# Patient Record
Sex: FEMALE | Race: WHITE | NOT HISPANIC OR LATINO | Employment: OTHER | URBAN - METROPOLITAN AREA
[De-identification: names, ages, dates, MRNs, and addresses within clinical notes are randomized per-mention and may not be internally consistent; named-entity substitution may affect disease eponyms.]

---

## 2017-01-09 DIAGNOSIS — E55.9 VITAMIN D DEFICIENCY: ICD-10-CM

## 2017-01-09 DIAGNOSIS — D64.9 ANEMIA: ICD-10-CM

## 2017-01-09 DIAGNOSIS — E53.8 DEFICIENCY OF OTHER SPECIFIED B GROUP VITAMINS: ICD-10-CM

## 2017-01-09 DIAGNOSIS — R73.9 HYPERGLYCEMIA: ICD-10-CM

## 2017-01-09 DIAGNOSIS — E78.5 HYPERLIPIDEMIA: ICD-10-CM

## 2017-01-09 DIAGNOSIS — R74.8 ABNORMAL LEVELS OF OTHER SERUM ENZYMES: ICD-10-CM

## 2017-01-09 DIAGNOSIS — E87.6 HYPOKALEMIA: ICD-10-CM

## 2017-01-16 ENCOUNTER — APPOINTMENT (OUTPATIENT)
Dept: LAB | Facility: CLINIC | Age: 47
End: 2017-01-16
Payer: MEDICARE

## 2017-01-16 ENCOUNTER — HOSPITAL ENCOUNTER (OUTPATIENT)
Dept: INFUSION CENTER | Facility: CLINIC | Age: 47
Discharge: HOME | End: 2017-01-16
Payer: MEDICARE

## 2017-01-16 DIAGNOSIS — R74.8 ABNORMAL LEVELS OF OTHER SERUM ENZYMES: ICD-10-CM

## 2017-01-16 DIAGNOSIS — E87.6 HYPOKALEMIA: ICD-10-CM

## 2017-01-16 DIAGNOSIS — R73.9 HYPERGLYCEMIA: ICD-10-CM

## 2017-01-16 DIAGNOSIS — E55.9 VITAMIN D DEFICIENCY: ICD-10-CM

## 2017-01-16 DIAGNOSIS — D64.9 ANEMIA: ICD-10-CM

## 2017-01-16 DIAGNOSIS — E53.8 DEFICIENCY OF OTHER SPECIFIED B GROUP VITAMINS: ICD-10-CM

## 2017-01-16 DIAGNOSIS — E78.5 HYPERLIPIDEMIA: ICD-10-CM

## 2017-01-16 LAB
25(OH)D3 SERPL-MCNC: 17.8 NG/ML (ref 30–100)
ALBUMIN SERPL BCP-MCNC: 4.1 G/DL (ref 3.5–5)
ALP SERPL-CCNC: 81 U/L (ref 46–116)
ALT SERPL W P-5'-P-CCNC: <6 U/L (ref 12–78)
AMORPH PHOS CRY URNS QL MICRO: ABNORMAL /HPF
ANION GAP SERPL CALCULATED.3IONS-SCNC: 9 MMOL/L (ref 4–13)
AST SERPL W P-5'-P-CCNC: <5 U/L (ref 5–45)
BACTERIA UR QL AUTO: ABNORMAL /HPF
BILIRUB SERPL-MCNC: 0.5 MG/DL (ref 0.2–1)
BILIRUB UR QL STRIP: NEGATIVE
BUN SERPL-MCNC: 9 MG/DL (ref 5–25)
CALCIUM SERPL-MCNC: 9.3 MG/DL (ref 8.3–10.1)
CHLORIDE SERPL-SCNC: 101 MMOL/L (ref 100–108)
CHOLEST SERPL-MCNC: 283 MG/DL (ref 50–200)
CLARITY UR: CLEAR
CO2 SERPL-SCNC: 30 MMOL/L (ref 21–32)
COLOR UR: YELLOW
CREAT SERPL-MCNC: 0.39 MG/DL (ref 0.6–1.3)
ERYTHROCYTE [DISTWIDTH] IN BLOOD BY AUTOMATED COUNT: 12.6 % (ref 11.6–15.1)
EST. AVERAGE GLUCOSE BLD GHB EST-MCNC: 100 MG/DL
FOLATE SERPL-MCNC: 11.1 NG/ML (ref 3.1–17.5)
GFR SERPL CREATININE-BSD FRML MDRD: >60 ML/MIN/1.73SQ M
GLUCOSE SERPL-MCNC: 98 MG/DL (ref 65–140)
GLUCOSE UR STRIP-MCNC: NEGATIVE MG/DL
HBA1C MFR BLD: 5.1 % (ref 4.2–6.3)
HCT VFR BLD AUTO: 40.1 % (ref 34.8–46.1)
HDLC SERPL-MCNC: 97 MG/DL (ref 40–60)
HGB BLD-MCNC: 13.4 G/DL (ref 11.5–15.4)
HGB UR QL STRIP.AUTO: NEGATIVE
INSULIN SERPL-ACNC: 7.6 MU/L (ref 3–25)
KETONES UR STRIP-MCNC: NEGATIVE MG/DL
LDLC SERPL CALC-MCNC: 172 MG/DL (ref 0–100)
LEUKOCYTE ESTERASE UR QL STRIP: ABNORMAL
MCH RBC QN AUTO: 28.6 PG (ref 26.8–34.3)
MCHC RBC AUTO-ENTMCNC: 33.5 G/DL (ref 31.4–37.4)
MCV RBC AUTO: 85 FL (ref 82–98)
NITRITE UR QL STRIP: POSITIVE
NON-SQ EPI CELLS URNS QL MICRO: ABNORMAL /HPF
PH UR STRIP.AUTO: 7.5 [PH] (ref 4.5–8)
PLATELET # BLD AUTO: 292 THOUSANDS/UL (ref 149–390)
PMV BLD AUTO: 7.4 FL (ref 8.9–12.7)
POTASSIUM SERPL-SCNC: 3.6 MMOL/L (ref 3.5–5.3)
PROT SERPL-MCNC: 6.6 G/DL (ref 6.4–8.2)
PROT UR STRIP-MCNC: NEGATIVE MG/DL
RBC # BLD AUTO: 4.7 MILLION/UL (ref 3.81–5.12)
RBC #/AREA URNS AUTO: ABNORMAL /HPF
SODIUM SERPL-SCNC: 140 MMOL/L (ref 136–145)
SP GR UR STRIP.AUTO: 1.01 (ref 1–1.03)
TRIGL SERPL-MCNC: 71 MG/DL
TSH SERPL DL<=0.05 MIU/L-ACNC: 2.9 UIU/ML (ref 0.36–3.74)
UROBILINOGEN UR QL STRIP.AUTO: 0.2 E.U./DL
VIT B12 SERPL-MCNC: 482 PG/ML (ref 100–900)
WBC # BLD AUTO: 8.5 THOUSAND/UL (ref 4.31–10.16)
WBC #/AREA URNS AUTO: ABNORMAL /HPF

## 2017-01-16 PROCEDURE — 84207 ASSAY OF VITAMIN B-6: CPT | Performed by: PSYCHIATRY & NEUROLOGY

## 2017-01-16 PROCEDURE — 36415 COLL VENOUS BLD VENIPUNCTURE: CPT

## 2017-01-16 PROCEDURE — 80061 LIPID PANEL: CPT

## 2017-01-16 PROCEDURE — 84425 ASSAY OF VITAMIN B-1: CPT | Performed by: PSYCHIATRY & NEUROLOGY

## 2017-01-16 PROCEDURE — 80053 COMPREHEN METABOLIC PANEL: CPT

## 2017-01-16 PROCEDURE — 84080 ASSAY ALKALINE PHOSPHATASES: CPT

## 2017-01-16 PROCEDURE — 85027 COMPLETE CBC AUTOMATED: CPT

## 2017-01-16 PROCEDURE — 83036 HEMOGLOBIN GLYCOSYLATED A1C: CPT

## 2017-01-16 PROCEDURE — 84443 ASSAY THYROID STIM HORMONE: CPT

## 2017-01-16 PROCEDURE — 82306 VITAMIN D 25 HYDROXY: CPT

## 2017-01-16 PROCEDURE — 83525 ASSAY OF INSULIN: CPT

## 2017-01-16 PROCEDURE — 83918 ORGANIC ACIDS TOTAL QUANT: CPT | Performed by: PSYCHIATRY & NEUROLOGY

## 2017-01-16 PROCEDURE — 81001 URINALYSIS AUTO W/SCOPE: CPT

## 2017-01-16 PROCEDURE — 82607 VITAMIN B-12: CPT

## 2017-01-16 PROCEDURE — 82746 ASSAY OF FOLIC ACID SERUM: CPT

## 2017-01-16 RX ORDER — POTASSIUM CHLORIDE 20 MEQ/1
20 TABLET, EXTENDED RELEASE ORAL EVERY EVENING
COMMUNITY
End: 2018-01-25 | Stop reason: SDUPTHER

## 2017-01-16 RX ORDER — FUROSEMIDE 40 MG/1
40 TABLET ORAL EVERY EVENING
COMMUNITY
End: 2018-02-07 | Stop reason: SDUPTHER

## 2017-01-16 RX ORDER — DULOXETIN HYDROCHLORIDE 20 MG/1
20 CAPSULE, DELAYED RELEASE ORAL EVERY EVENING
COMMUNITY
End: 2018-01-25 | Stop reason: SDUPTHER

## 2017-01-16 RX ORDER — BACLOFEN 20 MG/1
20 TABLET ORAL 3 TIMES DAILY
COMMUNITY
End: 2018-06-14 | Stop reason: SDUPTHER

## 2017-01-16 RX ADMIN — Medication 300 UNITS: at 12:00

## 2017-01-16 NOTE — PLAN OF CARE
Problem: Potential for Falls  Goal: Patient will remain free of falls  INTERVENTIONS:  - Assess patient frequently for physical needs  - Identify cognitive and physical deficits and behaviors that affect risk of falls  - [place] fall precautions as indicated by assessment   - Educate patient/family on patient safety including physical limitations  - Instruct patient to call for assistance with activity based on assessment  - Modify environment to reduce risk of injury  - Consider OT/PT consult to assist with strengthening/mobility   Outcome: Progressing    Problem: SAFETY ADULT  Goal: Patient will remain free of falls  INTERVENTIONS:  - Assess patient frequently for physical needs  - Identify cognitive and physical deficits and behaviors that affect risk of falls  - [place] fall precautions as indicated by assessment   - Educate patient/family on patient safety including physical limitations  - Instruct patient to call for assistance with activity based on assessment  - Modify environment to reduce risk of injury  - Consider OT/PT consult to assist with strengthening/mobility   Outcome: Progressing    Problem: Knowledge Deficit  Goal: Patient/family/caregiver demonstrates understanding of disease process, treatment plan, medications, and discharge instructions  Complete learning assessment and assess knowledge base    Interventions:  - Provide teaching at level of understanding  - Provide teaching via preferred learning methods  Outcome: Progressing

## 2017-01-17 ENCOUNTER — ALLSCRIPTS OFFICE VISIT (OUTPATIENT)
Dept: OTHER | Facility: OTHER | Age: 47
End: 2017-01-17

## 2017-01-18 LAB
ALP BONE CFR SERPL: 32 % (ref 14–68)
ALP INTEST CFR SERPL: 0 % (ref 0–18)
ALP LIVER CFR SERPL: 68 % (ref 18–85)
ALP SERPL-CCNC: 81 IU/L (ref 39–117)

## 2017-01-19 ENCOUNTER — GENERIC CONVERSION - ENCOUNTER (OUTPATIENT)
Dept: OTHER | Facility: OTHER | Age: 47
End: 2017-01-19

## 2017-01-19 LAB
VIT B1 BLD-SCNC: 130.8 NMOL/L (ref 66.5–200)
VIT B6 SERPL-MCNC: 15.8 UG/L (ref 2–32.8)

## 2017-01-20 LAB — METHYLMALONATE SERPL-SCNC: 151 NMOL/L (ref 0–378)

## 2017-01-30 ENCOUNTER — GENERIC CONVERSION - ENCOUNTER (OUTPATIENT)
Dept: OTHER | Facility: OTHER | Age: 47
End: 2017-01-30

## 2017-02-19 ENCOUNTER — GENERIC CONVERSION - ENCOUNTER (OUTPATIENT)
Dept: OTHER | Facility: OTHER | Age: 47
End: 2017-02-19

## 2017-02-22 ENCOUNTER — ALLSCRIPTS OFFICE VISIT (OUTPATIENT)
Dept: OTHER | Facility: OTHER | Age: 47
End: 2017-02-22

## 2017-02-24 RX ORDER — SODIUM CHLORIDE 9 MG/ML
20 INJECTION, SOLUTION INTRAVENOUS CONTINUOUS
Status: DISCONTINUED | OUTPATIENT
Start: 2017-02-27 | End: 2017-03-02 | Stop reason: HOSPADM

## 2017-02-27 ENCOUNTER — HOSPITAL ENCOUNTER (OUTPATIENT)
Dept: INFUSION CENTER | Facility: CLINIC | Age: 47
Discharge: HOME | End: 2017-02-27
Payer: MEDICARE

## 2017-02-27 VITALS
RESPIRATION RATE: 18 BRPM | TEMPERATURE: 98.2 F | SYSTOLIC BLOOD PRESSURE: 114 MMHG | HEART RATE: 83 BPM | DIASTOLIC BLOOD PRESSURE: 53 MMHG

## 2017-02-27 PROCEDURE — 96365 THER/PROPH/DIAG IV INF INIT: CPT

## 2017-02-27 RX ORDER — SODIUM CHLORIDE 9 MG/ML
20 INJECTION, SOLUTION INTRAVENOUS CONTINUOUS
Status: DISCONTINUED | OUTPATIENT
Start: 2017-02-28 | End: 2017-03-03 | Stop reason: HOSPADM

## 2017-02-27 RX ADMIN — SODIUM CHLORIDE 20 ML/HR: 0.9 INJECTION, SOLUTION INTRAVENOUS at 11:00

## 2017-02-27 RX ADMIN — Medication 300 UNITS: at 12:14

## 2017-02-27 RX ADMIN — SODIUM CHLORIDE 1000 MG: 0.9 INJECTION, SOLUTION INTRAVENOUS at 11:05

## 2017-02-28 ENCOUNTER — HOSPITAL ENCOUNTER (OUTPATIENT)
Dept: INFUSION CENTER | Facility: CLINIC | Age: 47
Discharge: HOME | End: 2017-02-28
Payer: MEDICARE

## 2017-02-28 VITALS
SYSTOLIC BLOOD PRESSURE: 99 MMHG | HEART RATE: 77 BPM | TEMPERATURE: 97 F | RESPIRATION RATE: 16 BRPM | DIASTOLIC BLOOD PRESSURE: 58 MMHG

## 2017-02-28 PROCEDURE — 96365 THER/PROPH/DIAG IV INF INIT: CPT

## 2017-02-28 RX ORDER — SODIUM CHLORIDE 9 MG/ML
20 INJECTION, SOLUTION INTRAVENOUS CONTINUOUS
Status: DISCONTINUED | OUTPATIENT
Start: 2017-03-01 | End: 2017-03-04 | Stop reason: HOSPADM

## 2017-02-28 RX ADMIN — SODIUM CHLORIDE 1000 MG: 0.9 INJECTION, SOLUTION INTRAVENOUS at 13:17

## 2017-02-28 RX ADMIN — Medication 300 UNITS: at 14:31

## 2017-02-28 RX ADMIN — SODIUM CHLORIDE 20 ML/HR: 0.9 INJECTION, SOLUTION INTRAVENOUS at 13:16

## 2017-02-28 NOTE — PLAN OF CARE
Problem: Potential for Falls  Goal: Patient will remain free of falls  INTERVENTIONS:  - Assess patient frequently for physical needs  - Identify cognitive and physical deficits and behaviors that affect risk of falls    - [place] fall precautions as indicated by assessment   - Educate patient/family on patient safety including physical limitations  - Instruct patient to call for assistance with activity based on assessment  - Modify environment to reduce risk of injury  - Consider OT/PT consult to assist with strengthening/mobility   Outcome: Progressing

## 2017-02-28 NOTE — PROGRESS NOTES
Port a cath accessed from yesterday's infusion  Pt requests to keep needle in place for tomorrow as well

## 2017-03-01 ENCOUNTER — HOSPITAL ENCOUNTER (OUTPATIENT)
Dept: INFUSION CENTER | Facility: CLINIC | Age: 47
Discharge: HOME | End: 2017-03-01
Payer: MEDICARE

## 2017-03-01 VITALS
SYSTOLIC BLOOD PRESSURE: 103 MMHG | RESPIRATION RATE: 18 BRPM | TEMPERATURE: 97 F | HEART RATE: 65 BPM | DIASTOLIC BLOOD PRESSURE: 51 MMHG

## 2017-03-01 PROCEDURE — 96365 THER/PROPH/DIAG IV INF INIT: CPT

## 2017-03-01 RX ADMIN — SODIUM CHLORIDE 1000 MG: 0.9 INJECTION, SOLUTION INTRAVENOUS at 10:03

## 2017-03-01 RX ADMIN — SODIUM CHLORIDE 20 ML/HR: 0.9 INJECTION, SOLUTION INTRAVENOUS at 10:06

## 2017-03-01 RX ADMIN — Medication 300 UNITS: at 11:28

## 2017-03-01 NOTE — PLAN OF CARE
Problem: Potential for Falls  Goal: Patient will remain free of falls  INTERVENTIONS:  - Assess patient frequently for physical needs  - Identify cognitive and physical deficits and behaviors that affect risk of falls    - [place] fall precautions as indicated by assessment   - Educate patient/family on patient safety including physical limitations  - Instruct patient to call for assistance with activity based on assessment  - Modify environment to reduce risk of injury  - Consider OT/PT consult to assist with strengthening/mobility   Outcome: Progressing

## 2017-03-06 DIAGNOSIS — G35 MULTIPLE SCLEROSIS (HCC): ICD-10-CM

## 2017-04-17 DIAGNOSIS — R73.9 HYPERGLYCEMIA: ICD-10-CM

## 2017-04-17 DIAGNOSIS — M48.00 SPINAL STENOSIS: ICD-10-CM

## 2017-04-17 DIAGNOSIS — N31.9 NEUROMUSCULAR DYSFUNCTION OF BLADDER: ICD-10-CM

## 2017-04-17 DIAGNOSIS — E53.8 DEFICIENCY OF OTHER SPECIFIED B GROUP VITAMINS: ICD-10-CM

## 2017-04-17 DIAGNOSIS — E55.9 VITAMIN D DEFICIENCY: ICD-10-CM

## 2017-04-17 DIAGNOSIS — Z00.00 ENCOUNTER FOR GENERAL ADULT MEDICAL EXAMINATION WITHOUT ABNORMAL FINDINGS: ICD-10-CM

## 2017-04-17 DIAGNOSIS — E78.5 HYPERLIPIDEMIA: ICD-10-CM

## 2017-04-17 DIAGNOSIS — E87.6 HYPOKALEMIA: ICD-10-CM

## 2017-04-17 DIAGNOSIS — Z99.3 DEPENDENT ON WHEELCHAIR: ICD-10-CM

## 2017-04-17 DIAGNOSIS — F32.9 MAJOR DEPRESSIVE DISORDER, SINGLE EPISODE: ICD-10-CM

## 2017-07-18 ENCOUNTER — ALLSCRIPTS OFFICE VISIT (OUTPATIENT)
Dept: OTHER | Facility: OTHER | Age: 47
End: 2017-07-18

## 2017-09-28 ENCOUNTER — GENERIC CONVERSION - ENCOUNTER (OUTPATIENT)
Dept: OTHER | Facility: OTHER | Age: 47
End: 2017-09-28

## 2017-10-09 ENCOUNTER — GENERIC CONVERSION - ENCOUNTER (OUTPATIENT)
Dept: OTHER | Facility: OTHER | Age: 47
End: 2017-10-09

## 2017-11-17 ENCOUNTER — GENERIC CONVERSION - ENCOUNTER (OUTPATIENT)
Dept: OTHER | Facility: OTHER | Age: 47
End: 2017-11-17

## 2017-11-21 DIAGNOSIS — N21.0 CALCULUS IN BLADDER: ICD-10-CM

## 2017-11-21 DIAGNOSIS — N20.0 CALCULUS OF KIDNEY: ICD-10-CM

## 2017-11-24 ENCOUNTER — GENERIC CONVERSION - ENCOUNTER (OUTPATIENT)
Dept: OTHER | Facility: OTHER | Age: 47
End: 2017-11-24

## 2017-11-27 ENCOUNTER — GENERIC CONVERSION - ENCOUNTER (OUTPATIENT)
Dept: OTHER | Facility: OTHER | Age: 47
End: 2017-11-27

## 2017-11-28 ENCOUNTER — GENERIC CONVERSION - ENCOUNTER (OUTPATIENT)
Dept: OTHER | Facility: OTHER | Age: 47
End: 2017-11-28

## 2017-11-29 ENCOUNTER — ANESTHESIA EVENT (OUTPATIENT)
Dept: PERIOP | Facility: HOSPITAL | Age: 47
End: 2017-11-29
Payer: MEDICARE

## 2017-11-29 RX ORDER — SODIUM CHLORIDE 9 MG/ML
125 INJECTION, SOLUTION INTRAVENOUS CONTINUOUS
Status: CANCELLED | OUTPATIENT
Start: 2017-12-04

## 2017-11-30 ENCOUNTER — HOSPITAL ENCOUNTER (OUTPATIENT)
Dept: NON INVASIVE DIAGNOSTICS | Facility: HOSPITAL | Age: 47
Discharge: HOME | End: 2017-11-30
Attending: UROLOGY
Payer: MEDICARE

## 2017-11-30 ENCOUNTER — TRANSCRIBE ORDERS (OUTPATIENT)
Dept: ADMINISTRATIVE | Facility: HOSPITAL | Age: 47
End: 2017-11-30

## 2017-11-30 ENCOUNTER — APPOINTMENT (OUTPATIENT)
Dept: PREADMISSION TESTING | Facility: HOSPITAL | Age: 47
End: 2017-11-30
Payer: MEDICARE

## 2017-11-30 ENCOUNTER — APPOINTMENT (OUTPATIENT)
Dept: LAB | Facility: HOSPITAL | Age: 47
End: 2017-11-30
Attending: UROLOGY
Payer: MEDICARE

## 2017-11-30 VITALS
BODY MASS INDEX: 27.32 KG/M2 | HEIGHT: 66 IN | TEMPERATURE: 96 F | RESPIRATION RATE: 16 BRPM | HEART RATE: 76 BPM | WEIGHT: 170 LBS

## 2017-11-30 DIAGNOSIS — Z01.818 PREOP EXAMINATION: Primary | ICD-10-CM

## 2017-11-30 DIAGNOSIS — Z01.818 PREOP EXAMINATION: ICD-10-CM

## 2017-11-30 DIAGNOSIS — N31.9 NEUROGENIC DYSFUNCTION OF THE URINARY BLADDER: ICD-10-CM

## 2017-11-30 LAB
ANION GAP SERPL CALCULATED.3IONS-SCNC: 5 MMOL/L (ref 4–13)
APTT PPP: 27 SECONDS (ref 23–35)
ATRIAL RATE: 73 BPM
BASOPHILS # BLD AUTO: 0.03 THOUSANDS/ΜL (ref 0–0.1)
BASOPHILS NFR BLD AUTO: 1 % (ref 0–1)
BUN SERPL-MCNC: 6 MG/DL (ref 5–25)
CALCIUM SERPL-MCNC: 9.5 MG/DL (ref 8.3–10.1)
CHLORIDE SERPL-SCNC: 100 MMOL/L (ref 100–108)
CO2 SERPL-SCNC: 32 MMOL/L (ref 21–32)
CREAT SERPL-MCNC: 0.47 MG/DL (ref 0.6–1.3)
EOSINOPHIL # BLD AUTO: 0.12 THOUSAND/ΜL (ref 0–0.61)
EOSINOPHIL NFR BLD AUTO: 2 % (ref 0–6)
ERYTHROCYTE [DISTWIDTH] IN BLOOD BY AUTOMATED COUNT: 12.7 % (ref 11.6–15.1)
GFR SERPL CREATININE-BSD FRML MDRD: 118 ML/MIN/1.73SQ M
GLUCOSE P FAST SERPL-MCNC: 98 MG/DL (ref 65–99)
HCT VFR BLD AUTO: 41.4 % (ref 34.8–46.1)
HGB BLD-MCNC: 13.8 G/DL (ref 11.5–15.4)
INR PPP: 0.91 (ref 0.86–1.16)
LYMPHOCYTES # BLD AUTO: 1.33 THOUSANDS/ΜL (ref 0.6–4.47)
LYMPHOCYTES NFR BLD AUTO: 27 % (ref 14–44)
MCH RBC QN AUTO: 28.8 PG (ref 26.8–34.3)
MCHC RBC AUTO-ENTMCNC: 33.3 G/DL (ref 31.4–37.4)
MCV RBC AUTO: 86 FL (ref 82–98)
MONOCYTES # BLD AUTO: 0.32 THOUSAND/ΜL (ref 0.17–1.22)
MONOCYTES NFR BLD AUTO: 6 % (ref 4–12)
NEUTROPHILS # BLD AUTO: 3.22 THOUSANDS/ΜL (ref 1.85–7.62)
NEUTS SEG NFR BLD AUTO: 64 % (ref 43–75)
P AXIS: 67 DEGREES
PLATELET # BLD AUTO: 295 THOUSANDS/UL (ref 149–390)
PMV BLD AUTO: 9.7 FL (ref 8.9–12.7)
POTASSIUM SERPL-SCNC: 3.6 MMOL/L (ref 3.5–5.3)
PR INTERVAL: 138 MS
PROTHROMBIN TIME: 12.3 SECONDS (ref 12.1–14.4)
QRS AXIS: 39 DEGREES
QRSD INTERVAL: 74 MS
QT INTERVAL: 394 MS
QTC INTERVAL: 434 MS
RBC # BLD AUTO: 4.79 MILLION/UL (ref 3.81–5.12)
SODIUM SERPL-SCNC: 137 MMOL/L (ref 136–145)
T WAVE AXIS: 36 DEGREES
VENTRICULAR RATE: 73 BPM
WBC # BLD AUTO: 5.02 THOUSAND/UL (ref 4.31–10.16)

## 2017-11-30 PROCEDURE — 93005 ELECTROCARDIOGRAM TRACING: CPT

## 2017-11-30 PROCEDURE — 85610 PROTHROMBIN TIME: CPT

## 2017-11-30 PROCEDURE — 85730 THROMBOPLASTIN TIME PARTIAL: CPT

## 2017-11-30 PROCEDURE — 85025 COMPLETE CBC W/AUTO DIFF WBC: CPT

## 2017-11-30 PROCEDURE — 36415 COLL VENOUS BLD VENIPUNCTURE: CPT

## 2017-11-30 PROCEDURE — 80048 BASIC METABOLIC PNL TOTAL CA: CPT

## 2017-11-30 RX ORDER — OXYCODONE HYDROCHLORIDE AND ACETAMINOPHEN 5; 325 MG/1; MG/1
1 TABLET ORAL EVERY 4 HOURS PRN
COMMUNITY
End: 2018-01-25 | Stop reason: SDUPTHER

## 2017-11-30 NOTE — ANESTHESIA PREPROCEDURE EVALUATION
Review of Systems/Medical History  Patient summary reviewed  Chart reviewed      Cardiovascular  EKG reviewed, Negative cardio ROS    Pulmonary       GI/Hepatic  Negative GI/hepatic ROS          Negative  ROS        Endo/Other  Negative endo/other ROS      GYN       Hematology  Negative hematology ROS      Musculoskeletal  Negative musculoskeletal ROS        Neurology      Comment: Multiple sclerosis    Muscle spacity Psychology   Negative psychology ROS            Physical Exam    Airway    Mallampati score: I  TM Distance: <3 FB  Neck ROM: full     Dental   No notable dental hx     Cardiovascular  Comment: Negative ROS, Rhythm: regular, Rate: normal,     Pulmonary  Pulmonary exam normal     Other Findings        Anesthesia Plan  ASA Score- 2       Anesthesia Type- general with ASA Monitors  Additional Monitors:   Airway Plan:           Induction- intravenous  Informed Consent- Anesthetic plan and risks discussed with patient and spouse

## 2017-11-30 NOTE — PRE-PROCEDURE INSTRUCTIONS
Pre-Surgery Instructions:   Medication Instructions    baclofen 20 mg tablet Patient was instructed by Physician and understands   Cyanocobalamin (VITAMIN B-12 PO) Patient was instructed by Physician and understands   DULoxetine (CYMBALTA) 20 mg capsule Patient was instructed by Physician and understands   furosemide (LASIX) 40 mg tablet Patient was instructed by Physician and understands   oxyCODONE-acetaminophen (PERCOCET) 5-325 mg per tablet Patient was instructed by Physician and understands   potassium chloride (K-DUR,KLOR-CON) 20 mEq tablet Patient was instructed by Physician and understands   Sennosides (SENOKOT PO) Patient was instructed by Physician and understands    Pt and spouse given/reviewed [facility] preop instructions and chlorhexadine soap   Pt to hold asa/NSAIDS/vitamins/herbal supplements one week before surgery

## 2017-12-04 ENCOUNTER — HOSPITAL ENCOUNTER (OUTPATIENT)
Facility: HOSPITAL | Age: 47
Setting detail: OUTPATIENT SURGERY
Discharge: HOME | End: 2017-12-04
Attending: UROLOGY | Admitting: UROLOGY
Payer: MEDICARE

## 2017-12-04 ENCOUNTER — HOSPITAL ENCOUNTER (OUTPATIENT)
Dept: RADIOLOGY | Facility: HOSPITAL | Age: 47
Setting detail: OUTPATIENT SURGERY
Discharge: HOME | End: 2017-12-04
Payer: MEDICARE

## 2017-12-04 ENCOUNTER — ANESTHESIA (OUTPATIENT)
Dept: PERIOP | Facility: HOSPITAL | Age: 47
End: 2017-12-04
Payer: MEDICARE

## 2017-12-04 VITALS
RESPIRATION RATE: 16 BRPM | DIASTOLIC BLOOD PRESSURE: 54 MMHG | BODY MASS INDEX: 22.34 KG/M2 | SYSTOLIC BLOOD PRESSURE: 111 MMHG | WEIGHT: 139 LBS | HEIGHT: 66 IN | TEMPERATURE: 97.5 F | OXYGEN SATURATION: 99 % | HEART RATE: 91 BPM

## 2017-12-04 DIAGNOSIS — N21.0 CALCULUS IN BLADDER: ICD-10-CM

## 2017-12-04 PROCEDURE — C1769 GUIDE WIRE: HCPCS | Performed by: UROLOGY

## 2017-12-04 PROCEDURE — 82360 CALCULUS ASSAY QUANT: CPT | Performed by: UROLOGY

## 2017-12-04 RX ORDER — SODIUM CHLORIDE 9 MG/ML
125 INJECTION, SOLUTION INTRAVENOUS CONTINUOUS
Status: DISCONTINUED | OUTPATIENT
Start: 2017-12-04 | End: 2017-12-04 | Stop reason: HOSPADM

## 2017-12-04 RX ORDER — ONDANSETRON 2 MG/ML
4 INJECTION INTRAMUSCULAR; INTRAVENOUS ONCE
Status: DISCONTINUED | OUTPATIENT
Start: 2017-12-04 | End: 2017-12-04 | Stop reason: HOSPADM

## 2017-12-04 RX ORDER — HYDROCODONE BITARTRATE AND ACETAMINOPHEN 5; 325 MG/1; MG/1
1 TABLET ORAL EVERY 4 HOURS PRN
Qty: 12 TABLET | Refills: 0 | Status: SHIPPED | OUTPATIENT
Start: 2017-12-04 | End: 2017-12-14

## 2017-12-04 RX ORDER — SODIUM CHLORIDE 9 MG/ML
INJECTION, SOLUTION INTRAVENOUS AS NEEDED
Status: DISCONTINUED | OUTPATIENT
Start: 2017-12-04 | End: 2017-12-04 | Stop reason: HOSPADM

## 2017-12-04 RX ORDER — EPHEDRINE SULFATE 50 MG/ML
INJECTION, SOLUTION INTRAVENOUS AS NEEDED
Status: DISCONTINUED | OUTPATIENT
Start: 2017-12-04 | End: 2017-12-04 | Stop reason: SURG

## 2017-12-04 RX ORDER — FENTANYL CITRATE/PF 50 MCG/ML
25 SYRINGE (ML) INJECTION
Status: DISCONTINUED | OUTPATIENT
Start: 2017-12-04 | End: 2017-12-04 | Stop reason: HOSPADM

## 2017-12-04 RX ORDER — MIDAZOLAM HYDROCHLORIDE 1 MG/ML
INJECTION INTRAMUSCULAR; INTRAVENOUS AS NEEDED
Status: DISCONTINUED | OUTPATIENT
Start: 2017-12-04 | End: 2017-12-04 | Stop reason: SURG

## 2017-12-04 RX ORDER — ONDANSETRON 2 MG/ML
INJECTION INTRAMUSCULAR; INTRAVENOUS AS NEEDED
Status: DISCONTINUED | OUTPATIENT
Start: 2017-12-04 | End: 2017-12-04 | Stop reason: SURG

## 2017-12-04 RX ORDER — HYDROCODONE BITARTRATE AND ACETAMINOPHEN 5; 325 MG/1; MG/1
2 TABLET ORAL EVERY 4 HOURS PRN
Status: DISCONTINUED | OUTPATIENT
Start: 2017-12-04 | End: 2017-12-04 | Stop reason: HOSPADM

## 2017-12-04 RX ORDER — FENTANYL CITRATE 50 UG/ML
INJECTION, SOLUTION INTRAMUSCULAR; INTRAVENOUS AS NEEDED
Status: DISCONTINUED | OUTPATIENT
Start: 2017-12-04 | End: 2017-12-04 | Stop reason: SURG

## 2017-12-04 RX ORDER — PROPOFOL 10 MG/ML
INJECTION, EMULSION INTRAVENOUS AS NEEDED
Status: DISCONTINUED | OUTPATIENT
Start: 2017-12-04 | End: 2017-12-04 | Stop reason: SURG

## 2017-12-04 RX ORDER — CIPROFLOXACIN 500 MG/1
500 TABLET, FILM COATED ORAL 2 TIMES DAILY
Qty: 6 TABLET | Refills: 0 | Status: SHIPPED | OUTPATIENT
Start: 2017-12-04 | End: 2017-12-07

## 2017-12-04 RX ORDER — MAGNESIUM HYDROXIDE 1200 MG/15ML
LIQUID ORAL AS NEEDED
Status: DISCONTINUED | OUTPATIENT
Start: 2017-12-04 | End: 2017-12-04 | Stop reason: HOSPADM

## 2017-12-04 RX ADMIN — EPHEDRINE SULFATE 20 MG: 50 INJECTION, SOLUTION INTRAMUSCULAR; INTRAVENOUS; SUBCUTANEOUS at 15:12

## 2017-12-04 RX ADMIN — ONDANSETRON HYDROCHLORIDE 4 MG: 2 INJECTION, SOLUTION INTRAVENOUS at 15:47

## 2017-12-04 RX ADMIN — CEFAZOLIN SODIUM 1000 MG: 1 SOLUTION INTRAVENOUS at 15:06

## 2017-12-04 RX ADMIN — SODIUM CHLORIDE 125 ML/HR: 0.9 INJECTION, SOLUTION INTRAVENOUS at 13:45

## 2017-12-04 RX ADMIN — MIDAZOLAM HYDROCHLORIDE 2 MG: 1 INJECTION, SOLUTION INTRAMUSCULAR; INTRAVENOUS at 15:06

## 2017-12-04 RX ADMIN — PROPOFOL 200 MG: 10 INJECTION, EMULSION INTRAVENOUS at 15:10

## 2017-12-04 RX ADMIN — LIDOCAINE HYDROCHLORIDE 40 MG: 20 INJECTION, SOLUTION INTRAVENOUS at 15:10

## 2017-12-04 RX ADMIN — DEXAMETHASONE SODIUM PHOSPHATE 8 MG: 10 INJECTION INTRAMUSCULAR; INTRAVENOUS at 15:19

## 2017-12-04 RX ADMIN — FENTANYL CITRATE 50 MCG: 50 INJECTION INTRAMUSCULAR; INTRAVENOUS at 15:10

## 2017-12-04 RX ADMIN — SODIUM CHLORIDE 125 ML/HR: 0.9 INJECTION, SOLUTION INTRAVENOUS at 16:56

## 2017-12-04 NOTE — OP NOTE
OPERATIVE REPORT  PATIENT NAME: Raleigh Jacques    :  1970  MRN: [id number]  Pt Location: [facility] OR ROOM 03    SURGERY DATE: 2017    Surgeon(s) and Role:     Gregorio Maldonado MD - Primary    Preop Diagnosis:  Calculus in bladder [N21 0]    Post-Op Diagnosis Codes:     * Calculus in bladder [N21 0]    Procedure(s) (LRB):  CYSTO, LITHOLOPAXY HOLMIUM LASER (N/A)    Specimen(s):  Bladder stone    Estimated Blood Loss:   Minimal    Drains:  Suprapubic Catheter Non-latex (Active)   Site Assessment Clean; Intact 2017  1:38 PM   Dressing Status Clean;Dry; Intact 2017  1:38 PM   Dressing Type Dry dressing 2017  1:38 PM   Collection Container Standard drainage bag 2017  1:38 PM   Number of days:        Anesthesia Type:   General    Operative Indications:  Calculus in bladder [N21 0]      Operative Findings:  Multiple bladder stones    Complications:   none    Procedure and Technique:  India Levi is a 40-year-old female with advanced multiple sclerosis  She has an indwelling suprapubic tube  Most recently in the office I performed flexible cystoscopy and found multiple bladder stones too large to pass  There were 2 large to be irrigated  Risk and benefits of cystoscopy with cystolitholapaxy were discussed and reviewed  Informed consent was obtained  The patient was brought to the operating room on 2017  After the smooth induction of general LMA anesthesia, the patient is placed in the dorsal lithotomy position  Her genitalia was prepped and draped in sterile fashion  Intravenous antibiotics were administered  A time-out was performed with all members of the operative team confirming the patient's identity and procedure be performed  A 22 German rigid scope with 30 degree lens was inserted  The patient's suprapubic tube has been removed at the start of the case  A new 24 German suprapubic tube was inserted under vision    Within the bladder there were at least 4 sizable stones identified  A 1000 micorn holmium laser fiber was utilized to estimate the stones until the fragments were easily removed utilizing an ellick flask  Mucosa was re-inspected and no abnormalities were appreciated  Both ureteral orifices were intact  The bladder was emptied and the cystoscope was removed  The suprapubic tube was maintained to gravity drainage  Overall the patient tolerated the procedure well number no complications  Patient was extubated in the operating room and transferred to the PACU in stable condition at the conclusion of the case      Patient Disposition:  PACU stable and extubated      SIGNATURE: Nowell Crigler, MD  DATE: December 4, 2017  TIME: 3:15 PM

## 2017-12-04 NOTE — ANESTHESIA POSTPROCEDURE EVALUATION
Post-Op Assessment Note      CV Status:  Stable    Mental Status:  Alert and awake    Hydration Status:  Euvolemic    PONV Controlled:  Controlled    Airway Patency:  Patent    Post Op Vitals Reviewed: Yes          Staff: Anesthesiologist           /53 (12/04/17 1627)    Temp      Pulse 96 (12/04/17 1627)   Resp (!) 11 (12/04/17 1627)    SpO2 97 % (12/04/17 1627)

## 2017-12-04 NOTE — DISCHARGE INSTRUCTIONS
Today he underwent removal of your bladder stones  Maintain her suprapubic tube to gravity drainage  Call for follow-up with Dr Satish Solis  [phone]    Bladder Stones   WHAT YOU NEED TO KNOW:   A bladder stone is a hard substance in your bladder  Bladder stones may form in your bladder, or they may first form in your kidney and then travel to your bladder  Bladder stones are made up of minerals such as calcium, uric acid, oxalate, and phosphate  You may have one or more bladder stone  DISCHARGE INSTRUCTIONS:   Seek care immediately if:   · You have severe pain that does not get better with medicine  · You are vomiting  · You have a fever  Contact your healthcare provider if:   · Your symptoms do not get better, or they get worse  · You have trouble urinating  · You have questions or concerns about your condition or care  Drink plenty of liquids: Your healthcare provider may tell you to drink up to 8 (eight-ounce) cups of liquids each day  This helps flush out the stones when you urinate  It may also help prevent bladder stones from forming again  Water is the best liquid to drink  Follow up with your healthcare provider as directed: You may need to return for more tests or treatment  Write down your questions so you remember to ask them during your visits  © 2017 [employer] Information is for End User's use only and may not be sold, redistributed or otherwise used for commercial purposes  All illustrations and images included in CareNotes® are the copyrighted property of [employer]  or [employer]  The above information is an  only  It is not intended as medical advice for individual conditions or treatments  Talk to your doctor, nurse or pharmacist before following any medical regimen to see if it is safe and effective for you

## 2017-12-12 LAB
AMM URATE MFR STONE: 5 %
CA PHOS MFR STONE: 55 %
COLOR STONE: NORMAL
COM MFR STONE: 25 %
COMMENT-STONE3: NORMAL
COMPOSITION: NORMAL
LABORATORY COMMENT REPORT: NORMAL
NIDUS STONE QL: NORMAL
PHOTO: NORMAL
SIZE STONE: NORMAL MM
STONE ANALYSIS-IMP: NORMAL
STONE ANALYSIS-IMP: NORMAL
TRI-PHOS MFR STONE: 15 %
WT STONE: 1234.8 MG

## 2018-01-10 NOTE — MISCELLANEOUS
Provider Comments  Provider Comments:   Patient did not show up for scheduled appointment      Signatures   Electronically signed by : Danielle Goldman DO; May 25 2016 11:03AM EST                       (Author)

## 2018-01-12 NOTE — MISCELLANEOUS
Provider Comments  Provider Comments:   Patient did not show up for her scheduled appointment today      Signatures   Electronically signed by : Rachana Carrasquillo DO; Dec 15 2016  3:28PM EST                       (Author)

## 2018-01-12 NOTE — RESULT NOTES
Verified Results  (1) ALKALINE PHOSPHATASE ISOENZYMES [id number] 11:50AM Tamera Linden Order Number: [id number]     Test Name Result Flag Reference   ALK PHOSPHATAS 81 IU/L  39 - 117   BONE ISOENZYMES 32 %  14 - 68   INTEST ISOENZYM 0 %  0 - 18   Performed at:  [address] [place] [address]  [id number]  : Trina Bateman MD, Phone:  [phone]  Performed at:  [address]  [id number]  : Janki Robins MD, Phone:  [phone]   LIVER ISOENZ 68 %  18 - 85

## 2018-01-13 NOTE — MISCELLANEOUS
Message   Recorded as Task   Date: 11/17/2017 02:09 PM, Created By: Jimmy Camacho   Task Name: Care Coordination   Assigned To: Jimmy Camacho   Regarding Patient: Martha Carrasco, Status: Active   Janet Harden - 17 Nov 2017 2:09 PM     TASK CREATED  PC from patient calling regarding sp tubes changes  I spoke with Veronica from [facility]  and informed her of the new order for patient to have sp tube changes  She will contact patient        Active Problems    1  Abdomen Suprapubic Catheter   2  Bladder calculus (594 1) (N21 0)   3  Conjunctivitis (372 30) (H10 9)   4  Constipation (564 00) (K59 00)   5  Depression (311) (F32 9)   6  Difficulty in walking (719 7) (R26 2)   7  Difficulty Swallowing (Dysphagia)   8  Dizziness (780 4) (R42)   9  Double vision (368 2) (H53 2)   10  History of Elevated alkaline phosphatase level (790 5) (R74 8)   11  Encounter for routine gynecological examination with Papanicolaou smear of cervix    (V72 31,V76 2) (Z01 419)   12  Encounter for screening mammogram for malignant neoplasm of breast (V76 12)    (Z12 31)   13  Hyperglycemia (790 29) (R73 9)   14  Hyperlipidemia (272 4) (E78 5)   15  Hypokalemia (276 8) (E87 6)   16  Lymphedema (457 1) (I89 0)   17  Multiple sclerosis (340) (G35)   18  Muscle spasm (728 85) (M62 838)   19  Muscle weakness (728 87) (M62 81)   20  Nephrolithiasis (592 0) (N20 0)   21  Neurogenic bladder (596 54) (N31 9)   22  Numbness (782 0) (R20 0)   23  Paraparesis (Lower Extremities) (344 1)   24  Right knee pain (719 46) (M25 561)   25  Sleep disorder (780 50) (G47 9)   26  Spasticity (781 0) (R25 2)   27  Spinal stenosis (724 00) (M48 00)   28  Tingling (782 0) (R20 2)   29  Tremor (781 0) (R25 1)   30  Urge incontinence of urine (788 31) (N39 41)   31  Urinary incontinence (788 30) (R32)   32  Vision loss (369 9) (H54 7)   33  Vitamin B12 deficiency (266 2) (E53 8)   34  Vitamin D deficiency (268 9) (E55 9)   35   Wheelchair dependent (V46 3) (Z99 3)    Current Meds   1  Baclofen 20 MG Oral Tablet; TAKE 1 TABLET 5 TIMES A DAY AS NEEDED; Therapy: 31Hhe2201 to (Evaluate:85Rry1177)  Requested for: 45PPA4557; Last   Rx:71Txk1555 Ordered   2  DULoxetine HCl - 30 MG Oral Capsule Delayed Release Particles (Cymbalta); TAKE 1   CAPSULE EVERY DAY; Therapy: 89XYA2498 to (BSUQVLUV:60MDH6191)  Requested for: 14XYQ9188; Last   Rx:17Jan2017 Ordered   3  Furosemide 40 MG Oral Tablet; TAKE 1 TABLET EVERY DAY; Therapy: 56ROM6766 to (FFFFNEOW:10AEB4506)  Requested for: 36JKB8514; Last   Rx:17Jan2017 Ordered   4  Klor-Con 8 MEQ Oral Tablet Extended Release; TAKE 1 TABLET DAILY WITH FOOD; Therapy: 17OBC0578 to (XTCHDPVR:44HRF2176)  Requested for: 89DKV4415; Last   Rx:17Jan2017 Ordered   5  Motrin  MG Oral Tablet; Therapy: [phone]) to Recorded   6  Oxycodone-Acetaminophen 5-325 MG Oral Tablet (Percocet); TAKE 1 TABLET every 4   hours as needed for pain; Therapy: 44NPN9763 to (Last Rx:12Nis2408) Ordered    Allergies    1  Latex Gloves MISC    Plan  Abdomen Suprapubic Catheter    · *1 - [facility]  Consult and treat  Patient needs sp tube changes every 4 weeks  Patient is due for sp tube change 11/20/17  Status: Hold For - Scheduling,Retrospective  By Protocol Authorization  Requested for: [id number]  Care Summary provided  : Yes    Signatures   Electronically signed by :  Mirtha Castellon, ; Nov 17 2017  2:11PM EST                       (Author)

## 2018-01-14 VITALS — HEART RATE: 64 BPM | SYSTOLIC BLOOD PRESSURE: 122 MMHG | DIASTOLIC BLOOD PRESSURE: 60 MMHG

## 2018-01-14 NOTE — MISCELLANEOUS
Provider Comments  Provider Comments:   1st no show appointment for neurology  No call, no message received  Tapan Preston attempted to call the patient- unable to reach the patient left a message for the patient to give our office a call back to reschedule her missed appointment on 5/11/16  1st no show letter mailed to patient's home on 5/13/16        Signatures   Electronically signed by : INES Soliman ; May 13 2016  1:10PM EST                       (Review)

## 2018-01-15 VITALS
SYSTOLIC BLOOD PRESSURE: 108 MMHG | DIASTOLIC BLOOD PRESSURE: 60 MMHG | HEIGHT: 66 IN | HEART RATE: 82 BPM | RESPIRATION RATE: 14 BRPM

## 2018-01-15 VITALS — DIASTOLIC BLOOD PRESSURE: 70 MMHG | SYSTOLIC BLOOD PRESSURE: 106 MMHG | HEART RATE: 80 BPM | RESPIRATION RATE: 20 BRPM

## 2018-01-15 NOTE — MISCELLANEOUS
Message  Message Free Text Note Form: Pt called service with c/o bilateral red sclera with d/c for the past two to three days  No cold symptoms or head congestion  Does not wear contacts  No one else in the family has same symptoms  She is not allergic to any antibiotics  Plan    1   Sulfacetamide Sodium 10 % Ophthalmic Solution; INSTILL 2 DROP 3 times daily    Signatures   Electronically signed by : Chance Salazar [facility]; Feb 19 2017 10:02AM EST                       (Author)

## 2018-01-15 NOTE — MISCELLANEOUS
Message  I rec'd a call from [facility] stating pt is being D/C from services b/c she was told that she can no longer change her own [facility] Cath otherwise Home care services are eligible through Medicare  Nurse went out and on 9/9/17 pt's family changed the cath and again on 9/23 which they were asked not to do that prior to 9/9/17  Family became upset when Nurse informed them on D/C  [facility] called us to let us know and she also contact Dr [place] office and spoke to [place] to inform her as well  Active Problems    1  Bladder calculus (594 1) (N21 0)   2  Conjunctivitis (372 30) (H10 9)   3  Constipation (564 00) (K59 00)   4  Depression (311) (F32 9)   5  Difficulty in walking (719 7) (R26 2)   6  Difficulty Swallowing (Dysphagia)   7  Dizziness (780 4) (R42)   8  Double vision (368 2) (H53 2)   9  History of Elevated alkaline phosphatase level (790 5) (R74 8)   10  Encounter for routine gynecological examination with Papanicolaou smear of cervix    (V72 31,V76 2) (Z01 419)   11  Encounter for screening mammogram for malignant neoplasm of breast (V76 12)    (Z12 31)   12  Hyperglycemia (790 29) (R73 9)   13  Hyperlipidemia (272 4) (E78 5)   14  Hypokalemia (276 8) (E87 6)   15  Lymphedema (457 1) (I89 0)   16  Multiple sclerosis (340) (G35)   17  Muscle spasm (728 85) (M62 838)   18  Muscle weakness (728 87) (M62 81)   19  Nephrolithiasis (592 0) (N20 0)   20  Neurogenic bladder (596 54) (N31 9)   21  Numbness (782 0) (R20 0)   22  Paraparesis (Lower Extremities) (344 1)   23  Right knee pain (719 46) (M25 561)   24  Sleep disorder (780 50) (G47 9)   25  Spasticity (781 0) (R25 2)   26  Spinal stenosis (724 00) (M48 00)   27  Tingling (782 0) (R20 2)   28  Tremor (781 0) (R25 1)   29  Urge incontinence of urine (788 31) (N39 41)   30  Urinary incontinence (788 30) (R32)   31  Vision loss (369 9) (H54 7)   32  Vitamin B12 deficiency (266 2) (E53 8)   33  Vitamin D deficiency (268 9) (E55 9)   34   Wheelchair dependent (V46 3) (Z99 3)    Current Meds   1  Baclofen 20 MG Oral Tablet; TAKE 1 TABLET 5 TIMES A DAY AS NEEDED; Therapy: 78Tev2785 to (Evaluate:52Whe8254)  Requested for: 62WXP2861; Last   Rx:30Sdu6915 Ordered   2  DULoxetine HCl - 30 MG Oral Capsule Delayed Release Particles (Cymbalta); TAKE 1   CAPSULE EVERY DAY; Therapy: 46IOM4208 to (AANVPMQF:19SVG5559)  Requested for: 88UJU8585; Last   Rx:17Jan2017 Ordered   3  Furosemide 40 MG Oral Tablet; TAKE 1 TABLET EVERY DAY; Therapy: 27SNL6060 to (FWSQJDZN:48KNC7615)  Requested for: 75YMZ8571; Last   Rx:17Jan2017 Ordered   4  Klor-Con 8 MEQ Oral Tablet Extended Release; TAKE 1 TABLET DAILY WITH FOOD; Therapy: 94JQQ6057 to (MHVSJBI:78PAR7285)  Requested for: 11KLH5299; Last   Rx:17Jan2017 Ordered   5  Motrin  MG Oral Tablet; Therapy: (Recorded:14Jan2014) to Recorded   6  Oxycodone-Acetaminophen 5-325 MG Oral Tablet (Percocet); TAKE 1 TABLET every 4   hours as needed for pain; Therapy: 42QZO4725 to (Last Rx:09Kje3627) Ordered   7  Vitamin D (Ergocalciferol) 36812 UNIT Oral Capsule; TAKE 1 CAPSULE Weekly; Therapy: 98EMF6727 to (Last Rx:17Jan2017)  Requested for: 14FIL0115 Ordered    Allergies    1   Latex Gloves MISC    Signatures   Electronically signed by : Walt hSoemaker, ; Sep 28 2017  8:26AM EST                       (Author)

## 2018-01-15 NOTE — MISCELLANEOUS
Message  PC from [facility] [phone] calling regarding sp tube changes  She stated that she didn't see the order to change sp tube  Explained that the order was in allscripts  If patient needs someone to assess if she is eligble for any other assistance, okay for assessment      Active Problems    1  Abdomen Suprapubic Catheter   2  Bladder calculus (594 1) (N21 0)   3  Conjunctivitis (372 30) (H10 9)   4  Constipation (564 00) (K59 00)   5  Depression (311) (F32 9)   6  Difficulty in walking (719 7) (R26 2)   7  Difficulty Swallowing (Dysphagia)   8  Dizziness (780 4) (R42)   9  Double vision (368 2) (H53 2)   10  History of Elevated alkaline phosphatase level (790 5) (R74 8)   11  Encounter for routine gynecological examination with Papanicolaou smear of cervix    (V72 31,V76 2) (Z01 419)   12  Encounter for screening mammogram for malignant neoplasm of breast (V76 12)    (Z12 31)   13  Hyperglycemia (790 29) (R73 9)   14  Hyperlipidemia (272 4) (E78 5)   15  Hypokalemia (276 8) (E87 6)   16  Lymphedema (457 1) (I89 0)   17  Multiple sclerosis (340) (G35)   18  Muscle spasm (728 85) (M62 838)   19  Muscle weakness (728 87) (M62 81)   20  Nephrolithiasis (592 0) (N20 0)   21  Neurogenic bladder (596 54) (N31 9)   22  Numbness (782 0) (R20 0)   23  Paraparesis (Lower Extremities) (344 1)   24  Right knee pain (719 46) (M25 561)   25  Sleep disorder (780 50) (G47 9)   26  Spasticity (781 0) (R25 2)   27  Spinal stenosis (724 00) (M48 00)   28  Tingling (782 0) (R20 2)   29  Tremor (781 0) (R25 1)   30  Urge incontinence of urine (788 31) (N39 41)   31  Urinary incontinence (788 30) (R32)   32  Vision loss (369 9) (H54 7)   33  Vitamin B12 deficiency (266 2) (E53 8)   34  Vitamin D deficiency (268 9) (E55 9)   35  Wheelchair dependent (V46 3) (Z99 3)    Current Meds   1  Baclofen 20 MG Oral Tablet; TAKE 1 TABLET 5 TIMES A DAY AS NEEDED;    Therapy: 39Suz2851 to (Evaluate:40Mdi5583)  Requested for: 31OYT1749; Last   Rx:96Kpw1175 Ordered   2  DULoxetine HCl - 30 MG Oral Capsule Delayed Release Particles (Cymbalta); TAKE 1   CAPSULE EVERY DAY; Therapy: 18RVL0186 to (FGJAJYQZ:96UPP6874)  Requested for: 79QZL6969; Last   Rx:17Jan2017 Ordered   3  Furosemide 40 MG Oral Tablet; TAKE 1 TABLET EVERY DAY; Therapy: 28RLB3435 to (RFOJXZST:66GRC3977)  Requested for: 11RQZ5384; Last   Rx:17Jan2017 Ordered   4  Klor-Con 8 MEQ Oral Tablet Extended Release; TAKE 1 TABLET DAILY WITH FOOD; Therapy: 13ZOQ5949 to (KLRYLBJK:95PRU9062)  Requested for: 35VGZ1071; Last   Rx:17Jan2017 Ordered   5  Motrin  MG Oral Tablet; Therapy: (450 39 173) to Recorded   6  Oxycodone-Acetaminophen 5-325 MG Oral Tablet (Percocet); TAKE 1 TABLET every 4   hours as needed for pain; Therapy: 07PQF4529 to (Last Rx:32Rwg5330) Ordered    Allergies    1  Latex Gloves MISC    Signatures   Electronically signed by :  Jose Delgadillo, ; Nov 24 2017  9:47AM EST                       (Author)

## 2018-01-17 NOTE — MISCELLANEOUS
Message  DR Jessica Alba [place], [address] IN [place] CALLED  [phone]  SHE WILL BE FAXING  [address] BECAUSE PT IS HOMEBOUND WITH [employer]  THANK YOU  Active Problems    1  Bladder calculus (594 1) (N21 0)   2  Constipation (564 00) (K59 00)   3  Depression (311) (F32 9)   4  Difficulty in walking (719 7) (R26 2)   5  Difficulty Swallowing (Dysphagia)   6  Dizziness (780 4) (R42)   7  Double vision (368 2) (H53 2)   8  History of Elevated alkaline phosphatase level (790 5) (R74 8)   9  Encounter for routine gynecological examination with Papanicolaou smear of cervix   (V72 31,V76 2) (Z01 419)   10  Encounter for screening mammogram for malignant neoplasm of breast (V76 12)    (Z12 31)   11  Hyperglycemia (790 29) (R73 9)   12  Hyperlipidemia (272 4) (E78 5)   13  Hypokalemia (276 8) (E87 6)   14  Lymphedema (457 1) (I89 0)   15  Multiple sclerosis (340) (G35)   16  Muscle spasm (728 85) (M62 838)   17  Muscle weakness (728 87) (M62 81)   18  Nephrolithiasis (592 0) (N20 0)   19  Neurogenic bladder (596 54) (N31 9)   20  Numbness (782 0) (R20 0)   21  Paraparesis (Lower Extremities) (344 1)   22  Right knee pain (719 46) (M25 561)   23  Sleep disorder (780 50) (G47 9)   24  Spasticity (781 0) (R25 2)   25  Spinal stenosis (724 00) (M48 00)   26  Tingling (782 0) (R20 2)   27  Tremor (781 0) (R25 1)   28  Urge incontinence of urine (788 31) (N39 41)   29  Urinary incontinence (788 30) (R32)   30  Vision loss (369 9) (H54 7)   31  Vitamin B12 deficiency (266 2) (E53 8)   32  Vitamin D deficiency (268 9) (E55 9)   33  Wheelchair dependent (V46 3) (Z99 3)    Current Meds   1  Baclofen 20 MG Oral Tablet; TAKE 1 TABLET 5 TIMES A DAY AS NEEDED; Therapy: 97Xbo6990 to (Evaluate:31Jan2017)  Requested for: 69MCO6627; Last   Rx:03Oct2016 Ordered   2  Calmoseptine 0 44-20 6 % External Ointment; Therapy: 61QJN1863 to Recorded   3   DiazePAM 5 MG Oral Tablet; TAKE 1 TABLET Bedtime; Therapy: 34Upj3862 to (Evaluate:64Aee6812); Last Rx:86Ptq4656 Ordered   4  DULoxetine HCl - 30 MG Oral Capsule Delayed Release Particles; TAKE 1 CAPSULE   EVERY DAY; Therapy: 41YMG2454 to (OGNLBJDX:76VBZ4714)  Requested for: 39BTR6669; Last   Rx:2017 Ordered   5  Furosemide 40 MG Oral Tablet; TAKE 1 TABLET EVERY DAY; Therapy: 41TUK3829 to (KGQOMHBW:84PTK3121)  Requested for: 85RZL3570; Last   Rx:2017 Ordered   6  Klor-Con 8 MEQ Oral Tablet Extended Release; TAKE 1 TABLET DAILY WITH FOOD; Therapy: 16AGL8218 to (SJMSWVSJ:89EAO0057)  Requested for: 73MGM0353; Last   Rx:2017 Ordered   7  Motrin  MG Oral Tablet; Therapy: (Recorded:2014) to Recorded   8  Oxybutynin Chloride ER 10 MG Oral Tablet Extended Release 24 Hour; Take 1 tablet   daily; Therapy: 58RWK9492 to (Seda Flax)  Requested for: 44YTD9482; Last   Rx:2016 Ordered   9  Oxycodone-Acetaminophen 5-325 MG Oral Tablet; TAKE 1 TABLET every 4 hours as   needed for pain; Therapy: 26UKD0273 to (Last Rx:2017) Ordered   10  QC Daily Multivitamins/Iron Oral Tablet; TAKE 1 TABLET ONCE DAILY; Therapy: 21PVY8372 to (NDXHLWK33FJJ1853); Last Rx:2015 Ordered   11  Vitamin D (Ergocalciferol) 32403 UNIT Oral Capsule; TAKE 1 CAPSULE Weekly; Therapy: 10DVI4050 to (Last Rx:2017)  Requested for: 16DMH3421 Ordered    Allergies    1   Latex Gloves MISC    Signatures   Electronically signed by : Lawrence Jaime DO; 2017 10:07AM EST                       (Author)

## 2018-01-18 NOTE — MISCELLANEOUS
Message  Phone call from Torin Barrondarin   she is providing home care for catheter check per Dr Sofia Fisher  She has noticed the start of pressure ulcers on b/l buttocks  Would like to use hydrocolloid dressing  Approved  Active Problems    1  Abdomen Suprapubic Catheter   2  Bladder calculus (594 1) (N21 0)   3  Conjunctivitis (372 30) (H10 9)   4  Constipation (564 00) (K59 00)   5  Depression (311) (F32 9)   6  Difficulty in walking (719 7) (R26 2)   7  Difficulty Swallowing (Dysphagia)   8  Dizziness (780 4) (R42)   9  Double vision (368 2) (H53 2)   10  History of Elevated alkaline phosphatase level (790 5) (R74 8)   11  Encounter for routine gynecological examination with Papanicolaou smear of cervix    (V72 31,V76 2) (Z01 419)   12  Encounter for screening mammogram for malignant neoplasm of breast (V76 12)    (Z12 31)   13  Hyperglycemia (790 29) (R73 9)   14  Hyperlipidemia (272 4) (E78 5)   15  Hypokalemia (276 8) (E87 6)   16  Lymphedema (457 1) (I89 0)   17  Multiple sclerosis (340) (G35)   18  Muscle spasm (728 85) (M62 838)   19  Muscle weakness (728 87) (M62 81)   20  Nephrolithiasis (592 0) (N20 0)   21  Neurogenic bladder (596 54) (N31 9)   22  Numbness (782 0) (R20 0)   23  Paraparesis (Lower Extremities) (344 1)   24  Right knee pain (719 46) (M25 561)   25  Sleep disorder (780 50) (G47 9)   26  Spasticity (781 0) (R25 2)   27  Spinal stenosis (724 00) (M48 00)   28  Tingling (782 0) (R20 2)   29  Tremor (781 0) (R25 1)   30  Urge incontinence of urine (788 31) (N39 41)   31  Urinary incontinence (788 30) (R32)   32  Vision loss (369 9) (H54 7)   33  Vitamin B12 deficiency (266 2) (E53 8)   34  Vitamin D deficiency (268 9) (E55 9)   35  Wheelchair dependent (V46 3) (Z99 3)    Current Meds   1  Baclofen 20 MG Oral Tablet; TAKE 1 TABLET 5 TIMES A DAY AS NEEDED; Therapy: 61Ekv6927 to (Evaluate:98Yjn0547)  Requested for: 43SPG9790; Last   Rx:92Qlg1900 Ordered   2   DULoxetine HCl - 30 MG Oral Capsule Delayed Release Particles (Cymbalta); TAKE 1   CAPSULE EVERY DAY; Therapy: 90SRI2802 to (OWWRFCTH:94VSO9747)  Requested for: 26ACE9206; Last   Rx:17Jan2017 Ordered   3  Furosemide 40 MG Oral Tablet; TAKE 1 TABLET EVERY DAY; Therapy: 52GQS7574 to (AJTHYMJS:79RKM3785)  Requested for: 58DNB4398; Last   Rx:17Jan2017 Ordered   4  Klor-Con 8 MEQ Oral Tablet Extended Release; TAKE 1 TABLET DAILY WITH FOOD; Therapy: 32WRE0352 to (FTWLTHYV:93GMK7974)  Requested for: 71KWC4631; Last   Rx:17Jan2017 Ordered   5  Motrin  MG Oral Tablet; Therapy: ([id number]) to Recorded   6  Oxycodone-Acetaminophen 5-325 MG Oral Tablet (Percocet); TAKE 1 TABLET every 4   hours as needed for pain; Therapy: 91IKN5551 to (Last Rx:64Cxd6719) Ordered    Allergies    1   Latex Gloves MISC    Signatures   Electronically signed by : Marely Fallon, ; Nov 27 2017  9:24AM EST                       (Author)

## 2018-01-22 VITALS — HEART RATE: 80 BPM | DIASTOLIC BLOOD PRESSURE: 70 MMHG | RESPIRATION RATE: 16 BRPM | SYSTOLIC BLOOD PRESSURE: 114 MMHG

## 2018-01-22 VITALS — HEART RATE: 60 BPM | SYSTOLIC BLOOD PRESSURE: 110 MMHG | DIASTOLIC BLOOD PRESSURE: 70 MMHG

## 2018-01-25 PROBLEM — Z99.3 WHEELCHAIR DEPENDENT: Status: ACTIVE | Noted: 2018-01-25

## 2018-01-25 PROBLEM — Z93.59 SUPRAPUBIC CATHETER (HCC): Status: ACTIVE | Noted: 2017-11-17

## 2018-01-25 RX ORDER — POTASSIUM CHLORIDE 600 MG/1
TABLET, FILM COATED, EXTENDED RELEASE ORAL
COMMUNITY
Start: 2015-09-22 | End: 2018-02-07 | Stop reason: SDUPTHER

## 2018-01-25 RX ORDER — OXYCODONE HYDROCHLORIDE AND ACETAMINOPHEN 5; 325 MG/1; MG/1
TABLET ORAL
COMMUNITY
Start: 2014-01-22 | End: 2018-01-26 | Stop reason: SDUPTHER

## 2018-01-25 RX ORDER — IBUPROFEN 200 MG
TABLET ORAL EVERY 4 HOURS PRN
COMMUNITY
End: 2020-10-10 | Stop reason: HOSPADM

## 2018-01-25 RX ORDER — FUROSEMIDE 40 MG/1
TABLET ORAL
COMMUNITY
Start: 2015-01-20 | End: 2018-01-25 | Stop reason: SDUPTHER

## 2018-01-25 RX ORDER — DULOXETIN HYDROCHLORIDE 30 MG/1
CAPSULE, DELAYED RELEASE ORAL
COMMUNITY
Start: 2014-01-22 | End: 2018-02-07 | Stop reason: SDUPTHER

## 2018-01-25 RX ORDER — BACLOFEN 20 MG/1
TABLET ORAL
COMMUNITY
Start: 2011-08-24 | End: 2018-01-25 | Stop reason: SDUPTHER

## 2018-01-26 ENCOUNTER — OFFICE VISIT (OUTPATIENT)
Dept: FAMILY MEDICINE CLINIC | Facility: CLINIC | Age: 48
End: 2018-01-26
Payer: MEDICARE

## 2018-01-26 VITALS — HEART RATE: 80 BPM | DIASTOLIC BLOOD PRESSURE: 72 MMHG | SYSTOLIC BLOOD PRESSURE: 100 MMHG

## 2018-01-26 DIAGNOSIS — E78.2 MIXED HYPERLIPIDEMIA: ICD-10-CM

## 2018-01-26 DIAGNOSIS — E87.6 HYPOKALEMIA: ICD-10-CM

## 2018-01-26 DIAGNOSIS — M48.00 SPINAL STENOSIS, UNSPECIFIED SPINAL REGION: ICD-10-CM

## 2018-01-26 DIAGNOSIS — E53.8 VITAMIN B12 DEFICIENCY: ICD-10-CM

## 2018-01-26 DIAGNOSIS — Z93.59 SUPRAPUBIC CATHETER (HCC): ICD-10-CM

## 2018-01-26 DIAGNOSIS — N20.0 NEPHROLITHIASIS: ICD-10-CM

## 2018-01-26 DIAGNOSIS — F32.A DEPRESSION, UNSPECIFIED DEPRESSION TYPE: ICD-10-CM

## 2018-01-26 DIAGNOSIS — G82.20 PARAPLEGIA (HCC): ICD-10-CM

## 2018-01-26 DIAGNOSIS — I89.0 LYMPHEDEMA: ICD-10-CM

## 2018-01-26 DIAGNOSIS — Z23 NEED FOR INFLUENZA VACCINATION: Primary | ICD-10-CM

## 2018-01-26 DIAGNOSIS — R32 URINARY INCONTINENCE, UNSPECIFIED TYPE: ICD-10-CM

## 2018-01-26 DIAGNOSIS — G35 MULTIPLE SCLEROSIS (HCC): ICD-10-CM

## 2018-01-26 DIAGNOSIS — N31.9 NEUROGENIC BLADDER: ICD-10-CM

## 2018-01-26 DIAGNOSIS — E55.9 VITAMIN D DEFICIENCY: ICD-10-CM

## 2018-01-26 DIAGNOSIS — R73.9 HYPERGLYCEMIA: ICD-10-CM

## 2018-01-26 PROCEDURE — G0008 ADMIN INFLUENZA VIRUS VAC: HCPCS

## 2018-01-26 PROCEDURE — 90656 IIV3 VACC NO PRSV 0.5 ML IM: CPT

## 2018-01-26 PROCEDURE — 99214 OFFICE O/P EST MOD 30 MIN: CPT

## 2018-01-26 RX ORDER — OXYCODONE HYDROCHLORIDE AND ACETAMINOPHEN 5; 325 MG/1; MG/1
1 TABLET ORAL EVERY 4 HOURS PRN
Qty: 100 TABLET | Refills: 0 | Status: SHIPPED | OUTPATIENT
Start: 2018-01-26 | End: 2018-02-25

## 2018-01-26 NOTE — PROGRESS NOTES
Assessment/Plan:    No problem-specific Assessment & Plan notes found for this encounter  Diagnoses and all orders for this visit:    Need for influenza vaccination  -     Flu vaccine greater than or equal to 4yo preservative free IM    Multiple sclerosis (HCC)  -     oxyCODONE-acetaminophen (PERCOCET) 5-325 mg per tablet; Take 1 tablet by mouth every 4 (four) hours as needed for moderate pain for up to 30 days Max Daily Amount: 6 tablets  -     CBC; Future  -     Comprehensive metabolic panel; Future  -     Hemoglobin A1c; Future  -     Lipid Panel with Direct LDL reflex; Future  -     TSH, 3rd generation with T4 reflex; Future  -     Vitamin B12; Future    Paraplegia (HCC)  -     CBC; Future  -     Comprehensive metabolic panel; Future  -     Hemoglobin A1c; Future  -     Lipid Panel with Direct LDL reflex; Future  -     TSH, 3rd generation with T4 reflex; Future  -     Vitamin B12; Future    Nephrolithiasis  -     CBC; Future  -     Comprehensive metabolic panel; Future  -     Hemoglobin A1c; Future  -     Lipid Panel with Direct LDL reflex; Future  -     TSH, 3rd generation with T4 reflex; Future  -     Vitamin B12; Future    Suprapubic catheter (HCC)  -     CBC; Future  -     Comprehensive metabolic panel; Future  -     Hemoglobin A1c; Future  -     Lipid Panel with Direct LDL reflex; Future  -     TSH, 3rd generation with T4 reflex; Future  -     Vitamin B12; Future    Depression, unspecified depression type  -     CBC; Future  -     Comprehensive metabolic panel; Future  -     Hemoglobin A1c; Future  -     Lipid Panel with Direct LDL reflex; Future  -     TSH, 3rd generation with T4 reflex; Future  -     Vitamin B12; Future    Hyperglycemia  -     CBC; Future  -     Comprehensive metabolic panel; Future  -     Hemoglobin A1c; Future  -     Lipid Panel with Direct LDL reflex; Future  -     TSH, 3rd generation with T4 reflex; Future  -     Vitamin B12; Future    Mixed hyperlipidemia  -     CBC;  Future  - Comprehensive metabolic panel; Future  -     Hemoglobin A1c; Future  -     Lipid Panel with Direct LDL reflex; Future  -     TSH, 3rd generation with T4 reflex; Future  -     Vitamin B12; Future    Hypokalemia  -     CBC; Future  -     Comprehensive metabolic panel; Future  -     Hemoglobin A1c; Future  -     Lipid Panel with Direct LDL reflex; Future  -     TSH, 3rd generation with T4 reflex; Future  -     Vitamin B12; Future    Lymphedema  -     CBC; Future  -     Comprehensive metabolic panel; Future  -     Hemoglobin A1c; Future  -     Lipid Panel with Direct LDL reflex; Future  -     TSH, 3rd generation with T4 reflex; Future  -     Vitamin B12; Future    Neurogenic bladder  -     CBC; Future  -     Comprehensive metabolic panel; Future  -     Hemoglobin A1c; Future  -     Lipid Panel with Direct LDL reflex; Future  -     TSH, 3rd generation with T4 reflex; Future  -     Vitamin B12; Future    Vitamin B12 deficiency  -     CBC; Future  -     Comprehensive metabolic panel; Future  -     Hemoglobin A1c; Future  -     Lipid Panel with Direct LDL reflex; Future  -     TSH, 3rd generation with T4 reflex; Future  -     Vitamin B12; Future    Vitamin D deficiency  -     CBC; Future  -     Comprehensive metabolic panel; Future  -     Hemoglobin A1c; Future  -     Lipid Panel with Direct LDL reflex; Future  -     TSH, 3rd generation with T4 reflex; Future  -     Vitamin B12; Future    Urinary incontinence, unspecified type  -     CBC; Future  -     Comprehensive metabolic panel; Future  -     Hemoglobin A1c; Future  -     Lipid Panel with Direct LDL reflex; Future  -     TSH, 3rd generation with T4 reflex; Future  -     Vitamin B12; Future    Spinal stenosis, unspecified spinal region  -     oxyCODONE-acetaminophen (PERCOCET) 5-325 mg per tablet; Take 1 tablet by mouth every 4 (four) hours as needed for moderate pain for up to 30 days Max Daily Amount: 6 tablets  -     CBC;  Future  -     Comprehensive metabolic panel; Future  -     Hemoglobin A1c; Future  -     Lipid Panel with Direct LDL reflex; Future  -     TSH, 3rd generation with T4 reflex; Future  -     Vitamin B12; Future        1  Multiple sclerosis/spinal stenosis, Percocet was reordered, patient follows with Neurology  2  Hyperlipidemia/hyperglycemia, blood work is ordered  3  Hypokalemia blood work is ordered  4  Lymphedema, chronic  5  Neurogenic bladder/nephrolithiasis/suprapubic catheter, patient follows up with Urology  6  Vitamin deficiencies B12 and D, blood work is ordered  7  Depression, stable continue present therapy  8  Patient return in 6 months, will see sooner if needed  Subjective:      Patient ID: Kina De La Garza is a 52 y o  female  Follow up  Also pt would like a Flu vaccine today  kw    Patient is stable  Patient tells me recently she had a have for kidney stones removed by Urology  At the present time patient is doing well  Past medical, surgical, family, medication, allergy history reviewed    Review of Systems   Constitutional: Negative  HENT: Negative  Eyes: Negative  Respiratory: Negative  Cardiovascular: Negative  Gastrointestinal: Negative  Endocrine: Negative  Genitourinary:        See HPI   Musculoskeletal:        Wheelchair-bound   Skin: Negative  Allergic/Immunologic: Negative  Neurological:        Wheelchair-bound   Hematological: Negative  Psychiatric/Behavioral: Negative  Objective:     Physical Exam   Constitutional: She appears well-developed and well-nourished  HENT:   Head: Normocephalic and atraumatic  Mouth/Throat: Oropharynx is clear and moist    Eyes: Conjunctivae are normal    Neck: Neck supple  Cardiovascular: Normal rate, regular rhythm and normal heart sounds  Pulmonary/Chest: Effort normal and breath sounds normal    Abdominal: Soft  Bowel sounds are normal    Positive suprapubic catheter   Musculoskeletal: She exhibits edema  Wheelchair-bound, positive chronic nonpitting lymphedema lower extremities    Skin: Skin is warm and dry  Psychiatric: She has a normal mood and affect

## 2018-02-07 DIAGNOSIS — I89.0 LYMPHEDEMA: ICD-10-CM

## 2018-02-07 DIAGNOSIS — E87.6 HYPOKALEMIA: Primary | ICD-10-CM

## 2018-02-07 DIAGNOSIS — F32.A DEPRESSION, UNSPECIFIED DEPRESSION TYPE: ICD-10-CM

## 2018-02-07 RX ORDER — FUROSEMIDE 40 MG/1
TABLET ORAL
Qty: 30 TABLET | Refills: 11 | Status: SHIPPED | OUTPATIENT
Start: 2018-02-07 | End: 2019-02-26 | Stop reason: SDUPTHER

## 2018-02-07 RX ORDER — DULOXETIN HYDROCHLORIDE 30 MG/1
CAPSULE, DELAYED RELEASE ORAL
Qty: 30 CAPSULE | Refills: 8 | Status: SHIPPED | OUTPATIENT
Start: 2018-02-07 | End: 2019-01-31 | Stop reason: SDUPTHER

## 2018-02-07 RX ORDER — POTASSIUM CHLORIDE 600 MG/1
TABLET, FILM COATED, EXTENDED RELEASE ORAL
Qty: 30 TABLET | Refills: 9 | Status: SHIPPED | OUTPATIENT
Start: 2018-02-07 | End: 2018-12-30 | Stop reason: SDUPTHER

## 2018-03-26 ENCOUNTER — OFFICE VISIT (OUTPATIENT)
Dept: FAMILY MEDICINE CLINIC | Facility: CLINIC | Age: 48
End: 2018-03-26
Payer: MEDICARE

## 2018-03-26 VITALS
TEMPERATURE: 100.6 F | HEART RATE: 52 BPM | DIASTOLIC BLOOD PRESSURE: 66 MMHG | HEIGHT: 66 IN | BODY MASS INDEX: 22.5 KG/M2 | SYSTOLIC BLOOD PRESSURE: 108 MMHG | WEIGHT: 140 LBS

## 2018-03-26 DIAGNOSIS — G35 MULTIPLE SCLEROSIS (HCC): ICD-10-CM

## 2018-03-26 DIAGNOSIS — H69.80 DYSFUNCTION OF EUSTACHIAN TUBE, UNSPECIFIED LATERALITY: ICD-10-CM

## 2018-03-26 DIAGNOSIS — J01.40 ACUTE PANSINUSITIS, RECURRENCE NOT SPECIFIED: Primary | ICD-10-CM

## 2018-03-26 DIAGNOSIS — G82.20 PARAPLEGIA (HCC): ICD-10-CM

## 2018-03-26 DIAGNOSIS — R05.9 COUGH: ICD-10-CM

## 2018-03-26 DIAGNOSIS — H61.23 EXCESSIVE CERUMEN IN BOTH EAR CANALS: ICD-10-CM

## 2018-03-26 DIAGNOSIS — J30.9 ALLERGIC RHINITIS, UNSPECIFIED CHRONICITY, UNSPECIFIED SEASONALITY, UNSPECIFIED TRIGGER: ICD-10-CM

## 2018-03-26 DIAGNOSIS — H65.90 SEROUS OTITIS MEDIA, UNSPECIFIED CHRONICITY, UNSPECIFIED LATERALITY: ICD-10-CM

## 2018-03-26 PROCEDURE — 99214 OFFICE O/P EST MOD 30 MIN: CPT | Performed by: FAMILY MEDICINE

## 2018-03-26 RX ORDER — OXYCODONE HYDROCHLORIDE AND ACETAMINOPHEN 5; 325 MG/1; MG/1
1 TABLET ORAL EVERY 4 HOURS PRN
COMMUNITY
End: 2018-09-10 | Stop reason: SDUPTHER

## 2018-03-26 RX ORDER — CEFUROXIME AXETIL 500 MG/1
500 TABLET ORAL EVERY 12 HOURS SCHEDULED
Qty: 20 TABLET | Refills: 0 | Status: SHIPPED | OUTPATIENT
Start: 2018-03-26 | End: 2018-04-05

## 2018-03-26 RX ORDER — MONTELUKAST SODIUM 10 MG/1
TABLET ORAL
Qty: 30 TABLET | Refills: 0 | Status: SHIPPED | OUTPATIENT
Start: 2018-03-26 | End: 2019-05-17 | Stop reason: ALTCHOICE

## 2018-03-26 RX ORDER — BENZONATATE 200 MG/1
200 CAPSULE ORAL 3 TIMES DAILY PRN
Qty: 30 CAPSULE | Refills: 0 | Status: SHIPPED | OUTPATIENT
Start: 2018-03-26 | End: 2018-04-05

## 2018-03-26 NOTE — PROGRESS NOTES
Assessment/Plan:  1  Acute sinusitis Ceftin was ordered  2  Allergic rhinitis, singular was ordered  3  Eustachian tube dysfunction, singular was ordered  4  Serous otitis media, singular was ordered  5  Cough  Tessalon was ordered  6  Multiple sclerosis/paraplegia, stable patient is wheelchair-bound patient follows up with Neurology  7  Excessive cerumen patient to use over-the-counter Debrox 4 drops in both ears twice daily  If unrelieved return to office in 1 week for ear lavage  8  If patient still with fever in 48 hours she should call office  If not completely symptom-free in 1 week return to office    Allergic rhinitis  Singulair ordered    Multiple sclerosis [facility])  Patient follows with Neurology    Paraplegia [facility])  Follows with Neurology       Diagnoses and all orders for this visit:    Acute pansinusitis, recurrence not specified  -     cefuroxime (CEFTIN) 500 mg tablet; Take 1 tablet (500 mg total) by mouth every 12 (twelve) hours for 10 days    Cough  -     benzonatate (TESSALON) 200 MG capsule; Take 1 capsule (200 mg total) by mouth 3 (three) times a day as needed for cough for up to 10 days    Serous otitis media, unspecified chronicity, unspecified laterality  -     montelukast (SINGULAIR) 10 mg tablet; Take 1 tablet daily for allergy    Dysfunction of Eustachian tube, unspecified laterality  -     montelukast (SINGULAIR) 10 mg tablet; Take 1 tablet daily for allergy    Excessive cerumen in both ear canals    Multiple sclerosis (HCC)    Paraplegia (HCC)    Allergic rhinitis, unspecified chronicity, unspecified seasonality, unspecified trigger  -     montelukast (SINGULAIR) 10 mg tablet; Take 1 tablet daily for allergy    Other orders  -     oxyCODONE-acetaminophen (PERCOCET) 5-325 mg per tablet; Take 1 tablet by mouth every 4 (four) hours as needed for moderate pain          Subjective:     Cc: pt is here with spouse and daughter with complains of fever, cough, and congestion 2 days  GAL Mccullough     Patient ID: Sloane Eubanks is a 52 y o  female  For the past 2 days patient has had fever and chills head congestion sinus pain and pressure sneezing postnasal drip mild to moderate dry cough  Patient denies wheezing or hemoptysis  No chest pain or shortness of breath  No medication has been taken        The following portions of the patient's history were reviewed and updated as appropriate: allergies, current medications, past family history, past medical history, past social history, past surgical history and problem list     Review of Systems   Constitutional: Positive for chills and fever  HENT:        HPI   Eyes: Negative  Respiratory:        HPI   Cardiovascular: Negative  Negative for chest pain  Gastrointestinal: Negative  Endocrine: Negative  Genitourinary: Negative  Musculoskeletal:        Wheelchair-bound, chronic   Skin: Negative  Allergic/Immunologic: Negative  Neurological:        Wheelchair-bound, chronic   Hematological: Negative  Psychiatric/Behavioral: Negative  Objective:    Vitals:    03/26/18 1302   BP: 108/66   BP Location: Left arm   Patient Position: Sitting   Cuff Size: Standard   Pulse: (!) 52   Temp: (!) 100 6 °F (38 1 °C)   TempSrc: Tympanic   Weight: 63 5 kg (140 lb)   Height: 5' 6" (1 676 m)          Physical Exam   Constitutional: She is oriented to person, place, and time  She appears well-developed and well-nourished  HENT:   Head: Normocephalic and atraumatic  Both tympanic membranes dull with fluid increased cerumen bilateral positive allergic turbinates positive pansinus tenderness to percussion positive purulent postnasal drip minimal pharyngeal injection negative exudate positive shotty anterior cervical adenopathy   Eyes: Conjunctivae are normal  No scleral icterus  Neck: Neck supple  Cardiovascular: Normal rate, regular rhythm and normal heart sounds      Pulmonary/Chest: Effort normal and breath sounds normal  Abdominal: Soft  Bowel sounds are normal    Musculoskeletal:   Wheelchair-bound   Lymphadenopathy:     She has cervical adenopathy  Neurological: She is alert and oriented to person, place, and time  No cranial nerve deficit  Skin: Skin is warm and dry  Psychiatric: She has a normal mood and affect

## 2018-04-18 ENCOUNTER — TELEPHONE (OUTPATIENT)
Dept: UROLOGY | Facility: CLINIC | Age: 48
End: 2018-04-18

## 2018-04-18 NOTE — TELEPHONE ENCOUNTER
Patient of Dr Josefina Clark, she left message inquiring if [place] had received orders from [facility] care for Dr Josefina Clark to sign  Left her message to call back for more specifics and that I will check with Ela Mahmood, sending to you to see if you received any orders

## 2018-06-04 ENCOUNTER — OFFICE VISIT (OUTPATIENT)
Dept: UROLOGY | Facility: CLINIC | Age: 48
End: 2018-06-04
Payer: MEDICARE

## 2018-06-04 VITALS — HEART RATE: 72 BPM | DIASTOLIC BLOOD PRESSURE: 72 MMHG | SYSTOLIC BLOOD PRESSURE: 112 MMHG

## 2018-06-04 DIAGNOSIS — N31.9 NEUROGENIC BLADDER: Primary | ICD-10-CM

## 2018-06-04 PROCEDURE — 52000 CYSTOURETHROSCOPY: CPT | Performed by: UROLOGY

## 2018-06-04 NOTE — PROGRESS NOTES
6/4/2018    Wilma Novak  1970  [id number]        Assessment  Multiple sclerosis, neurogenic bladder, indwelling suprapubic tube   History of bladder stones status post cystolitholapaxy, no evidence of recurrent bladder stones    Discussion  I provided the patient and her  with reassurance that her cystoscopy in the office today was within normal limits  There is no evidence of recurrent bladder stones  Mild inflammatory changes were noted which were consistent with her chronic indwelling suprapubic tube  Follow-up will be in 1 year with repeat flexible cystoscopy  She will continue routine suprapubic tube changes at home with [facility]  The patient and her  were instructed to call if she believes she has more stones  History of Present Illness  50 y o  female with a history of multiple sclerosis with a neurogenic bladder status post suprapubic tube insertion  Unfortunately her disease is relatively advanced and she requires a mac and I would wheelchair as she is unable to walk  In December 2017 she underwent cystoscopy with cystolitholapaxy in the operating room to remove bladder stones  She returns for six-month follow-up cystoscopy to ensure no evidence of recurrent stones  She denies any hematuria  There have been no issues otherwise with her suprapubic tube            AUA Symptom Score      Review of Systems  Review of Systems      Past Medical History  Past Medical History:   Diagnosis Date    Anesthesia     "prefers if able to be put to sleep on stretcher before placed on table if possible due to severe spasticity    Bladder stones     Chronic pain disorder     neck    Contracture, right shoulder     right arm and hand    Dependent on wheelchair     motorized    Edema     dependant lower extremities    Elevated alkaline phosphatase level     Mildly elevated total, normal isoenzymes 4/15/15, normal 1/17; last assessed: 80LHR0652    Fernandez catheter in place     #24 to large bag(silicone catheter)    Gallbladder disease     History of femur fracture     right leg    History of UTI     MS (multiple sclerosis) (HCC)     Muscle spasticity     especially with touch    Muscle weakness     Muscular dystrophy (HCC)     Neck pain     Neurogenic bladder     Paralysis (HCC)     bilateral lower extremities    Port-a-cath in place     left chest," hasn't used in approx 1 yr or so"    Sacral pressure sore     "shearing, tegaderm in place,"    Swallowing difficulty     Tinnitus     Urinary incontinence        Past Social History  Past Surgical History:   Procedure Laterality Date    BLADDER STONE REMOVAL N/A 12/4/2017    Procedure: Chance Hooker;  Surgeon: Elizabeth Black MD;  Location: [facility];  Service: Urology    BLADDER SURGERY      CHOLECYSTECTOMY      ESOPHAGOGASTRODUODENOSCOPY      KIDNEY STONE SURGERY      PORTACATH PLACEMENT      SUPRAPUBIC CYSTOSTOMY  02/25/2014    last assessed: 36NDN0029       Past Family History  Family History   Problem Relation Age of Onset    Diabetes Mother     Hyperlipidemia Mother     Hypertension Mother     COPD Father     Hypertension Father     Hyperlipidemia Sister     Alzheimer's disease Family     Diabetes Family     Hypertension Family     Heart disease Family        Past Social history  Social History     Social History    Marital status: /Civil Union     Spouse name: N/A    Number of children: N/A    Years of education: N/A     Occupational History    Not on file       Social History Main Topics    Smoking status: Never Smoker    Smokeless tobacco: Never Used    Alcohol use Yes      Comment: "very rare"; history of being a social drinker- quit drinking     Drug use: No    Sexual activity: Yes     Other Topics Concern    Not on file     Social History Narrative    Caffeine use    Currently on disability    Daily coffee consumption (2 cups/day)    Educational level- completed 2nd year college    Lives with adult children    Not currently employed    Wheelchair dependent        Current Medications  Current Outpatient Prescriptions   Medication Sig Dispense Refill    baclofen 20 mg tablet Take 20 mg by mouth 3 (three) times a day      DULoxetine (CYMBALTA) 30 mg delayed release capsule TAKE ONE CAPSULE BY MOUTH EVERY DAY 30 capsule 8    furosemide (LASIX) 40 mg tablet TAKE 1 TABLET EVERY DAY 30 tablet 11    ibuprofen (MOTRIN IB) 200 mg tablet Take by mouth      KLOR-CON 8 MEQ tablet TAKE 1 TABLET DAILY WITH FOOD 30 tablet 9    montelukast (SINGULAIR) 10 mg tablet Take 1 tablet daily for allergy 30 tablet 0    oxyCODONE-acetaminophen (PERCOCET) 5-325 mg per tablet Take 1 tablet by mouth every 4 (four) hours as needed for moderate pain      Sennosides (SENOKOT PO) Take by mouth as needed      Cyanocobalamin (VITAMIN B-12 PO) Take by mouth every evening       No current facility-administered medications for this visit  Allergies  Allergies   Allergen Reactions    Latex Other (See Comments)     Added based on information entered during case entry, please review and add reactions, type, and severity as needed    blisters       Past Medical History, Social History, Family History, medications and allergies were reviewed      Vitals  Vitals:    06/04/18 0906   BP: 112/72   BP Location: Left arm   Patient Position: Sitting   Cuff Size: Standard   Pulse: 72       Physical Exam  Physical Exam        Results  No results found for: PSA  Lab Results   Component Value Date    GLUCOSE 98 01/16/2017    CALCIUM 9 5 11/30/2017     11/30/2017    K 3 6 11/30/2017    CO2 32 11/30/2017     11/30/2017    BUN 6 11/30/2017    CREATININE 0 47 (L) 11/30/2017     Lab Results   Component Value Date    WBC 5 02 11/30/2017    HGB 13 8 11/30/2017    HCT 41 4 11/30/2017    MCV 86 11/30/2017     11/30/2017         Office Urine Dip  No results found for this or any previous visit (from the past 1 hour(s)) ]         Cystoscopy Procedure note    Risk and benefits of flexible cystoscopy were discussed  Informed consent was obtained  A urine dip is adequate for cystoscopy  The patient was placed supine in her motorized wheelchair  Her suprapubic tube tract was prepped and draped in a sterile fashion  Viscous lidocaine was instilled into the tract  Flexible cystoscopy was then performed  The bladder was thoroughly inspected  Both ureteral orifices were visualized with clear efflux of urine  The bladder mucosa was thoroughly inspected  No stones were visualized  Mild inflammatory changes were noted along the trigone  The bladder neck was normal   Overall this was a negative cystoscopy  The bladder was left full and the cystoscope was removed  A new latex-free 25 Romanian super pubic tube was placed  The catheter easily irrigated and was connected to gravity drainage  A female office staff member was present throughout the entire procedure

## 2018-06-14 DIAGNOSIS — G35 MULTIPLE SCLEROSIS (HCC): Primary | ICD-10-CM

## 2018-06-14 RX ORDER — BACLOFEN 20 MG/1
TABLET ORAL
Qty: 150 TABLET | Refills: 1 | Status: SHIPPED | OUTPATIENT
Start: 2018-06-14 | End: 2018-08-20 | Stop reason: SDUPTHER

## 2018-06-14 NOTE — TELEPHONE ENCOUNTER
Called and advised pt of all of the below  She would like to continue to f/u w/ you  Scheduled appt- 9/25/18 @ 0900  Also placed on waitlist  Pls enter labs

## 2018-06-14 NOTE — TELEPHONE ENCOUNTER
Just got refill for baclofen on pt  Last seen in feb 2017  Please see if pt is continuing to follow up here at [facility]?   If so, needs appt to update status, labs,etc

## 2018-07-17 ENCOUNTER — TELEPHONE (OUTPATIENT)
Dept: FAMILY MEDICINE CLINIC | Facility: CLINIC | Age: 48
End: 2018-07-17

## 2018-07-17 NOTE — TELEPHONE ENCOUNTER
[phone] called  They will be sending over a paper  They wanted to let Dr Chinedu Menjivar know that they are continuing  Care for another 60 days  There is no change  She will have a nurse and a aid

## 2018-08-20 DIAGNOSIS — G35 MULTIPLE SCLEROSIS (HCC): ICD-10-CM

## 2018-08-20 DIAGNOSIS — G35 MULTIPLE SCLEROSIS (HCC): Primary | ICD-10-CM

## 2018-08-20 RX ORDER — BACLOFEN 20 MG/1
TABLET ORAL
Qty: 150 TABLET | Refills: 1 | Status: SHIPPED | OUTPATIENT
Start: 2018-08-20 | End: 2018-11-01 | Stop reason: SDUPTHER

## 2018-08-20 NOTE — TELEPHONE ENCOUNTER
Made patient aware of below  She will have her labs done prior to her September visit  Verbalizes clear understanding of instructions

## 2018-08-20 NOTE — TELEPHONE ENCOUNTER
Just got rx for baclofen refill  Pt has not been seen since feb 2017  Please see my telephone note from June 2018 that again at that time I was refilling meds and reminded that pt must be seen for an appt  It looks like she is scheduled for sept appt  However no recent labs sine nov 2017 and only bmp not cmp done at that time  I am only refilling for one more time till she comes for appt  I will write for rx for labs to be done prior to her retn visit  If she has labs from her pcp recently that would be ok, but I do not seen anything in epic or care everywhere

## 2018-09-06 ENCOUNTER — TELEPHONE (OUTPATIENT)
Dept: NEUROLOGY | Facility: CLINIC | Age: 48
End: 2018-09-06

## 2018-09-06 NOTE — TELEPHONE ENCOUNTER
Patient had a difficult time remembering who had ordered her port  Thinks it was Dr Isauro Thompson from Neurology and it was placed in 2007  Has left it in because she's never had an issue with it and it's easier access for bloodwork since she is a, "terrible stick "  States it has not been flushed in, "probably 6 months "  Sees her PCP every 6 months and has an appointment with him on Monday  Advised patient to call PCP for flush order for tomorrow and to see if they will provide regular maintenance

## 2018-09-06 NOTE — TELEPHONE ENCOUNTER
Order signed by Jacob Alanis and faxed to [facility] infusion  Will have Dr Lila Leblanc on Monday  Left message for patient making her aware script was signed and faxed  Also, reminded patient to have labs drawn prior to her Sept 25 appointment with Dr Shivam Thorne

## 2018-09-06 NOTE — TELEPHONE ENCOUNTER
maite from dr Dnaiel Dickson office called and states that pt called their office asking for an order for port flush  they do not manage port and can not write for flush    i made her aware that our office would reach out to pt regarding flush once we hear back from rommel or dr Riggins [place]

## 2018-09-06 NOTE — TELEPHONE ENCOUNTER
LMOM for patient to return call  Patient has not been seen since Feb 2017  Need additional information regarding who placed port, who has been maintaining etc   Patient also needs labs prior to her Sept 25 visit with Dr Tara Jimenez, last labs from Nov 2017  Had made patient aware of this in Oct  20 encounter

## 2018-09-06 NOTE — TELEPHONE ENCOUNTER
Hoda,  Please have rommel sign flush order for tomorrow, [facility]  please have Dr Rivero Rolling next week & re-fax to [facility]    thanks

## 2018-09-07 ENCOUNTER — TELEPHONE (OUTPATIENT)
Dept: FAMILY MEDICINE CLINIC | Facility: CLINIC | Age: 48
End: 2018-09-07

## 2018-09-07 ENCOUNTER — HOSPITAL ENCOUNTER (OUTPATIENT)
Dept: INFUSION CENTER | Facility: CLINIC | Age: 48
Discharge: HOME | End: 2018-09-07
Payer: MEDICARE

## 2018-09-07 DIAGNOSIS — G35 MULTIPLE SCLEROSIS (HCC): ICD-10-CM

## 2018-09-07 LAB
ALBUMIN SERPL BCP-MCNC: 3.5 G/DL (ref 3.5–5)
ALP SERPL-CCNC: 96 U/L (ref 46–116)
ALT SERPL W P-5'-P-CCNC: 16 U/L (ref 12–78)
AST SERPL W P-5'-P-CCNC: 14 U/L (ref 5–45)
BASOPHILS # BLD AUTO: 0 THOUSAND/UL (ref 0–0.1)
BASOPHILS NFR MAR MANUAL: 0 % (ref 0–1)
BILIRUB DIRECT SERPL-MCNC: 0.12 MG/DL (ref 0–0.2)
BILIRUB SERPL-MCNC: 0.66 MG/DL (ref 0.2–1)
EOSINOPHIL # BLD AUTO: 0.2 THOUSAND/UL (ref 0–0.61)
EOSINOPHIL NFR BLD MANUAL: 4 % (ref 0–6)
ERYTHROCYTE [DISTWIDTH] IN BLOOD BY AUTOMATED COUNT: 13.6 % (ref 11.6–15.1)
HCT VFR BLD AUTO: 32 % (ref 34.8–46.1)
HGB BLD-MCNC: 10.2 G/DL (ref 11.5–15.4)
LYMPHOCYTES # BLD AUTO: 0.66 THOUSAND/UL (ref 0.6–4.47)
LYMPHOCYTES # BLD AUTO: 13 % (ref 14–44)
MCH RBC QN AUTO: 28.2 PG (ref 26.8–34.3)
MCHC RBC AUTO-ENTMCNC: 31.7 G/DL (ref 31.4–37.4)
MCV RBC AUTO: 89 FL (ref 82–98)
MONOCYTES # BLD AUTO: 0.2 THOUSAND/UL (ref 0–1.22)
MONOCYTES NFR BLD AUTO: 4 % (ref 4–12)
NEUTS BAND NFR BLD MANUAL: 0 % (ref 0–8)
NEUTS SEG # BLD: 4.03 THOUSAND/UL (ref 1.81–6.82)
NEUTS SEG NFR BLD AUTO: 79 % (ref 43–75)
PLATELET # BLD AUTO: 218 THOUSANDS/UL (ref 149–390)
PLATELET BLD QL SMEAR: ADEQUATE
PMV BLD AUTO: 7.3 FL (ref 8.9–12.7)
PROT SERPL-MCNC: 7 G/DL (ref 6.4–8.2)
RBC # BLD AUTO: 3.6 MILLION/UL (ref 3.81–5.12)
RBC MORPH BLD: NORMAL
TOTAL CELLS COUNTED SPEC: 100
WBC # BLD AUTO: 5.1 THOUSAND/UL (ref 4.31–10.16)
WBC NRBC COR # BLD: 5.1 THOUSAND/UL (ref 4.31–10.16)

## 2018-09-07 PROCEDURE — 85007 BL SMEAR W/DIFF WBC COUNT: CPT

## 2018-09-07 PROCEDURE — 85027 COMPLETE CBC AUTOMATED: CPT

## 2018-09-07 PROCEDURE — 80076 HEPATIC FUNCTION PANEL: CPT

## 2018-09-07 RX ADMIN — Medication 300 UNITS: at 14:51

## 2018-09-07 NOTE — PLAN OF CARE
Problem: Potential for Falls  Goal: Patient will remain free of falls  INTERVENTIONS:  - Assess patient frequently for physical needs  -  Identify cognitive and physical deficits and behaviors that affect risk of falls    -  [place] fall precautions as indicated by assessment   - Educate patient/family on patient safety including physical limitations  - Instruct patient to call for assistance with activity based on assessment  - Modify environment to reduce risk of injury  - Consider OT/PT consult to assist with strengthening/mobility   Outcome: Progressing

## 2018-09-08 ENCOUNTER — HOSPITAL ENCOUNTER (EMERGENCY)
Facility: HOSPITAL | Age: 48
Discharge: HOME | End: 2018-09-08
Attending: EMERGENCY MEDICINE | Admitting: EMERGENCY MEDICINE
Payer: MEDICARE

## 2018-09-08 VITALS
WEIGHT: 139.99 LBS | OXYGEN SATURATION: 100 % | SYSTOLIC BLOOD PRESSURE: 121 MMHG | TEMPERATURE: 98.8 F | RESPIRATION RATE: 20 BRPM | HEART RATE: 88 BPM | BODY MASS INDEX: 22.6 KG/M2 | DIASTOLIC BLOOD PRESSURE: 56 MMHG

## 2018-09-08 DIAGNOSIS — Z00.00 WELL ADULT HEALTH CHECK: Primary | ICD-10-CM

## 2018-09-08 LAB
ALBUMIN SERPL BCP-MCNC: 3.5 G/DL (ref 3.5–5)
ALP SERPL-CCNC: 94 U/L (ref 46–116)
ALT SERPL W P-5'-P-CCNC: 16 U/L (ref 12–78)
ANION GAP SERPL CALCULATED.3IONS-SCNC: 7 MMOL/L (ref 4–13)
AST SERPL W P-5'-P-CCNC: 10 U/L (ref 5–45)
BASOPHILS # BLD AUTO: 0.04 THOUSANDS/ΜL (ref 0–0.1)
BASOPHILS NFR BLD AUTO: 1 % (ref 0–1)
BILIRUB DIRECT SERPL-MCNC: 0.09 MG/DL (ref 0–0.2)
BILIRUB SERPL-MCNC: 0.34 MG/DL (ref 0.2–1)
BUN SERPL-MCNC: 9 MG/DL (ref 5–25)
CALCIUM SERPL-MCNC: 9 MG/DL (ref 8.3–10.1)
CHLORIDE SERPL-SCNC: 103 MMOL/L (ref 100–108)
CO2 SERPL-SCNC: 30 MMOL/L (ref 21–32)
CREAT SERPL-MCNC: 0.43 MG/DL (ref 0.6–1.3)
EOSINOPHIL # BLD AUTO: 0.2 THOUSAND/ΜL (ref 0–0.61)
EOSINOPHIL NFR BLD AUTO: 3 % (ref 0–6)
ERYTHROCYTE [DISTWIDTH] IN BLOOD BY AUTOMATED COUNT: 12.7 % (ref 11.6–15.1)
GFR SERPL CREATININE-BSD FRML MDRD: 121 ML/MIN/1.73SQ M
GLUCOSE SERPL-MCNC: 105 MG/DL (ref 65–140)
HCT VFR BLD AUTO: 38.8 % (ref 34.8–46.1)
HGB BLD-MCNC: 12.4 G/DL (ref 11.5–15.4)
IMM GRANULOCYTES # BLD AUTO: 0.02 THOUSAND/UL (ref 0–0.2)
IMM GRANULOCYTES NFR BLD AUTO: 0 % (ref 0–2)
LYMPHOCYTES # BLD AUTO: 2.02 THOUSANDS/ΜL (ref 0.6–4.47)
LYMPHOCYTES NFR BLD AUTO: 32 % (ref 14–44)
MAGNESIUM SERPL-MCNC: 2.4 MG/DL (ref 1.6–2.6)
MCH RBC QN AUTO: 28.6 PG (ref 26.8–34.3)
MCHC RBC AUTO-ENTMCNC: 32 G/DL (ref 31.4–37.4)
MCV RBC AUTO: 90 FL (ref 82–98)
MONOCYTES # BLD AUTO: 0.36 THOUSAND/ΜL (ref 0.17–1.22)
MONOCYTES NFR BLD AUTO: 6 % (ref 4–12)
NEUTROPHILS # BLD AUTO: 3.75 THOUSANDS/ΜL (ref 1.85–7.62)
NEUTS SEG NFR BLD AUTO: 58 % (ref 43–75)
NRBC BLD AUTO-RTO: 0 /100 WBCS
PHOSPHATE SERPL-MCNC: 3.6 MG/DL (ref 2.7–4.5)
PLATELET # BLD AUTO: 247 THOUSANDS/UL (ref 149–390)
PMV BLD AUTO: 9.3 FL (ref 8.9–12.7)
POTASSIUM SERPL-SCNC: 3.8 MMOL/L (ref 3.5–5.3)
PROT SERPL-MCNC: 7.2 G/DL (ref 6.4–8.2)
RBC # BLD AUTO: 4.33 MILLION/UL (ref 3.81–5.12)
SODIUM SERPL-SCNC: 140 MMOL/L (ref 136–145)
WBC # BLD AUTO: 6.39 THOUSAND/UL (ref 4.31–10.16)

## 2018-09-08 PROCEDURE — 84100 ASSAY OF PHOSPHORUS: CPT | Performed by: EMERGENCY MEDICINE

## 2018-09-08 PROCEDURE — 36415 COLL VENOUS BLD VENIPUNCTURE: CPT | Performed by: EMERGENCY MEDICINE

## 2018-09-08 PROCEDURE — 96365 THER/PROPH/DIAG IV INF INIT: CPT

## 2018-09-08 PROCEDURE — 83735 ASSAY OF MAGNESIUM: CPT | Performed by: EMERGENCY MEDICINE

## 2018-09-08 PROCEDURE — 80048 BASIC METABOLIC PNL TOTAL CA: CPT | Performed by: EMERGENCY MEDICINE

## 2018-09-08 PROCEDURE — 85025 COMPLETE CBC W/AUTO DIFF WBC: CPT | Performed by: EMERGENCY MEDICINE

## 2018-09-08 PROCEDURE — 80076 HEPATIC FUNCTION PANEL: CPT | Performed by: EMERGENCY MEDICINE

## 2018-09-08 PROCEDURE — 93005 ELECTROCARDIOGRAM TRACING: CPT

## 2018-09-08 PROCEDURE — 99284 EMERGENCY DEPT VISIT MOD MDM: CPT

## 2018-09-08 RX ADMIN — POTASSIUM CHLORIDE 20 MEQ: 149 INJECTION, SOLUTION, CONCENTRATE INTRAVENOUS at 22:22

## 2018-09-08 RX ADMIN — Medication 300 UNITS: at 22:50

## 2018-09-08 RX ADMIN — SODIUM CHLORIDE 1000 ML: 0.9 INJECTION, SOLUTION INTRAVENOUS at 22:22

## 2018-09-08 NOTE — TELEPHONE ENCOUNTER
I received a call from [address] [place] [address] regarding a critical lab value on the patient  Her potassium is 1 9 and her calcium level is less than 5  I attempted to call her home phone and her mobile phone and left a message on both to call me immediately or go to the ER for further evaluation and treatment    As of 30 minutes after calling both phone numbers, she has not called back as yet and I gave her my cell phone number to call back on       2 hours later I called the patient and they finally answered and they were agreeable to go to the ER

## 2018-09-09 NOTE — ED NOTES
of patient, assisted staff with placing patient in bed at this time      Arabella Estrada RN  09/08/18 7659

## 2018-09-09 NOTE — ED PROVIDER NOTES
History  Chief Complaint   Patient presents with    Abnormal Lab     pt reports she had bloodwork drawn yesterday and was told her potassium and calcium are critically low  denies cp, denies sob  49 YO female presents with an abnormal lab  Pt has a Hx of MS, states she had routine blood work yesterday and was called today, told to go to the ED as her calcium and potassium were critically low  Pt denies complaints at this time, no chest pain, shortness of breath   states bloodwork may have been drawn from her port after it had been injected with heparin, concerned this may have skewed the result  Pt states she does have a Hx of low calcium and she does take potassium  Pt denies CP/SOB/F/C/N/V/D/C, no dysuria, burning on urination or blood in urine  History provided by:  Patient   used: No    Medical Problem   Severity:  Unable to specify  Onset quality:  Unable to specify  Timing:  Unable to specify  Context:  Low potassium and calcium on routine blood work  Relieved by:  Nothing  Worsened by:  Nothing  Ineffective treatments:  None tried  Associated symptoms: no abdominal pain, no chest pain, no diarrhea, no fatigue, no fever, no rash, no shortness of breath and no vomiting        Prior to Admission Medications   Prescriptions Last Dose Informant Patient Reported? Taking?    DULoxetine (CYMBALTA) 30 mg delayed release capsule  Self No Yes   Sig: TAKE ONE CAPSULE BY MOUTH EVERY DAY   KLOR-CON 8 MEQ tablet  Self No Yes   Sig: TAKE 1 TABLET DAILY WITH FOOD   Sennosides (SENOKOT PO)  Self Yes Yes   Sig: Take by mouth as needed   baclofen 20 mg tablet   No Yes   Sig: TAKE 1 TABLET 5 TIMES A DAY AS NEEDED   furosemide (LASIX) 40 mg tablet  Self No Yes   Sig: TAKE 1 TABLET EVERY DAY   ibuprofen (MOTRIN IB) 200 mg tablet  Self Yes Yes   Sig: Take by mouth every 4 (four) hours as needed     montelukast (SINGULAIR) 10 mg tablet  Self No Yes   Sig: Take 1 tablet daily for allergy Patient taking differently: as needed Take 1 tablet daily for allergy    oxyCODONE-acetaminophen (PERCOCET) 5-325 mg per tablet  Self Yes Yes   Sig: Take 1 tablet by mouth every 4 (four) hours as needed for moderate pain      Facility-Administered Medications: None       Past Medical History:   Diagnosis Date    Anesthesia     "prefers if able to be put to sleep on stretcher before placed on table if possible due to severe spasticity    Bladder stones     Chronic pain disorder     neck    Contracture, right shoulder     right arm and hand    Dependent on wheelchair     motorized    Edema     dependant lower extremities    Elevated alkaline phosphatase level     Mildly elevated total, normal isoenzymes 4/15/15, normal 1/17; last assessed: [id number]    Fernandez catheter in place     #37 to large bag(silicone catheter)    Gallbladder disease     History of femur fracture     right leg    History of UTI     [facility])     Muscle spasticity     especially with touch    Muscle weakness     Muscular dystrophy ([facility])     Neck pain     Neurogenic bladder     Paralysis ([facility])     bilateral lower extremities    Port-a-cath in place     left chest," hasn't used in approx 1 yr or so"    Sacral pressure sore     "shearing, tegaderm in place,"    Swallowing difficulty     Tinnitus     Urinary incontinence        Past Surgical History:   Procedure Laterality Date    BLADDER STONE REMOVAL N/A 12/4/2017    Procedure: Ara Smart;  Surgeon: Migue Riley MD;  Location: [facility];  Service: Urology    BLADDER SURGERY      CHOLECYSTECTOMY      ESOPHAGOGASTRODUODENOSCOPY      KIDNEY STONE SURGERY      PORTACATH PLACEMENT      SUPRAPUBIC CYSTOSTOMY  02/25/2014    last assessed: [id number]       Family History   Problem Relation Age of Onset    Diabetes Mother     Hyperlipidemia Mother     Hypertension Mother     COPD Father     Hypertension Father    Kareemmirza Gallito Hyperlipidemia Sister     Alzheimer's disease Family     Diabetes Family     Hypertension Family     Heart disease Family      I have reviewed and agree with the history as documented  Social History   Substance Use Topics    Smoking status: Never Smoker    Smokeless tobacco: Never Used    Alcohol use Yes      Comment: "very rare"; history of being a social drinker- quit drinking         Review of Systems   Constitutional: Negative for chills, fatigue and fever  HENT: Negative for dental problem  Eyes: Negative for visual disturbance  Respiratory: Negative for shortness of breath  Cardiovascular: Negative for chest pain  Gastrointestinal: Negative for abdominal pain, diarrhea and vomiting  Genitourinary: Negative for dysuria and frequency  Musculoskeletal: Negative for arthralgias  Skin: Negative for rash  Neurological: Negative for dizziness, weakness and light-headedness  Psychiatric/Behavioral: Negative for agitation, behavioral problems and confusion  All other systems reviewed and are negative  Physical Exam  Physical Exam   Constitutional: She is oriented to person, place, and time  She appears well-developed and well-nourished  HENT:   Head: Normocephalic and atraumatic  Eyes: EOM are normal    Neck: Normal range of motion  Cardiovascular: Normal rate, regular rhythm and normal heart sounds  Pulmonary/Chest: Effort normal and breath sounds normal    Abdominal: Soft  There is no tenderness  Musculoskeletal: Normal range of motion  Neurological: She is alert and oriented to person, place, and time  Skin: Skin is warm and dry  Psychiatric: She has a normal mood and affect  Her behavior is normal  Thought content normal    Nursing note and vitals reviewed        Vital Signs  ED Triage Vitals [09/08/18 2016]   Temperature Pulse Respirations Blood Pressure SpO2   98 8 °F (37 1 °C) 84 16 113/67 100 %      Temp Source Heart Rate Source Patient Position - Orthostatic VS BP Location FiO2 (%)   Temporal Monitor Sitting Left arm --      Pain Score       --           Vitals:    09/08/18 2016 09/08/18 2226   BP: 113/67 124/60   Pulse: 84 77   Patient Position - Orthostatic VS: Sitting Lying       Visual Acuity      ED Medications  Medications   potassium chloride 20 mEq in dextrose 5 % 100 mL IVPB (0 mEq Intravenous Stopped 9/8/18 2243)   heparin lock flush 100 units/mL injection 300 Units (not administered)   sodium chloride 0 9 % bolus 1,000 mL (0 mL Intravenous Stopped 9/8/18 2243)       Diagnostic Studies  Results Reviewed     Procedure Component Value Units Date/Time    Basic metabolic panel [[id number]]  (Abnormal) Collected:  09/08/18 2208    Lab Status:  Final result Specimen:  Blood from Central Venous Line Updated:  09/08/18 2234     Sodium 140 mmol/L      Potassium 3 8 mmol/L      Chloride 103 mmol/L      CO2 30 mmol/L      ANION GAP 7 mmol/L      BUN 9 mg/dL      Creatinine 0 43 (L) mg/dL      Glucose 105 mg/dL      Calcium 9 0 mg/dL      eGFR 121 ml/min/1 73sq m     Narrative:         National Kidney Disease Education Program recommendations are as follows:  GFR calculation is accurate only with a steady state creatinine  Chronic Kidney disease less than 60 ml/min/1 73 sq  meters  Kidney failure less than 15 ml/min/1 73 sq  meters      Hepatic function panel [[id number]]  (Normal) Collected:  09/08/18 2208    Lab Status:  Final result Specimen:  Blood from Central Venous Line Updated:  09/08/18 2234     Total Bilirubin 0 34 mg/dL      Bilirubin, Direct 0 09 mg/dL      Alkaline Phosphatase 94 U/L      AST 10 U/L      ALT 16 U/L      Total Protein 7 2 g/dL      Albumin 3 5 g/dL     Phosphorus [[id number]]  (Normal) Collected:  09/08/18 2208    Lab Status:  Final result Specimen:  Blood from Central Venous Line Updated:  09/08/18 2234     Phosphorus 3 6 mg/dL     Magnesium [[id number]]  (Normal) Collected:  09/08/18 2208    Lab Status:  Final result Specimen:  Blood from Central Venous Line Updated:  09/08/18 2234     Magnesium 2 4 mg/dL     CBC and differential [[id number]] Collected:  09/08/18 2208    Lab Status:  Final result Specimen:  Blood from Central Venous Line Updated:  09/08/18 2215     WBC 6 39 Thousand/uL      RBC 4 33 Million/uL      Hemoglobin 12 4 g/dL      Hematocrit 38 8 %      MCV 90 fL      MCH 28 6 pg      MCHC 32 0 g/dL      RDW 12 7 %      MPV 9 3 fL      Platelets 426 Thousands/uL      nRBC 0 /100 WBCs      Neutrophils Relative 58 %      Immat GRANS % 0 %      Lymphocytes Relative 32 %      Monocytes Relative 6 %      Eosinophils Relative 3 %      Basophils Relative 1 %      Neutrophils Absolute 3 75 Thousands/µL      Immature Grans Absolute 0 02 Thousand/uL      Lymphocytes Absolute 2 02 Thousands/µL      Monocytes Absolute 0 36 Thousand/µL      Eosinophils Absolute 0 20 Thousand/µL      Basophils Absolute 0 04 Thousands/µL                  No orders to display              Procedures  Procedures       Phone Contacts  ED Phone Contact    ED Course                               MDM  Number of Diagnoses or Management Options  Well adult health check: new and requires workup  Diagnosis management comments: Abnormal labs - Pt had routine lab work  This does show a potassium of 1 9, calcium <5  The creatinine is also <0 15  This is possibly due to dilution  Will recheck these labs to determine if yesterday's labs were accurate  Amount and/or Complexity of Data Reviewed  Clinical lab tests: ordered and reviewed  Obtain history from someone other than the patient: yes  Review and summarize past medical records: yes    Patient Progress  Patient progress: stable    CritCare Time    Disposition  Final diagnoses:    Well adult health check     Time reflects when diagnosis was documented in both MDM as applicable and the Disposition within this note     Time User Action Codes Description Comment    9/8/2018 10:41 PM Asiya Srinivasan Add [Z00 00] Well adult health check ED Disposition     ED Disposition Condition Comment    Discharge  Ciara Conway discharge to home/self care  Condition at discharge: Stable        Follow-up Information     Follow up With Specialties Details Why [facility], DO Family Medicine Schedule an appointment as soon as possible for a visit  [address] [place] [address] [address]  [phone]            Patient's Medications   Discharge Prescriptions    No medications on file     No discharge procedures on file      ED Provider  Electronically Signed by           Renu Murillo MD  09/08/18 9723

## 2018-09-09 NOTE — ED NOTES
Patient potassium WNL  ED provider notified and at patient's bedside  Per Dr Yamila Devlin orders, d/c potassium and bolus        Zion Mansfield RN  09/08/18 2724

## 2018-09-09 NOTE — DISCHARGE INSTRUCTIONS
Your potassium level was normal  Call your doctors to let them know of this new lab result  Hypokalemia   WHAT YOU NEED TO KNOW:   Hypokalemia is a low level of potassium in your blood  Potassium helps control how your muscles, heart, and digestive system work  Hypokalemia occurs when your body loses too much potassium or does not absorb enough from food  DISCHARGE INSTRUCTIONS:   Return to the emergency department if:   · You cannot move your arm or leg  · You have a fast or irregular heartbeat  · You are too tired or weak to stand up  Contact your healthcare provider if:   · You are vomiting, or you have diarrhea  · You have numbness or tingling in your arms or legs  · Your symptoms do not go away or they get worse  · You have questions or concerns about your condition or care  Medicines:   · Potassium  will be given to bring your potassium levels back to normal     · Take your medicine as directed  Contact your healthcare provider if you think your medicine is not helping or if you have side effects  Tell him of her if you are allergic to any medicine  Keep a list of the medicines, vitamins, and herbs you take  Include the amounts, and when and why you take them  Bring the list or the pill bottles to follow-up visits  Carry your medicine list with you in case of an emergency  Eat foods that are high in potassium:  Foods that are high in potassium include bananas, oranges, tomatoes, potatoes, and avocado  Chan beans, turkey, salmon, lean beef, yogurt, and milk are also high in potassium  Ask your healthcare provider or dietitian for more information about foods that are high in potassium  Follow up with your healthcare provider as directed:  Write down your questions so you remember to ask them during your visits  © 2017 [employer] Information is for End User's use only and may not be sold, redistributed or otherwise used for commercial purposes   All illustrations and images included in CareNotes® are the copyrighted property of A D A M , Inc  or [employer]  The above information is an  only  It is not intended as medical advice for individual conditions or treatments  Talk to your doctor, nurse or pharmacist before following any medical regimen to see if it is safe and effective for you

## 2018-09-09 NOTE — ED NOTES
Ashley Teixeira RN at bedside accessing patient's port at this time      Latoya Mckeon [facility]  09/08/18 0350

## 2018-09-10 ENCOUNTER — OFFICE VISIT (OUTPATIENT)
Dept: FAMILY MEDICINE CLINIC | Facility: CLINIC | Age: 48
End: 2018-09-10
Payer: MEDICARE

## 2018-09-10 ENCOUNTER — TELEPHONE (OUTPATIENT)
Dept: NEUROLOGY | Facility: CLINIC | Age: 48
End: 2018-09-10

## 2018-09-10 VITALS — HEART RATE: 76 BPM | DIASTOLIC BLOOD PRESSURE: 70 MMHG | SYSTOLIC BLOOD PRESSURE: 100 MMHG

## 2018-09-10 DIAGNOSIS — M48.00 SPINAL STENOSIS, UNSPECIFIED SPINAL REGION: ICD-10-CM

## 2018-09-10 DIAGNOSIS — E55.9 VITAMIN D DEFICIENCY: ICD-10-CM

## 2018-09-10 DIAGNOSIS — E87.6 HYPOKALEMIA: ICD-10-CM

## 2018-09-10 DIAGNOSIS — I89.0 LYMPHEDEMA: ICD-10-CM

## 2018-09-10 DIAGNOSIS — G35 MULTIPLE SCLEROSIS (HCC): Primary | ICD-10-CM

## 2018-09-10 DIAGNOSIS — F32.A DEPRESSION, UNSPECIFIED DEPRESSION TYPE: ICD-10-CM

## 2018-09-10 DIAGNOSIS — E78.2 MIXED HYPERLIPIDEMIA: ICD-10-CM

## 2018-09-10 DIAGNOSIS — Z93.59 SUPRAPUBIC CATHETER (HCC): ICD-10-CM

## 2018-09-10 DIAGNOSIS — Z99.3 WHEELCHAIR DEPENDENT: ICD-10-CM

## 2018-09-10 DIAGNOSIS — R73.9 HYPERGLYCEMIA: ICD-10-CM

## 2018-09-10 DIAGNOSIS — N31.9 NEUROGENIC BLADDER: ICD-10-CM

## 2018-09-10 DIAGNOSIS — Z23 NEED FOR INFLUENZA VACCINATION: ICD-10-CM

## 2018-09-10 DIAGNOSIS — Z12.39 SCREENING FOR BREAST CANCER: ICD-10-CM

## 2018-09-10 DIAGNOSIS — E53.8 VITAMIN B12 DEFICIENCY: ICD-10-CM

## 2018-09-10 PROCEDURE — 90686 IIV4 VACC NO PRSV 0.5 ML IM: CPT

## 2018-09-10 PROCEDURE — G0008 ADMIN INFLUENZA VIRUS VAC: HCPCS

## 2018-09-10 PROCEDURE — 99214 OFFICE O/P EST MOD 30 MIN: CPT | Performed by: FAMILY MEDICINE

## 2018-09-10 RX ORDER — OXYCODONE HYDROCHLORIDE AND ACETAMINOPHEN 5; 325 MG/1; MG/1
1 TABLET ORAL EVERY 4 HOURS PRN
Qty: 40 TABLET | Refills: 0 | Status: SHIPPED | OUTPATIENT
Start: 2018-09-10 | End: 2018-10-30 | Stop reason: SDUPTHER

## 2018-09-10 NOTE — PROGRESS NOTES
Assessment/Plan:  1  Multiple sclerosis/wheelchair-bound, patient follows up with Neurology  2  Neurogenic bladder/suprapubic catheter, patient follows up with Urology  3  Hypokalemia repeat was normal continue present therapy  4  Hyperlipidemia, stable off of medication diet controlled will continue to monitor  5  Hyperglycemia blood work is ordered  6  Vitamin deficiencies D and B12 blood work is ordered  7  Spinal stenosis, Percocet reordered  8  Lymphedema, chronic  9  Depression, stable continue Cymbalta  10  Health maintenance, flu vaccine given today  11  Patient to return in 3 months for office visit blood work, sooner if needed  Patient requested 3 months secondary to drawing air with with visiting nurses I agree to repeat everything in 3 months        Multiple sclerosis ([facility] [address] )  Follows with Neurology    Wheelchair dependent  Follows with Neurology    Vitamin D deficiency  Blood work ordered    Vitamin B12 deficiency  Blood work ordered    [facility]  Follows with Urology    Lymphedema  Chronic    Hypokalemia  Repeat was normal continue to monitor    Hyperlipidemia  Stable on no medication continue to monitor    Hyperglycemia  Diet controlled    Depression  Stable continue Cymbalta    Spinal stenosis  Percocet reordered       Diagnoses and all orders for this visit:    Multiple sclerosis ([facility] [address] )    Screening for breast cancer  -     Mammo screening bilateral w cad; Future    Wheelchair dependent    Lymphedema  -     CBC; Future  -     Comprehensive metabolic panel; Future  -     Hemoglobin A1C; Future  -     Lipid Panel with Direct LDL reflex; Future  -     TSH, 3rd generation with Free T4 reflex; Future  -     Vitamin B12; Future  -     Folate; Future  -     Vitamin D 25 hydroxy; Future    Hypokalemia  -     CBC; Future  -     Comprehensive metabolic panel; Future  -     Hemoglobin A1C; Future  -     Lipid Panel with Direct LDL reflex;  Future  -     TSH, 3rd generation with Free T4 reflex; Future  -     Vitamin B12; Future  -     Folate; Future  -     Vitamin D 25 hydroxy; Future    Mixed hyperlipidemia  -     CBC; Future  -     Comprehensive metabolic panel; Future  -     Hemoglobin A1C; Future  -     Lipid Panel with Direct LDL reflex; Future  -     TSH, 3rd generation with Free T4 reflex; Future  -     Vitamin B12; Future  -     Folate; Future  -     Vitamin D 25 hydroxy; Future    Hyperglycemia  -     CBC; Future  -     Comprehensive metabolic panel; Future  -     Hemoglobin A1C; Future  -     Lipid Panel with Direct LDL reflex; Future  -     TSH, 3rd generation with Free T4 reflex; Future  -     Vitamin B12; Future  -     Folate; Future  -     Vitamin D 25 hydroxy; Future    Depression, unspecified depression type  -     CBC; Future  -     Comprehensive metabolic panel; Future  -     Hemoglobin A1C; Future  -     Lipid Panel with Direct LDL reflex; Future  -     TSH, 3rd generation with Free T4 reflex; Future  -     Vitamin B12; Future  -     Folate; Future  -     Vitamin D 25 hydroxy; Future    Suprapubic catheter (HCC)  -     CBC; Future  -     Comprehensive metabolic panel; Future  -     Hemoglobin A1C; Future  -     Lipid Panel with Direct LDL reflex; Future  -     TSH, 3rd generation with Free T4 reflex; Future  -     Vitamin B12; Future  -     Folate; Future  -     Vitamin D 25 hydroxy; Future    Vitamin D deficiency  -     CBC; Future  -     Comprehensive metabolic panel; Future  -     Hemoglobin A1C; Future  -     Lipid Panel with Direct LDL reflex; Future  -     TSH, 3rd generation with Free T4 reflex; Future  -     Vitamin B12; Future  -     Folate; Future  -     Vitamin D 25 hydroxy; Future    Vitamin B12 deficiency  -     CBC; Future  -     Comprehensive metabolic panel; Future  -     Hemoglobin A1C; Future  -     Lipid Panel with Direct LDL reflex; Future  -     TSH, 3rd generation with Free T4 reflex; Future  -     Vitamin B12; Future  -     Folate;  Future  -     Vitamin D 25 hydroxy; Future    Need for influenza vaccination  -     FLU VACCINE QUADRIVALENT GREATER THAN OR EQUAL TO 2YO PRESERVATIVE FREE IM  -     CBC; Future  -     Comprehensive metabolic panel; Future  -     Hemoglobin A1C; Future  -     Lipid Panel with Direct LDL reflex; Future  -     TSH, 3rd generation with Free T4 reflex; Future  -     Vitamin B12; Future  -     Folate; Future  -     Vitamin D 25 hydroxy; Future    Spinal stenosis, unspecified spinal region  -     oxyCODONE-acetaminophen (PERCOCET) 5-325 mg per tablet; Take 1 tablet by mouth every 4 (four) hours as needed for moderate pain Max Daily Amount: 6 tablets  -     CBC; Future  -     Comprehensive metabolic panel; Future  -     Hemoglobin A1C; Future  -     Lipid Panel with Direct LDL reflex; Future  -     TSH, 3rd generation with Free T4 reflex; Future  -     Vitamin B12; Future  -     Folate; Future  -     Vitamin D 25 hydroxy; Future    Neurogenic bladder          Subjective: Follow up on chronic health issues including MS, Paraplegia, Nephrolithasis, Supra-pubic catheter, hyperglycemia, hyperlipidemia, hypokalemia, lymphedema, vit B12 deficiency and vit d deficiency  Review labs  --bb   Patient ID: Porsha Grant is a 50 y o  female  Discussed blood work with the patient  She had extremely low potassium and calcium was seen in the ER repeat was normal   Patient stated that visiting nurse true these blood work from her indwelling line after flushing  The ER physician the patient and now myself believe this was just dilutional as all repeat blood work was normal         The following portions of the patient's history were reviewed and updated as appropriate: allergies, current medications, past family history, past medical history, past social history, past surgical history and problem list     Review of Systems   Constitutional: Negative for chills and fever  HENT: Negative  Eyes: Negative  Respiratory: Negative      Cardiovascular: Negative  Gastrointestinal: Negative  Endocrine: Negative  Genitourinary:        Follows with Urology positive indwelling suprapubic catheter   Musculoskeletal:        Wheelchair-bound follows up with Neurology   Skin: Negative  Allergic/Immunologic: Negative  Neurological:        Follows with Neurology   Hematological: Negative  Psychiatric/Behavioral: Negative  Objective:      /70 (BP Location: Left arm, Patient Position: Sitting, Cuff Size: Large)   Pulse 76          Physical Exam   Constitutional: She is oriented to person, place, and time  She appears well-developed and well-nourished  HENT:   Head: Normocephalic and atraumatic  Mouth/Throat: Oropharynx is clear and moist    Eyes: Conjunctivae and EOM are normal  No scleral icterus  Neck: Normal range of motion  Neck supple  No thyromegaly present  Cardiovascular: Normal rate, regular rhythm and normal heart sounds  Pulmonary/Chest: Effort normal and breath sounds normal    Abdominal: Soft  Bowel sounds are normal  There is no tenderness  Musculoskeletal: She exhibits edema  Minor nonpitting lymphedema, chronic   Neurological: She is alert and oriented to person, place, and time  No cranial nerve deficit  Skin: Skin is warm and dry  Psychiatric: She has a normal mood and affect

## 2018-09-10 NOTE — TELEPHONE ENCOUNTER
LMOM for patient to return call  Patient has appointment with PCP today and Dr Antwon Quesada on 9/25/18     ----- Message from Sherice Meza MD sent at 9/9/2018  7:04 AM EDT -----  Let pt know her hb low at 10 2, down from 13 8 about 9 months ago  Concerned about her anemia  Please let pt know she must follow up with her pcp  Send labs to her pcp  In addition, pt with lower abs lymphs of 660  Any new meds? Any new ms meds since her last visit in feb 2017? These are about half the value they were 9 months ago Pt is overdue for follow up appt

## 2018-09-12 LAB
ATRIAL RATE: 78 BPM
P AXIS: 52 DEGREES
PR INTERVAL: 134 MS
QRS AXIS: 53 DEGREES
QRSD INTERVAL: 70 MS
QT INTERVAL: 360 MS
QTC INTERVAL: 410 MS
T WAVE AXIS: 7 DEGREES
VENTRICULAR RATE: 78 BPM

## 2018-09-12 PROCEDURE — 93010 ELECTROCARDIOGRAM REPORT: CPT | Performed by: INTERNAL MEDICINE

## 2018-09-13 NOTE — TELEPHONE ENCOUNTER
Made patient aware of below  Takes Baclofen, Cymbalta, Lasix and potassium  Saw PCP on Monday  Results routed to PCP

## 2018-09-25 ENCOUNTER — TELEPHONE (OUTPATIENT)
Dept: NEUROLOGY | Facility: CLINIC | Age: 48
End: 2018-09-25

## 2018-09-25 ENCOUNTER — OFFICE VISIT (OUTPATIENT)
Dept: NEUROLOGY | Facility: CLINIC | Age: 48
End: 2018-09-25
Payer: MEDICARE

## 2018-09-25 VITALS — DIASTOLIC BLOOD PRESSURE: 53 MMHG | RESPIRATION RATE: 16 BRPM | HEART RATE: 87 BPM | SYSTOLIC BLOOD PRESSURE: 113 MMHG

## 2018-09-25 DIAGNOSIS — G35 MULTIPLE SCLEROSIS (HCC): Primary | ICD-10-CM

## 2018-09-25 PROCEDURE — 99215 OFFICE O/P EST HI 40 MIN: CPT | Performed by: PSYCHIATRY & NEUROLOGY

## 2018-09-25 NOTE — TELEPHONE ENCOUNTER
We have a blank order for port flushes that we typically use  Is this appropriate? We can use this order form and I can get patient scheduled for flushes every 6 weeks

## 2018-09-25 NOTE — TELEPHONE ENCOUNTER
Dr Nyla Yadav,     Patient just needs a script for OT that says destini and treat  Also spoke to PT staff  The loud program is geared for Parkinson's patients  They wouldn't turn our patient down but they don't usually have other diagnosis do it, as there is no research to back up that this program would help/benefit someone with MS  Program is 4x a week, for 4 weeks  Loud program is covered by medicare 80%  If patient has secondary they may  other 20% or patient would have co pay  They won't know until patient is referred and they run insurance  If you want patient to try it though she can   You would just need to also put in a script for Physical Therapy and in the comments identify Loud Program

## 2018-09-25 NOTE — PATIENT INSTRUCTIONS
Multiple sclerosis ([facility] [address] )  Pt here for neuro follow up  Pt here with spouse today  Pt last seen in feb 2017  Pt did have er visit Severiano Notice this month due to low potassium which was false reading due to site of draw  Pt notes no significant changes except for some increased left arm weakness and diff with dexterity  Pt with some int word finding diff  rec updated mri head and c spine  Pt notes this is very difficult with her lying down  Hard to make new recommendation without updated studies  Pt also with some increased issues with full breath and hypophonic speech francy with fatigue  rec pulm eval  rec LOUD program for her speech  Will ask sw to help   Pt has nursing visit q 2 weeks  Aide twice weekly which helps tremendously  Pt has suprapubic catheter every 4 weeks by nursing  No recent infections  Discussed ocrevus, pt and spouse not interested at this time due to risk for infection and need for updated imaging  And testing  Pt has had port ever since 2007, placed by dr Sunshine Bashir port flushing was at time of labs about 2-3 weeks ago  Pt is unsure who writes orders for ongoing flushing  Question if visiting nurse can flush the port  We will need to check with infusion center how often this is needed due to longevity of her port     orignially placed for cytoxin  Will do vascular consultation re maintaince recommendations as well

## 2018-09-25 NOTE — TELEPHONE ENCOUNTER
Nicolas Markham and Helder Magallanes,  Just saw pt and spouse in office today  Pt last seen over 1 5 yrs ago  Pt with progressive ms  Pt is tetraplegic and power chair bound  Pt best function is with her left arm  Would like OT eval for her left arm as well as LOUD program for her due to her dysarthria and hypophonia  Can you please see how we can get these set up for pt  In additon she has [facility] nursing every 2weeks and suprapubic catheter change every 4 weeks  This is already in place  An aide comes for 1-2 hours twice weekly as well  Pt had a port placed in 2007 by another doctor  Pt still has port but does not have instructions on regular flushing of port  Pt thought pcp was doing it  Last flush was during er visit earlier this month but not routinely done  Can you coordinate with Hernandez Pryor and see how we find out how to manage the port? I have a referral to vascular surgery in place as well  Helder Magallanes, can you call infusion center and see who they recommend we call about maintance protocols for her port  Again this was just accessed during her er visit on 9/8 but it is not routinely flushed  Need to find out how to get a set protocol in place for her  Can the visiting nurse flush it?   Thanks  Cesar Torres

## 2018-09-25 NOTE — TELEPHONE ENCOUNTER
Called hematology/oncology office to ask for typical port flushing guidelines, spoke with one of the nurses  Normal protocol is monthly port flushes, some physicians push that out to every 6-8 weeks depending on the patient  Port flushes done at infusion center, they do not have any patients that have this done in the home  We can write order and set patient up for monthly (or whatever timeline you'd prefer) port flushes with infusion center  Spoke to patient, she is willing to go to infusion center for maintenance  Flush orders have been coming from our office  PCP had refused to prescribe maintenance, states orders must come from provider who placed port  Port placed in 2007, patient thinks by Dr Destiney Chambers

## 2018-09-25 NOTE — TELEPHONE ENCOUNTER
Awesome job  Can you get a copy of what a typical port flush protocol would be from the infusion or heme nurses    It think every 6 weeks would be reaonable

## 2018-09-25 NOTE — ASSESSMENT & PLAN NOTE
Pt here for neuro follow up  Pt here with spouse today  Pt last seen in feb 2017  Pt did have er visit Samy Ham this month due to low potassium which was false reading due to site of draw  Pt notes no significant changes except for some increased left arm weakness and diff with dexterity  Pt with some int word finding diff  rec updated mri head and c spine  Pt notes this is very difficult with her lying down  Hard to make new recommendation without updated studies  Pt also with some increased issues with full breath and hypophonic speech francy with fatigue  rec pulm eval  rec LOUD program for her speech  Will ask sw to help   Pt has nursing visit q 2 weeks  Aide twice weekly which helps tremendously  Pt has suprapubic catheter every 4 weeks by nursing  No recent infections  Discussed ocrevus, pt and spouse not interested at this time due to risk for infection and need for updated imaging  And testing  Pt has had port ever since 2007, placed by dr Valentin Pena port flushing was at time of labs about 2-3 weeks ago  Pt is unsure who writes orders for ongoing flushing  Question if visiting nurse can flush the port  We will need to check with infusion center how often this is needed due to longevity of her port     orignially placed for cytoxin  Will do vascular consultation re maintaince recommendations as well

## 2018-09-25 NOTE — TELEPHONE ENCOUNTER
Writer left voicemail for pt, asking for return call back about big and loud program, as it's only listed for parkinson's disease  Awaiting return call

## 2018-09-25 NOTE — PROGRESS NOTES
Patient ID: Elio Adler is a 50 y o  female  Assessment/Plan:    Multiple sclerosis ([facility] [address] )  Pt here for neuro follow up  Pt here with spouse today  Pt last seen in feb 2017  Pt did have er visit Severiano Notice this month due to low potassium which was false reading due to site of draw  Pt notes no significant changes except for some increased left arm weakness and diff with dexterity  Pt with some int word finding diff  rec updated mri head and c spine  Pt notes this is very difficult with her lying down  Hard to make new recommendation without updated studies  Pt also with some increased issues with full breath and hypophonic speech francy with fatigue  rec pulm eval  rec LOUD program for her speech  Will ask sw to help   Pt has nursing visit q 2 weeks  Aide twice weekly which helps tremendously  Pt has suprapubic catheter every 4 weeks by nursing  No recent infections  Discussed ocrevus, pt and spouse not interested at this time due to risk for infection and need for updated imaging  And testing  Pt has had port ever since 2007, placed by dr Sunshine Bashir port flushing was at time of labs about 2-3 weeks ago  Pt is unsure who writes orders for ongoing flushing  Question if visiting nurse can flush the port  We will need to check with infusion center how often this is needed due to longevity of her port  orignially placed for cytoxin  Will do vascular consultation re maintaince recommendations as well         Diagnoses and all orders for this visit:    Multiple sclerosis ([facility] [address] )  -     MRI brain MS wo contrast; Future  -     MRI cervical spine wo contrast; Future  -     Ambulatory referral to Pulmonology; Future  -     Ambulatory referral to Vascular Surgery; Future           Subjective:    HPI    HPI: 50year old female presents for MS follow up, last seen in feb 2017 by me  Per my last note " Patient was diagnosed with MS in 1998  She was previously seen by Dr Sujatha Goodson and Dr Ana Schmitt over the years   She was on Copaxone initially, then Avonex, then Rebif  She then went to Cytoxan which she stopped in 2004 and resumed in 2006  Patient reports she started using a wheelchair in 2004  She has also done IV steroid therapy for a while as well, without overall help with progression of disease  Patient stopped working in 2002  At time of Jan 2015 visit she complained on leg pain following fall off of her wheelchair ramp while in her wheelchair  We ordered x-ray and found pt to have a femur fx  She currently has suprapubic catheter via Dr Osborne  This is being managed by visiting nurse as well as urology and going fairly well  This has been helpful for avoidance for skin breakdown "  Pt last seen in feb 2017 over 1 5 yrs ago  Extended appt today to catch up on past  Pt here with spouse today  Pt last seen in feb 2017  Pt did have er visit Jamel Ortiz this month due to low potassium which was false reading due to site of draw  Pt notes no significant changes except for some increased left arm weakness and diff with dexterity  Pt with some int word finding diff  rec updated mri head and c spine  Pt notes this is very difficult with her lying down  Hard to make new recommendation without updated studies  Pt also with some increased issues with full breath and hypophonic speech francy with fatigue  rec pulm eval and also rec LOUD program for her speech  Will ask sw to help coordinate these resources for her  Pt has nursing visit q 2 weeks  Pt has had Aide twice weekly which helps tremendously for about 1-2 hours per week and the same provider for the past yr  Pt has suprapubic catheter every 4 weeks by nursing  No recent infections  earlier last yr pt had a cough and uri requiring abx  Pt notes not recurrence  Spouse has noted that pt cough much less and we both agreed pulm eval may be helpful as well  Discussed ocrevus in th past as well as with pt today    pt and spouse not interested at this time due to risk for infection and need for updated imaging andtesting  Pt has had port ever since 2007, placed by dr Gordo Stanley  Last port flushing was at time of labs about 2-3 weeks ago  Pt is unsure who writes orders for ongoing flushing  Question if visiting nurse can flush the port  We will need to check with infusion center how often this is needed due to longevity of her port     The port was orignially placed for cytoxin  Will do vascular consultation re maintaince recommendations as well  No other new sxs  No recent utis  No falls  No issues with power chair  No sz  No loc  No change in mental status,  No vertigo  No diplopia  No ha or n or v   Bowel and bladder the same  Pt cont to use baclofen for her spasticity  She had been evaluated by Dr Juliet Nguyen for Botox injections but determined she was not a good candidate  She has problems with choking especially with thin liquids  She did complete swallowing eval for this complaint and she had slight delay in her swallowing with recommendation to remove problem some foods from her diet  Last IV steroids were in feb 2017 due to voice getting weaker, left hand weaker  Pt's last MRI brain 11/2010, last MRI c-spine 9/2009 and last MRI t-spine 3/2006  pt adamant about not getting any furhter imaging  Rev upcoming meds for progressive disease  Likely coming out in feb 2019  We will re discuss at that time once fda approvals/ indications of meds available  Total time spent today 45 minutes  Greater than 50% of total time was spent with the patient and / or family counseling and / or coordination of care  The following portions of the patient's history were reviewed and updated as appropriate: allergies, current medications, past family history, past medical history, past social history, past surgical history and problem list          Objective:    Blood pressure 113/53, pulse 87, resp  rate 16      Physical Exam   Constitutional: She appears well-developed and well-nourished  Eyes: EOM are normal  Pupils are equal, round, and reactive to light  Cardiovascular: Intact distal pulses  Neurological Exam    Mental Status  The patient is alert and oriented to person, place, time, and situation  Her recent and remote memory are normal  She has dysarthria of moderate severity  She has normal attention span and concentration  She follows multi-step commands  She has a normal fund of knowledge  hypophonic     Cranial Nerves    CN II: The patient's visual acuity and visual fields are normal   CN III, IV, VI: The patient's pupils are equally round and reactive to light and ocular movements are normal   CN V: The patient has normal facial sensation  CN VII:  The patient has symmetric facial movement  CN VIII:  The patient's hearing is normal   CN IX, X: The patient has symmetric palate movement and normal gag reflex  CN XI: The patient's shoulder shrug strength is normal   CN XII: The patient's tongue is midline without atrophy or fasciculations  Motor    Tetraparesis of the right upper and aleksander lowers  Increased tone throughout greater in legs     Sensory    Dec vib aleksander lowers     Reflexes  Decreased reflexes throughout     Gait and Coordination   She has abnormal right finger to nose and abnormal left finger to nose coordination  Non ambulatory  Power chair dependent           ROS:    Review of Systems   Constitutional: Negative  Negative for appetite change and fever  HENT: Positive for tinnitus and trouble swallowing  Negative for hearing loss and voice change  Eyes: Positive for visual disturbance (blurred vision)  Negative for photophobia and pain  Respiratory: Negative  Negative for shortness of breath  Cardiovascular: Negative  Negative for palpitations  Gastrointestinal: Negative  Negative for nausea and vomiting  Endocrine: Negative  Negative for cold intolerance and heat intolerance  Genitourinary: Negative    Negative for dysuria, frequency and urgency  Musculoskeletal: Negative  Negative for myalgias and neck pain  Skin: Negative  Negative for rash  Neurological: Negative  Negative for dizziness, tremors, seizures, syncope, facial asymmetry, speech difficulty, weakness, light-headedness, numbness and headaches  Hematological: Negative  Does not bruise/bleed easily  Psychiatric/Behavioral: Negative  Negative for confusion, hallucinations and sleep disturbance

## 2018-09-27 NOTE — TELEPHONE ENCOUNTER
Per discussion with Dr Hal Ortez, ok to use our port flush infusion orders  LMOM for patient to call back  Need to ask where she wants to go for port flushes every 6 weeks  Will get first set up for patient once we have her availability

## 2018-09-27 NOTE — TELEPHONE ENCOUNTER
Writer spoke with patient regarding loud program  She will review information as well when it comes in mail with script  She will call and schedule when ready

## 2018-10-01 NOTE — TELEPHONE ENCOUNTER
Patient goes to [facility] infusion [place], requests late morning  Scheduled on Monday Oct 22 at 11  Made patient aware of date and time  Patient aware to schedule every 6 weeks moving forward

## 2018-10-15 ENCOUNTER — DOCUMENTATION (OUTPATIENT)
Dept: NEUROLOGY | Facility: CLINIC | Age: 48
End: 2018-10-15

## 2018-10-30 DIAGNOSIS — M48.00 SPINAL STENOSIS, UNSPECIFIED SPINAL REGION: ICD-10-CM

## 2018-10-31 RX ORDER — OXYCODONE HYDROCHLORIDE AND ACETAMINOPHEN 5; 325 MG/1; MG/1
1 TABLET ORAL EVERY 4 HOURS PRN
Qty: 40 TABLET | Refills: 0 | Status: SHIPPED | OUTPATIENT
Start: 2018-10-31 | End: 2019-05-29 | Stop reason: SDUPTHER

## 2018-11-01 DIAGNOSIS — G35 MULTIPLE SCLEROSIS (HCC): ICD-10-CM

## 2018-11-01 RX ORDER — BACLOFEN 20 MG/1
TABLET ORAL
Qty: 150 TABLET | Refills: 1 | Status: SHIPPED | OUTPATIENT
Start: 2018-11-01 | End: 2019-02-26 | Stop reason: SDUPTHER

## 2018-11-01 NOTE — TELEPHONE ENCOUNTER
Pt called requesting baclofen refill  Pt is completely out  Refill request entered  Pls sign off       Thanks

## 2018-11-24 ENCOUNTER — HOSPITAL ENCOUNTER (OUTPATIENT)
Dept: MRI IMAGING | Facility: HOSPITAL | Age: 48
Discharge: HOME | End: 2018-11-24
Attending: PSYCHIATRY & NEUROLOGY
Payer: MEDICARE

## 2018-11-24 DIAGNOSIS — G35 MULTIPLE SCLEROSIS (HCC): ICD-10-CM

## 2018-11-24 PROCEDURE — 70551 MRI BRAIN STEM W/O DYE: CPT

## 2018-11-24 PROCEDURE — 72141 MRI NECK SPINE W/O DYE: CPT

## 2018-11-27 ENCOUNTER — TELEPHONE (OUTPATIENT)
Dept: NEUROLOGY | Facility: CLINIC | Age: 48
End: 2018-11-27

## 2018-11-27 NOTE — TELEPHONE ENCOUNTER
Made patient aware of both messages below  Verbalizes clear understanding  Denies any further questions or concerns at this time  MD Jimy Salas RN             Let pt know mri head stable         ----- Message from Loco Enciso MD sent at 11/26/2018 12:38 PM EST -----  Please let pt know mri c spine no new lesions but progressive atrophy from prior lesions noted  New drug in 2019 may be an option for her once it is released   We can discuss at next appt

## 2018-12-07 ENCOUNTER — LAB REQUISITION (OUTPATIENT)
Dept: LAB | Facility: HOSPITAL | Age: 48
End: 2018-12-07
Payer: MEDICARE

## 2018-12-07 ENCOUNTER — TELEPHONE (OUTPATIENT)
Dept: UROLOGY | Facility: AMBULATORY SURGERY CENTER | Age: 48
End: 2018-12-07

## 2018-12-07 DIAGNOSIS — Z43.5 ENCOUNTER FOR ATTENTION TO CYSTOSTOMY (HCC): ICD-10-CM

## 2018-12-07 DIAGNOSIS — N31.9 NEUROMUSCULAR DYSFUNCTION OF BLADDER: ICD-10-CM

## 2018-12-07 DIAGNOSIS — N39.0 ACUTE UTI: Primary | ICD-10-CM

## 2018-12-07 LAB
BACTERIA UR QL AUTO: ABNORMAL /HPF
BILIRUB UR QL STRIP: NEGATIVE
CLARITY UR: ABNORMAL
COLOR UR: ABNORMAL
GLUCOSE UR STRIP-MCNC: NEGATIVE MG/DL
HGB UR QL STRIP.AUTO: ABNORMAL
KETONES UR STRIP-MCNC: NEGATIVE MG/DL
LEUKOCYTE ESTERASE UR QL STRIP: ABNORMAL
NITRITE UR QL STRIP: POSITIVE
NON-SQ EPI CELLS URNS QL MICRO: ABNORMAL /HPF
PH UR STRIP.AUTO: 7.5 [PH] (ref 4.5–8)
PROT UR STRIP-MCNC: NEGATIVE MG/DL
RBC #/AREA URNS AUTO: ABNORMAL /HPF
SP GR UR STRIP.AUTO: 1.01 (ref 1–1.03)
UROBILINOGEN UR QL STRIP.AUTO: 0.2 E.U./DL
WBC #/AREA URNS AUTO: ABNORMAL /HPF

## 2018-12-07 PROCEDURE — 81001 URINALYSIS AUTO W/SCOPE: CPT | Performed by: UROLOGY

## 2018-12-07 PROCEDURE — 87086 URINE CULTURE/COLONY COUNT: CPT | Performed by: UROLOGY

## 2018-12-07 NOTE — TELEPHONE ENCOUNTER
Spoke with patient, patient said that she thinks she has a UTI and would like a call back  Please call

## 2018-12-07 NOTE — TELEPHONE ENCOUNTER
Patient has SPT and c/o strong foul odor around stoma and in urine  Also states she is sure she has urine infection due to the feeling in her belly  Denies fever at this time  Advised patient will place order for UA and urine culture  Patient will call her VNA and see if she can take sample  ER precautions provided since it is Friday  Someone will call with results when they are available

## 2018-12-08 LAB — BACTERIA UR CULT: NORMAL

## 2018-12-11 RX ORDER — SULFAMETHOXAZOLE AND TRIMETHOPRIM 800; 160 MG/1; MG/1
1 TABLET ORAL EVERY 12 HOURS SCHEDULED
Qty: 10 TABLET | Refills: 0 | Status: SHIPPED | OUTPATIENT
Start: 2018-12-11 | End: 2018-12-16

## 2018-12-11 NOTE — TELEPHONE ENCOUNTER
Urine culture shows no presence of infection, but mixed contaminants, this is likely secondary to specimen from SPT  Due to presence of nitrites and leukocytes on UA, and report of symptoms, will send Bactrim to patient's [facility] pharmacy, based on prior sensitivity reports  Encourage patient to increase water intake

## 2018-12-11 NOTE — TELEPHONE ENCOUNTER
Giuliana Ponce,    Can you please read the urine results and inform on patient care from results and if antibiotic is needed       Thank you

## 2018-12-11 NOTE — TELEPHONE ENCOUNTER
Spoke with patient and informed her antibiotic was sent to her [facility] pharmacy  Advised patient to continue to increase water intake  Patient verbalized understanding and agrees to plan

## 2018-12-24 ENCOUNTER — TELEPHONE (OUTPATIENT)
Dept: NEUROLOGY | Facility: CLINIC | Age: 48
End: 2018-12-24

## 2018-12-24 DIAGNOSIS — G50.0 TRIGEMINAL NEURALGIA: Primary | ICD-10-CM

## 2018-12-24 RX ORDER — GABAPENTIN 100 MG/1
100 CAPSULE ORAL 3 TIMES DAILY
Qty: 90 CAPSULE | Refills: 0 | Status: SHIPPED | OUTPATIENT
Start: 2018-12-24 | End: 2019-01-22 | Stop reason: SDUPTHER

## 2018-12-24 NOTE — TELEPHONE ENCOUNTER
Ok to restart gabapentin 100mg tid,  Script sent to pharmacy but this is a new problem, if it contniues she needs to call for OVF,  Make sure no dental issues as well

## 2018-12-24 NOTE — TELEPHONE ENCOUNTER
Called and advised pt of all of the below  She verbalized clear understanding of all instructions  No dental issues

## 2018-12-24 NOTE — TELEPHONE ENCOUNTER
Received vm message from pt Sat @ 2:11 pm, c/o dull pain in her face, "I think I have trigeminal neuralgia"  Called pt to get more info  Thursday evening she was having dinner, had taken a bite out of no where she felt excruciating pain like electric shock in her face  Friday, she was ok-had several episode  Could not eat or drink or even brush her teeth  Pt took motrin q 6 hours and gabapentin 100 mg tid (old rx that was prescribed)-helped her symptoms  Asking if ok to re-start gabapentin? If ok, pls send rx to [facility] pharmacy  Advised pt that she will need to make an appt to get evaluated       Pls advise               [phone]

## 2018-12-30 DIAGNOSIS — E87.6 HYPOKALEMIA: ICD-10-CM

## 2018-12-31 RX ORDER — POTASSIUM CHLORIDE 600 MG/1
TABLET, FILM COATED, EXTENDED RELEASE ORAL
Qty: 30 TABLET | Refills: 9 | Status: SHIPPED | OUTPATIENT
Start: 2018-12-31 | End: 2019-11-01 | Stop reason: SDUPTHER

## 2019-01-14 ENCOUNTER — TELEPHONE (OUTPATIENT)
Dept: FAMILY MEDICINE CLINIC | Facility: CLINIC | Age: 49
End: 2019-01-14

## 2019-01-14 NOTE — TELEPHONE ENCOUNTER
Phone call received from pt's home care nurse, Kody Roque  She wanted to inform us of continuing care with pt once a month for catheter change, and now will also do once a week wound care of (L) gluteal fold with cound cream and tegaderm cover  Pt also has HHA x 2 days per week for personal care  --bb

## 2019-01-22 DIAGNOSIS — G50.0 TRIGEMINAL NEURALGIA: ICD-10-CM

## 2019-01-22 RX ORDER — GABAPENTIN 100 MG/1
CAPSULE ORAL
Qty: 90 CAPSULE | Refills: 0 | Status: SHIPPED | OUTPATIENT
Start: 2019-01-22 | End: 2019-02-27 | Stop reason: SDUPTHER

## 2019-01-31 DIAGNOSIS — F32.A DEPRESSION, UNSPECIFIED DEPRESSION TYPE: ICD-10-CM

## 2019-01-31 RX ORDER — DULOXETIN HYDROCHLORIDE 30 MG/1
CAPSULE, DELAYED RELEASE ORAL
Qty: 30 CAPSULE | Refills: 8 | Status: SHIPPED | OUTPATIENT
Start: 2019-01-31 | End: 2019-11-01 | Stop reason: SDUPTHER

## 2019-02-02 ENCOUNTER — TELEPHONE (OUTPATIENT)
Dept: OTHER | Facility: OTHER | Age: 49
End: 2019-02-02

## 2019-02-05 ENCOUNTER — TELEPHONE (OUTPATIENT)
Dept: UROLOGY | Facility: MEDICAL CENTER | Age: 49
End: 2019-02-05

## 2019-02-05 NOTE — TELEPHONE ENCOUNTER
Patient managed by Dr Lynne Paulino, seen in the [address] [id number] office  Patient has history of MS, neurogenic bladder, SPT, and history of bladder stones  Patient last seen in June 2018 for routine cysto  Patient calling today with reports of foul smelling urine, no other real symptoms, no fever or pain  Per patient, urine smells so bad, she is afraid to leave her home  Patient reports she drinks about eight 20 oz glasses of water daily  Patient also reports frequent problems with sediment in her catheter  Reports  or visiting nurse irrigate catheter every other day  Patient reports visiting nurses change her SPT routinely and as needed  Advised patient, given only symptom is foul smelling urine, will discuss with provider need for urine testing or other options to help with smell  Patient agreeable to plan  Patient knows, office will call her back tomorrow to further discuss       Patient has pending routine office cysto scheduled for 6/7/19

## 2019-02-05 NOTE — TELEPHONE ENCOUNTER
Patient managed by Dr Raj Gutiérrez and seen at the [address] office      Left message for patient to call office regarding symptoms

## 2019-02-05 NOTE — TELEPHONE ENCOUNTER
Pt was on Bactrim in December since finishing the antibiotic she has noticed an increased foul odor   Please advise [phone]

## 2019-02-06 NOTE — TELEPHONE ENCOUNTER
Spoke with patient  Advised patient, based on just foul odor, do not advise urine testing at this time  Patient knows if further symptoms arise, she should call office  Per patient, she is unsure if smell is from urine or she does get some drainage around the SPT site  Per patient, she keeps area clean daily and also uses a drain sponge  Patient reports drainage to be brown in color, however, patient reports he  feels drainage looks like "pus" and he is concerned with infection  Patient not sure it is infection or not  Per patient she is going to have home health nurse assess site next time she comes to the home  Advised patient, if home health nurse feels she sees any signs of infection, advised that home health nurse call office to provide her assessment and to determine if any additional testing needed  Patient verbalized understanding  Also instructed patient, if urine is cause for odor, she can try changing her leg bag more frequently, as she currently changes it every other week  Or patient can try rinsing bag regularly with vinegar water solution to clean it  Patient verbalized understanding, thankful for suggestions

## 2019-02-06 NOTE — TELEPHONE ENCOUNTER
Do not recommend urine testing based off of foul smelling urine alone, should encourage 60 ounces of water daily to help improve this and her sediment  Should monitor for UTI symptoms and notify us if these occur  Include Location In Plan?: No Detail Level: Zone

## 2019-02-18 ENCOUNTER — TELEPHONE (OUTPATIENT)
Dept: UROLOGY | Facility: MEDICAL CENTER | Age: 49
End: 2019-02-18

## 2019-02-18 NOTE — TELEPHONE ENCOUNTER
[address] called  She is changing patient's catheter today  Urine has a strong odor  She wants to get a specimen today, but needs orders  Please call her at [phone]

## 2019-02-18 NOTE — TELEPHONE ENCOUNTER
Patient managed by Dr Claire Vasquez, seen in the [address] office  Patient has history of MS, Neurogenic bladder, SPT and history of bladder stones  Patient last seen in June 2018 for routine cysto  Returned [employer]  Per Marylin Boudreaux, patient has no other symptoms, pain or fever  Francisco Feliciano, per previous telephone encounter from approximately 2 weeks ago, had already advised patient urine culture not recommended based on foul odor alone  Francisco Feliciano, if patient symptomatic, would advise testing, but not without presence of symptoms  Marylin Boudreaux verbalized understanding

## 2019-02-26 DIAGNOSIS — G35 MULTIPLE SCLEROSIS (HCC): ICD-10-CM

## 2019-02-26 DIAGNOSIS — I89.0 LYMPHEDEMA: ICD-10-CM

## 2019-02-26 RX ORDER — BACLOFEN 20 MG/1
TABLET ORAL
Qty: 150 TABLET | Refills: 0 | Status: SHIPPED | OUTPATIENT
Start: 2019-02-26 | End: 2019-05-30 | Stop reason: SDUPTHER

## 2019-02-26 RX ORDER — FUROSEMIDE 40 MG/1
TABLET ORAL
Qty: 30 TABLET | Refills: 11 | Status: SHIPPED | OUTPATIENT
Start: 2019-02-26 | End: 2020-02-21 | Stop reason: SDUPTHER

## 2019-02-26 NOTE — TELEPHONE ENCOUNTER
I did refill patient's furosemide however patient is to make appointment in the next few weeks    She was supposed to come in December and did not show for her appointment

## 2019-02-27 DIAGNOSIS — G50.0 TRIGEMINAL NEURALGIA: ICD-10-CM

## 2019-02-27 RX ORDER — GABAPENTIN 100 MG/1
CAPSULE ORAL
Qty: 90 CAPSULE | Refills: 2 | Status: SHIPPED | OUTPATIENT
Start: 2019-02-27 | End: 2019-06-10 | Stop reason: SDUPTHER

## 2019-03-08 ENCOUNTER — TELEPHONE (OUTPATIENT)
Dept: FAMILY MEDICINE CLINIC | Facility: CLINIC | Age: 49
End: 2019-03-08

## 2019-03-08 NOTE — TELEPHONE ENCOUNTER
There are no orders currently in epic    Patient was last seen in September she needs to make appointment in the next month or 2 for follow-up

## 2019-03-08 NOTE — TELEPHONE ENCOUNTER
TSShabana from [employer] called re: pts open wound on right upper thigh, she states the wound is getting deeper not better so they are looking for your permission to go out to pt 3 times per week as well as the aid to do the same, You should be getting an order to sign off on in Epic

## 2019-03-08 NOTE — TELEPHONE ENCOUNTER
Yasir Rider @ [facility] aware as per TS ok to increase visits to pt

## 2019-05-10 ENCOUNTER — TELEPHONE (OUTPATIENT)
Dept: NEUROLOGY | Facility: CLINIC | Age: 49
End: 2019-05-10

## 2019-05-10 ENCOUNTER — OFFICE VISIT (OUTPATIENT)
Dept: NEUROLOGY | Facility: CLINIC | Age: 49
End: 2019-05-10
Payer: MEDICARE

## 2019-05-10 VITALS — DIASTOLIC BLOOD PRESSURE: 61 MMHG | SYSTOLIC BLOOD PRESSURE: 114 MMHG | HEART RATE: 91 BPM

## 2019-05-10 DIAGNOSIS — R25.2 SPASTICITY: ICD-10-CM

## 2019-05-10 DIAGNOSIS — G35 MULTIPLE SCLEROSIS (HCC): Primary | ICD-10-CM

## 2019-05-10 DIAGNOSIS — N31.9 NEUROGENIC BLADDER: ICD-10-CM

## 2019-05-10 DIAGNOSIS — R13.10 DYSPHAGIA, UNSPECIFIED TYPE: ICD-10-CM

## 2019-05-10 PROCEDURE — 99214 OFFICE O/P EST MOD 30 MIN: CPT | Performed by: PHYSICIAN ASSISTANT

## 2019-05-14 DIAGNOSIS — G35 MULTIPLE SCLEROSIS (HCC): ICD-10-CM

## 2019-05-14 RX ORDER — SODIUM CHLORIDE 9 MG/ML
20 INJECTION, SOLUTION INTRAVENOUS ONCE
Status: CANCELLED | OUTPATIENT
Start: 2019-05-17

## 2019-05-17 ENCOUNTER — HOSPITAL ENCOUNTER (OUTPATIENT)
Dept: INFUSION CENTER | Facility: CLINIC | Age: 49
Discharge: HOME | End: 2019-05-17
Payer: MEDICARE

## 2019-05-17 VITALS
HEART RATE: 85 BPM | RESPIRATION RATE: 16 BRPM | DIASTOLIC BLOOD PRESSURE: 72 MMHG | SYSTOLIC BLOOD PRESSURE: 113 MMHG | TEMPERATURE: 96.6 F

## 2019-05-17 DIAGNOSIS — G35 MULTIPLE SCLEROSIS (HCC): Primary | ICD-10-CM

## 2019-05-17 PROCEDURE — 96365 THER/PROPH/DIAG IV INF INIT: CPT

## 2019-05-17 RX ORDER — SODIUM CHLORIDE 9 MG/ML
20 INJECTION, SOLUTION INTRAVENOUS ONCE
Status: CANCELLED | OUTPATIENT
Start: 2019-05-17

## 2019-05-17 RX ORDER — SODIUM CHLORIDE 9 MG/ML
20 INJECTION, SOLUTION INTRAVENOUS ONCE
Status: COMPLETED | OUTPATIENT
Start: 2019-05-17 | End: 2019-05-17

## 2019-05-17 RX ADMIN — SODIUM CHLORIDE 1000 MG: 0.9 INJECTION, SOLUTION INTRAVENOUS at 14:50

## 2019-05-17 RX ADMIN — SODIUM CHLORIDE 20 ML/HR: 0.9 INJECTION, SOLUTION INTRAVENOUS at 14:33

## 2019-05-17 NOTE — PLAN OF CARE
Problem: Knowledge Deficit  Goal: Patient/family/caregiver demonstrates understanding of disease process, treatment plan, medications, and discharge instructions  Description  Complete learning assessment and assess knowledge base    Interventions:  - Provide teaching at level of understanding  - Provide teaching via preferred learning methods  5/17/2019 1554 by Yanci Smith RN  Outcome: Progressing  5/17/2019 1441 by Yanci Smith, RN  Outcome: Progressing

## 2019-05-17 NOTE — PROGRESS NOTES
Patient tolerated IV Methylprednisolone  Denies any discomforts  No further infusion appointments scheduled at this time   AVS given to patient

## 2019-05-29 ENCOUNTER — OFFICE VISIT (OUTPATIENT)
Dept: FAMILY MEDICINE CLINIC | Facility: CLINIC | Age: 49
End: 2019-05-29
Payer: MEDICARE

## 2019-05-29 VITALS — HEART RATE: 64 BPM | SYSTOLIC BLOOD PRESSURE: 112 MMHG | RESPIRATION RATE: 14 BRPM | DIASTOLIC BLOOD PRESSURE: 62 MMHG

## 2019-05-29 DIAGNOSIS — L89.319 PRESSURE INJURY OF SKIN OF RIGHT BUTTOCK, UNSPECIFIED INJURY STAGE: ICD-10-CM

## 2019-05-29 DIAGNOSIS — F32.A DEPRESSION, UNSPECIFIED DEPRESSION TYPE: ICD-10-CM

## 2019-05-29 DIAGNOSIS — E87.6 HYPOKALEMIA: ICD-10-CM

## 2019-05-29 DIAGNOSIS — M62.838 MUSCLE SPASM: ICD-10-CM

## 2019-05-29 DIAGNOSIS — Z93.59 SUPRAPUBIC CATHETER (HCC): ICD-10-CM

## 2019-05-29 DIAGNOSIS — Z12.31 SCREENING MAMMOGRAM, ENCOUNTER FOR: ICD-10-CM

## 2019-05-29 DIAGNOSIS — G82.20 PARAPLEGIA (HCC): ICD-10-CM

## 2019-05-29 DIAGNOSIS — L89.329 PRESSURE INJURY OF SKIN OF LEFT BUTTOCK, UNSPECIFIED INJURY STAGE: ICD-10-CM

## 2019-05-29 DIAGNOSIS — E53.8 VITAMIN B12 DEFICIENCY: ICD-10-CM

## 2019-05-29 DIAGNOSIS — G35 MULTIPLE SCLEROSIS (HCC): Primary | ICD-10-CM

## 2019-05-29 DIAGNOSIS — R73.9 HYPERGLYCEMIA: ICD-10-CM

## 2019-05-29 DIAGNOSIS — N31.9 NEUROGENIC BLADDER: ICD-10-CM

## 2019-05-29 DIAGNOSIS — I89.0 LYMPHEDEMA: ICD-10-CM

## 2019-05-29 DIAGNOSIS — E55.9 VITAMIN D DEFICIENCY: ICD-10-CM

## 2019-05-29 DIAGNOSIS — Z99.3 WHEELCHAIR DEPENDENT: ICD-10-CM

## 2019-05-29 DIAGNOSIS — Z00.00 HEALTH CARE MAINTENANCE: ICD-10-CM

## 2019-05-29 DIAGNOSIS — M48.00 SPINAL STENOSIS, UNSPECIFIED SPINAL REGION: ICD-10-CM

## 2019-05-29 DIAGNOSIS — E78.2 MIXED HYPERLIPIDEMIA: ICD-10-CM

## 2019-05-29 PROBLEM — L89.309 PRESSURE INJURY OF SKIN OF BUTTOCK: Status: ACTIVE | Noted: 2019-05-29

## 2019-05-29 PROCEDURE — 99214 OFFICE O/P EST MOD 30 MIN: CPT | Performed by: FAMILY MEDICINE

## 2019-05-29 PROCEDURE — G0438 PPPS, INITIAL VISIT: HCPCS | Performed by: FAMILY MEDICINE

## 2019-05-29 RX ORDER — OXYCODONE HYDROCHLORIDE AND ACETAMINOPHEN 5; 325 MG/1; MG/1
1 TABLET ORAL EVERY 4 HOURS PRN
Qty: 40 TABLET | Refills: 0 | Status: SHIPPED | OUTPATIENT
Start: 2019-05-29 | End: 2019-05-29 | Stop reason: SDUPTHER

## 2019-05-29 RX ORDER — OXYCODONE HYDROCHLORIDE AND ACETAMINOPHEN 5; 325 MG/1; MG/1
1 TABLET ORAL EVERY 4 HOURS PRN
Qty: 40 TABLET | Refills: 0 | Status: SHIPPED | OUTPATIENT
Start: 2019-05-29 | End: 2020-02-21 | Stop reason: SDUPTHER

## 2019-05-30 DIAGNOSIS — G35 MULTIPLE SCLEROSIS (HCC): ICD-10-CM

## 2019-05-31 RX ORDER — BACLOFEN 20 MG/1
TABLET ORAL
Qty: 150 TABLET | Refills: 0 | Status: SHIPPED | OUTPATIENT
Start: 2019-05-31 | End: 2019-07-18 | Stop reason: SDUPTHER

## 2019-06-03 ENCOUNTER — APPOINTMENT (OUTPATIENT)
Dept: WOUND CARE | Facility: HOSPITAL | Age: 49
End: 2019-06-03
Payer: MEDICARE

## 2019-06-03 PROCEDURE — 99203 OFFICE O/P NEW LOW 30 MIN: CPT

## 2019-06-03 PROCEDURE — 11042 DBRDMT SUBQ TIS 1ST 20SQCM/<: CPT

## 2019-06-07 ENCOUNTER — TELEPHONE (OUTPATIENT)
Dept: UROLOGY | Facility: CLINIC | Age: 49
End: 2019-06-07

## 2019-06-07 ENCOUNTER — PROCEDURE VISIT (OUTPATIENT)
Dept: UROLOGY | Facility: CLINIC | Age: 49
End: 2019-06-07
Payer: MEDICARE

## 2019-06-07 VITALS — DIASTOLIC BLOOD PRESSURE: 78 MMHG | HEART RATE: 66 BPM | SYSTOLIC BLOOD PRESSURE: 112 MMHG

## 2019-06-07 DIAGNOSIS — N31.9 NEUROGENIC BLADDER: Primary | ICD-10-CM

## 2019-06-07 PROCEDURE — 52000 CYSTOURETHROSCOPY: CPT | Performed by: UROLOGY

## 2019-06-07 RX ORDER — OXYBUTYNIN CHLORIDE 10 MG/1
10 TABLET, EXTENDED RELEASE ORAL
Qty: 90 TABLET | Refills: 3 | Status: SHIPPED | OUTPATIENT
Start: 2019-06-07 | End: 2020-10-20

## 2019-06-10 ENCOUNTER — APPOINTMENT (OUTPATIENT)
Dept: WOUND CARE | Facility: HOSPITAL | Age: 49
End: 2019-06-10
Payer: MEDICARE

## 2019-06-10 DIAGNOSIS — G50.0 TRIGEMINAL NEURALGIA: ICD-10-CM

## 2019-06-10 PROCEDURE — 99212 OFFICE O/P EST SF 10 MIN: CPT

## 2019-06-10 RX ORDER — GABAPENTIN 100 MG/1
CAPSULE ORAL
Qty: 90 CAPSULE | Refills: 2 | Status: SHIPPED | OUTPATIENT
Start: 2019-06-10 | End: 2020-10-20

## 2019-06-17 ENCOUNTER — APPOINTMENT (OUTPATIENT)
Dept: WOUND CARE | Facility: HOSPITAL | Age: 49
End: 2019-06-17
Payer: MEDICARE

## 2019-06-17 ENCOUNTER — LAB REQUISITION (OUTPATIENT)
Dept: LAB | Facility: HOSPITAL | Age: 49
End: 2019-06-17
Payer: MEDICARE

## 2019-06-17 DIAGNOSIS — L89.313 STAGE III PRESSURE ULCER OF RIGHT BUTTOCK (HCC): ICD-10-CM

## 2019-06-17 PROCEDURE — 87070 CULTURE OTHR SPECIMN AEROBIC: CPT | Performed by: PREVENTIVE MEDICINE

## 2019-06-17 PROCEDURE — 87205 SMEAR GRAM STAIN: CPT | Performed by: PREVENTIVE MEDICINE

## 2019-06-17 PROCEDURE — 97597 DBRDMT OPN WND 1ST 20 CM/<: CPT | Performed by: PREVENTIVE MEDICINE

## 2019-06-17 PROCEDURE — 87186 SC STD MICRODIL/AGAR DIL: CPT | Performed by: PREVENTIVE MEDICINE

## 2019-06-17 PROCEDURE — 87077 CULTURE AEROBIC IDENTIFY: CPT | Performed by: PREVENTIVE MEDICINE

## 2019-06-20 LAB
BACTERIA WND AEROBE CULT: ABNORMAL
BACTERIA WND AEROBE CULT: ABNORMAL
GRAM STN SPEC: ABNORMAL
GRAM STN SPEC: ABNORMAL

## 2019-06-24 ENCOUNTER — APPOINTMENT (OUTPATIENT)
Dept: WOUND CARE | Facility: HOSPITAL | Age: 49
End: 2019-06-24
Payer: MEDICARE

## 2019-06-24 PROCEDURE — 97597 DBRDMT OPN WND 1ST 20 CM/<: CPT | Performed by: NURSE PRACTITIONER

## 2019-07-01 ENCOUNTER — APPOINTMENT (OUTPATIENT)
Dept: WOUND CARE | Facility: HOSPITAL | Age: 49
End: 2019-07-01
Payer: MEDICARE

## 2019-07-01 PROCEDURE — 99212 OFFICE O/P EST SF 10 MIN: CPT

## 2019-07-08 ENCOUNTER — APPOINTMENT (OUTPATIENT)
Dept: WOUND CARE | Facility: HOSPITAL | Age: 49
End: 2019-07-08
Payer: MEDICARE

## 2019-07-08 PROCEDURE — 11042 DBRDMT SUBQ TIS 1ST 20SQCM/<: CPT | Performed by: PREVENTIVE MEDICINE

## 2019-07-15 ENCOUNTER — APPOINTMENT (OUTPATIENT)
Dept: WOUND CARE | Facility: HOSPITAL | Age: 49
End: 2019-07-15
Payer: MEDICARE

## 2019-07-15 PROCEDURE — 11042 DBRDMT SUBQ TIS 1ST 20SQCM/<: CPT | Performed by: NURSE PRACTITIONER

## 2019-07-16 DIAGNOSIS — G35 MULTIPLE SCLEROSIS (HCC): ICD-10-CM

## 2019-07-18 DIAGNOSIS — G35 MULTIPLE SCLEROSIS (HCC): ICD-10-CM

## 2019-07-18 DIAGNOSIS — G35 MULTIPLE SCLEROSIS (HCC): Primary | ICD-10-CM

## 2019-07-18 RX ORDER — BACLOFEN 20 MG/1
TABLET ORAL
Qty: 150 TABLET | Refills: 2 | Status: SHIPPED | OUTPATIENT
Start: 2019-07-18 | End: 2019-11-19 | Stop reason: SDUPTHER

## 2019-07-18 RX ORDER — BACLOFEN 20 MG/1
TABLET ORAL
Qty: 120 TABLET | Refills: 1 | OUTPATIENT
Start: 2019-07-18

## 2019-07-18 NOTE — TELEPHONE ENCOUNTER
Patient has multiple labs active from May 29th, not quite sure who ordered all of those, but she needs those done    From our standpoint, need to look for her LFTs, will put in a separate order

## 2019-07-18 NOTE — TELEPHONE ENCOUNTER
Called and Left a message on pt's answering machine for a call back  What labs would you like pt to have    Please enter orders

## 2019-07-18 NOTE — TELEPHONE ENCOUNTER
Please let patient know that she should obtain updated labs, baclofen is a fairly high dose  When last seen, patient was referred to PMR for possible Botox, I do not see if this was done, can you ask if she was seen by them   Will refill script

## 2019-07-19 NOTE — TELEPHONE ENCOUNTER
pt made aware  she states that she saw PMR a while ago and this dr said they would not know where to start with her  they did not do any botox  she was scheduled with dr Ute Steele but had to cancel  she states that she will reschedule  she uses a  facility for her labs  she will get labs done within the next few weeks    i then transfered her to reschedule with dr Ute Steele

## 2019-07-23 ENCOUNTER — APPOINTMENT (OUTPATIENT)
Dept: WOUND CARE | Facility: HOSPITAL | Age: 49
End: 2019-07-23
Payer: MEDICARE

## 2019-07-23 PROCEDURE — 11042 DBRDMT SUBQ TIS 1ST 20SQCM/<: CPT | Performed by: PREVENTIVE MEDICINE

## 2019-08-05 ENCOUNTER — APPOINTMENT (OUTPATIENT)
Dept: WOUND CARE | Facility: HOSPITAL | Age: 49
End: 2019-08-05
Payer: MEDICARE

## 2019-08-05 PROCEDURE — 97597 DBRDMT OPN WND 1ST 20 CM/<: CPT

## 2019-08-19 ENCOUNTER — APPOINTMENT (OUTPATIENT)
Dept: WOUND CARE | Facility: HOSPITAL | Age: 49
End: 2019-08-19
Payer: MEDICARE

## 2019-08-19 PROCEDURE — 97597 DBRDMT OPN WND 1ST 20 CM/<: CPT

## 2019-09-09 ENCOUNTER — APPOINTMENT (OUTPATIENT)
Dept: WOUND CARE | Facility: HOSPITAL | Age: 49
End: 2019-09-09
Payer: MEDICARE

## 2019-09-09 PROCEDURE — 97597 DBRDMT OPN WND 1ST 20 CM/<: CPT

## 2019-09-23 ENCOUNTER — APPOINTMENT (OUTPATIENT)
Dept: WOUND CARE | Facility: HOSPITAL | Age: 49
End: 2019-09-23
Payer: MEDICARE

## 2019-09-23 PROCEDURE — 11042 DBRDMT SUBQ TIS 1ST 20SQCM/<: CPT

## 2019-09-27 ENCOUNTER — TRANSCRIBE ORDERS (OUTPATIENT)
Dept: ADMINISTRATIVE | Facility: HOSPITAL | Age: 49
End: 2019-09-27

## 2019-09-27 ENCOUNTER — HOSPITAL ENCOUNTER (OUTPATIENT)
Dept: RADIOLOGY | Facility: HOSPITAL | Age: 49
Discharge: HOME | End: 2019-09-27
Attending: PREVENTIVE MEDICINE
Payer: MEDICARE

## 2019-09-27 DIAGNOSIS — L89.313 STAGE III PRESSURE ULCER OF RIGHT BUTTOCK (HCC): Primary | ICD-10-CM

## 2019-09-27 DIAGNOSIS — L89.313 STAGE III PRESSURE ULCER OF RIGHT BUTTOCK (HCC): ICD-10-CM

## 2019-09-27 PROCEDURE — 72170 X-RAY EXAM OF PELVIS: CPT

## 2019-10-25 ENCOUNTER — APPOINTMENT (OUTPATIENT)
Dept: WOUND CARE | Facility: HOSPITAL | Age: 49
End: 2019-10-25
Payer: MEDICARE

## 2019-10-25 PROCEDURE — 97597 DBRDMT OPN WND 1ST 20 CM/<: CPT

## 2019-11-01 DIAGNOSIS — E87.6 HYPOKALEMIA: ICD-10-CM

## 2019-11-01 DIAGNOSIS — F32.A DEPRESSION, UNSPECIFIED DEPRESSION TYPE: ICD-10-CM

## 2019-11-01 RX ORDER — DULOXETIN HYDROCHLORIDE 30 MG/1
30 CAPSULE, DELAYED RELEASE ORAL DAILY
Qty: 30 CAPSULE | Refills: 5 | Status: SHIPPED | OUTPATIENT
Start: 2019-11-01 | End: 2020-05-05

## 2019-11-01 RX ORDER — POTASSIUM CHLORIDE 600 MG/1
8 TABLET, FILM COATED, EXTENDED RELEASE ORAL DAILY
Qty: 30 TABLET | Refills: 5 | Status: SHIPPED | OUTPATIENT
Start: 2019-11-01 | End: 2020-05-05

## 2019-11-15 ENCOUNTER — APPOINTMENT (OUTPATIENT)
Dept: WOUND CARE | Facility: HOSPITAL | Age: 49
End: 2019-11-15
Payer: MEDICARE

## 2019-11-15 PROCEDURE — 97597 DBRDMT OPN WND 1ST 20 CM/<: CPT

## 2019-11-19 DIAGNOSIS — G35 MULTIPLE SCLEROSIS (HCC): ICD-10-CM

## 2019-11-19 RX ORDER — BACLOFEN 20 MG/1
TABLET ORAL
Qty: 120 TABLET | Refills: 3 | Status: SHIPPED | OUTPATIENT
Start: 2019-11-19 | End: 2020-07-23

## 2020-01-03 ENCOUNTER — APPOINTMENT (OUTPATIENT)
Dept: WOUND CARE | Facility: HOSPITAL | Age: 50
End: 2020-01-03
Payer: MEDICARE

## 2020-01-03 PROCEDURE — 97597 DBRDMT OPN WND 1ST 20 CM/<: CPT

## 2020-01-15 ENCOUNTER — TELEPHONE (OUTPATIENT)
Dept: UROLOGY | Facility: MEDICAL CENTER | Age: 50
End: 2020-01-15

## 2020-01-15 NOTE — TELEPHONE ENCOUNTER
Patient of Dr Radha Mcdonald seen in [facility] office  Evia First from vna calling to see what can be done  Evia First states patient has increased soft sediment causing daily blockage in catheter  Flushing is done daily  VNA states it looks like broken see shells    Please advise

## 2020-01-15 NOTE — TELEPHONE ENCOUNTER
Called patient's visiting nurse Janie Varela [phone] and left a message asking her to please contact the office to speak with clinical in regards to patient's SPT

## 2020-01-16 NOTE — TELEPHONE ENCOUNTER
Called Ela visiting nurse [phone] and left message for Elma Castleman to please contact the office to speak with clinical in regards to patient's SPT

## 2020-01-16 NOTE — TELEPHONE ENCOUNTER
Ela returned phone call to office  Patient with multiple sclerosis and chronic SPT  Managed by Dr Maribell Lara at the [facility] office  Patient's SPT changed every 3 weeks and patient's  irrigates SPT at least daily depending on blockage of catheter  Ashley Jara reports  patient produces large amount of sediment to the point the connection on the urinary bag gets clogged  Patient drinks about 3 liters of water a day  Ashley Jara is asking if there is any medication patient can take to lessen the amount of sediment  Will forward message to [facility] for her advise then call Ela back

## 2020-01-17 NOTE — TELEPHONE ENCOUNTER
Called placed  Spoke with Tato HANCOCK  Advised Grzegorz of [facility] recommendations  Grzegorz verbalized pt is hydrating with 3 liters daily  She states pt currently has non latex catheter, pt did not tolerate silcone catheter   Grzegorz verbalized she understands theres no meds at this time and will have pt continue hydrating

## 2020-01-17 NOTE — TELEPHONE ENCOUNTER
It is uncommon to prescribe the medication to help eliminate the amount of sediment within the patient's urine  I recommend she continue with aggressive hydration and consider adding lemon to her water and/or lemonade  In addition, if she is using a latex Fernandez catheter we can consider switching to a silicone catheter  Sometimes, the amount of sediment decreases with a change in the catheter

## 2020-01-23 DIAGNOSIS — M48.00 SPINAL STENOSIS, UNSPECIFIED SPINAL REGION: ICD-10-CM

## 2020-01-23 RX ORDER — OXYCODONE HYDROCHLORIDE AND ACETAMINOPHEN 5; 325 MG/1; MG/1
1 TABLET ORAL EVERY 4 HOURS PRN
Qty: 40 TABLET | Refills: 0 | OUTPATIENT
Start: 2020-01-23

## 2020-01-27 DIAGNOSIS — I89.0 LYMPHEDEMA: ICD-10-CM

## 2020-01-27 RX ORDER — FUROSEMIDE 40 MG/1
TABLET ORAL
Qty: 90 TABLET | Refills: 0 | OUTPATIENT
Start: 2020-01-27

## 2020-02-13 ENCOUNTER — OFFICE VISIT (OUTPATIENT)
Dept: NEUROLOGY | Facility: CLINIC | Age: 50
End: 2020-02-13
Payer: MEDICARE

## 2020-02-13 VITALS — HEART RATE: 89 BPM | DIASTOLIC BLOOD PRESSURE: 74 MMHG | SYSTOLIC BLOOD PRESSURE: 110 MMHG

## 2020-02-13 DIAGNOSIS — N31.9 NEUROGENIC BLADDER: ICD-10-CM

## 2020-02-13 DIAGNOSIS — G35 MULTIPLE SCLEROSIS (HCC): Primary | ICD-10-CM

## 2020-02-13 PROCEDURE — 1036F TOBACCO NON-USER: CPT | Performed by: PSYCHIATRY & NEUROLOGY

## 2020-02-13 PROCEDURE — 99215 OFFICE O/P EST HI 40 MIN: CPT | Performed by: PSYCHIATRY & NEUROLOGY

## 2020-02-13 NOTE — ASSESSMENT & PLAN NOTE
Pt now with need for more frequent catheter change to every 3 weeks  Home nursing and spouse watchful for any leakage or poor drainage of tubing  Pt to follow up with dr Narinder Flores

## 2020-02-13 NOTE — PROGRESS NOTES
Patient ID: Wilfred Castleman is a 52 y o  female  Assessment/Plan:    Multiple sclerosis (HCC)  Pt here with rebeka   Pt last seen in May   Pt notes some increased progression of the left upper ext  Pt with on going wound care for the past 6 months  Pt with some issues currently with margin of the wound, under nursing eval at home  Pt also cont to see dr Yudith Arteaga and now every 3 week catheter changes  Rev in detail history dating back to pt seeing dr Ruchi Lyn likely with PPMS  rec to pt to consider ocrevus  Pt has been reluctant in past but now due to her left hand , more receptive to option  Rev pros and cons   No known family history of female cancers  No dermatological cancers in pt  Pt to see gyn for updated eval   Also consider derm eval prior to any med consideration  Will also update labs  Update mri c and head  Update jcv status    Neurogenic bladder  Pt now with need for more frequent catheter change to every 3 weeks  Home nursing and spouse watchful for any leakage or poor drainage of tubing  Pt to follow up with dr carey       Diagnoses and all orders for this visit:    Multiple sclerosis ([facility] [address] )  -     Comprehensive metabolic panel; Future  -     CBC and differential; Future  -     MRI brain with and without contrast; Future  -     MRI cervical spine with and without contrast; Future  -     Hep B Surface Ab, Ql; Future  -     Hepatitis B Core Ab Total W/Refl IgM; Future  -     Hepatitis B core antibody, total; Future  -     Hepatitis B Core Ab W/Reflex; Future  -     Quantiferon TB Gold Plus; Future    Neurogenic bladder           Subjective:    HPI      52year old female presents for MS follow up, last seen in May 10 2019 by rommel momin  Per rommel last note "Patient was diagnosed with MS in 1998  She was previously seen by Dr Yuliya Fish and Dr Cecelia Hernández over the years  She was on Copaxone initially, then Avonex, then Rebif   She then went to Cytoxan which she stopped in 2004 and resumed in 2006  Patient reports she started using a wheelchair in 2004  She has also done IV steroid therapy for a while as well, without overall help with progression of disease  Patient stopped working in 2002  She currently has suprapubic catheter via Dr Stephane Peterson  This is being managed by visiting nurse as well as urology and going fairly well  Patient has had a port ever since 2007, placed by Dr Natty Rogers  Patient continues to use baclofen for her spasticity  She had been evaluated by Dr Newton Nageotte for Botox injections but determined she was not a good candidate  She has problems with choking especially with thin liquids  She did complete swallowing eval for this complaint and she had slight delay in her swallowing with recommendation to remove problem some foods from her diet  Last IV steroids were in Feb 2017 due to voice getting weaker, left hand weaker  In the past she had been adamant about no further imaging, but was agreeable to updating  MRI brain 11/24/19 was stable compared to 11/15/2010  MRI c-spine 11/24/19 was compared to 9/30/2009  This did not show any cord lesions, but did show significant progression of atrophy in the cord "  Pt here today for neuro follow up  Pt notes no recent hospitalizations  No fever or chills  Pt is being followed closely for wound decub in her right upper thigh  This has been about 6 months of both wound care appt as well as visiting nursing  Pt notes she is also following with dr Krystal Smiley for her neurogenic bladder  Pt was having more issues with catheter clogging and has required more frequent cath changes  Prior to wound issue, pt had a day with leakage of the catheter with saturation of her seat and this added to her skin breakdown  Re rev history dating back to [address] at time of dx at age 29  Pt notes she was never specifically told what type of ms she had  Pt notes going off meds as no meds halted her progression    Pt and spouse do not recall any times where there seemed to be remitting nature of her sxs  Pt notes she kept accumulating more disease as the yrs went on  Pt stopped work back in 2002  Pt has had her left arm as her best asset over the yrs  Pt has noted some increased diff with now helping with eating  Pt is able to operate her power chair but overall limited with her adls due to her tetraparesis  Pt notes no change in speech or swallowing  No change in bowels  Rev with pt possible role for ocrevus  Rev indicated for rrms but also ppms  Pt has had overall stability in imaging for yrs  Encouraged pt to consider possible use of ocrevus  Rev pros and cons of medicaiton  No known family history of cancers francy any female cancers  Pt will double check with her mom as well  Rev pre ocrevus labs  No known derm issues as well for pt  Pt would possibly consider medication  rec pt to see dr Choudhury Or for amanda for review of med  rec updated mri head and c spine imaging prior to any consideration as well as pre ocruvus labs  Encouraged pt to see her gyn as she has not seen in yrs  Pt notes no recent infections but is contiuing to have some issues with her decub  More recently some issues with margins of healing  Pt is willing to consider ocrevus if all imaging, labs come back favorable for her  Pt is willing to discuss this option with dr Choudhury Or  Again pt not posiitive if she will pursue treatment but willing to go for pre med testing  Pt also to have updated jcv testing  Pt recalls being positive in past   Pt has been using motorized chair ever since 2006  Total time spent today 40 minutes  Greater than 50% of total time was spent with the patient and / or family counseling and / or coordination of care  Extended appt to re rev history of ms presentation and course as well as possible option for drug therapy with ocrevus    Pt has been very hesitant to consider any meds francy any drugs that have risk for pml or any drug that needs serial monitoring  Pt referred to dr Jess Fitzgerald for further evaluation  Again reviewed side effect profile of medication  Pt needs to continue to doctor for her bladder as well as her wound care          The following portions of the patient's history were reviewed and updated as appropriate: allergies, current medications, past family history, past medical history, past social history, past surgical history and problem list and ros rev         Objective:    Blood pressure 110/74, pulse 89  Physical Exam   Constitutional: She appears well-developed and well-nourished  Eyes: Pupils are equal, round, and reactive to light  Lids are normal    Cardiovascular: Intact distal pulses  Neurological:   Reflex Scores:       Tricep reflexes are 1+ on the right side and 1+ on the left side  Bicep reflexes are 1+ on the right side and 1+ on the left side  Brachioradialis reflexes are 1+ on the right side and 1+ on the left side  Patellar reflexes are 1+ on the right side and 1+ on the left side  Achilles reflexes are 1+ on the right side and 1+ on the left side  Neurological Exam  Mental Status  Awake  Recent and remote memory are intact  Moderate dysarthria present  Language is fluent with no aphasia  Attention and concentration are normal  Fund of knowledge is appropriate for level of education  Cranial Nerves  CN II: Visual acuity is normal  Visual fields full to confrontation  Right funduscopic exam: disc intact  Left funduscopic exam: disc intact  CN III, IV, VI: Extraocular movements intact bilaterally  Normal lids and orbits bilaterally  Pupils equal round and reactive to light bilaterally  CN V: Facial sensation is normal   CN VII: Full and symmetric facial movement  CN VIII: Hearing is normal   CN IX, X: Palate elevates symmetrically  Normal gag reflex  CN XI: Shoulder shrug strength is normal   CN XII: Tongue midline without atrophy or fasciculations      Motor  Normal muscle bulk throughout  Increased muscle tone  Strength is 5/5 in all four extremities except as noted  Pt with tetraparesis, aleksander lower ext and right upper ext  Left upper 3-/5 prox, 3+/5 distally  Able to operate power chair with left hand  Sensory  Dec vib aleksander lowers  Reflexes                                           Right                      Left  Brachioradialis                    1+                         1+  Biceps                                 1+                         1+  Triceps                                1+                         1+  Finger flex                           1+                         1+  Hamstring                            1+                         1+  Patellar                                1+                         1+  Achilles                                1+                         1+  Plantar                           Downgoing                Downgoing    Coordination  Right: Finger-to-nose abnormality: Rapid alternating movement abnormality:  Left: Finger-to-nose abnormality: Rapid alternating movement abnormality:    Gait  Casual gait:  Nonambulatory  Power chair dependent  ROS:    Review of Systems   Constitutional: Negative  Negative for appetite change and fever  HENT: Negative  Negative for hearing loss, tinnitus, trouble swallowing and voice change  Eyes: Positive for visual disturbance  Negative for photophobia and pain  Respiratory: Negative  Negative for shortness of breath  Cardiovascular: Negative  Negative for palpitations  Gastrointestinal: Negative  Negative for nausea and vomiting  Endocrine: Negative  Negative for cold intolerance and heat intolerance  Genitourinary: Negative  Negative for dysuria, frequency and urgency  Musculoskeletal: Negative  Negative for myalgias and neck pain  Skin: Negative  Negative for rash  Neurological: Positive for weakness (L arm)   Negative for dizziness, tremors, seizures, syncope, facial asymmetry, speech difficulty, light-headedness, numbness and headaches  Tingling of hands and feet   Hematological: Negative  Does not bruise/bleed easily  Psychiatric/Behavioral: Negative  Negative for confusion, hallucinations and sleep disturbance          Mood swings

## 2020-02-13 NOTE — ASSESSMENT & PLAN NOTE
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

## 2020-02-21 ENCOUNTER — APPOINTMENT (OUTPATIENT)
Dept: WOUND CARE | Facility: HOSPITAL | Age: 50
End: 2020-02-21
Payer: MEDICARE

## 2020-02-21 ENCOUNTER — OFFICE VISIT (OUTPATIENT)
Dept: FAMILY MEDICINE CLINIC | Facility: CLINIC | Age: 50
End: 2020-02-21
Payer: MEDICARE

## 2020-02-21 VITALS — HEART RATE: 88 BPM | RESPIRATION RATE: 16 BRPM | DIASTOLIC BLOOD PRESSURE: 70 MMHG | SYSTOLIC BLOOD PRESSURE: 108 MMHG

## 2020-02-21 DIAGNOSIS — E55.9 VITAMIN D DEFICIENCY: ICD-10-CM

## 2020-02-21 DIAGNOSIS — G35 MULTIPLE SCLEROSIS (HCC): Primary | ICD-10-CM

## 2020-02-21 DIAGNOSIS — E78.2 MIXED HYPERLIPIDEMIA: ICD-10-CM

## 2020-02-21 DIAGNOSIS — E87.6 HYPOKALEMIA: ICD-10-CM

## 2020-02-21 DIAGNOSIS — I89.0 LYMPHEDEMA: ICD-10-CM

## 2020-02-21 DIAGNOSIS — M48.00 SPINAL STENOSIS, UNSPECIFIED SPINAL REGION: ICD-10-CM

## 2020-02-21 DIAGNOSIS — Z02.89 MEDICATION MANAGEMENT CONTRACT SIGNED: ICD-10-CM

## 2020-02-21 DIAGNOSIS — E53.8 VITAMIN B12 DEFICIENCY: ICD-10-CM

## 2020-02-21 DIAGNOSIS — Z93.59 SUPRAPUBIC CATHETER (HCC): ICD-10-CM

## 2020-02-21 DIAGNOSIS — R73.9 HYPERGLYCEMIA: ICD-10-CM

## 2020-02-21 DIAGNOSIS — G82.20 PARAPLEGIA (HCC): ICD-10-CM

## 2020-02-21 PROCEDURE — 99214 OFFICE O/P EST MOD 30 MIN: CPT | Performed by: FAMILY MEDICINE

## 2020-02-21 PROCEDURE — 1036F TOBACCO NON-USER: CPT | Performed by: FAMILY MEDICINE

## 2020-02-21 PROCEDURE — 11042 DBRDMT SUBQ TIS 1ST 20SQCM/<: CPT

## 2020-02-21 PROCEDURE — 99212 OFFICE O/P EST SF 10 MIN: CPT

## 2020-02-21 RX ORDER — OXYCODONE HYDROCHLORIDE AND ACETAMINOPHEN 5; 325 MG/1; MG/1
1 TABLET ORAL EVERY 4 HOURS PRN
Qty: 40 TABLET | Refills: 0 | Status: SHIPPED | OUTPATIENT
Start: 2020-02-21 | End: 2020-10-15 | Stop reason: SDUPTHER

## 2020-02-21 RX ORDER — FUROSEMIDE 40 MG/1
40 TABLET ORAL DAILY
Qty: 30 TABLET | Refills: 11 | Status: SHIPPED | OUTPATIENT
Start: 2020-02-21 | End: 2020-12-29

## 2020-02-21 NOTE — PROGRESS NOTES
Assessment and Plan:  1  Multiple sclerosis/paraplegia/wheelchair-bound, patient follows with Neurology  2  Lymphedema, stable a 6 reordered  3  Hyperglycemia blood work ordered  4  Hyperlipidemia, blood work ordered  5  Hypokalemia blood work ordered  6  Spinal stenosis uses Percocet very sparingly  Narcotic contract was signed  Urine drug screen was ordered  7  Suprapubic catheter, patient follows with Urology  8  Vitamin deficiencies B12 and D, blood work is ordered  9   Formal follow-up in 6 months, sooner if needed      Problem List Items Addressed This Visit        Nervous and Auditory    [facility] ([facility] ) - Primary     Follows with Neurology         Relevant Orders    CBC    Comprehensive metabolic panel    Hemoglobin A1C    Lipid Panel with Direct LDL reflex    TSH, 3rd generation with Free T4 reflex    Vitamin B12    Vitamin D 25 hydroxy    Folate    Oxycodone/Oxymorphone urine    Paraplegia (HCC)     Follows with Neurology         Relevant Orders    CBC    Comprehensive metabolic panel    Hemoglobin A1C    Lipid Panel with Direct LDL reflex    TSH, 3rd generation with Free T4 reflex    Vitamin B12    Vitamin D 25 hydroxy    Folate    Oxycodone/Oxymorphone urine       Other    Suprapubic catheter ([facility] [address] )     Follows with Urology         Relevant Orders    CBC    Comprehensive metabolic panel    Hemoglobin A1C    Lipid Panel with Direct LDL reflex    TSH, 3rd generation with Free T4 reflex    Vitamin B12    Vitamin D 25 hydroxy    Folate    Oxycodone/Oxymorphone urine    Hyperglycemia     Blood work ordered         Relevant Orders    CBC    Comprehensive metabolic panel    Hemoglobin A1C    Lipid Panel with Direct LDL reflex    TSH, 3rd generation with Free T4 reflex    Vitamin B12    Vitamin D 25 hydroxy    Folate    Oxycodone/Oxymorphone urine    Hyperlipidemia     Blood work ordered         Relevant Orders    CBC    Comprehensive metabolic panel    Hemoglobin A1C    Lipid Panel with Direct LDL reflex    TSH, 3rd generation with Free T4 reflex    Vitamin B12    Vitamin D 25 hydroxy    Folate    Oxycodone/Oxymorphone urine    Hypokalemia     Blood work ordered         Relevant Orders    CBC    Comprehensive metabolic panel    Hemoglobin A1C    Lipid Panel with Direct LDL reflex    TSH, 3rd generation with Free T4 reflex    Vitamin B12    Vitamin D 25 hydroxy    Folate    Oxycodone/Oxymorphone urine    Lymphedema     Chronic, Lasix ordered         Relevant Medications    furosemide (LASIX) 40 mg tablet    Other Relevant Orders    CBC    Comprehensive metabolic panel    Hemoglobin A1C    Lipid Panel with Direct LDL reflex    TSH, 3rd generation with Free T4 reflex    Vitamin B12    Vitamin D 25 hydroxy    Folate    Oxycodone/Oxymorphone urine    Spinal stenosis     Uses Percocet p r n , contract was signed  Refill was given PDMP was ordered         Relevant Medications    oxyCODONE-acetaminophen (PERCOCET) 5-325 mg per tablet    Other Relevant Orders    CBC    Comprehensive metabolic panel    Hemoglobin A1C    Lipid Panel with Direct LDL reflex    TSH, 3rd generation with Free T4 reflex    Vitamin B12    Vitamin D 25 hydroxy    Folate    Oxycodone/Oxymorphone urine    Vitamin B12 deficiency     Blood work ordered         Relevant Orders    CBC    Comprehensive metabolic panel    Hemoglobin A1C    Lipid Panel with Direct LDL reflex    TSH, 3rd generation with Free T4 reflex    Vitamin B12    Vitamin D 25 hydroxy    Folate    Oxycodone/Oxymorphone urine    Vitamin D deficiency     Blood work ordered         Relevant Orders    CBC    Comprehensive metabolic panel    Hemoglobin A1C    Lipid Panel with Direct LDL reflex    TSH, 3rd generation with Free T4 reflex    Vitamin B12    Vitamin D 25 hydroxy    Folate    Oxycodone/Oxymorphone urine    Medication management contract signed     Contract signed                      Diagnoses and all orders for this visit:    Multiple sclerosis ([facility] [address] )  -     CBC;  Future  - Comprehensive metabolic panel; Future  -     Hemoglobin A1C; Future  -     Lipid Panel with Direct LDL reflex; Future  -     TSH, 3rd generation with Free T4 reflex; Future  -     Vitamin B12; Future  -     Vitamin D 25 hydroxy; Future  -     Folate; Future  -     Oxycodone/Oxymorphone urine; Future    Lymphedema  -     furosemide (LASIX) 40 mg tablet; Take 1 tablet (40 mg total) by mouth daily  -     CBC; Future  -     Comprehensive metabolic panel; Future  -     Hemoglobin A1C; Future  -     Lipid Panel with Direct LDL reflex; Future  -     TSH, 3rd generation with Free T4 reflex; Future  -     Vitamin B12; Future  -     Vitamin D 25 hydroxy; Future  -     Folate; Future  -     Oxycodone/Oxymorphone urine; Future    Paraplegia (HCC)  -     CBC; Future  -     Comprehensive metabolic panel; Future  -     Hemoglobin A1C; Future  -     Lipid Panel with Direct LDL reflex; Future  -     TSH, 3rd generation with Free T4 reflex; Future  -     Vitamin B12; Future  -     Vitamin D 25 hydroxy; Future  -     Folate; Future  -     Oxycodone/Oxymorphone urine; Future    Hyperglycemia  -     CBC; Future  -     Comprehensive metabolic panel; Future  -     Hemoglobin A1C; Future  -     Lipid Panel with Direct LDL reflex; Future  -     TSH, 3rd generation with Free T4 reflex; Future  -     Vitamin B12; Future  -     Vitamin D 25 hydroxy; Future  -     Folate; Future  -     Oxycodone/Oxymorphone urine; Future    Mixed hyperlipidemia  -     CBC; Future  -     Comprehensive metabolic panel; Future  -     Hemoglobin A1C; Future  -     Lipid Panel with Direct LDL reflex; Future  -     TSH, 3rd generation with Free T4 reflex; Future  -     Vitamin B12; Future  -     Vitamin D 25 hydroxy; Future  -     Folate; Future  -     Oxycodone/Oxymorphone urine; Future    Hypokalemia  -     CBC; Future  -     Comprehensive metabolic panel; Future  -     Hemoglobin A1C; Future  -     Lipid Panel with Direct LDL reflex;  Future  -     TSH, 3rd generation with Free T4 reflex; Future  -     Vitamin B12; Future  -     Vitamin D 25 hydroxy; Future  -     Folate; Future  -     Oxycodone/Oxymorphone urine; Future    Spinal stenosis, unspecified spinal region  -     oxyCODONE-acetaminophen (PERCOCET) 5-325 mg per tablet; Take 1 tablet by mouth every 4 (four) hours as needed for moderate painMax Daily Amount: 6 tablets  -     CBC; Future  -     Comprehensive metabolic panel; Future  -     Hemoglobin A1C; Future  -     Lipid Panel with Direct LDL reflex; Future  -     TSH, 3rd generation with Free T4 reflex; Future  -     Vitamin B12; Future  -     Vitamin D 25 hydroxy; Future  -     Folate; Future  -     Oxycodone/Oxymorphone urine; Future    Suprapubic catheter (HCC)  -     CBC; Future  -     Comprehensive metabolic panel; Future  -     Hemoglobin A1C; Future  -     Lipid Panel with Direct LDL reflex; Future  -     TSH, 3rd generation with Free T4 reflex; Future  -     Vitamin B12; Future  -     Vitamin D 25 hydroxy; Future  -     Folate; Future  -     Oxycodone/Oxymorphone urine; Future    Vitamin B12 deficiency  -     CBC; Future  -     Comprehensive metabolic panel; Future  -     Hemoglobin A1C; Future  -     Lipid Panel with Direct LDL reflex; Future  -     TSH, 3rd generation with Free T4 reflex; Future  -     Vitamin B12; Future  -     Vitamin D 25 hydroxy; Future  -     Folate; Future  -     Oxycodone/Oxymorphone urine; Future    Vitamin D deficiency  -     CBC; Future  -     Comprehensive metabolic panel; Future  -     Hemoglobin A1C; Future  -     Lipid Panel with Direct LDL reflex; Future  -     TSH, 3rd generation with Free T4 reflex; Future  -     Vitamin B12; Future  -     Vitamin D 25 hydroxy; Future  -     Folate; Future  -     Oxycodone/Oxymorphone urine; Future    Medication management contract signed              Subjective:      Patient ID: Jermaine Overton is a 52 y o  female      CC:    Chief Complaint   Patient presents with    Follow-up Pt here today for a follow up  GAL Mccullough       HPI:    Patient is here for her regular follow-up patient does follow-up with Neurology for MS patient is wheelchair-bound  Patient's pain has been stable  Uses her Percocet p r n  Janie Adelaida She uses it very sparingly  Discussed risk and benefit chronic chronic therapy  Signed a narcotic contract  Will order urine drug screen with next blood work patient offers no new complaints or concerns  The following portions of the patient's history were reviewed and updated as appropriate: allergies, current medications, past family history, past medical history, past social history, past surgical history and problem list       Review of Systems   Constitutional: Negative  HENT: Negative  Eyes: Negative  Respiratory: Negative  Cardiovascular: Negative  Gastrointestinal: Negative  Endocrine: Negative  Genitourinary: Negative  Musculoskeletal:        HPI   Skin: Negative  Allergic/Immunologic: Negative  Neurological:        HPI   Hematological: Negative  Psychiatric/Behavioral: Negative  Data to review:       Objective:    Vitals:    02/21/20 1000   BP: 108/70   BP Location: Left arm   Patient Position: Sitting   Cuff Size: Large   Pulse: 88   Resp: 16        Physical Exam   Constitutional: She is oriented to person, place, and time  She appears well-developed and well-nourished  HENT:   Head: Normocephalic and atraumatic  Mouth/Throat: Oropharynx is clear and moist    Eyes: No scleral icterus  Neck: Neck supple  No JVD present  Cardiovascular: Normal rate, regular rhythm and normal heart sounds  Pulmonary/Chest: Effort normal and breath sounds normal    Abdominal: Soft  Bowel sounds are normal  There is no tenderness  Musculoskeletal: She exhibits edema  Chronic nonpitting lower extremity lymphedema   Neurological: She is alert and oriented to person, place, and time  No cranial nerve deficit     Wheelchair-bound   Skin: Skin is warm and dry  Psychiatric: She has a normal mood and affect  BMI Counseling: BMI was not able to be calculated due to patient refusing height and/or weight   Patient is MS is wheelchair-bound and able to do height and weight in office

## 2020-04-16 ENCOUNTER — TELEPHONE (OUTPATIENT)
Dept: NEUROLOGY | Facility: CLINIC | Age: 50
End: 2020-04-16

## 2020-05-05 DIAGNOSIS — F32.A DEPRESSION, UNSPECIFIED DEPRESSION TYPE: ICD-10-CM

## 2020-05-05 DIAGNOSIS — E87.6 HYPOKALEMIA: ICD-10-CM

## 2020-05-05 RX ORDER — POTASSIUM CHLORIDE 600 MG/1
TABLET, FILM COATED, EXTENDED RELEASE ORAL
Qty: 90 TABLET | Refills: 1 | Status: SHIPPED | OUTPATIENT
Start: 2020-05-05 | End: 2020-10-10 | Stop reason: HOSPADM

## 2020-05-05 RX ORDER — DULOXETIN HYDROCHLORIDE 30 MG/1
CAPSULE, DELAYED RELEASE ORAL
Qty: 90 CAPSULE | Refills: 1 | Status: SHIPPED | OUTPATIENT
Start: 2020-05-05 | End: 2020-09-08

## 2020-06-15 ENCOUNTER — PROCEDURE VISIT (OUTPATIENT)
Dept: UROLOGY | Facility: CLINIC | Age: 50
End: 2020-06-15
Payer: MEDICARE

## 2020-06-15 VITALS — SYSTOLIC BLOOD PRESSURE: 110 MMHG | TEMPERATURE: 96.4 F | HEART RATE: 82 BPM | DIASTOLIC BLOOD PRESSURE: 70 MMHG

## 2020-06-15 DIAGNOSIS — N31.9 NEUROGENIC BLADDER: ICD-10-CM

## 2020-06-15 DIAGNOSIS — G35 MULTIPLE SCLEROSIS (HCC): Primary | ICD-10-CM

## 2020-06-15 PROCEDURE — 52000 CYSTOURETHROSCOPY: CPT | Performed by: UROLOGY

## 2020-06-15 PROCEDURE — NC001 PR NO CHARGE: Performed by: UROLOGY

## 2020-07-23 DIAGNOSIS — G35 MULTIPLE SCLEROSIS (HCC): ICD-10-CM

## 2020-07-23 RX ORDER — BACLOFEN 20 MG/1
TABLET ORAL
Qty: 120 TABLET | Refills: 3 | Status: SHIPPED | OUTPATIENT
Start: 2020-07-23 | End: 2021-01-15

## 2020-07-23 NOTE — TELEPHONE ENCOUNTER
Spoke with patient's  [place] Davida (on communication consent)  Made him aware patient needs her labs drawn, he verbalizes understanding

## 2020-07-23 NOTE — TELEPHONE ENCOUNTER
Let pt know I refilled her baclofen  She is overdue for routine labs like cmp from pcp  Please encourage pt about getting labs done

## 2020-08-21 ENCOUNTER — TELEPHONE (OUTPATIENT)
Dept: FAMILY MEDICINE CLINIC | Facility: CLINIC | Age: 50
End: 2020-08-21

## 2020-08-21 NOTE — TELEPHONE ENCOUNTER
This patient did not show for her appointment today, 08/21/2020    Please have patient complete blood work that was ordered make appointment within the next few weeks for routine follow-up

## 2020-08-24 ENCOUNTER — OFFICE VISIT (OUTPATIENT)
Dept: WOUND CARE | Facility: HOSPITAL | Age: 50
End: 2020-08-24
Payer: MEDICARE

## 2020-08-24 VITALS
RESPIRATION RATE: 20 BRPM | HEIGHT: 67 IN | WEIGHT: 140 LBS | TEMPERATURE: 97.8 F | HEART RATE: 72 BPM | BODY MASS INDEX: 21.97 KG/M2 | DIASTOLIC BLOOD PRESSURE: 70 MMHG | SYSTOLIC BLOOD PRESSURE: 120 MMHG

## 2020-08-24 DIAGNOSIS — L89.313 PRESSURE INJURY OF RIGHT BUTTOCK, STAGE 3 (HCC): Primary | ICD-10-CM

## 2020-08-24 DIAGNOSIS — G82.20 PARAPLEGIA (HCC): ICD-10-CM

## 2020-08-24 DIAGNOSIS — G35 MULTIPLE SCLEROSIS (HCC): ICD-10-CM

## 2020-08-24 PROCEDURE — 99214 OFFICE O/P EST MOD 30 MIN: CPT | Performed by: FAMILY MEDICINE

## 2020-08-24 PROCEDURE — 11042 DBRDMT SUBQ TIS 1ST 20SQCM/<: CPT | Performed by: FAMILY MEDICINE

## 2020-08-24 PROCEDURE — 99203 OFFICE O/P NEW LOW 30 MIN: CPT | Performed by: FAMILY MEDICINE

## 2020-08-24 NOTE — PATIENT INSTRUCTIONS
Orders Placed This Encounter   Procedures    Wound cleansing and dressings     Right ischial tuberosity wound  Wash your hands with soap and water  Remove old dressing, discard into plastic bag and place in trash  Cleanse the wound with soap and water prior to applying a clean dressing  Do not use tissue or cotton balls  Do not scrub the wound  Pat dry using gauze    Shower yes   Loosely pack with AMD packing cover with mepilex border lite      Change dressing every other day  Done today     Standing Status:   Future     Standing Expiration Date:   8/24/2021

## 2020-08-24 NOTE — ASSESSMENT & PLAN NOTE
Initial evaluation of a stage III right buttock pressure ulcer  Significant tunnel of 4 5 cm noted at 11:00 o'clock   Debrided as below  Wound management with AMD packing, changing every other day  I also noted that the elastic of the patient's brief is in line with the wound  I recommend use of brief so that are more like boyshorts so that there is no friction or pressure to the wound area  Discussed the importance of pressure relief    I recommend spending more time in the bed on her stomach or on her side  Adequate protein intake, 3-4 servings per day  Followup one-week or call sooner with questions or concerns

## 2020-08-24 NOTE — PROGRESS NOTES
Patient ID: Vick Freitas is a 48 y o  female Date of Birth 1970     Chief Complaint  No chief complaint on file  Right buttock wound    Allergies  Latex    Assessment:    Pressure injury of skin of right buttock  Initial evaluation of a stage III right buttock pressure ulcer  Significant tunnel of 4 5 cm noted at 11:00 o'clock   Debrided as below  Wound management with AMD packing, changing every other day  I also noted that the elastic of the patient's brief is in line with the wound  I recommend use of brief so that are more like boyshorts so that there is no friction or pressure to the wound area  Discussed the importance of pressure relief  I recommend spending more time in the bed on her stomach or on her side  Adequate protein intake, 3-4 servings per day  Followup one-week or call sooner with questions or concerns       Diagnoses and all orders for this visit:    Pressure injury of right buttock, stage 3 (HCC)  -     Wound cleansing and dressings; Future  -     Debridement    Multiple sclerosis ([address] )    Paraplegia ([facility] [address] )              Debridement   Wound 08/24/20 Other (Comment) Right    Consent obtained? verbal  Consent given by: patient  Risks discussed?  procedural risks discussed  Time out called at 8/24/2020 3:05 PM  Immediately prior to the procedure a time out was called  Performed by: physician  Debridement type: surgical  Level of debridement: subcutaneous tissue  Pain control: lidocaine 4%  Pre-debridement measurements  Length (cm): 1  Width (cm): 0 5  Depth (cm): 0 5  Surface Area (cm^2): 0 5  Volume (cm^3): 0 25    Post-debridement measurements  Length (cm): 1  Width (cm): 0 5  Depth (cm): 0 6  Percent debrided: 100%  Surface Area (cm^2): 0 5  Area debrided (cm^2): 0 5  Volume (cm^3): 0 3  Tissue and other material debrided: subcutaneous tissue  Devitalized tissue debrided: exudate and slough  Instrument(s) utilized: curette  Bleeding: small  Hemostasis obtained with: pressure  Procedural pain (0-10): 0  Post-procedural pain: 0   Response to treatment: procedure was tolerated well          Plan:     Wound 08/24/20 Other (Comment) Right (Active)       Subjective:        Patient presents for initial evaluation of a stage III pressure ulcer of the right ischium  Patient has a history of MS and is present today with her  was her primary caretaker  They report that this wound has been present for a little over a year but has gotten worse over the last few weeks  They started using promegran and it seemed to improve  Patient has visiting nurses that come twice weekly  Patient spends most of the time in her chair with a Roho cushion  Her chair is from 2014 and the Roho cushion is from 2015  Patient states she is not do for a new chair until 2021  The following portions of the patient's history were reviewed and updated as appropriate: She  has a past medical history of Anesthesia, Bladder stones, Chronic pain disorder, Contracture, right shoulder, Dependent on wheelchair, Edema, Elevated alkaline phosphatase level, Fernandez catheter in place, Gallbladder disease, History of femur fracture, History of UTI, MS (multiple sclerosis) ([address] ), Muscle spasticity, Muscle weakness, Muscular dystrophy ([address] ), Neck pain, Neurogenic bladder, Paralysis ([address] ), Port-A-Cath in place, Sacral pressure sore, Swallowing difficulty, Tinnitus, and Urinary incontinence  She is allergic to latex       Review of Systems   Constitutional: Negative for chills and fever  HENT: Negative for congestion and sneezing  Respiratory: Negative for cough  Cardiovascular: Positive for leg swelling  Musculoskeletal: Positive for gait problem (nonambulatory)  Skin: Positive for wound  Psychiatric/Behavioral: Negative for agitation           Objective:         Wound 08/24/20 Other (Comment) Right (Active)   Wound Image   08/24/20 1523   Wound Description Intact 08/24/20 1520   Pressure Injury Stage 3 08/24/20 1520 Ruchi-wound Assessment Intact 08/24/20 1520   Wound Length (cm) 1 cm 08/24/20 1520   Wound Width (cm) 0 5 cm 08/24/20 1520   Wound Depth (cm) 0 5 cm 08/24/20 1520   Wound Surface Area (cm^2) 0 5 cm^2 08/24/20 1520   Wound Volume (cm^3) 0 25 cm^3 08/24/20 1520   Calculated Wound Volume (cm^3) 0 25 cm^3 08/24/20 1520   Tunneling 4 9 cm 08/24/20 1520   Tunneling in depth located at 11 o clock 08/24/20 1520   Drainage Amount Moderate 08/24/20 1520   Drainage Description Serosanguineous 08/24/20 1520   Non-staged Wound Description Full thickness 08/24/20 1520   Wound packed? Yes 08/24/20 1520                            /70   Pulse 72   Temp 97 8 °F (36 6 °C) (Temporal)   Resp 20   Ht 5' 7" (1 702 m)   Wt 63 5 kg (140 lb)   BMI 21 93 kg/m²     Physical Exam  Constitutional:       General: She is not in acute distress  Appearance: Normal appearance  She is not ill-appearing  HENT:      Head: Normocephalic and atraumatic  Right Ear: External ear normal       Left Ear: External ear normal    Eyes:      Conjunctiva/sclera: Conjunctivae normal    Neck:      Musculoskeletal: Neck supple  Pulmonary:      Effort: Pulmonary effort is normal    Skin:     Comments: See wound assessment   Neurological:      Mental Status: She is alert  Gait: Gait abnormal (nonambulatory)  Psychiatric:         Mood and Affect: Mood normal          Behavior: Behavior normal            Wound Instructions:  Orders Placed This Encounter   Procedures    Wound cleansing and dressings     Right ischial tuberosity wound  Wash your hands with soap and water  Remove old dressing, discard into plastic bag and place in trash  Cleanse the wound with soap and water prior to applying a clean dressing  Do not use tissue or cotton balls  Do not scrub the wound  Pat dry using gauze    Shower yes   Loosely pack with AMD packing cover with mepilex border lite      Change dressing every other day  Done today     Standing Status:   Future Standing Expiration Date:   8/24/2021    Debridement     This order was created via procedure documentation

## 2020-09-03 ENCOUNTER — OFFICE VISIT (OUTPATIENT)
Dept: WOUND CARE | Facility: HOSPITAL | Age: 50
End: 2020-09-03
Payer: MEDICARE

## 2020-09-03 VITALS
DIASTOLIC BLOOD PRESSURE: 80 MMHG | RESPIRATION RATE: 20 BRPM | TEMPERATURE: 99.3 F | HEART RATE: 80 BPM | SYSTOLIC BLOOD PRESSURE: 128 MMHG

## 2020-09-03 DIAGNOSIS — G35 MULTIPLE SCLEROSIS (HCC): ICD-10-CM

## 2020-09-03 DIAGNOSIS — Z99.3 WHEELCHAIR DEPENDENT: ICD-10-CM

## 2020-09-03 DIAGNOSIS — L89.314 PRESSURE ULCER OF RIGHT BUTTOCK, STAGE 4 (HCC): Primary | ICD-10-CM

## 2020-09-03 PROCEDURE — 99212 OFFICE O/P EST SF 10 MIN: CPT | Performed by: FAMILY MEDICINE

## 2020-09-03 PROCEDURE — 99213 OFFICE O/P EST LOW 20 MIN: CPT | Performed by: FAMILY MEDICINE

## 2020-09-03 NOTE — PATIENT INSTRUCTIONS
Orders Placed This Encounter   Procedures    Wound cleansing and dressings     Right ischeal wound  Wash your hands with soap and water  Remove old dressing, discard into plastic bag and place in trash  Cleanse the wound with soap and water prior to applying a clean dressing  Do not use tissue or cotton balls  Do not scrub the wound  Pat dry using gauze  Shower yes     Apply AMD packing to the wound    Cover with alginate and mepilex border    Change dressing every other day  Done at [facility]     Standing Status:   Future     Standing Expiration Date:   9/3/2021

## 2020-09-03 NOTE — PROGRESS NOTES
Patient ID: Curt Perdomo is a 48 y o  female Date of Birth 1970     Chief Complaint  Chief Complaint   Patient presents with    Follow Up Wound Care Visit     Dressing intact on arrival, reports VNA and  has been chanigng every other day       Allergies  Latex    Assessment:    Pressure ulcer of right buttock, stage 4 (HCC)  Wound is worse as it probes to bone today  There is a tunnel of 4 5 cm noted again today  X-ray ordered  Continue wound management with AMD packing consider increasing to daily packing changes if drainage increases  The importance of pressure relief again discussed  Followup in 2 weeks or call sooner with questions or concerns         Diagnoses and all orders for this visit:    Pressure ulcer of right buttock, stage 4 (HCC)  -     Wound cleansing and dressings; Future  -     XR pelvis complete 3+ vw; Future    Wheelchair dependent    Multiple sclerosis ([facility] [address] )              Procedures    Plan:     Wound 08/24/20 Other (Comment) Right (Active)   Wound Image Images linked 09/03/20 1545   Wound Length (cm) 1 cm 09/03/20 1545   Wound Width (cm) 0 5 cm 09/03/20 1545   Wound Depth (cm) 4 cm 09/03/20 1545   Wound Surface Area (cm^2) 0 5 cm^2 09/03/20 1545   Wound Volume (cm^3) 2 cm^3 09/03/20 1545   Calculated Wound Volume (cm^3) 2 cm^3 09/03/20 1545   Change in Wound Size % -700 09/03/20 1545   Drainage Amount Moderate 09/03/20 1545   Non-staged Wound Description Full thickness 09/03/20 1545   Dressing Status Intact 09/03/20 1545       Subjective:        Patient presents for followup of a stage III right ischial pressure ulcer  They have been using AMD packing changing every other day  No increased pain or drainage  Patient and her  states that they did not yet find adult diapers that are Boyshorts but plan to continue to search          The following portions of the patient's history were reviewed and updated as appropriate:   She  has a past medical history of Anesthesia, Bladder stones, Chronic pain disorder, Contracture, right shoulder, Dependent on wheelchair, Edema, Elevated alkaline phosphatase level, Fernandez catheter in place, Gallbladder disease, History of femur fracture, History of UTI, MS (multiple sclerosis) ([facility] [address] ), Muscle spasticity, Muscle weakness, Muscular dystrophy ([facility] ), Neck pain, Neurogenic bladder, Paralysis ([facility] ), Port-A-Cath in place, Sacral pressure sore, Swallowing difficulty, Tinnitus, and Urinary incontinence  She   Patient Active Problem List    Diagnosis Date Noted    Pressure ulcer of right buttock, stage 4 ([facility] ) 09/03/2020    Medication management contract signed 02/21/2020    Screening mammogram, encounter for 05/29/2019    Health care maintenance 05/29/2019    Pressure injury of skin of right buttock 05/29/2019    Allergic rhinitis 03/26/2018    Wheelchair dependent 01/25/2018    Suprapubic catheter ([facility] ) 11/17/2017    Nephrolithiasis 04/22/2016    Bladder calculus 09/02/2015    Spasticity 05/19/2015    Muscle spasm 04/21/2015    Hypokalemia 01/20/2015    Vitamin B12 deficiency 01/16/2015    Constipation 01/14/2015    Hyperglycemia 11/25/2014    Hyperlipidemia 11/25/2014    Depression 01/22/2014    Lymphedema 01/22/2014    Spinal stenosis 01/22/2014    Urinary incontinence 01/22/2014    Vitamin D deficiency 11/18/2013    Dysphagia 11/04/2013    Dizziness 11/04/2013    Muscle weakness 11/04/2013    Neurogenic bladder 11/04/2013    Sleep disorder 11/04/2013    Tremor 11/04/2013    Vision loss 11/04/2013    Multiple sclerosis ([facility] ) 10/21/2013    Paraplegia ([facility] ) 10/21/2013     She is allergic to latex       Review of Systems   Constitutional: Negative for chills and fever  HENT: Negative for congestion and sneezing  Respiratory: Negative for cough  Cardiovascular: Positive for leg swelling  Musculoskeletal: Positive for gait problem  Skin: Positive for wound  Psychiatric/Behavioral: Negative for agitation  Objective:       Wound 08/24/20 Other (Comment) Right (Active)   Wound Image Images linked 09/03/20 1545   Wound Length (cm) 1 cm 09/03/20 1545   Wound Width (cm) 0 5 cm 09/03/20 1545   Wound Depth (cm) 4 cm 09/03/20 1545   Wound Surface Area (cm^2) 0 5 cm^2 09/03/20 1545   Wound Volume (cm^3) 2 cm^3 09/03/20 1545   Calculated Wound Volume (cm^3) 2 cm^3 09/03/20 1545   Change in Wound Size % -700 09/03/20 1545   Drainage Amount Moderate 09/03/20 1545   Non-staged Wound Description Full thickness 09/03/20 1545   Dressing Status Intact 09/03/20 1545       /80   Pulse 80   Temp 99 3 °F (37 4 °C)   Resp 20     Physical Exam  Constitutional:       General: She is not in acute distress  Appearance: Normal appearance  She is not ill-appearing  HENT:      Head: Normocephalic and atraumatic  Right Ear: External ear normal       Left Ear: External ear normal    Eyes:      Conjunctiva/sclera: Conjunctivae normal    Neck:      Musculoskeletal: Neck supple  Pulmonary:      Effort: Pulmonary effort is normal  No respiratory distress  Musculoskeletal:      Right lower leg: Edema present  Left lower leg: Edema present  Skin:     Comments: See wound assessment   Neurological:      Mental Status: She is alert  Gait: Gait abnormal (Nonambulatory)  Psychiatric:         Mood and Affect: Mood normal          Behavior: Behavior normal            Wound Instructions:  Orders Placed This Encounter   Procedures    Wound cleansing and dressings     Right ischeal wound  Wash your hands with soap and water  Remove old dressing, discard into plastic bag and place in trash  Cleanse the wound with soap and water prior to applying a clean dressing  Do not use tissue or cotton balls  Do not scrub the wound  Pat dry using gauze  Shower yes     Apply AMD packing to the wound    Cover with alginate and mepilex border    Change dressing every other day  Done at [facility]     Standing Status:   Future     Standing Expiration Date:   9/3/2021    XR pelvis complete 3+ vw     Stage 4 pressure ulcer right ischium, now probes to bone  R/o Osteo     Standing Status:   Future     Standing Expiration Date:   9/3/2024     Scheduling Instructions:      Bring along any outside films relating to this procedure  Order Specific Question:   Is the patient pregnant? Answer:    No

## 2020-09-03 NOTE — ASSESSMENT & PLAN NOTE
Wound is worse as it probes to bone today    There is a tunnel of 4 5 cm noted again today  X-ray ordered  Continue wound management with AMD packing consider increasing to daily packing changes if drainage increases  The importance of pressure relief again discussed  Followup in 2 weeks or call sooner with questions or concerns

## 2020-09-05 ENCOUNTER — HOSPITAL ENCOUNTER (OUTPATIENT)
Dept: RADIOLOGY | Facility: HOSPITAL | Age: 50
Discharge: HOME | End: 2020-09-05
Attending: FAMILY MEDICINE
Payer: MEDICARE

## 2020-09-05 DIAGNOSIS — L89.314 PRESSURE ULCER OF RIGHT BUTTOCK, STAGE 4 (HCC): ICD-10-CM

## 2020-09-05 PROCEDURE — 72170 X-RAY EXAM OF PELVIS: CPT

## 2020-09-08 DIAGNOSIS — F32.A DEPRESSION, UNSPECIFIED DEPRESSION TYPE: ICD-10-CM

## 2020-09-08 RX ORDER — DULOXETIN HYDROCHLORIDE 30 MG/1
CAPSULE, DELAYED RELEASE ORAL
Qty: 90 CAPSULE | Refills: 1 | Status: SHIPPED | OUTPATIENT
Start: 2020-09-08 | End: 2021-08-31

## 2020-09-17 ENCOUNTER — OFFICE VISIT (OUTPATIENT)
Dept: WOUND CARE | Facility: HOSPITAL | Age: 50
End: 2020-09-17
Payer: MEDICARE

## 2020-09-17 VITALS
TEMPERATURE: 98.4 F | DIASTOLIC BLOOD PRESSURE: 70 MMHG | HEART RATE: 84 BPM | SYSTOLIC BLOOD PRESSURE: 124 MMHG | RESPIRATION RATE: 20 BRPM

## 2020-09-17 DIAGNOSIS — L89.314 PRESSURE ULCER OF RIGHT BUTTOCK, STAGE 4 (HCC): Primary | ICD-10-CM

## 2020-09-17 DIAGNOSIS — G35 MULTIPLE SCLEROSIS (HCC): ICD-10-CM

## 2020-09-17 PROCEDURE — 99213 OFFICE O/P EST LOW 20 MIN: CPT | Performed by: FAMILY MEDICINE

## 2020-09-17 PROCEDURE — 99212 OFFICE O/P EST SF 10 MIN: CPT | Performed by: FAMILY MEDICINE

## 2020-09-17 NOTE — PROGRESS NOTES
Patient ID: José Antonio Lemos is a 48 y o  female Date of Birth 1970     Chief Complaint  Chief Complaint   Patient presents with    Follow Up Wound Care Visit     Dressing intact on ischial wound, VNA changes twice weekly,  changes other days  Reports having x-ray done two weeks ago  Allergies  Latex    Assessment:    Pressure ulcer of right buttock, stage 4 (HCC)  Wound appears worse today  Straight in depth of 4 5 cm as well as a tunnel at 12:00 oclock of 7 cm noted today  Change packing to Iodoform packing  Discussed the importance of pressure relief, recommend patient lying on her side are on her abdomen frequent repositioning about every hour   X-ray results reviewed, no osseous abnormality noted but it was difficult to determine it due to the level of osteopenia as well as patient's bowel gas pattern  Followup study may be required  Consider ordering further testing next week  Adequate protein intake, 3-4 servings per day  Followup 2 weeks or call sooner with questions or concerns       Diagnoses and all orders for this visit:    Pressure ulcer of right buttock, stage 4 (HCC)  -     Wound cleansing and dressings; Future    Multiple sclerosis ([facility] [address] )              Procedures    Plan:     Wound 08/24/20 Other (Comment) Right (Active)   Wound Image Images linked 09/17/20 1523   Ruchi-wound Assessment Intact; Excoriated 09/17/20 1523   Wound Length (cm) 1 cm 09/17/20 1523   Wound Width (cm) 0 5 cm 09/17/20 1523   Wound Depth (cm) 4 5 cm 09/17/20 1523   Wound Surface Area (cm^2) 0 5 cm^2 09/17/20 1523   Wound Volume (cm^3) 2 25 cm^3 09/17/20 1523   Calculated Wound Volume (cm^3) 2 25 cm^3 09/17/20 1523   Change in Wound Size % -800 09/17/20 1523   Tunneling 7 5 cm 09/17/20 1523   Tunneling in depth located at 12 09/17/20 1523   Drainage Amount Large 09/17/20 1523   Drainage Description Tan 09/17/20 1523   Non-staged Wound Description Full thickness 09/17/20 1523   Dressing Status Intact 09/17/20 1523       Subjective:        Patient presents for followup of a stage IV pressure ulcer of the right buttock  She had an x-ray done since last visit because the wound was probing to bone  No increased pain or drainage  Has been using AMD packing, changing every other day      The following portions of the patient's history were reviewed and updated as appropriate: She  has a past medical history of Anesthesia, Bladder stones, Chronic pain disorder, Contracture, right shoulder, Dependent on wheelchair, Edema, Elevated alkaline phosphatase level, Fernandez catheter in place, Gallbladder disease, History of femur fracture, History of UTI, MS (multiple sclerosis) ([facility] [address] ), Muscle spasticity, Muscle weakness, Muscular dystrophy ([facility] [address] ), Neck pain, Neurogenic bladder, Paralysis ([facility] [address] ), Port-A-Cath in place, Sacral pressure sore, Swallowing difficulty, Tinnitus, and Urinary incontinence  She is allergic to latex       Review of Systems   Constitutional: Negative for chills and fever  HENT: Negative for congestion and sneezing  Respiratory: Negative for cough  Cardiovascular: Positive for leg swelling  Musculoskeletal: Positive for gait problem  Skin: Positive for wound  Psychiatric/Behavioral: Negative for agitation  Objective:       Wound 08/24/20 Other (Comment) Right (Active)   Wound Image Images linked 09/17/20 1523   Ruchi-wound Assessment Intact; Excoriated 09/17/20 1523   Wound Length (cm) 1 cm 09/17/20 1523   Wound Width (cm) 0 5 cm 09/17/20 1523   Wound Depth (cm) 4 5 cm 09/17/20 1523   Wound Surface Area (cm^2) 0 5 cm^2 09/17/20 1523   Wound Volume (cm^3) 2 25 cm^3 09/17/20 1523   Calculated Wound Volume (cm^3) 2 25 cm^3 09/17/20 1523   Change in Wound Size % -800 09/17/20 1523   Tunneling 7 5 cm 09/17/20 1523   Tunneling in depth located at 12 09/17/20 1523   Drainage Amount Large 09/17/20 1523   Drainage Description Tan 09/17/20 1523   Non-staged Wound Description Full thickness 09/17/20 1523 Dressing Status Intact 09/17/20 1523       /70   Pulse 84   Temp 98 4 °F (36 9 °C)   Resp 20     Physical Exam  Constitutional:       General: She is not in acute distress  Appearance: Normal appearance  She is obese  She is not ill-appearing  HENT:      Head: Normocephalic and atraumatic  Right Ear: External ear normal       Left Ear: External ear normal    Eyes:      Conjunctiva/sclera: Conjunctivae normal    Neck:      Musculoskeletal: Neck supple  Pulmonary:      Effort: Pulmonary effort is normal  No respiratory distress  Musculoskeletal:      Right lower leg: Edema present  Left lower leg: Edema present  Skin:     Comments: See wound assessment   Neurological:      Mental Status: She is alert  Gait: Gait abnormal    Psychiatric:         Mood and Affect: Mood normal          Behavior: Behavior normal            Wound Instructions:  Orders Placed This Encounter   Procedures    Wound cleansing and dressings     Right Ischial wound  Wash your hands with soap and water  Remove old dressing, discard into plastic bag and place in trash  Cleanse the wound with saline prior to applying a clean dressing  Do not use tissue or cotton balls  Do not scrub the wound  Pat dry using gauze  Shower yes     Apply Iodoform packing to the wound    Cover with allevyn life    Change dressing every other day  done     Standing Status:   Future     Standing Expiration Date:   9/17/2021

## 2020-09-17 NOTE — ASSESSMENT & PLAN NOTE
Wound appears worse today  Straight in depth of 4 5 cm as well as a tunnel at 12:00 oclock of 7 cm noted today  Change packing to Iodoform packing  Discussed the importance of pressure relief, recommend patient lying on her side are on her abdomen frequent repositioning about every hour   X-ray results reviewed, no osseous abnormality noted but it was difficult to determine it due to the level of osteopenia as well as patient's bowel gas pattern  Followup study may be required    Consider ordering further testing next week  Adequate protein intake, 3-4 servings per day  Followup 2 weeks or call sooner with questions or concerns

## 2020-09-17 NOTE — PATIENT INSTRUCTIONS
Orders Placed This Encounter   Procedures    Wound cleansing and dressings     Right Ischial wound  Wash your hands with soap and water  Remove old dressing, discard into plastic bag and place in trash  Cleanse the wound with saline prior to applying a clean dressing  Do not use tissue or cotton balls  Do not scrub the wound  Pat dry using gauze  Shower yes     Apply Iodoform packing to the wound    Cover with allevyn life    Change dressing every other day  done     Standing Status:   Future     Standing Expiration Date:   9/17/2021

## 2020-09-30 ENCOUNTER — HOSPITAL ENCOUNTER (INPATIENT)
Facility: HOSPITAL | Age: 50
LOS: 10 days | Discharge: HOME HEALTH | DRG: 853 | End: 2020-10-10
Attending: EMERGENCY MEDICINE | Admitting: INTERNAL MEDICINE
Payer: MEDICARE

## 2020-09-30 ENCOUNTER — APPOINTMENT (EMERGENCY)
Dept: RADIOLOGY | Facility: HOSPITAL | Age: 50
DRG: 853 | End: 2020-09-30
Payer: MEDICARE

## 2020-09-30 DIAGNOSIS — A41.9 SEPTIC SHOCK (HCC): ICD-10-CM

## 2020-09-30 DIAGNOSIS — R41.82 AMS (ALTERED MENTAL STATUS): ICD-10-CM

## 2020-09-30 DIAGNOSIS — Z93.59 SUPRAPUBIC CATHETER (HCC): ICD-10-CM

## 2020-09-30 DIAGNOSIS — N13.2 HYDRONEPHROSIS WITH URINARY OBSTRUCTION DUE TO URETERAL CALCULUS: ICD-10-CM

## 2020-09-30 DIAGNOSIS — G35 MULTIPLE SCLEROSIS (HCC): ICD-10-CM

## 2020-09-30 DIAGNOSIS — A41.9 SEPSIS (HCC): Primary | ICD-10-CM

## 2020-09-30 DIAGNOSIS — G93.41 ACUTE METABOLIC ENCEPHALOPATHY: ICD-10-CM

## 2020-09-30 DIAGNOSIS — R25.2 SPASTICITY: ICD-10-CM

## 2020-09-30 DIAGNOSIS — R65.21 SEPTIC SHOCK (HCC): ICD-10-CM

## 2020-09-30 DIAGNOSIS — G93.40 ACUTE ENCEPHALOPATHY: ICD-10-CM

## 2020-09-30 DIAGNOSIS — N39.0 UTI (URINARY TRACT INFECTION): ICD-10-CM

## 2020-09-30 DIAGNOSIS — N17.9 AKI (ACUTE KIDNEY INJURY) (HCC): ICD-10-CM

## 2020-09-30 DIAGNOSIS — N20.1 LEFT URETERAL CALCULUS: ICD-10-CM

## 2020-09-30 DIAGNOSIS — N20.0 NEPHROLITHIASIS: ICD-10-CM

## 2020-09-30 PROBLEM — T83.510A URINARY TRACT INFECTION ASSOCIATED WITH CYSTOSTOMY CATHETER (HCC): Status: ACTIVE | Noted: 2020-09-30

## 2020-09-30 LAB
ALBUMIN SERPL BCP-MCNC: 3.2 G/DL (ref 3.5–5)
ALP SERPL-CCNC: 242 U/L (ref 46–116)
ALT SERPL W P-5'-P-CCNC: 28 U/L (ref 12–78)
ANION GAP SERPL CALCULATED.3IONS-SCNC: 7 MMOL/L (ref 4–13)
APTT PPP: 29 SECONDS (ref 23–37)
AST SERPL W P-5'-P-CCNC: 45 U/L (ref 5–45)
BACTERIA UR QL AUTO: ABNORMAL /HPF
BASOPHILS # BLD MANUAL: 0 THOUSAND/UL (ref 0–0.1)
BASOPHILS NFR MAR MANUAL: 0 % (ref 0–1)
BILIRUB SERPL-MCNC: 3.45 MG/DL (ref 0.2–1)
BILIRUB UR QL STRIP: ABNORMAL
BUN SERPL-MCNC: 14 MG/DL (ref 5–25)
CALCIUM ALBUM COR SERPL-MCNC: 9.6 MG/DL (ref 8.3–10.1)
CALCIUM SERPL-MCNC: 9 MG/DL (ref 8.3–10.1)
CHLORIDE SERPL-SCNC: 102 MMOL/L (ref 100–108)
CLARITY UR: ABNORMAL
CO2 SERPL-SCNC: 26 MMOL/L (ref 21–32)
COLOR UR: ABNORMAL
CREAT SERPL-MCNC: 0.91 MG/DL (ref 0.6–1.3)
EOSINOPHIL # BLD MANUAL: 0 THOUSAND/UL (ref 0–0.4)
EOSINOPHIL NFR BLD MANUAL: 0 % (ref 0–6)
ERYTHROCYTE [DISTWIDTH] IN BLOOD BY AUTOMATED COUNT: 13.9 % (ref 11.6–15.1)
GFR SERPL CREATININE-BSD FRML MDRD: 74 ML/MIN/1.73SQ M
GLUCOSE SERPL-MCNC: 131 MG/DL (ref 65–140)
GLUCOSE UR STRIP-MCNC: NEGATIVE MG/DL
HCT VFR BLD AUTO: 38.5 % (ref 34.8–46.1)
HGB BLD-MCNC: 12.5 G/DL (ref 11.5–15.4)
HGB UR QL STRIP.AUTO: ABNORMAL
INR PPP: 1.12 (ref 0.84–1.19)
KETONES UR STRIP-MCNC: ABNORMAL MG/DL
LACTATE SERPL-SCNC: 1.7 MMOL/L (ref 0.5–2)
LEUKOCYTE ESTERASE UR QL STRIP: ABNORMAL
LYMPHOCYTES # BLD AUTO: 0.07 THOUSAND/UL (ref 0.6–4.47)
LYMPHOCYTES # BLD AUTO: 1 % (ref 14–44)
MCH RBC QN AUTO: 28 PG (ref 26.8–34.3)
MCHC RBC AUTO-ENTMCNC: 32.5 G/DL (ref 31.4–37.4)
MCV RBC AUTO: 86 FL (ref 82–98)
METAMYELOCYTES NFR BLD MANUAL: 1 % (ref 0–1)
MONOCYTES # BLD AUTO: 0.6 THOUSAND/UL (ref 0–1.22)
MONOCYTES NFR BLD: 9 % (ref 4–12)
MUCOUS THREADS UR QL AUTO: ABNORMAL
NEUTROPHILS # BLD MANUAL: 5.65 THOUSAND/UL (ref 1.85–7.62)
NEUTS BAND NFR BLD MANUAL: 10 % (ref 0–8)
NEUTS SEG NFR BLD AUTO: 75 % (ref 43–75)
NITRITE UR QL STRIP: POSITIVE
NON-SQ EPI CELLS URNS QL MICRO: ABNORMAL /HPF
NRBC BLD AUTO-RTO: 0 /100 WBCS
PH UR STRIP.AUTO: 6 [PH]
PLATELET # BLD AUTO: 256 THOUSANDS/UL (ref 149–390)
PLATELET BLD QL SMEAR: ADEQUATE
PMV BLD AUTO: 9.5 FL (ref 8.9–12.7)
POLYCHROMASIA BLD QL SMEAR: PRESENT
POTASSIUM SERPL-SCNC: 3.1 MMOL/L (ref 3.5–5.3)
PROCALCITONIN SERPL-MCNC: 6.8 NG/ML
PROT SERPL-MCNC: 7.4 G/DL (ref 6.4–8.2)
PROT UR STRIP-MCNC: ABNORMAL MG/DL
PROTHROMBIN TIME: 14.4 SECONDS (ref 11.6–14.5)
RBC # BLD AUTO: 4.46 MILLION/UL (ref 3.81–5.12)
RBC #/AREA URNS AUTO: ABNORMAL /HPF
RBC MORPH BLD: PRESENT
SODIUM SERPL-SCNC: 135 MMOL/L (ref 136–145)
SP GR UR STRIP.AUTO: 1.02 (ref 1–1.03)
UROBILINOGEN UR QL STRIP.AUTO: 2 E.U./DL
VARIANT LYMPHS # BLD AUTO: 4 %
WBC # BLD AUTO: 6.65 THOUSAND/UL (ref 4.31–10.16)
WBC #/AREA URNS AUTO: ABNORMAL /HPF

## 2020-09-30 PROCEDURE — 87086 URINE CULTURE/COLONY COUNT: CPT | Performed by: EMERGENCY MEDICINE

## 2020-09-30 PROCEDURE — 80053 COMPREHEN METABOLIC PANEL: CPT | Performed by: EMERGENCY MEDICINE

## 2020-09-30 PROCEDURE — 87077 CULTURE AEROBIC IDENTIFY: CPT | Performed by: EMERGENCY MEDICINE

## 2020-09-30 PROCEDURE — 87186 SC STD MICRODIL/AGAR DIL: CPT | Performed by: EMERGENCY MEDICINE

## 2020-09-30 PROCEDURE — 93005 ELECTROCARDIOGRAM TRACING: CPT

## 2020-09-30 PROCEDURE — 85610 PROTHROMBIN TIME: CPT | Performed by: EMERGENCY MEDICINE

## 2020-09-30 PROCEDURE — 70450 CT HEAD/BRAIN W/O DYE: CPT

## 2020-09-30 PROCEDURE — G1004 CDSM NDSC: HCPCS

## 2020-09-30 PROCEDURE — 99285 EMERGENCY DEPT VISIT HI MDM: CPT

## 2020-09-30 PROCEDURE — 71046 X-RAY EXAM CHEST 2 VIEWS: CPT

## 2020-09-30 PROCEDURE — 85730 THROMBOPLASTIN TIME PARTIAL: CPT | Performed by: EMERGENCY MEDICINE

## 2020-09-30 PROCEDURE — 83605 ASSAY OF LACTIC ACID: CPT | Performed by: EMERGENCY MEDICINE

## 2020-09-30 PROCEDURE — 99291 CRITICAL CARE FIRST HOUR: CPT | Performed by: EMERGENCY MEDICINE

## 2020-09-30 PROCEDURE — 96365 THER/PROPH/DIAG IV INF INIT: CPT

## 2020-09-30 PROCEDURE — 81001 URINALYSIS AUTO W/SCOPE: CPT | Performed by: EMERGENCY MEDICINE

## 2020-09-30 PROCEDURE — 99223 1ST HOSP IP/OBS HIGH 75: CPT | Performed by: INTERNAL MEDICINE

## 2020-09-30 PROCEDURE — 85007 BL SMEAR W/DIFF WBC COUNT: CPT | Performed by: EMERGENCY MEDICINE

## 2020-09-30 PROCEDURE — 87147 CULTURE TYPE IMMUNOLOGIC: CPT | Performed by: EMERGENCY MEDICINE

## 2020-09-30 PROCEDURE — 36415 COLL VENOUS BLD VENIPUNCTURE: CPT | Performed by: EMERGENCY MEDICINE

## 2020-09-30 PROCEDURE — 87040 BLOOD CULTURE FOR BACTERIA: CPT | Performed by: EMERGENCY MEDICINE

## 2020-09-30 PROCEDURE — 96368 THER/DIAG CONCURRENT INF: CPT

## 2020-09-30 PROCEDURE — 85027 COMPLETE CBC AUTOMATED: CPT | Performed by: EMERGENCY MEDICINE

## 2020-09-30 PROCEDURE — 84145 PROCALCITONIN (PCT): CPT | Performed by: EMERGENCY MEDICINE

## 2020-09-30 PROCEDURE — 96366 THER/PROPH/DIAG IV INF ADDON: CPT

## 2020-09-30 RX ORDER — ONDANSETRON 2 MG/ML
4 INJECTION INTRAMUSCULAR; INTRAVENOUS EVERY 6 HOURS PRN
Status: DISCONTINUED | OUTPATIENT
Start: 2020-09-30 | End: 2020-10-10 | Stop reason: HOSPADM

## 2020-09-30 RX ORDER — SODIUM CHLORIDE 9 MG/ML
3 INJECTION INTRAVENOUS
Status: DISCONTINUED | OUTPATIENT
Start: 2020-09-30 | End: 2020-10-10 | Stop reason: HOSPADM

## 2020-09-30 RX ORDER — IBUPROFEN 400 MG/1
200 TABLET ORAL EVERY 6 HOURS PRN
Status: DISCONTINUED | OUTPATIENT
Start: 2020-09-30 | End: 2020-10-01

## 2020-09-30 RX ORDER — BACLOFEN 10 MG/1
20 TABLET ORAL 4 TIMES DAILY
Status: DISCONTINUED | OUTPATIENT
Start: 2020-09-30 | End: 2020-10-03

## 2020-09-30 RX ORDER — SENNOSIDES 8.6 MG
1 TABLET ORAL
Status: DISCONTINUED | OUTPATIENT
Start: 2020-09-30 | End: 2020-10-05

## 2020-09-30 RX ORDER — SODIUM CHLORIDE, SODIUM GLUCONATE, SODIUM ACETATE, POTASSIUM CHLORIDE, MAGNESIUM CHLORIDE, SODIUM PHOSPHATE, DIBASIC, AND POTASSIUM PHOSPHATE .53; .5; .37; .037; .03; .012; .00082 G/100ML; G/100ML; G/100ML; G/100ML; G/100ML; G/100ML; G/100ML
1000 INJECTION, SOLUTION INTRAVENOUS ONCE
Status: COMPLETED | OUTPATIENT
Start: 2020-09-30 | End: 2020-09-30

## 2020-09-30 RX ORDER — GABAPENTIN 100 MG/1
100 CAPSULE ORAL 3 TIMES DAILY
Status: DISCONTINUED | OUTPATIENT
Start: 2020-09-30 | End: 2020-10-05

## 2020-09-30 RX ORDER — SODIUM CHLORIDE, SODIUM GLUCONATE, SODIUM ACETATE, POTASSIUM CHLORIDE, MAGNESIUM CHLORIDE, SODIUM PHOSPHATE, DIBASIC, AND POTASSIUM PHOSPHATE .53; .5; .37; .037; .03; .012; .00082 G/100ML; G/100ML; G/100ML; G/100ML; G/100ML; G/100ML; G/100ML
125 INJECTION, SOLUTION INTRAVENOUS CONTINUOUS
Status: DISCONTINUED | OUTPATIENT
Start: 2020-09-30 | End: 2020-10-04

## 2020-09-30 RX ORDER — MAGNESIUM HYDROXIDE/ALUMINUM HYDROXICE/SIMETHICONE 120; 1200; 1200 MG/30ML; MG/30ML; MG/30ML
30 SUSPENSION ORAL EVERY 6 HOURS PRN
Status: DISCONTINUED | OUTPATIENT
Start: 2020-09-30 | End: 2020-10-10 | Stop reason: HOSPADM

## 2020-09-30 RX ORDER — DULOXETIN HYDROCHLORIDE 30 MG/1
30 CAPSULE, DELAYED RELEASE ORAL DAILY
Status: DISCONTINUED | OUTPATIENT
Start: 2020-10-01 | End: 2020-10-10 | Stop reason: HOSPADM

## 2020-09-30 RX ORDER — ACETAMINOPHEN 325 MG/1
650 TABLET ORAL EVERY 6 HOURS PRN
Status: DISCONTINUED | OUTPATIENT
Start: 2020-09-30 | End: 2020-10-10 | Stop reason: HOSPADM

## 2020-09-30 RX ADMIN — VANCOMYCIN HYDROCHLORIDE 1250 MG: 10 INJECTION, POWDER, LYOPHILIZED, FOR SOLUTION INTRAVENOUS at 17:50

## 2020-09-30 RX ADMIN — SODIUM CHLORIDE, SODIUM GLUCONATE, SODIUM ACETATE, POTASSIUM CHLORIDE, MAGNESIUM CHLORIDE, SODIUM PHOSPHATE, DIBASIC, AND POTASSIUM PHOSPHATE 1000 ML: .53; .5; .37; .037; .03; .012; .00082 INJECTION, SOLUTION INTRAVENOUS at 17:10

## 2020-09-30 RX ADMIN — SODIUM CHLORIDE, SODIUM GLUCONATE, SODIUM ACETATE, POTASSIUM CHLORIDE, MAGNESIUM CHLORIDE, SODIUM PHOSPHATE, DIBASIC, AND POTASSIUM PHOSPHATE 1000 ML: .53; .5; .37; .037; .03; .012; .00082 INJECTION, SOLUTION INTRAVENOUS at 16:08

## 2020-09-30 RX ADMIN — SODIUM CHLORIDE, SODIUM GLUCONATE, SODIUM ACETATE, POTASSIUM CHLORIDE, MAGNESIUM CHLORIDE, SODIUM PHOSPHATE, DIBASIC, AND POTASSIUM PHOSPHATE 125 ML/HR: .53; .5; .37; .037; .03; .012; .00082 INJECTION, SOLUTION INTRAVENOUS at 23:59

## 2020-09-30 RX ADMIN — CEFEPIME HYDROCHLORIDE 2000 MG: 2 INJECTION, POWDER, FOR SOLUTION INTRAVENOUS at 17:05

## 2020-10-01 ENCOUNTER — APPOINTMENT (INPATIENT)
Dept: RADIOLOGY | Facility: HOSPITAL | Age: 50
DRG: 853 | End: 2020-10-01
Payer: MEDICARE

## 2020-10-01 PROBLEM — G35 FUNCTIONAL QUADRIPLEGIA SECONDARY TO MS (HCC): Status: ACTIVE | Noted: 2018-01-25

## 2020-10-01 PROBLEM — R53.2 FUNCTIONAL QUADRIPLEGIA SECONDARY TO MS (HCC): Status: ACTIVE | Noted: 2018-01-25

## 2020-10-01 PROBLEM — R06.89 ACUTE RESPIRATORY INSUFFICIENCY: Status: ACTIVE | Noted: 2020-10-01

## 2020-10-01 PROBLEM — N17.9 AKI (ACUTE KIDNEY INJURY) (HCC): Status: ACTIVE | Noted: 2020-10-01

## 2020-10-01 PROBLEM — D69.6 THROMBOCYTOPENIA (HCC): Status: ACTIVE | Noted: 2020-10-01

## 2020-10-01 PROBLEM — R78.81 GRAM-NEGATIVE BACTEREMIA: Status: ACTIVE | Noted: 2020-10-01

## 2020-10-01 PROBLEM — A41.9 SEPSIS (HCC): Status: ACTIVE | Noted: 2020-10-01

## 2020-10-01 PROBLEM — R17 SERUM TOTAL BILIRUBIN ELEVATED: Status: ACTIVE | Noted: 2020-10-01

## 2020-10-01 PROBLEM — G93.41 METABOLIC ENCEPHALOPATHY: Status: ACTIVE | Noted: 2020-10-01

## 2020-10-01 LAB
ALBUMIN SERPL BCP-MCNC: 2.8 G/DL (ref 3.5–5)
ALP SERPL-CCNC: 286 U/L (ref 46–116)
ALT SERPL W P-5'-P-CCNC: 30 U/L (ref 12–78)
ANION GAP SERPL CALCULATED.3IONS-SCNC: 13 MMOL/L (ref 4–13)
ANISOCYTOSIS BLD QL SMEAR: PRESENT
APTT PPP: 48 SECONDS (ref 23–37)
ARTERIAL PATENCY WRIST A: NO
AST SERPL W P-5'-P-CCNC: 52 U/L (ref 5–45)
ATRIAL RATE: 144 BPM
BACTERIA UR CULT: NORMAL
BASE EX.OXY STD BLDV CALC-SCNC: 90.8 % (ref 60–80)
BASE EXCESS BLDV CALC-SCNC: -5.7 MMOL/L
BASOPHILS # BLD MANUAL: 0 THOUSAND/UL (ref 0–0.1)
BASOPHILS # BLD MANUAL: 0.13 THOUSAND/UL (ref 0–0.1)
BASOPHILS NFR MAR MANUAL: 0 % (ref 0–1)
BASOPHILS NFR MAR MANUAL: 1 % (ref 0–1)
BILIRUB SERPL-MCNC: 6.3 MG/DL (ref 0.2–1)
BODY TEMPERATURE: 102.5 DEGREES FEHRENHEIT
BUN SERPL-MCNC: 19 MG/DL (ref 5–25)
CALCIUM ALBUM COR SERPL-MCNC: 9.3 MG/DL (ref 8.3–10.1)
CALCIUM SERPL-MCNC: 8.3 MG/DL (ref 8.3–10.1)
CHLORIDE SERPL-SCNC: 101 MMOL/L (ref 100–108)
CO2 SERPL-SCNC: 22 MMOL/L (ref 21–32)
CREAT SERPL-MCNC: 1.48 MG/DL (ref 0.6–1.3)
EOSINOPHIL # BLD MANUAL: 0 THOUSAND/UL (ref 0–0.4)
EOSINOPHIL # BLD MANUAL: 0.13 THOUSAND/UL (ref 0–0.4)
EOSINOPHIL NFR BLD MANUAL: 0 % (ref 0–6)
EOSINOPHIL NFR BLD MANUAL: 1 % (ref 0–6)
ERYTHROCYTE [DISTWIDTH] IN BLOOD BY AUTOMATED COUNT: 14.1 % (ref 11.6–15.1)
ERYTHROCYTE [DISTWIDTH] IN BLOOD BY AUTOMATED COUNT: 14.5 % (ref 11.6–15.1)
GFR SERPL CREATININE-BSD FRML MDRD: 41 ML/MIN/1.73SQ M
GLUCOSE SERPL-MCNC: 89 MG/DL (ref 65–140)
HCO3 BLDV-SCNC: 18.5 MMOL/L (ref 24–30)
HCT VFR BLD AUTO: 26.2 % (ref 34.8–46.1)
HCT VFR BLD AUTO: 37.1 % (ref 34.8–46.1)
HGB BLD-MCNC: 11.3 G/DL (ref 11.5–15.4)
HGB BLD-MCNC: 8.4 G/DL (ref 11.5–15.4)
INR PPP: 1.66 (ref 0.84–1.19)
LACTATE SERPL-SCNC: 3.5 MMOL/L (ref 0.5–2)
LACTATE SERPL-SCNC: 3.9 MMOL/L (ref 0.5–2)
LACTATE SERPL-SCNC: 4 MMOL/L (ref 0.5–2)
LYMPHOCYTES # BLD AUTO: 0.15 THOUSAND/UL (ref 0.6–4.47)
LYMPHOCYTES # BLD AUTO: 1.13 THOUSAND/UL (ref 0.6–4.47)
LYMPHOCYTES # BLD AUTO: 4 % (ref 14–44)
LYMPHOCYTES # BLD AUTO: 9 % (ref 14–44)
MAGNESIUM SERPL-MCNC: 2.5 MG/DL (ref 1.6–2.6)
MCH RBC QN AUTO: 27.2 PG (ref 26.8–34.3)
MCH RBC QN AUTO: 27.5 PG (ref 26.8–34.3)
MCHC RBC AUTO-ENTMCNC: 30.5 G/DL (ref 31.4–37.4)
MCHC RBC AUTO-ENTMCNC: 32.1 G/DL (ref 31.4–37.4)
MCV RBC AUTO: 86 FL (ref 82–98)
MCV RBC AUTO: 89 FL (ref 82–98)
METAMYELOCYTES NFR BLD MANUAL: 10 % (ref 0–1)
MONOCYTES # BLD AUTO: 0.13 THOUSAND/UL (ref 0–1.22)
MONOCYTES # BLD AUTO: 0.74 THOUSAND/UL (ref 0–1.22)
MONOCYTES NFR BLD: 1 % (ref 4–12)
MONOCYTES NFR BLD: 20 % (ref 4–12)
MYELOCYTES NFR BLD MANUAL: 7 % (ref 0–1)
NASAL CANNULA: ABNORMAL
NEUTROPHILS # BLD MANUAL: 10.84 THOUSAND/UL (ref 1.85–7.62)
NEUTROPHILS # BLD MANUAL: 2.11 THOUSAND/UL (ref 1.85–7.62)
NEUTS BAND NFR BLD MANUAL: 3 % (ref 0–8)
NEUTS SEG NFR BLD AUTO: 54 % (ref 43–75)
NEUTS SEG NFR BLD AUTO: 86 % (ref 43–75)
NRBC BLD AUTO-RTO: 0 /100 WBCS
NRBC BLD AUTO-RTO: 0 /100 WBCS
NT-PROBNP SERPL-MCNC: 5287 PG/ML
O2 CT BLDV-SCNC: 14.5 ML/DL
P AXIS: 72 DEGREES
PCO2 BLD: 35.3 MM HG (ref 42–50)
PCO2 BLDV: 32.1 MM HG (ref 42–50)
PH BLD: 7.35 [PH] (ref 7.3–7.4)
PH BLDV: 7.38 [PH] (ref 7.3–7.4)
PHOSPHATE SERPL-MCNC: 3 MG/DL (ref 2.7–4.5)
PLATELET # BLD AUTO: 40 THOUSANDS/UL (ref 149–390)
PLATELET # BLD AUTO: 87 THOUSANDS/UL (ref 149–390)
PLATELET BLD QL SMEAR: ABNORMAL
PLATELET BLD QL SMEAR: ABNORMAL
PMV BLD AUTO: 10.1 FL (ref 8.9–12.7)
PMV BLD AUTO: 11.4 FL (ref 8.9–12.7)
PO2 BLDV: 65.4 MM HG (ref 35–45)
PO2 VENOUS TEMP CORRECTED: 76 MM HG (ref 35–45)
POIKILOCYTOSIS BLD QL SMEAR: PRESENT
POTASSIUM SERPL-SCNC: 3.5 MMOL/L (ref 3.5–5.3)
PR INTERVAL: 182 MS
PROCALCITONIN SERPL-MCNC: 45.78 NG/ML
PROT SERPL-MCNC: 6.8 G/DL (ref 6.4–8.2)
PROTHROMBIN TIME: 19.5 SECONDS (ref 11.6–14.5)
QRS AXIS: 101 DEGREES
QRSD INTERVAL: 70 MS
QT INTERVAL: 266 MS
QTC INTERVAL: 411 MS
RBC # BLD AUTO: 3.06 MILLION/UL (ref 3.81–5.12)
RBC # BLD AUTO: 4.15 MILLION/UL (ref 3.81–5.12)
RBC MORPH BLD: PRESENT
SARS-COV-2 RNA RESP QL NAA+PROBE: NEGATIVE
SODIUM SERPL-SCNC: 136 MMOL/L (ref 136–145)
T WAVE AXIS: -43 DEGREES
VARIANT LYMPHS # BLD AUTO: 2 %
VARIANT LYMPHS # BLD AUTO: 2 %
VENTRICULAR RATE: 144 BPM
WBC # BLD AUTO: 12.61 THOUSAND/UL (ref 4.31–10.16)
WBC # BLD AUTO: 3.7 THOUSAND/UL (ref 4.31–10.16)

## 2020-10-01 PROCEDURE — 85610 PROTHROMBIN TIME: CPT | Performed by: PHYSICIAN ASSISTANT

## 2020-10-01 PROCEDURE — 84100 ASSAY OF PHOSPHORUS: CPT | Performed by: INTERNAL MEDICINE

## 2020-10-01 PROCEDURE — 83605 ASSAY OF LACTIC ACID: CPT | Performed by: PHYSICIAN ASSISTANT

## 2020-10-01 PROCEDURE — 84145 PROCALCITONIN (PCT): CPT | Performed by: INTERNAL MEDICINE

## 2020-10-01 PROCEDURE — 85007 BL SMEAR W/DIFF WBC COUNT: CPT | Performed by: PHYSICIAN ASSISTANT

## 2020-10-01 PROCEDURE — 99233 SBSQ HOSP IP/OBS HIGH 50: CPT | Performed by: PHYSICIAN ASSISTANT

## 2020-10-01 PROCEDURE — 87081 CULTURE SCREEN ONLY: CPT | Performed by: INTERNAL MEDICINE

## 2020-10-01 PROCEDURE — 85007 BL SMEAR W/DIFF WBC COUNT: CPT | Performed by: INTERNAL MEDICINE

## 2020-10-01 PROCEDURE — 99223 1ST HOSP IP/OBS HIGH 75: CPT | Performed by: PSYCHIATRY & NEUROLOGY

## 2020-10-01 PROCEDURE — 85730 THROMBOPLASTIN TIME PARTIAL: CPT | Performed by: PHYSICIAN ASSISTANT

## 2020-10-01 PROCEDURE — 80053 COMPREHEN METABOLIC PANEL: CPT | Performed by: INTERNAL MEDICINE

## 2020-10-01 PROCEDURE — 82805 BLOOD GASES W/O2 SATURATION: CPT | Performed by: PHYSICIAN ASSISTANT

## 2020-10-01 PROCEDURE — 83880 ASSAY OF NATRIURETIC PEPTIDE: CPT | Performed by: PHYSICIAN ASSISTANT

## 2020-10-01 PROCEDURE — 93010 ELECTROCARDIOGRAM REPORT: CPT | Performed by: INTERNAL MEDICINE

## 2020-10-01 PROCEDURE — 83735 ASSAY OF MAGNESIUM: CPT | Performed by: INTERNAL MEDICINE

## 2020-10-01 PROCEDURE — 85027 COMPLETE CBC AUTOMATED: CPT | Performed by: PHYSICIAN ASSISTANT

## 2020-10-01 PROCEDURE — U0003 INFECTIOUS AGENT DETECTION BY NUCLEIC ACID (DNA OR RNA); SEVERE ACUTE RESPIRATORY SYNDROME CORONAVIRUS 2 (SARS-COV-2) (CORONAVIRUS DISEASE [COVID-19]), AMPLIFIED PROBE TECHNIQUE, MAKING USE OF HIGH THROUGHPUT TECHNOLOGIES AS DESCRIBED BY CMS-2020-01-R: HCPCS | Performed by: PHYSICIAN ASSISTANT

## 2020-10-01 PROCEDURE — 71045 X-RAY EXAM CHEST 1 VIEW: CPT

## 2020-10-01 PROCEDURE — 99223 1ST HOSP IP/OBS HIGH 75: CPT | Performed by: STUDENT IN AN ORGANIZED HEALTH CARE EDUCATION/TRAINING PROGRAM

## 2020-10-01 PROCEDURE — 85027 COMPLETE CBC AUTOMATED: CPT | Performed by: INTERNAL MEDICINE

## 2020-10-01 PROCEDURE — 99223 1ST HOSP IP/OBS HIGH 75: CPT | Performed by: INTERNAL MEDICINE

## 2020-10-01 RX ORDER — SODIUM CHLORIDE, SODIUM GLUCONATE, SODIUM ACETATE, POTASSIUM CHLORIDE, MAGNESIUM CHLORIDE, SODIUM PHOSPHATE, DIBASIC, AND POTASSIUM PHOSPHATE .53; .5; .37; .037; .03; .012; .00082 G/100ML; G/100ML; G/100ML; G/100ML; G/100ML; G/100ML; G/100ML
1000 INJECTION, SOLUTION INTRAVENOUS ONCE
Status: COMPLETED | OUTPATIENT
Start: 2020-10-01 | End: 2020-10-01

## 2020-10-01 RX ORDER — SODIUM CHLORIDE, SODIUM GLUCONATE, SODIUM ACETATE, POTASSIUM CHLORIDE, MAGNESIUM CHLORIDE, SODIUM PHOSPHATE, DIBASIC, AND POTASSIUM PHOSPHATE .53; .5; .37; .037; .03; .012; .00082 G/100ML; G/100ML; G/100ML; G/100ML; G/100ML; G/100ML; G/100ML
500 INJECTION, SOLUTION INTRAVENOUS ONCE
Status: COMPLETED | OUTPATIENT
Start: 2020-10-01 | End: 2020-10-01

## 2020-10-01 RX ORDER — ACETAMINOPHEN 325 MG/1
325 TABLET ORAL ONCE
Status: COMPLETED | OUTPATIENT
Start: 2020-10-01 | End: 2020-10-01

## 2020-10-01 RX ORDER — ALBUMIN, HUMAN INJ 5% 5 %
12.5 SOLUTION INTRAVENOUS ONCE
Status: COMPLETED | OUTPATIENT
Start: 2020-10-01 | End: 2020-10-02

## 2020-10-01 RX ORDER — HEPARIN SODIUM 5000 [USP'U]/ML
5000 INJECTION, SOLUTION INTRAVENOUS; SUBCUTANEOUS EVERY 8 HOURS SCHEDULED
Status: DISCONTINUED | OUTPATIENT
Start: 2020-10-01 | End: 2020-10-02

## 2020-10-01 RX ADMIN — HEPARIN SODIUM 5000 UNITS: 5000 INJECTION INTRAVENOUS; SUBCUTANEOUS at 21:36

## 2020-10-01 RX ADMIN — DULOXETINE HYDROCHLORIDE 30 MG: 30 CAPSULE, DELAYED RELEASE ORAL at 09:06

## 2020-10-01 RX ADMIN — IBUPROFEN 200 MG: 400 TABLET ORAL at 03:57

## 2020-10-01 RX ADMIN — ACETAMINOPHEN 325 MG: 325 TABLET, FILM COATED ORAL at 07:28

## 2020-10-01 RX ADMIN — HEPARIN SODIUM 5000 UNITS: 5000 INJECTION INTRAVENOUS; SUBCUTANEOUS at 13:31

## 2020-10-01 RX ADMIN — SODIUM CHLORIDE, SODIUM GLUCONATE, SODIUM ACETATE, POTASSIUM CHLORIDE, MAGNESIUM CHLORIDE, SODIUM PHOSPHATE, DIBASIC, AND POTASSIUM PHOSPHATE 1000 ML: .53; .5; .37; .037; .03; .012; .00082 INJECTION, SOLUTION INTRAVENOUS at 20:00

## 2020-10-01 RX ADMIN — SODIUM CHLORIDE, SODIUM GLUCONATE, SODIUM ACETATE, POTASSIUM CHLORIDE, MAGNESIUM CHLORIDE, SODIUM PHOSPHATE, DIBASIC, AND POTASSIUM PHOSPHATE 1000 ML: .53; .5; .37; .037; .03; .012; .00082 INJECTION, SOLUTION INTRAVENOUS at 20:02

## 2020-10-01 RX ADMIN — GABAPENTIN 100 MG: 100 CAPSULE ORAL at 09:06

## 2020-10-01 RX ADMIN — SODIUM CHLORIDE, SODIUM GLUCONATE, SODIUM ACETATE, POTASSIUM CHLORIDE, MAGNESIUM CHLORIDE, SODIUM PHOSPHATE, DIBASIC, AND POTASSIUM PHOSPHATE 500 ML: .53; .5; .37; .037; .03; .012; .00082 INJECTION, SOLUTION INTRAVENOUS at 16:14

## 2020-10-01 RX ADMIN — ENOXAPARIN SODIUM 40 MG: 40 INJECTION SUBCUTANEOUS at 09:06

## 2020-10-01 RX ADMIN — SODIUM CHLORIDE, SODIUM GLUCONATE, SODIUM ACETATE, POTASSIUM CHLORIDE, MAGNESIUM CHLORIDE, SODIUM PHOSPHATE, DIBASIC, AND POTASSIUM PHOSPHATE 125 ML/HR: .53; .5; .37; .037; .03; .012; .00082 INJECTION, SOLUTION INTRAVENOUS at 07:39

## 2020-10-01 RX ADMIN — GABAPENTIN 100 MG: 100 CAPSULE ORAL at 21:36

## 2020-10-01 RX ADMIN — CEFEPIME HYDROCHLORIDE 2000 MG: 2 INJECTION, POWDER, FOR SOLUTION INTRAVENOUS at 07:29

## 2020-10-01 RX ADMIN — ACETAMINOPHEN 650 MG: 325 TABLET, FILM COATED ORAL at 06:16

## 2020-10-01 RX ADMIN — CEFEPIME HYDROCHLORIDE 2000 MG: 2 INJECTION, POWDER, FOR SOLUTION INTRAVENOUS at 18:44

## 2020-10-01 RX ADMIN — GABAPENTIN 100 MG: 100 CAPSULE ORAL at 16:55

## 2020-10-01 RX ADMIN — BACLOFEN 20 MG: 20 TABLET ORAL at 21:36

## 2020-10-01 RX ADMIN — BACLOFEN 20 MG: 20 TABLET ORAL at 09:06

## 2020-10-01 RX ADMIN — BACLOFEN 20 MG: 20 TABLET ORAL at 00:07

## 2020-10-01 RX ADMIN — SODIUM CHLORIDE, SODIUM GLUCONATE, SODIUM ACETATE, POTASSIUM CHLORIDE, MAGNESIUM CHLORIDE, SODIUM PHOSPHATE, DIBASIC, AND POTASSIUM PHOSPHATE 500 ML: .53; .5; .37; .037; .03; .012; .00082 INJECTION, SOLUTION INTRAVENOUS at 17:39

## 2020-10-01 RX ADMIN — BACLOFEN 20 MG: 20 TABLET ORAL at 13:48

## 2020-10-01 RX ADMIN — GABAPENTIN 100 MG: 100 CAPSULE ORAL at 00:07

## 2020-10-01 RX ADMIN — ALBUMIN (HUMAN) 12.5 G: 12.5 INJECTION, SOLUTION INTRAVENOUS at 23:13

## 2020-10-02 ENCOUNTER — APPOINTMENT (INPATIENT)
Dept: RADIOLOGY | Facility: HOSPITAL | Age: 50
DRG: 853 | End: 2020-10-02
Payer: MEDICARE

## 2020-10-02 ENCOUNTER — PREP FOR PROCEDURE (OUTPATIENT)
Dept: UROLOGY | Facility: CLINIC | Age: 50
End: 2020-10-02

## 2020-10-02 ENCOUNTER — ANESTHESIA EVENT (INPATIENT)
Dept: PERIOP | Facility: HOSPITAL | Age: 50
DRG: 853 | End: 2020-10-02
Payer: MEDICARE

## 2020-10-02 ENCOUNTER — ANESTHESIA (INPATIENT)
Dept: PERIOP | Facility: HOSPITAL | Age: 50
DRG: 853 | End: 2020-10-02
Payer: MEDICARE

## 2020-10-02 VITALS — HEART RATE: 100 BPM

## 2020-10-02 DIAGNOSIS — N20.1 LEFT URETERAL STONE: Primary | ICD-10-CM

## 2020-10-02 PROBLEM — Z93.59 SUPRAPUBIC CATHETER (HCC): Chronic | Status: ACTIVE | Noted: 2017-11-17

## 2020-10-02 PROBLEM — N13.30 HYDRONEPHROSIS OF LEFT KIDNEY: Status: ACTIVE | Noted: 2020-10-02

## 2020-10-02 PROBLEM — R53.2 FUNCTIONAL QUADRIPLEGIA SECONDARY TO MS (HCC): Chronic | Status: ACTIVE | Noted: 2018-01-25

## 2020-10-02 PROBLEM — G35 FUNCTIONAL QUADRIPLEGIA SECONDARY TO MS (HCC): Chronic | Status: ACTIVE | Noted: 2018-01-25

## 2020-10-02 LAB
ABO GROUP BLD: NORMAL
ABO GROUP BLD: NORMAL
ALBUMIN SERPL BCP-MCNC: 2.2 G/DL (ref 3.5–5)
ALP SERPL-CCNC: 169 U/L (ref 46–116)
ALT SERPL W P-5'-P-CCNC: 19 U/L (ref 12–78)
ANION GAP SERPL CALCULATED.3IONS-SCNC: 10 MMOL/L (ref 4–13)
ANION GAP SERPL CALCULATED.3IONS-SCNC: 5 MMOL/L (ref 4–13)
ANION GAP SERPL CALCULATED.3IONS-SCNC: 9 MMOL/L (ref 4–13)
AST SERPL W P-5'-P-CCNC: 29 U/L (ref 5–45)
BASOPHILS # BLD AUTO: 0.04 THOUSANDS/ΜL (ref 0–0.1)
BASOPHILS NFR BLD AUTO: 0 % (ref 0–1)
BILIRUB DIRECT SERPL-MCNC: 2.97 MG/DL (ref 0–0.2)
BILIRUB SERPL-MCNC: 3.55 MG/DL (ref 0.2–1)
BLD GP AB SCN SERPL QL: NEGATIVE
BUN SERPL-MCNC: 19 MG/DL (ref 5–25)
BUN SERPL-MCNC: 24 MG/DL (ref 5–25)
BUN SERPL-MCNC: 28 MG/DL (ref 5–25)
CA-I BLD-SCNC: 1.04 MMOL/L (ref 1.12–1.32)
CALCIUM ALBUM COR SERPL-MCNC: 9 MG/DL (ref 8.3–10.1)
CALCIUM SERPL-MCNC: 7.6 MG/DL (ref 8.3–10.1)
CALCIUM SERPL-MCNC: 7.9 MG/DL (ref 8.3–10.1)
CALCIUM SERPL-MCNC: 8.7 MG/DL (ref 8.3–10.1)
CHLORIDE SERPL-SCNC: 103 MMOL/L (ref 100–108)
CHLORIDE SERPL-SCNC: 105 MMOL/L (ref 100–108)
CHLORIDE SERPL-SCNC: 110 MMOL/L (ref 100–108)
CO2 SERPL-SCNC: 25 MMOL/L (ref 21–32)
CO2 SERPL-SCNC: 27 MMOL/L (ref 21–32)
CO2 SERPL-SCNC: 28 MMOL/L (ref 21–32)
CREAT SERPL-MCNC: 0.8 MG/DL (ref 0.6–1.3)
CREAT SERPL-MCNC: 1.23 MG/DL (ref 0.6–1.3)
CREAT SERPL-MCNC: 1.56 MG/DL (ref 0.6–1.3)
EOSINOPHIL # BLD AUTO: 0.06 THOUSAND/ΜL (ref 0–0.61)
EOSINOPHIL NFR BLD AUTO: 1 % (ref 0–6)
ERYTHROCYTE [DISTWIDTH] IN BLOOD BY AUTOMATED COUNT: 14.6 % (ref 11.6–15.1)
ERYTHROCYTE [DISTWIDTH] IN BLOOD BY AUTOMATED COUNT: 14.6 % (ref 11.6–15.1)
ERYTHROCYTE [DISTWIDTH] IN BLOOD BY AUTOMATED COUNT: 15.2 % (ref 11.6–15.1)
GFR SERPL CREATININE-BSD FRML MDRD: 38 ML/MIN/1.73SQ M
GFR SERPL CREATININE-BSD FRML MDRD: 51 ML/MIN/1.73SQ M
GFR SERPL CREATININE-BSD FRML MDRD: 86 ML/MIN/1.73SQ M
GLUCOSE SERPL-MCNC: 121 MG/DL (ref 65–140)
GLUCOSE SERPL-MCNC: 90 MG/DL (ref 65–140)
GLUCOSE SERPL-MCNC: 94 MG/DL (ref 65–140)
HCT VFR BLD AUTO: 23.4 % (ref 34.8–46.1)
HCT VFR BLD AUTO: 26 % (ref 34.8–46.1)
HCT VFR BLD AUTO: 26.9 % (ref 34.8–46.1)
HGB BLD-MCNC: 7.1 G/DL (ref 11.5–15.4)
HGB BLD-MCNC: 8.3 G/DL (ref 11.5–15.4)
HGB BLD-MCNC: 8.4 G/DL (ref 11.5–15.4)
HGB BLD-MCNC: 9.7 G/DL (ref 11.5–15.4)
IMM GRANULOCYTES # BLD AUTO: 0.47 THOUSAND/UL (ref 0–0.2)
IMM GRANULOCYTES NFR BLD AUTO: 4 % (ref 0–2)
LACTATE SERPL-SCNC: 2 MMOL/L (ref 0.5–2)
LACTATE SERPL-SCNC: 2 MMOL/L (ref 0.5–2)
LACTATE SERPL-SCNC: 3.2 MMOL/L (ref 0.5–2)
LACTATE SERPL-SCNC: 3.2 MMOL/L (ref 0.5–2)
LYMPHOCYTES # BLD AUTO: 1 THOUSANDS/ΜL (ref 0.6–4.47)
LYMPHOCYTES NFR BLD AUTO: 8 % (ref 14–44)
MAGNESIUM SERPL-MCNC: 2.6 MG/DL (ref 1.6–2.6)
MCH RBC QN AUTO: 26.9 PG (ref 26.8–34.3)
MCH RBC QN AUTO: 27.5 PG (ref 26.8–34.3)
MCH RBC QN AUTO: 27.8 PG (ref 26.8–34.3)
MCHC RBC AUTO-ENTMCNC: 30.3 G/DL (ref 31.4–37.4)
MCHC RBC AUTO-ENTMCNC: 30.9 G/DL (ref 31.4–37.4)
MCHC RBC AUTO-ENTMCNC: 32.3 G/DL (ref 31.4–37.4)
MCV RBC AUTO: 86 FL (ref 82–98)
MCV RBC AUTO: 87 FL (ref 82–98)
MCV RBC AUTO: 91 FL (ref 82–98)
MONOCYTES # BLD AUTO: 0.2 THOUSAND/ΜL (ref 0.17–1.22)
MONOCYTES NFR BLD AUTO: 2 % (ref 4–12)
MRSA NOSE QL CULT: NORMAL
NEUTROPHILS # BLD AUTO: 10.68 THOUSANDS/ΜL (ref 1.85–7.62)
NEUTS SEG NFR BLD AUTO: 85 % (ref 43–75)
NRBC BLD AUTO-RTO: 0 /100 WBCS
NRBC BLD AUTO-RTO: 0 /100 WBCS
PHOSPHATE SERPL-MCNC: 3.3 MG/DL (ref 2.7–4.5)
PLATELET # BLD AUTO: 42 THOUSANDS/UL (ref 149–390)
PLATELET # BLD AUTO: 47 THOUSANDS/UL (ref 149–390)
PLATELET # BLD AUTO: 50 THOUSANDS/UL (ref 149–390)
PMV BLD AUTO: 11.7 FL (ref 8.9–12.7)
PMV BLD AUTO: 12 FL (ref 8.9–12.7)
PMV BLD AUTO: 12.7 FL (ref 8.9–12.7)
POTASSIUM SERPL-SCNC: 3.1 MMOL/L (ref 3.5–5.3)
POTASSIUM SERPL-SCNC: 3.3 MMOL/L (ref 3.5–5.3)
POTASSIUM SERPL-SCNC: 3.8 MMOL/L (ref 3.5–5.3)
PROCALCITONIN SERPL-MCNC: 46.71 NG/ML
PROCALCITONIN SERPL-MCNC: 64.43 NG/ML
PROT SERPL-MCNC: 5.3 G/DL (ref 6.4–8.2)
RBC # BLD AUTO: 2.58 MILLION/UL (ref 3.81–5.12)
RBC # BLD AUTO: 3.02 MILLION/UL (ref 3.81–5.12)
RBC # BLD AUTO: 3.08 MILLION/UL (ref 3.81–5.12)
RH BLD: POSITIVE
RH BLD: POSITIVE
SODIUM SERPL-SCNC: 138 MMOL/L (ref 136–145)
SODIUM SERPL-SCNC: 141 MMOL/L (ref 136–145)
SODIUM SERPL-SCNC: 143 MMOL/L (ref 136–145)
SPECIMEN EXPIRATION DATE: NORMAL
WBC # BLD AUTO: 12.12 THOUSAND/UL (ref 4.31–10.16)
WBC # BLD AUTO: 12.45 THOUSAND/UL (ref 4.31–10.16)
WBC # BLD AUTO: 9.33 THOUSAND/UL (ref 4.31–10.16)

## 2020-10-02 PROCEDURE — 52351 CYSTOURETERO & OR PYELOSCOPE: CPT | Performed by: UROLOGY

## 2020-10-02 PROCEDURE — 86920 COMPATIBILITY TEST SPIN: CPT

## 2020-10-02 PROCEDURE — 82248 BILIRUBIN DIRECT: CPT | Performed by: PHYSICIAN ASSISTANT

## 2020-10-02 PROCEDURE — C1769 GUIDE WIRE: HCPCS | Performed by: UROLOGY

## 2020-10-02 PROCEDURE — 51705 CHANGE OF BLADDER TUBE: CPT | Performed by: UROLOGY

## 2020-10-02 PROCEDURE — 83605 ASSAY OF LACTIC ACID: CPT | Performed by: PHYSICIAN ASSISTANT

## 2020-10-02 PROCEDURE — 99291 CRITICAL CARE FIRST HOUR: CPT | Performed by: PHYSICIAN ASSISTANT

## 2020-10-02 PROCEDURE — 99223 1ST HOSP IP/OBS HIGH 75: CPT | Performed by: UROLOGY

## 2020-10-02 PROCEDURE — 87077 CULTURE AEROBIC IDENTIFY: CPT | Performed by: UROLOGY

## 2020-10-02 PROCEDURE — 84145 PROCALCITONIN (PCT): CPT | Performed by: PHYSICIAN ASSISTANT

## 2020-10-02 PROCEDURE — 82330 ASSAY OF CALCIUM: CPT | Performed by: PHYSICIAN ASSISTANT

## 2020-10-02 PROCEDURE — 0T2BX0Z CHANGE DRAINAGE DEVICE IN BLADDER, EXTERNAL APPROACH: ICD-10-PCS | Performed by: INTERNAL MEDICINE

## 2020-10-02 PROCEDURE — G1004 CDSM NDSC: HCPCS

## 2020-10-02 PROCEDURE — 92610 EVALUATE SWALLOWING FUNCTION: CPT

## 2020-10-02 PROCEDURE — 52332 CYSTOSCOPY AND TREATMENT: CPT | Performed by: UROLOGY

## 2020-10-02 PROCEDURE — 99233 SBSQ HOSP IP/OBS HIGH 50: CPT | Performed by: INTERNAL MEDICINE

## 2020-10-02 PROCEDURE — BT1FYZZ FLUOROSCOPY OF LEFT KIDNEY, URETER AND BLADDER USING OTHER CONTRAST: ICD-10-PCS | Performed by: INTERNAL MEDICINE

## 2020-10-02 PROCEDURE — 87147 CULTURE TYPE IMMUNOLOGIC: CPT | Performed by: UROLOGY

## 2020-10-02 PROCEDURE — 86901 BLOOD TYPING SEROLOGIC RH(D): CPT | Performed by: PHYSICIAN ASSISTANT

## 2020-10-02 PROCEDURE — 80048 BASIC METABOLIC PNL TOTAL CA: CPT | Performed by: PHYSICIAN ASSISTANT

## 2020-10-02 PROCEDURE — 85025 COMPLETE CBC W/AUTO DIFF WBC: CPT | Performed by: PHYSICIAN ASSISTANT

## 2020-10-02 PROCEDURE — 85027 COMPLETE CBC AUTOMATED: CPT | Performed by: PHYSICIAN ASSISTANT

## 2020-10-02 PROCEDURE — 86850 RBC ANTIBODY SCREEN: CPT | Performed by: PHYSICIAN ASSISTANT

## 2020-10-02 PROCEDURE — 87186 SC STD MICRODIL/AGAR DIL: CPT | Performed by: UROLOGY

## 2020-10-02 PROCEDURE — 80053 COMPREHEN METABOLIC PANEL: CPT | Performed by: PHYSICIAN ASSISTANT

## 2020-10-02 PROCEDURE — 0T778DZ DILATION OF LEFT URETER WITH INTRALUMINAL DEVICE, VIA NATURAL OR ARTIFICIAL OPENING ENDOSCOPIC: ICD-10-PCS | Performed by: UROLOGY

## 2020-10-02 PROCEDURE — 87086 URINE CULTURE/COLONY COUNT: CPT | Performed by: UROLOGY

## 2020-10-02 PROCEDURE — 71250 CT THORAX DX C-: CPT

## 2020-10-02 PROCEDURE — 84100 ASSAY OF PHOSPHORUS: CPT | Performed by: PHYSICIAN ASSISTANT

## 2020-10-02 PROCEDURE — P9016 RBC LEUKOCYTES REDUCED: HCPCS

## 2020-10-02 PROCEDURE — NC001 PR NO CHARGE: Performed by: PHYSICIAN ASSISTANT

## 2020-10-02 PROCEDURE — C2617 STENT, NON-COR, TEM W/O DEL: HCPCS | Performed by: UROLOGY

## 2020-10-02 PROCEDURE — 83735 ASSAY OF MAGNESIUM: CPT | Performed by: PHYSICIAN ASSISTANT

## 2020-10-02 PROCEDURE — 85018 HEMOGLOBIN: CPT | Performed by: PHYSICIAN ASSISTANT

## 2020-10-02 PROCEDURE — 74420 UROGRAPHY RTRGR +-KUB: CPT

## 2020-10-02 PROCEDURE — 85027 COMPLETE CBC AUTOMATED: CPT | Performed by: NURSE ANESTHETIST, CERTIFIED REGISTERED

## 2020-10-02 PROCEDURE — 74176 CT ABD & PELVIS W/O CONTRAST: CPT

## 2020-10-02 PROCEDURE — 30233N1 TRANSFUSION OF NONAUTOLOGOUS RED BLOOD CELLS INTO PERIPHERAL VEIN, PERCUTANEOUS APPROACH: ICD-10-PCS | Performed by: ANESTHESIOLOGY

## 2020-10-02 PROCEDURE — 99292 CRITICAL CARE ADDL 30 MIN: CPT | Performed by: ANESTHESIOLOGY

## 2020-10-02 PROCEDURE — 86900 BLOOD TYPING SEROLOGIC ABO: CPT | Performed by: PHYSICIAN ASSISTANT

## 2020-10-02 DEVICE — STENT URETERAL 6FR 24CML INLAY OPTIMA: Type: IMPLANTABLE DEVICE | Site: URETER | Status: FUNCTIONAL

## 2020-10-02 RX ORDER — SODIUM CHLORIDE, SODIUM GLUCONATE, SODIUM ACETATE, POTASSIUM CHLORIDE, MAGNESIUM CHLORIDE, SODIUM PHOSPHATE, DIBASIC, AND POTASSIUM PHOSPHATE .53; .5; .37; .037; .03; .012; .00082 G/100ML; G/100ML; G/100ML; G/100ML; G/100ML; G/100ML; G/100ML
1000 INJECTION, SOLUTION INTRAVENOUS ONCE
Status: COMPLETED | OUTPATIENT
Start: 2020-10-02 | End: 2020-10-02

## 2020-10-02 RX ORDER — ALBUMIN, HUMAN INJ 5% 5 %
25 SOLUTION INTRAVENOUS ONCE
Status: COMPLETED | OUTPATIENT
Start: 2020-10-02 | End: 2020-10-02

## 2020-10-02 RX ORDER — SODIUM CHLORIDE, SODIUM LACTATE, POTASSIUM CHLORIDE, CALCIUM CHLORIDE 600; 310; 30; 20 MG/100ML; MG/100ML; MG/100ML; MG/100ML
INJECTION, SOLUTION INTRAVENOUS CONTINUOUS PRN
Status: DISCONTINUED | OUTPATIENT
Start: 2020-10-02 | End: 2020-10-02

## 2020-10-02 RX ORDER — PROPOFOL 10 MG/ML
INJECTION, EMULSION INTRAVENOUS AS NEEDED
Status: DISCONTINUED | OUTPATIENT
Start: 2020-10-02 | End: 2020-10-02

## 2020-10-02 RX ORDER — POTASSIUM CHLORIDE 14.9 MG/ML
20 INJECTION INTRAVENOUS ONCE
Status: COMPLETED | OUTPATIENT
Start: 2020-10-02 | End: 2020-10-03

## 2020-10-02 RX ORDER — POTASSIUM CHLORIDE 20 MEQ/1
40 TABLET, EXTENDED RELEASE ORAL ONCE
Status: DISCONTINUED | OUTPATIENT
Start: 2020-10-02 | End: 2020-10-02

## 2020-10-02 RX ORDER — FENTANYL CITRATE/PF 50 MCG/ML
25 SYRINGE (ML) INJECTION
Status: DISCONTINUED | OUTPATIENT
Start: 2020-10-02 | End: 2020-10-02 | Stop reason: HOSPADM

## 2020-10-02 RX ORDER — ALBUMIN, HUMAN INJ 5% 5 %
SOLUTION INTRAVENOUS
Status: COMPLETED
Start: 2020-10-02 | End: 2020-10-02

## 2020-10-02 RX ORDER — CALCIUM GLUCONATE 20 MG/ML
2 INJECTION, SOLUTION INTRAVENOUS ONCE
Status: COMPLETED | OUTPATIENT
Start: 2020-10-02 | End: 2020-10-02

## 2020-10-02 RX ORDER — POTASSIUM CHLORIDE 29.8 MG/ML
40 INJECTION INTRAVENOUS ONCE
Status: COMPLETED | OUTPATIENT
Start: 2020-10-02 | End: 2020-10-02

## 2020-10-02 RX ORDER — POTASSIUM CHLORIDE 14.9 MG/ML
20 INJECTION INTRAVENOUS ONCE
Status: COMPLETED | OUTPATIENT
Start: 2020-10-02 | End: 2020-10-02

## 2020-10-02 RX ORDER — VASOPRESSIN 20 U/ML
INJECTION PARENTERAL AS NEEDED
Status: DISCONTINUED | OUTPATIENT
Start: 2020-10-02 | End: 2020-10-02

## 2020-10-02 RX ORDER — MAGNESIUM HYDROXIDE 1200 MG/15ML
LIQUID ORAL AS NEEDED
Status: DISCONTINUED | OUTPATIENT
Start: 2020-10-02 | End: 2020-10-02 | Stop reason: HOSPADM

## 2020-10-02 RX ORDER — ALBUMIN, HUMAN INJ 5% 5 %
12.5 SOLUTION INTRAVENOUS ONCE
Status: COMPLETED | OUTPATIENT
Start: 2020-10-02 | End: 2020-10-02

## 2020-10-02 RX ORDER — LIDOCAINE HYDROCHLORIDE 10 MG/ML
INJECTION, SOLUTION EPIDURAL; INFILTRATION; INTRACAUDAL; PERINEURAL AS NEEDED
Status: DISCONTINUED | OUTPATIENT
Start: 2020-10-02 | End: 2020-10-02

## 2020-10-02 RX ORDER — POTASSIUM CHLORIDE 14.9 MG/ML
20 INJECTION INTRAVENOUS ONCE
Status: DISCONTINUED | OUTPATIENT
Start: 2020-10-02 | End: 2020-10-02

## 2020-10-02 RX ORDER — PROMETHAZINE HYDROCHLORIDE 25 MG/ML
25 INJECTION, SOLUTION INTRAMUSCULAR; INTRAVENOUS ONCE AS NEEDED
Status: DISCONTINUED | OUTPATIENT
Start: 2020-10-02 | End: 2020-10-02 | Stop reason: HOSPADM

## 2020-10-02 RX ORDER — POTASSIUM CHLORIDE 20 MEQ/1
20 TABLET, EXTENDED RELEASE ORAL ONCE
Status: DISCONTINUED | OUTPATIENT
Start: 2020-10-02 | End: 2020-10-02

## 2020-10-02 RX ADMIN — PHENYLEPHRINE HYDROCHLORIDE 130 MCG/MIN: 10 INJECTION INTRAVENOUS at 15:24

## 2020-10-02 RX ADMIN — VASOPRESSIN 1 UNITS: 20 INJECTION INTRAVENOUS at 07:12

## 2020-10-02 RX ADMIN — SODIUM CHLORIDE, SODIUM GLUCONATE, SODIUM ACETATE, POTASSIUM CHLORIDE, MAGNESIUM CHLORIDE, SODIUM PHOSPHATE, DIBASIC, AND POTASSIUM PHOSPHATE 125 ML/HR: .53; .5; .37; .037; .03; .012; .00082 INJECTION, SOLUTION INTRAVENOUS at 08:05

## 2020-10-02 RX ADMIN — POTASSIUM CHLORIDE 20 MEQ: 14.9 INJECTION, SOLUTION INTRAVENOUS at 16:49

## 2020-10-02 RX ADMIN — VASOPRESSIN 2 UNITS: 20 INJECTION INTRAVENOUS at 06:29

## 2020-10-02 RX ADMIN — POTASSIUM CHLORIDE 20 MEQ: 14.9 INJECTION, SOLUTION INTRAVENOUS at 22:56

## 2020-10-02 RX ADMIN — ALBUMIN, HUMAN INJ 5% 25 G: 5 SOLUTION at 08:33

## 2020-10-02 RX ADMIN — ONDANSETRON 4 MG: 2 INJECTION INTRAMUSCULAR; INTRAVENOUS at 16:16

## 2020-10-02 RX ADMIN — ALBUMIN (HUMAN) 25 G: 12.5 INJECTION, SOLUTION INTRAVENOUS at 08:33

## 2020-10-02 RX ADMIN — PHENYLEPHRINE HYDROCHLORIDE 200 MCG: 10 INJECTION INTRAVENOUS at 06:26

## 2020-10-02 RX ADMIN — LIDOCAINE HYDROCHLORIDE 50 MG: 10 INJECTION, SOLUTION EPIDURAL; INFILTRATION; INTRACAUDAL; PERINEURAL at 06:24

## 2020-10-02 RX ADMIN — HEPARIN SODIUM 5000 UNITS: 5000 INJECTION INTRAVENOUS; SUBCUTANEOUS at 14:17

## 2020-10-02 RX ADMIN — PHENYLEPHRINE HYDROCHLORIDE 200 MCG: 10 INJECTION INTRAVENOUS at 06:24

## 2020-10-02 RX ADMIN — CEFEPIME HYDROCHLORIDE 2000 MG: 2 INJECTION, POWDER, FOR SOLUTION INTRAVENOUS at 18:39

## 2020-10-02 RX ADMIN — VASOPRESSIN 1 UNITS: 20 INJECTION INTRAVENOUS at 07:23

## 2020-10-02 RX ADMIN — SODIUM CHLORIDE, SODIUM GLUCONATE, SODIUM ACETATE, POTASSIUM CHLORIDE, MAGNESIUM CHLORIDE, SODIUM PHOSPHATE, DIBASIC, AND POTASSIUM PHOSPHATE 125 ML/HR: .53; .5; .37; .037; .03; .012; .00082 INJECTION, SOLUTION INTRAVENOUS at 17:22

## 2020-10-02 RX ADMIN — PHENYLEPHRINE HYDROCHLORIDE 25 MCG/MIN: 10 INJECTION INTRAVENOUS at 07:35

## 2020-10-02 RX ADMIN — ALBUMIN (HUMAN) 12.5 G: 12.5 INJECTION, SOLUTION INTRAVENOUS at 13:48

## 2020-10-02 RX ADMIN — PROPOFOL 100 MG: 10 INJECTION, EMULSION INTRAVENOUS at 06:24

## 2020-10-02 RX ADMIN — NOREPINEPHRINE BITARTRATE 2 MCG/MIN: 1 INJECTION, SOLUTION, CONCENTRATE INTRAVENOUS at 17:10

## 2020-10-02 RX ADMIN — POTASSIUM CHLORIDE 40 MEQ: 29.8 INJECTION, SOLUTION INTRAVENOUS at 12:32

## 2020-10-02 RX ADMIN — SODIUM CHLORIDE, SODIUM GLUCONATE, SODIUM ACETATE, POTASSIUM CHLORIDE, MAGNESIUM CHLORIDE, SODIUM PHOSPHATE, DIBASIC, AND POTASSIUM PHOSPHATE 1000 ML: .53; .5; .37; .037; .03; .012; .00082 INJECTION, SOLUTION INTRAVENOUS at 01:39

## 2020-10-02 RX ADMIN — SODIUM CHLORIDE, SODIUM LACTATE, POTASSIUM CHLORIDE, AND CALCIUM CHLORIDE: .6; .31; .03; .02 INJECTION, SOLUTION INTRAVENOUS at 06:21

## 2020-10-02 RX ADMIN — CALCIUM GLUCONATE 2 G: 20 INJECTION, SOLUTION INTRAVENOUS at 14:35

## 2020-10-03 ENCOUNTER — APPOINTMENT (INPATIENT)
Dept: RADIOLOGY | Facility: HOSPITAL | Age: 50
DRG: 853 | End: 2020-10-03
Payer: MEDICARE

## 2020-10-03 LAB
ABO GROUP BLD BPU: NORMAL
ALBUMIN SERPL BCP-MCNC: 2.5 G/DL (ref 3.5–5)
ALP SERPL-CCNC: 173 U/L (ref 46–116)
ALT SERPL W P-5'-P-CCNC: 14 U/L (ref 12–78)
ANION GAP SERPL CALCULATED.3IONS-SCNC: 7 MMOL/L (ref 4–13)
AST SERPL W P-5'-P-CCNC: 20 U/L (ref 5–45)
BILIRUB SERPL-MCNC: 2.83 MG/DL (ref 0.2–1)
BLD SMEAR INTERP: NORMAL
BPU ID: NORMAL
BUN SERPL-MCNC: 17 MG/DL (ref 5–25)
CALCIUM ALBUM COR SERPL-MCNC: 9.5 MG/DL (ref 8.3–10.1)
CALCIUM SERPL-MCNC: 8.3 MG/DL (ref 8.3–10.1)
CHLORIDE SERPL-SCNC: 111 MMOL/L (ref 100–108)
CO2 SERPL-SCNC: 26 MMOL/L (ref 21–32)
CREAT SERPL-MCNC: 0.67 MG/DL (ref 0.6–1.3)
CROSSMATCH: NORMAL
ERYTHROCYTE [DISTWIDTH] IN BLOOD BY AUTOMATED COUNT: 14.7 % (ref 11.6–15.1)
GFR SERPL CREATININE-BSD FRML MDRD: 103 ML/MIN/1.73SQ M
GLUCOSE SERPL-MCNC: 120 MG/DL (ref 65–140)
HCT VFR BLD AUTO: 29.2 % (ref 34.8–46.1)
HGB BLD-MCNC: 9.2 G/DL (ref 11.5–15.4)
LDH SERPL-CCNC: 166 U/L (ref 81–234)
MAGNESIUM SERPL-MCNC: 2.7 MG/DL (ref 1.6–2.6)
MCH RBC QN AUTO: 27.8 PG (ref 26.8–34.3)
MCHC RBC AUTO-ENTMCNC: 31.5 G/DL (ref 31.4–37.4)
MCV RBC AUTO: 88 FL (ref 82–98)
PHOSPHATE SERPL-MCNC: 2 MG/DL (ref 2.7–4.5)
PLATELET # BLD AUTO: 48 THOUSANDS/UL (ref 149–390)
PMV BLD AUTO: 12.1 FL (ref 8.9–12.7)
POTASSIUM SERPL-SCNC: 3.6 MMOL/L (ref 3.5–5.3)
PROCALCITONIN SERPL-MCNC: 21 NG/ML
PROT SERPL-MCNC: 5.5 G/DL (ref 6.4–8.2)
RBC # BLD AUTO: 3.31 MILLION/UL (ref 3.81–5.12)
SODIUM SERPL-SCNC: 144 MMOL/L (ref 136–145)
UNIT DISPENSE STATUS: NORMAL
UNIT PRODUCT CODE: NORMAL
UNIT RH: NORMAL
WBC # BLD AUTO: 13.81 THOUSAND/UL (ref 4.31–10.16)

## 2020-10-03 PROCEDURE — 85027 COMPLETE CBC AUTOMATED: CPT | Performed by: PHYSICIAN ASSISTANT

## 2020-10-03 PROCEDURE — 99232 SBSQ HOSP IP/OBS MODERATE 35: CPT | Performed by: PSYCHIATRY & NEUROLOGY

## 2020-10-03 PROCEDURE — A9585 GADOBUTROL INJECTION: HCPCS | Performed by: PSYCHIATRY & NEUROLOGY

## 2020-10-03 PROCEDURE — 99291 CRITICAL CARE FIRST HOUR: CPT | Performed by: ANESTHESIOLOGY

## 2020-10-03 PROCEDURE — 84100 ASSAY OF PHOSPHORUS: CPT | Performed by: PHYSICIAN ASSISTANT

## 2020-10-03 PROCEDURE — 70450 CT HEAD/BRAIN W/O DYE: CPT

## 2020-10-03 PROCEDURE — G1004 CDSM NDSC: HCPCS

## 2020-10-03 PROCEDURE — 83010 ASSAY OF HAPTOGLOBIN QUANT: CPT | Performed by: PHYSICIAN ASSISTANT

## 2020-10-03 PROCEDURE — 83615 LACTATE (LD) (LDH) ENZYME: CPT | Performed by: PHYSICIAN ASSISTANT

## 2020-10-03 PROCEDURE — 99233 SBSQ HOSP IP/OBS HIGH 50: CPT | Performed by: INTERNAL MEDICINE

## 2020-10-03 PROCEDURE — 80053 COMPREHEN METABOLIC PANEL: CPT | Performed by: PHYSICIAN ASSISTANT

## 2020-10-03 PROCEDURE — 84145 PROCALCITONIN (PCT): CPT | Performed by: PHYSICIAN ASSISTANT

## 2020-10-03 PROCEDURE — 70553 MRI BRAIN STEM W/O & W/DYE: CPT

## 2020-10-03 PROCEDURE — 83735 ASSAY OF MAGNESIUM: CPT | Performed by: PHYSICIAN ASSISTANT

## 2020-10-03 PROCEDURE — 99232 SBSQ HOSP IP/OBS MODERATE 35: CPT | Performed by: UROLOGY

## 2020-10-03 RX ORDER — DIAZEPAM 5 MG/ML
2.5 INJECTION, SOLUTION INTRAMUSCULAR; INTRAVENOUS ONCE
Status: COMPLETED | OUTPATIENT
Start: 2020-10-03 | End: 2020-10-03

## 2020-10-03 RX ORDER — POTASSIUM CHLORIDE 29.8 MG/ML
40 INJECTION INTRAVENOUS ONCE
Status: COMPLETED | OUTPATIENT
Start: 2020-10-03 | End: 2020-10-03

## 2020-10-03 RX ORDER — BACLOFEN 10 MG/1
20 TABLET ORAL 3 TIMES DAILY
Status: DISCONTINUED | OUTPATIENT
Start: 2020-10-03 | End: 2020-10-05

## 2020-10-03 RX ORDER — LIDOCAINE 50 MG/G
1 PATCH TOPICAL DAILY
Status: DISCONTINUED | OUTPATIENT
Start: 2020-10-03 | End: 2020-10-10 | Stop reason: HOSPADM

## 2020-10-03 RX ORDER — LANOLIN ALCOHOL/MO/W.PET/CERES
6 CREAM (GRAM) TOPICAL
Status: DISCONTINUED | OUTPATIENT
Start: 2020-10-03 | End: 2020-10-10 | Stop reason: HOSPADM

## 2020-10-03 RX ORDER — DIAZEPAM 5 MG/ML
5 INJECTION, SOLUTION INTRAMUSCULAR; INTRAVENOUS ONCE
Status: COMPLETED | OUTPATIENT
Start: 2020-10-03 | End: 2020-10-03

## 2020-10-03 RX ADMIN — BACLOFEN 20 MG: 10 TABLET ORAL at 14:36

## 2020-10-03 RX ADMIN — LIDOCAINE 5% 1 PATCH: 700 PATCH TOPICAL at 08:30

## 2020-10-03 RX ADMIN — BACLOFEN 20 MG: 10 TABLET ORAL at 21:27

## 2020-10-03 RX ADMIN — DIAZEPAM 5 MG: 10 INJECTION, SOLUTION INTRAMUSCULAR; INTRAVENOUS at 05:40

## 2020-10-03 RX ADMIN — NOREPINEPHRINE BITARTRATE 2.5 MCG/MIN: 1 INJECTION, SOLUTION, CONCENTRATE INTRAVENOUS at 08:41

## 2020-10-03 RX ADMIN — SODIUM CHLORIDE, SODIUM GLUCONATE, SODIUM ACETATE, POTASSIUM CHLORIDE, MAGNESIUM CHLORIDE, SODIUM PHOSPHATE, DIBASIC, AND POTASSIUM PHOSPHATE 125 ML/HR: .53; .5; .37; .037; .03; .012; .00082 INJECTION, SOLUTION INTRAVENOUS at 20:55

## 2020-10-03 RX ADMIN — GABAPENTIN 100 MG: 100 CAPSULE ORAL at 21:26

## 2020-10-03 RX ADMIN — SODIUM CHLORIDE, SODIUM GLUCONATE, SODIUM ACETATE, POTASSIUM CHLORIDE, MAGNESIUM CHLORIDE, SODIUM PHOSPHATE, DIBASIC, AND POTASSIUM PHOSPHATE 125 ML/HR: .53; .5; .37; .037; .03; .012; .00082 INJECTION, SOLUTION INTRAVENOUS at 00:00

## 2020-10-03 RX ADMIN — POTASSIUM CHLORIDE 40 MEQ: 29.8 INJECTION, SOLUTION INTRAVENOUS at 05:04

## 2020-10-03 RX ADMIN — NOREPINEPHRINE BITARTRATE 2 MCG/MIN: 1 INJECTION, SOLUTION, CONCENTRATE INTRAVENOUS at 20:15

## 2020-10-03 RX ADMIN — POTASSIUM PHOSPHATE, MONOBASIC POTASSIUM PHOSPHATE, DIBASIC 12 MMOL: 224; 236 INJECTION, SOLUTION, CONCENTRATE INTRAVENOUS at 06:34

## 2020-10-03 RX ADMIN — CEFEPIME HYDROCHLORIDE 2000 MG: 2 INJECTION, POWDER, FOR SOLUTION INTRAVENOUS at 06:06

## 2020-10-03 RX ADMIN — ONDANSETRON 4 MG: 2 INJECTION INTRAMUSCULAR; INTRAVENOUS at 13:52

## 2020-10-03 RX ADMIN — GABAPENTIN 100 MG: 100 CAPSULE ORAL at 15:21

## 2020-10-03 RX ADMIN — ACETAMINOPHEN 650 MG: 325 TABLET, FILM COATED ORAL at 18:27

## 2020-10-03 RX ADMIN — GADOBUTROL 8 ML: 604.72 INJECTION INTRAVENOUS at 16:14

## 2020-10-03 RX ADMIN — DIAZEPAM 2.5 MG: 10 INJECTION, SOLUTION INTRAMUSCULAR; INTRAVENOUS at 00:47

## 2020-10-03 RX ADMIN — SODIUM CHLORIDE, SODIUM GLUCONATE, SODIUM ACETATE, POTASSIUM CHLORIDE, MAGNESIUM CHLORIDE, SODIUM PHOSPHATE, DIBASIC, AND POTASSIUM PHOSPHATE 125 ML/HR: .53; .5; .37; .037; .03; .012; .00082 INJECTION, SOLUTION INTRAVENOUS at 09:40

## 2020-10-03 RX ADMIN — SODIUM CHLORIDE, SODIUM GLUCONATE, SODIUM ACETATE, POTASSIUM CHLORIDE, MAGNESIUM CHLORIDE, SODIUM PHOSPHATE, DIBASIC, AND POTASSIUM PHOSPHATE 125 ML/HR: .53; .5; .37; .037; .03; .012; .00082 INJECTION, SOLUTION INTRAVENOUS at 08:45

## 2020-10-03 RX ADMIN — MELATONIN 6 MG: at 22:49

## 2020-10-03 RX ADMIN — CEFEPIME HYDROCHLORIDE 2000 MG: 2 INJECTION, POWDER, FOR SOLUTION INTRAVENOUS at 18:00

## 2020-10-04 ENCOUNTER — APPOINTMENT (INPATIENT)
Dept: RADIOLOGY | Facility: HOSPITAL | Age: 50
DRG: 853 | End: 2020-10-04
Payer: MEDICARE

## 2020-10-04 PROBLEM — D69.1 THROMBOCYTOPATHIA (HCC): Status: ACTIVE | Noted: 2020-10-04

## 2020-10-04 PROBLEM — N13.2 HYDRONEPHROSIS WITH URINARY OBSTRUCTION DUE TO RENAL CALCULUS: Status: ACTIVE | Noted: 2020-10-02

## 2020-10-04 LAB
ALBUMIN SERPL BCP-MCNC: 2.3 G/DL (ref 3.5–5)
ALP SERPL-CCNC: 252 U/L (ref 46–116)
ALT SERPL W P-5'-P-CCNC: 19 U/L (ref 12–78)
ANION GAP SERPL CALCULATED.3IONS-SCNC: 2 MMOL/L (ref 4–13)
ANION GAP SERPL CALCULATED.3IONS-SCNC: 3 MMOL/L (ref 4–13)
ANION GAP SERPL CALCULATED.3IONS-SCNC: 4 MMOL/L (ref 4–13)
AST SERPL W P-5'-P-CCNC: 33 U/L (ref 5–45)
BACTERIA UR CULT: ABNORMAL
BASOPHILS # BLD AUTO: 0.06 THOUSANDS/ΜL (ref 0–0.1)
BASOPHILS NFR BLD AUTO: 0 % (ref 0–1)
BILIRUB SERPL-MCNC: 1.84 MG/DL (ref 0.2–1)
BUN SERPL-MCNC: 11 MG/DL (ref 5–25)
BUN SERPL-MCNC: 12 MG/DL (ref 5–25)
BUN SERPL-MCNC: 12 MG/DL (ref 5–25)
CA-I BLD-SCNC: 1.07 MMOL/L (ref 1.12–1.32)
CALCIUM ALBUM COR SERPL-MCNC: 9.9 MG/DL (ref 8.3–10.1)
CALCIUM SERPL-MCNC: 8 MG/DL (ref 8.3–10.1)
CALCIUM SERPL-MCNC: 8.5 MG/DL (ref 8.3–10.1)
CALCIUM SERPL-MCNC: 8.5 MG/DL (ref 8.3–10.1)
CHLORIDE SERPL-SCNC: 108 MMOL/L (ref 100–108)
CHLORIDE SERPL-SCNC: 109 MMOL/L (ref 100–108)
CHLORIDE SERPL-SCNC: 111 MMOL/L (ref 100–108)
CO2 SERPL-SCNC: 29 MMOL/L (ref 21–32)
CO2 SERPL-SCNC: 32 MMOL/L (ref 21–32)
CO2 SERPL-SCNC: 33 MMOL/L (ref 21–32)
CREAT SERPL-MCNC: 0.47 MG/DL (ref 0.6–1.3)
CREAT SERPL-MCNC: 0.52 MG/DL (ref 0.6–1.3)
CREAT SERPL-MCNC: 0.52 MG/DL (ref 0.6–1.3)
EOSINOPHIL # BLD AUTO: 0.02 THOUSAND/ΜL (ref 0–0.61)
EOSINOPHIL NFR BLD AUTO: 0 % (ref 0–6)
ERYTHROCYTE [DISTWIDTH] IN BLOOD BY AUTOMATED COUNT: 15 % (ref 11.6–15.1)
GFR SERPL CREATININE-BSD FRML MDRD: 112 ML/MIN/1.73SQ M
GFR SERPL CREATININE-BSD FRML MDRD: 112 ML/MIN/1.73SQ M
GFR SERPL CREATININE-BSD FRML MDRD: 116 ML/MIN/1.73SQ M
GLUCOSE SERPL-MCNC: 113 MG/DL (ref 65–140)
GLUCOSE SERPL-MCNC: 115 MG/DL (ref 65–140)
GLUCOSE SERPL-MCNC: 122 MG/DL (ref 65–140)
HAPTOGLOB SERPL-MCNC: 175 MG/DL (ref 42–296)
HCT VFR BLD AUTO: 28.1 % (ref 34.8–46.1)
HGB BLD-MCNC: 8.8 G/DL (ref 11.5–15.4)
IMM GRANULOCYTES # BLD AUTO: 0.18 THOUSAND/UL (ref 0–0.2)
IMM GRANULOCYTES NFR BLD AUTO: 1 % (ref 0–2)
LYMPHOCYTES # BLD AUTO: 1.41 THOUSANDS/ΜL (ref 0.6–4.47)
LYMPHOCYTES NFR BLD AUTO: 9 % (ref 14–44)
MAGNESIUM SERPL-MCNC: 2.2 MG/DL (ref 1.6–2.6)
MCH RBC QN AUTO: 27.5 PG (ref 26.8–34.3)
MCHC RBC AUTO-ENTMCNC: 31.3 G/DL (ref 31.4–37.4)
MCV RBC AUTO: 88 FL (ref 82–98)
MONOCYTES # BLD AUTO: 0.5 THOUSAND/ΜL (ref 0.17–1.22)
MONOCYTES NFR BLD AUTO: 3 % (ref 4–12)
NEUTROPHILS # BLD AUTO: 13.24 THOUSANDS/ΜL (ref 1.85–7.62)
NEUTS SEG NFR BLD AUTO: 87 % (ref 43–75)
NRBC BLD AUTO-RTO: 0 /100 WBCS
PHOSPHATE SERPL-MCNC: 1.8 MG/DL (ref 2.7–4.5)
PLATELET # BLD AUTO: 66 THOUSANDS/UL (ref 149–390)
PMV BLD AUTO: 11 FL (ref 8.9–12.7)
POTASSIUM SERPL-SCNC: 2.9 MMOL/L (ref 3.5–5.3)
POTASSIUM SERPL-SCNC: 3.5 MMOL/L (ref 3.5–5.3)
POTASSIUM SERPL-SCNC: 3.9 MMOL/L (ref 3.5–5.3)
PROT SERPL-MCNC: 5.5 G/DL (ref 6.4–8.2)
RBC # BLD AUTO: 3.2 MILLION/UL (ref 3.81–5.12)
SODIUM SERPL-SCNC: 143 MMOL/L (ref 136–145)
SODIUM SERPL-SCNC: 144 MMOL/L (ref 136–145)
SODIUM SERPL-SCNC: 144 MMOL/L (ref 136–145)
WBC # BLD AUTO: 15.41 THOUSAND/UL (ref 4.31–10.16)

## 2020-10-04 PROCEDURE — 99233 SBSQ HOSP IP/OBS HIGH 50: CPT | Performed by: ANESTHESIOLOGY

## 2020-10-04 PROCEDURE — 80053 COMPREHEN METABOLIC PANEL: CPT | Performed by: PHYSICIAN ASSISTANT

## 2020-10-04 PROCEDURE — 99233 SBSQ HOSP IP/OBS HIGH 50: CPT | Performed by: INTERNAL MEDICINE

## 2020-10-04 PROCEDURE — 71045 X-RAY EXAM CHEST 1 VIEW: CPT

## 2020-10-04 PROCEDURE — 83735 ASSAY OF MAGNESIUM: CPT | Performed by: PHYSICIAN ASSISTANT

## 2020-10-04 PROCEDURE — 99232 SBSQ HOSP IP/OBS MODERATE 35: CPT | Performed by: PSYCHIATRY & NEUROLOGY

## 2020-10-04 PROCEDURE — 84100 ASSAY OF PHOSPHORUS: CPT | Performed by: PHYSICIAN ASSISTANT

## 2020-10-04 PROCEDURE — 80048 BASIC METABOLIC PNL TOTAL CA: CPT | Performed by: PHYSICIAN ASSISTANT

## 2020-10-04 PROCEDURE — 82330 ASSAY OF CALCIUM: CPT | Performed by: PHYSICIAN ASSISTANT

## 2020-10-04 PROCEDURE — 85025 COMPLETE CBC W/AUTO DIFF WBC: CPT | Performed by: PHYSICIAN ASSISTANT

## 2020-10-04 RX ORDER — CALCIUM GLUCONATE 20 MG/ML
1 INJECTION, SOLUTION INTRAVENOUS ONCE
Status: COMPLETED | OUTPATIENT
Start: 2020-10-04 | End: 2020-10-04

## 2020-10-04 RX ORDER — FUROSEMIDE 10 MG/ML
40 INJECTION INTRAMUSCULAR; INTRAVENOUS ONCE
Status: COMPLETED | OUTPATIENT
Start: 2020-10-04 | End: 2020-10-04

## 2020-10-04 RX ORDER — ACETAMINOPHEN 650 MG/1
650 SUPPOSITORY RECTAL EVERY 4 HOURS PRN
Status: DISCONTINUED | OUTPATIENT
Start: 2020-10-04 | End: 2020-10-10 | Stop reason: HOSPADM

## 2020-10-04 RX ORDER — SODIUM PHOSPHATE, DIBASIC AND SODIUM PHOSPHATE, MONOBASIC 7; 19 G/133ML; G/133ML
1 ENEMA RECTAL ONCE
Status: COMPLETED | OUTPATIENT
Start: 2020-10-04 | End: 2020-10-04

## 2020-10-04 RX ORDER — POTASSIUM CHLORIDE 29.8 MG/ML
40 INJECTION INTRAVENOUS ONCE
Status: COMPLETED | OUTPATIENT
Start: 2020-10-04 | End: 2020-10-04

## 2020-10-04 RX ORDER — HEPARIN SODIUM 5000 [USP'U]/ML
5000 INJECTION, SOLUTION INTRAVENOUS; SUBCUTANEOUS EVERY 8 HOURS SCHEDULED
Status: DISCONTINUED | OUTPATIENT
Start: 2020-10-04 | End: 2020-10-10 | Stop reason: HOSPADM

## 2020-10-04 RX ORDER — POTASSIUM CHLORIDE 20MEQ/15ML
20 LIQUID (ML) ORAL ONCE
Status: DISCONTINUED | OUTPATIENT
Start: 2020-10-04 | End: 2020-10-04

## 2020-10-04 RX ORDER — BISACODYL 10 MG
10 SUPPOSITORY, RECTAL RECTAL DAILY PRN
Status: DISCONTINUED | OUTPATIENT
Start: 2020-10-04 | End: 2020-10-05

## 2020-10-04 RX ORDER — POTASSIUM CHLORIDE 14.9 MG/ML
20 INJECTION INTRAVENOUS ONCE
Status: COMPLETED | OUTPATIENT
Start: 2020-10-04 | End: 2020-10-04

## 2020-10-04 RX ADMIN — CEFTRIAXONE SODIUM 1000 MG: 10 INJECTION, POWDER, FOR SOLUTION INTRAVENOUS at 17:55

## 2020-10-04 RX ADMIN — SODIUM PHOSPHATE 1 ENEMA: 7; 19 ENEMA RECTAL at 20:44

## 2020-10-04 RX ADMIN — LIDOCAINE 5% 1 PATCH: 700 PATCH TOPICAL at 08:26

## 2020-10-04 RX ADMIN — GABAPENTIN 100 MG: 100 CAPSULE ORAL at 21:46

## 2020-10-04 RX ADMIN — HEPARIN SODIUM 5000 UNITS: 5000 INJECTION INTRAVENOUS; SUBCUTANEOUS at 15:39

## 2020-10-04 RX ADMIN — MELATONIN 6 MG: at 21:46

## 2020-10-04 RX ADMIN — HEPARIN SODIUM 5000 UNITS: 5000 INJECTION INTRAVENOUS; SUBCUTANEOUS at 21:46

## 2020-10-04 RX ADMIN — BISACODYL 10 MG: 10 SUPPOSITORY RECTAL at 12:00

## 2020-10-04 RX ADMIN — FUROSEMIDE 40 MG: 10 INJECTION, SOLUTION INTRAMUSCULAR; INTRAVENOUS at 06:40

## 2020-10-04 RX ADMIN — ACETAMINOPHEN 650 MG: 650 SUPPOSITORY RECTAL at 09:43

## 2020-10-04 RX ADMIN — BACLOFEN 20 MG: 10 TABLET ORAL at 21:45

## 2020-10-04 RX ADMIN — ACETAMINOPHEN 650 MG: 325 TABLET, FILM COATED ORAL at 00:33

## 2020-10-04 RX ADMIN — CEFEPIME HYDROCHLORIDE 2000 MG: 2 INJECTION, POWDER, FOR SOLUTION INTRAVENOUS at 06:27

## 2020-10-04 RX ADMIN — BACLOFEN 20 MG: 10 TABLET ORAL at 15:38

## 2020-10-04 RX ADMIN — POTASSIUM CHLORIDE 20 MEQ: 14.9 INJECTION, SOLUTION INTRAVENOUS at 08:50

## 2020-10-04 RX ADMIN — GABAPENTIN 100 MG: 100 CAPSULE ORAL at 15:39

## 2020-10-04 RX ADMIN — POTASSIUM PHOSPHATE, MONOBASIC POTASSIUM PHOSPHATE, DIBASIC 21 MMOL: 224; 236 INJECTION, SOLUTION, CONCENTRATE INTRAVENOUS at 08:47

## 2020-10-04 RX ADMIN — CALCIUM GLUCONATE 1 G: 20 INJECTION, SOLUTION INTRAVENOUS at 11:03

## 2020-10-04 RX ADMIN — POTASSIUM CHLORIDE 40 MEQ: 29.8 INJECTION, SOLUTION INTRAVENOUS at 09:54

## 2020-10-05 ENCOUNTER — APPOINTMENT (INPATIENT)
Dept: RADIOLOGY | Facility: HOSPITAL | Age: 50
DRG: 853 | End: 2020-10-05
Payer: MEDICARE

## 2020-10-05 LAB
ANION GAP SERPL CALCULATED.3IONS-SCNC: 1 MMOL/L (ref 4–13)
ANION GAP SERPL CALCULATED.3IONS-SCNC: 3 MMOL/L (ref 4–13)
ANION GAP SERPL CALCULATED.3IONS-SCNC: 4 MMOL/L (ref 4–13)
BACTERIA BLD CULT: ABNORMAL
BUN SERPL-MCNC: 13 MG/DL (ref 5–25)
BUN SERPL-MCNC: 14 MG/DL (ref 5–25)
BUN SERPL-MCNC: 14 MG/DL (ref 5–25)
CALCIUM SERPL-MCNC: 8.5 MG/DL (ref 8.3–10.1)
CALCIUM SERPL-MCNC: 8.6 MG/DL (ref 8.3–10.1)
CALCIUM SERPL-MCNC: 9 MG/DL (ref 8.3–10.1)
CHLORIDE SERPL-SCNC: 103 MMOL/L (ref 100–108)
CHLORIDE SERPL-SCNC: 105 MMOL/L (ref 100–108)
CHLORIDE SERPL-SCNC: 108 MMOL/L (ref 100–108)
CO2 SERPL-SCNC: 32 MMOL/L (ref 21–32)
CO2 SERPL-SCNC: 34 MMOL/L (ref 21–32)
CO2 SERPL-SCNC: 38 MMOL/L (ref 21–32)
CREAT SERPL-MCNC: 0.35 MG/DL (ref 0.6–1.3)
CREAT SERPL-MCNC: 0.44 MG/DL (ref 0.6–1.3)
CREAT SERPL-MCNC: 0.44 MG/DL (ref 0.6–1.3)
ERYTHROCYTE [DISTWIDTH] IN BLOOD BY AUTOMATED COUNT: 15 % (ref 11.6–15.1)
GFR SERPL CREATININE-BSD FRML MDRD: 118 ML/MIN/1.73SQ M
GFR SERPL CREATININE-BSD FRML MDRD: 118 ML/MIN/1.73SQ M
GFR SERPL CREATININE-BSD FRML MDRD: 127 ML/MIN/1.73SQ M
GLUCOSE SERPL-MCNC: 108 MG/DL (ref 65–140)
GLUCOSE SERPL-MCNC: 116 MG/DL (ref 65–140)
GLUCOSE SERPL-MCNC: 155 MG/DL (ref 65–140)
GRAM STN SPEC: ABNORMAL
GRAM STN SPEC: ABNORMAL
HCT VFR BLD AUTO: 28.5 % (ref 34.8–46.1)
HGB BLD-MCNC: 8.9 G/DL (ref 11.5–15.4)
LACTATE SERPL-SCNC: 1 MMOL/L (ref 0.5–2)
MCH RBC QN AUTO: 27.5 PG (ref 26.8–34.3)
MCHC RBC AUTO-ENTMCNC: 31.2 G/DL (ref 31.4–37.4)
MCV RBC AUTO: 88 FL (ref 82–98)
PHOSPHATE SERPL-MCNC: 2.7 MG/DL (ref 2.7–4.5)
PLATELET # BLD AUTO: 78 THOUSANDS/UL (ref 149–390)
PMV BLD AUTO: 10.7 FL (ref 8.9–12.7)
POTASSIUM SERPL-SCNC: 3.3 MMOL/L (ref 3.5–5.3)
POTASSIUM SERPL-SCNC: 3.4 MMOL/L (ref 3.5–5.3)
POTASSIUM SERPL-SCNC: 3.9 MMOL/L (ref 3.5–5.3)
RBC # BLD AUTO: 3.24 MILLION/UL (ref 3.81–5.12)
SODIUM SERPL-SCNC: 142 MMOL/L (ref 136–145)
SODIUM SERPL-SCNC: 143 MMOL/L (ref 136–145)
SODIUM SERPL-SCNC: 143 MMOL/L (ref 136–145)
WBC # BLD AUTO: 14.85 THOUSAND/UL (ref 4.31–10.16)

## 2020-10-05 PROCEDURE — 99233 SBSQ HOSP IP/OBS HIGH 50: CPT | Performed by: INTERNAL MEDICINE

## 2020-10-05 PROCEDURE — 74018 RADEX ABDOMEN 1 VIEW: CPT

## 2020-10-05 PROCEDURE — 80048 BASIC METABOLIC PNL TOTAL CA: CPT | Performed by: PHYSICIAN ASSISTANT

## 2020-10-05 PROCEDURE — 71045 X-RAY EXAM CHEST 1 VIEW: CPT

## 2020-10-05 PROCEDURE — 99291 CRITICAL CARE FIRST HOUR: CPT | Performed by: ANESTHESIOLOGY

## 2020-10-05 PROCEDURE — 85027 COMPLETE CBC AUTOMATED: CPT | Performed by: PHYSICIAN ASSISTANT

## 2020-10-05 PROCEDURE — 83605 ASSAY OF LACTIC ACID: CPT | Performed by: NURSE PRACTITIONER

## 2020-10-05 PROCEDURE — 84100 ASSAY OF PHOSPHORUS: CPT | Performed by: PHYSICIAN ASSISTANT

## 2020-10-05 PROCEDURE — 92526 ORAL FUNCTION THERAPY: CPT

## 2020-10-05 RX ORDER — FUROSEMIDE 10 MG/ML
20 INJECTION INTRAMUSCULAR; INTRAVENOUS DAILY
Status: DISCONTINUED | OUTPATIENT
Start: 2020-10-05 | End: 2020-10-05

## 2020-10-05 RX ORDER — POTASSIUM CHLORIDE 29.8 MG/ML
40 INJECTION INTRAVENOUS ONCE
Status: COMPLETED | OUTPATIENT
Start: 2020-10-05 | End: 2020-10-05

## 2020-10-05 RX ORDER — POTASSIUM CHLORIDE 20MEQ/15ML
40 LIQUID (ML) ORAL ONCE
Status: COMPLETED | OUTPATIENT
Start: 2020-10-05 | End: 2020-10-05

## 2020-10-05 RX ORDER — FUROSEMIDE 10 MG/ML
20 INJECTION INTRAMUSCULAR; INTRAVENOUS
Status: COMPLETED | OUTPATIENT
Start: 2020-10-05 | End: 2020-10-07

## 2020-10-05 RX ORDER — FUROSEMIDE 10 MG/ML
40 INJECTION INTRAMUSCULAR; INTRAVENOUS ONCE
Status: COMPLETED | OUTPATIENT
Start: 2020-10-05 | End: 2020-10-05

## 2020-10-05 RX ORDER — SODIUM PHOSPHATE, DIBASIC AND SODIUM PHOSPHATE, MONOBASIC 7; 19 G/133ML; G/133ML
1 ENEMA RECTAL ONCE
Status: COMPLETED | OUTPATIENT
Start: 2020-10-05 | End: 2020-10-05

## 2020-10-05 RX ORDER — POLYETHYLENE GLYCOL 3350 17 G/17G
17 POWDER, FOR SOLUTION ORAL DAILY
Status: DISCONTINUED | OUTPATIENT
Start: 2020-10-05 | End: 2020-10-08

## 2020-10-05 RX ORDER — BISACODYL 10 MG
10 SUPPOSITORY, RECTAL RECTAL DAILY
Status: DISCONTINUED | OUTPATIENT
Start: 2020-10-05 | End: 2020-10-10 | Stop reason: HOSPADM

## 2020-10-05 RX ORDER — POTASSIUM CHLORIDE 14.9 MG/ML
20 INJECTION INTRAVENOUS
Status: COMPLETED | OUTPATIENT
Start: 2020-10-05 | End: 2020-10-06

## 2020-10-05 RX ORDER — AMOXICILLIN 250 MG
1 CAPSULE ORAL
Status: DISCONTINUED | OUTPATIENT
Start: 2020-10-05 | End: 2020-10-10 | Stop reason: HOSPADM

## 2020-10-05 RX ORDER — BACLOFEN 10 MG/1
40 TABLET ORAL
Status: DISCONTINUED | OUTPATIENT
Start: 2020-10-05 | End: 2020-10-05

## 2020-10-05 RX ORDER — BACLOFEN 20 MG/1
40 TABLET ORAL
Status: DISCONTINUED | OUTPATIENT
Start: 2020-10-05 | End: 2020-10-10 | Stop reason: HOSPADM

## 2020-10-05 RX ORDER — POTASSIUM CHLORIDE 14.9 MG/ML
20 INJECTION INTRAVENOUS
Status: DISCONTINUED | OUTPATIENT
Start: 2020-10-05 | End: 2020-10-05

## 2020-10-05 RX ADMIN — POTASSIUM CHLORIDE 20 MEQ: 14.9 INJECTION, SOLUTION INTRAVENOUS at 21:55

## 2020-10-05 RX ADMIN — POTASSIUM CHLORIDE 40 MEQ: 29.8 INJECTION, SOLUTION INTRAVENOUS at 06:33

## 2020-10-05 RX ADMIN — MELATONIN 6 MG: at 21:28

## 2020-10-05 RX ADMIN — HEPARIN SODIUM 5000 UNITS: 5000 INJECTION INTRAVENOUS; SUBCUTANEOUS at 13:31

## 2020-10-05 RX ADMIN — CEFEPIME HYDROCHLORIDE 2000 MG: 2 INJECTION, POWDER, FOR SOLUTION INTRAVENOUS at 17:04

## 2020-10-05 RX ADMIN — POLYETHYLENE GLYCOL 3350 17 G: 17 POWDER, FOR SOLUTION ORAL at 08:14

## 2020-10-05 RX ADMIN — SODIUM PHOSPHATE 1 ENEMA: 7; 19 ENEMA RECTAL at 10:01

## 2020-10-05 RX ADMIN — DULOXETINE HYDROCHLORIDE 30 MG: 30 CAPSULE, DELAYED RELEASE ORAL at 08:14

## 2020-10-05 RX ADMIN — ACETAMINOPHEN 650 MG: 325 TABLET, FILM COATED ORAL at 21:27

## 2020-10-05 RX ADMIN — POTASSIUM CHLORIDE 40 MEQ: 20 SOLUTION ORAL at 21:28

## 2020-10-05 RX ADMIN — DOCUSATE SODIUM AND SENNOSIDES 1 TABLET: 8.6; 5 TABLET ORAL at 21:27

## 2020-10-05 RX ADMIN — HEPARIN SODIUM 5000 UNITS: 5000 INJECTION INTRAVENOUS; SUBCUTANEOUS at 05:48

## 2020-10-05 RX ADMIN — HEPARIN SODIUM 5000 UNITS: 5000 INJECTION INTRAVENOUS; SUBCUTANEOUS at 21:28

## 2020-10-05 RX ADMIN — ACETAMINOPHEN 650 MG: 325 TABLET, FILM COATED ORAL at 13:31

## 2020-10-05 RX ADMIN — BISACODYL 10 MG: 10 SUPPOSITORY RECTAL at 08:14

## 2020-10-05 RX ADMIN — POTASSIUM PHOSPHATE, MONOBASIC POTASSIUM PHOSPHATE, DIBASIC 9 MMOL: 224; 236 INJECTION, SOLUTION, CONCENTRATE INTRAVENOUS at 08:39

## 2020-10-05 RX ADMIN — FUROSEMIDE 20 MG: 10 INJECTION, SOLUTION INTRAMUSCULAR; INTRAVENOUS at 17:04

## 2020-10-05 RX ADMIN — POTASSIUM CHLORIDE 20 MEQ: 14.9 INJECTION, SOLUTION INTRAVENOUS at 21:29

## 2020-10-05 RX ADMIN — FUROSEMIDE 40 MG: 10 INJECTION, SOLUTION INTRAMUSCULAR; INTRAVENOUS at 08:14

## 2020-10-06 PROBLEM — R65.20 SEVERE SEPSIS (HCC): Status: ACTIVE | Noted: 2020-10-01

## 2020-10-06 LAB
ANION GAP SERPL CALCULATED.3IONS-SCNC: 4 MMOL/L (ref 4–13)
BASOPHILS # BLD MANUAL: 0 THOUSAND/UL (ref 0–0.1)
BASOPHILS NFR MAR MANUAL: 0 % (ref 0–1)
BUN SERPL-MCNC: 16 MG/DL (ref 5–25)
CALCIUM SERPL-MCNC: 9 MG/DL (ref 8.3–10.1)
CHLORIDE SERPL-SCNC: 105 MMOL/L (ref 100–108)
CO2 SERPL-SCNC: 34 MMOL/L (ref 21–32)
CREAT SERPL-MCNC: 0.34 MG/DL (ref 0.6–1.3)
EOSINOPHIL # BLD MANUAL: 0.69 THOUSAND/UL (ref 0–0.4)
EOSINOPHIL NFR BLD MANUAL: 5 % (ref 0–6)
ERYTHROCYTE [DISTWIDTH] IN BLOOD BY AUTOMATED COUNT: 15.2 % (ref 11.6–15.1)
GFR SERPL CREATININE-BSD FRML MDRD: 129 ML/MIN/1.73SQ M
GLUCOSE SERPL-MCNC: 95 MG/DL (ref 65–140)
HCT VFR BLD AUTO: 31.5 % (ref 34.8–46.1)
HGB BLD-MCNC: 9.6 G/DL (ref 11.5–15.4)
LYMPHOCYTES # BLD AUTO: 18 % (ref 14–44)
LYMPHOCYTES # BLD AUTO: 2.5 THOUSAND/UL (ref 0.6–4.47)
MAGNESIUM SERPL-MCNC: 1.9 MG/DL (ref 1.6–2.6)
MCH RBC QN AUTO: 27.3 PG (ref 26.8–34.3)
MCHC RBC AUTO-ENTMCNC: 30.5 G/DL (ref 31.4–37.4)
MCV RBC AUTO: 90 FL (ref 82–98)
METAMYELOCYTES NFR BLD MANUAL: 1 % (ref 0–1)
MONOCYTES # BLD AUTO: 0.97 THOUSAND/UL (ref 0–1.22)
MONOCYTES NFR BLD: 7 % (ref 4–12)
NEUTROPHILS # BLD MANUAL: 9.57 THOUSAND/UL (ref 1.85–7.62)
NEUTS BAND NFR BLD MANUAL: 1 % (ref 0–8)
NEUTS SEG NFR BLD AUTO: 68 % (ref 43–75)
NRBC BLD AUTO-RTO: 0 /100 WBCS
PHOSPHATE SERPL-MCNC: 2 MG/DL (ref 2.7–4.5)
PLATELET # BLD AUTO: 118 THOUSANDS/UL (ref 149–390)
PLATELET BLD QL SMEAR: ABNORMAL
PMV BLD AUTO: 10.5 FL (ref 8.9–12.7)
POTASSIUM SERPL-SCNC: 4 MMOL/L (ref 3.5–5.3)
RBC # BLD AUTO: 3.52 MILLION/UL (ref 3.81–5.12)
SODIUM SERPL-SCNC: 143 MMOL/L (ref 136–145)
TOTAL CELLS COUNTED SPEC: 100
WBC # BLD AUTO: 13.87 THOUSAND/UL (ref 4.31–10.16)

## 2020-10-06 PROCEDURE — 85027 COMPLETE CBC AUTOMATED: CPT | Performed by: PHYSICIAN ASSISTANT

## 2020-10-06 PROCEDURE — 85007 BL SMEAR W/DIFF WBC COUNT: CPT | Performed by: PHYSICIAN ASSISTANT

## 2020-10-06 PROCEDURE — 99233 SBSQ HOSP IP/OBS HIGH 50: CPT | Performed by: INTERNAL MEDICINE

## 2020-10-06 PROCEDURE — 80048 BASIC METABOLIC PNL TOTAL CA: CPT | Performed by: PHYSICIAN ASSISTANT

## 2020-10-06 PROCEDURE — 83735 ASSAY OF MAGNESIUM: CPT | Performed by: PHYSICIAN ASSISTANT

## 2020-10-06 PROCEDURE — 84100 ASSAY OF PHOSPHORUS: CPT | Performed by: PHYSICIAN ASSISTANT

## 2020-10-06 RX ORDER — BISACODYL 10 MG
10 SUPPOSITORY, RECTAL RECTAL DAILY PRN
Status: DISCONTINUED | OUTPATIENT
Start: 2020-10-06 | End: 2020-10-10 | Stop reason: HOSPADM

## 2020-10-06 RX ADMIN — CEFEPIME HYDROCHLORIDE 2000 MG: 2 INJECTION, POWDER, FOR SOLUTION INTRAVENOUS at 09:48

## 2020-10-06 RX ADMIN — POLYETHYLENE GLYCOL 3350 17 G: 17 POWDER, FOR SOLUTION ORAL at 09:47

## 2020-10-06 RX ADMIN — ACETAMINOPHEN 650 MG: 650 SUPPOSITORY RECTAL at 19:18

## 2020-10-06 RX ADMIN — LIDOCAINE 5% 1 PATCH: 700 PATCH TOPICAL at 09:46

## 2020-10-06 RX ADMIN — CEFEPIME HYDROCHLORIDE 2000 MG: 2 INJECTION, POWDER, FOR SOLUTION INTRAVENOUS at 17:49

## 2020-10-06 RX ADMIN — BACLOFEN 40 MG: 20 TABLET ORAL at 17:52

## 2020-10-06 RX ADMIN — MELATONIN 6 MG: at 22:17

## 2020-10-06 RX ADMIN — ACETAMINOPHEN 650 MG: 325 TABLET, FILM COATED ORAL at 06:12

## 2020-10-06 RX ADMIN — HEPARIN SODIUM 5000 UNITS: 5000 INJECTION INTRAVENOUS; SUBCUTANEOUS at 06:12

## 2020-10-06 RX ADMIN — DIBASIC SODIUM PHOSPHATE, MONOBASIC POTASSIUM PHOSPHATE AND MONOBASIC SODIUM PHOSPHATE 1 TABLET: 852; 155; 130 TABLET ORAL at 17:49

## 2020-10-06 RX ADMIN — FUROSEMIDE 20 MG: 10 INJECTION, SOLUTION INTRAMUSCULAR; INTRAVENOUS at 17:49

## 2020-10-06 RX ADMIN — BISACODYL 10 MG: 10 SUPPOSITORY RECTAL at 19:17

## 2020-10-06 RX ADMIN — HEPARIN SODIUM 5000 UNITS: 5000 INJECTION INTRAVENOUS; SUBCUTANEOUS at 22:17

## 2020-10-06 RX ADMIN — HEPARIN SODIUM 5000 UNITS: 5000 INJECTION INTRAVENOUS; SUBCUTANEOUS at 15:00

## 2020-10-06 RX ADMIN — FUROSEMIDE 20 MG: 10 INJECTION, SOLUTION INTRAMUSCULAR; INTRAVENOUS at 09:47

## 2020-10-06 RX ADMIN — DULOXETINE HYDROCHLORIDE 30 MG: 30 CAPSULE, DELAYED RELEASE ORAL at 09:47

## 2020-10-06 RX ADMIN — DIBASIC SODIUM PHOSPHATE, MONOBASIC POTASSIUM PHOSPHATE AND MONOBASIC SODIUM PHOSPHATE 1 TABLET: 852; 155; 130 TABLET ORAL at 09:47

## 2020-10-06 RX ADMIN — BISACODYL 10 MG: 10 SUPPOSITORY RECTAL at 09:47

## 2020-10-06 RX ADMIN — BACLOFEN 40 MG: 20 TABLET ORAL at 22:17

## 2020-10-06 RX ADMIN — DOCUSATE SODIUM AND SENNOSIDES 1 TABLET: 8.6; 5 TABLET ORAL at 22:17

## 2020-10-07 ENCOUNTER — APPOINTMENT (INPATIENT)
Dept: RADIOLOGY | Facility: HOSPITAL | Age: 50
DRG: 853 | End: 2020-10-07
Payer: MEDICARE

## 2020-10-07 PROBLEM — K63.9 MURAL THICKENING OF SIGMOID COLON: Status: ACTIVE | Noted: 2020-10-07

## 2020-10-07 LAB
ANION GAP SERPL CALCULATED.3IONS-SCNC: -1 MMOL/L (ref 4–13)
BACTERIA BLD CULT: ABNORMAL
BUN SERPL-MCNC: 16 MG/DL (ref 5–25)
CALCIUM SERPL-MCNC: 8.6 MG/DL (ref 8.3–10.1)
CHLORIDE SERPL-SCNC: 103 MMOL/L (ref 100–108)
CO2 SERPL-SCNC: 36 MMOL/L (ref 21–32)
CREAT SERPL-MCNC: 0.33 MG/DL (ref 0.6–1.3)
ERYTHROCYTE [DISTWIDTH] IN BLOOD BY AUTOMATED COUNT: 15.2 % (ref 11.6–15.1)
GFR SERPL CREATININE-BSD FRML MDRD: 130 ML/MIN/1.73SQ M
GLUCOSE SERPL-MCNC: 98 MG/DL (ref 65–140)
GRAM STN SPEC: ABNORMAL
GRAM STN SPEC: ABNORMAL
HCT VFR BLD AUTO: 29.7 % (ref 34.8–46.1)
HGB BLD-MCNC: 9.1 G/DL (ref 11.5–15.4)
MCH RBC QN AUTO: 27.7 PG (ref 26.8–34.3)
MCHC RBC AUTO-ENTMCNC: 30.6 G/DL (ref 31.4–37.4)
MCV RBC AUTO: 91 FL (ref 82–98)
PLATELET # BLD AUTO: 158 THOUSANDS/UL (ref 149–390)
PMV BLD AUTO: 9.5 FL (ref 8.9–12.7)
POTASSIUM SERPL-SCNC: 3.4 MMOL/L (ref 3.5–5.3)
RBC # BLD AUTO: 3.28 MILLION/UL (ref 3.81–5.12)
SODIUM SERPL-SCNC: 138 MMOL/L (ref 136–145)
WBC # BLD AUTO: 13.85 THOUSAND/UL (ref 4.31–10.16)

## 2020-10-07 PROCEDURE — 85027 COMPLETE CBC AUTOMATED: CPT | Performed by: INTERNAL MEDICINE

## 2020-10-07 PROCEDURE — 99233 SBSQ HOSP IP/OBS HIGH 50: CPT | Performed by: INTERNAL MEDICINE

## 2020-10-07 PROCEDURE — 92526 ORAL FUNCTION THERAPY: CPT

## 2020-10-07 PROCEDURE — 80048 BASIC METABOLIC PNL TOTAL CA: CPT | Performed by: INTERNAL MEDICINE

## 2020-10-07 PROCEDURE — 74177 CT ABD & PELVIS W/CONTRAST: CPT

## 2020-10-07 PROCEDURE — G1004 CDSM NDSC: HCPCS

## 2020-10-07 RX ORDER — SODIUM CHLORIDE 9 MG/ML
75 INJECTION, SOLUTION INTRAVENOUS CONTINUOUS
Status: DISPENSED | OUTPATIENT
Start: 2020-10-07 | End: 2020-10-08

## 2020-10-07 RX ADMIN — BISACODYL 10 MG: 10 SUPPOSITORY RECTAL at 08:45

## 2020-10-07 RX ADMIN — HEPARIN SODIUM 5000 UNITS: 5000 INJECTION INTRAVENOUS; SUBCUTANEOUS at 05:13

## 2020-10-07 RX ADMIN — CEFEPIME HYDROCHLORIDE 2000 MG: 2 INJECTION, POWDER, FOR SOLUTION INTRAVENOUS at 16:25

## 2020-10-07 RX ADMIN — LIDOCAINE 5% 1 PATCH: 700 PATCH TOPICAL at 08:46

## 2020-10-07 RX ADMIN — MELATONIN 6 MG: at 21:46

## 2020-10-07 RX ADMIN — POLYETHYLENE GLYCOL 3350 17 G: 17 POWDER, FOR SOLUTION ORAL at 08:45

## 2020-10-07 RX ADMIN — FUROSEMIDE 20 MG: 10 INJECTION, SOLUTION INTRAMUSCULAR; INTRAVENOUS at 08:43

## 2020-10-07 RX ADMIN — BACLOFEN 40 MG: 20 TABLET ORAL at 21:47

## 2020-10-07 RX ADMIN — SODIUM CHLORIDE 75 ML/HR: 0.9 INJECTION, SOLUTION INTRAVENOUS at 21:52

## 2020-10-07 RX ADMIN — FUROSEMIDE 20 MG: 10 INJECTION, SOLUTION INTRAMUSCULAR; INTRAVENOUS at 16:25

## 2020-10-07 RX ADMIN — HEPARIN SODIUM 5000 UNITS: 5000 INJECTION INTRAVENOUS; SUBCUTANEOUS at 16:11

## 2020-10-07 RX ADMIN — DOCUSATE SODIUM AND SENNOSIDES 1 TABLET: 8.6; 5 TABLET ORAL at 21:46

## 2020-10-07 RX ADMIN — CEFEPIME HYDROCHLORIDE 2000 MG: 2 INJECTION, POWDER, FOR SOLUTION INTRAVENOUS at 01:38

## 2020-10-07 RX ADMIN — SODIUM CHLORIDE 75 ML/HR: 0.9 INJECTION, SOLUTION INTRAVENOUS at 10:18

## 2020-10-07 RX ADMIN — IOHEXOL 100 ML: 350 INJECTION, SOLUTION INTRAVENOUS at 15:56

## 2020-10-07 RX ADMIN — HEPARIN SODIUM 5000 UNITS: 5000 INJECTION INTRAVENOUS; SUBCUTANEOUS at 21:47

## 2020-10-07 RX ADMIN — CEFEPIME HYDROCHLORIDE 2000 MG: 2 INJECTION, POWDER, FOR SOLUTION INTRAVENOUS at 08:44

## 2020-10-07 RX ADMIN — DULOXETINE HYDROCHLORIDE 30 MG: 30 CAPSULE, DELAYED RELEASE ORAL at 08:45

## 2020-10-08 PROBLEM — G93.41 ACUTE METABOLIC ENCEPHALOPATHY: Status: RESOLVED | Noted: 2020-10-01 | Resolved: 2020-10-08

## 2020-10-08 LAB
ANION GAP SERPL CALCULATED.3IONS-SCNC: 5 MMOL/L (ref 4–13)
ANISOCYTOSIS BLD QL SMEAR: PRESENT
BASOPHILS # BLD MANUAL: 0 THOUSAND/UL (ref 0–0.1)
BASOPHILS NFR MAR MANUAL: 0 % (ref 0–1)
BUN SERPL-MCNC: 11 MG/DL (ref 5–25)
CALCIUM SERPL-MCNC: 8.6 MG/DL (ref 8.3–10.1)
CHLORIDE SERPL-SCNC: 99 MMOL/L (ref 100–108)
CO2 SERPL-SCNC: 35 MMOL/L (ref 21–32)
CREAT SERPL-MCNC: 0.21 MG/DL (ref 0.6–1.3)
EOSINOPHIL # BLD MANUAL: 0.12 THOUSAND/UL (ref 0–0.4)
EOSINOPHIL NFR BLD MANUAL: 1 % (ref 0–6)
ERYTHROCYTE [DISTWIDTH] IN BLOOD BY AUTOMATED COUNT: 14.8 % (ref 11.6–15.1)
FERRITIN SERPL-MCNC: 396 NG/ML (ref 8–388)
GFR SERPL CREATININE-BSD FRML MDRD: 151 ML/MIN/1.73SQ M
GIANT PLATELETS BLD QL SMEAR: PRESENT
GLUCOSE SERPL-MCNC: 93 MG/DL (ref 65–140)
HCT VFR BLD AUTO: 29.6 % (ref 34.8–46.1)
HGB BLD-MCNC: 9.1 G/DL (ref 11.5–15.4)
IRON SATN MFR SERPL: 19 %
IRON SERPL-MCNC: 44 UG/DL (ref 50–170)
LYMPHOCYTES # BLD AUTO: 0.99 THOUSAND/UL (ref 0.6–4.47)
LYMPHOCYTES # BLD AUTO: 8 % (ref 14–44)
MCH RBC QN AUTO: 27.8 PG (ref 26.8–34.3)
MCHC RBC AUTO-ENTMCNC: 30.7 G/DL (ref 31.4–37.4)
MCV RBC AUTO: 91 FL (ref 82–98)
MONOCYTES # BLD AUTO: 0.25 THOUSAND/UL (ref 0–1.22)
MONOCYTES NFR BLD: 2 % (ref 4–12)
NEUTROPHILS # BLD MANUAL: 10.25 THOUSAND/UL (ref 1.85–7.62)
NEUTS SEG NFR BLD AUTO: 83 % (ref 43–75)
NRBC BLD AUTO-RTO: 0 /100 WBCS
PLATELET # BLD AUTO: 198 THOUSANDS/UL (ref 149–390)
PLATELET BLD QL SMEAR: ADEQUATE
PMV BLD AUTO: 9.2 FL (ref 8.9–12.7)
POTASSIUM SERPL-SCNC: 3.5 MMOL/L (ref 3.5–5.3)
RBC # BLD AUTO: 3.27 MILLION/UL (ref 3.81–5.12)
RBC MORPH BLD: PRESENT
SODIUM SERPL-SCNC: 139 MMOL/L (ref 136–145)
TIBC SERPL-MCNC: 236 UG/DL (ref 250–450)
VARIANT LYMPHS # BLD AUTO: 6 %
WBC # BLD AUTO: 12.35 THOUSAND/UL (ref 4.31–10.16)

## 2020-10-08 PROCEDURE — 83550 IRON BINDING TEST: CPT | Performed by: INTERNAL MEDICINE

## 2020-10-08 PROCEDURE — 85007 BL SMEAR W/DIFF WBC COUNT: CPT | Performed by: INTERNAL MEDICINE

## 2020-10-08 PROCEDURE — 85027 COMPLETE CBC AUTOMATED: CPT | Performed by: INTERNAL MEDICINE

## 2020-10-08 PROCEDURE — 80048 BASIC METABOLIC PNL TOTAL CA: CPT | Performed by: INTERNAL MEDICINE

## 2020-10-08 PROCEDURE — 99223 1ST HOSP IP/OBS HIGH 75: CPT | Performed by: INTERNAL MEDICINE

## 2020-10-08 PROCEDURE — 83540 ASSAY OF IRON: CPT | Performed by: INTERNAL MEDICINE

## 2020-10-08 PROCEDURE — 82728 ASSAY OF FERRITIN: CPT | Performed by: INTERNAL MEDICINE

## 2020-10-08 PROCEDURE — 99232 SBSQ HOSP IP/OBS MODERATE 35: CPT | Performed by: INTERNAL MEDICINE

## 2020-10-08 PROCEDURE — 99233 SBSQ HOSP IP/OBS HIGH 50: CPT | Performed by: INTERNAL MEDICINE

## 2020-10-08 RX ORDER — AMOXICILLIN AND CLAVULANATE POTASSIUM 875; 125 MG/1; MG/1
1 TABLET, FILM COATED ORAL EVERY 12 HOURS SCHEDULED
Status: DISCONTINUED | OUTPATIENT
Start: 2020-10-08 | End: 2020-10-10 | Stop reason: HOSPADM

## 2020-10-08 RX ORDER — SULFAMETHOXAZOLE AND TRIMETHOPRIM 800; 160 MG/1; MG/1
1 TABLET ORAL EVERY 12 HOURS SCHEDULED
Refills: 0 | Status: CANCELLED | OUTPATIENT
Start: 2020-10-08

## 2020-10-08 RX ORDER — POLYETHYLENE GLYCOL 3350 17 G/17G
17 POWDER, FOR SOLUTION ORAL 2 TIMES DAILY
Status: DISCONTINUED | OUTPATIENT
Start: 2020-10-08 | End: 2020-10-09

## 2020-10-08 RX ORDER — SULFAMETHOXAZOLE AND TRIMETHOPRIM 800; 160 MG/1; MG/1
1 TABLET ORAL EVERY 12 HOURS SCHEDULED
Status: DISCONTINUED | OUTPATIENT
Start: 2020-10-08 | End: 2020-10-10 | Stop reason: HOSPADM

## 2020-10-08 RX ORDER — MAGNESIUM CARB/ALUMINUM HYDROX 105-160MG
296 TABLET,CHEWABLE ORAL ONCE
Status: COMPLETED | OUTPATIENT
Start: 2020-10-08 | End: 2020-10-08

## 2020-10-08 RX ORDER — AMOXICILLIN AND CLAVULANATE POTASSIUM 875; 125 MG/1; MG/1
1 TABLET, FILM COATED ORAL EVERY 12 HOURS SCHEDULED
Qty: 30 TABLET | Refills: 0 | Status: CANCELLED | OUTPATIENT
Start: 2020-10-08 | End: 2020-10-15

## 2020-10-08 RX ORDER — POTASSIUM CHLORIDE 20 MEQ/1
40 TABLET, EXTENDED RELEASE ORAL ONCE
Status: COMPLETED | OUTPATIENT
Start: 2020-10-08 | End: 2020-10-08

## 2020-10-08 RX ORDER — SODIUM CHLORIDE 9 MG/ML
75 INJECTION, SOLUTION INTRAVENOUS CONTINUOUS
Status: DISCONTINUED | OUTPATIENT
Start: 2020-10-08 | End: 2020-10-09

## 2020-10-08 RX ADMIN — CEFEPIME HYDROCHLORIDE 2000 MG: 2 INJECTION, POWDER, FOR SOLUTION INTRAVENOUS at 08:47

## 2020-10-08 RX ADMIN — SODIUM CHLORIDE 75 ML/HR: 0.9 INJECTION, SOLUTION INTRAVENOUS at 13:02

## 2020-10-08 RX ADMIN — POTASSIUM CHLORIDE 40 MEQ: 1500 TABLET, EXTENDED RELEASE ORAL at 21:45

## 2020-10-08 RX ADMIN — HEPARIN SODIUM 5000 UNITS: 5000 INJECTION INTRAVENOUS; SUBCUTANEOUS at 21:45

## 2020-10-08 RX ADMIN — SULFAMETHOXAZOLE AND TRIMETHOPRIM 1 TABLET: 800; 160 TABLET ORAL at 15:58

## 2020-10-08 RX ADMIN — POLYETHYLENE GLYCOL 3350 17 G: 17 POWDER, FOR SOLUTION ORAL at 08:46

## 2020-10-08 RX ADMIN — HEPARIN SODIUM 5000 UNITS: 5000 INJECTION INTRAVENOUS; SUBCUTANEOUS at 05:08

## 2020-10-08 RX ADMIN — POLYETHYLENE GLYCOL 3350 17 G: 17 POWDER, FOR SOLUTION ORAL at 18:27

## 2020-10-08 RX ADMIN — CEFEPIME HYDROCHLORIDE 2000 MG: 2 INJECTION, POWDER, FOR SOLUTION INTRAVENOUS at 00:21

## 2020-10-08 RX ADMIN — DULOXETINE HYDROCHLORIDE 30 MG: 30 CAPSULE, DELAYED RELEASE ORAL at 08:47

## 2020-10-08 RX ADMIN — MAGNESIUM CITRATE 296 ML: 1.75 LIQUID ORAL at 18:28

## 2020-10-08 RX ADMIN — BACLOFEN 40 MG: 20 TABLET ORAL at 21:45

## 2020-10-08 RX ADMIN — LIDOCAINE 5% 1 PATCH: 700 PATCH TOPICAL at 08:46

## 2020-10-08 RX ADMIN — DOCUSATE SODIUM AND SENNOSIDES 1 TABLET: 8.6; 5 TABLET ORAL at 21:45

## 2020-10-08 RX ADMIN — BISACODYL 10 MG: 10 SUPPOSITORY RECTAL at 08:47

## 2020-10-08 RX ADMIN — MELATONIN 6 MG: at 21:45

## 2020-10-08 RX ADMIN — HEPARIN SODIUM 5000 UNITS: 5000 INJECTION INTRAVENOUS; SUBCUTANEOUS at 14:03

## 2020-10-08 RX ADMIN — AMOXICILLIN AND CLAVULANATE POTASSIUM 1 TABLET: 875; 125 TABLET, FILM COATED ORAL at 15:58

## 2020-10-09 LAB
ALBUMIN SERPL BCP-MCNC: 2.4 G/DL (ref 3.5–5)
ALP SERPL-CCNC: 155 U/L (ref 46–116)
ALT SERPL W P-5'-P-CCNC: 24 U/L (ref 12–78)
ANION GAP SERPL CALCULATED.3IONS-SCNC: 2 MMOL/L (ref 4–13)
AST SERPL W P-5'-P-CCNC: 27 U/L (ref 5–45)
BILIRUB SERPL-MCNC: 0.48 MG/DL (ref 0.2–1)
BUN SERPL-MCNC: 7 MG/DL (ref 5–25)
CALCIUM ALBUM COR SERPL-MCNC: 9.5 MG/DL (ref 8.3–10.1)
CALCIUM SERPL-MCNC: 8.2 MG/DL (ref 8.3–10.1)
CHLORIDE SERPL-SCNC: 105 MMOL/L (ref 100–108)
CO2 SERPL-SCNC: 33 MMOL/L (ref 21–32)
CREAT SERPL-MCNC: 0.28 MG/DL (ref 0.6–1.3)
ERYTHROCYTE [DISTWIDTH] IN BLOOD BY AUTOMATED COUNT: 14.6 % (ref 11.6–15.1)
GFR SERPL CREATININE-BSD FRML MDRD: 137 ML/MIN/1.73SQ M
GLUCOSE SERPL-MCNC: 89 MG/DL (ref 65–140)
HCT VFR BLD AUTO: 29.4 % (ref 34.8–46.1)
HGB BLD-MCNC: 8.9 G/DL (ref 11.5–15.4)
MCH RBC QN AUTO: 27.4 PG (ref 26.8–34.3)
MCHC RBC AUTO-ENTMCNC: 30.3 G/DL (ref 31.4–37.4)
MCV RBC AUTO: 91 FL (ref 82–98)
PLATELET # BLD AUTO: 200 THOUSANDS/UL (ref 149–390)
PMV BLD AUTO: 9.1 FL (ref 8.9–12.7)
POTASSIUM SERPL-SCNC: 4.2 MMOL/L (ref 3.5–5.3)
PROT SERPL-MCNC: 6.2 G/DL (ref 6.4–8.2)
RBC # BLD AUTO: 3.25 MILLION/UL (ref 3.81–5.12)
SODIUM SERPL-SCNC: 140 MMOL/L (ref 136–145)
WBC # BLD AUTO: 10.23 THOUSAND/UL (ref 4.31–10.16)

## 2020-10-09 PROCEDURE — 85027 COMPLETE CBC AUTOMATED: CPT | Performed by: INTERNAL MEDICINE

## 2020-10-09 PROCEDURE — 92526 ORAL FUNCTION THERAPY: CPT

## 2020-10-09 PROCEDURE — 80053 COMPREHEN METABOLIC PANEL: CPT | Performed by: INTERNAL MEDICINE

## 2020-10-09 PROCEDURE — 99233 SBSQ HOSP IP/OBS HIGH 50: CPT | Performed by: INTERNAL MEDICINE

## 2020-10-09 PROCEDURE — 99232 SBSQ HOSP IP/OBS MODERATE 35: CPT | Performed by: INTERNAL MEDICINE

## 2020-10-09 RX ORDER — FUROSEMIDE 40 MG/1
40 TABLET ORAL DAILY
Status: DISCONTINUED | OUTPATIENT
Start: 2020-10-09 | End: 2020-10-10 | Stop reason: HOSPADM

## 2020-10-09 RX ORDER — POLYETHYLENE GLYCOL 3350 17 G/17G
34 POWDER, FOR SOLUTION ORAL 2 TIMES DAILY
Status: DISCONTINUED | OUTPATIENT
Start: 2020-10-09 | End: 2020-10-10 | Stop reason: HOSPADM

## 2020-10-09 RX ADMIN — SODIUM CHLORIDE 75 ML/HR: 0.9 INJECTION, SOLUTION INTRAVENOUS at 01:47

## 2020-10-09 RX ADMIN — HEPARIN SODIUM 5000 UNITS: 5000 INJECTION INTRAVENOUS; SUBCUTANEOUS at 13:14

## 2020-10-09 RX ADMIN — SULFAMETHOXAZOLE AND TRIMETHOPRIM 1 TABLET: 800; 160 TABLET ORAL at 20:57

## 2020-10-09 RX ADMIN — HEPARIN SODIUM 5000 UNITS: 5000 INJECTION INTRAVENOUS; SUBCUTANEOUS at 04:59

## 2020-10-09 RX ADMIN — LIDOCAINE 5% 1 PATCH: 700 PATCH TOPICAL at 08:49

## 2020-10-09 RX ADMIN — DULOXETINE HYDROCHLORIDE 30 MG: 30 CAPSULE, DELAYED RELEASE ORAL at 08:50

## 2020-10-09 RX ADMIN — AMOXICILLIN AND CLAVULANATE POTASSIUM 1 TABLET: 875; 125 TABLET, FILM COATED ORAL at 20:57

## 2020-10-09 RX ADMIN — BISACODYL 10 MG: 10 SUPPOSITORY RECTAL at 08:50

## 2020-10-09 RX ADMIN — DOCUSATE SODIUM AND SENNOSIDES 1 TABLET: 8.6; 5 TABLET ORAL at 21:03

## 2020-10-09 RX ADMIN — HEPARIN SODIUM 5000 UNITS: 5000 INJECTION INTRAVENOUS; SUBCUTANEOUS at 21:03

## 2020-10-09 RX ADMIN — POLYETHYLENE GLYCOL 3350 34 G: 17 POWDER, FOR SOLUTION ORAL at 17:15

## 2020-10-09 RX ADMIN — POLYETHYLENE GLYCOL 3350 17 G: 17 POWDER, FOR SOLUTION ORAL at 08:49

## 2020-10-09 RX ADMIN — BACLOFEN 40 MG: 20 TABLET ORAL at 12:54

## 2020-10-09 RX ADMIN — FUROSEMIDE 40 MG: 40 TABLET ORAL at 12:31

## 2020-10-09 RX ADMIN — AMOXICILLIN AND CLAVULANATE POTASSIUM 1 TABLET: 875; 125 TABLET, FILM COATED ORAL at 08:50

## 2020-10-09 RX ADMIN — SULFAMETHOXAZOLE AND TRIMETHOPRIM 1 TABLET: 800; 160 TABLET ORAL at 08:50

## 2020-10-09 RX ADMIN — MELATONIN 6 MG: at 21:03

## 2020-10-10 VITALS
OXYGEN SATURATION: 95 % | TEMPERATURE: 98.7 F | DIASTOLIC BLOOD PRESSURE: 70 MMHG | HEART RATE: 101 BPM | SYSTOLIC BLOOD PRESSURE: 124 MMHG | WEIGHT: 198.63 LBS | HEIGHT: 67 IN | BODY MASS INDEX: 31.18 KG/M2 | RESPIRATION RATE: 18 BRPM

## 2020-10-10 DIAGNOSIS — E87.6 HYPOKALEMIA: ICD-10-CM

## 2020-10-10 LAB
ANION GAP SERPL CALCULATED.3IONS-SCNC: 7 MMOL/L (ref 4–13)
BUN SERPL-MCNC: 8 MG/DL (ref 5–25)
CALCIUM SERPL-MCNC: 8.8 MG/DL (ref 8.3–10.1)
CHLORIDE SERPL-SCNC: 102 MMOL/L (ref 100–108)
CO2 SERPL-SCNC: 30 MMOL/L (ref 21–32)
CREAT SERPL-MCNC: 0.31 MG/DL (ref 0.6–1.3)
ERYTHROCYTE [DISTWIDTH] IN BLOOD BY AUTOMATED COUNT: 15 % (ref 11.6–15.1)
GFR SERPL CREATININE-BSD FRML MDRD: 132 ML/MIN/1.73SQ M
GLUCOSE SERPL-MCNC: 101 MG/DL (ref 65–140)
GLUCOSE SERPL-MCNC: 103 MG/DL (ref 65–140)
GLUCOSE SERPL-MCNC: 113 MG/DL (ref 65–140)
HCT VFR BLD AUTO: 31.8 % (ref 34.8–46.1)
HGB BLD-MCNC: 9.7 G/DL (ref 11.5–15.4)
MCH RBC QN AUTO: 27.5 PG (ref 26.8–34.3)
MCHC RBC AUTO-ENTMCNC: 30.5 G/DL (ref 31.4–37.4)
MCV RBC AUTO: 90 FL (ref 82–98)
NRBC BLD AUTO-RTO: 0 /100 WBCS
PLATELET # BLD AUTO: 248 THOUSANDS/UL (ref 149–390)
PMV BLD AUTO: 9.2 FL (ref 8.9–12.7)
POTASSIUM SERPL-SCNC: 3.7 MMOL/L (ref 3.5–5.3)
RBC # BLD AUTO: 3.53 MILLION/UL (ref 3.81–5.12)
SODIUM SERPL-SCNC: 139 MMOL/L (ref 136–145)
WBC # BLD AUTO: 11.93 THOUSAND/UL (ref 4.31–10.16)

## 2020-10-10 PROCEDURE — 85027 COMPLETE CBC AUTOMATED: CPT | Performed by: INTERNAL MEDICINE

## 2020-10-10 PROCEDURE — 99239 HOSP IP/OBS DSCHRG MGMT >30: CPT | Performed by: INTERNAL MEDICINE

## 2020-10-10 PROCEDURE — 82948 REAGENT STRIP/BLOOD GLUCOSE: CPT

## 2020-10-10 PROCEDURE — 80048 BASIC METABOLIC PNL TOTAL CA: CPT | Performed by: INTERNAL MEDICINE

## 2020-10-10 RX ORDER — BISACODYL 10 MG
10 SUPPOSITORY, RECTAL RECTAL DAILY
Qty: 12 SUPPOSITORY | Refills: 0 | Status: SHIPPED | OUTPATIENT
Start: 2020-10-11 | End: 2020-10-20

## 2020-10-10 RX ORDER — LANOLIN ALCOHOL/MO/W.PET/CERES
6 CREAM (GRAM) TOPICAL
Qty: 30 EACH | Refills: 0 | Status: SHIPPED | OUTPATIENT
Start: 2020-10-10 | End: 2020-10-20

## 2020-10-10 RX ORDER — SULFAMETHOXAZOLE AND TRIMETHOPRIM 800; 160 MG/1; MG/1
1 TABLET ORAL EVERY 12 HOURS SCHEDULED
Qty: 10 TABLET | Refills: 0 | Status: SHIPPED | OUTPATIENT
Start: 2020-10-10 | End: 2020-10-15

## 2020-10-10 RX ORDER — POLYETHYLENE GLYCOL 3350 17 G/17G
34 POWDER, FOR SOLUTION ORAL 2 TIMES DAILY
Qty: 30 EACH | Refills: 0 | Status: SHIPPED | OUTPATIENT
Start: 2020-10-10 | End: 2020-10-20

## 2020-10-10 RX ORDER — AMOXICILLIN AND CLAVULANATE POTASSIUM 875; 125 MG/1; MG/1
1 TABLET, FILM COATED ORAL EVERY 12 HOURS SCHEDULED
Qty: 10 TABLET | Refills: 0 | Status: SHIPPED | OUTPATIENT
Start: 2020-10-10 | End: 2020-10-15

## 2020-10-10 RX ADMIN — AMOXICILLIN AND CLAVULANATE POTASSIUM 1 TABLET: 875; 125 TABLET, FILM COATED ORAL at 08:55

## 2020-10-10 RX ADMIN — SULFAMETHOXAZOLE AND TRIMETHOPRIM 1 TABLET: 800; 160 TABLET ORAL at 08:55

## 2020-10-10 RX ADMIN — BACLOFEN 40 MG: 20 TABLET ORAL at 10:42

## 2020-10-10 RX ADMIN — LIDOCAINE 5% 1 PATCH: 700 PATCH TOPICAL at 08:55

## 2020-10-10 RX ADMIN — HEPARIN SODIUM 5000 UNITS: 5000 INJECTION INTRAVENOUS; SUBCUTANEOUS at 05:13

## 2020-10-10 RX ADMIN — DULOXETINE HYDROCHLORIDE 30 MG: 30 CAPSULE, DELAYED RELEASE ORAL at 08:55

## 2020-10-10 RX ADMIN — FUROSEMIDE 40 MG: 40 TABLET ORAL at 08:55

## 2020-10-12 ENCOUNTER — TRANSITIONAL CARE MANAGEMENT (OUTPATIENT)
Dept: FAMILY MEDICINE CLINIC | Facility: CLINIC | Age: 50
End: 2020-10-12

## 2020-10-12 RX ORDER — POTASSIUM CHLORIDE 600 MG/1
TABLET, FILM COATED, EXTENDED RELEASE ORAL
Qty: 90 TABLET | Refills: 1 | Status: SHIPPED | OUTPATIENT
Start: 2020-10-12 | End: 2022-07-20

## 2020-10-13 ENCOUNTER — TELEPHONE (OUTPATIENT)
Dept: NEUROLOGY | Facility: CLINIC | Age: 50
End: 2020-10-13

## 2020-10-15 ENCOUNTER — OFFICE VISIT (OUTPATIENT)
Dept: FAMILY MEDICINE CLINIC | Facility: CLINIC | Age: 50
End: 2020-10-15
Payer: MEDICARE

## 2020-10-15 VITALS — DIASTOLIC BLOOD PRESSURE: 68 MMHG | SYSTOLIC BLOOD PRESSURE: 118 MMHG | TEMPERATURE: 98.1 F | HEART RATE: 100 BPM

## 2020-10-15 DIAGNOSIS — T83.510S URINARY TRACT INFECTION ASSOCIATED WITH CYSTOSTOMY CATHETER, SEQUELA: Primary | ICD-10-CM

## 2020-10-15 DIAGNOSIS — Z11.4 SCREENING FOR HIV (HUMAN IMMUNODEFICIENCY VIRUS): ICD-10-CM

## 2020-10-15 DIAGNOSIS — M48.00 SPINAL STENOSIS, UNSPECIFIED SPINAL REGION: ICD-10-CM

## 2020-10-15 DIAGNOSIS — G35 FUNCTIONAL QUADRIPLEGIA SECONDARY TO MS (HCC): Chronic | ICD-10-CM

## 2020-10-15 DIAGNOSIS — R93.3 ABNORMAL CT SCAN, SIGMOID COLON: ICD-10-CM

## 2020-10-15 DIAGNOSIS — N39.0 URINARY TRACT INFECTION ASSOCIATED WITH CYSTOSTOMY CATHETER, SEQUELA: Primary | ICD-10-CM

## 2020-10-15 DIAGNOSIS — D69.6 THROMBOCYTOPENIA (HCC): ICD-10-CM

## 2020-10-15 DIAGNOSIS — Z12.4 SCREENING FOR CERVICAL CANCER: ICD-10-CM

## 2020-10-15 DIAGNOSIS — E55.9 VITAMIN D DEFICIENCY: ICD-10-CM

## 2020-10-15 DIAGNOSIS — R73.9 HYPERGLYCEMIA: ICD-10-CM

## 2020-10-15 DIAGNOSIS — G82.20 PARAPLEGIA (HCC): ICD-10-CM

## 2020-10-15 DIAGNOSIS — L89.314 PRESSURE ULCER OF RIGHT BUTTOCK, STAGE 4 (HCC): ICD-10-CM

## 2020-10-15 DIAGNOSIS — E53.8 VITAMIN B12 DEFICIENCY: ICD-10-CM

## 2020-10-15 DIAGNOSIS — R06.89 ACUTE RESPIRATORY INSUFFICIENCY: ICD-10-CM

## 2020-10-15 DIAGNOSIS — D69.1 THROMBOCYTOPATHIA (HCC): ICD-10-CM

## 2020-10-15 DIAGNOSIS — G35 MULTIPLE SCLEROSIS (HCC): Chronic | ICD-10-CM

## 2020-10-15 DIAGNOSIS — R53.2 FUNCTIONAL QUADRIPLEGIA SECONDARY TO MS (HCC): Chronic | ICD-10-CM

## 2020-10-15 DIAGNOSIS — A41.9 SEVERE SEPSIS (HCC): ICD-10-CM

## 2020-10-15 DIAGNOSIS — N13.2 HYDRONEPHROSIS WITH URINARY OBSTRUCTION DUE TO URETERAL CALCULUS: ICD-10-CM

## 2020-10-15 DIAGNOSIS — R65.20 SEVERE SEPSIS (HCC): ICD-10-CM

## 2020-10-15 DIAGNOSIS — Z93.59 SUPRAPUBIC CATHETER (HCC): Chronic | ICD-10-CM

## 2020-10-15 DIAGNOSIS — N17.9 AKI (ACUTE KIDNEY INJURY) (HCC): ICD-10-CM

## 2020-10-15 DIAGNOSIS — Z12.31 ENCOUNTER FOR SCREENING MAMMOGRAM FOR MALIGNANT NEOPLASM OF BREAST: ICD-10-CM

## 2020-10-15 DIAGNOSIS — E78.2 MIXED HYPERLIPIDEMIA: ICD-10-CM

## 2020-10-15 PROBLEM — L89.319 PRESSURE INJURY OF SKIN OF RIGHT BUTTOCK: Status: RESOLVED | Noted: 2019-05-29 | Resolved: 2020-10-15

## 2020-10-15 PROCEDURE — 99496 TRANSJ CARE MGMT HIGH F2F 7D: CPT | Performed by: FAMILY MEDICINE

## 2020-10-15 RX ORDER — OXYCODONE HYDROCHLORIDE AND ACETAMINOPHEN 5; 325 MG/1; MG/1
1 TABLET ORAL EVERY 4 HOURS PRN
Qty: 40 TABLET | Refills: 0 | Status: SHIPPED | OUTPATIENT
Start: 2020-10-15 | End: 2021-03-10 | Stop reason: SDUPTHER

## 2020-10-19 ENCOUNTER — TRANSCRIBE ORDERS (OUTPATIENT)
Dept: LAB | Facility: CLINIC | Age: 50
End: 2020-10-19

## 2020-10-19 ENCOUNTER — LAB (OUTPATIENT)
Dept: LAB | Facility: CLINIC | Age: 50
End: 2020-10-19
Payer: MEDICARE

## 2020-10-19 ENCOUNTER — TELEPHONE (OUTPATIENT)
Dept: FAMILY MEDICINE CLINIC | Facility: CLINIC | Age: 50
End: 2020-10-19

## 2020-10-19 DIAGNOSIS — B37.3 CANDIDAL VAGINITIS: Primary | ICD-10-CM

## 2020-10-19 DIAGNOSIS — N20.0 CALCULUS OF KIDNEY: ICD-10-CM

## 2020-10-19 PROBLEM — B37.31 CANDIDAL VAGINITIS: Status: ACTIVE | Noted: 2020-10-19

## 2020-10-19 PROCEDURE — 87086 URINE CULTURE/COLONY COUNT: CPT

## 2020-10-19 PROCEDURE — 87186 SC STD MICRODIL/AGAR DIL: CPT

## 2020-10-19 PROCEDURE — 87077 CULTURE AEROBIC IDENTIFY: CPT

## 2020-10-19 RX ORDER — FLUCONAZOLE 150 MG/1
150 TABLET ORAL ONCE
Qty: 1 TABLET | Refills: 0 | Status: SHIPPED | OUTPATIENT
Start: 2020-10-19 | End: 2020-10-20

## 2020-10-20 ENCOUNTER — ANESTHESIA EVENT (OUTPATIENT)
Dept: PERIOP | Facility: HOSPITAL | Age: 50
End: 2020-10-20
Payer: MEDICARE

## 2020-10-21 ENCOUNTER — TELEPHONE (OUTPATIENT)
Dept: UROLOGY | Facility: AMBULATORY SURGERY CENTER | Age: 50
End: 2020-10-21

## 2020-10-21 ENCOUNTER — LAB (OUTPATIENT)
Dept: LAB | Facility: MEDICAL CENTER | Age: 50
End: 2020-10-21
Payer: MEDICARE

## 2020-10-21 ENCOUNTER — TELEPHONE (OUTPATIENT)
Dept: NEUROLOGY | Facility: CLINIC | Age: 50
End: 2020-10-21

## 2020-10-21 DIAGNOSIS — G35 MULTIPLE SCLEROSIS (HCC): ICD-10-CM

## 2020-10-21 DIAGNOSIS — G35 FUNCTIONAL QUADRIPLEGIA SECONDARY TO MS (HCC): ICD-10-CM

## 2020-10-21 DIAGNOSIS — G82.20 PARAPLEGIA (HCC): ICD-10-CM

## 2020-10-21 DIAGNOSIS — Z12.4 SCREENING FOR CERVICAL CANCER: ICD-10-CM

## 2020-10-21 DIAGNOSIS — A41.9 SEVERE SEPSIS (HCC): ICD-10-CM

## 2020-10-21 DIAGNOSIS — E53.8 VITAMIN B12 DEFICIENCY: ICD-10-CM

## 2020-10-21 DIAGNOSIS — N39.0 URINARY TRACT INFECTION ASSOCIATED WITH CYSTOSTOMY CATHETER, SEQUELA: ICD-10-CM

## 2020-10-21 DIAGNOSIS — N17.9 AKI (ACUTE KIDNEY INJURY) (HCC): ICD-10-CM

## 2020-10-21 DIAGNOSIS — Z93.59 SUPRAPUBIC CATHETER (HCC): ICD-10-CM

## 2020-10-21 DIAGNOSIS — R73.9 HYPERGLYCEMIA: ICD-10-CM

## 2020-10-21 DIAGNOSIS — R65.20 SEVERE SEPSIS (HCC): ICD-10-CM

## 2020-10-21 DIAGNOSIS — M48.00 SPINAL STENOSIS, UNSPECIFIED SPINAL REGION: ICD-10-CM

## 2020-10-21 DIAGNOSIS — R93.3 ABNORMAL CT SCAN, SIGMOID COLON: ICD-10-CM

## 2020-10-21 DIAGNOSIS — T83.510S URINARY TRACT INFECTION ASSOCIATED WITH CYSTOSTOMY CATHETER, SEQUELA: ICD-10-CM

## 2020-10-21 DIAGNOSIS — N39.0 ACUTE UTI: Primary | ICD-10-CM

## 2020-10-21 DIAGNOSIS — R53.2 FUNCTIONAL QUADRIPLEGIA SECONDARY TO MS (HCC): ICD-10-CM

## 2020-10-21 DIAGNOSIS — E55.9 VITAMIN D DEFICIENCY: ICD-10-CM

## 2020-10-21 DIAGNOSIS — L89.314 PRESSURE ULCER OF RIGHT BUTTOCK, STAGE 4 (HCC): ICD-10-CM

## 2020-10-21 DIAGNOSIS — Z11.4 SCREENING FOR HIV (HUMAN IMMUNODEFICIENCY VIRUS): ICD-10-CM

## 2020-10-21 DIAGNOSIS — Z12.31 ENCOUNTER FOR SCREENING MAMMOGRAM FOR MALIGNANT NEOPLASM OF BREAST: ICD-10-CM

## 2020-10-21 DIAGNOSIS — D69.6 THROMBOCYTOPENIA (HCC): ICD-10-CM

## 2020-10-21 DIAGNOSIS — E78.2 MIXED HYPERLIPIDEMIA: ICD-10-CM

## 2020-10-21 DIAGNOSIS — R06.89 ACUTE RESPIRATORY INSUFFICIENCY: ICD-10-CM

## 2020-10-21 DIAGNOSIS — N13.2 HYDRONEPHROSIS WITH URINARY OBSTRUCTION DUE TO URETERAL CALCULUS: ICD-10-CM

## 2020-10-21 DIAGNOSIS — D69.1 THROMBOCYTOPATHIA (HCC): ICD-10-CM

## 2020-10-21 LAB
25(OH)D3 SERPL-MCNC: 22.8 NG/ML (ref 30–100)
ALBUMIN SERPL BCP-MCNC: 3.7 G/DL (ref 3.5–5)
ALP SERPL-CCNC: 152 U/L (ref 46–116)
ALT SERPL W P-5'-P-CCNC: 40 U/L (ref 12–78)
ANION GAP SERPL CALCULATED.3IONS-SCNC: 6 MMOL/L (ref 4–13)
AST SERPL W P-5'-P-CCNC: 81 U/L (ref 5–45)
BACTERIA UR CULT: ABNORMAL
BILIRUB SERPL-MCNC: 1.22 MG/DL (ref 0.2–1)
BUN SERPL-MCNC: 8 MG/DL (ref 5–25)
CALCIUM SERPL-MCNC: 9.7 MG/DL (ref 8.3–10.1)
CHLORIDE SERPL-SCNC: 99 MMOL/L (ref 100–108)
CHOLEST SERPL-MCNC: 230 MG/DL (ref 50–200)
CO2 SERPL-SCNC: 29 MMOL/L (ref 21–32)
CREAT SERPL-MCNC: 0.41 MG/DL (ref 0.6–1.3)
ERYTHROCYTE [DISTWIDTH] IN BLOOD BY AUTOMATED COUNT: 17.7 % (ref 11.6–15.1)
EST. AVERAGE GLUCOSE BLD GHB EST-MCNC: 94 MG/DL
FOLATE SERPL-MCNC: 7.7 NG/ML (ref 3.1–17.5)
GFR SERPL CREATININE-BSD FRML MDRD: 121 ML/MIN/1.73SQ M
GLUCOSE P FAST SERPL-MCNC: 113 MG/DL (ref 65–99)
HBA1C MFR BLD: 4.9 %
HCT VFR BLD AUTO: 32.5 % (ref 34.8–46.1)
HDLC SERPL-MCNC: 61 MG/DL
HGB BLD-MCNC: 10 G/DL (ref 11.5–15.4)
LDLC SERPL CALC-MCNC: 140 MG/DL (ref 0–100)
MCH RBC QN AUTO: 27.4 PG (ref 26.8–34.3)
MCHC RBC AUTO-ENTMCNC: 30.8 G/DL (ref 31.4–37.4)
MCV RBC AUTO: 89 FL (ref 82–98)
PLATELET # BLD AUTO: 290 THOUSANDS/UL (ref 149–390)
PMV BLD AUTO: 9.9 FL (ref 8.9–12.7)
POTASSIUM SERPL-SCNC: 3.5 MMOL/L (ref 3.5–5.3)
PROT SERPL-MCNC: 7.9 G/DL (ref 6.4–8.2)
RBC # BLD AUTO: 3.65 MILLION/UL (ref 3.81–5.12)
SODIUM SERPL-SCNC: 134 MMOL/L (ref 136–145)
T4 FREE SERPL-MCNC: 1.6 NG/DL (ref 0.76–1.46)
TRIGL SERPL-MCNC: 146 MG/DL
TSH SERPL DL<=0.05 MIU/L-ACNC: 4.23 UIU/ML (ref 0.36–3.74)
VIT B12 SERPL-MCNC: 817 PG/ML (ref 100–900)
WBC # BLD AUTO: 6.32 THOUSAND/UL (ref 4.31–10.16)

## 2020-10-21 PROCEDURE — 82306 VITAMIN D 25 HYDROXY: CPT

## 2020-10-21 PROCEDURE — 85027 COMPLETE CBC AUTOMATED: CPT

## 2020-10-21 PROCEDURE — 36415 COLL VENOUS BLD VENIPUNCTURE: CPT

## 2020-10-21 PROCEDURE — 84439 ASSAY OF FREE THYROXINE: CPT

## 2020-10-21 PROCEDURE — 82607 VITAMIN B-12: CPT

## 2020-10-21 PROCEDURE — 87389 HIV-1 AG W/HIV-1&-2 AB AG IA: CPT

## 2020-10-21 PROCEDURE — 80053 COMPREHEN METABOLIC PANEL: CPT

## 2020-10-21 PROCEDURE — 83036 HEMOGLOBIN GLYCOSYLATED A1C: CPT

## 2020-10-21 PROCEDURE — 82746 ASSAY OF FOLIC ACID SERUM: CPT

## 2020-10-21 PROCEDURE — 84443 ASSAY THYROID STIM HORMONE: CPT

## 2020-10-21 PROCEDURE — 80061 LIPID PANEL: CPT

## 2020-10-21 RX ORDER — CIPROFLOXACIN 500 MG/1
500 TABLET, FILM COATED ORAL EVERY 12 HOURS SCHEDULED
Qty: 14 TABLET | Refills: 0 | Status: SHIPPED | OUTPATIENT
Start: 2020-10-21 | End: 2020-10-28

## 2020-10-22 ENCOUNTER — TELEPHONE (OUTPATIENT)
Dept: FAMILY MEDICINE CLINIC | Facility: CLINIC | Age: 50
End: 2020-10-22

## 2020-10-22 LAB — HIV 1+2 AB+HIV1 P24 AG SERPL QL IA: NORMAL

## 2020-10-23 ENCOUNTER — ANESTHESIA (OUTPATIENT)
Dept: PERIOP | Facility: HOSPITAL | Age: 50
End: 2020-10-23
Payer: MEDICARE

## 2020-10-28 ENCOUNTER — TELEMEDICINE (OUTPATIENT)
Dept: FAMILY MEDICINE CLINIC | Facility: CLINIC | Age: 50
End: 2020-10-28
Payer: MEDICARE

## 2020-10-28 DIAGNOSIS — E53.8 VITAMIN B12 DEFICIENCY: ICD-10-CM

## 2020-10-28 DIAGNOSIS — Z00.00 MEDICARE ANNUAL WELLNESS VISIT, SUBSEQUENT: ICD-10-CM

## 2020-10-28 DIAGNOSIS — E87.1 HYPONATREMIA: Primary | ICD-10-CM

## 2020-10-28 DIAGNOSIS — R74.8 ALKALINE PHOSPHATASE ELEVATION: ICD-10-CM

## 2020-10-28 DIAGNOSIS — R73.9 HYPERGLYCEMIA: ICD-10-CM

## 2020-10-28 DIAGNOSIS — D50.9 MICROCYTIC ANEMIA: ICD-10-CM

## 2020-10-28 DIAGNOSIS — G82.20 PARAPLEGIA (HCC): ICD-10-CM

## 2020-10-28 DIAGNOSIS — R94.6 ABNORMAL THYROID FUNCTION TEST: ICD-10-CM

## 2020-10-28 DIAGNOSIS — G35 FUNCTIONAL QUADRIPLEGIA SECONDARY TO MS (HCC): Chronic | ICD-10-CM

## 2020-10-28 DIAGNOSIS — R53.2 FUNCTIONAL QUADRIPLEGIA SECONDARY TO MS (HCC): Chronic | ICD-10-CM

## 2020-10-28 DIAGNOSIS — G35 MULTIPLE SCLEROSIS (HCC): Chronic | ICD-10-CM

## 2020-10-28 DIAGNOSIS — E87.6 HYPOKALEMIA: ICD-10-CM

## 2020-10-28 DIAGNOSIS — K63.9 MURAL THICKENING OF SIGMOID COLON: ICD-10-CM

## 2020-10-28 DIAGNOSIS — E55.9 VITAMIN D DEFICIENCY: ICD-10-CM

## 2020-10-28 DIAGNOSIS — E78.2 MIXED HYPERLIPIDEMIA: ICD-10-CM

## 2020-10-28 DIAGNOSIS — R17 SERUM TOTAL BILIRUBIN ELEVATED: ICD-10-CM

## 2020-10-28 DIAGNOSIS — R74.01 TRANSAMINITIS: ICD-10-CM

## 2020-10-28 DIAGNOSIS — R93.3 ABNORMAL CT SCAN, SIGMOID COLON: ICD-10-CM

## 2020-10-28 PROBLEM — R65.20 SEVERE SEPSIS (HCC): Status: RESOLVED | Noted: 2020-10-01 | Resolved: 2020-10-28

## 2020-10-28 PROBLEM — R78.81 GRAM-NEGATIVE BACTEREMIA: Status: RESOLVED | Noted: 2020-10-01 | Resolved: 2020-10-28

## 2020-10-28 PROBLEM — A41.9 SEVERE SEPSIS (HCC): Status: RESOLVED | Noted: 2020-10-01 | Resolved: 2020-10-28

## 2020-10-28 PROBLEM — D69.1 THROMBOCYTOPATHIA (HCC): Status: RESOLVED | Noted: 2020-10-04 | Resolved: 2020-10-28

## 2020-10-28 PROBLEM — R06.89 ACUTE RESPIRATORY INSUFFICIENCY: Status: RESOLVED | Noted: 2020-10-01 | Resolved: 2020-10-28

## 2020-10-28 PROBLEM — N17.9 AKI (ACUTE KIDNEY INJURY) (HCC): Status: RESOLVED | Noted: 2020-10-01 | Resolved: 2020-10-28

## 2020-10-28 PROCEDURE — G0439 PPPS, SUBSEQ VISIT: HCPCS | Performed by: FAMILY MEDICINE

## 2020-10-28 PROCEDURE — 99214 OFFICE O/P EST MOD 30 MIN: CPT | Performed by: FAMILY MEDICINE

## 2020-10-28 RX ORDER — MULTIVIT-MIN/IRON/FOLIC ACID/K 18-600-40
CAPSULE ORAL DAILY
COMMUNITY

## 2020-10-30 ENCOUNTER — APPOINTMENT (OUTPATIENT)
Dept: LAB | Facility: CLINIC | Age: 50
End: 2020-10-30
Payer: MEDICARE

## 2020-10-30 ENCOUNTER — TRANSCRIBE ORDERS (OUTPATIENT)
Dept: ADMINISTRATIVE | Facility: HOSPITAL | Age: 50
End: 2020-10-30

## 2020-10-30 DIAGNOSIS — N10 ACUTE PYELONEPHRITIS WITH LESION OF RENAL MEDULLARY NECROSIS (HCC): ICD-10-CM

## 2020-10-30 DIAGNOSIS — G35 MULTIPLE SCLEROSIS (HCC): ICD-10-CM

## 2020-10-30 DIAGNOSIS — N17.2 ACUTE PYELONEPHRITIS WITH LESION OF RENAL MEDULLARY NECROSIS (HCC): ICD-10-CM

## 2020-10-30 DIAGNOSIS — N17.2 ACUTE PYELONEPHRITIS WITH LESION OF RENAL MEDULLARY NECROSIS (HCC): Primary | ICD-10-CM

## 2020-10-30 DIAGNOSIS — N10 ACUTE PYELONEPHRITIS WITH LESION OF RENAL MEDULLARY NECROSIS (HCC): Primary | ICD-10-CM

## 2020-10-30 PROCEDURE — 87086 URINE CULTURE/COLONY COUNT: CPT

## 2020-10-31 LAB — BACTERIA UR CULT: NORMAL

## 2020-11-02 ENCOUNTER — OFFICE VISIT (OUTPATIENT)
Dept: WOUND CARE | Facility: HOSPITAL | Age: 50
End: 2020-11-02
Payer: MEDICARE

## 2020-11-02 VITALS
RESPIRATION RATE: 18 BRPM | HEART RATE: 80 BPM | DIASTOLIC BLOOD PRESSURE: 68 MMHG | SYSTOLIC BLOOD PRESSURE: 120 MMHG | TEMPERATURE: 98.4 F

## 2020-11-02 DIAGNOSIS — L89.314 PRESSURE ULCER OF RIGHT BUTTOCK, STAGE 4 (HCC): Primary | ICD-10-CM

## 2020-11-02 PROCEDURE — 11042 DBRDMT SUBQ TIS 1ST 20SQCM/<: CPT | Performed by: FAMILY MEDICINE

## 2020-11-02 RX ORDER — SODIUM HYPOCHLORITE 2.5 MG/ML
1 SOLUTION TOPICAL EVERY OTHER DAY
Qty: 473 ML | Refills: 0 | Status: SHIPPED | OUTPATIENT
Start: 2020-11-02 | End: 2021-05-07 | Stop reason: SDUPTHER

## 2020-11-03 ENCOUNTER — APPOINTMENT (OUTPATIENT)
Dept: RADIOLOGY | Facility: HOSPITAL | Age: 50
End: 2020-11-03
Payer: MEDICARE

## 2020-11-03 ENCOUNTER — TELEPHONE (OUTPATIENT)
Dept: UROLOGY | Facility: AMBULATORY SURGERY CENTER | Age: 50
End: 2020-11-03

## 2020-11-03 ENCOUNTER — HOSPITAL ENCOUNTER (OUTPATIENT)
Facility: HOSPITAL | Age: 50
Setting detail: OUTPATIENT SURGERY
Discharge: HOME | End: 2020-11-03
Attending: UROLOGY | Admitting: UROLOGY
Payer: MEDICARE

## 2020-11-03 VITALS
HEART RATE: 80 BPM | RESPIRATION RATE: 16 BRPM | DIASTOLIC BLOOD PRESSURE: 53 MMHG | HEIGHT: 67 IN | OXYGEN SATURATION: 99 % | SYSTOLIC BLOOD PRESSURE: 102 MMHG | WEIGHT: 198 LBS | TEMPERATURE: 97.4 F | BODY MASS INDEX: 31.08 KG/M2

## 2020-11-03 VITALS — HEART RATE: 84 BPM

## 2020-11-03 DIAGNOSIS — N20.1 LEFT URETERAL STONE: ICD-10-CM

## 2020-11-03 DIAGNOSIS — N20.0 NEPHROLITHIASIS: Primary | ICD-10-CM

## 2020-11-03 DIAGNOSIS — N20.1 URETERAL CALCULUS, LEFT: Primary | ICD-10-CM

## 2020-11-03 LAB
EXT PREGNANCY TEST URINE: NEGATIVE
EXT. CONTROL: NORMAL

## 2020-11-03 PROCEDURE — 81025 URINE PREGNANCY TEST: CPT | Performed by: STUDENT IN AN ORGANIZED HEALTH CARE EDUCATION/TRAINING PROGRAM

## 2020-11-03 PROCEDURE — 74420 UROGRAPHY RTRGR +-KUB: CPT

## 2020-11-03 PROCEDURE — NC001 PR NO CHARGE: Performed by: UROLOGY

## 2020-11-03 PROCEDURE — 82360 CALCULUS ASSAY QUANT: CPT | Performed by: UROLOGY

## 2020-11-03 PROCEDURE — 52352 CYSTOURETERO W/STONE REMOVE: CPT | Performed by: UROLOGY

## 2020-11-03 PROCEDURE — C1769 GUIDE WIRE: HCPCS | Performed by: UROLOGY

## 2020-11-03 PROCEDURE — 52332 CYSTOSCOPY AND TREATMENT: CPT | Performed by: UROLOGY

## 2020-11-03 PROCEDURE — C2617 STENT, NON-COR, TEM W/O DEL: HCPCS | Performed by: UROLOGY

## 2020-11-03 PROCEDURE — 51705 CHANGE OF BLADDER TUBE: CPT | Performed by: UROLOGY

## 2020-11-03 DEVICE — INLAY OPTIMA URETERAL STENT W/O GUIDEWIRE
Type: IMPLANTABLE DEVICE | Site: URETER | Status: FUNCTIONAL
Brand: BARD® INLAY OPTIMA® URETERAL STENT

## 2020-11-03 RX ORDER — SODIUM CHLORIDE, SODIUM LACTATE, POTASSIUM CHLORIDE, CALCIUM CHLORIDE 600; 310; 30; 20 MG/100ML; MG/100ML; MG/100ML; MG/100ML
125 INJECTION, SOLUTION INTRAVENOUS CONTINUOUS
Status: DISCONTINUED | OUTPATIENT
Start: 2020-11-03 | End: 2020-11-03 | Stop reason: HOSPADM

## 2020-11-03 RX ORDER — DEXAMETHASONE SODIUM PHOSPHATE 10 MG/ML
INJECTION, SOLUTION INTRAMUSCULAR; INTRAVENOUS AS NEEDED
Status: DISCONTINUED | OUTPATIENT
Start: 2020-11-03 | End: 2020-11-03

## 2020-11-03 RX ORDER — ONDANSETRON 2 MG/ML
4 INJECTION INTRAMUSCULAR; INTRAVENOUS ONCE AS NEEDED
Status: DISCONTINUED | OUTPATIENT
Start: 2020-11-03 | End: 2020-11-03 | Stop reason: HOSPADM

## 2020-11-03 RX ORDER — CIPROFLOXACIN 500 MG/1
500 TABLET, FILM COATED ORAL 2 TIMES DAILY
Qty: 10 TABLET | Refills: 0 | Status: SHIPPED | OUTPATIENT
Start: 2020-11-03 | End: 2020-11-08

## 2020-11-03 RX ORDER — HYDROCODONE BITARTRATE AND ACETAMINOPHEN 5; 325 MG/1; MG/1
1 TABLET ORAL EVERY 6 HOURS PRN
Qty: 6 TABLET | Refills: 0 | Status: SHIPPED | OUTPATIENT
Start: 2020-11-03 | End: 2020-11-13

## 2020-11-03 RX ORDER — FENTANYL CITRATE/PF 50 MCG/ML
50 SYRINGE (ML) INJECTION
Status: DISCONTINUED | OUTPATIENT
Start: 2020-11-03 | End: 2020-11-03 | Stop reason: HOSPADM

## 2020-11-03 RX ORDER — LIDOCAINE HYDROCHLORIDE 10 MG/ML
INJECTION, SOLUTION EPIDURAL; INFILTRATION; INTRACAUDAL; PERINEURAL AS NEEDED
Status: DISCONTINUED | OUTPATIENT
Start: 2020-11-03 | End: 2020-11-03

## 2020-11-03 RX ORDER — MAGNESIUM HYDROXIDE 1200 MG/15ML
LIQUID ORAL AS NEEDED
Status: DISCONTINUED | OUTPATIENT
Start: 2020-11-03 | End: 2020-11-03 | Stop reason: HOSPADM

## 2020-11-03 RX ORDER — HYDROCODONE BITARTRATE AND ACETAMINOPHEN 5; 325 MG/1; MG/1
2 TABLET ORAL EVERY 4 HOURS PRN
Status: DISCONTINUED | OUTPATIENT
Start: 2020-11-03 | End: 2020-11-03 | Stop reason: HOSPADM

## 2020-11-03 RX ORDER — PROPOFOL 10 MG/ML
INJECTION, EMULSION INTRAVENOUS AS NEEDED
Status: DISCONTINUED | OUTPATIENT
Start: 2020-11-03 | End: 2020-11-03

## 2020-11-03 RX ORDER — CIPROFLOXACIN 2 MG/ML
400 INJECTION, SOLUTION INTRAVENOUS ONCE
Status: COMPLETED | OUTPATIENT
Start: 2020-11-03 | End: 2020-11-03

## 2020-11-03 RX ORDER — HYDROMORPHONE HCL/PF 1 MG/ML
0.5 SYRINGE (ML) INJECTION
Status: DISCONTINUED | OUTPATIENT
Start: 2020-11-03 | End: 2020-11-03 | Stop reason: HOSPADM

## 2020-11-03 RX ORDER — ONDANSETRON 2 MG/ML
INJECTION INTRAMUSCULAR; INTRAVENOUS AS NEEDED
Status: DISCONTINUED | OUTPATIENT
Start: 2020-11-03 | End: 2020-11-03

## 2020-11-03 RX ADMIN — PHENYLEPHRINE HYDROCHLORIDE 100 MCG: 10 INJECTION INTRAVENOUS at 08:51

## 2020-11-03 RX ADMIN — SODIUM CHLORIDE, SODIUM LACTATE, POTASSIUM CHLORIDE, AND CALCIUM CHLORIDE: .6; .31; .03; .02 INJECTION, SOLUTION INTRAVENOUS at 07:20

## 2020-11-03 RX ADMIN — PROPOFOL 80 MG: 10 INJECTION, EMULSION INTRAVENOUS at 08:31

## 2020-11-03 RX ADMIN — DEXAMETHASONE SODIUM PHOSPHATE 10 MG: 10 INJECTION, SOLUTION INTRAMUSCULAR; INTRAVENOUS at 08:34

## 2020-11-03 RX ADMIN — ONDANSETRON 4 MG: 2 INJECTION INTRAMUSCULAR; INTRAVENOUS at 08:34

## 2020-11-03 RX ADMIN — CIPROFLOXACIN: 2 INJECTION, SOLUTION INTRAVENOUS at 08:11

## 2020-11-03 RX ADMIN — LIDOCAINE HYDROCHLORIDE 50 MG: 10 INJECTION, SOLUTION EPIDURAL; INFILTRATION; INTRACAUDAL; PERINEURAL at 08:29

## 2020-11-03 RX ADMIN — PHENYLEPHRINE HYDROCHLORIDE 100 MCG: 10 INJECTION INTRAVENOUS at 08:42

## 2020-11-03 RX ADMIN — PROPOFOL 150 MG: 10 INJECTION, EMULSION INTRAVENOUS at 08:29

## 2020-11-12 ENCOUNTER — TELEPHONE (OUTPATIENT)
Dept: WOUND CARE | Facility: HOSPITAL | Age: 50
End: 2020-11-12

## 2020-11-12 LAB
AMM URATE MFR STONE: 30 %
CA CARBONATE CRY STONE QL IR: 20 %
COLOR STONE: NORMAL
COMMENT-STONE3: NORMAL
COMPOSITION: NORMAL
LABORATORY COMMENT REPORT: NORMAL
PHOTO: NORMAL
SIZE STONE: NORMAL MM
SPEC SOURCE SUBJ: NORMAL
STONE ANALYSIS-IMP: NORMAL
TRI-PHOS MFR STONE: 50 %
WT STONE: 67 MG

## 2020-11-16 ENCOUNTER — TELEPHONE (OUTPATIENT)
Dept: FAMILY MEDICINE CLINIC | Facility: CLINIC | Age: 50
End: 2020-11-16

## 2020-11-18 ENCOUNTER — OFFICE VISIT (OUTPATIENT)
Dept: WOUND CARE | Facility: HOSPITAL | Age: 50
End: 2020-11-18
Payer: MEDICARE

## 2020-11-18 VITALS
HEART RATE: 70 BPM | RESPIRATION RATE: 18 BRPM | DIASTOLIC BLOOD PRESSURE: 66 MMHG | SYSTOLIC BLOOD PRESSURE: 118 MMHG | TEMPERATURE: 96.6 F

## 2020-11-18 DIAGNOSIS — L89.314 PRESSURE ULCER OF RIGHT BUTTOCK, STAGE 4 (HCC): Primary | ICD-10-CM

## 2020-11-18 PROCEDURE — 99213 OFFICE O/P EST LOW 20 MIN: CPT | Performed by: FAMILY MEDICINE

## 2020-11-18 PROCEDURE — 99212 OFFICE O/P EST SF 10 MIN: CPT | Performed by: FAMILY MEDICINE

## 2020-12-16 ENCOUNTER — LAB REQUISITION (OUTPATIENT)
Dept: LAB | Facility: HOSPITAL | Age: 50
End: 2020-12-16
Payer: MEDICARE

## 2020-12-16 DIAGNOSIS — N31.9 NEUROMUSCULAR DYSFUNCTION OF BLADDER, UNSPECIFIED: ICD-10-CM

## 2020-12-16 DIAGNOSIS — T83.510A: ICD-10-CM

## 2020-12-16 LAB
BACTERIA UR QL AUTO: ABNORMAL /HPF
BILIRUB UR QL STRIP: NEGATIVE
CLARITY UR: ABNORMAL
COLOR UR: YELLOW
GLUCOSE UR STRIP-MCNC: NEGATIVE MG/DL
HGB UR QL STRIP.AUTO: NEGATIVE
KETONES UR STRIP-MCNC: NEGATIVE MG/DL
LEUKOCYTE ESTERASE UR QL STRIP: ABNORMAL
NITRITE UR QL STRIP: POSITIVE
NON-SQ EPI CELLS URNS QL MICRO: ABNORMAL /HPF
PH UR STRIP.AUTO: 7.5 [PH]
PROT UR STRIP-MCNC: NEGATIVE MG/DL
RBC #/AREA URNS AUTO: ABNORMAL /HPF
SP GR UR STRIP.AUTO: 1.01 (ref 1–1.03)
UROBILINOGEN UR QL STRIP.AUTO: 0.2 E.U./DL
WBC #/AREA URNS AUTO: ABNORMAL /HPF

## 2020-12-16 PROCEDURE — 87186 SC STD MICRODIL/AGAR DIL: CPT | Performed by: UROLOGY

## 2020-12-16 PROCEDURE — 87077 CULTURE AEROBIC IDENTIFY: CPT | Performed by: UROLOGY

## 2020-12-16 PROCEDURE — 81001 URINALYSIS AUTO W/SCOPE: CPT | Performed by: UROLOGY

## 2020-12-16 PROCEDURE — 87086 URINE CULTURE/COLONY COUNT: CPT | Performed by: UROLOGY

## 2020-12-18 ENCOUNTER — TELEPHONE (OUTPATIENT)
Dept: UROLOGY | Facility: MEDICAL CENTER | Age: 50
End: 2020-12-18

## 2020-12-18 DIAGNOSIS — N32.81 OAB (OVERACTIVE BLADDER): Primary | ICD-10-CM

## 2020-12-18 DIAGNOSIS — N39.0 URINARY TRACT INFECTION WITHOUT HEMATURIA, SITE UNSPECIFIED: ICD-10-CM

## 2020-12-18 RX ORDER — CEPHALEXIN 500 MG/1
500 CAPSULE ORAL EVERY 12 HOURS SCHEDULED
Qty: 10 CAPSULE | Refills: 0 | Status: SHIPPED | OUTPATIENT
Start: 2020-12-18 | End: 2020-12-23

## 2020-12-20 LAB
BACTERIA UR CULT: ABNORMAL

## 2020-12-29 DIAGNOSIS — I89.0 LYMPHEDEMA: ICD-10-CM

## 2020-12-29 RX ORDER — FUROSEMIDE 40 MG/1
TABLET ORAL
Qty: 90 TABLET | Refills: 3 | Status: SHIPPED | OUTPATIENT
Start: 2020-12-29 | End: 2022-01-18 | Stop reason: SDUPTHER

## 2021-01-01 ENCOUNTER — NURSE TRIAGE (OUTPATIENT)
Dept: OTHER | Facility: OTHER | Age: 51
End: 2021-01-01

## 2021-01-01 DIAGNOSIS — N39.0 URINARY TRACT INFECTION ASSOCIATED WITH CATHETERIZATION OF URINARY TRACT, UNSPECIFIED INDWELLING URINARY CATHETER TYPE, SEQUELA: Primary | ICD-10-CM

## 2021-01-01 DIAGNOSIS — T83.511S URINARY TRACT INFECTION ASSOCIATED WITH CATHETERIZATION OF URINARY TRACT, UNSPECIFIED INDWELLING URINARY CATHETER TYPE, SEQUELA: Primary | ICD-10-CM

## 2021-01-01 RX ORDER — CEPHALEXIN 500 MG/1
500 CAPSULE ORAL EVERY 12 HOURS SCHEDULED
Qty: 10 CAPSULE | Refills: 0 | Status: SHIPPED | OUTPATIENT
Start: 2021-01-01 | End: 2021-01-06

## 2021-01-01 NOTE — TELEPHONE ENCOUNTER
Instructed  to  sterile urine cup from [facility] lab tomorrow and return ASAP  Keflex resumed pending urine c/s results   understood, no further questions

## 2021-01-01 NOTE — TELEPHONE ENCOUNTER
Regarding: urology- UTI   ----- Message from Arsalan Menendez sent at 1/1/2021 11:05 AM EST -----  Urology   Pt's  is calling because she has UTI symptoms, requesting medication, states that she just had a UTI and finished the medication but it has not fully resolved

## 2021-01-01 NOTE — TELEPHONE ENCOUNTER
Reason for Disposition   Bad or foul-smelling urine    Answer Assessment - Initial Assessment Questions  1  SYMPTOM: "What's the main symptom you're concerned about?" (e g , frequency, incontinence)      Cloudy and foul smelling urine  2  ONSET: "When did the  symptoms  start?"      Yesterday  3  PAIN: "Is there any pain?" If so, ask: "How bad is it?" (Scale: 1-10; mild, moderate, severe)      Pt is functional quadraplegic  4   CAUSE: "What do you think is causing the symptoms?"      UTI  5  OTHER SYMPTOMS: "Do you have any other symptoms?" (e g , fever, flank pain, blood in urine, pain with urination)      Denies    Protocols used: [facility]

## 2021-01-01 NOTE — TELEPHONE ENCOUNTER
TC message to on call:    Eilld[place]onte  [birth year] recurrent UTI  Quadraplegia seconday to MS  SP catheter  Last UTI 12/18/20, treated w/ Keflex  Urine currently cloudy, foul smell x 2 days  Temp 100 0 Home health RN visit due Mon   request abx  Allergies: Latex  Response received:    · Urine culture should be done  11:28 AM  20 days left  · Can I order and I'll advise him to  specimen cup tomorrow and bring back to lab   11:30 AM  20 days left  Read  · Please - they can resume the keflex pending culture results- can you order antibiotic?  11:38 AM  20 days left  · Yes, not a problem  11:38 AM  20 days left  Read  · Thanks  11:38 AM  20 days left  · You're welcome   Take care  11:39 AM  20 days left  Read

## 2021-01-04 ENCOUNTER — APPOINTMENT (OUTPATIENT)
Dept: LAB | Facility: HOSPITAL | Age: 51
End: 2021-01-04
Attending: UROLOGY
Payer: MEDICARE

## 2021-01-04 DIAGNOSIS — N39.0 URINARY TRACT INFECTION ASSOCIATED WITH CATHETERIZATION OF URINARY TRACT, UNSPECIFIED INDWELLING URINARY CATHETER TYPE, SEQUELA: ICD-10-CM

## 2021-01-04 DIAGNOSIS — T83.511S URINARY TRACT INFECTION ASSOCIATED WITH CATHETERIZATION OF URINARY TRACT, UNSPECIFIED INDWELLING URINARY CATHETER TYPE, SEQUELA: ICD-10-CM

## 2021-01-04 PROCEDURE — 87086 URINE CULTURE/COLONY COUNT: CPT

## 2021-01-05 ENCOUNTER — TELEPHONE (OUTPATIENT)
Dept: UROLOGY | Facility: MEDICAL CENTER | Age: 51
End: 2021-01-05

## 2021-01-05 LAB — BACTERIA UR CULT: NORMAL

## 2021-01-05 NOTE — TELEPHONE ENCOUNTER
Called patient - LMOM that the urine culture was negative  She is told that if she is not feeling good to call the office to schedule a follow up appointment

## 2021-01-11 ENCOUNTER — OFFICE VISIT (OUTPATIENT)
Dept: WOUND CARE | Facility: HOSPITAL | Age: 51
End: 2021-01-11
Payer: MEDICARE

## 2021-01-11 VITALS
TEMPERATURE: 95.7 F | DIASTOLIC BLOOD PRESSURE: 68 MMHG | HEART RATE: 72 BPM | SYSTOLIC BLOOD PRESSURE: 118 MMHG | RESPIRATION RATE: 18 BRPM

## 2021-01-11 DIAGNOSIS — L89.314 PRESSURE ULCER OF RIGHT BUTTOCK, STAGE 4 (HCC): Primary | ICD-10-CM

## 2021-01-11 PROCEDURE — 99213 OFFICE O/P EST LOW 20 MIN: CPT | Performed by: FAMILY MEDICINE

## 2021-01-11 PROCEDURE — 99212 OFFICE O/P EST SF 10 MIN: CPT | Performed by: FAMILY MEDICINE

## 2021-01-11 NOTE — PATIENT INSTRUCTIONS
Orders Placed This Encounter   Procedures    Wound cleansing and dressings     Wound cleansing and dressings      Right ischial wound  Wash your hands with soap and water  Remove old dressing, discard into plastic bag and place in trash  Cleanse the wound with dakins weekly and normal saline remaining days prior to applying a clean dressing  Do not use tissue or cotton balls  Do not scrub the wound  Pat dry using gauze  Shower yes    Apply AMD packing to the wound  Cover with allevyn life    Change dressing daily  Please obtain boy shorts to alleviate underwear rubbing on wound      Iodoform packing done today in wound center      Return in three weeks     Standing Status:   Future     Standing Expiration Date:   1/11/2022

## 2021-01-12 NOTE — PROGRESS NOTES
Patient ID: Wilfred Castleman is a 48 y o  female Date of Birth 1970     Chief Complaint  Chief Complaint   Patient presents with    Follow Up Wound Care Visit     right ischial wound       Allergies  Latex    Assessment:    Pressure ulcer of right buttock, stage 4 (HCC)  Wound is improved  Continue wound management with AMD packing  Cut down Dakins rinse to once weekly  Discussed obtaining postpartum disposable briefs or boyshort underwear and using pads in place of bikini style underwear/diapers as the elastic of that style is directly over the wound and putting pressure on it  Pressure relief  Adequate protein intake  F/u 2-3 weeks or call sooner with questions/concerns       Diagnoses and all orders for this visit:    Pressure ulcer of right buttock, stage 4 (HCC)  -     Wound cleansing and dressings; Future              Procedures    Plan:     Wound 10/01/20 Pressure Injury Ischium Right (Active)   Wound Image Images linked 01/11/21 1539   Wound Description Granulation tissue;Slough 01/11/21 1541   Ruchi-wound Assessment Intact 01/11/21 1541   Wound Length (cm) 1 5 cm 01/11/21 1541   Wound Width (cm) 1 2 cm 01/11/21 1541   Wound Depth (cm) 1 3 cm 01/11/21 1541   Wound Surface Area (cm^2) 1 8 cm^2 01/11/21 1541   Wound Volume (cm^3) 2 34 cm^3 01/11/21 1541   Calculated Wound Volume (cm^3) 2 34 cm^3 01/11/21 1541   Change in Wound Size % -836 01/11/21 1541   Tunneling 6 cm 01/11/21 1541   Tunneling in depth located at 12 01/11/21 1541   Drainage Amount Large 01/11/21 1541   Drainage Description Bloody; Serosanguineous 01/11/21 1541   Non-staged Wound Description Full thickness 01/11/21 1541       Wound 10/01/20 Pressure Injury Ischium Right (Active)   Date First Assessed/Time First Assessed: 10/01/20 1320   Pre-Existing Wound: Yes  Primary Wound Type: Pressure Injury  Location: Ischium  Wound Location Orientation: Right       [REMOVED] Wound Sacrum hydrocolloid intact (Removed)   Resolved Date/Resolved Time: 08/24/20 1517  No Date First Assessed or Time First Assessed found  Location: Sacrum  Wound Description (Comments): hydrocolloid intact  Dressing Status: Clean;Dry; Intact       [REMOVED] Incision 12/04/17 Vagina Other (Comment) (Removed)   Resolved Date/Resolved Time: 08/24/20 1509  Date First Assessed/Time First Assessed: 12/04/17 1558   Location: Vagina  Wound Location Orientation: Other (Comment)  Wound Outcome: Healed       [REMOVED] Wound 08/24/20 Pressure Injury Other (Comment) Right (Removed)   Resolved Date/Resolved Time: 10/01/20 1445  Date First Assessed/Time First Assessed: 08/24/20 1518   Primary Wound Type: Pressure Injury  Location: (c) Other (Comment)  Wound Location Orientation: Right  Dressing Status: Old drainage;Removed       [REMOVED] Wound 11/03/20 Vagina N/A (Removed)   Resolved Date/Resolved Time: 11/18/20 1608  Date First Assessed/Time First Assessed: 11/03/20 0847   Location: Vagina  Wound Location Orientation: N/A  Wound Description (Comments): string on stent  Incision's 1st Dressing: DRESSING TRNSPRT TEGADERM 2  Subjective:        Patient presents for f/u of stage 4 pressure ulcer of the right buttock  No increased pain or drainage  Has been using mesalt but ran out a few days ago so has been using AMD packing  Has continued to use Dakins rinse every other day for the past 2 months  Has not been seen here for 2 months           The following portions of the patient's history were reviewed and updated as appropriate: She  has a past medical history of Anesthesia, Bladder stones, Chronic pain disorder, Contracture, right shoulder, Dependent on wheelchair, Edema, Elevated alkaline phosphatase level, Fernandez catheter in place, Gallbladder disease, History of femur fracture, History of UTI, MS (multiple sclerosis) ([address] ), Muscle spasticity, Muscle weakness, Muscular dystrophy ([address] ), Neck pain, Neurogenic bladder, Paralysis ([address] ), Port-A-Cath in place, Pressure injury of skin, Sacral pressure sore, Swallowing difficulty, Tinnitus, and Urinary incontinence  She  reports that she has never smoked  She has never used smokeless tobacco  She reports previous alcohol use  She reports that she does not use drugs  She is allergic to latex       Review of Systems   Constitutional: Negative for chills and fever  HENT: Negative for congestion and sneezing  Respiratory: Negative for cough  Musculoskeletal: Positive for gait problem  Skin: Positive for wound  Psychiatric/Behavioral: Negative for agitation  Objective:       Wound 10/01/20 Pressure Injury Ischium Right (Active)   Wound Image Images linked 01/11/21 1539   Wound Description Granulation tissue;Slough 01/11/21 1541   Ruchi-wound Assessment Intact 01/11/21 1541   Wound Length (cm) 1 5 cm 01/11/21 1541   Wound Width (cm) 1 2 cm 01/11/21 1541   Wound Depth (cm) 1 3 cm 01/11/21 1541   Wound Surface Area (cm^2) 1 8 cm^2 01/11/21 1541   Wound Volume (cm^3) 2 34 cm^3 01/11/21 1541   Calculated Wound Volume (cm^3) 2 34 cm^3 01/11/21 1541   Change in Wound Size % -836 01/11/21 1541   Tunneling 6 cm 01/11/21 1541   Tunneling in depth located at 12 01/11/21 1541   Drainage Amount Large 01/11/21 1541   Drainage Description Bloody; Serosanguineous 01/11/21 1541   Non-staged Wound Description Full thickness 01/11/21 1541       /68   Pulse 72   Temp (!) 95 7 °F (35 4 °C)   Resp 18     Physical Exam  Constitutional:       General: She is not in acute distress  Appearance: Normal appearance  She is not ill-appearing or toxic-appearing  HENT:      Head: Normocephalic and atraumatic  Right Ear: External ear normal       Left Ear: External ear normal    Eyes:      Conjunctiva/sclera: Conjunctivae normal    Neck:      Musculoskeletal: Neck supple  Pulmonary:      Effort: Pulmonary effort is normal  No respiratory distress  Skin:     Comments: See wound assessment   Neurological:      Mental Status: She is alert  Gait: Gait abnormal (nonambulatory)  Psychiatric:         Mood and Affect: Mood normal          Behavior: Behavior normal            Wound Instructions:  Orders Placed This Encounter   Procedures    Wound cleansing and dressings     Wound cleansing and dressings      Right ischial wound  Wash your hands with soap and water  Remove old dressing, discard into plastic bag and place in trash  Cleanse the wound with dakins weekly and normal saline remaining days prior to applying a clean dressing  Do not use tissue or cotton balls  Do not scrub the wound  Pat dry using gauze  Shower yes    Apply AMD packing to the wound  Cover with allevyn life    Change dressing daily  Please obtain boy shorts to alleviate underwear rubbing on wound      Iodoform packing done today in wound center  Return in three weeks     Standing Status:   Future     Standing Expiration Date:   1/11/2022        Diagnosis ICD-10-CM Associated Orders   1   Pressure ulcer of right buttock, stage 4 (HCC)  [id number] [id number] Wound cleansing and dressings

## 2021-01-12 NOTE — ASSESSMENT & PLAN NOTE
Wound is improved  Continue wound management with AMD packing      Cut down Dakins rinse to once weekly  Discussed obtaining postpartum disposable briefs or boyshort underwear and using pads in place of bikini style underwear/diapers as the elastic of that style is directly over the wound and putting pressure on it  Pressure relief  Adequate protein intake  F/u 2-3 weeks or call sooner with questions/concerns

## 2021-01-15 DIAGNOSIS — G35 MULTIPLE SCLEROSIS (HCC): ICD-10-CM

## 2021-01-15 RX ORDER — BACLOFEN 20 MG/1
TABLET ORAL
Qty: 120 TABLET | Refills: 3 | Status: SHIPPED | OUTPATIENT
Start: 2021-01-15 | End: 2021-04-06 | Stop reason: SDUPTHER

## 2021-01-18 ENCOUNTER — TELEPHONE (OUTPATIENT)
Dept: GASTROENTEROLOGY | Facility: CLINIC | Age: 51
End: 2021-01-18

## 2021-01-30 ENCOUNTER — APPOINTMENT (OUTPATIENT)
Dept: RADIOLOGY | Facility: CLINIC | Age: 51
End: 2021-01-30
Payer: MEDICARE

## 2021-01-30 DIAGNOSIS — N20.0 NEPHROLITHIASIS: ICD-10-CM

## 2021-01-30 PROCEDURE — 74018 RADEX ABDOMEN 1 VIEW: CPT

## 2021-02-24 ENCOUNTER — OFFICE VISIT (OUTPATIENT)
Dept: WOUND CARE | Facility: HOSPITAL | Age: 51
End: 2021-02-24
Payer: MEDICARE

## 2021-02-24 VITALS
TEMPERATURE: 97.7 F | HEART RATE: 100 BPM | DIASTOLIC BLOOD PRESSURE: 72 MMHG | RESPIRATION RATE: 18 BRPM | SYSTOLIC BLOOD PRESSURE: 104 MMHG

## 2021-02-24 DIAGNOSIS — L89.314 PRESSURE ULCER OF RIGHT BUTTOCK, STAGE 4 (HCC): Primary | ICD-10-CM

## 2021-02-24 PROCEDURE — 87205 SMEAR GRAM STAIN: CPT | Performed by: FAMILY MEDICINE

## 2021-02-24 PROCEDURE — 87186 SC STD MICRODIL/AGAR DIL: CPT | Performed by: FAMILY MEDICINE

## 2021-02-24 PROCEDURE — 87070 CULTURE OTHR SPECIMN AEROBIC: CPT | Performed by: FAMILY MEDICINE

## 2021-02-24 PROCEDURE — 99212 OFFICE O/P EST SF 10 MIN: CPT | Performed by: FAMILY MEDICINE

## 2021-02-24 PROCEDURE — 99214 OFFICE O/P EST MOD 30 MIN: CPT | Performed by: FAMILY MEDICINE

## 2021-02-24 RX ORDER — CEPHALEXIN 500 MG/1
500 CAPSULE ORAL EVERY 6 HOURS SCHEDULED
Qty: 28 CAPSULE | Refills: 0 | Status: SHIPPED | OUTPATIENT
Start: 2021-02-24 | End: 2021-03-03

## 2021-02-24 RX ORDER — SULFAMETHOXAZOLE AND TRIMETHOPRIM 800; 160 MG/1; MG/1
1 TABLET ORAL EVERY 12 HOURS SCHEDULED
Qty: 14 TABLET | Refills: 0 | Status: SHIPPED | OUTPATIENT
Start: 2021-02-24 | End: 2021-03-03

## 2021-02-24 NOTE — PROGRESS NOTES
Patient ID: Davon Zimmerman is a 48 y o  female Date of Birth 1970     Chief Complaint  Chief Complaint   Patient presents with    Follow Up Wound Care Visit     right buttock wound       Allergies  Latex    Assessment:    Pressure ulcer of right buttock, stage 4 (HCC)   Wound is significantly worse today  The opening itself is smaller however there appears to be a larger cavity beneath the skin that is now open  There is significantly more drainage  A culture was done today  Patient was started on antibiotics which will be adjusted according to the culture results once reviewed  At this point I feel it is best for the patient to be evaluated by a surgeon who may be able to offer more aggressive treatment options  Patient has an appointment in 5 days at [facility] with Dr Too Us  Call sooner with questions or concerns  Diagnoses and all orders for this visit:    Pressure ulcer of right buttock, stage 4 (HCC)  -     Wound cleansing and dressings; Future  -     sulfamethoxazole-trimethoprim (BACTRIM DS) 800-160 mg per tablet; Take 1 tablet by mouth every 12 (twelve) hours for 7 days  -     cephalexin (KEFLEX) 500 mg capsule;  Take 1 capsule (500 mg total) by mouth every 6 (six) hours for 7 days  -     Wound culture and Gram stain              Procedures    Plan:     Wound 10/01/20 Pressure Injury Ischium Right (Active)   Wound Image Images linked 02/24/21 1431   Wound Description Granulation tissue;Slough 02/24/21 1431   Pressure Injury Stage 4 02/24/21 1431   Ruchi-wound Assessment Intact 02/24/21 1431   Wound Length (cm) 1 cm 02/24/21 1431   Wound Width (cm) 1 cm 02/24/21 1431   Wound Depth (cm) 4 5 cm 02/24/21 1431   Wound Surface Area (cm^2) 1 cm^2 02/24/21 1431   Wound Volume (cm^3) 4 5 cm^3 02/24/21 1431   Calculated Wound Volume (cm^3) 4 5 cm^3 02/24/21 1431   Change in Wound Size % -1700 02/24/21 1431   Tunneling 9 cm 02/24/21 1431   Tunneling in depth located at 12 02/24/21 1431   Drainage Amount Large 02/24/21 1431   Drainage Description Milky; Serosanguineous 02/24/21 1431       Wound 10/01/20 Pressure Injury Ischium Right (Active)   Date First Assessed/Time First Assessed: 10/01/20 1320   Pre-Existing Wound: Yes  Primary Wound Type: Pressure Injury  Location: Ischium  Wound Location Orientation: Right       [REMOVED] Wound Sacrum hydrocolloid intact (Removed)   Resolved Date/Resolved Time: 08/24/20 1517  No Date First Assessed or Time First Assessed found  Location: Sacrum  Wound Description (Comments): hydrocolloid intact  Dressing Status: Clean;Dry; Intact       [REMOVED] Incision 12/04/17 Vagina Other (Comment) (Removed)   Resolved Date/Resolved Time: 08/24/20 1509  Date First Assessed/Time First Assessed: 12/04/17 1558   Location: Vagina  Wound Location Orientation: Other (Comment)  Wound Outcome: Healed       [REMOVED] Wound 08/24/20 Pressure Injury Other (Comment) Right (Removed)   Resolved Date/Resolved Time: 10/01/20 1445  Date First Assessed/Time First Assessed: 08/24/20 1518   Primary Wound Type: Pressure Injury  Location: (c) Other (Comment)  Wound Location Orientation: Right  Dressing Status: Old drainage;Removed       [REMOVED] Wound 11/03/20 Vagina N/A (Removed)   Resolved Date/Resolved Time: 11/18/20 1608  Date First Assessed/Time First Assessed: 11/03/20 0847   Location: Vagina  Wound Location Orientation: N/A  Wound Description (Comments): string on stent  Incision's 1st Dressing: DRESSING TRNSPRT TEGADERM 2  Subjective:        Patient presents for followup of a stage IV pressure ulcer of the right buttock  Patient's  reports there has been increased drainage  He states that he was using me salt for while but stopped because it seemed to be sticking to the base of the wound  He has been more recently using AMD packing        The following portions of the patient's history were reviewed and updated as appropriate: She  has a past medical history of Anesthesia, Bladder stones, Chronic pain disorder, Contracture, right shoulder, Dependent on wheelchair, Edema, Elevated alkaline phosphatase level, Fernandez catheter in place, Gallbladder disease, History of femur fracture, History of UTI, MS (multiple sclerosis) ([facility] ), Muscle spasticity, Muscle weakness, Muscular dystrophy ([facility] ), Neck pain, Neurogenic bladder, Paralysis ([facility] [facility] ), Port-A-Cath in place, Pressure injury of skin, Sacral pressure sore, Swallowing difficulty, Tinnitus, and Urinary incontinence  She  reports that she has never smoked  She has never used smokeless tobacco  She reports previous alcohol use  She reports that she does not use drugs  She is allergic to latex       Review of Systems   Constitutional: Negative for chills and fever  HENT: Negative for congestion and sneezing  Respiratory: Negative for cough  Cardiovascular: Positive for leg swelling  Musculoskeletal: Positive for gait problem  Skin: Positive for wound  Psychiatric/Behavioral: Negative for agitation  Objective:       Wound 10/01/20 Pressure Injury Ischium Right (Active)   Wound Image Images linked 02/24/21 1431   Wound Description Granulation tissue;Slough 02/24/21 1431   Pressure Injury Stage 4 02/24/21 1431   Ruchi-wound Assessment Intact 02/24/21 1431   Wound Length (cm) 1 cm 02/24/21 1431   Wound Width (cm) 1 cm 02/24/21 1431   Wound Depth (cm) 4 5 cm 02/24/21 1431   Wound Surface Area (cm^2) 1 cm^2 02/24/21 1431   Wound Volume (cm^3) 4 5 cm^3 02/24/21 1431   Calculated Wound Volume (cm^3) 4 5 cm^3 02/24/21 1431   Change in Wound Size % -1700 02/24/21 1431   Tunneling 9 cm 02/24/21 1431   Tunneling in depth located at 12 02/24/21 1431   Drainage Amount Large 02/24/21 1431   Drainage Description Milky; Serosanguineous 02/24/21 1431       /72   Pulse 100   Temp 97 7 °F (36 5 °C)   Resp 18     Physical Exam  Constitutional:       General: She is not in acute distress  Appearance: Normal appearance   She is not ill-appearing, toxic-appearing or diaphoretic  HENT:      Head: Normocephalic and atraumatic  Right Ear: External ear normal       Left Ear: External ear normal    Eyes:      Conjunctiva/sclera: Conjunctivae normal    Neck:      Musculoskeletal: Neck supple  Pulmonary:      Effort: Pulmonary effort is normal  No respiratory distress  Musculoskeletal:      Right lower leg: Edema present  Left lower leg: Edema present  Skin:     Comments: See wound assessment   Neurological:      Mental Status: She is alert  Gait: Gait abnormal (nonambulatory)  Psychiatric:         Mood and Affect: Mood normal          Behavior: Behavior normal            Wound Instructions:  Orders Placed This Encounter   Procedures    Wound cleansing and dressings     Wound cleansing and dressings                                Right ischial wound  Wash your hands with soap and water  Remove old dressing, discard into plastic bag and place in trash  Cleanse the wound with dakins weekly and normal saline remaining days prior to applying a clean dressing  Do not use tissue or cotton balls  Do not scrub the wound  Pat dry using gauze  Shower yes    Apply mesalt packing into the wound  Cover with allevyn life    Change dressing daily  (Home care to assess and care for wound 2-3x week)     Please obtain boy shorts to alleviate underwear rubbing on wound  Followup with wound care surgeon at [facility] or [facility] and return to [facility] if needed  Standing Status:   Future     Standing Expiration Date:   2/24/2022    Wound culture and Gram stain     Order Specific Question:   Release to patient through Mychart     Answer:   Immediate        Diagnosis ICD-10-CM Associated Orders   1   Pressure ulcer of right buttock, stage 4 ([facility])  L89 314 Wound cleansing and dressings     sulfamethoxazole-trimethoprim (BACTRIM DS) 800-160 mg per tablet     cephalexin (KEFLEX) 500 mg capsule     Wound culture and Gram stain

## 2021-02-24 NOTE — ASSESSMENT & PLAN NOTE
Wound is significantly worse today  The opening itself is smaller however there appears to be a larger cavity beneath the skin that is now open  There is significantly more drainage  A culture was done today  Patient was started on antibiotics which will be adjusted according to the culture results once reviewed  At this point I feel it is best for the patient to be evaluated by a surgeon who may be able to offer more aggressive treatment options  Patient has an appointment in 5 days at [facility] with Dr David Sanchez  Call sooner with questions or concerns

## 2021-02-24 NOTE — PATIENT INSTRUCTIONS
No orders of the defined types were placed in this encounter  Orders Placed This Encounter   Procedures    Wound cleansing and dressings     Wound cleansing and dressings                                Right ischial wound  Wash your hands with soap and water  Remove old dressing, discard into plastic bag and place in trash  Cleanse the wound with dakins weekly and normal saline remaining days prior to applying a clean dressing  Do not use tissue or cotton balls  Do not scrub the wound  Pat dry using gauze  Shower yes    Apply mesalt packing into the wound  Cover with allevyn life    Change dressing daily  (Home care to assess and care for wound 2-3x week)     Please obtain boy shorts to alleviate underwear rubbing on wound  Followup with wound care surgeon at [place] or [facility] and return to [facility] if needed       Standing Status:   Future     Standing Expiration Date:   2/24/2022

## 2021-02-27 LAB
BACTERIA WND AEROBE CULT: ABNORMAL
GRAM STN SPEC: ABNORMAL
GRAM STN SPEC: ABNORMAL

## 2021-03-08 ENCOUNTER — OFFICE VISIT (OUTPATIENT)
Dept: WOUND CARE | Facility: HOSPITAL | Age: 51
End: 2021-03-08
Payer: MEDICARE

## 2021-03-08 VITALS
SYSTOLIC BLOOD PRESSURE: 114 MMHG | DIASTOLIC BLOOD PRESSURE: 68 MMHG | TEMPERATURE: 97.2 F | RESPIRATION RATE: 16 BRPM | HEART RATE: 84 BPM

## 2021-03-08 DIAGNOSIS — L89.314 PRESSURE ULCER OF RIGHT BUTTOCK, STAGE 4 (HCC): Primary | ICD-10-CM

## 2021-03-08 PROCEDURE — 99213 OFFICE O/P EST LOW 20 MIN: CPT | Performed by: SURGERY

## 2021-03-08 NOTE — PATIENT INSTRUCTIONS
Orders Placed This Encounter   Procedures    Wound cleansing and dressings     Right ischial wound  Wash your hands with soap and water  Remove old dressing, discard into plastic bag and place in trash  Cleanse the wound with dakins weekly and normal saline remaining days prior to applying a clean dressing  Do not use tissue or cotton balls  Do not scrub the wound  Pat dry using gauze  Shower yes    Apply mesalt packing into the wound and tape a tail to the chelsey wound  Cover with allevyn life    Change dressing daily  (Home care to assess and care for wound 2-3x week)    Off-loading Instructions:    Keep weight and pressure off wound at all times  Use Wheelchair Cushion for pressure relief--- (Do not use donut-type devices)  Seat lifts or shift position in chair every 15 minutes  Turn every 1-2 hours and more often if able to  Avoid position directing pressure to Wound site  Limit side lying to 30 degree tilt  Limit the head of bed elevation to 30 degrees  Do Not Sit for Long Periods of Time      Ensure to increase protein in your diet for wound healing     Standing Status:   Future     Standing Expiration Date:   3/8/2022

## 2021-03-08 NOTE — ASSESSMENT & PLAN NOTE
Wound looks clean with no signs of infection  Somewhat larger underneath than the small opening on the skin  Instructed to use less packing  Skin may close completely and there still may be a pocket underneath, but this will gradually resolve   Continue Mesalt

## 2021-03-08 NOTE — PROGRESS NOTES
Patient ID: Kimberly Saavedra is a 48 y o  female Date of Birth 1970       Chief Complaint   Patient presents with    Follow Up Wound Care Visit     Right Ischial wound       Allergies:  Latex    Diagnosis:  1  Pressure ulcer of right buttock, stage 4 (HCC)  Assessment & Plan:  Wound looks clean with no signs of infection  Somewhat larger underneath than the small opening on the skin  Instructed to use less packing  Skin may close completely and there still may be a pocket underneath, but this will gradually resolve  Continue Mesalt    Orders:  -     Wound cleansing and dressings; Future     Diagnosis ICD-10-CM Associated Orders   1   Pressure ulcer of right buttock, stage 4 (HCC)  L89 314 Wound cleansing and dressings          Subjective:   HPI  Here for wound follow up  The following portions of the patient's history were reviewed and updated as appropriate:   Patient Active Problem List   Diagnosis    Suprapubic catheter ([address] )    Bladder calculus    Constipation    Depression    Dysphagia    Dizziness    Hyperglycemia    Mixed hyperlipidemia    Hypokalemia    Lymphedema    Multiple sclerosis (HCC)    Muscle spasm    Muscle weakness    Nephrolithiasis    Neurogenic bladder    Paraplegia (HCC)    Sleep disorder    Spasticity    Spinal stenosis    Tremor    Urinary incontinence    Vision loss    Vitamin B12 deficiency    Vitamin D deficiency    Functional quadriplegia secondary to MS ([address] )    Allergic rhinitis    Screening mammogram, encounter for   [facility] maintenance    Medication management contract signed    Pressure ulcer of right buttock, stage 4 (HCC)    Urinary tract infection associated with cystostomy catheter (HCC)    Thrombocytopenia (HCC)    Serum total bilirubin elevated    Hydronephrosis with urinary obstruction due to ureteral calculus    Mural thickening of sigmoid colon    Abnormal CT scan, sigmoid colon    Candidal vaginitis    Transaminitis    Alkaline phosphatase elevation    Abnormal thyroid function test    Microcytic anemia    Ureteral calculus, left     Past Medical History:   Diagnosis Date    Anesthesia     "prefers if able to be put to sleep on stretcher before placed on table if possible due to severe spasticity    Bladder stones     Chronic pain disorder     neck    Contracture, right shoulder     right arm and hand    Dependent on wheelchair     motorized    Edema     dependant lower extremities    Elevated alkaline phosphatase level     Mildly elevated total, normal isoenzymes 4/15/15, normal 1/17; last assessed: [id number]    Fernandez catheter in place     #61 to large bag(silicone catheter)    Gallbladder disease     History of femur fracture     right leg    History of UTI     [facility]     Muscle spasticity     especially with touch    Muscle weakness     Muscular dystrophy ([facility])     Neck pain     Neurogenic bladder     Paralysis ([address] )     bilateral lower extremities    Port-A-Cath in place     left chest," hasn't used in approx 1 yr or so"    Pressure injury of skin     right ischium    Sacral pressure sore     "shearing, tegaderm in place,"    Swallowing difficulty     Tinnitus     Urinary incontinence      Past Surgical History:   Procedure Laterality Date    BLADDER STONE REMOVAL N/A 12/4/2017    Procedure: Donna Schwab;  Surgeon: Bela Armstrong MD;  Location: [facility] OR;  Service: Urology    BLADDER SURGERY      CHOLECYSTECTOMY      CYSTOSCOPY  06/15/2020    ESOPHAGOGASTRODUODENOSCOPY      FL RETROGRADE PYELOGRAM  10/2/2020    FL RETROGRADE PYELOGRAM  11/3/2020    KIDNEY STONE SURGERY      PORTACATH PLACEMENT Left     MA CYSTO/URETERO W/LITHOTRIPSY &INDWELL STENT INSRT Left 11/3/2020    Procedure: CYSTOSCOPY URETEROSCOPY WITH RETROGRADE PYELOGRAM AND EXCHANGE STENT URETERAL, SP TUBE EXCHANGE, BASKET STONE EXTRACTION, BLADDER STONE EXTRACTION;  Surgeon: Kingsley Corbett Smitha Flores MD;  Location:  MAIN OR;  Service: Urology    MN CYSTOURETHROSCOPY,URETER CATHETER Left 10/2/2020    Procedure: CYSTOSCOPY LEFT RETROGRADE ; LEFT URETEROSCOPY; PYELOGRAM WITH STENT INSERTION AND [address];  Surgeon: Margareth Montiel MD;  Location:  MAIN OR;  Service: Urology    SUPRAPUBIC CYSTOSTOMY  02/25/2014    last assessed: [id number]     Social History     Socioeconomic History    Marital status: /Civil Union     Spouse name: None    Number of children: None    Years of education: None    Highest education level: None   Occupational History    None   Social Needs    Financial resource strain: None    Food insecurity     Worry: None     Inability: None    Transportation needs     Medical: None     Non-medical: None   Tobacco Use    Smoking status: Never Smoker    Smokeless tobacco: Never Used   Substance and Sexual Activity    Alcohol use: Not Currently     Frequency: Never    Drug use: No    Sexual activity: Yes   Lifestyle    Physical activity     Days per week: None     Minutes per session: None    Stress: None   Relationships    Social connections     Talks on phone: None     Gets together: None     Attends Hindu service: None     Active member of club or organization: None     Attends meetings of clubs or organizations: None     Relationship status: None    Intimate partner violence     Fear of current or ex partner: None     Emotionally abused: None     Physically abused: None     Forced sexual activity: None   Other Topics Concern    None   Social History Narrative    Caffeine use    Currently on disability    Daily coffee consumption (2 cups/day)    Educational level- completed 2nd year college    Lives with adult children    Not currently employed    Wheelchair dependent         Current Outpatient Medications:     baclofen 20 mg tablet, TAKE 1 TABLET BY MOUTH 5 TIMES A DAY AS NEEDED, Disp: 120 tablet, Rfl: 3    DULoxetine (CYMBALTA) 30 mg delayed release capsule, TAKE 1 CAPSULE BY MOUTH EVERY DAY (Patient taking differently: 30 mg daily at bedtime ), Disp: 90 capsule, Rfl: 1    furosemide (LASIX) 40 mg tablet, TAKE 1 TABLET BY MOUTH EVERY DAY, Disp: 90 tablet, Rfl: 3    oxyCODONE-acetaminophen (PERCOCET) 5-325 mg per tablet, Take 1 tablet by mouth every 4 (four) hours as needed for moderate painMax Daily Amount: 6 tablets, Disp: 40 tablet, Rfl: 0    potassium chloride (KLOR-CON) 8 MEQ tablet, TAKE 1 TABLET BY MOUTH EVERY DAY WITH FOOD (Patient not taking: Reported on 10/20/2020), Disp: 90 tablet, Rfl: 1    sodium hypochlorite (DAKIN'S HALF-STRENGTH) external solution, Apply 1 application topically every other day, Disp: 473 mL, Rfl: 0    Vitamin D, Cholecalciferol, 50 MCG (2000 UT) CAPS, Take by mouth daily , Disp: , Rfl:   Family History   Problem Relation Age of Onset    Diabetes Mother     Hyperlipidemia Mother     Hypertension Mother     COPD Father     Hypertension Father     Hyperlipidemia Sister     Alzheimer's disease Family     Diabetes Family     Hypertension Family     Heart disease Family       Review of Systems      Objective:  /68   Pulse 84   Temp (!) 97 2 °F (36 2 °C)   Resp 16     Physical Exam        Wound 10/01/20 Pressure Injury Ischium Right (Active)   Wound Image   03/08/21 1530   Wound Description Granulation tissue;Pink 03/08/21 1530   Pressure Injury Stage 4 03/08/21 1530   Ruchi-wound Assessment Scar Tissue; Maceration 03/08/21 1530   Wound Length (cm) 0 5 cm 03/08/21 1530   Wound Width (cm) 0 7 cm 03/08/21 1530   Wound Depth (cm) 4 cm 03/08/21 1530   Wound Surface Area (cm^2) 0 35 cm^2 03/08/21 1530   Wound Volume (cm^3) 1 4 cm^3 03/08/21 1530   Calculated Wound Volume (cm^3) 1 4 cm^3 03/08/21 1530   Change in Wound Size % -460 03/08/21 1530   Tunneling 9 cm 02/24/21 1431   Tunneling in depth located at 12 02/24/21 1431   Undermining 8 5 03/08/21 1530   Undermining is depth extending from 12-1 oclock 03/08/21 1530   Drainage Amount Moderate 03/08/21 1530   Drainage Description Sanguineous 03/08/21 1530   Non-staged Wound Description Full thickness 03/08/21 1530   Wound packed? Yes 11/18/20 1614   Dressing Status Intact 11/18/20 1614       Right ischial wound chelsey wound clean, edges slightly rolled, undermines as noted in wound documentation, bone feels covered                  Procedures     Results from last 6 Months   Lab Units 02/24/21  1558   WOUND CULTURE  4+ Growth of Methicillin Resistant Staphylococcus aureus*         Wound Instructions:  Orders Placed This Encounter   Procedures    Wound cleansing and dressings     Right ischial wound  Wash your hands with soap and water  Remove old dressing, discard into plastic bag and place in trash  Cleanse the wound with dakins weekly and normal saline remaining days prior to applying a clean dressing  Do not use tissue or cotton balls  Do not scrub the wound  Pat dry using gauze  Shower yes    Apply mesalt packing into the wound and tape a tail to the chelsey wound  Cover with allevyn life    Change dressing daily  (Home care to assess and care for wound 2-3x week)    Off-loading Instructions:    Keep weight and pressure off wound at all times  Use Wheelchair Cushion for pressure relief--- (Do not use donut-type devices)  Seat lifts or shift position in chair every 15 minutes  Turn every 1-2 hours and more often if able to  Avoid position directing pressure to Wound site  Limit side lying to 30 degree tilt  Limit the head of bed elevation to 30 degrees  Do Not Sit for Long Periods of Time  Ensure to increase protein in your diet for wound healing     Standing Status:   Future     Standing Expiration Date:   3/8/2022         Lord Judie MD      Portions of the record may have been created with voice recognition software   Occasional wrong word or "sound a like" substitutions may have occurred due to the inherent limitations of voice recognition software  Read the chart carefully and recognize, using context, where substitutions have occurred

## 2021-03-10 ENCOUNTER — TELEMEDICINE (OUTPATIENT)
Dept: FAMILY MEDICINE CLINIC | Facility: CLINIC | Age: 51
End: 2021-03-10
Payer: MEDICARE

## 2021-03-10 DIAGNOSIS — G82.20 PARAPLEGIA (HCC): ICD-10-CM

## 2021-03-10 DIAGNOSIS — D69.6 THROMBOCYTOPENIA (HCC): ICD-10-CM

## 2021-03-10 DIAGNOSIS — E53.8 VITAMIN B12 DEFICIENCY: ICD-10-CM

## 2021-03-10 DIAGNOSIS — G35 MULTIPLE SCLEROSIS (HCC): Chronic | ICD-10-CM

## 2021-03-10 DIAGNOSIS — G35 FUNCTIONAL QUADRIPLEGIA SECONDARY TO MS (HCC): Chronic | ICD-10-CM

## 2021-03-10 DIAGNOSIS — D50.9 MICROCYTIC ANEMIA: ICD-10-CM

## 2021-03-10 DIAGNOSIS — E55.9 VITAMIN D DEFICIENCY: ICD-10-CM

## 2021-03-10 DIAGNOSIS — K63.9 MURAL THICKENING OF SIGMOID COLON: Primary | ICD-10-CM

## 2021-03-10 DIAGNOSIS — E87.6 HYPOKALEMIA: ICD-10-CM

## 2021-03-10 DIAGNOSIS — Z79.899 MEDICATION MANAGEMENT: ICD-10-CM

## 2021-03-10 DIAGNOSIS — R53.2 FUNCTIONAL QUADRIPLEGIA SECONDARY TO MS (HCC): Chronic | ICD-10-CM

## 2021-03-10 DIAGNOSIS — L89.314 PRESSURE ULCER OF RIGHT BUTTOCK, STAGE 4 (HCC): ICD-10-CM

## 2021-03-10 DIAGNOSIS — R93.89 INCREASED ENDOMETRIAL STRIPE THICKNESS: ICD-10-CM

## 2021-03-10 DIAGNOSIS — E78.2 MIXED HYPERLIPIDEMIA: ICD-10-CM

## 2021-03-10 DIAGNOSIS — Z93.59 SUPRAPUBIC CATHETER (HCC): Chronic | ICD-10-CM

## 2021-03-10 DIAGNOSIS — R94.6 ABNORMAL THYROID FUNCTION TEST: ICD-10-CM

## 2021-03-10 DIAGNOSIS — M48.00 SPINAL STENOSIS, UNSPECIFIED SPINAL REGION: ICD-10-CM

## 2021-03-10 DIAGNOSIS — R74.8 ALKALINE PHOSPHATASE ELEVATION: ICD-10-CM

## 2021-03-10 DIAGNOSIS — R73.9 HYPERGLYCEMIA: ICD-10-CM

## 2021-03-10 PROBLEM — R93.3 ABNORMAL CT SCAN, SIGMOID COLON: Status: RESOLVED | Noted: 2020-10-15 | Resolved: 2021-03-10

## 2021-03-10 PROCEDURE — 99214 OFFICE O/P EST MOD 30 MIN: CPT | Performed by: FAMILY MEDICINE

## 2021-03-10 RX ORDER — OXYCODONE HYDROCHLORIDE AND ACETAMINOPHEN 5; 325 MG/1; MG/1
1 TABLET ORAL EVERY 4 HOURS PRN
Qty: 40 TABLET | Refills: 0 | Status: SHIPPED | OUTPATIENT
Start: 2021-03-10 | End: 2022-01-18 | Stop reason: SDUPTHER

## 2021-03-10 NOTE — PROGRESS NOTES
Virtual Regular Visit      Assessment/Plan:  1  Spinal stenosis, Percocet was reordered  Urine drug screen was ordered  PDMP was checked  2  Sigmoid colon thickening, refer to Gastroenterology  3  Functional quadriplegia secondary to MS/MS/ paraplegia, patient follows with Neurology  Patient does have a new wheelchair in order  Patient will need form completion  Patient is aware she may need to go to PT/ wheelchair clinic for evaluation  4  Right buttock pressure ulcer  Follows with specialist  5  Abnormal thyroid function test complete blood work that was ordered  6  Alkaline phosphatase elevation, complete blood work that was ordered  7  Hyperglycemia complete blood work that was ordered  8  Hypokalemia, complete blood work that was ordered  9  Microcytic anemia complete blood work that was ordered  10  Hyperlipidemia, complete blood work that was ordered  6  Thrombocytopenia, complete blood work that was ordered  12  Suprapubic catheter, patient follows with Urology  13  Vitamin deficiencies B12 and D, complete blood work that was ordered  14  Increased endometrial thickening complete pelvic ultrasound   14  Medication management, urine drug screen is ordered  15  Patient to return in 4 months in office for office visit will see sooner if needed          Problem List Items Addressed This Visit        Digestive    Mural thickening of sigmoid colon - Primary      Patient has not seen Gastroenterology for evaluation of this will refer back to gastroenterology         Relevant Orders    Ambulatory referral to Gastroenterology       Nervous and Auditory    Multiple sclerosis ([facility] [address] ) (Chronic)      Follows with Neurology         Functional quadriplegia secondary to MS [facility]) (Chronic)      Patient will be getting a new power wheelchair explained to patient she may need to go to 1 of the therapy department for an evaluation patient understands         Paraplegia [facility])      Follows with Neurology Musculoskeletal and Integument    Pressure ulcer of right buttock, stage 4 (HCC)      Follows with specialist            Other    Suprapubic catheter ([address] ) (Chronic)      Follows with urology         Hyperglycemia      Will complete blood work that was ordered         Mixed hyperlipidemia      Complete blood work         Hypokalemia      As above         Spinal stenosis      PDMP was checked  Percocet was ordered  Urine drug screen was ordered         Relevant Medications    oxyCODONE-acetaminophen (PERCOCET) 5-325 mg per tablet    Other Relevant Orders    Toxicology screen, urine    Oxycodone/Oxymorphone urine    Vitamin B12 deficiency      Complete blood work         Vitamin D deficiency      Complete blood work         Thrombocytopenia ([address] )      Complete blood work         Alkaline phosphatase elevation      Complete blood work that was ordered         Abnormal thyroid function test      Patient will complete blood work that was ordered         Microcytic anemia      Complete blood work that was ordered         Increased endometrial stripe thickness      Pelvic ultrasound ordered         Relevant Orders    US pelvis complete non OB      Other Visit Diagnoses     Medication management        Relevant Orders    Toxicology screen, urine    Oxycodone/Oxymorphone urine               Reason for visit is   Chief Complaint   Patient presents with    Follow-up     for chronic conditions  mgb    Virtual Regular Visit        Encounter provider Alex Salazar DO    Provider located at [address] [place] [address] PRIMARY CARE  [facility] [address]      Recent Visits  No visits were found meeting these conditions     Showing recent visits within past 7 days and meeting all other requirements     Today's Visits  Date Type Provider Dept   03/10/21 Telemedicine Alex Salazar DO [facility]   Showing today's visits and meeting all other requirements     Future Appointments  No visits were found meeting these conditions  Showing future appointments within next 150 days and meeting all other requirements        The patient was identified by name and date of birth  Marylin Salvador was informed that this is a telemedicine visit and that the visit is being conducted through [employer] and patient was informed that this is not a secure, HIPAA-compliant platform  She agrees to proceed     My office door was closed  No one else was in the room  She acknowledged consent and understanding of privacy and security of the video platform  The patient has agreed to participate and understands they can discontinue the visit at any time  Patient is aware this is a billable service  Subjective  Marylin Salvador is a 48 y o  female  HPI as below       Patient without any new medical complaints or concerns  Discussed last office visit  She has not seen Gastroenterology for thickening of sigmoid colon  Also discussed that CT scan  Patient tells me she has been in menopause for few years  Explained that her uterus is borderline abnormal and radiology is recommending pelvic ultrasound I will set this up  In addition, patient tells me that she has ordered a new power chair and Medicare will require form completion  I explained I would happily fill out the form however most times the form  Necessitates a full physical therapy evaluation  Patient understands this and is willing to get this will wait for the form to come to the office         Past Medical History:   Diagnosis Date    Anesthesia     "prefers if able to be put to sleep on stretcher before placed on table if possible due to severe spasticity    Bladder stones     Chronic pain disorder     neck    Contracture, right shoulder     right arm and hand    Dependent on wheelchair     motorized    Edema     dependant lower extremities    Elevated alkaline phosphatase level     Mildly elevated total, normal isoenzymes 4/15/15, normal 1/17; last assessed: [id number]    Fernandez catheter in place     #29 to large bag(silicone catheter)    Gallbladder disease     History of femur fracture     right leg    History of UTI     MS ([facility] sclerosis) ([facility])     Muscle spasticity     especially with touch    Muscle weakness     Muscular dystrophy ([facility])     Neck pain     Neurogenic bladder     Paralysis ([facility])     bilateral lower extremities    Port-A-Cath in place     left chest," hasn't used in approx 1 yr or so"    Pressure injury of skin     right ischium    Sacral pressure sore     "shearing, tegaderm in place,"    Swallowing difficulty     Tinnitus     Urinary incontinence        Past Surgical History:   Procedure Laterality Date    BLADDER STONE REMOVAL N/A 12/4/2017    Procedure: Ching Jonathan;  Surgeon: Panchito Hale MD;  Location: [facility];  Service: Urology    BLADDER SURGERY      CHOLECYSTECTOMY      CYSTOSCOPY  06/15/2020    ESOPHAGOGASTRODUODENOSCOPY      FL RETROGRADE PYELOGRAM  10/2/2020    FL RETROGRADE PYELOGRAM  11/3/2020    KIDNEY STONE SURGERY      PORTACATH PLACEMENT Left     MO CYSTO/URETERO W/LITHOTRIPSY &INDWELL STENT INSRT Left 11/3/2020    Procedure: CYSTOSCOPY URETEROSCOPY WITH RETROGRADE PYELOGRAM AND EXCHANGE STENT URETERAL, SP TUBE EXCHANGE, BASKET STONE EXTRACTION, BLADDER STONE EXTRACTION;  Surgeon: Panchito Hale MD;  [facility]: [facility];  Service: Urology    MO [address] Left 10/2/2020    Procedure: CYSTOSCOPY LEFT RETROGRADE ; LEFT URETEROSCOPY; PYELOGRAM WITH STENT INSERTION AND [address];  Surgeon: Vishnu Mcgarry MD;  [facility]: [facility];  Service: Urology    SUPRAPUBIC CYSTOSTOMY  02/25/2014    last assessed: [id number]       Current Outpatient Medications   Medication Sig Dispense Refill    baclofen 20 mg tablet TAKE 1 TABLET BY MOUTH 5 TIMES A DAY AS NEEDED 120 tablet 3    DULoxetine (CYMBALTA) 30 mg delayed release capsule TAKE 1 CAPSULE BY MOUTH EVERY DAY (Patient taking differently: 30 mg daily at bedtime ) 90 capsule 1    furosemide (LASIX) 40 mg tablet TAKE 1 TABLET BY MOUTH EVERY DAY 90 tablet 3    oxyCODONE-acetaminophen (PERCOCET) 5-325 mg per tablet Take 1 tablet by mouth every 4 (four) hours as needed for moderate painMax Daily Amount: 6 tablets 40 tablet 0    sodium hypochlorite (DAKIN'S HALF-STRENGTH) external solution Apply 1 application topically every other day 473 mL 0    Vitamin D, Cholecalciferol, 50 MCG (2000 UT) CAPS Take by mouth daily       potassium chloride (KLOR-CON) 8 MEQ tablet TAKE 1 TABLET BY MOUTH EVERY DAY WITH FOOD (Patient not taking: Reported on 10/20/2020) 90 tablet 1     No current facility-administered medications for this visit  Allergies   Allergen Reactions    Latex Other (See Comments)     Added based on information entered during case entry, please review and add reactions, type, and severity as needed    blisters       Review of Systems   Constitutional: Negative  HENT: Negative  Eyes: Negative  Respiratory: Negative  Cardiovascular: Negative  Gastrointestinal:        HPI   Endocrine: Negative  Genitourinary:        HPI   Musculoskeletal:        HPI   Skin: Negative  Allergic/Immunologic: Negative  Neurological:        HPI   Hematological: Negative  Psychiatric/Behavioral: Negative  Video Exam    Vitals:       Physical Exam  Nursing note reviewed  Constitutional:       Appearance: Normal appearance  HENT:      Head: Normocephalic and atraumatic  Right Ear: External ear normal       Left Ear: External ear normal       Mouth/Throat:      Mouth: Mucous membranes are moist    Eyes:      General: No scleral icterus  Neck:      Musculoskeletal: No neck rigidity  Pulmonary:      Effort: Pulmonary effort is normal    Neurological:      Mental Status: She is alert and oriented to person, place, and time        Cranial Nerves: No cranial nerve deficit  Psychiatric:         Mood and Affect: Mood normal           I spent 22 minutes directly with the patient during this visit      [address] acknowledges that she has consented to an online visit or consultation  She understands that the online visit is based solely on information provided by her, and that, in the absence of a face-to-face physical evaluation by the physician, the diagnosis she receives is both limited and provisional in terms of accuracy and completeness  This is not intended to replace a full medical face-to-face evaluation by the physician  Chetna Denson understands and accepts these terms

## 2021-03-10 NOTE — ASSESSMENT & PLAN NOTE
Patient will be getting a new power wheelchair explained to patient she may need to go to 1 of the therapy department for an evaluation patient understands

## 2021-03-10 NOTE — PATIENT INSTRUCTIONS
Complete blood work that was ordered in October   Follow-up with Gastroenterology for evaluation of thickened sigmoid colon    complete pelvic ultrasound for evaluation of mildly thickened uterine lining  Follow all specialist per their instructions   Return in 4 months for office visit

## 2021-03-11 ENCOUNTER — TELEPHONE (OUTPATIENT)
Dept: FAMILY MEDICINE CLINIC | Facility: CLINIC | Age: 51
End: 2021-03-11

## 2021-03-11 ENCOUNTER — LAB (OUTPATIENT)
Dept: LAB | Facility: CLINIC | Age: 51
End: 2021-03-11
Payer: MEDICARE

## 2021-03-11 DIAGNOSIS — R17 SERUM TOTAL BILIRUBIN ELEVATED: ICD-10-CM

## 2021-03-11 DIAGNOSIS — E87.6 HYPOKALEMIA: ICD-10-CM

## 2021-03-11 DIAGNOSIS — R93.3 ABNORMAL CT SCAN, SIGMOID COLON: ICD-10-CM

## 2021-03-11 DIAGNOSIS — R53.2 FUNCTIONAL QUADRIPLEGIA SECONDARY TO MS (HCC): Primary | Chronic | ICD-10-CM

## 2021-03-11 DIAGNOSIS — G35 MULTIPLE SCLEROSIS (HCC): ICD-10-CM

## 2021-03-11 DIAGNOSIS — K63.9 MURAL THICKENING OF SIGMOID COLON: ICD-10-CM

## 2021-03-11 DIAGNOSIS — D50.9 MICROCYTIC ANEMIA: ICD-10-CM

## 2021-03-11 DIAGNOSIS — E55.9 VITAMIN D DEFICIENCY: ICD-10-CM

## 2021-03-11 DIAGNOSIS — E53.8 VITAMIN B12 DEFICIENCY: ICD-10-CM

## 2021-03-11 DIAGNOSIS — G35 FUNCTIONAL QUADRIPLEGIA SECONDARY TO MS (HCC): ICD-10-CM

## 2021-03-11 DIAGNOSIS — R74.01 TRANSAMINITIS: ICD-10-CM

## 2021-03-11 DIAGNOSIS — R74.8 ALKALINE PHOSPHATASE ELEVATION: ICD-10-CM

## 2021-03-11 DIAGNOSIS — G82.20 PARAPLEGIA (HCC): ICD-10-CM

## 2021-03-11 DIAGNOSIS — E87.1 HYPONATREMIA: ICD-10-CM

## 2021-03-11 DIAGNOSIS — R94.6 ABNORMAL THYROID FUNCTION TEST: ICD-10-CM

## 2021-03-11 DIAGNOSIS — G35 FUNCTIONAL QUADRIPLEGIA SECONDARY TO MS (HCC): Primary | Chronic | ICD-10-CM

## 2021-03-11 DIAGNOSIS — R53.2 FUNCTIONAL QUADRIPLEGIA SECONDARY TO MS (HCC): ICD-10-CM

## 2021-03-11 DIAGNOSIS — R73.9 HYPERGLYCEMIA: ICD-10-CM

## 2021-03-11 LAB
25(OH)D3 SERPL-MCNC: 36.8 NG/ML (ref 30–100)
ALBUMIN SERPL BCP-MCNC: 3.5 G/DL (ref 3.5–5)
ALP SERPL-CCNC: 98 U/L (ref 46–116)
ALT SERPL W P-5'-P-CCNC: 15 U/L (ref 12–78)
ANION GAP SERPL CALCULATED.3IONS-SCNC: 5 MMOL/L (ref 4–13)
AST SERPL W P-5'-P-CCNC: 12 U/L (ref 5–45)
BILIRUB SERPL-MCNC: 0.32 MG/DL (ref 0.2–1)
BUN SERPL-MCNC: 11 MG/DL (ref 5–25)
CALCIUM SERPL-MCNC: 9.2 MG/DL (ref 8.3–10.1)
CHLORIDE SERPL-SCNC: 101 MMOL/L (ref 100–108)
CHOLEST SERPL-MCNC: 255 MG/DL (ref 50–200)
CO2 SERPL-SCNC: 30 MMOL/L (ref 21–32)
CREAT SERPL-MCNC: 0.49 MG/DL (ref 0.6–1.3)
ERYTHROCYTE [DISTWIDTH] IN BLOOD BY AUTOMATED COUNT: 15.6 % (ref 11.6–15.1)
EST. AVERAGE GLUCOSE BLD GHB EST-MCNC: 100 MG/DL
FERRITIN SERPL-MCNC: 69 NG/ML (ref 8–388)
FOLATE SERPL-MCNC: 7.2 NG/ML (ref 3.1–17.5)
GFR SERPL CREATININE-BSD FRML MDRD: 114 ML/MIN/1.73SQ M
GLUCOSE P FAST SERPL-MCNC: 91 MG/DL (ref 65–99)
HAV IGM SER QL: NORMAL
HBA1C MFR BLD: 5.1 %
HBV CORE IGM SER QL: NORMAL
HBV SURFACE AG SER QL: NORMAL
HCT VFR BLD AUTO: 40 % (ref 34.8–46.1)
HCV AB SER QL: NORMAL
HDLC SERPL-MCNC: 73 MG/DL
HGB BLD-MCNC: 12.1 G/DL (ref 11.5–15.4)
IRON SERPL-MCNC: 61 UG/DL (ref 50–170)
LDLC SERPL CALC-MCNC: 160 MG/DL (ref 0–100)
MCH RBC QN AUTO: 26.1 PG (ref 26.8–34.3)
MCHC RBC AUTO-ENTMCNC: 30.3 G/DL (ref 31.4–37.4)
MCV RBC AUTO: 86 FL (ref 82–98)
PLATELET # BLD AUTO: 315 THOUSANDS/UL (ref 149–390)
PMV BLD AUTO: 9.6 FL (ref 8.9–12.7)
POTASSIUM SERPL-SCNC: 3.7 MMOL/L (ref 3.5–5.3)
PROT SERPL-MCNC: 7.5 G/DL (ref 6.4–8.2)
RBC # BLD AUTO: 4.64 MILLION/UL (ref 3.81–5.12)
SODIUM SERPL-SCNC: 136 MMOL/L (ref 136–145)
T4 FREE SERPL-MCNC: 1.23 NG/DL (ref 0.76–1.46)
T4 SERPL-MCNC: 12.4 UG/DL (ref 4.7–13.3)
TRIGL SERPL-MCNC: 109 MG/DL
TSH SERPL DL<=0.05 MIU/L-ACNC: 5.5 UIU/ML (ref 0.36–3.74)
VIT B12 SERPL-MCNC: 300 PG/ML (ref 100–900)
WBC # BLD AUTO: 7.05 THOUSAND/UL (ref 4.31–10.16)

## 2021-03-11 PROCEDURE — 85027 COMPLETE CBC AUTOMATED: CPT

## 2021-03-11 PROCEDURE — 80307 DRUG TEST PRSMV CHEM ANLYZR: CPT | Performed by: FAMILY MEDICINE

## 2021-03-11 PROCEDURE — 82728 ASSAY OF FERRITIN: CPT

## 2021-03-11 PROCEDURE — 86800 THYROGLOBULIN ANTIBODY: CPT

## 2021-03-11 PROCEDURE — 84080 ASSAY ALKALINE PHOSPHATASES: CPT

## 2021-03-11 PROCEDURE — 82306 VITAMIN D 25 HYDROXY: CPT

## 2021-03-11 PROCEDURE — 80061 LIPID PANEL: CPT

## 2021-03-11 PROCEDURE — 84075 ASSAY ALKALINE PHOSPHATASE: CPT

## 2021-03-11 PROCEDURE — 82746 ASSAY OF FOLIC ACID SERUM: CPT

## 2021-03-11 PROCEDURE — 84443 ASSAY THYROID STIM HORMONE: CPT

## 2021-03-11 PROCEDURE — 86376 MICROSOMAL ANTIBODY EACH: CPT

## 2021-03-11 PROCEDURE — 83036 HEMOGLOBIN GLYCOSYLATED A1C: CPT

## 2021-03-11 PROCEDURE — 80074 ACUTE HEPATITIS PANEL: CPT

## 2021-03-11 PROCEDURE — 36415 COLL VENOUS BLD VENIPUNCTURE: CPT

## 2021-03-11 PROCEDURE — 82607 VITAMIN B-12: CPT

## 2021-03-11 PROCEDURE — 84439 ASSAY OF FREE THYROXINE: CPT

## 2021-03-11 PROCEDURE — 80053 COMPREHEN METABOLIC PANEL: CPT

## 2021-03-11 PROCEDURE — 83540 ASSAY OF IRON: CPT

## 2021-03-11 NOTE — TELEPHONE ENCOUNTER
Patient sent me a form for her new motorized wheelchair  Patient must be evaluated for a motorized wheelchair by PT department please schedule with [address] if [address] does not do at [address] [place] [address] definitely does a motorized wheelchair evaluations    After this is complete patient needs an in office office visit for me to evaluate and signed form

## 2021-03-12 LAB
AMPHETAMINES UR QL SCN: NEGATIVE NG/ML
BARBITURATES UR QL SCN: NEGATIVE NG/ML
BENZODIAZ UR QL: NEGATIVE NG/ML
BZE UR QL: NEGATIVE NG/ML
CANNABINOIDS UR QL SCN: NEGATIVE NG/ML
METHADONE UR QL SCN: NEGATIVE NG/ML
OPIATES UR QL: NEGATIVE NG/ML
OXYCODONE+OXYMORPHONE UR QL SCN: NEGATIVE NG/ML
PCP UR QL: NEGATIVE NG/ML
PROPOXYPH UR QL SCN: NEGATIVE NG/ML
THYROGLOB AB SERPL-ACNC: <1 IU/ML (ref 0–0.9)
THYROPEROXIDASE AB SERPL-ACNC: <9 IU/ML (ref 0–34)

## 2021-03-15 ENCOUNTER — TELEPHONE (OUTPATIENT)
Dept: GASTROENTEROLOGY | Facility: CLINIC | Age: 51
End: 2021-03-15

## 2021-03-15 LAB
ALP BONE CFR SERPL: 21 % (ref 14–68)
ALP INTEST CFR SERPL: 0 % (ref 0–18)
ALP LIVER CFR SERPL: 79 % (ref 18–85)
ALP SERPL-CCNC: 97 IU/L (ref 39–117)

## 2021-03-15 NOTE — TELEPHONE ENCOUNTER
Called patient from referral list ([phone]) and lvm to schedule an appointment   If patient calls ok to schedule

## 2021-03-17 ENCOUNTER — TELEPHONE (OUTPATIENT)
Dept: NEUROLOGY | Facility: CLINIC | Age: 51
End: 2021-03-17

## 2021-03-17 NOTE — TELEPHONE ENCOUNTER
Left detailed message advising pt of the below mentioned recommendation  Dr Kalyan French has open availability tomorrow 3/18/21 in [place] at 8 am and 10am if pt is interested scheduling her LAWRENCE with Dr Kalyan French

## 2021-03-17 NOTE — TELEPHONE ENCOUNTER
Barbra Isabel from [facility] called they did an evaluation for the Wheel Chair that she needed she also filled out a letter of necessity for the wheel chair  Dr Jacinda Jenkins should still sign the form that was sent over   If you have any questions you may call [address] [facility] at [phone]

## 2021-03-17 NOTE — TELEPHONE ENCOUNTER
Received a fax asking for progress notes and encounter notes from the last 6 months for a standard order of a power wheelchair with a letter of medical necessity  Patient has not been seen by Dr Berenice Morel and was to be a LAWRENCE from Dr Berenice Morel to Dr MCINTYRE and it looks like she cancelled her appointment with no appointment scheduled        Forms can not be signed at this time since she has not seen Dr Berenice Morel since 2/13/20 and was to see Dr Connor Marquez,    Please advise if this patient is going to be scheduled with Dr Rip Gottron?

## 2021-03-18 NOTE — TELEPHONE ENCOUNTER
Please see that earlier appt offered for tomorrow  Is pt able to take that?? Paperwork for the power chair usually needs to be a face to face, over one yr ago seems to long to be able to complete forms

## 2021-03-18 NOTE — TELEPHONE ENCOUNTER
Spoke with patient/ offered next available 5-24-21 9:00am (Virtual Visit) confirmed email [email or web address] patient would like to be seen sooner placed on  waitlist

## 2021-03-18 NOTE — TELEPHONE ENCOUNTER
Noted, thank you      Dr Po Basilio would you like to proceed with forms at this time, or have them taken care of at [facility] appt with Dr Sukhdeep Rowland

## 2021-03-18 NOTE — TELEPHONE ENCOUNTER
Looks like they are unable to take that  I just wanted to clarify that we were still not completing these forms

## 2021-03-22 ENCOUNTER — TELEPHONE (OUTPATIENT)
Dept: GASTROENTEROLOGY | Facility: CLINIC | Age: 51
End: 2021-03-22

## 2021-03-22 NOTE — TELEPHONE ENCOUNTER
Called patient from referral list ([phone]) and Lvm to bring in for an office visit, If patient calls back ok to schedule

## 2021-04-02 ENCOUNTER — TELEPHONE (OUTPATIENT)
Dept: NEUROLOGY | Facility: CLINIC | Age: 51
End: 2021-04-02

## 2021-04-02 NOTE — TELEPHONE ENCOUNTER
Patient has never been seen by Dr Samira Wallace, visit requires 60 min  Patient [facility] was with Dr Olivier Nance on 2/13/20

## 2021-04-02 NOTE — TELEPHONE ENCOUNTER
Spoke with patient and offered sooner appointment - patient will be coming in on Tuesday April 6 @ 1:30 in [facility] - insurance is still in force

## 2021-04-06 ENCOUNTER — OFFICE VISIT (OUTPATIENT)
Dept: NEUROLOGY | Facility: CLINIC | Age: 51
End: 2021-04-06
Payer: MEDICARE

## 2021-04-06 ENCOUNTER — TELEPHONE (OUTPATIENT)
Dept: NEUROLOGY | Facility: CLINIC | Age: 51
End: 2021-04-06

## 2021-04-06 VITALS
RESPIRATION RATE: 16 BRPM | HEIGHT: 67 IN | DIASTOLIC BLOOD PRESSURE: 82 MMHG | HEART RATE: 82 BPM | SYSTOLIC BLOOD PRESSURE: 104 MMHG | BODY MASS INDEX: 31.48 KG/M2

## 2021-04-06 DIAGNOSIS — G35 MULTIPLE SCLEROSIS (HCC): Primary | ICD-10-CM

## 2021-04-06 DIAGNOSIS — R32 URINARY INCONTINENCE, UNSPECIFIED TYPE: ICD-10-CM

## 2021-04-06 DIAGNOSIS — G82.20 PARAPLEGIA (HCC): ICD-10-CM

## 2021-04-06 DIAGNOSIS — N31.9 NEUROGENIC BLADDER: ICD-10-CM

## 2021-04-06 DIAGNOSIS — Z93.59 SUPRAPUBIC CATHETER (HCC): Chronic | ICD-10-CM

## 2021-04-06 DIAGNOSIS — R13.10 DYSPHAGIA, UNSPECIFIED TYPE: ICD-10-CM

## 2021-04-06 PROCEDURE — 99215 OFFICE O/P EST HI 40 MIN: CPT | Performed by: PSYCHIATRY & NEUROLOGY

## 2021-04-06 RX ORDER — BACLOFEN 20 MG/1
TABLET ORAL
Qty: 120 TABLET | Refills: 3 | Status: SHIPPED | OUTPATIENT
Start: 2021-04-06 | End: 2021-06-18

## 2021-04-06 NOTE — PROGRESS NOTES
He he her [facility] MULTIPLE SCLEROSIS CENTER  PATIENT:  Fredy Flaherty  MRN:  [id number]  :  1970  DATE OF SERVICE:  2021    Assessment/Plan:           Problem List Items Addressed This Visit        Digestive    Swallowing difficulty       Nervous and Auditory    Multiple sclerosis (HCC) - Primary (Chronic)    Relevant Medications    baclofen 20 mg tablet    Paraplegia (HCC)       Other    Suprapubic catheter (HCC) (Chronic)    Neurogenic bladder    Urinary incontinence          Mrs Harvey Ashley has presented to [facility] for evaluation  Patient has established care with Dr Madison Hare and Amina Painter, as patient has establish follow-up on annual basis:  - patient presented today with her  for evaluation to request chin controlled wheelchair as she has quadriplegia  - patient was diagnosed with multiple sclerosis in  by Romel Kirby at [facility], and within short 3 years patient started using cane than rolling walker, with question was brought for primary progressive multiple sclerosis;   - concern was brought today for difficulties with breathing and having softer voice due to longstanding progressive condition; she patient is to consider establish her care with pulmonary team for basic evaluation of functional lung capacity if clinically indicated  Evaluation by speech and swallow team has been completed patient was advised to use couple maneuvers to avoid aspiration  - patient will have physiatry evaluation for spasticity; patient has baclofen 60 mg may increase up to 4 times a day  Phyllistine Drain was discussed- single medication approved for primary progressive MS;   - MRI brain was done in 2020, and stable from 2018    - vitamin b12 was 300 and may start    -  mg/dL  - [place] [employer] form was received by [facility] and will require review and submission then signed      Patient is to follow with [facility] within 3 months  While the Covid-19 mRNA vaccine was not specifically studied in MS patients, it is probably safe for patients with MS, off or on disease modifying therapy, 25years of age and older  The benefits  of the Covid-19 vaccine outweigh the risks in this evolving pandemic  Please follow the link below to the [employer]'s statement on the vaccine in MS patients:  Meng barrera  Subjective:  Multiple sclerosis and related issues  HPI  Mrs Danielle Bourgeois has presented to [facility] [address] for evaluation  Patient is LAWRENCE from Dr Shae Springer services with [address] 2/13/2020, with no last 6 months evaluation provided for MS team to provide a power wheelchair order with a letter of medical necessity  Current sxs are neck pain neck stiffness, fatigue  Pt is wheel chair bound  Patient described having very rapid progression of her multiple sclerosis despite been diagnosed and T 98°  Do patient up to 3 years when she started using cane and then walker and then become wheelchair-bound  Patient was considering evaluation by pulmonary specialist as she has progressive worsening in her lung capacity with intermittent shortness of breath has been noted  With those function are due to progressive neuro degenerative process due to multiple sclerosis  No recent infection has been described  Patient using for the stroke, she has intermittent difficulties swallowing, and after 5 small sips patient is out of breath  Patient was recently hospitalized for stones developed in her ureter, with consequent sepsis reported  Today's patient brought question if she can be evaluated for chain controlled wheelchair       The following portions of the patient's history were reviewed and updated as appropriate:   She  has a past medical history of Anesthesia, Bladder stones, Chronic pain disorder, Contracture, right shoulder, Dependent on wheelchair, Edema, Elevated alkaline phosphatase level, Fernandez catheter in place, Gallbladder disease, History of femur fracture, History of UTI, MS (multiple sclerosis) ([facility] [address] ), Muscle spasticity, Muscle weakness, Muscular dystrophy ([address] ), Neck pain, Neurogenic bladder, Paralysis ([address] ), Port-A-Cath in place, Pressure injury of skin, Sacral pressure sore, Swallowing difficulty, Tinnitus, and Urinary incontinence    She   Patient Active Problem List    Diagnosis Date Noted    Increased endometrial stripe thickness 03/10/2021    Ureteral calculus, left 11/03/2020    Transaminitis 10/28/2020    Alkaline phosphatase elevation 10/28/2020    Abnormal thyroid function test 10/28/2020    Microcytic anemia 10/28/2020    Candidal vaginitis 10/19/2020    Mural thickening of sigmoid colon 10/07/2020    Hydronephrosis with urinary obstruction due to ureteral calculus 10/02/2020    Thrombocytopenia ([facility] [address] ) 10/01/2020    Serum total bilirubin elevated 10/01/2020    Urinary tract infection associated with cystostomy catheter ([facility] [address] ) 09/30/2020    Pressure ulcer of right buttock, stage 4 ([facility] [address] ) 09/03/2020    Medication management contract signed 02/21/2020    Screening mammogram, encounter for 05/29/2019    Health care maintenance 05/29/2019    Allergic rhinitis 03/26/2018    Functional quadriplegia secondary to MS [facility]) 01/25/2018    Suprapubic catheter ([facility] [address] ) 11/17/2017    Nephrolithiasis 04/22/2016    Bladder calculus 09/02/2015    Spasticity 05/19/2015    Muscle spasm 04/21/2015    Hypokalemia 01/20/2015    Vitamin B12 deficiency 01/16/2015    Constipation 01/14/2015    Hyperglycemia 11/25/2014    Mixed hyperlipidemia 11/25/2014    Depression 01/22/2014    Lymphedema 01/22/2014    Spinal stenosis 01/22/2014    Urinary incontinence 01/22/2014    Vitamin D deficiency 11/18/2013    Swallowing difficulty 11/04/2013    Dizziness 11/04/2013    Muscle weakness 11/04/2013    Neurogenic bladder 11/04/2013    Sleep disorder 11/04/2013    Tremor 11/04/2013    Vision loss 11/04/2013    Multiple sclerosis ([facility] [address] ) 10/21/2013    Paraplegia ([facility] ) 10/21/2013     She  has a past surgical history that includes Cholecystectomy; Kidney stone surgery; Portacath placement (Left); Esophagogastroduodenoscopy; Bladder stone removal (N/A, 12/4/2017); Bladder surgery; Suprapubic cystostomy (02/25/2014); Cystoscopy (06/15/2020); [place] retrograde pyelogram (10/2/2020); pr cystourethroscopy,ureter catheter (Left, 10/2/2020); pr cysto/uretero w/lithotripsy &indwell stent insrt (Left, 11/3/2020); and [place] retrograde pyelogram (11/3/2020)  Her family history includes Alzheimer's disease in her family; COPD in her father; Diabetes in her family and mother; Heart disease in her family; Hyperlipidemia in her mother and sister; Hypertension in her family, father, and mother  She  reports that she has never smoked  She has never used smokeless tobacco  She reports previous alcohol use  She reports that she does not use drugs    Current Outpatient Medications   Medication Sig Dispense Refill    baclofen 20 mg tablet Take 1 in the morning and two at bedtime 120 tablet 3    DULoxetine (CYMBALTA) 30 mg delayed release capsule TAKE 1 CAPSULE BY MOUTH EVERY DAY (Patient taking differently: 30 mg daily at bedtime ) 90 capsule 1    furosemide (LASIX) 40 mg tablet TAKE 1 TABLET BY MOUTH EVERY DAY 90 tablet 3    oxyCODONE-acetaminophen (PERCOCET) 5-325 mg per tablet Take 1 tablet by mouth every 4 (four) hours as needed for moderate painMax Daily Amount: 6 tablets 40 tablet 0    sodium hypochlorite (DAKIN'S HALF-STRENGTH) external solution Apply 1 application topically every other day 473 mL 0    Vitamin D, Cholecalciferol, 50 MCG (2000 UT) CAPS Take by mouth daily       potassium chloride (KLOR-CON) 8 MEQ tablet TAKE 1 TABLET BY MOUTH EVERY DAY WITH FOOD (Patient not taking: Reported on 10/20/2020) 90 tablet 1     No current facility-administered medications for this visit  Current Outpatient Medications on File Prior to Visit   Medication Sig    DULoxetine (CYMBALTA) 30 mg delayed release capsule TAKE 1 CAPSULE BY MOUTH EVERY DAY (Patient taking differently: 30 mg daily at bedtime )    furosemide (LASIX) 40 mg tablet TAKE 1 TABLET BY MOUTH EVERY DAY    oxyCODONE-acetaminophen (PERCOCET) 5-325 mg per tablet Take 1 tablet by mouth every 4 (four) hours as needed for moderate painMax Daily Amount: 6 tablets    sodium hypochlorite (DAKIN'S HALF-STRENGTH) external solution Apply 1 application topically every other day    Vitamin D, Cholecalciferol, 50 MCG (2000 UT) CAPS Take by mouth daily     [DISCONTINUED] baclofen 20 mg tablet TAKE 1 TABLET BY MOUTH 5 TIMES A DAY AS NEEDED    potassium chloride (KLOR-CON) 8 MEQ tablet TAKE 1 TABLET BY MOUTH EVERY DAY WITH FOOD (Patient not taking: Reported on 10/20/2020)     No current facility-administered medications on file prior to visit  She is allergic to latex - food allergy            Objective:    Blood pressure 104/82, pulse 82, resp  rate 16, height 5' 6 5" (1 689 m)  Physical Exam    Neurological Exam  CONSTITUTIONAL: NAD, pleasant  NECK: supple, no lymphadenopathy, no thyromegaly, no JVD  CARDIOVASCULAR: RRR, normal S1S2, no murmurs, no rubs  RESP: clear to auscultation bilaterally, no wheezes/rhonchi/rales  ABDOMEN: soft, non tender, non distended  SKIN: no rash or skin lesions  EXTREMITIES: no edema, pulses 2+bilaterally  PSYCH: appropriate mood and affect  NEUROLOGIC COMPREHENSIVE EXAM: Patient is oriented to person, place and time, NAD; appropriate affect  CN II, III, IV, V, VI, VII,VIII,IX,X,XI-XII intact with EOMI, PERRLA, OKN intact, VF grossly intact, fundi poorly visualized secondary to pupillary constriction; symmetric face noted   Motor: 1/5 UE/LE bilateral symmetric, with LUE can bring against gravity and RUE 2/5 bringing hands toward the body; LE 1/5 , with LLE dorsiflexion; Sensory: decreased to light touch and pinprick bilaterally; normal vibration sensation feet bilaterally; Coordination within normal limits on FTN and YVONNE testing; DTR: 1/4 through, no Babinski, no clonus  Patient was in wheelchair, with muscle contracture in right hand noted  ROS:  12 points of review of system was reviewed with the patient and was unremarkable with exception: see HPI  Review of Systems   Constitutional: Positive for fatigue  Negative for appetite change and fever  HENT: Negative  Negative for hearing loss, tinnitus, trouble swallowing and voice change  Eyes: Negative  Negative for photophobia and pain  Respiratory: Negative  Negative for shortness of breath  Cardiovascular: Negative  Negative for palpitations  Gastrointestinal: Negative  Negative for nausea and vomiting  Endocrine: Negative  Negative for cold intolerance  Genitourinary: Negative  Negative for dysuria, frequency and urgency  Musculoskeletal: Positive for gait problem, neck pain and neck stiffness  Negative for myalgias  Skin: Negative  Negative for rash  Allergic/Immunologic: Negative  Neurological: Negative for dizziness, tremors, seizures, syncope, facial asymmetry, speech difficulty, weakness, light-headedness, numbness and headaches  Hematological: Negative  Does not bruise/bleed easily  Psychiatric/Behavioral: Negative  Negative for confusion, hallucinations and sleep disturbance

## 2021-04-06 NOTE — TELEPHONE ENCOUNTER
Ludivina- patient was in the office for initial evaluation for chin-controlled wheelchair, as she mentioned her paperwork was sent to our practice already to be signed by Dr Vahid Rodriguez  Can you pleas guide me with the process ?  Thank you

## 2021-04-07 NOTE — TELEPHONE ENCOUNTER
Forms prepped and sent to Andi Simon  She will give them to Dr Wilian Field for her review and signature

## 2021-04-07 NOTE — TELEPHONE ENCOUNTER
The e-mail below was sent to [facility]:     "Good Morning Alondra Jeff,     I hope all is well  We received your powerchair request forms for Santy Gibson,  70  The forms were requested for Dr Oswald Mcwilliams to complete and sign, however this patient now sees Dr Guzman Oneill for her MS care  Are you able to addend the forms for Dr Jose Castro to complete and sign rather than Dr Savanah Prince? Simeon Gant has not seen Dr Savanah Prince in over a year  Please advise  Thank you,"      MSW will wait for her response

## 2021-04-07 NOTE — TELEPHONE ENCOUNTER
MSW reviewed the patient's chart to find [place] [employer]'s forms that needed to be completed and signed  MSW was unable to find these forms  Per telephone encounter dated 3/18/21 forms were given to Rosibel Milan MA for Dr Eriberto Killian to review and sign  Since patient was LAWRENCE to Dr Elaine Feldman it is unclear if the forms were completed by Dr Eriberto Killian or given to Dr Twan Ortiz MA  MSW will reach out to Simeon Claudio

## 2021-04-07 NOTE — TELEPHONE ENCOUNTER
MSW spoke with Maxime Hinton  She sent MSW a copy of the forms  MSW reviewed the forms  All are written to be signed for Dr Delphine Garcia, however Dr Gabrielle Jj completed the evaluation  MSW will reach out to [place] and [address] [place] [address] to ask for them to addend the forms for Dr Jesse Hoang  Once sent back, MSW will have the forms sent to Dr Gabrielle Jj for her review and signature

## 2021-04-07 NOTE — TELEPHONE ENCOUNTER
Uriel Mathews made the changes to the forms and sent them to MSW  MSW will prep them and send them to Dr Michelle Trent for her review and signature

## 2021-04-08 NOTE — TELEPHONE ENCOUNTER
Signed forms returned back to MSW from Graham Stevenson scanned them in to the patient's chart  MSW sent them to [facility] and informed her to reach out if any further information is needed  There are no further needs for the patient at this time  MSW will be available to assist with any future needs in regard to this patient

## 2021-04-29 ENCOUNTER — OFFICE VISIT (OUTPATIENT)
Dept: WOUND CARE | Facility: HOSPITAL | Age: 51
End: 2021-04-29
Payer: MEDICARE

## 2021-04-29 VITALS
DIASTOLIC BLOOD PRESSURE: 76 MMHG | SYSTOLIC BLOOD PRESSURE: 118 MMHG | HEART RATE: 84 BPM | TEMPERATURE: 96.6 F | RESPIRATION RATE: 16 BRPM

## 2021-04-29 DIAGNOSIS — L89.314 PRESSURE ULCER OF RIGHT BUTTOCK, STAGE 4 (HCC): Primary | ICD-10-CM

## 2021-04-29 PROCEDURE — 99213 OFFICE O/P EST LOW 20 MIN: CPT | Performed by: FAMILY MEDICINE

## 2021-04-29 NOTE — PROGRESS NOTES
Patient ID: Kina De La Garza is a 46 y o  female Date of Birth 1970     Chief Complaint  Chief Complaint   Patient presents with    Follow Up Wound Care Visit     Right Ischial       Allergies  Latex    Assessment:    Pressure ulcer of right buttock, stage 4 (HCC)    Wound is significantly improved   continue wound management Iodoform packing   pressure relief  Followup in 3-4 weeks or call sooner with questions or concerns       Diagnoses and all orders for this visit:    Pressure ulcer of right buttock, stage 4 (HCC)  -     Wound cleansing and dressings; Future  -     Wound home care; Future              Procedures    Plan:     Wound 10/01/20 Pressure Injury Ischium Right (Active)   Wound Image Images linked 04/29/21 1434   Wound Description Yellow;Pink 04/29/21 1435   Ruchi-wound Assessment Maceration;Scar Tissue;Pink 04/29/21 1435   Wound Length (cm) 0 2 cm 04/29/21 1435   Wound Width (cm) 0 7 cm 04/29/21 1435   Wound Depth (cm) 2 cm 04/29/21 1435   Wound Surface Area (cm^2) 0 14 cm^2 04/29/21 1435   Wound Volume (cm^3) 0 28 cm^3 04/29/21 1435   Calculated Wound Volume (cm^3) 0 28 cm^3 04/29/21 1435   Change in Wound Size % -12 04/29/21 1435   Tunneling 4 4 cm 04/29/21 1435   Tunneling in depth located at 12 oclock 04/29/21 1435   Undermining 5 2 04/29/21 1435   Undermining is depth extending from 9-1 oclock 04/29/21 1435   Drainage Amount Small 04/29/21 1435   Drainage Description Serous; Yellow 04/29/21 1435   Non-staged Wound Description Full thickness 04/29/21 1435   Dressing Mesalt 04/29/21 1435   Wound packed?  Yes 04/29/21 1435       Wound 10/01/20 Pressure Injury Ischium Right (Active)   Date First Assessed/Time First Assessed: 10/01/20 1320   Pre-Existing Wound: Yes  Primary Wound Type: Pressure Injury  Location: Ischium  Wound Location Orientation: Right       [REMOVED] Wound Sacrum hydrocolloid intact (Removed)   Resolved Date/Resolved Time: 08/24/20 1517  No Date First Assessed or Time First Assessed found  Location: Sacrum  Wound Description (Comments): hydrocolloid intact  Dressing Status: Clean;Dry; Intact       [REMOVED] Incision 12/04/17 Vagina Other (Comment) (Removed)   Resolved Date/Resolved Time: 08/24/20 1509  Date First Assessed/Time First Assessed: 12/04/17 1558   Location: Vagina  Wound Location Orientation: Other (Comment)  Wound Outcome: Healed       [REMOVED] Wound 08/24/20 Pressure Injury Other (Comment) Right (Removed)   Resolved Date/Resolved Time: 10/01/20 1445  Date First Assessed/Time First Assessed: 08/24/20 1518   Primary Wound Type: Pressure Injury  Location: (c) Other (Comment)  Wound Location Orientation: Right  Dressing Status: Old drainage;Removed       [REMOVED] Wound 11/03/20 Vagina N/A (Removed)   Resolved Date/Resolved Time: 11/18/20 1608  Date First Assessed/Time First Assessed: 11/03/20 0847   Location: Vagina  Wound Location Orientation: N/A  Wound Description (Comments): string on stent  Incision's 1st Dressing: DRESSING TRNSPRT TEGADERM 2  Subjective:        Patient presents for followup   Stage IV pressure ulcer of the right ischium  No increased pain or drainage, patient an  reports that the drainage is significantly improved  The opening has significantly decreased in size as well so they have stopped using the mesalt and been using Iodoform packing over the last few weeks        The following portions of the patient's history were reviewed and updated as appropriate: She  has a past medical history of Anesthesia, Bladder stones, Chronic pain disorder, Contracture, right shoulder, Dependent on wheelchair, Edema, Elevated alkaline phosphatase level, Fernandez catheter in place, Gallbladder disease, History of femur fracture, History of UTI, MS (multiple sclerosis) ([address] ), Muscle spasticity, Muscle weakness, Muscular dystrophy ([address] ), Neck pain, Neurogenic bladder, Paralysis ([address] ), Port-A-Cath in place, Pressure injury of skin, Sacral pressure sore, Swallowing difficulty, Tinnitus, and Urinary incontinence  She  reports that she has never smoked  She has never used smokeless tobacco  She reports previous alcohol use  She reports that she does not use drugs  She is allergic to latex       Review of Systems   Constitutional: Negative for chills and fever  HENT: Negative for congestion and sneezing  Respiratory: Negative for cough  Cardiovascular: Positive for leg swelling  Musculoskeletal: Positive for gait problem  Skin: Positive for wound  Psychiatric/Behavioral: Negative for agitation  Objective:       Wound 10/01/20 Pressure Injury Ischium Right (Active)   Wound Image Images linked 04/29/21 1434   Wound Description Yellow;Pink 04/29/21 1435   Ruchi-wound Assessment Maceration;Scar Tissue;Pink 04/29/21 1435   Wound Length (cm) 0 2 cm 04/29/21 1435   Wound Width (cm) 0 7 cm 04/29/21 1435   Wound Depth (cm) 2 cm 04/29/21 1435   Wound Surface Area (cm^2) 0 14 cm^2 04/29/21 1435   Wound Volume (cm^3) 0 28 cm^3 04/29/21 1435   Calculated Wound Volume (cm^3) 0 28 cm^3 04/29/21 1435   Change in Wound Size % -12 04/29/21 1435   Tunneling 4 4 cm 04/29/21 1435   Tunneling in depth located at 12 oclock 04/29/21 1435   Undermining 5 2 04/29/21 1435   Undermining is depth extending from 9-1 oclock 04/29/21 1435   Drainage Amount Small 04/29/21 1435   Drainage Description Serous; Yellow 04/29/21 1435   Non-staged Wound Description Full thickness 04/29/21 1435   Dressing Mesalt 04/29/21 1435   Wound packed? Yes 04/29/21 1435       /76   Pulse 84   Temp (!) 96 6 °F (35 9 °C)   Resp 16     Physical Exam  Constitutional:       General: She is not in acute distress  Appearance: Normal appearance  She is not ill-appearing, toxic-appearing or diaphoretic  HENT:      Head: Normocephalic and atraumatic        Right Ear: External ear normal       Left Ear: External ear normal    Eyes:      Conjunctiva/sclera: Conjunctivae normal    Neck: Musculoskeletal: Neck supple  Pulmonary:      Effort: Pulmonary effort is normal  No respiratory distress  Musculoskeletal:      Right lower leg: Edema present  Left lower leg: Edema present  Skin:     Comments: See wound assessment   Neurological:      Mental Status: She is alert  Gait: Gait abnormal (nonambulatory)  Psychiatric:         Mood and Affect: Mood normal          Behavior: Behavior normal            Wound Instructions:  Orders Placed This Encounter   Procedures    Wound cleansing and dressings     Right ischial wound  Wash your hands with soap and water  Remove old dressing, discard into plastic bag and place in trash  Cleanse the wound with dakins weekly and normal saline remaining days prior to applying a clean dressing  Do not use tissue or cotton balls  Do not scrub the wound  Pat dry using gauze  Shower yes    Loosely pack with 1/4 inch Iodoform packing into the wound and tape a tail to the chelsey wound  Cover with allevyn life    Change dressing daily  (Home care to assess and care for wound 2-3x week)     Off-loading Instructions:     Keep weight and pressure off wound at all times  Use Wheelchair Cushion for pressure relief--- (Do not use donut-type devices)  Seat lifts or shift position in chair every 15 minutes  Turn every 1-2 hours and more often if able to  Avoid position directing pressure to Wound site  Limit side lying to 30 degree tilt  Limit the head of bed elevation to 30 degrees  Do Not Sit for Long Periods of Time      Ensure to increase protein in your diet for wound healing    [facility]    Follow up in 4 weeks     Standing Status:   Future     Standing Expiration Date:   4/29/2022    Wound home care     Continue [facility] for wound care     Standing Status:   Future     Standing Expiration Date:   4/29/2022        Diagnosis ICD-10-CM Associated Orders   1   Pressure ulcer of right buttock, stage 4 ([facility] [address] )  [id number] Wound cleansing and dressings     Wound home care

## 2021-04-29 NOTE — ASSESSMENT & PLAN NOTE
Wound is significantly improved   continue wound management Iodoform packing   pressure relief  Followup in 3-4 weeks or call sooner with questions or concerns

## 2021-04-30 ENCOUNTER — TELEPHONE (OUTPATIENT)
Dept: UROLOGY | Facility: CLINIC | Age: 51
End: 2021-04-30

## 2021-04-30 NOTE — TELEPHONE ENCOUNTER
Letter Sent to notify patient of appt   Rescheduled from 6/21 to 6/4 @ 9am due to Curry Carranza 81  out of office that day **

## 2021-05-03 ENCOUNTER — TELEPHONE (OUTPATIENT)
Dept: FAMILY MEDICINE CLINIC | Facility: CLINIC | Age: 51
End: 2021-05-03

## 2021-05-03 DIAGNOSIS — L89.314 PRESSURE ULCER OF RIGHT BUTTOCK, STAGE 4 (HCC): ICD-10-CM

## 2021-05-03 DIAGNOSIS — G35 FUNCTIONAL QUADRIPLEGIA SECONDARY TO MS (HCC): Primary | Chronic | ICD-10-CM

## 2021-05-03 DIAGNOSIS — G35 MULTIPLE SCLEROSIS (HCC): Chronic | ICD-10-CM

## 2021-05-03 DIAGNOSIS — R53.2 FUNCTIONAL QUADRIPLEGIA SECONDARY TO MS (HCC): Primary | Chronic | ICD-10-CM

## 2021-05-03 DIAGNOSIS — G82.20 PARAPLEGIA (HCC): ICD-10-CM

## 2021-05-03 NOTE — TELEPHONE ENCOUNTER
Shelbie Roman from [facility] visiting nurse called, on Friday 4/30/21 they went to the home to recertify and they could not find her so they had to discharge her   She states they now need a new referral

## 2021-05-07 DIAGNOSIS — L89.314 PRESSURE ULCER OF RIGHT BUTTOCK, STAGE 4 (HCC): ICD-10-CM

## 2021-05-07 RX ORDER — SODIUM HYPOCHLORITE 2.5 MG/ML
1 SOLUTION TOPICAL EVERY OTHER DAY
Qty: 473 ML | Refills: 0 | Status: SHIPPED | OUTPATIENT
Start: 2021-05-07 | End: 2022-07-20

## 2021-05-07 NOTE — TELEPHONE ENCOUNTER
[facility] with [facility] called requesting order for Dakin's Half-strength external solution as they will be visiting pt tomorrow

## 2021-05-14 NOTE — TELEPHONE ENCOUNTER
MSW phoned José Miguel Minaya at [facility] and [employer] at [phone], ext [phone]  Ivon Bates stated that the 3 items (hydration pack, personal hook, and power seat elevation system) are excluded items from patient's plan which is why they were not covered  Ivon Bates stated that patient/her  were agreeable to pay privately for those 3 items, plus a portion of the copay not covered by [employer] Bates stated that patient's out of pocket expense came to about $4,000 and patient/ signed up for "Care Credit" which is a charge card for medical expenses, where if you pay off the balance during a promotional period, you don't pay interest     Ivon Bates stated that the power wheelchair has been ordered for patient and that they will notify patient once it is in

## 2021-05-29 ENCOUNTER — NURSE TRIAGE (OUTPATIENT)
Dept: OTHER | Facility: OTHER | Age: 51
End: 2021-05-29

## 2021-05-29 NOTE — TELEPHONE ENCOUNTER
Regarding: possible catheter infection   ----- Message from Nataliia Price sent at 5/29/2021 11:37 AM EDT -----  " I have catheter in and I'm pretty sure its infected "

## 2021-06-01 NOTE — TELEPHONE ENCOUNTER
Spoke to pt she advises, sxs has resolved  She is feeling much better and will f/u with urology on Friday

## 2021-06-03 ENCOUNTER — OFFICE VISIT (OUTPATIENT)
Dept: WOUND CARE | Facility: HOSPITAL | Age: 51
End: 2021-06-03
Payer: MEDICARE

## 2021-06-03 VITALS
SYSTOLIC BLOOD PRESSURE: 120 MMHG | TEMPERATURE: 97.3 F | HEART RATE: 72 BPM | RESPIRATION RATE: 16 BRPM | DIASTOLIC BLOOD PRESSURE: 60 MMHG

## 2021-06-03 DIAGNOSIS — L89.314 PRESSURE ULCER OF RIGHT BUTTOCK, STAGE 4 (HCC): Primary | ICD-10-CM

## 2021-06-03 PROCEDURE — 99213 OFFICE O/P EST LOW 20 MIN: CPT | Performed by: FAMILY MEDICINE

## 2021-06-03 PROCEDURE — 99212 OFFICE O/P EST SF 10 MIN: CPT | Performed by: FAMILY MEDICINE

## 2021-06-03 NOTE — PATIENT INSTRUCTIONS
Orders Placed This Encounter   Procedures    Wound cleansing and dressings     Right ischial wound  Wash your hands with soap and water  Remove old dressing, discard into plastic bag and place in trash  Cleanse the wound with dakins weekly and normal saline remaining days prior to applying a clean dressing  Do not use tissue or cotton balls  Do not scrub the wound  Pat dry using gauze  Shower yes    Loosely pack with 1/4 inch Iodoform packing into the wound and tape a tail to the chelsey wound  Cover with allevyn life    Change dressing daily  (Home care to assess and care for wound 2-3x week)               Treatments above were completed today at the [address] [facility],[address]        Standing Status:   Future     Standing Expiration Date:   6/3/2022    Wound off loading     Off-loading Instructions:     Keep weight and pressure off wound at all times  Use Wheelchair Cushion for pressure relief--- (Do not use donut-type devices)  Seat lifts or shift position in chair every 15 minutes  Turn every 1-2 hours and more often if able to  Avoid position directing pressure to Wound site  Limit side lying to 30 degree tilt  Limit the head of bed elevation to 30 degrees  Do Not Sit for Long Periods of Time       Standing Status:   Future     Standing Expiration Date:   6/3/2022    Wound miscellaneous orders     Ensure to increase protein in your diet for wound healing     Standing Status:   Future     Standing Expiration Date:   6/3/2022    Wound home care     Continue visiting nurses for dressing changes     Standing Status:   Future     Standing Expiration Date:   6/3/2022

## 2021-06-03 NOTE — PROGRESS NOTES
Patient ID: Jaziel Forbes is a 46 y o  female Date of Birth 1970     Chief Complaint  Chief Complaint   Patient presents with    Follow Up Wound Care Visit     right ishium wound       Allergies  Latex    Assessment:    Pressure ulcer of right buttock, stage 4 (HCC)    Wound is worse today in the tunnel   wound base appears clean, no debridement today   No clinical signs of infection    continue wound management with Iodoform packing  Again discussed the importance of proper pressure relief  Recommend patient take several breaks throughout the day to get in bed and lie on her side to take pressure completely the area  Adequate protein intake, 3-4 servings per day  Followup in 4 weeks or call sooner with questions or concerns       Diagnoses and all orders for this visit:    Pressure ulcer of right buttock, stage 4 (HCC)  -     Wound cleansing and dressings; Future  -     Wound off loading; Future  -     Wound miscellaneous orders; Future  -     Wound home care; Future              Procedures    Plan:     Wound 10/01/20 Pressure Injury Ischium Right (Active)   Wound Image Images linked 06/03/21 1358   Wound Description Yellow;Pink; Other (Comment) (unable to visulize wound bed) 06/03/21 1407   Ruchi-wound Assessment Maceration;Scar Tissue;Pink 06/03/21 1407   Wound Length (cm) 0 3 cm 06/03/21 1407   Wound Width (cm) 0 7 cm 06/03/21 1407   Wound Depth (cm) 2 1 cm 06/03/21 1407   Wound Surface Area (cm^2) 0 21 cm^2 06/03/21 1407   Wound Volume (cm^3) 0 44 cm^3 06/03/21 1407   Calculated Wound Volume (cm^3) 0 44 cm^3 06/03/21 1407   Change in Wound Size % -76 06/03/21 1407   Tunneling 6 5 cm 06/03/21 1407   Tunneling in depth located at 12 oclock 06/03/21 1407   Undermining 3 7 06/03/21 1407   Undermining is depth extending from 9-1 oclock 06/03/21 1407   Drainage Amount Moderate 06/03/21 1407   Drainage Description Serosanguineous;Milky 06/03/21 1407   Non-staged Wound Description Full thickness 06/03/21 1407 Wound 10/01/20 Pressure Injury Ischium Right (Active)   Date First Assessed/Time First Assessed: 10/01/20 1320   Pre-Existing Wound: Yes  Primary Wound Type: Pressure Injury  Location: Ischium  Wound Location Orientation: Right       [REMOVED] Wound Sacrum hydrocolloid intact (Removed)   Resolved Date/Resolved Time: 08/24/20 1517  No Date First Assessed or Time First Assessed found  Location: Sacrum  Wound Description (Comments): hydrocolloid intact  Dressing Status: Clean;Dry; Intact       [REMOVED] Incision 12/04/17 Vagina Other (Comment) (Removed)   Resolved Date/Resolved Time: 08/24/20 1509  Date First Assessed/Time First Assessed: 12/04/17 1558   Location: Vagina  Wound Location Orientation: Other (Comment)  Wound Outcome: Healed       [REMOVED] Wound 08/24/20 Pressure Injury Other (Comment) Right (Removed)   Resolved Date/Resolved Time: 10/01/20 1445  Date First Assessed/Time First Assessed: 08/24/20 1518   Primary Wound Type: Pressure Injury  Location: (c) Other (Comment)  Wound Location Orientation: Right  Dressing Status: Old drainage;Removed       [REMOVED] Wound 11/03/20 Vagina N/A (Removed)   Resolved Date/Resolved Time: 11/18/20 1608  Date First Assessed/Time First Assessed: 11/03/20 0847   Location: Vagina  Wound Location Orientation: N/A  Wound Description (Comments): string on stent  Incision's 1st Dressing: DRESSING TRNSPRT TEGADERM 2  Subjective:        Patient presents for followup of a stage IV pressure ulcer of the right buttock  No increased pain or drainage  Has been using Iodoform packing  Patient has reports that just today he noticed bit of thicker drainage but it has been doing very well lately  Patient spent 1 day on a cushion other than her usual cushion over the holiday weekend        The following portions of the patient's history were reviewed and updated as appropriate: She  has a past medical history of Anesthesia, Bladder stones, Chronic pain disorder, Contracture, right shoulder, Dependent on wheelchair, Edema, Elevated alkaline phosphatase level, Fernandez catheter in place, Gallbladder disease, History of femur fracture, History of UTI, MS (multiple sclerosis) ([facility] [address] ), Muscle spasticity, Muscle weakness, Muscular dystrophy ([facility] [address] ), Neck pain, Neurogenic bladder, Paralysis ([facility] [address] ), Port-A-Cath in place, Pressure injury of skin, Sacral pressure sore, Swallowing difficulty, Tinnitus, and Urinary incontinence  She  reports that she has never smoked  She has never used smokeless tobacco  She reports previous alcohol use  She reports that she does not use drugs  She is allergic to latex       Review of Systems   Constitutional: Negative for chills and fever  HENT: Negative for congestion and sneezing  Respiratory: Negative for cough  Cardiovascular: Positive for leg swelling  Musculoskeletal: Positive for gait problem  Skin: Positive for wound  Psychiatric/Behavioral: Negative for agitation  Objective:       Wound 10/01/20 Pressure Injury Ischium Right (Active)   Wound Image Images linked 06/03/21 1358   Wound Description Yellow;Pink; Other (Comment) (unable to visulize wound bed) 06/03/21 1407   Ruchi-wound Assessment Maceration;Scar Tissue;Pink 06/03/21 1407   Wound Length (cm) 0 3 cm 06/03/21 1407   Wound Width (cm) 0 7 cm 06/03/21 1407   Wound Depth (cm) 2 1 cm 06/03/21 1407   Wound Surface Area (cm^2) 0 21 cm^2 06/03/21 1407   Wound Volume (cm^3) 0 44 cm^3 06/03/21 1407   Calculated Wound Volume (cm^3) 0 44 cm^3 06/03/21 1407   Change in Wound Size % -76 06/03/21 1407   Tunneling 6 5 cm 06/03/21 1407   Tunneling in depth located at 12 oclock 06/03/21 1407   Undermining 3 7 06/03/21 1407   Undermining is depth extending from 9-1 oclock 06/03/21 1407   Drainage Amount Moderate 06/03/21 1407   Drainage Description Serosanguineous;Milky 06/03/21 1407   Non-staged Wound Description Full thickness 06/03/21 1407       /60   Pulse 72   Temp (!) 97 3 °F (36 3 °C)   Resp 16     Physical Exam  Constitutional:       General: She is not in acute distress  Appearance: Normal appearance  She is not ill-appearing, toxic-appearing or diaphoretic  HENT:      Head: Normocephalic and atraumatic  Right Ear: External ear normal       Left Ear: External ear normal    Eyes:      Conjunctiva/sclera: Conjunctivae normal    Neck:      Musculoskeletal: Neck supple  Pulmonary:      Effort: Pulmonary effort is normal  No respiratory distress  Musculoskeletal:      Right lower leg: Edema present  Left lower leg: Edema present  Skin:     Comments: See wound assessment   Neurological:      Mental Status: She is alert  Gait: Gait abnormal    Psychiatric:         Mood and Affect: Mood normal          Behavior: Behavior normal            Wound 10/01/20 Pressure Injury Ischium Right (Active)   Wound Image   06/03/21 1358   Wound Description Yellow;Pink; Other (Comment) 06/03/21 1407   Pressure Injury Stage 4 03/08/21 1530   Ruchi-wound Assessment Maceration;Scar Tissue;Pink 06/03/21 1407   Wound Length (cm) 0 3 cm 06/03/21 1407   Wound Width (cm) 0 7 cm 06/03/21 1407   Wound Depth (cm) 2 1 cm 06/03/21 1407   Wound Surface Area (cm^2) 0 21 cm^2 06/03/21 1407   Wound Volume (cm^3) 0 44 cm^3 06/03/21 1407   Calculated Wound Volume (cm^3) 0 44 cm^3 06/03/21 1407   Change in Wound Size % -76 06/03/21 1407   Tunneling 6 5 cm 06/03/21 1407   Tunneling in depth located at 12 oclock 06/03/21 1407   Undermining 3 7 06/03/21 1407   Undermining is depth extending from 9-1 oclock 06/03/21 1407   Drainage Amount Moderate 06/03/21 1407   Drainage Description Serosanguineous;Milky 06/03/21 1407   Non-staged Wound Description Full thickness 06/03/21 1407   Dressing Mesalt 04/29/21 1435   Wound packed?  Yes 04/29/21 1435   Dressing Status Intact 11/18/20 1614                         Wound Instructions:  Orders Placed This Encounter   Procedures    Wound cleansing and dressings Right ischial wound  Wash your hands with soap and water  Remove old dressing, discard into plastic bag and place in trash  Cleanse the wound with dakins weekly and normal saline remaining days prior to applying a clean dressing  Do not use tissue or cotton balls  Do not scrub the wound  Pat dry using gauze  Shower yes    Loosely pack with 1/4 inch Iodoform packing into the wound and tape a tail to the chelsey wound  Cover with allevyn life    Change dressing daily  (Home care to assess and care for wound 2-3x week)               [facility] [address] [facility]        Standing Status:   Future     Standing Expiration Date:   6/3/2022    Wound off loading     Off-loading Instructions:     Keep weight and pressure off wound at all times  Use Wheelchair Cushion for pressure relief--- (Do not use donut-type devices)  Seat lifts or shift position in chair every 15 minutes  Turn every 1-2 hours and more often if able to  Avoid position directing pressure to Wound site  Limit side lying to 30 degree tilt  Limit the head of bed elevation to 30 degrees  Do Not Sit for Long Periods of Time  Standing Status:   Future     Standing Expiration Date:   6/3/2022    Wound miscellaneous orders     Ensure to increase protein in your diet for wound healing     Standing Status:   Future     Standing Expiration Date:   6/3/2022    Wound home care     Continue visiting nurses for dressing changes     Standing Status:   Future     Standing Expiration Date:   6/3/2022        Diagnosis ICD-10-CM Associated Orders   1   Pressure ulcer of right buttock, stage 4 (HCC)  L89 314 Wound cleansing and dressings     Wound off loading     Wound miscellaneous orders     Wound home care

## 2021-06-03 NOTE — ASSESSMENT & PLAN NOTE
Wound is worse today in the tunnel   wound base appears clean, no debridement today   No clinical signs of infection    continue wound management with Iodoform packing  Again discussed the importance of proper pressure relief    Recommend patient take several breaks throughout the day to get in bed and lie on her side to take pressure completely the area  Adequate protein intake, 3-4 servings per day  Followup in 4 weeks or call sooner with questions or concerns

## 2021-06-04 ENCOUNTER — PROCEDURE VISIT (OUTPATIENT)
Dept: UROLOGY | Facility: CLINIC | Age: 51
End: 2021-06-04
Payer: MEDICARE

## 2021-06-04 DIAGNOSIS — N20.0 NEPHROLITHIASIS: ICD-10-CM

## 2021-06-04 DIAGNOSIS — G35 MULTIPLE SCLEROSIS (HCC): Primary | ICD-10-CM

## 2021-06-04 PROCEDURE — 52000 CYSTOURETHROSCOPY: CPT | Performed by: UROLOGY

## 2021-06-04 RX ORDER — OXYBUTYNIN CHLORIDE 10 MG/1
10 TABLET, EXTENDED RELEASE ORAL DAILY
Qty: 90 TABLET | Refills: 3 | Status: SHIPPED | OUTPATIENT
Start: 2021-06-04 | End: 2022-06-07 | Stop reason: SDUPTHER

## 2021-06-04 NOTE — PROGRESS NOTES
Cystoscopy     Date/Time 6/4/2021 11:22 AM     Performed by  Kai Butt MD     Authorized by Kai Butt MD           Ryan Cardoza is a 59-year-old female with a history of advanced multiple sclerosis  She is in a scooter chair  She also has a history of bladder stones and a suprapubic tube  She last return to the operating room in November 2020  At that time I removed stones from her bladder  As well as a stone from the left proximal ureter  Renal bladder ultrasound was not performed   Prior to follow-up today  Her suprapubic tube was removed  The tract was prepped and draped in sterile fashion  Flexible cystoscopy was then performed through the suprapubic tube tract  Small amount of debris was identified within the bladder but no stones were seen  There was no evidence of mucosal abnormalities or lesions  The bladder was left full  The cystoscope was removed  A new 24 [place] latex-free suprapubic tube was then placed into the tract  Balloon was inflated and the catheter was connected to gravity drainage  Impression: Multiple sclerosis, neurogenic bladder, bladder spasm, history of bladder stones, history of ureteral calculi     plan: I prescribed oxybutynin 10 mg XR daily  She will continue with routine suprapubic tube exchanges  Her next cystoscopy can be performed in 1 year  In the interim I have ordered a renal bladder ultrasound to assess for any residual calculi  She will return sooner if there are any abnormalities on the ultrasound

## 2021-06-18 DIAGNOSIS — G35 MULTIPLE SCLEROSIS (HCC): ICD-10-CM

## 2021-06-18 RX ORDER — BACLOFEN 20 MG/1
TABLET ORAL
Qty: 120 TABLET | Refills: 3 | Status: SHIPPED | OUTPATIENT
Start: 2021-06-18 | End: 2021-12-06

## 2021-06-28 ENCOUNTER — TELEPHONE (OUTPATIENT)
Dept: FAMILY MEDICINE CLINIC | Facility: CLINIC | Age: 51
End: 2021-06-28

## 2021-06-28 NOTE — TELEPHONE ENCOUNTER
TONY Christianson called stating pt will be getting new chair with head control ,suppose to be delivered tomorrow  She stated she is sending verbal order for you to sign in regards to OT

## 2021-07-29 ENCOUNTER — OFFICE VISIT (OUTPATIENT)
Dept: WOUND CARE | Facility: HOSPITAL | Age: 51
End: 2021-07-29
Payer: MEDICARE

## 2021-07-29 VITALS
WEIGHT: 148 LBS | BODY MASS INDEX: 23.78 KG/M2 | HEIGHT: 66 IN | TEMPERATURE: 98 F | SYSTOLIC BLOOD PRESSURE: 111 MMHG | HEART RATE: 116 BPM | DIASTOLIC BLOOD PRESSURE: 58 MMHG | RESPIRATION RATE: 20 BRPM

## 2021-07-29 DIAGNOSIS — L89.314 PRESSURE ULCER OF RIGHT BUTTOCK, STAGE 4 (HCC): Primary | ICD-10-CM

## 2021-07-29 PROCEDURE — 99213 OFFICE O/P EST LOW 20 MIN: CPT | Performed by: FAMILY MEDICINE

## 2021-07-29 NOTE — PATIENT INSTRUCTIONS
Orders Placed This Encounter   Procedures    Wound cleansing and dressings     Right ischium wound:  Wound care every other day and as need for strikethrough drainage and dislodgement  Cleanse wound with NSS, pat dry  Do not use Dakins unless green or creamy drainage occurs  Apply skin prep to periwound  Mesalt ribbon gently into wound, secure tail to skin  Cover with silicone bordered foam     This was applied today  Standing Status:   Future     Standing Expiration Date:   7/29/2022    Wound off loading     Off-loading Instructions:    Use Wheelchair Cushion  Seat lifts or shift position in chair every 15 minutes  Reposition at least every 2 hours       Standing Status:   Future     Standing Expiration Date:   7/29/2022    Wound home care     SL VNA     Standing Status:   Future     Standing Expiration Date:   7/29/2022

## 2021-07-30 NOTE — PROGRESS NOTES
Patient ID: Mukund Gee is a 46 y o  female Date of Birth 1970     Chief Complaint  Chief Complaint   Patient presents with    New Patient Visit     Left ischial wound pt has not been to [facility] for >30 days       Allergies  Latex    Assessment:    Pressure ulcer of right buttock, stage 4 (HCC)   Wound appears worse today, probing to bone and tunnel is deeper   no clinical signs of infection today, no debridement   Continue wound management with me salt packing   I recommend a consult with plastic surgery at this time for further discussion of  Surgical treatment options   pressure relief   Followup in 3-4 weeks or call sooner with questions or concerns       Diagnoses and all orders for this visit:    Pressure ulcer of right buttock, stage 4 (HCC)  -     Wound cleansing and dressings; Future  -     Wound off loading; Future  -     Wound home care; Future  -     Ambulatory referral to Plastic Surgery;  Future              Procedures    Plan:     Wound 07/29/21 Pressure Injury Ischium Right (Active)   Wound Image Images linked 07/29/21 1537   Wound Description Probes to bone 07/29/21 1539   Pressure Injury Stage 4 07/29/21 1539   Wound Length (cm) 0 2 cm 07/29/21 1539   Wound Width (cm) 0 5 cm 07/29/21 1539   Wound Depth (cm) 1 2 cm 07/29/21 1539   Wound Surface Area (cm^2) 0 1 cm^2 07/29/21 1539   Wound Volume (cm^3) 0 12 cm^3 07/29/21 1539   Calculated Wound Volume (cm^3) 0 12 cm^3 07/29/21 1539   Tunneling 7 3 cm 07/29/21 1539   Tunneling in depth located at 1 oclock 07/29/21 1539   Drainage Amount Large 07/29/21 1539   Drainage Description Serosanguineous 07/29/21 1539   Non-staged Wound Description Full thickness 07/29/21 1539   Dressing Status Intact 07/29/21 1539       Wound 07/29/21 Pressure Injury Ischium Right (Active)   Date First Assessed/Time First Assessed: 07/29/21 1536   Pre-Existing Wound: Yes  Primary Wound Type: Pressure Injury  Location: Ischium  Wound Location Orientation: Right [REMOVED] Wound 10/01/20 Pressure Injury Ischium Right (Removed)   Resolved Date/Resolved Time: 07/29/21 1536  Date First Assessed/Time First Assessed: 10/01/20 1320   Pre-Existing Wound: Yes  Primary Wound Type: Pressure Injury  Location: Ischium  Wound Location Orientation: Right  Wound Outcome: Unknown (No longer     Subjective:        Patient presents for evaluation of a stage IV pressure ulcer of the right buttock  Patient is a known patient here to the management center but has not been seen here in the past 30 days  Patient is present with her  who is her primary caretaker  They reports they have been using me salt packing in the wound  Occasionally they use Dakin's, about once a week  The following portions of the patient's history were reviewed and updated as appropriate: She  has a past medical history of Anesthesia, Bladder stones, Chronic pain disorder, Contracture, right shoulder, Dependent on wheelchair, Edema, Elevated alkaline phosphatase level, Fernandez catheter in place, Gallbladder disease, History of femur fracture, History of UTI, MS (multiple sclerosis) ([address] ), Muscle spasticity, Muscle weakness, Muscular dystrophy ([address] ), Neck pain, Neurogenic bladder, Paralysis ([address] ), Port-A-Cath in place, Pressure injury of skin, Sacral pressure sore, Swallowing difficulty, Tinnitus, and Urinary incontinence    She   Patient Active Problem List    Diagnosis Date Noted    Increased endometrial stripe thickness 03/10/2021    Ureteral calculus, left 11/03/2020    Transaminitis 10/28/2020    Alkaline phosphatase elevation 10/28/2020    Abnormal thyroid function test 10/28/2020    Microcytic anemia 10/28/2020    Candidal vaginitis 10/19/2020    Mural thickening of sigmoid colon 10/07/2020    Hydronephrosis with urinary obstruction due to ureteral calculus 10/02/2020    Thrombocytopenia ([address] ) 10/01/2020    Serum total bilirubin elevated 10/01/2020    Urinary tract infection associated with cystostomy catheter ([facility] [address] ) 09/30/2020    Pressure ulcer of right buttock, stage 4 ([facility] [address] ) 09/03/2020    Medication management contract signed 02/21/2020    Screening mammogram, encounter for 05/29/2019    Health care maintenance 05/29/2019    Allergic rhinitis 03/26/2018    Functional quadriplegia secondary to MS [facility]) 01/25/2018    Suprapubic catheter ([facility] [address] ) 11/17/2017    Nephrolithiasis 04/22/2016    Bladder calculus 09/02/2015    Spasticity 05/19/2015    Muscle spasm 04/21/2015    Hypokalemia 01/20/2015    Vitamin B12 deficiency 01/16/2015    Constipation 01/14/2015    Hyperglycemia 11/25/2014    Mixed hyperlipidemia 11/25/2014    Depression 01/22/2014    Lymphedema 01/22/2014    Spinal stenosis 01/22/2014    Urinary incontinence 01/22/2014    Vitamin D deficiency 11/18/2013    Swallowing difficulty 11/04/2013    Dizziness 11/04/2013    Muscle weakness 11/04/2013    Neurogenic bladder 11/04/2013    Sleep disorder 11/04/2013    Tremor 11/04/2013    Vision loss 11/04/2013    Multiple sclerosis ([facility] [address] ) 10/21/2013    [facility] 10/21/2013     She  reports that she has never smoked  She has never used smokeless tobacco  She reports previous alcohol use  She reports that she does not use drugs  She is allergic to latex       Review of Systems   Constitutional: Negative for chills and fever  HENT: Negative for congestion and sneezing  Respiratory: Negative for cough  Cardiovascular: Positive for leg swelling  Musculoskeletal: Positive for gait problem  Skin: Positive for wound  Psychiatric/Behavioral: Negative for agitation           Objective:       Wound 07/29/21 Pressure Injury Ischium Right (Active)   Wound Image Images linked 07/29/21 1537   Wound Description Probes to bone 07/29/21 1539   Pressure Injury Stage 4 07/29/21 1539   Wound Length (cm) 0 2 cm 07/29/21 1539   Wound Width (cm) 0 5 cm 07/29/21 1539   Wound Depth (cm) 1 2 cm 07/29/21 1539   Wound Surface Area (cm^2) 0 1 cm^2 07/29/21 1539   Wound Volume (cm^3) 0 12 cm^3 07/29/21 1539   Calculated Wound Volume (cm^3) 0 12 cm^3 07/29/21 1539   Tunneling 7 3 cm 07/29/21 1539   Tunneling in depth located at 1 oclock 07/29/21 1539   Drainage Amount Large 07/29/21 1539   Drainage Description Serosanguineous 07/29/21 1539   Non-staged Wound Description Full thickness 07/29/21 1539   Dressing Status Intact 07/29/21 1539       /58   Pulse (!) 116   Temp 98 °F (36 7 °C)   Resp 20   Ht 5' 6" (1 676 m)   Wt 67 1 kg (148 lb)   BMI 23 89 kg/m²     Physical Exam  Constitutional:       General: She is not in acute distress  Appearance: Normal appearance  She is obese  She is not ill-appearing, toxic-appearing or diaphoretic  HENT:      Head: Normocephalic and atraumatic  Right Ear: External ear normal       Left Ear: External ear normal    Eyes:      Conjunctiva/sclera: Conjunctivae normal    Pulmonary:      Effort: Pulmonary effort is normal  No respiratory distress  Musculoskeletal:      Cervical back: Neck supple  Right lower leg: Edema present  Left lower leg: Edema present  Skin:     Comments: See wound assessment   Neurological:      Mental Status: She is alert  Gait: Gait abnormal (Non ambulatory)     Psychiatric:         Mood and Affect: Mood normal          Behavior: Behavior normal            Wound 07/29/21 Pressure Injury Ischium Right (Active)   Wound Image   07/29/21 1537   Wound Description Probes to bone 07/29/21 1539   Pressure Injury Stage 4 07/29/21 1539   Wound Length (cm) 0 2 cm 07/29/21 1539   Wound Width (cm) 0 5 cm 07/29/21 1539   Wound Depth (cm) 1 2 cm 07/29/21 1539   Wound Surface Area (cm^2) 0 1 cm^2 07/29/21 1539   Wound Volume (cm^3) 0 12 cm^3 07/29/21 1539   Calculated Wound Volume (cm^3) 0 12 cm^3 07/29/21 1539   Tunneling 7 3 cm 07/29/21 1539   Tunneling in depth located at 1 oclock 07/29/21 1539   Drainage Amount Large 07/29/21 1539   Drainage Description Serosanguineous 07/29/21 1539   Non-staged Wound Description Full thickness 07/29/21 1539   Dressing Status Intact 07/29/21 1539                         Wound Instructions:  Orders Placed This Encounter   Procedures    Wound cleansing and dressings     Right ischium wound:  Wound care every other day and as need for strikethrough drainage and dislodgement  Cleanse wound with NSS, pat dry  Do not use Dakins unless green or creamy drainage occurs  Apply skin prep to periwound  Mesalt ribbon gently into wound, secure tail to skin  Cover with silicone bordered foam     This was applied today  Standing Status:   Future     Standing Expiration Date:   7/29/2022    Wound off loading     Off-loading Instructions:    Use Wheelchair Cushion  Seat lifts or shift position in chair every 15 minutes  Reposition at least every 2 hours  Standing Status:   Future     Standing Expiration Date:   7/29/2022    Wound home care     SL VNA     Standing Status:   Future     Standing Expiration Date:   7/29/2022    Ambulatory referral to Plastic Surgery     Standing Status:   Future     Standing Expiration Date:   7/30/2022     Referral Priority:   Routine     Referral Type:   Consult - AMB     Referral Reason:   Specialty Services Required     Requested Specialty:   Plastic Surgery     Number of Visits Requested:   1     Expiration Date:   7/30/2022        Diagnosis ICD-10-CM Associated Orders   1   Pressure ulcer of right buttock, stage 4 ([facility])  L89 314 Wound cleansing and dressings     Wound off loading     Wound home care     Ambulatory referral to Plastic Surgery

## 2021-07-30 NOTE — ASSESSMENT & PLAN NOTE
Wound appears worse today, probing to bone and tunnel is deeper   no clinical signs of infection today, no debridement   Continue wound management with me salt packing   I recommend a consult with plastic surgery at this time for further discussion of  Surgical treatment options   pressure relief   Followup in 3-4 weeks or call sooner with questions or concerns

## 2021-08-31 DIAGNOSIS — F32.A DEPRESSION, UNSPECIFIED DEPRESSION TYPE: ICD-10-CM

## 2021-08-31 RX ORDER — DULOXETIN HYDROCHLORIDE 30 MG/1
CAPSULE, DELAYED RELEASE ORAL
Qty: 90 CAPSULE | Refills: 1 | Status: SHIPPED | OUTPATIENT
Start: 2021-08-31 | End: 2022-03-07

## 2021-09-15 ENCOUNTER — HOSPITAL ENCOUNTER (OUTPATIENT)
Dept: RADIOLOGY | Facility: HOSPITAL | Age: 51
Discharge: HOME | End: 2021-09-15
Attending: UROLOGY
Payer: MEDICARE

## 2021-09-15 DIAGNOSIS — N20.0 NEPHROLITHIASIS: ICD-10-CM

## 2021-09-15 PROCEDURE — 76770 US EXAM ABDO BACK WALL COMP: CPT

## 2021-09-18 ENCOUNTER — NURSE TRIAGE (OUTPATIENT)
Dept: OTHER | Facility: OTHER | Age: 51
End: 2021-09-18

## 2021-09-18 DIAGNOSIS — T83.511S URINARY TRACT INFECTION ASSOCIATED WITH CATHETERIZATION OF URINARY TRACT, UNSPECIFIED INDWELLING URINARY CATHETER TYPE, SEQUELA: Primary | ICD-10-CM

## 2021-09-18 DIAGNOSIS — N39.0 URINARY TRACT INFECTION ASSOCIATED WITH CATHETERIZATION OF URINARY TRACT, UNSPECIFIED INDWELLING URINARY CATHETER TYPE, SEQUELA: Primary | ICD-10-CM

## 2021-09-18 RX ORDER — CEPHALEXIN 500 MG/1
500 CAPSULE ORAL EVERY 12 HOURS SCHEDULED
Qty: 10 CAPSULE | Refills: 0 | Status: SHIPPED | OUTPATIENT
Start: 2021-09-18 | End: 2021-09-23

## 2021-09-18 NOTE — TELEPHONE ENCOUNTER
Patient has a suprapubic catheter and now has developed dark urine with sediment that is foul smelling  She also is feeling weak at this time       On call provider messaged-

## 2021-09-18 NOTE — TELEPHONE ENCOUNTER
Reason for Disposition   Urine smells bad    Answer Assessment - Initial Assessment Questions  1  SYMPTOMS: "What symptoms are you concerned about?"      Foul smelling urine/sediment   2  ONSET:  "When did the symptoms start?"      2 days ago  3  FEVER: "Is there a fever?" If Yes, ask: "What is the temperature, how was it measured, and when did it start?"      Unsure  4  ABDOMINAL PAIN: "Is there any abdominal pain?" (e g , Scale 1-10; or mild, moderate, severe)      No  5  URINE COLOR: "What color is the urine?"  "Is there blood present in the urine?" (e g , clear, yellow, cloudy, tea-colored, blood streaks, bright red)      Dark yellow  6  ONSET: "When was the catheter inserted?"      Several years  7  OTHER SYMPTOMS: "Do you have any other symptoms?" (e g , back pain, bad urine odor)       Back pain  8   PREGNANCY: "Is there any chance you are pregnant?" "When was your last menstrual period?"      No    Protocols used: URINARY CATHETER SYMPTOMS AND QUESTIONS-ADULT-[facility]

## 2021-09-18 NOTE — TELEPHONE ENCOUNTER
Regarding:  My wife has catheter urine odor is strong and MS symptoms are more weaker   ----- Message from Lilli Holt sent at 9/18/2021  3:27 PM EDT -----  " My wife has a catheter her urine has a strong odor and her MS symptoms are more weaker than normal "

## 2021-11-13 ENCOUNTER — NURSE TRIAGE (OUTPATIENT)
Dept: OTHER | Facility: OTHER | Age: 51
End: 2021-11-13

## 2021-12-06 DIAGNOSIS — G35 MULTIPLE SCLEROSIS (HCC): ICD-10-CM

## 2021-12-06 RX ORDER — BACLOFEN 20 MG/1
TABLET ORAL
Qty: 120 TABLET | Refills: 3 | Status: SHIPPED | OUTPATIENT
Start: 2021-12-06 | End: 2022-05-31

## 2022-01-18 ENCOUNTER — OFFICE VISIT (OUTPATIENT)
Dept: FAMILY MEDICINE CLINIC | Facility: CLINIC | Age: 52
End: 2022-01-18
Payer: MEDICARE

## 2022-01-18 VITALS
SYSTOLIC BLOOD PRESSURE: 112 MMHG | DIASTOLIC BLOOD PRESSURE: 74 MMHG | HEART RATE: 102 BPM | OXYGEN SATURATION: 97 % | TEMPERATURE: 98.5 F

## 2022-01-18 DIAGNOSIS — R94.6 ABNORMAL THYROID FUNCTION TEST: ICD-10-CM

## 2022-01-18 DIAGNOSIS — T83.510S URINARY TRACT INFECTION ASSOCIATED WITH CYSTOSTOMY CATHETER, SEQUELA: ICD-10-CM

## 2022-01-18 DIAGNOSIS — K63.9 MURAL THICKENING OF SIGMOID COLON: ICD-10-CM

## 2022-01-18 DIAGNOSIS — G35 MULTIPLE SCLEROSIS (HCC): Chronic | ICD-10-CM

## 2022-01-18 DIAGNOSIS — Z12.31 ENCOUNTER FOR SCREENING MAMMOGRAM FOR MALIGNANT NEOPLASM OF BREAST: ICD-10-CM

## 2022-01-18 DIAGNOSIS — L89.314 PRESSURE ULCER OF RIGHT BUTTOCK, STAGE 4 (HCC): ICD-10-CM

## 2022-01-18 DIAGNOSIS — E53.8 VITAMIN B12 DEFICIENCY: ICD-10-CM

## 2022-01-18 DIAGNOSIS — I89.0 LYMPHEDEMA: ICD-10-CM

## 2022-01-18 DIAGNOSIS — D50.9 MICROCYTIC ANEMIA: ICD-10-CM

## 2022-01-18 DIAGNOSIS — E55.9 VITAMIN D DEFICIENCY: ICD-10-CM

## 2022-01-18 DIAGNOSIS — R93.89 INCREASED ENDOMETRIAL STRIPE THICKNESS: ICD-10-CM

## 2022-01-18 DIAGNOSIS — M25.50 ARTHRALGIA, UNSPECIFIED JOINT: ICD-10-CM

## 2022-01-18 DIAGNOSIS — R73.9 HYPERGLYCEMIA: ICD-10-CM

## 2022-01-18 DIAGNOSIS — M48.00 SPINAL STENOSIS, UNSPECIFIED SPINAL REGION: Primary | ICD-10-CM

## 2022-01-18 DIAGNOSIS — G35 FUNCTIONAL QUADRIPLEGIA SECONDARY TO MS (HCC): Chronic | ICD-10-CM

## 2022-01-18 DIAGNOSIS — D69.6 THROMBOCYTOPENIA (HCC): ICD-10-CM

## 2022-01-18 DIAGNOSIS — N39.0 URINARY TRACT INFECTION ASSOCIATED WITH CYSTOSTOMY CATHETER, SEQUELA: ICD-10-CM

## 2022-01-18 DIAGNOSIS — R53.2 FUNCTIONAL QUADRIPLEGIA SECONDARY TO MS (HCC): Chronic | ICD-10-CM

## 2022-01-18 DIAGNOSIS — M54.2 CERVICALGIA: ICD-10-CM

## 2022-01-18 DIAGNOSIS — Z00.00 HEALTH CARE MAINTENANCE: ICD-10-CM

## 2022-01-18 DIAGNOSIS — E87.6 HYPOKALEMIA: ICD-10-CM

## 2022-01-18 DIAGNOSIS — R74.8 ALKALINE PHOSPHATASE ELEVATION: ICD-10-CM

## 2022-01-18 DIAGNOSIS — Z93.59 SUPRAPUBIC CATHETER (HCC): Chronic | ICD-10-CM

## 2022-01-18 DIAGNOSIS — R74.01 TRANSAMINITIS: ICD-10-CM

## 2022-01-18 DIAGNOSIS — G82.20 PARAPLEGIA (HCC): ICD-10-CM

## 2022-01-18 DIAGNOSIS — E78.2 MIXED HYPERLIPIDEMIA: ICD-10-CM

## 2022-01-18 PROCEDURE — G0439 PPPS, SUBSEQ VISIT: HCPCS | Performed by: FAMILY MEDICINE

## 2022-01-18 PROCEDURE — 99215 OFFICE O/P EST HI 40 MIN: CPT | Performed by: FAMILY MEDICINE

## 2022-01-18 RX ORDER — FUROSEMIDE 40 MG/1
40 TABLET ORAL DAILY
Qty: 90 TABLET | Refills: 3 | Status: SHIPPED | OUTPATIENT
Start: 2022-01-18

## 2022-01-18 RX ORDER — OXYCODONE HYDROCHLORIDE AND ACETAMINOPHEN 5; 325 MG/1; MG/1
1 TABLET ORAL EVERY 4 HOURS PRN
Qty: 40 TABLET | Refills: 0 | Status: SHIPPED | OUTPATIENT
Start: 2022-01-18

## 2022-01-18 NOTE — PROGRESS NOTES
Assessment and Plan:  1  Spinal stenosis cervical area, PDMP reviewed Percocet reordered patient uses very sparingly 40 tablets lasted for almost 1 year  UDS was ordered  2  Mural thickening of sigmoid colon, discussed could be benign or cancerous patient agreed to see Gastroenterology  3  Functional quadriplegia/MS/paraplegia, slowly worsening muscle weakness  Patient follows with MS specialist  4  Pressure ulcer, stable follows with wound care specialist  5  Abnormal thyroid function test, blood work ordered  6  Alkaline phosphatase elevation, blood work ordered with isoenzymes  7  Health care maintenance Medicare a [address] completed   8  Hyperglycemia blood work ordered  9  Hypokalemia, blood work ordered  10  Increased endometrial stripe  Explained to patient this could be benign or series is endometrial cancer  Patient agreed to complete pelvic ultrasound and see gyn  11  Microcytic anemia, blood work ordered  12  Hyperlipidemia blood work ordered  15  UTI so she with cystostomy, urine with culture ordered patient follows with Urology  14  Thrombocytopenia, blood work ordered  15  Suprapubic catheter, follows with Urology  6  Eighteen vitamin deficiencies B12 and D, blood work ordered  17   Transaminitis, blood work ordered repeat with hepatitis panel  18  rto 6 months    Problem List Items Addressed This Visit        Digestive    Mural thickening of sigmoid colon     Refer to Gastroenterology         Relevant Orders    CBC    Comprehensive metabolic panel    Hemoglobin A1C    Lipid Panel with Direct LDL reflex    TSH, 3rd generation with Free T4 reflex    UA (URINE) with reflex to Scope    Urine culture    Vitamin B12    Vitamin D 25 hydroxy    Folate    Hepatitis panel, acute    Toxicology screen, urine    Oxycodone/Oxymorphone urine    Alkaline phosphatase, isoenzymes    Ambulatory Referral to Gastroenterology       Nervous and Auditory    Multiple sclerosis (HCC) (Chronic)     As above         Relevant Orders CBC    Comprehensive metabolic panel    Hemoglobin A1C    Lipid Panel with Direct LDL reflex    TSH, 3rd generation with Free T4 reflex    UA (URINE) with reflex to Scope    Urine culture    Vitamin B12    Vitamin D 25 hydroxy    Folate    Hepatitis panel, acute    Toxicology screen, urine    Oxycodone/Oxymorphone urine    Alkaline phosphatase, isoenzymes    Functional quadriplegia secondary to MS (HCC) (Chronic)     Follows with MS specialist         Relevant Orders    CBC    Comprehensive metabolic panel    Hemoglobin A1C    Lipid Panel with Direct LDL reflex    TSH, 3rd generation with Free T4 reflex    UA (URINE) with reflex to Scope    Urine culture    Vitamin B12    Vitamin D 25 hydroxy    Folate    Hepatitis panel, acute    Toxicology screen, urine    Oxycodone/Oxymorphone urine    Alkaline phosphatase, isoenzymes    Paraplegia (HCC)     As above         Relevant Orders    CBC    Comprehensive metabolic panel    Hemoglobin A1C    Lipid Panel with Direct LDL reflex    TSH, 3rd generation with Free T4 reflex    UA (URINE) with reflex to Scope    Urine culture    Vitamin B12    Vitamin D 25 hydroxy    Folate    Hepatitis panel, acute    Toxicology screen, urine    Oxycodone/Oxymorphone urine    Alkaline phosphatase, isoenzymes       Musculoskeletal and Integument    Pressure ulcer of right buttock, stage 4 ([facility] [address] )     Follows with wound care specialist         Relevant Orders    CBC    Comprehensive metabolic panel    Hemoglobin A1C    Lipid Panel with Direct LDL reflex    TSH, 3rd generation with Free T4 reflex    UA (URINE) with reflex to Scope    Urine culture    Vitamin B12    Vitamin D 25 hydroxy    Folate    Hepatitis panel, acute    Toxicology screen, urine    Oxycodone/Oxymorphone urine    Alkaline phosphatase, isoenzymes       Genitourinary    Urinary tract infection associated with cystostomy catheter ([facility] [address] )     UA with culture ordered         Relevant Orders    CBC    Comprehensive metabolic panel Hemoglobin A1C    Lipid Panel with Direct LDL reflex    TSH, 3rd generation with Free T4 reflex    UA (URINE) with reflex to Scope    Urine culture    Vitamin B12    Vitamin D 25 hydroxy    Folate    Hepatitis panel, acute    Toxicology screen, urine    Oxycodone/Oxymorphone urine    Alkaline phosphatase, isoenzymes       Other    Suprapubic catheter (HCC) (Chronic)     Follows with urology         Relevant Orders    CBC    Comprehensive metabolic panel    Hemoglobin A1C    Lipid Panel with Direct LDL reflex    TSH, 3rd generation with Free T4 reflex    UA (URINE) with reflex to Scope    Urine culture    Vitamin B12    Vitamin D 25 hydroxy    Folate    Hepatitis panel, acute    Toxicology screen, urine    Oxycodone/Oxymorphone urine    Alkaline phosphatase, isoenzymes    Hyperglycemia     Blood work ordered         Relevant Orders    CBC    Comprehensive metabolic panel    Hemoglobin A1C    Lipid Panel with Direct LDL reflex    TSH, 3rd generation with Free T4 reflex    UA (URINE) with reflex to Scope    Urine culture    Vitamin B12    Vitamin D 25 hydroxy    Folate    Hepatitis panel, acute    Toxicology screen, urine    Oxycodone/Oxymorphone urine    Alkaline phosphatase, isoenzymes    Mixed hyperlipidemia     Blood work ordered         Relevant Orders    CBC    Comprehensive metabolic panel    Hemoglobin A1C    Lipid Panel with Direct LDL reflex    TSH, 3rd generation with Free T4 reflex    UA (URINE) with reflex to Scope    Urine culture    Vitamin B12    Vitamin D 25 hydroxy    Folate    Hepatitis panel, acute    Toxicology screen, urine    Oxycodone/Oxymorphone urine    Alkaline phosphatase, isoenzymes    Hypokalemia     Blood work ordered         Relevant Orders    CBC    Comprehensive metabolic panel    Hemoglobin A1C    Lipid Panel with Direct LDL reflex    TSH, 3rd generation with Free T4 reflex    UA (URINE) with reflex to Scope    Urine culture    Vitamin B12    Vitamin D 25 hydroxy    Folate Hepatitis panel, acute    Toxicology screen, urine    Oxycodone/Oxymorphone urine    Alkaline phosphatase, isoenzymes    Lymphedema     Stable furosemide reordered         Relevant Medications    furosemide (LASIX) 40 mg tablet    Other Relevant Orders    CBC    Comprehensive metabolic panel    Hemoglobin A1C    Lipid Panel with Direct LDL reflex    TSH, 3rd generation with Free T4 reflex    UA (URINE) with reflex to Scope    Urine culture    Vitamin B12    Vitamin D 25 hydroxy    Folate    Hepatitis panel, acute    Toxicology screen, urine    Oxycodone/Oxymorphone urine    Alkaline phosphatase, isoenzymes    Spinal stenosis - Primary     Uses Percocet very sparingly 40 tablets lasted for almost 1 year         Relevant Medications    oxyCODONE-acetaminophen (PERCOCET) 5-325 mg per tablet    Other Relevant Orders    CBC    Comprehensive metabolic panel    Hemoglobin A1C    Lipid Panel with Direct LDL reflex    TSH, 3rd generation with Free T4 reflex    UA (URINE) with reflex to Scope    Urine culture    Vitamin B12    Vitamin D 25 hydroxy    Folate    Hepatitis panel, acute    Toxicology screen, urine    Oxycodone/Oxymorphone urine    Alkaline phosphatase, isoenzymes    Vitamin B12 deficiency     Blood work ordered         Relevant Orders    CBC    Comprehensive metabolic panel    Hemoglobin A1C    Lipid Panel with Direct LDL reflex    TSH, 3rd generation with Free T4 reflex    UA (URINE) with reflex to Scope    Urine culture    Vitamin B12    Vitamin D 25 hydroxy    Folate    Hepatitis panel, acute    Toxicology screen, urine    Oxycodone/Oxymorphone urine    Alkaline phosphatase, isoenzymes    Vitamin D deficiency     Blood work ordered         Relevant Orders    CBC    Comprehensive metabolic panel    Hemoglobin A1C    Lipid Panel with Direct LDL reflex    TSH, 3rd generation with Free T4 reflex    UA (URINE) with reflex to Scope    Urine culture    Vitamin B12    Vitamin D 25 hydroxy    Folate    Hepatitis panel, acute    Toxicology screen, urine    Oxycodone/Oxymorphone urine    Alkaline phosphatase, isoenzymes    Health care maintenance     Medicare a [address] completed         Thrombocytopenia ([address] )     Blood work ordered         Relevant Orders    CBC    Comprehensive metabolic panel    Hemoglobin A1C    Lipid Panel with Direct LDL reflex    TSH, 3rd generation with Free T4 reflex    UA (URINE) with reflex to Scope    Urine culture    Vitamin B12    Vitamin D 25 hydroxy    Folate    Hepatitis panel, acute    Toxicology screen, urine    Oxycodone/Oxymorphone urine    Alkaline phosphatase, isoenzymes    Transaminitis     Blood work ordered with hepatitis panel         Relevant Orders    CBC    Comprehensive metabolic panel    Hemoglobin A1C    Lipid Panel with Direct LDL reflex    TSH, 3rd generation with Free T4 reflex    UA (URINE) with reflex to Scope    Urine culture    Vitamin B12    Vitamin D 25 hydroxy    Folate    Hepatitis panel, acute    Toxicology screen, urine    Oxycodone/Oxymorphone urine    Alkaline phosphatase, isoenzymes    Alkaline phosphatase elevation     Ordered with isoenzymes         Relevant Orders    CBC    Comprehensive metabolic panel    Hemoglobin A1C    Lipid Panel with Direct LDL reflex    TSH, 3rd generation with Free T4 reflex    UA (URINE) with reflex to Scope    Urine culture    Vitamin B12    Vitamin D 25 hydroxy    Folate    Hepatitis panel, acute    Toxicology screen, urine    Oxycodone/Oxymorphone urine    Alkaline phosphatase, isoenzymes    Abnormal thyroid function test     Blood work ordered         Relevant Orders    CBC    Comprehensive metabolic panel    Hemoglobin A1C    Lipid Panel with Direct LDL reflex    TSH, 3rd generation with Free T4 reflex    UA (URINE) with reflex to Scope    Urine culture    Vitamin B12    Vitamin D 25 hydroxy    Folate    Hepatitis panel, acute    Toxicology screen, urine    Oxycodone/Oxymorphone urine    Alkaline phosphatase, isoenzymes    Microcytic anemia     Blood work ordered         Relevant Orders    CBC    Comprehensive metabolic panel    Hemoglobin A1C    Lipid Panel with Direct LDL reflex    TSH, 3rd generation with Free T4 reflex    UA (URINE) with reflex to Scope    Urine culture    Vitamin B12    Vitamin D 25 hydroxy    Folate    Hepatitis panel, acute    Toxicology screen, urine    Oxycodone/Oxymorphone urine    Alkaline phosphatase, isoenzymes    Increased endometrial stripe thickness     Refer to gyn, complete pelvic ultrasound         Relevant Orders    CBC    Comprehensive metabolic panel    Hemoglobin A1C    Lipid Panel with Direct LDL reflex    TSH, 3rd generation with Free T4 reflex    UA (URINE) with reflex to Scope    Urine culture    Vitamin B12    Vitamin D 25 hydroxy    Folate    Hepatitis panel, acute    Toxicology screen, urine    Oxycodone/Oxymorphone urine    Alkaline phosphatase, isoenzymes    US pelvis complete non OB    Ambulatory referral to Obstetrics / Gynecology      Other Visit Diagnoses     Encounter for screening mammogram for malignant neoplasm of breast        Relevant Orders    Mammo screening bilateral w 3d & cad    CBC    Comprehensive metabolic panel    Hemoglobin A1C    Lipid Panel with Direct LDL reflex    TSH, 3rd generation with Free T4 reflex    UA (URINE) with reflex to Scope    Urine culture    Vitamin B12    Vitamin D 25 hydroxy    Folate    Hepatitis panel, acute    Toxicology screen, urine    Oxycodone/Oxymorphone urine    Alkaline phosphatase, isoenzymes    Arthralgia, unspecified joint         Relevant Orders    Toxicology screen, urine    Cervicalgia         Relevant Orders    Oxycodone/Oxymorphone urine                 Diagnoses and all orders for this visit:    Spinal stenosis, unspecified spinal region  -     oxyCODONE-acetaminophen (PERCOCET) 5-325 mg per tablet; Take 1 tablet by mouth every 4 (four) hours as needed for moderate pain Max Daily Amount: 6 tablets  -     CBC;  Future  -     Comprehensive metabolic panel; Future  -     Hemoglobin A1C; Future  -     Lipid Panel with Direct LDL reflex; Future  -     TSH, 3rd generation with Free T4 reflex; Future  -     UA (URINE) with reflex to Scope; Future  -     Urine culture; Future  -     Vitamin B12; Future  -     Vitamin D 25 hydroxy; Future  -     Folate; Future  -     Hepatitis panel, acute; Future  -     Toxicology screen, urine; Future  -     Oxycodone/Oxymorphone urine; Future  -     Alkaline phosphatase, isoenzymes; Future    Encounter for screening mammogram for malignant neoplasm of breast  -     Mammo screening bilateral w 3d & cad; Future  -     CBC; Future  -     Comprehensive metabolic panel; Future  -     Hemoglobin A1C; Future  -     Lipid Panel with Direct LDL reflex; Future  -     TSH, 3rd generation with Free T4 reflex; Future  -     UA (URINE) with reflex to Scope; Future  -     Urine culture; Future  -     Vitamin B12; Future  -     Vitamin D 25 hydroxy; Future  -     Folate; Future  -     Hepatitis panel, acute; Future  -     Toxicology screen, urine; Future  -     Oxycodone/Oxymorphone urine; Future  -     Alkaline phosphatase, isoenzymes; Future    Lymphedema  -     furosemide (LASIX) 40 mg tablet; Take 1 tablet (40 mg total) by mouth daily  -     CBC; Future  -     Comprehensive metabolic panel; Future  -     Hemoglobin A1C; Future  -     Lipid Panel with Direct LDL reflex; Future  -     TSH, 3rd generation with Free T4 reflex; Future  -     UA (URINE) with reflex to Scope; Future  -     Urine culture; Future  -     Vitamin B12; Future  -     Vitamin D 25 hydroxy; Future  -     Folate; Future  -     Hepatitis panel, acute; Future  -     Toxicology screen, urine; Future  -     Oxycodone/Oxymorphone urine; Future  -     Alkaline phosphatase, isoenzymes; Future    Mural thickening of sigmoid colon  -     CBC; Future  -     Comprehensive metabolic panel; Future  -     Hemoglobin A1C; Future  -     Lipid Panel with Direct LDL reflex;  Future  - TSH, 3rd generation with Free T4 reflex; Future  -     UA (URINE) with reflex to Scope; Future  -     Urine culture; Future  -     Vitamin B12; Future  -     Vitamin D 25 hydroxy; Future  -     Folate; Future  -     Hepatitis panel, acute; Future  -     Toxicology screen, urine; Future  -     Oxycodone/Oxymorphone urine; Future  -     Alkaline phosphatase, isoenzymes; Future  -     Ambulatory Referral to Gastroenterology; Future    Functional quadriplegia secondary to MS (HCC)  -     CBC; Future  -     Comprehensive metabolic panel; Future  -     Hemoglobin A1C; Future  -     Lipid Panel with Direct LDL reflex; Future  -     TSH, 3rd generation with Free T4 reflex; Future  -     UA (URINE) with reflex to Scope; Future  -     Urine culture; Future  -     Vitamin B12; Future  -     Vitamin D 25 hydroxy; Future  -     Folate; Future  -     Hepatitis panel, acute; Future  -     Toxicology screen, urine; Future  -     Oxycodone/Oxymorphone urine; Future  -     Alkaline phosphatase, isoenzymes; Future    Multiple sclerosis ([facility] [address] )  -     CBC; Future  -     Comprehensive metabolic panel; Future  -     Hemoglobin A1C; Future  -     Lipid Panel with Direct LDL reflex; Future  -     TSH, 3rd generation with Free T4 reflex; Future  -     UA (URINE) with reflex to Scope; Future  -     Urine culture; Future  -     Vitamin B12; Future  -     Vitamin D 25 hydroxy; Future  -     Folate; Future  -     Hepatitis panel, acute; Future  -     Toxicology screen, urine; Future  -     Oxycodone/Oxymorphone urine; Future  -     Alkaline phosphatase, isoenzymes; Future    Paraplegia (HCC)  -     CBC; Future  -     Comprehensive metabolic panel; Future  -     Hemoglobin A1C; Future  -     Lipid Panel with Direct LDL reflex; Future  -     TSH, 3rd generation with Free T4 reflex; Future  -     UA (URINE) with reflex to Scope; Future  -     Urine culture; Future  -     Vitamin B12; Future  -     Vitamin D 25 hydroxy;  Future  -     Folate; Future  -     Hepatitis panel, acute; Future  -     Toxicology screen, urine; Future  -     Oxycodone/Oxymorphone urine; Future  -     Alkaline phosphatase, isoenzymes; Future    Pressure ulcer of right buttock, stage 4 (HCC)  -     CBC; Future  -     Comprehensive metabolic panel; Future  -     Hemoglobin A1C; Future  -     Lipid Panel with Direct LDL reflex; Future  -     TSH, 3rd generation with Free T4 reflex; Future  -     UA (URINE) with reflex to Scope; Future  -     Urine culture; Future  -     Vitamin B12; Future  -     Vitamin D 25 hydroxy; Future  -     Folate; Future  -     Hepatitis panel, acute; Future  -     Toxicology screen, urine; Future  -     Oxycodone/Oxymorphone urine; Future  -     Alkaline phosphatase, isoenzymes; Future    Abnormal thyroid function test  -     CBC; Future  -     Comprehensive metabolic panel; Future  -     Hemoglobin A1C; Future  -     Lipid Panel with Direct LDL reflex; Future  -     TSH, 3rd generation with Free T4 reflex; Future  -     UA (URINE) with reflex to Scope; Future  -     Urine culture; Future  -     Vitamin B12; Future  -     Vitamin D 25 hydroxy; Future  -     Folate; Future  -     Hepatitis panel, acute; Future  -     Toxicology screen, urine; Future  -     Oxycodone/Oxymorphone urine; Future  -     Alkaline phosphatase, isoenzymes; Future    Alkaline phosphatase elevation  -     CBC; Future  -     Comprehensive metabolic panel; Future  -     Hemoglobin A1C; Future  -     Lipid Panel with Direct LDL reflex; Future  -     TSH, 3rd generation with Free T4 reflex; Future  -     UA (URINE) with reflex to Scope; Future  -     Urine culture; Future  -     Vitamin B12; Future  -     Vitamin D 25 hydroxy; Future  -     Folate; Future  -     Hepatitis panel, acute; Future  -     Toxicology screen, urine; Future  -     Oxycodone/Oxymorphone urine; Future  -     Alkaline phosphatase, isoenzymes; Future    Health care maintenance    Hyperglycemia  -     CBC;  Future  - Comprehensive metabolic panel; Future  -     Hemoglobin A1C; Future  -     Lipid Panel with Direct LDL reflex; Future  -     TSH, 3rd generation with Free T4 reflex; Future  -     UA (URINE) with reflex to Scope; Future  -     Urine culture; Future  -     Vitamin B12; Future  -     Vitamin D 25 hydroxy; Future  -     Folate; Future  -     Hepatitis panel, acute; Future  -     Toxicology screen, urine; Future  -     Oxycodone/Oxymorphone urine; Future  -     Alkaline phosphatase, isoenzymes; Future    Hypokalemia  -     CBC; Future  -     Comprehensive metabolic panel; Future  -     Hemoglobin A1C; Future  -     Lipid Panel with Direct LDL reflex; Future  -     TSH, 3rd generation with Free T4 reflex; Future  -     UA (URINE) with reflex to Scope; Future  -     Urine culture; Future  -     Vitamin B12; Future  -     Vitamin D 25 hydroxy; Future  -     Folate; Future  -     Hepatitis panel, acute; Future  -     Toxicology screen, urine; Future  -     Oxycodone/Oxymorphone urine; Future  -     Alkaline phosphatase, isoenzymes; Future    Increased endometrial stripe thickness  -     CBC; Future  -     Comprehensive metabolic panel; Future  -     Hemoglobin A1C; Future  -     Lipid Panel with Direct LDL reflex; Future  -     TSH, 3rd generation with Free T4 reflex; Future  -     UA (URINE) with reflex to Scope; Future  -     Urine culture; Future  -     Vitamin B12; Future  -     Vitamin D 25 hydroxy; Future  -     Folate; Future  -     Hepatitis panel, acute; Future  -     Toxicology screen, urine; Future  -     Oxycodone/Oxymorphone urine; Future  -     Alkaline phosphatase, isoenzymes; Future  -     US pelvis complete non OB; Future  -     Ambulatory referral to Obstetrics / Gynecology; Future    Microcytic anemia  -     CBC; Future  -     Comprehensive metabolic panel; Future  -     Hemoglobin A1C; Future  -     Lipid Panel with Direct LDL reflex; Future  -     TSH, 3rd generation with Free T4 reflex;  Future  - UA (URINE) with reflex to Scope; Future  -     Urine culture; Future  -     Vitamin B12; Future  -     Vitamin D 25 hydroxy; Future  -     Folate; Future  -     Hepatitis panel, acute; Future  -     Toxicology screen, urine; Future  -     Oxycodone/Oxymorphone urine; Future  -     Alkaline phosphatase, isoenzymes; Future    Mixed hyperlipidemia  -     CBC; Future  -     Comprehensive metabolic panel; Future  -     Hemoglobin A1C; Future  -     Lipid Panel with Direct LDL reflex; Future  -     TSH, 3rd generation with Free T4 reflex; Future  -     UA (URINE) with reflex to Scope; Future  -     Urine culture; Future  -     Vitamin B12; Future  -     Vitamin D 25 hydroxy; Future  -     Folate; Future  -     Hepatitis panel, acute; Future  -     Toxicology screen, urine; Future  -     Oxycodone/Oxymorphone urine; Future  -     Alkaline phosphatase, isoenzymes; Future    Urinary tract infection associated with cystostomy catheter, sequela  -     CBC; Future  -     Comprehensive metabolic panel; Future  -     Hemoglobin A1C; Future  -     Lipid Panel with Direct LDL reflex; Future  -     TSH, 3rd generation with Free T4 reflex; Future  -     UA (URINE) with reflex to Scope; Future  -     Urine culture; Future  -     Vitamin B12; Future  -     Vitamin D 25 hydroxy; Future  -     Folate; Future  -     Hepatitis panel, acute; Future  -     Toxicology screen, urine; Future  -     Oxycodone/Oxymorphone urine; Future  -     Alkaline phosphatase, isoenzymes; Future    Thrombocytopenia (HCC)  -     CBC; Future  -     Comprehensive metabolic panel; Future  -     Hemoglobin A1C; Future  -     Lipid Panel with Direct LDL reflex; Future  -     TSH, 3rd generation with Free T4 reflex; Future  -     UA (URINE) with reflex to Scope; Future  -     Urine culture; Future  -     Vitamin B12; Future  -     Vitamin D 25 hydroxy; Future  -     Folate; Future  -     Hepatitis panel, acute; Future  -     Toxicology screen, urine;  Future  - Oxycodone/Oxymorphone urine; Future  -     Alkaline phosphatase, isoenzymes; Future    Suprapubic catheter (HCC)  -     CBC; Future  -     Comprehensive metabolic panel; Future  -     Hemoglobin A1C; Future  -     Lipid Panel with Direct LDL reflex; Future  -     TSH, 3rd generation with Free T4 reflex; Future  -     UA (URINE) with reflex to Scope; Future  -     Urine culture; Future  -     Vitamin B12; Future  -     Vitamin D 25 hydroxy; Future  -     Folate; Future  -     Hepatitis panel, acute; Future  -     Toxicology screen, urine; Future  -     Oxycodone/Oxymorphone urine; Future  -     Alkaline phosphatase, isoenzymes; Future    Vitamin B12 deficiency  -     CBC; Future  -     Comprehensive metabolic panel; Future  -     Hemoglobin A1C; Future  -     Lipid Panel with Direct LDL reflex; Future  -     TSH, 3rd generation with Free T4 reflex; Future  -     UA (URINE) with reflex to Scope; Future  -     Urine culture; Future  -     Vitamin B12; Future  -     Vitamin D 25 hydroxy; Future  -     Folate; Future  -     Hepatitis panel, acute; Future  -     Toxicology screen, urine; Future  -     Oxycodone/Oxymorphone urine; Future  -     Alkaline phosphatase, isoenzymes; Future    Vitamin D deficiency  -     CBC; Future  -     Comprehensive metabolic panel; Future  -     Hemoglobin A1C; Future  -     Lipid Panel with Direct LDL reflex; Future  -     TSH, 3rd generation with Free T4 reflex; Future  -     UA (URINE) with reflex to Scope; Future  -     Urine culture; Future  -     Vitamin B12; Future  -     Vitamin D 25 hydroxy; Future  -     Folate; Future  -     Hepatitis panel, acute; Future  -     Toxicology screen, urine; Future  -     Oxycodone/Oxymorphone urine; Future  -     Alkaline phosphatase, isoenzymes; Future    Transaminitis  -     CBC; Future  -     Comprehensive metabolic panel; Future  -     Hemoglobin A1C; Future  -     Lipid Panel with Direct LDL reflex;  Future  -     TSH, 3rd generation with Free T4 reflex; Future  -     UA (URINE) with reflex to Scope; Future  -     Urine culture; Future  -     Vitamin B12; Future  -     Vitamin D 25 hydroxy; Future  -     Folate; Future  -     Hepatitis panel, acute; Future  -     Toxicology screen, urine; Future  -     Oxycodone/Oxymorphone urine; Future  -     Alkaline phosphatase, isoenzymes; Future    Arthralgia, unspecified joint   -     Toxicology screen, urine; Future    Cervicalgia   -     Oxycodone/Oxymorphone urine; Future              Subjective:      Patient ID: Desiree Yoo is a 46 y o  female  CC:    Chief Complaint   Patient presents with    Follow-up     for chronic conditions  mgb    Medicare Wellness Visit     mgb       HPI:    Patient is here for routine follow-up  Patient is due for blood work we will send her  Discussed taken sigmoid Ca 9 CT patient agrees to see Gastroenterology  Discussed thickened endometrial stripe patient agrees to see gyn and complete pelvic ultrasound  Patient has used 40 Percocet tablets in approximately 1 year she uses very sparingly      The following portions of the patient's history were reviewed and updated as appropriate: allergies, current medications, past family history, past medical history, past social history, past surgical history and problem list       Review of Systems   Constitutional: Negative  HENT: Negative  Eyes: Negative  Respiratory: Negative  Cardiovascular: Negative  Gastrointestinal:        HPI   Endocrine: Negative  Genitourinary:        HPI   Musculoskeletal: Negative  Skin: Negative  Allergic/Immunologic: Negative  Neurological:        Patient's weakness is advancing  Patient now is in a neck brace as well as wheelchair-bound with quadriplegia   Hematological: Negative  Psychiatric/Behavioral: Negative            Data to review:       Objective:    Vitals:    01/18/22 1556   BP: 112/74   BP Location: Right arm   Patient Position: Sitting   Cuff Size: Large   Pulse: 102   Temp: 98 5 °F (36 9 °C)   TempSrc: Temporal   SpO2: 97%        Physical Exam  Vitals and nursing note reviewed  Constitutional:       Appearance: Normal appearance  HENT:      Head: Normocephalic and atraumatic  Eyes:      General: No scleral icterus  Neck:      Comments: Positive cervical weakness needs neck brace  Cardiovascular:      Rate and Rhythm: Normal rate and regular rhythm  Heart sounds: Normal heart sounds  Pulmonary:      Effort: Pulmonary effort is normal       Breath sounds: Normal breath sounds  Abdominal:      General: Bowel sounds are normal       Palpations: Abdomen is soft  Tenderness: There is no abdominal tenderness  Comments: Positive suprapubic catheter   Musculoskeletal:      Cervical back: Neck supple  Right lower leg: No edema  Left lower leg: No edema  Skin:     General: Skin is warm and dry  Neurological:      Mental Status: She is alert and oriented to person, place, and time  Motor: Weakness present        Comments: Quadriplegia and increasing cervical spine weakness   Psychiatric:         Mood and Affect: Mood normal

## 2022-01-18 NOTE — PROGRESS NOTES
Assessment and Plan:     Problem List Items Addressed This Visit        Other    Lymphedema    Spinal stenosis      Other Visit Diagnoses     Encounter for screening mammogram for malignant neoplasm of breast    -  Primary    Relevant Orders    Mammo screening bilateral w 3d & cad           Preventive health issues were discussed with patient, and age appropriate screening tests were ordered as noted in patient's After Visit Summary  Personalized health advice and appropriate referrals for health education or preventive services given if needed, as noted in patient's After Visit Summary       History of Present Illness:     Patient presents for Medicare Annual Wellness visit    Patient Care Team:  Tato Bellamy DO as PCP - General (Family Medicine)  MD Tato Giron DO     Problem List:     Patient Active Problem List   Diagnosis    Suprapubic catheter [facility])    Bladder calculus    Constipation    Depression    Swallowing difficulty    Dizziness    Hyperglycemia    Mixed hyperlipidemia    Hypokalemia    Lymphedema    Multiple sclerosis ([address] )    Muscle spasm    Muscle weakness    Nephrolithiasis    Neurogenic bladder    Paraplegia ([address] )    Sleep disorder    Spasticity    Spinal stenosis    Tremor    Urinary incontinence    Vision loss    Vitamin B12 deficiency    Vitamin D deficiency    Functional quadriplegia secondary to MS ([address] )    Allergic rhinitis    Screening mammogram, encounter for   [facility] maintenance    Medication management contract signed    Pressure ulcer of right buttock, stage 4 ([address] )    Urinary tract infection associated with cystostomy catheter ([address] )    Thrombocytopenia (HCC)    Serum total bilirubin elevated    Hydronephrosis with urinary obstruction due to ureteral calculus    Mural thickening of sigmoid colon    Candidal vaginitis    Transaminitis    Alkaline phosphatase elevation    Abnormal thyroid function test    Microcytic anemia    Ureteral calculus, left    Increased endometrial stripe thickness      Past Medical and Surgical History:     Past Medical History:   Diagnosis Date    Anesthesia     "prefers if able to be put to sleep on stretcher before placed on table if possible due to severe spasticity    Bladder stones     Chronic pain disorder     neck    Contracture, right shoulder     right arm and hand    Dependent on wheelchair     motorized    Edema     dependant lower extremities    Elevated alkaline phosphatase level     Mildly elevated total, normal isoenzymes 4/15/15, normal 1/17; last assessed: 24Nov2015    Fernandez catheter in place     #36 to large bag(silicone catheter)    Gallbladder disease     History of femur fracture     right leg    History of UTI     MS (multiple sclerosis) ([facility])     Muscle spasticity     especially with touch    Muscle weakness     Muscular dystrophy ([address] )     Neck pain     Neurogenic bladder     Paralysis ([address] )     bilateral lower extremities    Port-A-Cath in place     left chest," hasn't used in approx 1 yr or so"    Pressure injury of skin     right ischium    Sacral pressure sore     "shearing, tegaderm in place,"    Swallowing difficulty     Tinnitus     Urinary incontinence      Past Surgical History:   Procedure Laterality Date    BLADDER STONE REMOVAL N/A 12/4/2017    Procedure: Mayra Aschoff HOLMIUM LASER;  Surgeon: Randy Le MD;  [facility];  Service: Urology    BLADDER SURGERY      CHOLECYSTECTOMY      CYSTOSCOPY  06/15/2020    ESOPHAGOGASTRODUODENOSCOPY      FL RETROGRADE PYELOGRAM  10/2/2020    FL RETROGRADE PYELOGRAM  11/3/2020    KIDNEY STONE SURGERY      PORTACATH PLACEMENT Left     PA CYSTO/URETERO W/LITHOTRIPSY &INDWELL STENT INSRT Left 11/3/2020    Procedure: CYSTOSCOPY URETEROSCOPY WITH RETROGRADE PYELOGRAM AND EXCHANGE STENT URETERAL, SP TUBE EXCHANGE, BASKET STONE EXTRACTION, BLADDER STONE EXTRACTION; Surgeon: Padmini Taylor MD;  [facility];  Service: Urology    MT CYSTOURETHROSCOPY,URETER CATHETER Left 10/2/2020    Procedure: CYSTOSCOPY LEFT RETROGRADE ; LEFT URETEROSCOPY; PYELOGRAM WITH STENT INSERTION AND [address] EXCHANGE;  Surgeon: Chanel Kent MD;  [facility];  Service: Urology    SUPRAPUBIC CYSTOSTOMY  02/25/2014    last assessed: [id number]      Family History:     Family History   Problem Relation Age of Onset    Diabetes Mother     Hyperlipidemia Mother     Hypertension Mother    [facility] COPD Father     Hypertension Father     Hyperlipidemia Sister     Alzheimer's disease Family     Diabetes Family     Hypertension Family     Heart disease Family       Social History:     Social History     Socioeconomic History    Marital status: /Civil Union     Spouse name: None    Number of children: None    Years of education: None    Highest education level: None   Occupational History    None   Tobacco Use    Smoking status: Never Smoker    Smokeless tobacco: Never Used   Vaping Use    Vaping Use: Never used   Substance and Sexual Activity    Alcohol use: Not Currently    Drug use: No    Sexual activity: Yes   Other Topics Concern    None   Social History Narrative    Caffeine use    Currently on disability    Daily coffee consumption (2 cups/day)    Educational level- completed 2nd year college    Lives with adult children    Not currently employed    Wheelchair dependent      Social Determinants of Health     Financial Resource Strain: Not on file   Food Insecurity: Not on file   Transportation Needs: Not on file   Physical Activity: Not on file   Stress: Not on file   Social Connections: Not on file   Intimate Partner Violence: Not on file   Housing Stability: Not on file      Medications and Allergies:     Current Outpatient Medications   Medication Sig Dispense Refill    baclofen 20 mg tablet TAKE 1 TABLET BY MOUTH 5 TIMES A DAY AS NEEDED 120 tablet 3  DULoxetine (CYMBALTA) 30 mg delayed release capsule TAKE 1 CAPSULE BY MOUTH EVERY DAY 90 capsule 1    furosemide (LASIX) 40 mg tablet TAKE 1 TABLET BY MOUTH EVERY DAY 90 tablet 3    oxybutynin (DITROPAN-XL) 10 MG 24 hr tablet Take 1 tablet (10 mg total) by mouth daily 90 tablet 3    oxyCODONE-acetaminophen (PERCOCET) 5-325 mg per tablet Take 1 tablet by mouth every 4 (four) hours as needed for moderate painMax Daily Amount: 6 tablets 40 tablet 0    sodium hypochlorite (DAKIN'S HALF-STRENGTH) external solution Apply 1 application topically every other day 473 mL 0    Vitamin D, Cholecalciferol, 50 MCG (2000 UT) CAPS Take by mouth daily       potassium chloride (KLOR-CON) 8 MEQ tablet TAKE 1 TABLET BY MOUTH EVERY DAY WITH FOOD (Patient not taking: Reported on 10/20/2020) 90 tablet 1     No current facility-administered medications for this visit       Allergies   Allergen Reactions    Latex Other (See Comments)     Added based on information entered during case entry, please review and add reactions, type, and severity as needed    blisters      Immunizations:     Immunization History   Administered Date(s) Administered    COVID-19 MODERNA VACC 0 5 ML IM 04/10/2021, 05/08/2021    INFLUENZA 10/20/2004    Influenza Quadrivalent Preservative Free 3 years and older IM 10/30/2014, 11/24/2015, 01/17/2017    Influenza, injectable, quadrivalent, preservative free 0 5 mL 09/10/2018    Influenza, seasonal, injectable, preservative free 01/26/2018    Tdap 11/24/2015      Health Maintenance:         Topic Date Due    Cervical Cancer Screening  Never done    Breast Cancer Screening: Mammogram  08/11/2016    Colorectal Cancer Screening  Never done    HIV Screening  Completed    Hepatitis C Screening  Completed         Topic Date Due    Influenza Vaccine (1) 09/01/2021    COVID-19 Vaccine (3 - Booster for Cynthia  series) 11/08/2021      Medicare Health Risk Assessment:     /74 (BP Location: Right arm, Patient Position: Sitting, Cuff Size: Large)   Pulse 102   Temp 98 5 °F (36 9 °C) (Temporal)   SpO2 97%          Health Risk Assessment:   Patient rates overall health as good  Patient feels that their physical health rating is same  Patient is satisfied with their life  Eyesight was rated as same  Hearing was rated as same  Patient feels that their emotional and mental health rating is same  Patients states they are never, rarely angry  Patient states they are never, rarely unusually tired/fatigued  Pain experienced in the last 7 days has been none  Patient states that she has experienced no weight loss or gain in last 6 months  Fall Risk Screening: In the past year, patient has experienced: no history of falling in past year      Urinary Incontinence Screening:   Patient has leaked urine accidently in the last six months  Home Safety:  Patient has trouble with stairs inside or outside of their home  Patient has working smoke alarms and has working carbon monoxide detector  Home safety hazards include: none  Nutrition:   Current diet is Regular  Medications:   Patient is currently taking over-the-counter supplements  OTC medications include: see medication list  Patient is able to manage medications  Activities of Daily Living (ADLs)/Instrumental Activities of Daily Living (IADLs):   Walk and transfer into and out of bed and chair?: No  Dress and groom yourself?: No    Bathe or shower yourself?: No    Feed yourself?  No  Do your laundry/housekeeping?: No  Manage your money, pay your bills and track your expenses?: Yes  Make your own meals?: No    Do your own shopping?: No    ADL comments: quadraplegic    Previous Hospitalizations:   Any hospitalizations or ED visits within the last 12 months?: No      Advance Care Planning:   Living will: Yes    Durable POA for healthcare: No    Advanced directive: Yes    Advanced directive counseling given: Yes    Five wishes given: No    Patient declined ACP directive: Yes    End of Life Decisions reviewed with patient: Yes    Provider agrees with end of life decisions: Yes      Cognitive Screening:   Provider or family/friend/caregiver concerned regarding cognition?: Yes    PREVENTIVE SCREENINGS      Cardiovascular Screening:    General: Screening Not Indicated and History Lipid Disorder      Diabetes Screening:     General: Screening Current      Colorectal Cancer Screening:     General: Risks and Benefits Discussed    Due for: Colonoscopy - Low Risk      Breast Cancer Screening:     General: Risks and Benefits Discussed    Due for: Mammogram        Cervical Cancer Screening:    General: Risks and Benefits Discussed    Due for: Cervical Pap Smear      Abdominal Aortic Aneurysm (AAA) Screening:        General: Screening Not Indicated      Lung Cancer Screening:     General: Screening Not Indicated      Hepatitis C Screening:    General: Screening Current    Screening, Brief Intervention, and Referral to Treatment (SBIRT)    Screening  Typical number of drinks in a day: 0  Typical number of drinks in a week: 0  Interpretation: Low risk drinking behavior      Single Item Drug Screening:  How often have you used an illegal drug (including marijuana) or a prescription medication for non-medical reasons in the past year? never    Single Item Drug Screen Score: 0  Interpretation: Negative screen for possible drug use disorder    Review of Current Opioid Use    Opioid Risk Tool (ORT) Interpretation: Complete Opioid Risk Tool (ORT)      Nacho Monroy DO

## 2022-01-18 NOTE — PATIENT INSTRUCTIONS
Complete blood work as planned  Follow with gyn and complete pelvic ultrasound for thickened endometrial stripe  Follow-up Gastroenterology for thickened sigmoid colon wall  Follow all specialist per their instructions  Return in 6 months sooner if needed

## 2022-02-02 ENCOUNTER — TELEPHONE (OUTPATIENT)
Dept: GASTROENTEROLOGY | Facility: CLINIC | Age: 52
End: 2022-02-02

## 2022-02-02 NOTE — TELEPHONE ENCOUNTER
LMOM, patient has an appnt today at 3PM, but we would like to change it to 2:30pm if possible for patient to come in at 2:30PM

## 2022-02-04 ENCOUNTER — APPOINTMENT (OUTPATIENT)
Dept: LAB | Facility: CLINIC | Age: 52
End: 2022-02-04
Payer: MEDICARE

## 2022-02-04 DIAGNOSIS — Z12.31 ENCOUNTER FOR SCREENING MAMMOGRAM FOR MALIGNANT NEOPLASM OF BREAST: ICD-10-CM

## 2022-02-04 DIAGNOSIS — R74.8 ALKALINE PHOSPHATASE ELEVATION: ICD-10-CM

## 2022-02-04 DIAGNOSIS — G35 MULTIPLE SCLEROSIS (HCC): ICD-10-CM

## 2022-02-04 DIAGNOSIS — R74.01 TRANSAMINITIS: ICD-10-CM

## 2022-02-04 DIAGNOSIS — R93.89 INCREASED ENDOMETRIAL STRIPE THICKNESS: ICD-10-CM

## 2022-02-04 DIAGNOSIS — R94.6 ABNORMAL THYROID FUNCTION TEST: ICD-10-CM

## 2022-02-04 DIAGNOSIS — D69.6 THROMBOCYTOPENIA (HCC): ICD-10-CM

## 2022-02-04 DIAGNOSIS — E55.9 VITAMIN D DEFICIENCY: ICD-10-CM

## 2022-02-04 DIAGNOSIS — E53.8 VITAMIN B12 DEFICIENCY: ICD-10-CM

## 2022-02-04 DIAGNOSIS — T83.510S URINARY TRACT INFECTION ASSOCIATED WITH CYSTOSTOMY CATHETER, SEQUELA: ICD-10-CM

## 2022-02-04 DIAGNOSIS — N39.0 URINARY TRACT INFECTION ASSOCIATED WITH CYSTOSTOMY CATHETER, SEQUELA: ICD-10-CM

## 2022-02-04 DIAGNOSIS — L89.314 PRESSURE ULCER OF RIGHT BUTTOCK, STAGE 4 (HCC): ICD-10-CM

## 2022-02-04 DIAGNOSIS — R73.9 HYPERGLYCEMIA: ICD-10-CM

## 2022-02-04 DIAGNOSIS — M48.00 SPINAL STENOSIS, UNSPECIFIED SPINAL REGION: ICD-10-CM

## 2022-02-04 DIAGNOSIS — G35 FUNCTIONAL QUADRIPLEGIA SECONDARY TO MS (HCC): ICD-10-CM

## 2022-02-04 DIAGNOSIS — K63.9 MURAL THICKENING OF SIGMOID COLON: ICD-10-CM

## 2022-02-04 DIAGNOSIS — E87.6 HYPOKALEMIA: ICD-10-CM

## 2022-02-04 DIAGNOSIS — Z93.59 SUPRAPUBIC CATHETER (HCC): ICD-10-CM

## 2022-02-04 DIAGNOSIS — D50.9 MICROCYTIC ANEMIA: ICD-10-CM

## 2022-02-04 DIAGNOSIS — E78.2 MIXED HYPERLIPIDEMIA: ICD-10-CM

## 2022-02-04 DIAGNOSIS — R53.2 FUNCTIONAL QUADRIPLEGIA SECONDARY TO MS (HCC): ICD-10-CM

## 2022-02-04 DIAGNOSIS — G82.20 PARAPLEGIA (HCC): ICD-10-CM

## 2022-02-04 DIAGNOSIS — I89.0 LYMPHEDEMA: ICD-10-CM

## 2022-02-04 LAB
AMORPH PHOS CRY URNS QL MICRO: ABNORMAL /HPF
BACTERIA UR QL AUTO: ABNORMAL /HPF
BILIRUB UR QL STRIP: NEGATIVE
CLARITY UR: ABNORMAL
COLOR UR: YELLOW
GLUCOSE UR STRIP-MCNC: NEGATIVE MG/DL
HGB UR QL STRIP.AUTO: ABNORMAL
KETONES UR STRIP-MCNC: NEGATIVE MG/DL
LEUKOCYTE ESTERASE UR QL STRIP: ABNORMAL
MUCOUS THREADS UR QL AUTO: ABNORMAL
NITRITE UR QL STRIP: NEGATIVE
NON-SQ EPI CELLS URNS QL MICRO: ABNORMAL /HPF
PH UR STRIP.AUTO: >=9 [PH]
PROT UR STRIP-MCNC: ABNORMAL MG/DL
RBC #/AREA URNS AUTO: ABNORMAL /HPF
SP GR UR STRIP.AUTO: 1.01 (ref 1–1.03)
TRI-PHOS CRY URNS QL MICRO: ABNORMAL /HPF
UROBILINOGEN UR QL STRIP.AUTO: 0.2 E.U./DL
WBC #/AREA URNS AUTO: ABNORMAL /HPF

## 2022-02-04 PROCEDURE — 87086 URINE CULTURE/COLONY COUNT: CPT

## 2022-02-04 PROCEDURE — 87186 SC STD MICRODIL/AGAR DIL: CPT

## 2022-02-04 PROCEDURE — 81001 URINALYSIS AUTO W/SCOPE: CPT

## 2022-02-04 PROCEDURE — 87077 CULTURE AEROBIC IDENTIFY: CPT

## 2022-02-07 LAB
BACTERIA UR CULT: ABNORMAL
BACTERIA UR CULT: ABNORMAL

## 2022-02-28 ENCOUNTER — NURSE TRIAGE (OUTPATIENT)
Dept: OTHER | Facility: OTHER | Age: 52
End: 2022-02-28

## 2022-02-28 NOTE — TELEPHONE ENCOUNTER
Regarding: Is having uti symptoms  ----- Message from Herber Bello sent at 2/28/2022  2:18 PM EST -----  "My wife is having uti symptoms  "

## 2022-02-28 NOTE — TELEPHONE ENCOUNTER
Reason for Disposition   Patient sounds very sick or weak to the triager    Answer Assessment - Initial Assessment Questions  1  SYMPTOM: "What's the main symptom you're concerned about?" (e g , frequency, incontinence)      Frequency, burning with urination, bad odor, cloudy urination  2  ONSET: "When did the  start?"      A month ago   3  PAIN: "Is there any pain?" If Yes, ask: "How bad is it?" (Scale: 1-10; mild, moderate, severe)      No pain  4  CAUSE: "What do you think is causing the symptoms?"      Patient was treated for UTI, finished keflex on 2/24/22    5  OTHER SYMPTOMS: "Do you have any other symptoms?" (e g , fever, flank pain, blood in urine, pain with urination)       Fever 99 0   6   PREGNANCY: "Is there any chance you are pregnant?" "When was your last menstrual period?"    Protocols used: [facility]

## 2022-03-07 DIAGNOSIS — F32.A DEPRESSION, UNSPECIFIED DEPRESSION TYPE: ICD-10-CM

## 2022-03-07 RX ORDER — DULOXETIN HYDROCHLORIDE 30 MG/1
CAPSULE, DELAYED RELEASE ORAL
Qty: 30 CAPSULE | Refills: 5 | Status: SHIPPED | OUTPATIENT
Start: 2022-03-07

## 2022-03-07 NOTE — TELEPHONE ENCOUNTER
Last OV with Office: 1/18/2022   Last visit with PCP :1/18/2022    Next visit with the Office :Visit date not found   Next visit with PCP : 7/20/2022

## 2022-03-09 ENCOUNTER — NURSE TRIAGE (OUTPATIENT)
Dept: OTHER | Facility: OTHER | Age: 52
End: 2022-03-09

## 2022-03-09 NOTE — TELEPHONE ENCOUNTER
Regarding: UTI symptoms  ----- Message from Karina Jaime sent at 3/9/2022 10:52 AM EST -----  "I have UTI symptoms  I have a superpubic cath  I have a smelly odor in my urine and drainage around the sight with odor even after I clean   I need an order for a UA and reflex C&S "

## 2022-03-09 NOTE — TELEPHONE ENCOUNTER
Reason for Disposition   Third attempt to contact caller AND no contact made  Phone number verified      Protocols used: NO CONTACT OR DUPLICATE CONTACT CALL-ADULT-[place]

## 2022-03-10 DIAGNOSIS — N39.0 URINARY TRACT INFECTION WITHOUT HEMATURIA, SITE UNSPECIFIED: Primary | ICD-10-CM

## 2022-03-10 NOTE — TELEPHONE ENCOUNTER
Patient placed a call to the call center stating her order for urinalysis and culture was not placed  Placed orders for patient and she will await the culture report

## 2022-03-11 ENCOUNTER — APPOINTMENT (OUTPATIENT)
Dept: LAB | Facility: CLINIC | Age: 52
End: 2022-03-11
Payer: MEDICARE

## 2022-03-11 DIAGNOSIS — N39.0 URINARY TRACT INFECTION WITHOUT HEMATURIA, SITE UNSPECIFIED: ICD-10-CM

## 2022-03-11 PROCEDURE — 87077 CULTURE AEROBIC IDENTIFY: CPT

## 2022-03-11 PROCEDURE — 87086 URINE CULTURE/COLONY COUNT: CPT

## 2022-03-11 PROCEDURE — 87186 SC STD MICRODIL/AGAR DIL: CPT

## 2022-03-14 ENCOUNTER — TELEPHONE (OUTPATIENT)
Dept: UROLOGY | Facility: AMBULATORY SURGERY CENTER | Age: 52
End: 2022-03-14

## 2022-03-14 DIAGNOSIS — N39.0 URINARY TRACT INFECTION WITHOUT HEMATURIA, SITE UNSPECIFIED: Primary | ICD-10-CM

## 2022-03-14 RX ORDER — LEVOFLOXACIN 500 MG/1
500 TABLET, FILM COATED ORAL EVERY 24 HOURS
Qty: 7 TABLET | Refills: 0 | Status: SHIPPED | OUTPATIENT
Start: 2022-03-14 | End: 2022-03-21

## 2022-03-14 NOTE — TELEPHONE ENCOUNTER
Called and LM per communication consent asking if the patient was having any signs or symptoms of a UTI  If patient calls back, please ask if she is having UTI symptoms

## 2022-03-14 NOTE — TELEPHONE ENCOUNTER
Patient's called stating she had urine test done and wants to know if any medication is needed  He urine is very dark and fowl smell  She stated please speak to  she is there with him and she is having a hard time speaking

## 2022-03-14 NOTE — TELEPHONE ENCOUNTER
----- Message from Davida Larson MD sent at 3/13/2022 12:14 PM EDT -----  Patient has long standing SPT in place  She is likely colonized with bacteria  Please call patient and treat if symptomatic    FT  ----- Message -----  From: Lab, Background User  Sent: 3/12/2022   1:17 PM EDT  To: Davida Larson MD

## 2022-03-14 NOTE — TELEPHONE ENCOUNTER
Called and spoke with patient and her   Patient states in addition to the foul odor and dark urine color she is experiencing burning in the lower abdomen as well  States she never had an odor this bad  Per MD note, will treat if patient is symptomatic   Patient requesting antibiotic be sent to pharmacy on file

## 2022-03-15 LAB
BACTERIA UR CULT: ABNORMAL

## 2022-04-07 ENCOUNTER — TELEPHONE (OUTPATIENT)
Dept: UROLOGY | Facility: MEDICAL CENTER | Age: 52
End: 2022-04-07

## 2022-04-07 NOTE — TELEPHONE ENCOUNTER
Surely thanks q3 week exchange is OK   Also oral hydration and low salt diet to help keep urine dilute thanks

## 2022-04-07 NOTE — TELEPHONE ENCOUNTER
Attempted to contact patient in regards to every 3 week catheter exchanges and VNA info, however there was no answer and voicemail box has not been set up  Also contacted spouse's phone, however unavailable at this time  Will attempt later

## 2022-04-07 NOTE — TELEPHONE ENCOUNTER
Patient of Dr Radha Mcdonald at [facility]    Patient called stating she has spt catheter is usually changed every 4 weeks  She states she feels its better to have it changed every 3 weeks  She feels by the third weeks it starts to get clogged  The VNA would need to have a new order    Please call patient at [phone]

## 2022-04-08 NOTE — TELEPHONE ENCOUNTER
Called and left voicemail message for patients  to please return call to discuss patients SPT changes/VNA, as patients voicemail box is not set up

## 2022-04-08 NOTE — TELEPHONE ENCOUNTER
Called and spoke with [employer]  States [facility] comes out to her house for SPT changes  She was advised to hydrate well with water and eat a low sodium diet to assist with diluting urine  Contacted [facility] at [phone] and verbal order given to change patients catheter every 3 weeks  Home Care will fax verbal order to the office to be signed by provider

## 2022-05-31 DIAGNOSIS — G35 MULTIPLE SCLEROSIS (HCC): ICD-10-CM

## 2022-05-31 RX ORDER — BACLOFEN 20 MG/1
TABLET ORAL
Qty: 120 TABLET | Refills: 3 | Status: SHIPPED | OUTPATIENT
Start: 2022-05-31

## 2022-05-31 NOTE — TELEPHONE ENCOUNTER
Patient last seen >1 year ago 4/2021 with Dr Sita Herring, previously seen by Dr Yamil Painter  Has upcoming appt with Yamil Painter 10/14/22  I am refilling to get to that appt    Needs visit for ongoing refills

## 2022-06-07 ENCOUNTER — TELEPHONE (OUTPATIENT)
Dept: UROLOGY | Facility: MEDICAL CENTER | Age: 52
End: 2022-06-07

## 2022-06-07 DIAGNOSIS — G35 MULTIPLE SCLEROSIS (HCC): ICD-10-CM

## 2022-06-07 RX ORDER — OXYBUTYNIN CHLORIDE 10 MG/1
10 TABLET, EXTENDED RELEASE ORAL DAILY
Qty: 90 TABLET | Refills: 3 | Status: SHIPPED | OUTPATIENT
Start: 2022-06-07

## 2022-06-07 NOTE — TELEPHONE ENCOUNTER
Patient of Dr Jazzmine Schaeffer at [place]    Patient called requesting refills on medication   oxybutynin (DITROPAN-XL) 10 MG 24 hr tablet 10 mg, Daily     Please send prescription to [facility]       Patient can be reached at [phone] (M)

## 2022-06-19 ENCOUNTER — HOME HEALTH ADMISSION (OUTPATIENT)
Dept: HOME HEALTH SERVICES | Facility: HOME HEALTHCARE | Age: 52
End: 2022-06-19
Payer: MEDICARE

## 2022-06-24 ENCOUNTER — NURSE TRIAGE (OUTPATIENT)
Dept: OTHER | Facility: OTHER | Age: 52
End: 2022-06-24

## 2022-06-24 ENCOUNTER — TELEPHONE (OUTPATIENT)
Dept: HOME HEALTH SERVICES | Facility: HOME HEALTHCARE | Age: 52
End: 2022-06-24

## 2022-06-24 DIAGNOSIS — R39.9 UTI SYMPTOMS: Primary | ICD-10-CM

## 2022-06-24 RX ORDER — LEVOFLOXACIN 750 MG/1
750 TABLET ORAL EVERY 24 HOURS
Qty: 7 TABLET | Refills: 0 | Status: SHIPPED | OUTPATIENT
Start: 2022-06-24 | End: 2022-07-01

## 2022-06-24 NOTE — TELEPHONE ENCOUNTER
Thanks agree with wound check and urine culture   I sent rx levaquin as well for polymicrobial coverage abx

## 2022-06-24 NOTE — TELEPHONE ENCOUNTER
Reason for Disposition   Urine smells bad    Answer Assessment - Initial Assessment Questions  1  SYMPTOMS: "What symptoms are you concerned about?"      Yellowish puss around insertion point of subrapubic cath  There is a foul odor to urine  2  ONSET:  "When did the symptoms start?"      Started one week ago "more or less"  3  FEVER: "Is there a fever?" If Yes, ask: "What is the temperature, how was it measured, and when did it start?"      Temp 97 4 (forehead)    4  ABDOMINAL PAIN: "Is there any abdominal pain?" (e g , Scale 1-10; or mild, moderate, severe)      No pain  5  URINE COLOR: "What color is the urine?"  "Is there blood present in the urine?" (e g , clear, yellow, cloudy, tea-colored, blood streaks, bright red)      Urine seems a bit darker then usual      6  ONSET: "When was the catheter inserted?"      Inserted 2 weeks ago  7  OTHER SYMPTOMS: "Do you have any other symptoms?" (e g , back pain, bad urine odor)       No other symptoms  Protocols used: URINARY CATHETER SYMPTOMS AND QUESTIONS-ADULT-[place]    Recommendation is a see in office? Please follow with pt to address

## 2022-06-24 NOTE — TELEPHONE ENCOUNTER
Patient states that drainage is more than usual  Urine is dark with fowl odor  SPT has drainage brown with tinge of yellow, both reported to have been going on for a week  Patient is drinking water  No fevers or chills  [address] [facility] helps patient manage SPT they come out once a week  Patient next appt was in August 8/9/22 for cysto, was advised for fu 1 year in June no appt availability  Advised a message would be sent to provider for recommendation      Pharmacy confirmed as :  [facility]   [address]

## 2022-06-24 NOTE — TELEPHONE ENCOUNTER
Regarding: possible UTI with suprapubic cath  ----- Message from Herber Lopez sent at 6/24/2022 12:23 PM EDT -----  "I have suprapubic cath, there is more urine in there than normal, a very foul odor and yellowish tint that is not normal  I am a little embarrassed about it but I would like a call back, I probably need medication "

## 2022-06-26 ENCOUNTER — HOME CARE VISIT (OUTPATIENT)
Dept: HOME HEALTH SERVICES | Facility: HOME HEALTHCARE | Age: 52
End: 2022-06-26

## 2022-06-26 DIAGNOSIS — R39.9 UTI SYMPTOMS: ICD-10-CM

## 2022-06-26 RX ORDER — LEVOFLOXACIN 750 MG/1
750 TABLET ORAL EVERY 24 HOURS
Qty: 7 TABLET | Refills: 0 | Status: SHIPPED | OUTPATIENT
Start: 2022-06-26 | End: 2022-07-03

## 2022-06-26 NOTE — CASE COMMUNICATION
Patients  called into office regarding antibiotic for patient  Reports urology was supposed to send script for antibiotic to [facility] however they did not receive it  I called [facility] to verify  Script not received  TT to [facility] to notify  As [facility]  I called patients  to notify

## 2022-06-26 NOTE — CASE COMMUNICATION
Patient called into office as she states she has been having SS UTI  Urology made aware by patient on 6/24/22 and ordered antibiotic and UA C&S  She wanted to ensure VNA got orders to have a UA C&S Done this week  Notified Patient that orders were received and specimen could be done during next nursing visit  Patient verbalized understanding

## 2022-06-27 ENCOUNTER — HOME CARE VISIT (OUTPATIENT)
Dept: HOME HEALTH SERVICES | Facility: HOME HEALTHCARE | Age: 52
End: 2022-06-27

## 2022-06-27 NOTE — TELEPHONE ENCOUNTER
Attempted to contact patient, however there was no answer and  has not yet been set up  Voicemail message was left on patients spouse's phone to please return call to discuss urine testing

## 2022-06-27 NOTE — TELEPHONE ENCOUNTER
Returned call to patient  She states home care did not come out to patients house to obtain urine sample yet  States they said they would come out sometime this week  Confirmed that patient started taking Levaquin as ordered and she states she is taking antibiotic and feeling better

## 2022-06-28 ENCOUNTER — HOME CARE VISIT (OUTPATIENT)
Dept: HOME HEALTH SERVICES | Facility: HOME HEALTHCARE | Age: 52
End: 2022-06-28
Payer: MEDICARE

## 2022-06-28 ENCOUNTER — HOME CARE VISIT (OUTPATIENT)
Dept: HOME HEALTH SERVICES | Facility: HOME HEALTHCARE | Age: 52
End: 2022-06-28

## 2022-06-28 ENCOUNTER — APPOINTMENT (OUTPATIENT)
Dept: LAB | Facility: CLINIC | Age: 52
End: 2022-06-28
Payer: MEDICARE

## 2022-06-28 DIAGNOSIS — R39.9 UTI SYMPTOMS: ICD-10-CM

## 2022-06-28 LAB
BACTERIA UR QL AUTO: ABNORMAL /HPF
BILIRUB UR QL STRIP: NEGATIVE
CLARITY UR: CLEAR
COLOR UR: ABNORMAL
GLUCOSE UR STRIP-MCNC: NEGATIVE MG/DL
HGB UR QL STRIP.AUTO: NEGATIVE
KETONES UR STRIP-MCNC: NEGATIVE MG/DL
LEUKOCYTE ESTERASE UR QL STRIP: ABNORMAL
NITRITE UR QL STRIP: NEGATIVE
NON-SQ EPI CELLS URNS QL MICRO: ABNORMAL /HPF
PH UR STRIP.AUTO: 7 [PH]
PROT UR STRIP-MCNC: ABNORMAL MG/DL
RBC #/AREA URNS AUTO: ABNORMAL /HPF
SP GR UR STRIP.AUTO: 1.01 (ref 1–1.03)
UROBILINOGEN UR STRIP-ACNC: <2 MG/DL
WBC #/AREA URNS AUTO: ABNORMAL /HPF

## 2022-06-28 PROCEDURE — 87077 CULTURE AEROBIC IDENTIFY: CPT

## 2022-06-28 PROCEDURE — 81001 URINALYSIS AUTO W/SCOPE: CPT

## 2022-06-28 PROCEDURE — 87086 URINE CULTURE/COLONY COUNT: CPT

## 2022-06-28 PROCEDURE — 87186 SC STD MICRODIL/AGAR DIL: CPT

## 2022-07-01 ENCOUNTER — HOME CARE VISIT (OUTPATIENT)
Dept: HOME HEALTH SERVICES | Facility: HOME HEALTHCARE | Age: 52
End: 2022-07-01
Payer: MEDICARE

## 2022-07-01 LAB
BACTERIA UR CULT: ABNORMAL

## 2022-07-01 PROCEDURE — G0156 HHCP-SVS OF AIDE,EA 15 MIN: HCPCS

## 2022-07-01 PROCEDURE — 400014 VN F/U

## 2022-07-05 ENCOUNTER — HOME CARE VISIT (OUTPATIENT)
Dept: HOME HEALTH SERVICES | Facility: HOME HEALTHCARE | Age: 52
End: 2022-07-05
Payer: MEDICARE

## 2022-07-05 PROCEDURE — G0156 HHCP-SVS OF AIDE,EA 15 MIN: HCPCS

## 2022-07-05 PROCEDURE — G0299 HHS/HOSPICE OF RN EA 15 MIN: HCPCS

## 2022-07-06 VITALS
OXYGEN SATURATION: 98 % | SYSTOLIC BLOOD PRESSURE: 120 MMHG | TEMPERATURE: 97.5 F | HEART RATE: 72 BPM | RESPIRATION RATE: 14 BRPM | DIASTOLIC BLOOD PRESSURE: 60 MMHG

## 2022-07-07 ENCOUNTER — HOME CARE VISIT (OUTPATIENT)
Dept: HOME HEALTH SERVICES | Facility: HOME HEALTHCARE | Age: 52
End: 2022-07-07
Payer: MEDICARE

## 2022-07-07 PROCEDURE — G0179 MD RECERTIFICATION HHA PT: HCPCS | Performed by: FAMILY MEDICINE

## 2022-07-12 ENCOUNTER — HOME CARE VISIT (OUTPATIENT)
Dept: HOME HEALTH SERVICES | Facility: HOME HEALTHCARE | Age: 52
End: 2022-07-12
Payer: MEDICARE

## 2022-07-12 PROCEDURE — G0299 HHS/HOSPICE OF RN EA 15 MIN: HCPCS

## 2022-07-12 PROCEDURE — G0156 HHCP-SVS OF AIDE,EA 15 MIN: HCPCS

## 2022-07-15 ENCOUNTER — HOME CARE VISIT (OUTPATIENT)
Dept: HOME HEALTH SERVICES | Facility: HOME HEALTHCARE | Age: 52
End: 2022-07-15
Payer: MEDICARE

## 2022-07-15 NOTE — CASE COMMUNICATION
Patient cancelled HHA visit for today so there is a change in the HHA visit pattern due to same  [employer]

## 2022-07-18 ENCOUNTER — HOME CARE VISIT (OUTPATIENT)
Dept: HOME HEALTH SERVICES | Facility: HOME HEALTHCARE | Age: 52
End: 2022-07-18
Payer: MEDICARE

## 2022-07-18 PROCEDURE — G0156 HHCP-SVS OF AIDE,EA 15 MIN: HCPCS

## 2022-07-19 ENCOUNTER — HOME CARE VISIT (OUTPATIENT)
Dept: HOME HEALTH SERVICES | Facility: HOME HEALTHCARE | Age: 52
End: 2022-07-19
Payer: MEDICARE

## 2022-07-19 VITALS
OXYGEN SATURATION: 98 % | SYSTOLIC BLOOD PRESSURE: 108 MMHG | RESPIRATION RATE: 14 BRPM | HEART RATE: 72 BPM | DIASTOLIC BLOOD PRESSURE: 60 MMHG

## 2022-07-19 PROCEDURE — G0299 HHS/HOSPICE OF RN EA 15 MIN: HCPCS

## 2022-07-20 ENCOUNTER — TELEPHONE (OUTPATIENT)
Dept: FAMILY MEDICINE CLINIC | Facility: CLINIC | Age: 52
End: 2022-07-20

## 2022-07-20 NOTE — TELEPHONE ENCOUNTER
Patient did not show up for her scheduled follow-up today, 07/20/2022  Please call patient she needs to complete blood work in scheduled follow-up    In addition she will need to appointment scheduled 1st 1 for routine follow-up, 2nd 1 for chronic narcotic therapy secondary to her Percocet

## 2022-07-29 ENCOUNTER — HOME CARE VISIT (OUTPATIENT)
Dept: HOME HEALTH SERVICES | Facility: HOME HEALTHCARE | Age: 52
End: 2022-07-29
Payer: MEDICARE

## 2022-07-29 PROCEDURE — G0156 HHCP-SVS OF AIDE,EA 15 MIN: HCPCS

## 2022-08-01 ENCOUNTER — HOME CARE VISIT (OUTPATIENT)
Dept: HOME HEALTH SERVICES | Facility: HOME HEALTHCARE | Age: 52
End: 2022-08-01
Payer: MEDICARE

## 2022-08-01 PROCEDURE — 400014 VN F/U

## 2022-08-01 PROCEDURE — G0156 HHCP-SVS OF AIDE,EA 15 MIN: HCPCS

## 2022-08-02 ENCOUNTER — TELEPHONE (OUTPATIENT)
Dept: OTHER | Facility: OTHER | Age: 52
End: 2022-08-02

## 2022-08-02 ENCOUNTER — HOME CARE VISIT (OUTPATIENT)
Dept: HOME HEALTH SERVICES | Facility: HOME HEALTHCARE | Age: 52
End: 2022-08-02
Payer: MEDICARE

## 2022-08-02 VITALS
OXYGEN SATURATION: 100 % | SYSTOLIC BLOOD PRESSURE: 106 MMHG | HEART RATE: 80 BPM | DIASTOLIC BLOOD PRESSURE: 58 MMHG | TEMPERATURE: 97.6 F

## 2022-08-02 PROCEDURE — G0299 HHS/HOSPICE OF RN EA 15 MIN: HCPCS

## 2022-08-02 NOTE — TELEPHONE ENCOUNTER
Dinora Coyle called in stating she changed pt's drain sponge and there was a real strong yeasty odor  Pt had sediment in here urine and brown discharge around the site  She is requesting a call back

## 2022-08-02 NOTE — TELEPHONE ENCOUNTER
Patient with history of MS and SPT  Called patient's visiting nurse Marcela Sullivan who reports patient's SPT site "has a yeasty smell to it "  There was brownish discharge on the sponge  Patient without fever/chills  SPT patent with sediment  Has appointment with Dr Tal Magaña 8/9/2022    Will send encounter to [facility] for advice then call Marcela Sullivan back

## 2022-08-02 NOTE — TELEPHONE ENCOUNTER
Recommendation for cleaning SPT site daily and as needed for soiled dressing recommend keep an open to air as much as possible, monitoring color of urine output, monitoring for redness around the site  We can evaluate the site her upcoming appointment    Call Urology for any fevers chills or any other concerns

## 2022-08-02 NOTE — CASE COMMUNICATION
Miguel Montes:  I visited [facility] this morning and changed her drain sponge at her SP cath site  She has brownish drainage at the site and it has a strong yeasty odor  There is white sediment in her urine  Radha Ramirez says she has been off antibiotics for a UTI about a week  Would you like to prescribe fluconazole?   Thank you,   Milo HANCOCK

## 2022-08-03 NOTE — TELEPHONE ENCOUNTER
Called and left voicemail for [place] with [facility] regarding CRNP's recommendations  Also spoke with patient and made her aware of recommendations as well and confirmed upcoming appointment for next week  She verbalized understanding

## 2022-08-05 ENCOUNTER — HOME CARE VISIT (OUTPATIENT)
Dept: HOME HEALTH SERVICES | Facility: HOME HEALTHCARE | Age: 52
End: 2022-08-05
Payer: MEDICARE

## 2022-08-05 NOTE — CASE COMMUNICATION
pt canceled HHA visit for today   This is a diviation in the visit pattern and we are required to notify MD

## 2022-08-08 ENCOUNTER — HOME CARE VISIT (OUTPATIENT)
Dept: HOME HEALTH SERVICES | Facility: HOME HEALTHCARE | Age: 52
End: 2022-08-08
Payer: MEDICARE

## 2022-08-08 PROCEDURE — G0156 HHCP-SVS OF AIDE,EA 15 MIN: HCPCS

## 2022-08-09 ENCOUNTER — PROCEDURE VISIT (OUTPATIENT)
Dept: UROLOGY | Facility: AMBULATORY SURGERY CENTER | Age: 52
End: 2022-08-09
Payer: MEDICARE

## 2022-08-09 ENCOUNTER — HOME CARE VISIT (OUTPATIENT)
Dept: HOME HEALTH SERVICES | Facility: HOME HEALTHCARE | Age: 52
End: 2022-08-09
Payer: MEDICARE

## 2022-08-09 VITALS — DIASTOLIC BLOOD PRESSURE: 80 MMHG | SYSTOLIC BLOOD PRESSURE: 132 MMHG | HEART RATE: 70 BPM

## 2022-08-09 DIAGNOSIS — G35 MULTIPLE SCLEROSIS (HCC): Primary | ICD-10-CM

## 2022-08-09 DIAGNOSIS — N21.0 BLADDER STONES: ICD-10-CM

## 2022-08-09 PROCEDURE — 99214 OFFICE O/P EST MOD 30 MIN: CPT | Performed by: UROLOGY

## 2022-08-09 PROCEDURE — 52000 CYSTOURETHROSCOPY: CPT | Performed by: UROLOGY

## 2022-08-09 RX ORDER — SODIUM CHLORIDE 9 MG/ML
125 INJECTION, SOLUTION INTRAVENOUS CONTINUOUS
OUTPATIENT
Start: 2022-08-09

## 2022-08-09 RX ORDER — CEFAZOLIN SODIUM 2 G/50ML
2000 SOLUTION INTRAVENOUS ONCE
OUTPATIENT
Start: 2022-08-09 | End: 2022-08-09

## 2022-08-09 NOTE — PROGRESS NOTES
Cystoscopy     Date/Time 8/9/2022 9:49 AM     Performed by  Ferdinand Cao MD     Authorized by Ferdinand Cao MD          Kim Rice is a 66-year-old female with advanced multiple sclerosis  She is in a scooter chair  Her bladder is managed with an indwelling suprapubic tube  She last had upper tract imaging with a renal bladder ultrasound performed in the fall of 2021 which is normal   Her last creatinine level from March of 2021 is 0 49  She has a history of bladder stones  She presents today for cystoscopy and suprapubic tube exchange  Risk and benefits of the procedure were discussed reviewed  The patient remained in her scooter chair  Her indwelling suprapubic tube was removed  The insertion site was prepped and draped in sterile fashion  Flexible cystoscopy was then performed through the suprapubic tube tract  Bladder mucosa was normal   Bladder neck was normal   Two small bladder stones were identified measuring up to 1-1 5 cm in maximal dimension  The bladder was left full  The cystoscope was removed  A new suprapubic tube 24 Faro[place] latex-free was then inserted and connected to gravity drainage  The bladder was irrigated in the stones were not able to be removed  Impression:  Advanced multiple sclerosis, neurogenic bladder, recurrent bladder stones    Plan:  Recommend cystoscopy with cystolitholapaxy in the operating room  Risk of the procedure including, but not limited to, bleeding, infection, recurrence, need for additional surgery were discussed reviewed  Informed consent was obtained

## 2022-08-12 ENCOUNTER — HOME CARE VISIT (OUTPATIENT)
Dept: HOME HEALTH SERVICES | Facility: HOME HEALTHCARE | Age: 52
End: 2022-08-12
Payer: MEDICARE

## 2022-08-15 VITALS
RESPIRATION RATE: 14 BRPM | OXYGEN SATURATION: 99 % | DIASTOLIC BLOOD PRESSURE: 62 MMHG | HEART RATE: 72 BPM | TEMPERATURE: 97.4 F | SYSTOLIC BLOOD PRESSURE: 110 MMHG

## 2022-08-16 ENCOUNTER — HOME CARE VISIT (OUTPATIENT)
Dept: HOME HEALTH SERVICES | Facility: HOME HEALTHCARE | Age: 52
End: 2022-08-16
Payer: MEDICARE

## 2022-08-17 ENCOUNTER — HOME CARE VISIT (OUTPATIENT)
Dept: HOME HEALTH SERVICES | Facility: HOME HEALTHCARE | Age: 52
End: 2022-08-17
Payer: MEDICARE

## 2022-08-17 ENCOUNTER — TELEPHONE (OUTPATIENT)
Dept: UROLOGY | Facility: AMBULATORY SURGERY CENTER | Age: 52
End: 2022-08-17

## 2022-08-17 VITALS
DIASTOLIC BLOOD PRESSURE: 70 MMHG | OXYGEN SATURATION: 99 % | SYSTOLIC BLOOD PRESSURE: 105 MMHG | RESPIRATION RATE: 15 BRPM | HEART RATE: 65 BPM

## 2022-08-17 PROCEDURE — G0299 HHS/HOSPICE OF RN EA 15 MIN: HCPCS

## 2022-08-17 NOTE — TELEPHONE ENCOUNTER
I called pt 's  to discuss scheduling pt 's procedure with Dr Jake Brewer  There was no answer and his voicemail was full so I could not leave a voicemail  I will try to reach pt's  again at a later time

## 2022-08-18 ENCOUNTER — PREP FOR PROCEDURE (OUTPATIENT)
Dept: UROLOGY | Facility: AMBULATORY SURGERY CENTER | Age: 52
End: 2022-08-18

## 2022-08-18 DIAGNOSIS — N21.0 BLADDER STONES: Primary | ICD-10-CM

## 2022-08-18 DIAGNOSIS — Z01.818 PREOP EXAMINATION: ICD-10-CM

## 2022-08-18 DIAGNOSIS — N39.0 URINARY TRACT INFECTION WITHOUT HEMATURIA, SITE UNSPECIFIED: ICD-10-CM

## 2022-08-18 NOTE — TELEPHONE ENCOUNTER
I returned call to pt 's  [employer] and I was able to speak with him  I offered to schedule pt with Dr Kathrin Earl on 9/23/2022 at the [facility]  I verbally went over all of pt 's pre op instructions and prep information with him  I also informed him that pt will need a medical clearance appt with her PCP  [employer] asked that I call the office and have them call him to schedule the appt  He also asked that I mail a copy of pt 's surgical packet to their home and he was instructed to call our office with any questions or concerns regarding this procedure  I then called Dr Rodrigo Callahan office and spoke with a   I infored her that I just scheduled pt for surgery with Dr Kathrin Earl on 9/23/22 and pt needed to be scheduled for a medical clearance appt  I then explained that pt 's  had asked me to call them and have them reach out to him to schedule the appt  Kirby Bah confirmed that she will call pt's  to schedule clearance appt

## 2022-08-23 ENCOUNTER — HOME CARE VISIT (OUTPATIENT)
Dept: HOME HEALTH SERVICES | Facility: HOME HEALTHCARE | Age: 52
End: 2022-08-23
Payer: MEDICARE

## 2022-08-23 ENCOUNTER — TELEPHONE (OUTPATIENT)
Dept: FAMILY MEDICINE CLINIC | Facility: CLINIC | Age: 52
End: 2022-08-23

## 2022-08-23 NOTE — TELEPHONE ENCOUNTER
Recvd a call from pt Urologist bryan we call pt to setup appt for pre op clearance for surgery on 9/23/22  I called pt and  number and lm for a call back

## 2022-08-23 NOTE — Clinical Note
Thank you  I sent a case communication to her Doctor  ----- Message -----  From: Brian Arcos RN  Sent: 8/23/2022  12:52 PM EDT  To: Estella Richardson RN      pt canceled HHA visit for today   this is informational only visit pattern is out of compliance for the week

## 2022-08-23 NOTE — TELEPHONE ENCOUNTER
I called Dr Kenny Delaney office to follow up since I did not see that pt has been scheduled for her medical clearance appt yet  I spoke with Marija Steele at the office who took all of the pt 's information again and confirmed that she will call pt 's  to schedule clearance appt

## 2022-08-23 NOTE — CASE COMMUNICATION
pt canceled HHA visit for today   this is informational only visit pattern is out of compliance for the week

## 2022-08-25 ENCOUNTER — HOME CARE VISIT (OUTPATIENT)
Dept: HOME HEALTH SERVICES | Facility: HOME HEALTHCARE | Age: 52
End: 2022-08-25
Payer: MEDICARE

## 2022-08-25 VITALS
SYSTOLIC BLOOD PRESSURE: 130 MMHG | OXYGEN SATURATION: 98 % | TEMPERATURE: 99 F | HEART RATE: 98 BPM | DIASTOLIC BLOOD PRESSURE: 70 MMHG | RESPIRATION RATE: 18 BRPM

## 2022-08-25 PROCEDURE — G0299 HHS/HOSPICE OF RN EA 15 MIN: HCPCS

## 2022-08-26 ENCOUNTER — HOME CARE VISIT (OUTPATIENT)
Dept: HOME HEALTH SERVICES | Facility: HOME HEALTHCARE | Age: 52
End: 2022-08-26
Payer: MEDICARE

## 2022-08-26 ENCOUNTER — NURSE TRIAGE (OUTPATIENT)
Dept: OTHER | Facility: OTHER | Age: 52
End: 2022-08-26

## 2022-08-26 PROCEDURE — G0156 HHCP-SVS OF AIDE,EA 15 MIN: HCPCS

## 2022-08-26 NOTE — TELEPHONE ENCOUNTER
Reason for Disposition   Patient sounds very sick or weak to the triager    Answer Assessment - Initial Assessment Questions  1  SYMPTOMS: "What symptoms are you concerned about?"      Purulent green drainage and foul odor surrounding insertion site- increasing in amount   pressed around insertion site which produced more green drainage  2  ONSET:  "When did the symptoms start?"      8/25    3  FEVER: "Is there a fever?" If Yes, ask: "What is the temperature, how was it measured, and when did it start?"      Denies    4  ABDOMINAL PAIN: "Is there any abdominal pain?" (e g , Scale 1-10; or mild, moderate, severe)      3-4/10    5  URINE COLOR: "What color is the urine?"  "Is there blood present in the urine?" (e g , clear, yellow, cloudy, tea-colored, blood streaks, bright red)      Dark and sediment    6   ONSET: "When was the catheter inserted?"      8/9    7  OTHER SYMPTOMS: "Do you have any other symptoms?" (e g , back pain, bad urine odor)       foul urine odor    Protocols used: URINARY CATHETER SYMPTOMS AND QUESTIONS-ADULT-AH

## 2022-08-26 NOTE — TELEPHONE ENCOUNTER
Regarding: suprapubic catheter/pus and odor   ----- Message from [facility] sent at 8/26/2022  5:03 PM EDT -----  "my wife has a suprapubic catheter and I just got home from work and she has odor and pus coming out, is a patient of Dr Jayy Morrissey and im not sure if she should or could get placed on abx"

## 2022-08-28 PROCEDURE — 10330064 BAG, URINE ANTI-REFLUX 2000ML (20/CS)

## 2022-08-28 PROCEDURE — 10330064 CATH TRAY, FOLEY SYR 10CC (20/CS)

## 2022-08-28 PROCEDURE — 10330064 SYRINGE, LL 10CC (100/BX 12BX/CS)

## 2022-08-28 PROCEDURE — 10330064 CATH SECURE, STATLOCK FOLEY SWIVEL SIL P

## 2022-08-28 PROCEDURE — 10330064

## 2022-08-30 ENCOUNTER — HOME CARE VISIT (OUTPATIENT)
Dept: HOME HEALTH SERVICES | Facility: HOME HEALTHCARE | Age: 52
End: 2022-08-30
Payer: MEDICARE

## 2022-08-30 PROCEDURE — 400014 VN F/U

## 2022-08-30 PROCEDURE — G0156 HHCP-SVS OF AIDE,EA 15 MIN: HCPCS

## 2022-09-01 PROCEDURE — G0179 MD RECERTIFICATION HHA PT: HCPCS | Performed by: FAMILY MEDICINE

## 2022-09-02 ENCOUNTER — HOME CARE VISIT (OUTPATIENT)
Dept: HOME HEALTH SERVICES | Facility: HOME HEALTHCARE | Age: 52
End: 2022-09-02
Payer: MEDICARE

## 2022-09-02 NOTE — CASE COMMUNICATION
FYI only, HHA visit pattern will be out of compliance this week due to patient refusing HHA today due to not feeling well  It is our policy to inform you

## 2022-09-06 ENCOUNTER — TELEPHONE (OUTPATIENT)
Dept: UROLOGY | Facility: MEDICAL CENTER | Age: 52
End: 2022-09-06

## 2022-09-06 DIAGNOSIS — R39.9 UTI SYMPTOMS: Primary | ICD-10-CM

## 2022-09-06 NOTE — TELEPHONE ENCOUNTER
Called spoke with patient said that she has been dealing with discharge from SPT site, right from her belly, states split gauze saturated with pus like drainage at times, also foul smell , no fever or pain  Only has pain if the bag gets moved  Dark urine draining into bag, states sometime has an odor  Pt admits not well hydrated at times  driving water  States that home health still coming out, will come out Thursday  States she is due for a change, home nurse usually does the change of the SPT but wants to make sure ok before changing  Orders placed for UA/UC to be done by home health  Pt advised to increase hydration  Called spoke with [address] [facility], they complete testing on Thursday when they go out for her visit    States patient cancelled visit on 09/02/22

## 2022-09-06 NOTE — TELEPHONE ENCOUNTER
Patient of Dr Jeffrey Parkinson at [facility]    Patient called stating she is has an spt catheter and she is having a fowl odor from it and she is having an excessive amount of drainage and she thinks she has an infection and she wants to know how to proceed       Patient can be reached at [phone]

## 2022-09-07 ENCOUNTER — HOME CARE VISIT (OUTPATIENT)
Dept: HOME HEALTH SERVICES | Facility: HOME HEALTHCARE | Age: 52
End: 2022-09-07
Payer: MEDICARE

## 2022-09-07 ENCOUNTER — APPOINTMENT (OUTPATIENT)
Dept: LAB | Facility: MEDICAL CENTER | Age: 52
End: 2022-09-07
Payer: MEDICARE

## 2022-09-07 VITALS
TEMPERATURE: 97.1 F | SYSTOLIC BLOOD PRESSURE: 102 MMHG | RESPIRATION RATE: 12 BRPM | DIASTOLIC BLOOD PRESSURE: 58 MMHG | OXYGEN SATURATION: 98 % | HEART RATE: 66 BPM

## 2022-09-07 DIAGNOSIS — G35 FUNCTIONAL QUADRIPLEGIA SECONDARY TO MS (HCC): Chronic | ICD-10-CM

## 2022-09-07 DIAGNOSIS — G82.20 PARAPLEGIA (HCC): ICD-10-CM

## 2022-09-07 DIAGNOSIS — R74.8 ALKALINE PHOSPHATASE ELEVATION: ICD-10-CM

## 2022-09-07 DIAGNOSIS — R73.9 HYPERGLYCEMIA: ICD-10-CM

## 2022-09-07 DIAGNOSIS — I89.0 LYMPHEDEMA: ICD-10-CM

## 2022-09-07 DIAGNOSIS — G35 MULTIPLE SCLEROSIS (HCC): Chronic | ICD-10-CM

## 2022-09-07 DIAGNOSIS — R94.6 ABNORMAL THYROID FUNCTION TEST: ICD-10-CM

## 2022-09-07 DIAGNOSIS — K63.9 MURAL THICKENING OF SIGMOID COLON: ICD-10-CM

## 2022-09-07 DIAGNOSIS — M48.00 SPINAL STENOSIS, UNSPECIFIED SPINAL REGION: ICD-10-CM

## 2022-09-07 DIAGNOSIS — E53.8 VITAMIN B12 DEFICIENCY: ICD-10-CM

## 2022-09-07 DIAGNOSIS — E78.2 MIXED HYPERLIPIDEMIA: ICD-10-CM

## 2022-09-07 DIAGNOSIS — T83.510S URINARY TRACT INFECTION ASSOCIATED WITH CYSTOSTOMY CATHETER, SEQUELA: ICD-10-CM

## 2022-09-07 DIAGNOSIS — L89.314 PRESSURE ULCER OF RIGHT BUTTOCK, STAGE 4 (HCC): ICD-10-CM

## 2022-09-07 DIAGNOSIS — R53.2 FUNCTIONAL QUADRIPLEGIA SECONDARY TO MS (HCC): Chronic | ICD-10-CM

## 2022-09-07 DIAGNOSIS — D69.6 THROMBOCYTOPENIA (HCC): ICD-10-CM

## 2022-09-07 DIAGNOSIS — R93.89 INCREASED ENDOMETRIAL STRIPE THICKNESS: ICD-10-CM

## 2022-09-07 DIAGNOSIS — D50.9 MICROCYTIC ANEMIA: ICD-10-CM

## 2022-09-07 DIAGNOSIS — Z12.31 ENCOUNTER FOR SCREENING MAMMOGRAM FOR MALIGNANT NEOPLASM OF BREAST: ICD-10-CM

## 2022-09-07 DIAGNOSIS — N39.0 URINARY TRACT INFECTION ASSOCIATED WITH CYSTOSTOMY CATHETER, SEQUELA: ICD-10-CM

## 2022-09-07 DIAGNOSIS — R74.01 TRANSAMINITIS: ICD-10-CM

## 2022-09-07 DIAGNOSIS — E87.6 HYPOKALEMIA: ICD-10-CM

## 2022-09-07 DIAGNOSIS — Z93.59 SUPRAPUBIC CATHETER (HCC): Chronic | ICD-10-CM

## 2022-09-07 DIAGNOSIS — E55.9 VITAMIN D DEFICIENCY: ICD-10-CM

## 2022-09-07 LAB
25(OH)D3 SERPL-MCNC: 34.3 NG/ML (ref 30–100)
ALBUMIN SERPL BCP-MCNC: 3.8 G/DL (ref 3.5–5)
ALP SERPL-CCNC: 100 U/L (ref 46–116)
ALT SERPL W P-5'-P-CCNC: 23 U/L (ref 12–78)
AMORPH URATE CRY URNS QL MICRO: ABNORMAL
ANION GAP SERPL CALCULATED.3IONS-SCNC: 9 MMOL/L (ref 4–13)
AST SERPL W P-5'-P-CCNC: 18 U/L (ref 5–45)
BACTERIA UR QL AUTO: ABNORMAL /HPF
BILIRUB SERPL-MCNC: 0.55 MG/DL (ref 0.2–1)
BILIRUB UR QL STRIP: NEGATIVE
BUDDING YEAST: PRESENT
BUN SERPL-MCNC: 6 MG/DL (ref 5–25)
CALCIUM SERPL-MCNC: 9.5 MG/DL (ref 8.3–10.1)
CHLORIDE SERPL-SCNC: 99 MMOL/L (ref 96–108)
CHOLEST SERPL-MCNC: 265 MG/DL
CLARITY UR: ABNORMAL
CO2 SERPL-SCNC: 28 MMOL/L (ref 21–32)
COLOR UR: ABNORMAL
CREAT SERPL-MCNC: 0.58 MG/DL (ref 0.6–1.3)
ERYTHROCYTE [DISTWIDTH] IN BLOOD BY AUTOMATED COUNT: 13.3 % (ref 11.6–15.1)
EST. AVERAGE GLUCOSE BLD GHB EST-MCNC: 100 MG/DL
FOLATE SERPL-MCNC: 9.1 NG/ML (ref 3.1–17.5)
GFR SERPL CREATININE-BSD FRML MDRD: 106 ML/MIN/1.73SQ M
GLUCOSE P FAST SERPL-MCNC: 79 MG/DL (ref 65–99)
GLUCOSE UR STRIP-MCNC: NEGATIVE MG/DL
HBA1C MFR BLD: 5.1 %
HCT VFR BLD AUTO: 46.2 % (ref 34.8–46.1)
HDLC SERPL-MCNC: 58 MG/DL
HGB BLD-MCNC: 14.8 G/DL (ref 11.5–15.4)
HGB UR QL STRIP.AUTO: ABNORMAL
KETONES UR STRIP-MCNC: NEGATIVE MG/DL
LDLC SERPL CALC-MCNC: 175 MG/DL (ref 0–100)
LEUKOCYTE ESTERASE UR QL STRIP: ABNORMAL
MCH RBC QN AUTO: 28.1 PG (ref 26.8–34.3)
MCHC RBC AUTO-ENTMCNC: 32 G/DL (ref 31.4–37.4)
MCV RBC AUTO: 88 FL (ref 82–98)
MUCOUS THREADS UR QL AUTO: ABNORMAL
NITRITE UR QL STRIP: NEGATIVE
NON-SQ EPI CELLS URNS QL MICRO: ABNORMAL /HPF
PH UR STRIP.AUTO: 7.5 [PH]
PLATELET # BLD AUTO: 276 THOUSANDS/UL (ref 149–390)
PMV BLD AUTO: 9.9 FL (ref 8.9–12.7)
POTASSIUM SERPL-SCNC: 3.7 MMOL/L (ref 3.5–5.3)
PROT SERPL-MCNC: 7.7 G/DL (ref 6.4–8.4)
PROT UR STRIP-MCNC: ABNORMAL MG/DL
RBC # BLD AUTO: 5.27 MILLION/UL (ref 3.81–5.12)
RBC #/AREA URNS AUTO: ABNORMAL /HPF
SODIUM SERPL-SCNC: 136 MMOL/L (ref 135–147)
SP GR UR STRIP.AUTO: 1 (ref 1–1.03)
TRIGL SERPL-MCNC: 161 MG/DL
TSH SERPL DL<=0.05 MIU/L-ACNC: 2.35 UIU/ML (ref 0.45–4.5)
UROBILINOGEN UR STRIP-ACNC: <2 MG/DL
VIT B12 SERPL-MCNC: 322 PG/ML (ref 100–900)
WBC # BLD AUTO: 7.7 THOUSAND/UL (ref 4.31–10.16)
WBC #/AREA URNS AUTO: ABNORMAL /HPF
WBC CLUMPS # UR AUTO: PRESENT /UL

## 2022-09-07 PROCEDURE — 80074 ACUTE HEPATITIS PANEL: CPT

## 2022-09-07 PROCEDURE — 84443 ASSAY THYROID STIM HORMONE: CPT

## 2022-09-07 PROCEDURE — 36415 COLL VENOUS BLD VENIPUNCTURE: CPT

## 2022-09-07 PROCEDURE — 80053 COMPREHEN METABOLIC PANEL: CPT

## 2022-09-07 PROCEDURE — 82306 VITAMIN D 25 HYDROXY: CPT

## 2022-09-07 PROCEDURE — 85027 COMPLETE CBC AUTOMATED: CPT

## 2022-09-07 PROCEDURE — 82746 ASSAY OF FOLIC ACID SERUM: CPT

## 2022-09-07 PROCEDURE — G0300 HHS/HOSPICE OF LPN EA 15 MIN: HCPCS

## 2022-09-07 PROCEDURE — 81001 URINALYSIS AUTO W/SCOPE: CPT

## 2022-09-07 PROCEDURE — G0156 HHCP-SVS OF AIDE,EA 15 MIN: HCPCS

## 2022-09-07 PROCEDURE — 83036 HEMOGLOBIN GLYCOSYLATED A1C: CPT

## 2022-09-07 PROCEDURE — 82607 VITAMIN B-12: CPT

## 2022-09-07 PROCEDURE — 80061 LIPID PANEL: CPT

## 2022-09-08 ENCOUNTER — TELEPHONE (OUTPATIENT)
Dept: FAMILY MEDICINE CLINIC | Facility: CLINIC | Age: 52
End: 2022-09-08

## 2022-09-08 ENCOUNTER — OFFICE VISIT (OUTPATIENT)
Dept: FAMILY MEDICINE CLINIC | Facility: CLINIC | Age: 52
End: 2022-09-08
Payer: MEDICARE

## 2022-09-08 VITALS — SYSTOLIC BLOOD PRESSURE: 100 MMHG | DIASTOLIC BLOOD PRESSURE: 80 MMHG | HEART RATE: 100 BPM | TEMPERATURE: 98.8 F

## 2022-09-08 DIAGNOSIS — M48.00 SPINAL STENOSIS, UNSPECIFIED SPINAL REGION: ICD-10-CM

## 2022-09-08 DIAGNOSIS — D50.9 MICROCYTIC ANEMIA: ICD-10-CM

## 2022-09-08 DIAGNOSIS — F11.20 CONTINUOUS OPIOID DEPENDENCE (HCC): ICD-10-CM

## 2022-09-08 DIAGNOSIS — E53.8 VITAMIN B12 DEFICIENCY: ICD-10-CM

## 2022-09-08 DIAGNOSIS — G82.20 PARAPLEGIA (HCC): ICD-10-CM

## 2022-09-08 DIAGNOSIS — R93.89 INCREASED ENDOMETRIAL STRIPE THICKNESS: ICD-10-CM

## 2022-09-08 DIAGNOSIS — N39.0 URINARY TRACT INFECTION ASSOCIATED WITH CYSTOSTOMY CATHETER, SEQUELA: ICD-10-CM

## 2022-09-08 DIAGNOSIS — R53.2 FUNCTIONAL QUADRIPLEGIA SECONDARY TO MS (HCC): Chronic | ICD-10-CM

## 2022-09-08 DIAGNOSIS — Z01.818 PREOPERATIVE CLEARANCE: Primary | ICD-10-CM

## 2022-09-08 DIAGNOSIS — R94.6 ABNORMAL THYROID FUNCTION TEST: ICD-10-CM

## 2022-09-08 DIAGNOSIS — D69.6 THROMBOCYTOPENIA (HCC): ICD-10-CM

## 2022-09-08 DIAGNOSIS — G35 MULTIPLE SCLEROSIS (HCC): Chronic | ICD-10-CM

## 2022-09-08 DIAGNOSIS — R73.9 HYPERGLYCEMIA: ICD-10-CM

## 2022-09-08 DIAGNOSIS — Z93.59 SUPRAPUBIC CATHETER (HCC): Chronic | ICD-10-CM

## 2022-09-08 DIAGNOSIS — R74.01 TRANSAMINITIS: ICD-10-CM

## 2022-09-08 DIAGNOSIS — N21.0 BLADDER CALCULUS: ICD-10-CM

## 2022-09-08 DIAGNOSIS — E55.9 VITAMIN D DEFICIENCY: ICD-10-CM

## 2022-09-08 DIAGNOSIS — T83.510S URINARY TRACT INFECTION ASSOCIATED WITH CYSTOSTOMY CATHETER, SEQUELA: ICD-10-CM

## 2022-09-08 DIAGNOSIS — K63.9 MURAL THICKENING OF SIGMOID COLON: ICD-10-CM

## 2022-09-08 DIAGNOSIS — G35 FUNCTIONAL QUADRIPLEGIA SECONDARY TO MS (HCC): Chronic | ICD-10-CM

## 2022-09-08 DIAGNOSIS — E78.01 FAMILIAL HYPERCHOLESTEROLEMIA: ICD-10-CM

## 2022-09-08 PROBLEM — J96.01 ACUTE RESPIRATORY FAILURE WITH HYPOXIA (HCC): Status: ACTIVE | Noted: 2022-09-08

## 2022-09-08 PROBLEM — Z12.11 SCREENING FOR COLON CANCER: Status: ACTIVE | Noted: 2022-09-08

## 2022-09-08 PROBLEM — L89.314 PRESSURE ULCER OF RIGHT BUTTOCK, STAGE 4 (HCC): Status: RESOLVED | Noted: 2020-09-03 | Resolved: 2022-09-08

## 2022-09-08 LAB
HAV IGM SER QL: NORMAL
HBV CORE IGM SER QL: NORMAL
HBV SURFACE AG SER QL: NORMAL
HCV AB SER QL: NORMAL

## 2022-09-08 PROCEDURE — 99215 OFFICE O/P EST HI 40 MIN: CPT | Performed by: FAMILY MEDICINE

## 2022-09-08 RX ORDER — ROSUVASTATIN CALCIUM 5 MG/1
5 TABLET, COATED ORAL DAILY
Qty: 30 TABLET | Refills: 5 | Status: SHIPPED | OUTPATIENT
Start: 2022-09-08 | End: 2022-10-03

## 2022-09-08 RX ORDER — OXYCODONE HYDROCHLORIDE AND ACETAMINOPHEN 5; 325 MG/1; MG/1
1 TABLET ORAL EVERY 4 HOURS PRN
Qty: 40 TABLET | Refills: 0 | Status: SHIPPED | OUTPATIENT
Start: 2022-09-08

## 2022-09-08 NOTE — TELEPHONE ENCOUNTER
Tyler Scripture: [id number] - Rx #: [id number]  Need help? Call us at [phone]    Status Sent to Plan today    Drug oxyCODONE-Acetaminophen 5-325MG tablets   Form [employer] Electronic Prior Authorization Form 2017 NCPDP     Original Claim Info    67,41       PA required [phone] 4 DS AUTH #[id number] [id number]   CALL [phone] Max 5 Days Supply in 5 Days Total MME 49 99MG

## 2022-09-08 NOTE — PROGRESS NOTES
Assessment and Plan:  1  Preoperative clearance  Exam completed  Blood work reviewed   Culture pending   Patient cleared for upcoming surgery  2  Bladder calculus for laser lithotripsy 09/23/2022 with Urology, Dr Gilma Herbert  3  Functional quadriplegia secondary MS   4  MS  5  Paraplegia  , stable patient follows with Neurology wheelchair-bound  6  UTI with cystostomy catheter sequelae, urine culture pending follows with Urology  7  Abnormal thyroid function testing, TSH normal present time continue to monitor  8  Hyperglycemia diet-controlled  9  Microcytic anemia, resolved  10  Increased endometrial stripe patient does have upcoming appoint with gyn   11  Thrombocytopenia, platelets stable continue to monitor, normal the present time   12  Suprapubic catheter, follows with Urology  13  Transaminitis, resolved, hepatitis panel was negative  14  Vitamin B12 deficiency and vitamin-D deficiencies, stable continue present therapy  15  Spinal stenosis/continuous opioid dependency, using Percocet sparingly refill was given will schedule opioid visit in the next 1-2 months  15  Hyperlipidemia, favor familial   Start Crestor daily  Risk and benefit discussed especially muscle and liver   16  Sigmoid colon thickening, patient has upcoming appointment scheduled with Gastroenterology  17   Patient to return in 3 months for office visit and blood work        Problem List Items Addressed This Visit        Digestive    Mural thickening of sigmoid colon     Has appoint with GI 09/27/2022         Relevant Medications    rosuvastatin (CRESTOR) 5 mg tablet    Other Relevant Orders    CBC    Comprehensive metabolic panel    Hemoglobin A1C    Lipid Panel with Direct LDL reflex    TSH, 3rd generation with Free T4 reflex    Vitamin B12    Vitamin D 25 hydroxy    Folate       Nervous and Auditory    Multiple sclerosis (HCC) (Chronic)     Follows with Neurology, stable         Relevant Medications    rosuvastatin (CRESTOR) 5 mg tablet Other Relevant Orders    CBC    Comprehensive metabolic panel    Hemoglobin A1C    Lipid Panel with Direct LDL reflex    TSH, 3rd generation with Free T4 reflex    Vitamin B12    Vitamin D 25 hydroxy    Folate    Functional quadriplegia secondary to MS (HCC) (Chronic)     Wheelchair-bound follows with Neurology         Relevant Medications    rosuvastatin (CRESTOR) 5 mg tablet    Other Relevant Orders    CBC    Comprehensive metabolic panel    Hemoglobin A1C    Lipid Panel with Direct LDL reflex    TSH, 3rd generation with Free T4 reflex    Vitamin B12    Vitamin D 25 hydroxy    Folate    Paraplegia (HCC)     Follows with Neurology,         Relevant Medications    rosuvastatin (CRESTOR) 5 mg tablet    Other Relevant Orders    CBC    Comprehensive metabolic panel    Hemoglobin A1C    Lipid Panel with Direct LDL reflex    TSH, 3rd generation with Free T4 reflex    Vitamin B12    Vitamin D 25 hydroxy    Folate       Genitourinary    Bladder calculus     For laser therapy with Urology 09/23/2022         Relevant Medications    rosuvastatin (CRESTOR) 5 mg tablet    oxyCODONE-acetaminophen (PERCOCET) 5-325 mg per tablet    Other Relevant Orders    CBC    Comprehensive metabolic panel    Hemoglobin A1C    Lipid Panel with Direct LDL reflex    TSH, 3rd generation with Free T4 reflex    Vitamin B12    Vitamin D 25 hydroxy    Folate    Urinary tract infection associated with cystostomy catheter (HCC)     Urine culture still pending         Relevant Medications    rosuvastatin (CRESTOR) 5 mg tablet    Other Relevant Orders    CBC    Comprehensive metabolic panel    Hemoglobin A1C    Lipid Panel with Direct LDL reflex    TSH, 3rd generation with Free T4 reflex    Vitamin B12    Vitamin D 25 hydroxy    Folate       Other    Suprapubic catheter (HCC) (Chronic)     Follows with urology         Relevant Medications    rosuvastatin (CRESTOR) 5 mg tablet    Other Relevant Orders    CBC    Comprehensive metabolic panel    Hemoglobin A1C    Lipid Panel with Direct LDL reflex    TSH, 3rd generation with Free T4 reflex    Vitamin B12    Vitamin D 25 hydroxy    Folate    Hyperglycemia     Diet controlled         Relevant Medications    rosuvastatin (CRESTOR) 5 mg tablet    Other Relevant Orders    CBC    Comprehensive metabolic panel    Hemoglobin A1C    Lipid Panel with Direct LDL reflex    TSH, 3rd generation with Free T4 reflex    Vitamin B12    Vitamin D 25 hydroxy    Folate    Familial hypercholesterolemia     Discussed familial hypercholesterolemia  Patient restart Crestor 5 mg daily    Risk and benefit discussed especially liver and muscle         Relevant Medications    rosuvastatin (CRESTOR) 5 mg tablet    Other Relevant Orders    CBC    Comprehensive metabolic panel    Hemoglobin A1C    Lipid Panel with Direct LDL reflex    TSH, 3rd generation with Free T4 reflex    Vitamin B12    Vitamin D 25 hydroxy    Folate    Spinal stenosis     Percocet reordered will set up opioid visit         Relevant Medications    oxyCODONE-acetaminophen (PERCOCET) 5-325 mg per tablet    Vitamin B12 deficiency     Stable continue present therapy         Relevant Medications    rosuvastatin (CRESTOR) 5 mg tablet    Other Relevant Orders    CBC    Comprehensive metabolic panel    Hemoglobin A1C    Lipid Panel with Direct LDL reflex    TSH, 3rd generation with Free T4 reflex    Vitamin B12    Vitamin D 25 hydroxy    Folate    Vitamin D deficiency     Will stable off of supplementation continue to monitor         Relevant Medications    rosuvastatin (CRESTOR) 5 mg tablet    Other Relevant Orders    CBC    Comprehensive metabolic panel    Hemoglobin A1C    Lipid Panel with Direct LDL reflex    TSH, 3rd generation with Free T4 reflex    Vitamin B12    Vitamin D 25 hydroxy    Folate    Thrombocytopenia (HCC)     Platelets are normal the present time         Relevant Medications    rosuvastatin (CRESTOR) 5 mg tablet    Other Relevant Orders    CBC    Comprehensive metabolic panel    Hemoglobin A1C    Lipid Panel with Direct LDL reflex    TSH, 3rd generation with Free T4 reflex    Vitamin B12    Vitamin D 25 hydroxy    Folate    Transaminitis     LFTs and hepatitis panel are both normal         Relevant Medications    rosuvastatin (CRESTOR) 5 mg tablet    Other Relevant Orders    CBC    Comprehensive metabolic panel    Hemoglobin A1C    Lipid Panel with Direct LDL reflex    TSH, 3rd generation with Free T4 reflex    Vitamin B12    Vitamin D 25 hydroxy    Folate    Abnormal thyroid function test     TSH is normal         Relevant Medications    rosuvastatin (CRESTOR) 5 mg tablet    Other Relevant Orders    CBC    Comprehensive metabolic panel    Hemoglobin A1C    Lipid Panel with Direct LDL reflex    TSH, 3rd generation with Free T4 reflex    Vitamin B12    Vitamin D 25 hydroxy    Folate    Increased endometrial stripe thickness     Has appoint with gyn 09/22/2020         Relevant Medications    rosuvastatin (CRESTOR) 5 mg tablet    Other Relevant Orders    CBC    Comprehensive metabolic panel    Hemoglobin A1C    Lipid Panel with Direct LDL reflex    TSH, 3rd generation with Free T4 reflex    Vitamin B12    Vitamin D 25 hydroxy    Folate    Continuous opioid dependence (HCC)     Percocet reordered will set up opioid visit         RESOLVED: Microcytic anemia     Resolved         Relevant Medications    rosuvastatin (CRESTOR) 5 mg tablet    Other Relevant Orders    CBC    Comprehensive metabolic panel    Hemoglobin A1C    Lipid Panel with Direct LDL reflex    TSH, 3rd generation with Free T4 reflex    Vitamin B12    Vitamin D 25 hydroxy    Folate      Other Visit Diagnoses     Preoperative clearance    -  Primary    Relevant Medications    rosuvastatin (CRESTOR) 5 mg tablet    Other Relevant Orders    CBC    Comprehensive metabolic panel    Hemoglobin A1C    Lipid Panel with Direct LDL reflex    TSH, 3rd generation with Free T4 reflex    Vitamin B12    Vitamin D 25 hydroxy Folate                 Diagnoses and all orders for this visit:    Preoperative clearance  -     rosuvastatin (CRESTOR) 5 mg tablet; Take 1 tablet (5 mg total) by mouth daily  -     CBC; Future  -     Comprehensive metabolic panel; Future  -     Hemoglobin A1C; Future  -     Lipid Panel with Direct LDL reflex; Future  -     TSH, 3rd generation with Free T4 reflex; Future  -     Vitamin B12; Future  -     Vitamin D 25 hydroxy; Future  -     Folate; Future    Bladder calculus  -     rosuvastatin (CRESTOR) 5 mg tablet; Take 1 tablet (5 mg total) by mouth daily  -     CBC; Future  -     Comprehensive metabolic panel; Future  -     Hemoglobin A1C; Future  -     Lipid Panel with Direct LDL reflex; Future  -     TSH, 3rd generation with Free T4 reflex; Future  -     Vitamin B12; Future  -     Vitamin D 25 hydroxy; Future  -     Folate; Future    Mural thickening of sigmoid colon  -     rosuvastatin (CRESTOR) 5 mg tablet; Take 1 tablet (5 mg total) by mouth daily  -     CBC; Future  -     Comprehensive metabolic panel; Future  -     Hemoglobin A1C; Future  -     Lipid Panel with Direct LDL reflex; Future  -     TSH, 3rd generation with Free T4 reflex; Future  -     Vitamin B12; Future  -     Vitamin D 25 hydroxy; Future  -     Folate; Future    Functional quadriplegia secondary to MS (HCC)  -     rosuvastatin (CRESTOR) 5 mg tablet; Take 1 tablet (5 mg total) by mouth daily  -     CBC; Future  -     Comprehensive metabolic panel; Future  -     Hemoglobin A1C; Future  -     Lipid Panel with Direct LDL reflex; Future  -     TSH, 3rd generation with Free T4 reflex; Future  -     Vitamin B12; Future  -     Vitamin D 25 hydroxy; Future  -     Folate; Future    Multiple sclerosis (HCC)  -     rosuvastatin (CRESTOR) 5 mg tablet; Take 1 tablet (5 mg total) by mouth daily  -     CBC; Future  -     Comprehensive metabolic panel; Future  -     Hemoglobin A1C; Future  -     Lipid Panel with Direct LDL reflex;  Future  -     TSH, 3rd generation with Free T4 reflex; Future  -     Vitamin B12; Future  -     Vitamin D 25 hydroxy; Future  -     Folate; Future    Paraplegia (HCC)  -     rosuvastatin (CRESTOR) 5 mg tablet; Take 1 tablet (5 mg total) by mouth daily  -     CBC; Future  -     Comprehensive metabolic panel; Future  -     Hemoglobin A1C; Future  -     Lipid Panel with Direct LDL reflex; Future  -     TSH, 3rd generation with Free T4 reflex; Future  -     Vitamin B12; Future  -     Vitamin D 25 hydroxy; Future  -     Folate; Future    Urinary tract infection associated with cystostomy catheter, sequela  -     rosuvastatin (CRESTOR) 5 mg tablet; Take 1 tablet (5 mg total) by mouth daily  -     CBC; Future  -     Comprehensive metabolic panel; Future  -     Hemoglobin A1C; Future  -     Lipid Panel with Direct LDL reflex; Future  -     TSH, 3rd generation with Free T4 reflex; Future  -     Vitamin B12; Future  -     Vitamin D 25 hydroxy; Future  -     Folate; Future    Abnormal thyroid function test  -     rosuvastatin (CRESTOR) 5 mg tablet; Take 1 tablet (5 mg total) by mouth daily  -     CBC; Future  -     Comprehensive metabolic panel; Future  -     Hemoglobin A1C; Future  -     Lipid Panel with Direct LDL reflex; Future  -     TSH, 3rd generation with Free T4 reflex; Future  -     Vitamin B12; Future  -     Vitamin D 25 hydroxy; Future  -     Folate; Future    Hyperglycemia  -     rosuvastatin (CRESTOR) 5 mg tablet; Take 1 tablet (5 mg total) by mouth daily  -     CBC; Future  -     Comprehensive metabolic panel; Future  -     Hemoglobin A1C; Future  -     Lipid Panel with Direct LDL reflex; Future  -     TSH, 3rd generation with Free T4 reflex; Future  -     Vitamin B12; Future  -     Vitamin D 25 hydroxy; Future  -     Folate; Future    Microcytic anemia  -     rosuvastatin (CRESTOR) 5 mg tablet; Take 1 tablet (5 mg total) by mouth daily  -     CBC; Future  -     Comprehensive metabolic panel;  Future  -     Hemoglobin A1C; Future  - Lipid Panel with Direct LDL reflex; Future  -     TSH, 3rd generation with Free T4 reflex; Future  -     Vitamin B12; Future  -     Vitamin D 25 hydroxy; Future  -     Folate; Future    Familial hypercholesterolemia  -     rosuvastatin (CRESTOR) 5 mg tablet; Take 1 tablet (5 mg total) by mouth daily  -     CBC; Future  -     Comprehensive metabolic panel; Future  -     Hemoglobin A1C; Future  -     Lipid Panel with Direct LDL reflex; Future  -     TSH, 3rd generation with Free T4 reflex; Future  -     Vitamin B12; Future  -     Vitamin D 25 hydroxy; Future  -     Folate; Future    Increased endometrial stripe thickness  -     rosuvastatin (CRESTOR) 5 mg tablet; Take 1 tablet (5 mg total) by mouth daily  -     CBC; Future  -     Comprehensive metabolic panel; Future  -     Hemoglobin A1C; Future  -     Lipid Panel with Direct LDL reflex; Future  -     TSH, 3rd generation with Free T4 reflex; Future  -     Vitamin B12; Future  -     Vitamin D 25 hydroxy; Future  -     Folate; Future    Thrombocytopenia (HCC)  -     rosuvastatin (CRESTOR) 5 mg tablet; Take 1 tablet (5 mg total) by mouth daily  -     CBC; Future  -     Comprehensive metabolic panel; Future  -     Hemoglobin A1C; Future  -     Lipid Panel with Direct LDL reflex; Future  -     TSH, 3rd generation with Free T4 reflex; Future  -     Vitamin B12; Future  -     Vitamin D 25 hydroxy; Future  -     Folate; Future    Suprapubic catheter (HCC)  -     rosuvastatin (CRESTOR) 5 mg tablet; Take 1 tablet (5 mg total) by mouth daily  -     CBC; Future  -     Comprehensive metabolic panel; Future  -     Hemoglobin A1C; Future  -     Lipid Panel with Direct LDL reflex; Future  -     TSH, 3rd generation with Free T4 reflex; Future  -     Vitamin B12; Future  -     Vitamin D 25 hydroxy; Future  -     Folate; Future    Transaminitis  -     rosuvastatin (CRESTOR) 5 mg tablet; Take 1 tablet (5 mg total) by mouth daily  -     CBC;  Future  -     Comprehensive metabolic panel; Future  -     Hemoglobin A1C; Future  -     Lipid Panel with Direct LDL reflex; Future  -     TSH, 3rd generation with Free T4 reflex; Future  -     Vitamin B12; Future  -     Vitamin D 25 hydroxy; Future  -     Folate; Future    Vitamin B12 deficiency  -     rosuvastatin (CRESTOR) 5 mg tablet; Take 1 tablet (5 mg total) by mouth daily  -     CBC; Future  -     Comprehensive metabolic panel; Future  -     Hemoglobin A1C; Future  -     Lipid Panel with Direct LDL reflex; Future  -     TSH, 3rd generation with Free T4 reflex; Future  -     Vitamin B12; Future  -     Vitamin D 25 hydroxy; Future  -     Folate; Future    Vitamin D deficiency  -     rosuvastatin (CRESTOR) 5 mg tablet; Take 1 tablet (5 mg total) by mouth daily  -     CBC; Future  -     Comprehensive metabolic panel; Future  -     Hemoglobin A1C; Future  -     Lipid Panel with Direct LDL reflex; Future  -     TSH, 3rd generation with Free T4 reflex; Future  -     Vitamin B12; Future  -     Vitamin D 25 hydroxy; Future  -     Folate; Future    Continuous opioid dependence ([facility] [address] )    Spinal stenosis, unspecified spinal region  -     oxyCODONE-acetaminophen (PERCOCET) 5-325 mg per tablet; Take 1 tablet by mouth every 4 (four) hours as needed for moderate pain Max Daily Amount: 6 tablets            Subjective:      Patient ID: Ritesh Valerio is a 46 y o  female      CC:    Chief Complaint   Patient presents with    Pre-op Exam     Cystolitholapaxy scheduled for 9/23       HPI:    HPI    The following portions of the patient's history were reviewed and updated as appropriate: allergies, current medications, past family history, past medical history, past social history, past surgical history and problem list       Review of Systems      Data to review:       Objective:    Vitals:    09/08/22 1128   BP: 100/80   Pulse: 100   Temp: 98 8 °F (37 1 °C)        Physical Exam

## 2022-09-08 NOTE — ASSESSMENT & PLAN NOTE
Discussed familial hypercholesterolemia  Patient restart Crestor 5 mg daily    Risk and benefit discussed especially liver and muscle

## 2022-09-08 NOTE — PATIENT INSTRUCTIONS
Patient is to follow with gynecology Gastroenterology as planned  Patient start Crestor 5 mg daily   Patient to return in 3 months for office visit and blood work   Please schedule opioid visit the next 3-4 weeks

## 2022-09-09 ENCOUNTER — HOME CARE VISIT (OUTPATIENT)
Dept: HOME HEALTH SERVICES | Facility: HOME HEALTHCARE | Age: 52
End: 2022-09-09
Payer: MEDICARE

## 2022-09-09 PROCEDURE — G0156 HHCP-SVS OF AIDE,EA 15 MIN: HCPCS

## 2022-09-09 NOTE — TELEPHONE ENCOUNTER
Patient's  cynthia returning phone call to reschedule surgery for patient       Transferred to [facility]

## 2022-09-09 NOTE — TELEPHONE ENCOUNTER
Pt was seen yesterday at family medicine appt  Had home health visit this morning but chart notes not yet available  Will check on UC results on 9/12/22

## 2022-09-09 NOTE — TELEPHONE ENCOUNTER
I called pt 's  Luis Alfredo Contreras this afternoon to inform him that we needed to reschedule pt 's upcoming procedure with Dr Ronnie Langston on 9/23/22 due to a change in Dr Beverley Arias schedule  There was no answer when ic alled so I did leave a voicemail asking him to call our office back to discuss when he had a moment

## 2022-09-12 NOTE — TELEPHONE ENCOUNTER
[facility] Case ID: [id number] - Rx #: [id number]  Need help? Call us at [phone]    Outcome Approved today  PA Case: [id number],   Status: Approved,   Coverage Starts on: 6/13/2022 12:00:00 AM,   Coverage Ends on: 9/12/2023 12:00:00 AM     Drug Cyclobenzaprine HCl 10MG tablets   Form [employer] Electronic PA Form (4296 NCPDP)    Original Claim Info        [id number]: *G* PRECERT REQ BY MD; USE COVERMYMEDS OR MD CALL FOR REVIEW [phone] DRUG REQUIRES PRIOR AUTHORIZATION TRANSMISSION FEE UP TO $0 28 MAY APPLY [id number]: Not Eligible; Gerardogenjames [address] [id number]: *G* PRECERT REQ BY MD; USE COVERMYMEDS OR MD CALL FOR REVIEW [phone] DRUG REQUIRES PRIOR AUTHORIZATION TRANSMISSION FEE UP TO $0 28 MAY APPLY [id number]: Not Eligible; Kelly0 Manuel Rainey notified

## 2022-09-13 ENCOUNTER — HOME CARE VISIT (OUTPATIENT)
Dept: HOME HEALTH SERVICES | Facility: HOME HEALTHCARE | Age: 52
End: 2022-09-13
Payer: MEDICARE

## 2022-09-13 PROCEDURE — G0156 HHCP-SVS OF AIDE,EA 15 MIN: HCPCS

## 2022-09-16 ENCOUNTER — HOME CARE VISIT (OUTPATIENT)
Dept: HOME HEALTH SERVICES | Facility: HOME HEALTHCARE | Age: 52
End: 2022-09-16
Payer: MEDICARE

## 2022-09-16 NOTE — CASE COMMUNICATION
FYI only it is out policy to notify you, HHA visits pattern is out of compliance due to no availabilty of HHA for 2nd visit this week

## 2022-09-20 ENCOUNTER — HOME CARE VISIT (OUTPATIENT)
Dept: HOME HEALTH SERVICES | Facility: HOME HEALTHCARE | Age: 52
End: 2022-09-20
Payer: MEDICARE

## 2022-09-20 VITALS
DIASTOLIC BLOOD PRESSURE: 62 MMHG | HEART RATE: 71 BPM | OXYGEN SATURATION: 98 % | TEMPERATURE: 97.2 F | SYSTOLIC BLOOD PRESSURE: 128 MMHG | RESPIRATION RATE: 16 BRPM

## 2022-09-20 NOTE — CASE COMMUNICATION
FYI only, pt HHA visit pattern will be out of compliance this week due to pt cancelling HHA visit scheduled for today  It is out policy to notify you

## 2022-09-27 ENCOUNTER — HOME CARE VISIT (OUTPATIENT)
Dept: HOME HEALTH SERVICES | Facility: HOME HEALTHCARE | Age: 52
End: 2022-09-27
Payer: MEDICARE

## 2022-09-27 PROCEDURE — G0156 HHCP-SVS OF AIDE,EA 15 MIN: HCPCS

## 2022-09-30 ENCOUNTER — HOME CARE VISIT (OUTPATIENT)
Dept: HOME HEALTH SERVICES | Facility: HOME HEALTHCARE | Age: 52
End: 2022-09-30
Payer: MEDICARE

## 2022-09-30 PROCEDURE — G0156 HHCP-SVS OF AIDE,EA 15 MIN: HCPCS

## 2022-09-30 PROCEDURE — 400014 VN F/U

## 2022-10-03 ENCOUNTER — TELEPHONE (OUTPATIENT)
Dept: HOME HEALTH SERVICES | Facility: HOME HEALTHCARE | Age: 52
End: 2022-10-03

## 2022-10-03 NOTE — TELEPHONE ENCOUNTER
I spoke with Eusebio Melendrez this afternoon and he confirmed that pt is not feeling well and stated that her father had a massive stroke early this morning and pt does not want to proceed with surgery scheduled for tomorrow with Dr Chary La  I did offer to reschedule but Eusebio Melendrez declined and stated that he will call back later this week or early next week to discuss

## 2022-10-03 NOTE — TELEPHONE ENCOUNTER
Pt  called and stated pt is not feeling well and is wondering if procedure can be pushed back    Pt call [phone]

## 2022-10-03 NOTE — TELEPHONE ENCOUNTER
Unable to LM VM not available Sec number just rings busy Unable to accommodate a visit today Alexander Adam called out

## 2022-10-03 NOTE — TELEPHONE ENCOUNTER
I returned Mikey's call again and there was still no answer but I was able to leave a voicemail this time  I asked him to call our office back to discuss his wife's status

## 2022-10-04 ENCOUNTER — HOME CARE VISIT (OUTPATIENT)
Dept: HOME HEALTH SERVICES | Facility: HOME HEALTHCARE | Age: 52
End: 2022-10-04
Payer: MEDICARE

## 2022-10-04 VITALS
TEMPERATURE: 98.2 F | DIASTOLIC BLOOD PRESSURE: 70 MMHG | SYSTOLIC BLOOD PRESSURE: 118 MMHG | RESPIRATION RATE: 16 BRPM | HEART RATE: 77 BPM | OXYGEN SATURATION: 97 %

## 2022-10-04 PROCEDURE — G0299 HHS/HOSPICE OF RN EA 15 MIN: HCPCS

## 2022-10-04 NOTE — CASE COMMUNICATION
Ship to     Home  Insurance Medicare    [employer]  24 fr 5 cc  2   **MUST BE SILICONE ONE patient allergic to latex **  Syringe 10cc [id number] 1     10cc cath tray [id number] 2   Drain bag 2000cc [id number] 4   Statlock  NOT STOCKED 528571 4   Saline 100ml 273830 4     Dry Dressings   Drain sponges 4x4 split 730519 30    Tape  Transpore white  2in [id number] 1

## 2022-10-05 PROCEDURE — 10330064 SPONGE, EXCILON SPLIT DRN STR 4"X4" (2/P

## 2022-10-05 PROCEDURE — 10330064 SYRINGE, LL 10CC (100/BX 12BX/CS)

## 2022-10-05 PROCEDURE — 10330064 SALINE, IRR SOL STR 100ML (48/CS) MGM37

## 2022-10-05 PROCEDURE — 10330064 BAG, URINE ANTI-REFLUX 2000ML (20/CS)

## 2022-10-05 PROCEDURE — 10330064 TAPE, ADHSV TRANSPORE WHT 2" (6RL/BX 10B

## 2022-10-05 PROCEDURE — 10330064 CATH SECURE, STATLOCK FOLEY SWIVEL SIL P

## 2022-10-05 PROCEDURE — 10330064 CATH TRAY, FOLEY SYR 10CC (20/CS)

## 2022-10-07 ENCOUNTER — HOME CARE VISIT (OUTPATIENT)
Dept: HOME HEALTH SERVICES | Facility: HOME HEALTHCARE | Age: 52
End: 2022-10-07
Payer: MEDICARE

## 2022-10-07 NOTE — CASE COMMUNICATION
FYI only, it is our policy to notify you the home health aide visit pattern is out of compliance this week due to pt cancelling for personal reasons

## 2022-10-10 ENCOUNTER — PREP FOR PROCEDURE (OUTPATIENT)
Dept: UROLOGY | Facility: AMBULATORY SURGERY CENTER | Age: 52
End: 2022-10-10

## 2022-10-10 NOTE — TELEPHONE ENCOUNTER
I spoke with pt on 10/7/2022 and she confirmd that she would like to reschedule her procedure with Dr Claire Vasquez at the [facility] on 12/23/2022  I verbally went over all of pt 's pre op instructions with her and she is aware that she needs to have a urine culture 2 weeks prior along with a medical clearance by her PCP  Pt requested that I mail her a new copy of her surgical packet  Pt was instructed to call our office with any questions or concerns regarding this procedure

## 2022-10-11 ENCOUNTER — HOME CARE VISIT (OUTPATIENT)
Dept: HOME HEALTH SERVICES | Facility: HOME HEALTHCARE | Age: 52
End: 2022-10-11
Payer: MEDICARE

## 2022-10-11 PROCEDURE — G0156 HHCP-SVS OF AIDE,EA 15 MIN: HCPCS

## 2022-10-14 ENCOUNTER — HOME CARE VISIT (OUTPATIENT)
Dept: HOME HEALTH SERVICES | Facility: HOME HEALTHCARE | Age: 52
End: 2022-10-14
Payer: MEDICARE

## 2022-10-14 PROCEDURE — G0156 HHCP-SVS OF AIDE,EA 15 MIN: HCPCS

## 2022-10-18 ENCOUNTER — HOME CARE VISIT (OUTPATIENT)
Dept: HOME HEALTH SERVICES | Facility: HOME HEALTHCARE | Age: 52
End: 2022-10-18
Payer: MEDICARE

## 2022-10-18 ENCOUNTER — TELEPHONE (OUTPATIENT)
Dept: FAMILY MEDICINE CLINIC | Facility: CLINIC | Age: 52
End: 2022-10-18

## 2022-10-18 DIAGNOSIS — G35 MULTIPLE SCLEROSIS (HCC): ICD-10-CM

## 2022-10-18 PROCEDURE — G0156 HHCP-SVS OF AIDE,EA 15 MIN: HCPCS

## 2022-10-18 PROCEDURE — G0299 HHS/HOSPICE OF RN EA 15 MIN: HCPCS

## 2022-10-18 NOTE — TELEPHONE ENCOUNTER
Please see 5/31/22 refill request     Patient has not been seen in 1 5 years  She had an appt with Dr Migue Correa scheduled for 10/14/22 and cancelled it, no future appts scheduled  She last saw Dr Juany Ramirez in 4/2021  I have not seen her since 2019  I am not comfortable continuing to refill this without her being seen in 1 5 years and no upcoming appts  Is she planning on continuing to follow up here?   If not, this med needs to come from her PCP

## 2022-10-18 NOTE — TELEPHONE ENCOUNTER
Patient not show up for her initial opioid appointment, 10/18/2022  Please call patient have her reschedule    I am unable to prescribe any narcotics until this is done

## 2022-10-19 VITALS
DIASTOLIC BLOOD PRESSURE: 60 MMHG | TEMPERATURE: 98.2 F | HEART RATE: 92 BPM | SYSTOLIC BLOOD PRESSURE: 132 MMHG | OXYGEN SATURATION: 100 %

## 2022-10-20 NOTE — TELEPHONE ENCOUNTER
Pt has appt with Dr MCINTYRE scheduled for 11/15/22  Attempted to reach pt to confirm if she will keep appt  No answer, unable to leave voicemail  Other # on file is busy  [employer] message sent

## 2022-10-25 ENCOUNTER — HOME CARE VISIT (OUTPATIENT)
Dept: HOME HEALTH SERVICES | Facility: HOME HEALTHCARE | Age: 52
End: 2022-10-25
Payer: MEDICARE

## 2022-10-25 VITALS
SYSTOLIC BLOOD PRESSURE: 132 MMHG | DIASTOLIC BLOOD PRESSURE: 86 MMHG | TEMPERATURE: 97.8 F | HEART RATE: 82 BPM | OXYGEN SATURATION: 100 %

## 2022-10-26 ENCOUNTER — TELEPHONE (OUTPATIENT)
Dept: HOME HEALTH SERVICES | Facility: HOME HEALTHCARE | Age: 52
End: 2022-10-26

## 2022-10-26 ENCOUNTER — HOME CARE VISIT (OUTPATIENT)
Dept: HOME HEALTH SERVICES | Facility: HOME HEALTHCARE | Age: 52
End: 2022-10-26
Payer: MEDICARE

## 2022-10-26 PROCEDURE — 10330064 BAG, URINE ANTI-REFLUX 2000ML (20/CS)

## 2022-10-26 PROCEDURE — 10330064 CATH TRAY, FOLEY SYR 10CC (20/CS)

## 2022-10-26 PROCEDURE — 10330064

## 2022-10-26 RX ORDER — BACLOFEN 20 MG/1
TABLET ORAL
Qty: 120 TABLET | Refills: 0 | Status: SHIPPED | OUTPATIENT
Start: 2022-10-26

## 2022-10-26 NOTE — CASE COMMUNICATION
FYI only, it is our policy to notify you  There is a deviation from A visit pattern for this week, pt will only be seen 1 x this week instead of projected 2 x weekly visit pattern due to pt request due to MD rothmant

## 2022-10-26 NOTE — TELEPHONE ENCOUNTER
I will refill a 1 month supply of this only    She must attend the appt with Dr Donna Zurita on 11/15 in order to continue getting refills from us

## 2022-10-26 NOTE — TELEPHONE ENCOUNTER
Attempted to reach pt to confirm if she will keep appt  No answer, unable to leave voicemail  2nd attempt  Letter mailed

## 2022-10-28 ENCOUNTER — HOME CARE VISIT (OUTPATIENT)
Dept: HOME HEALTH SERVICES | Facility: HOME HEALTHCARE | Age: 52
End: 2022-10-28
Payer: MEDICARE

## 2022-10-28 PROCEDURE — G0156 HHCP-SVS OF AIDE,EA 15 MIN: HCPCS

## 2022-10-28 PROCEDURE — 400014 VN F/U

## 2022-11-01 NOTE — TELEPHONE ENCOUNTER
Patient called back regarding the message she received from our office about her medication refill  Advised patient of previous messages on why we were trying to reach her  Patient stated she is planning on going to her appointment on 11/15/22 with Dr Lane Bowman in [facility]

## 2022-11-03 ENCOUNTER — HOME CARE VISIT (OUTPATIENT)
Dept: HOME HEALTH SERVICES | Facility: HOME HEALTHCARE | Age: 52
End: 2022-11-03

## 2022-11-03 VITALS — TEMPERATURE: 98 F

## 2022-11-03 NOTE — TELEPHONE ENCOUNTER
Received VM transcription:    Hi my name is Uzma Hernandez, calling for [facility]  Please give me a call back  I want to talk to her about her medication  Thank you

## 2022-11-07 PROBLEM — Z12.11 SCREENING FOR COLON CANCER: Status: RESOLVED | Noted: 2022-09-08 | Resolved: 2022-11-07

## 2022-11-08 ENCOUNTER — HOME CARE VISIT (OUTPATIENT)
Dept: HOME HEALTH SERVICES | Facility: HOME HEALTHCARE | Age: 52
End: 2022-11-08

## 2022-11-10 ENCOUNTER — TELEPHONE (OUTPATIENT)
Dept: NEUROLOGY | Facility: CLINIC | Age: 52
End: 2022-11-10

## 2022-11-11 ENCOUNTER — HOME CARE VISIT (OUTPATIENT)
Dept: HOME HEALTH SERVICES | Facility: HOME HEALTHCARE | Age: 52
End: 2022-11-11

## 2022-11-15 ENCOUNTER — OFFICE VISIT (OUTPATIENT)
Dept: NEUROLOGY | Facility: CLINIC | Age: 52
End: 2022-11-15

## 2022-11-15 VITALS — TEMPERATURE: 96.3 F | HEART RATE: 91 BPM | DIASTOLIC BLOOD PRESSURE: 63 MMHG | SYSTOLIC BLOOD PRESSURE: 116 MMHG

## 2022-11-15 DIAGNOSIS — Z93.59 SUPRAPUBIC CATHETER (HCC): Chronic | ICD-10-CM

## 2022-11-15 DIAGNOSIS — G35 MULTIPLE SCLEROSIS (HCC): Primary | Chronic | ICD-10-CM

## 2022-11-15 DIAGNOSIS — G35 FUNCTIONAL QUADRIPLEGIA SECONDARY TO MS (HCC): Chronic | ICD-10-CM

## 2022-11-15 DIAGNOSIS — G82.20 PARAPLEGIA (HCC): ICD-10-CM

## 2022-11-15 DIAGNOSIS — M48.00 SPINAL STENOSIS, UNSPECIFIED SPINAL REGION: ICD-10-CM

## 2022-11-15 DIAGNOSIS — R13.10 DYSPHAGIA, UNSPECIFIED TYPE: ICD-10-CM

## 2022-11-15 DIAGNOSIS — G47.9 SLEEP DISORDER: ICD-10-CM

## 2022-11-15 DIAGNOSIS — R53.2 FUNCTIONAL QUADRIPLEGIA SECONDARY TO MS (HCC): Chronic | ICD-10-CM

## 2022-11-15 DIAGNOSIS — R06.02 SOB (SHORTNESS OF BREATH): ICD-10-CM

## 2022-11-15 RX ORDER — BACLOFEN 20 MG/1
TABLET ORAL
Qty: 150 TABLET | Refills: 5 | Status: SHIPPED | OUTPATIENT
Start: 2022-11-15

## 2022-11-15 NOTE — PROGRESS NOTES
Patient ID: Magdi Paul is a 46 y o  female  Assessment/Plan:           Problem List Items Addressed This Visit        Digestive    Swallowing difficulty    Relevant Orders    Referral to [facility]  [facility]       Nervous and Auditory    Multiple sclerosis (HCC) - Primary (Chronic)    Relevant Medications    baclofen 20 mg tablet    Other Relevant Orders    Referral to [address] [place] [address]  [facility]    Functional quadriplegia secondary to MS [facility]) (Chronic)    Relevant Orders    Referral to [address] [place] [address]  [facility]    Paraplegia [facility])    Relevant Orders    Referral to [address] [place] [address]  [facility]       Other    Suprapubic catheter ([address] ) (Chronic)    Sleep disorder    Spinal stenosis      Other Visit Diagnoses     SOB (shortness of breath)        Relevant Orders    Referral to [facility]            Mrs Vanessa Avendano has presented to [facility] multiple [address] for follow-up on multiple sclerosis related issues  Patient presented with tetraplegia while wheelchair-bound with progressive worsening in her dysphagia and hypophonia has been noted  Patient stated she has perception of shortness of breath as she is using air to actually get her words out  Patient will be advised to consider home health evaluation by speech and swallow therapy as well as physical therapy to help with working on improving functional lung capacity  patient has spirometer at home; SpO2 was 100% while in the office today  Patient has no signs of recent infection, no hospitalization since September 2020  Patient has completed M approve mint in her ulcers  Patient had regular physical activity as she has spent enough time in her swimming pool, which benefited her considering spasticity and weakness  Patient may benefit from aquatic therapy if she is interested     -Litholopaxy scheduled on 12/23/2022, patient is very excited and she is ready for procedure      Patient has been taking baclofen 20 mg up to 5 times a day, we extensively discussed side effects of medication including constipation considering patient has multiple sclerosis with gastric paresis or bowel dysfunction at the baseline  Patient should consider Colace or other means of stool softeners  Patient is to continue following with [facility] Neurology on annual basis  Subjective: tremors, speech difficulty, weakness and numbness  HPI  Mrs Marissa Raya has presented to [facility] multiple [address] for follow up as LOV was 4/6/2021  Patient has established care with Dr Debby Rowan and Chadwick Griffin, as patient has establish follow-up on annual basis:  Patient presented today with her  while on chin controlled wheelchair due to MS related quadriplegia  Since last office visit, patient described having worsening hypophonia with perception of shortness of breath despite normal oxygen saturation on her repeated testing  Patient had multiple events of staying in a swimming pool during the summertime  Patient has known had any recent infection considering she has suprapubic catheter  Patient does not have any decubital ulcers for at least 1 year, no hospitalization due to infection  Patient continue describing difficulties swallowing as she has very poor cough reflex with significant weakness in muscle of her upper torso  Patient does have spirometer in her household;   Patient was diagnosed with multiple sclerosis in 1998 by Casandra Aguilar at [address], and within short 3 years patient started using cane than rolling walker, with question was brought for primary progressive multiple sclerosis; Patient had  physiatry evaluation for spasticity; patient has baclofen 80 mg may increase up to 5 times a day  Contreras Caul was previously discussed- single medication approved for primary progressive MS;    MRI brain was done in October 2020, and stable from 2018  Low Vitamin b12 at 300, supplements required       -  mg/dL  - Prashant [employer] form was received by [address] [place] [address] [address] and will require review and submission then signed          The following portions of the patient's history were reviewed and updated as appropriate:   She  has a past medical history of Anesthesia, Bladder stones, Chronic pain disorder, Contracture, right shoulder, Dependent on wheelchair, Edema, Elevated alkaline phosphatase level, Fernandez catheter in place, Gallbladder disease, History of femur fracture, History of UTI, MS (multiple sclerosis) ([facility] ), Muscle spasticity, Muscle weakness, Muscular dystrophy ([facility] ), Neck pain, Neurogenic bladder, Paralysis ([facility] ), Port-A-Cath in place, Pressure injury of skin, Sacral pressure sore, Swallowing difficulty, Tinnitus, and Urinary incontinence    She   Patient Active Problem List    Diagnosis Date Noted   • Acute respiratory failure with hypoxia ([place] [address] ) 09/08/2022   • Continuous opioid dependence ([place] [address] ) 09/08/2022   • Increased endometrial stripe thickness 03/10/2021   • Ureteral calculus, left 11/03/2020   • Transaminitis 10/28/2020   • Alkaline phosphatase elevation 10/28/2020   • Abnormal thyroid function test 10/28/2020   • Candidal vaginitis 10/19/2020   • Mural thickening of sigmoid colon 10/07/2020   • Hydronephrosis with urinary obstruction due to ureteral calculus 10/02/2020   • Thrombocytopenia ([facility] ) 10/01/2020   • Serum total bilirubin elevated 10/01/2020   • Urinary tract infection associated with cystostomy catheter ([place] [address] ) 09/30/2020   • Medication management contract signed 02/21/2020   • Screening mammogram, encounter for 05/29/2019   • Health care maintenance 05/29/2019   • Allergic rhinitis 03/26/2018   • Functional quadriplegia secondary to [address] [facility]) 01/25/2018   • Suprapubic catheter ([facility] ) 11/17/2017   • Nephrolithiasis 04/22/2016   • Bladder calculus 09/02/2015   • Vitamin B12 deficiency 01/16/2015   • Constipation 01/14/2015   • Hyperglycemia 11/25/2014   • Familial hypercholesterolemia 11/25/2014   • Depression 01/22/2014   • Lymphedema 01/22/2014   • Spinal stenosis 01/22/2014   • Urinary incontinence 01/22/2014   • Vitamin D deficiency 11/18/2013   • Swallowing difficulty 11/04/2013   • Dizziness 11/04/2013   • Muscle weakness 11/04/2013   • Neurogenic bladder 11/04/2013   • Sleep disorder 11/04/2013   • Tremor 11/04/2013   • Vision loss 11/04/2013   • Multiple sclerosis ([facility] ) 10/21/2013   • Paraplegia ([facility] ) 10/21/2013     She  has a past surgical history that includes Cholecystectomy; Kidney stone surgery; Portacath placement (Left); Esophagogastroduodenoscopy; Bladder stone removal (N/A, 12/4/2017); Bladder surgery; Suprapubic cystostomy (02/25/2014); Cystoscopy (06/15/2020); [place] retrograde pyelogram (10/2/2020); pr cystourethroscopy,ureter catheter (Left, 10/2/2020); pr cysto/uretero w/lithotripsy &indwell stent insrt (Left, 11/3/2020); and [place] retrograde pyelogram (11/3/2020)  Her family history includes Alzheimer's disease in her family; COPD in her father; Diabetes in her family and mother; Heart disease in her family; Hyperlipidemia in her mother and sister; Hypertension in her family, father, and mother  She  reports that she has never smoked  She has never used smokeless tobacco  She reports previous alcohol use  She reports that she does not use drugs    Current Outpatient Medications   Medication Sig Dispense Refill   • baclofen 20 mg tablet Take 1 tab PO 5 times as needed 150 tablet 5   • DULoxetine (CYMBALTA) 30 mg delayed release capsule TAKE 1 CAPSULE BY MOUTH EVERY DAY 30 capsule 5   • furosemide (LASIX) 40 mg tablet Take 1 tablet (40 mg total) by mouth daily 90 tablet 3   • oxybutynin (DITROPAN-XL) 10 MG 24 hr tablet Take 1 tablet (10 mg total) by mouth daily 90 tablet 3   • oxyCODONE-acetaminophen (PERCOCET) 5-325 mg per tablet Take 1 tablet by mouth every 4 (four) hours as needed for moderate pain Max Daily Amount: 6 tablets 40 tablet 0   • rosuvastatin (CRESTOR) 5 mg tablet TAKE 1 TABLET (5 MG TOTAL) BY MOUTH DAILY  90 tablet 3     No current facility-administered medications for this visit  Current Outpatient Medications on File Prior to Visit   Medication Sig   • DULoxetine (CYMBALTA) 30 mg delayed release capsule TAKE 1 CAPSULE BY MOUTH EVERY DAY   • furosemide (LASIX) 40 mg tablet Take 1 tablet (40 mg total) by mouth daily   • oxybutynin (DITROPAN-XL) 10 MG 24 hr tablet Take 1 tablet (10 mg total) by mouth daily   • oxyCODONE-acetaminophen (PERCOCET) 5-325 mg per tablet Take 1 tablet by mouth every 4 (four) hours as needed for moderate pain Max Daily Amount: 6 tablets   • rosuvastatin (CRESTOR) 5 mg tablet TAKE 1 TABLET (5 MG TOTAL) BY MOUTH DAILY  • [DISCONTINUED] baclofen 20 mg tablet TAKE 1 TABLET BY MOUTH 5 TIMES A DAY AS NEEDED     No current facility-administered medications on file prior to visit  She is allergic to latex            Objective:    Blood pressure 116/63, pulse 91, temperature (!) 96 3 °F (35 7 °C), temperature source Skin  Physical Exam    Neurological Exam  CONSTITUTIONAL: NAD, pleasant  NECK: supple, no lymphadenopathy, no thyromegaly, no JVD  CARDIOVASCULAR: RRR, normal S1S2, no murmurs, no rubs  RESP: clear to auscultation bilaterally, no wheezes/rhonchi/rales  ABDOMEN: soft, non tender, non distended  SKIN: no rash or skin lesions  EXTREMITIES: no edema, pulses 2+bilaterally  PSYCH: appropriate mood and affect  NEUROLOGIC COMPREHENSIVE EXAM: Patient is oriented to person, place and time, NAD; appropriate affect  CN II, III, IV, V, VI, VII,VIII,IX,X,XI-XII intact with EOMI, PERRLA, OKN intact, VF grossly intact, fundi poorly visualized secondary to pupillary constriction; symmetric face noted  Motor: 1/5 UE/LE bilateral symmetric, with LUE can bring against gravity and RUE 1/5 bringing hands toward the body; LE 1/5 , with LLE dorsiflexion;  Sensory: decreased to light touch and pinprick bilaterally; normal vibration sensation feet bilaterally; Coordination within normal limits on FTN and YVONNE testing; DTR: 1/4 through, no Babinski, no clonus  Patient was in wheelchair, with muscle contracture in right hand noted  No spasticity on exam      ROS:  12 points of review of system was reviewed with the patient and was unremarkable with exception: see HPI  Review of Systems   Constitutional: Negative  Negative for appetite change and fever  HENT: Negative  Negative for hearing loss, tinnitus, trouble swallowing and voice change  Eyes: Negative  Negative for photophobia, pain and visual disturbance  Respiratory: Negative  Negative for shortness of breath  Cardiovascular: Negative  Negative for palpitations  Gastrointestinal: Negative  Negative for nausea and vomiting  Endocrine: Negative  Negative for cold intolerance  Genitourinary: Negative  Negative for dysuria, frequency and urgency  Musculoskeletal: Negative  Negative for gait problem, myalgias and neck pain  Skin: Negative  Negative for rash  Allergic/Immunologic: Negative  Neurological: Positive for tremors, speech difficulty, weakness and numbness  Negative for dizziness, seizures, syncope, facial asymmetry, light-headedness and headaches  Hematological: Negative  Does not bruise/bleed easily  Psychiatric/Behavioral: Negative  Negative for confusion, hallucinations and sleep disturbance

## 2022-11-17 ENCOUNTER — HOME CARE VISIT (OUTPATIENT)
Dept: HOME HEALTH SERVICES | Facility: HOME HEALTHCARE | Age: 52
End: 2022-11-17

## 2022-11-18 ENCOUNTER — HOME CARE VISIT (OUTPATIENT)
Dept: HOME HEALTH SERVICES | Facility: HOME HEALTHCARE | Age: 52
End: 2022-11-18

## 2022-11-18 VITALS
HEART RATE: 82 BPM | RESPIRATION RATE: 16 BRPM | TEMPERATURE: 96.9 F | DIASTOLIC BLOOD PRESSURE: 64 MMHG | OXYGEN SATURATION: 100 % | SYSTOLIC BLOOD PRESSURE: 112 MMHG

## 2022-11-21 ENCOUNTER — HOME CARE VISIT (OUTPATIENT)
Dept: HOME HEALTH SERVICES | Facility: HOME HEALTHCARE | Age: 52
End: 2022-11-21

## 2022-11-22 ENCOUNTER — HOME CARE VISIT (OUTPATIENT)
Dept: HOME HEALTH SERVICES | Facility: HOME HEALTHCARE | Age: 52
End: 2022-11-22

## 2022-11-23 NOTE — CASE COMMUNICATION
Speech therapy evaluation completed  Pt presents with mild-moderate hypophonia characterized by low volume related to decreased breath support  She denied any new difficulty with swallowing reporting she manages by making adjustments to the foods she eats (ie pt avoids rice) and declined swallowing evaluation at this time  Pt will benefit from speech therapy f/u for voice therapy to increase respiratory support and improve volume  Pt  in agreement with POC

## 2022-11-25 ENCOUNTER — HOME CARE VISIT (OUTPATIENT)
Dept: HOME HEALTH SERVICES | Facility: HOME HEALTHCARE | Age: 52
End: 2022-11-25

## 2022-11-25 VITALS
RESPIRATION RATE: 20 BRPM | SYSTOLIC BLOOD PRESSURE: 110 MMHG | OXYGEN SATURATION: 98 % | HEART RATE: 64 BPM | DIASTOLIC BLOOD PRESSURE: 60 MMHG

## 2022-11-25 DIAGNOSIS — K63.9 MURAL THICKENING OF SIGMOID COLON: ICD-10-CM

## 2022-11-25 DIAGNOSIS — E55.9 VITAMIN D DEFICIENCY: ICD-10-CM

## 2022-11-25 DIAGNOSIS — R94.6 ABNORMAL THYROID FUNCTION TEST: ICD-10-CM

## 2022-11-25 DIAGNOSIS — E53.8 VITAMIN B12 DEFICIENCY: ICD-10-CM

## 2022-11-25 DIAGNOSIS — E78.01 FAMILIAL HYPERCHOLESTEROLEMIA: ICD-10-CM

## 2022-11-25 DIAGNOSIS — N21.0 BLADDER CALCULUS: ICD-10-CM

## 2022-11-25 DIAGNOSIS — R93.89 INCREASED ENDOMETRIAL STRIPE THICKNESS: ICD-10-CM

## 2022-11-25 DIAGNOSIS — D50.9 MICROCYTIC ANEMIA: ICD-10-CM

## 2022-11-25 DIAGNOSIS — T83.510S URINARY TRACT INFECTION ASSOCIATED WITH CYSTOSTOMY CATHETER, SEQUELA: ICD-10-CM

## 2022-11-25 DIAGNOSIS — R73.9 HYPERGLYCEMIA: ICD-10-CM

## 2022-11-25 DIAGNOSIS — R74.01 TRANSAMINITIS: ICD-10-CM

## 2022-11-25 DIAGNOSIS — D69.6 THROMBOCYTOPENIA (HCC): ICD-10-CM

## 2022-11-25 DIAGNOSIS — G35 MULTIPLE SCLEROSIS (HCC): Chronic | ICD-10-CM

## 2022-11-25 DIAGNOSIS — G35 FUNCTIONAL QUADRIPLEGIA SECONDARY TO MS (HCC): Chronic | ICD-10-CM

## 2022-11-25 DIAGNOSIS — Z93.59 SUPRAPUBIC CATHETER (HCC): Chronic | ICD-10-CM

## 2022-11-25 DIAGNOSIS — N39.0 URINARY TRACT INFECTION ASSOCIATED WITH CYSTOSTOMY CATHETER, SEQUELA: ICD-10-CM

## 2022-11-25 DIAGNOSIS — R53.2 FUNCTIONAL QUADRIPLEGIA SECONDARY TO MS (HCC): Chronic | ICD-10-CM

## 2022-11-25 DIAGNOSIS — Z01.818 PREOPERATIVE CLEARANCE: ICD-10-CM

## 2022-11-25 DIAGNOSIS — G82.20 PARAPLEGIA (HCC): ICD-10-CM

## 2022-11-25 RX ORDER — ROSUVASTATIN CALCIUM 5 MG/1
5 TABLET, COATED ORAL DAILY
Qty: 90 TABLET | Refills: 4 | Status: SHIPPED | OUTPATIENT
Start: 2022-11-25

## 2022-11-29 ENCOUNTER — HOME CARE VISIT (OUTPATIENT)
Dept: HOME HEALTH SERVICES | Facility: HOME HEALTHCARE | Age: 52
End: 2022-11-29

## 2022-11-29 VITALS — DIASTOLIC BLOOD PRESSURE: 62 MMHG | SYSTOLIC BLOOD PRESSURE: 114 MMHG

## 2022-11-29 DIAGNOSIS — G35 MULTIPLE SCLEROSIS (HCC): Chronic | ICD-10-CM

## 2022-12-01 ENCOUNTER — HOME CARE VISIT (OUTPATIENT)
Dept: HOME HEALTH SERVICES | Facility: HOME HEALTHCARE | Age: 52
End: 2022-12-01

## 2022-12-01 RX ORDER — BACLOFEN 20 MG/1
TABLET ORAL
Qty: 450 TABLET | Refills: 2 | Status: SHIPPED | OUTPATIENT
Start: 2022-12-01

## 2022-12-01 NOTE — CASE COMMUNICATION
Patient is requesting PT be decreased to 1 visit per week  This is a change from the original frequency  This is for informational purposes only

## 2022-12-01 NOTE — CASE COMMUNICATION
Received text message from pt requesting cancellation for ST appt today with resumption of treatment for the week of 12/5  ST orders out of compliance for this week

## 2022-12-02 ENCOUNTER — HOME CARE VISIT (OUTPATIENT)
Dept: HOME HEALTH SERVICES | Facility: HOME HEALTHCARE | Age: 52
End: 2022-12-02

## 2022-12-02 VITALS
DIASTOLIC BLOOD PRESSURE: 62 MMHG | SYSTOLIC BLOOD PRESSURE: 100 MMHG | OXYGEN SATURATION: 96 % | TEMPERATURE: 98.2 F | HEART RATE: 81 BPM

## 2022-12-06 ENCOUNTER — HOME CARE VISIT (OUTPATIENT)
Dept: HOME HEALTH SERVICES | Facility: HOME HEALTHCARE | Age: 52
End: 2022-12-06

## 2022-12-06 VITALS — SYSTOLIC BLOOD PRESSURE: 104 MMHG | DIASTOLIC BLOOD PRESSURE: 62 MMHG

## 2022-12-07 ENCOUNTER — HOME CARE VISIT (OUTPATIENT)
Dept: HOME HEALTH SERVICES | Facility: HOME HEALTHCARE | Age: 52
End: 2022-12-07

## 2022-12-08 ENCOUNTER — HOME CARE VISIT (OUTPATIENT)
Dept: HOME HEALTH SERVICES | Facility: HOME HEALTHCARE | Age: 52
End: 2022-12-08

## 2022-12-08 ENCOUNTER — APPOINTMENT (OUTPATIENT)
Dept: LAB | Facility: CLINIC | Age: 52
End: 2022-12-08

## 2022-12-08 VITALS
DIASTOLIC BLOOD PRESSURE: 70 MMHG | OXYGEN SATURATION: 100 % | SYSTOLIC BLOOD PRESSURE: 112 MMHG | HEART RATE: 75 BPM | TEMPERATURE: 97.7 F

## 2022-12-08 DIAGNOSIS — N39.0 URINARY TRACT INFECTION: ICD-10-CM

## 2022-12-08 LAB
BACTERIA UR QL AUTO: ABNORMAL /HPF
BILIRUB UR QL STRIP: NEGATIVE
CLARITY UR: ABNORMAL
COLOR UR: ABNORMAL
GLUCOSE UR STRIP-MCNC: NEGATIVE MG/DL
HGB UR QL STRIP.AUTO: ABNORMAL
KETONES UR STRIP-MCNC: NEGATIVE MG/DL
LEUKOCYTE ESTERASE UR QL STRIP: ABNORMAL
MUCOUS THREADS UR QL AUTO: ABNORMAL
NITRITE UR QL STRIP: NEGATIVE
NON-SQ EPI CELLS URNS QL MICRO: ABNORMAL /HPF
PH UR STRIP.AUTO: 8 [PH]
PROT UR STRIP-MCNC: ABNORMAL MG/DL
RBC #/AREA URNS AUTO: ABNORMAL /HPF
SP GR UR STRIP.AUTO: 1.01 (ref 1–1.03)
UROBILINOGEN UR STRIP-ACNC: <2 MG/DL
WBC #/AREA URNS AUTO: ABNORMAL /HPF

## 2022-12-08 RX ADMIN — BACLOFEN 20 MG: 20 TABLET ORAL at 10:49

## 2022-12-09 ENCOUNTER — TELEPHONE (OUTPATIENT)
Dept: HOME HEALTH SERVICES | Facility: HOME HEALTHCARE | Age: 52
End: 2022-12-09

## 2022-12-09 ENCOUNTER — HOME CARE VISIT (OUTPATIENT)
Dept: HOME HEALTH SERVICES | Facility: HOME HEALTHCARE | Age: 52
End: 2022-12-09

## 2022-12-11 ENCOUNTER — DOCUMENTATION (OUTPATIENT)
Dept: NEUROLOGY | Facility: CLINIC | Age: 52
End: 2022-12-11

## 2022-12-11 ENCOUNTER — TELEPHONE (OUTPATIENT)
Dept: UROLOGY | Facility: AMBULATORY SURGERY CENTER | Age: 52
End: 2022-12-11

## 2022-12-11 ENCOUNTER — NURSE TRIAGE (OUTPATIENT)
Dept: OTHER | Facility: OTHER | Age: 52
End: 2022-12-11

## 2022-12-11 DIAGNOSIS — N39.0 URINARY TRACT INFECTION WITHOUT HEMATURIA, SITE UNSPECIFIED: Primary | ICD-10-CM

## 2022-12-11 DIAGNOSIS — G35 MULTIPLE SCLEROSIS (HCC): Primary | ICD-10-CM

## 2022-12-11 LAB
BACTERIA UR CULT: ABNORMAL
BACTERIA UR CULT: ABNORMAL

## 2022-12-11 RX ORDER — LEVOFLOXACIN 500 MG/1
500 TABLET, FILM COATED ORAL EVERY 24 HOURS
Qty: 5 TABLET | Refills: 0 | Status: SHIPPED | OUTPATIENT
Start: 2022-12-11 | End: 2022-12-16

## 2022-12-11 RX ORDER — GABAPENTIN 300 MG/1
300 CAPSULE ORAL DAILY
Qty: 30 CAPSULE | Refills: 0 | Status: SHIPPED | OUTPATIENT
Start: 2022-12-11 | End: 2022-12-14 | Stop reason: SDUPTHER

## 2022-12-11 NOTE — TELEPHONE ENCOUNTER
Reason for Disposition  • [1] SEVERE pain (e g , excruciating) AND [2] not improved after 2 hours of pain medicine    Answer Assessment - Initial Assessment Questions  1  ONSET: "When did the pain start?" (e g , minutes, hours, days)      Started last night  2  ONSET: "Does the pain come and go, or has it been constant since it started?" (e g , constant, intermittent, fleeting)      Constant  3  SEVERITY: "How bad is the pain?"   (Scale 1-10; mild, moderate or severe)    - MILD (1-3): doesn't interfere with normal activities     - MODERATE (4-7): interferes with normal activities or awakens from sleep     - SEVERE (8-10): excruciating pain, unable to do any normal activities       Severe- over a #10  Took Percocet for pain last night  4  LOCATION: "Where does it hurt?"       Left sided jaw pain feels like an electric shock shooting through her jaw up to her ear  Does not want to move her jaw because it makes it worse  5  RASH: "Is there any redness, rash, or swelling of the face?"      No rash  6  FEVER: "Do you have a fever?" If Yes, ask: "What is it, how was it measured, and when did it start?"       No fever  7  OTHER SYMPTOMS: "Do you have any other symptoms?" (e g , fever, toothache, nasal discharge, nasal congestion, clicking sensation in jaw joint)      Denies  8   PREGNANCY: "Is there any chance you are pregnant?" "When was your last menstrual period?"      N/A    Protocols used: FACE PAIN-ADULT-

## 2022-12-11 NOTE — TELEPHONE ENCOUNTER
Dr Campbell Downs responded back that she called patient back and sent in the Gabapentin for this patient  I called her  back and he was on his way to  the script now  He appreciated the help

## 2022-12-11 NOTE — TELEPHONE ENCOUNTER
Regarding: jaw/ nerve pain  ----- Message from Juanjo Booth sent at 12/11/2022  9:25 AM EST -----  " My wife is having nerve pain in her jaw   She has MS "

## 2022-12-11 NOTE — TELEPHONE ENCOUNTER
Patient had same type of electric shock pain to jaw area last year and Gabapentin was ordered  He is requesting for same to be done now  She took Percocet last evening and she has three left for today    TC sent to Dr Glo Christie with the above request

## 2022-12-12 ENCOUNTER — TELEPHONE (OUTPATIENT)
Dept: NEUROLOGY | Facility: CLINIC | Age: 52
End: 2022-12-12

## 2022-12-12 NOTE — TELEPHONE ENCOUNTER
Patient has surgery scheduled in the near future  Urine cultures are positive  Levaquin 500 mg daily for 5-day course was sent to her pharmacy  Please call patient and ask her to begin antibiotics

## 2022-12-13 ENCOUNTER — HOME CARE VISIT (OUTPATIENT)
Dept: HOME HEALTH SERVICES | Facility: HOME HEALTHCARE | Age: 52
End: 2022-12-13

## 2022-12-13 ENCOUNTER — APPOINTMENT (OUTPATIENT)
Dept: LAB | Facility: MEDICAL CENTER | Age: 52
End: 2022-12-13

## 2022-12-13 DIAGNOSIS — R93.89 INCREASED ENDOMETRIAL STRIPE THICKNESS: ICD-10-CM

## 2022-12-13 DIAGNOSIS — N39.0 URINARY TRACT INFECTION ASSOCIATED WITH CYSTOSTOMY CATHETER, SEQUELA: ICD-10-CM

## 2022-12-13 DIAGNOSIS — G82.20 PARAPLEGIA (HCC): ICD-10-CM

## 2022-12-13 DIAGNOSIS — R73.9 HYPERGLYCEMIA: ICD-10-CM

## 2022-12-13 DIAGNOSIS — D50.9 MICROCYTIC ANEMIA: ICD-10-CM

## 2022-12-13 DIAGNOSIS — R74.01 TRANSAMINITIS: ICD-10-CM

## 2022-12-13 DIAGNOSIS — E53.8 VITAMIN B12 DEFICIENCY: ICD-10-CM

## 2022-12-13 DIAGNOSIS — G35 MULTIPLE SCLEROSIS (HCC): Chronic | ICD-10-CM

## 2022-12-13 DIAGNOSIS — Z01.818 PREOPERATIVE CLEARANCE: ICD-10-CM

## 2022-12-13 DIAGNOSIS — E55.9 VITAMIN D DEFICIENCY: ICD-10-CM

## 2022-12-13 DIAGNOSIS — R94.6 ABNORMAL THYROID FUNCTION TEST: ICD-10-CM

## 2022-12-13 DIAGNOSIS — N21.0 BLADDER CALCULUS: ICD-10-CM

## 2022-12-13 DIAGNOSIS — G35 FUNCTIONAL QUADRIPLEGIA SECONDARY TO MS (HCC): Chronic | ICD-10-CM

## 2022-12-13 DIAGNOSIS — R53.2 FUNCTIONAL QUADRIPLEGIA SECONDARY TO MS (HCC): Chronic | ICD-10-CM

## 2022-12-13 DIAGNOSIS — K63.9 MURAL THICKENING OF SIGMOID COLON: ICD-10-CM

## 2022-12-13 DIAGNOSIS — E78.01 FAMILIAL HYPERCHOLESTEROLEMIA: ICD-10-CM

## 2022-12-13 DIAGNOSIS — Z93.59 SUPRAPUBIC CATHETER (HCC): Chronic | ICD-10-CM

## 2022-12-13 DIAGNOSIS — T83.510S URINARY TRACT INFECTION ASSOCIATED WITH CYSTOSTOMY CATHETER, SEQUELA: ICD-10-CM

## 2022-12-13 DIAGNOSIS — D69.6 THROMBOCYTOPENIA (HCC): ICD-10-CM

## 2022-12-13 LAB
25(OH)D3 SERPL-MCNC: 37.2 NG/ML (ref 30–100)
ALBUMIN SERPL BCP-MCNC: 3.7 G/DL (ref 3.5–5)
ALP SERPL-CCNC: 94 U/L (ref 46–116)
ALT SERPL W P-5'-P-CCNC: 50 U/L (ref 12–78)
ANION GAP SERPL CALCULATED.3IONS-SCNC: 6 MMOL/L (ref 4–13)
AST SERPL W P-5'-P-CCNC: 49 U/L (ref 5–45)
BILIRUB SERPL-MCNC: 0.41 MG/DL (ref 0.2–1)
BUN SERPL-MCNC: 10 MG/DL (ref 5–25)
CALCIUM SERPL-MCNC: 9.7 MG/DL (ref 8.3–10.1)
CHLORIDE SERPL-SCNC: 102 MMOL/L (ref 96–108)
CHOLEST SERPL-MCNC: 165 MG/DL
CO2 SERPL-SCNC: 30 MMOL/L (ref 21–32)
CREAT SERPL-MCNC: 0.51 MG/DL (ref 0.6–1.3)
ERYTHROCYTE [DISTWIDTH] IN BLOOD BY AUTOMATED COUNT: 13.2 % (ref 11.6–15.1)
FOLATE SERPL-MCNC: 9.9 NG/ML (ref 3.1–17.5)
GFR SERPL CREATININE-BSD FRML MDRD: 110 ML/MIN/1.73SQ M
GLUCOSE P FAST SERPL-MCNC: 93 MG/DL (ref 65–99)
HCT VFR BLD AUTO: 41.8 % (ref 34.8–46.1)
HDLC SERPL-MCNC: 67 MG/DL
HGB BLD-MCNC: 12.7 G/DL (ref 11.5–15.4)
LDLC SERPL CALC-MCNC: 69 MG/DL (ref 0–100)
MCH RBC QN AUTO: 28.2 PG (ref 26.8–34.3)
MCHC RBC AUTO-ENTMCNC: 30.4 G/DL (ref 31.4–37.4)
MCV RBC AUTO: 93 FL (ref 82–98)
PLATELET # BLD AUTO: 327 THOUSANDS/UL (ref 149–390)
PMV BLD AUTO: 9.5 FL (ref 8.9–12.7)
POTASSIUM SERPL-SCNC: 3.9 MMOL/L (ref 3.5–5.3)
PROT SERPL-MCNC: 7.7 G/DL (ref 6.4–8.4)
RBC # BLD AUTO: 4.5 MILLION/UL (ref 3.81–5.12)
SODIUM SERPL-SCNC: 138 MMOL/L (ref 135–147)
TRIGL SERPL-MCNC: 143 MG/DL
TSH SERPL DL<=0.05 MIU/L-ACNC: 1.32 UIU/ML (ref 0.45–4.5)
VIT B12 SERPL-MCNC: 339 PG/ML (ref 100–900)
WBC # BLD AUTO: 6.85 THOUSAND/UL (ref 4.31–10.16)

## 2022-12-13 NOTE — TELEPHONE ENCOUNTER
Attempted to reach patient but VM box not set up  Per communication consent, called and spoke with   Advised on Dr Nemesio Downey note  He will  the antibiotic and let patient know

## 2022-12-14 ENCOUNTER — TELEPHONE (OUTPATIENT)
Dept: NEUROLOGY | Facility: CLINIC | Age: 52
End: 2022-12-14

## 2022-12-14 ENCOUNTER — OFFICE VISIT (OUTPATIENT)
Dept: FAMILY MEDICINE CLINIC | Facility: CLINIC | Age: 52
End: 2022-12-14

## 2022-12-14 VITALS — DIASTOLIC BLOOD PRESSURE: 64 MMHG | HEART RATE: 72 BPM | SYSTOLIC BLOOD PRESSURE: 92 MMHG

## 2022-12-14 DIAGNOSIS — G82.20 PARAPLEGIA (HCC): ICD-10-CM

## 2022-12-14 DIAGNOSIS — E55.9 VITAMIN D DEFICIENCY: ICD-10-CM

## 2022-12-14 DIAGNOSIS — D69.6 THROMBOCYTOPENIA (HCC): ICD-10-CM

## 2022-12-14 DIAGNOSIS — Z02.89 MEDICATION MANAGEMENT CONTRACT SIGNED: ICD-10-CM

## 2022-12-14 DIAGNOSIS — F11.20 CONTINUOUS OPIOID DEPENDENCE (HCC): ICD-10-CM

## 2022-12-14 DIAGNOSIS — J96.01 ACUTE RESPIRATORY FAILURE WITH HYPOXIA (HCC): Primary | ICD-10-CM

## 2022-12-14 DIAGNOSIS — E53.8 VITAMIN B12 DEFICIENCY: ICD-10-CM

## 2022-12-14 DIAGNOSIS — E78.01 FAMILIAL HYPERCHOLESTEROLEMIA: ICD-10-CM

## 2022-12-14 DIAGNOSIS — M48.00 SPINAL STENOSIS, UNSPECIFIED SPINAL REGION: ICD-10-CM

## 2022-12-14 DIAGNOSIS — Z93.59 SUPRAPUBIC CATHETER (HCC): Chronic | ICD-10-CM

## 2022-12-14 DIAGNOSIS — R53.2 FUNCTIONAL QUADRIPLEGIA SECONDARY TO MS (HCC): Chronic | ICD-10-CM

## 2022-12-14 DIAGNOSIS — R73.9 HYPERGLYCEMIA: ICD-10-CM

## 2022-12-14 DIAGNOSIS — N21.0 BLADDER CALCULUS: ICD-10-CM

## 2022-12-14 DIAGNOSIS — G35 FUNCTIONAL QUADRIPLEGIA SECONDARY TO MS (HCC): Chronic | ICD-10-CM

## 2022-12-14 DIAGNOSIS — G35 MULTIPLE SCLEROSIS (HCC): ICD-10-CM

## 2022-12-14 DIAGNOSIS — G35 MULTIPLE SCLEROSIS (HCC): Chronic | ICD-10-CM

## 2022-12-14 DIAGNOSIS — G50.0 TRIGEMINAL NEURALGIA: ICD-10-CM

## 2022-12-14 DIAGNOSIS — F33.42 RECURRENT MAJOR DEPRESSIVE DISORDER, IN FULL REMISSION (HCC): ICD-10-CM

## 2022-12-14 DIAGNOSIS — G50.0 TRIGEMINAL NEURALGIA: Primary | ICD-10-CM

## 2022-12-14 PROBLEM — R74.01 TRANSAMINITIS: Status: RESOLVED | Noted: 2020-10-28 | Resolved: 2022-12-14

## 2022-12-14 LAB
EST. AVERAGE GLUCOSE BLD GHB EST-MCNC: 88 MG/DL
HBA1C MFR BLD: 4.7 %

## 2022-12-14 RX ORDER — OXYCODONE HYDROCHLORIDE AND ACETAMINOPHEN 5; 325 MG/1; MG/1
1 TABLET ORAL EVERY 4 HOURS PRN
Qty: 40 TABLET | Refills: 0 | Status: SHIPPED | OUTPATIENT
Start: 2022-12-14

## 2022-12-14 RX ORDER — GABAPENTIN 300 MG/1
300 CAPSULE ORAL 3 TIMES DAILY
Qty: 90 CAPSULE | Refills: 4 | Status: SHIPPED | OUTPATIENT
Start: 2022-12-14

## 2022-12-14 RX ORDER — OXCARBAZEPINE 150 MG/1
150 TABLET, FILM COATED ORAL 2 TIMES DAILY
Qty: 28 TABLET | Refills: 0 | Status: SHIPPED | OUTPATIENT
Start: 2022-12-14

## 2022-12-14 NOTE — TELEPHONE ENCOUNTER
Clinical presentation resembles trigeminal neuralgia  Patient is to increase her gabapentin 300 mg up to tid, and patient is to proceed with oxcarbazepine 150 mg bid for 2 weeks if pain persists

## 2022-12-14 NOTE — ASSESSMENT & PLAN NOTE
I will reorder Percocet today secondary to patient's trigeminal neuralgia patient must make an appointment for opioid visit 4 weeks 1 unable to continue this medication

## 2022-12-14 NOTE — PATIENT INSTRUCTIONS
Continue present therapy  Will set up opioid visit and a [address] in 1 month  Follow all specialist per their instructions

## 2022-12-14 NOTE — TELEPHONE ENCOUNTER
Spoke with Prateek Dukes, he confirms he received detailed message  Confirmed medication instructions  No further questions

## 2022-12-14 NOTE — TELEPHONE ENCOUNTER
Confirmed pt's  Joyce Valencia is on communication consent  Spoke with Joyce Valencia  He reports pain started Saturday night  Reports severe shooting pain in the left side of pt's jaw  Pain goes up her cheek to her ear  Worse with swallowing, drinking, talking, and eating  Pt had same pain about 2 years ago, gabapentin helped previously  See 12/12/22 telephone encounter  Pt was started on gabapentin 300mg HS that day  Not helping yet  Joyce Valencia is asking if gabapentin dose can be increased or if there are other recommendations  Please advise       [phone]  Okay to leave detailed message

## 2022-12-14 NOTE — TELEPHONE ENCOUNTER
Reached vm:    Hi, I'm calling for my wife, Nichole Arambula  1970 is her birthday  She has MS and is a patient with you  She's having some jaw pain, severe jaw pain  I called her about half an hour ago  Haven't gotten a return call, so I'm just calling again, please call me back  Thank you       [phone]

## 2022-12-14 NOTE — PROGRESS NOTES
Name: Leif Miller      : 1970      MRN: [id number]  Encounter Provider: Wing Devante DO  Encounter Date: 2022   Encounter department: [facility] PRIMARY CARE    Assessment & Plan     1  Acute respiratory failure with hypoxemia/MS/quadriplegia/paraplegia, patient is wheelchair-bound follows with Neurology  2  Thrombocytopenia, platelet count normal continue to monitor  3  Bladder calculus patient set up for lithotripsy 2023 with Urology Department  4  Continuous opioid dependence/spinal stenosis, will renew Percocet will schedule in 1 month for opioid visit and urine drug screen PDMP was reviewed patient does have contract  5  Suprapubic catheter, per Urology  6  Vitamin deficiencies B12 and D, stable continue present therapy  7  Left trigeminal neuralgia, is on gabapentin may use Percocet follows with Neurology  8  Hyperglycemia diet-controlled  9  Familial hyperlipidemia well controlled on Crestor 5 mg daily  10  Patient return week of 2023 for opioid visit and a [address]   8    1  Acute respiratory failure with hypoxia ([facility] )  Assessment & Plan:  Stable the present time following with Neurology      2  Functional quadriplegia secondary to MS [facility])  Assessment & Plan:  Stable follow with Neurology      3  Multiple sclerosis ([facility] )  Assessment & Plan:  As above      4  Paraplegia [facility])  Assessment & Plan:  As above      5  Thrombocytopenia ([address] )  Assessment & Plan:  Platelets normal present time continue to monitor      6  Bladder calculus  Assessment & Plan: For lithotripsy with Urology Department 2022      7  Continuous opioid dependence ([facility] )  Assessment & Plan:  I will reorder Percocet today secondary to patient's trigeminal neuralgia patient must make an appointment for opioid visit 4 weeks 1 unable to continue this medication      8  Recurrent major depressive disorder, in full remission ([address] )  Assessment & Plan:  Stable continue Cymbalta      9   Spinal stenosis, unspecified spinal region  Assessment & Plan:  Percocet reordered    Orders:  -     oxyCODONE-acetaminophen (PERCOCET) 5-325 mg per tablet; Take 1 tablet by mouth every 4 (four) hours as needed for moderate pain Max Daily Amount: 6 tablets    10  Suprapubic catheter [facility])  Assessment & Plan:  Follows with urology      11  Vitamin B12 deficiency  Assessment & Plan:  Stable continue present therapy      12  Vitamin D deficiency  Assessment & Plan:  Stable continue present therapy      13  Trigeminal neuralgia    14  Medication management contract signed  Assessment & Plan:  Patient does have narcotic contract      15  Hyperglycemia    16  Familial hypercholesterolemia  Assessment & Plan:  Stable continue present therapy        BMI Counseling: There is no height or weight on file to calculate BMI  Follow-up plan was not completed due to elderly patient (72 years old) where weight reduction/weight gain would complicate underlying health condition such as: illness or physical disability  Subjective      Patient will be having lithotripsy 12/23/2022 with Urology Department  Patient has seen neurology she has left-sided trigeminal neurology is on gabapentin and still significant amount of pain  At this point I will renew her Percocet and she needs to come in for an opioid visit because I cannot renew Percocet after this  Patient and  are wear  Blood work was reviewed    Review of Systems   Constitutional: Negative  HENT: Negative  Eyes: Negative  Respiratory: Negative  Cardiovascular: Negative  Gastrointestinal: Negative  Endocrine: Negative  Genitourinary:        HPI   Musculoskeletal: Negative  Skin: Negative  Allergic/Immunologic: Negative  Neurological:        HPI   Hematological: Negative  Psychiatric/Behavioral: Negative          Current Outpatient Medications on File Prior to Visit   Medication Sig   • baclofen 20 mg tablet TAKE 1 TAB BY MOUTH 5 TIMES AS NEEDED   • DULoxetine (CYMBALTA) 30 mg delayed release capsule TAKE 1 CAPSULE BY MOUTH EVERY DAY   • furosemide (LASIX) 40 mg tablet Take 1 tablet (40 mg total) by mouth daily   • levofloxacin (LEVAQUIN) 500 mg tablet Take 1 tablet (500 mg total) by mouth every 24 hours for 5 days   • oxybutynin (DITROPAN-XL) 10 MG 24 hr tablet Take 1 tablet (10 mg total) by mouth daily   • rosuvastatin (CRESTOR) 5 mg tablet TAKE 1 TABLET (5 MG TOTAL) BY MOUTH DAILY  • [DISCONTINUED] oxyCODONE-acetaminophen (PERCOCET) 5-325 mg per tablet Take 1 tablet by mouth every 4 (four) hours as needed for moderate pain Max Daily Amount: 6 tablets   • [DISCONTINUED] gabapentin (Neurontin) 300 mg capsule Take 1 capsule (300 mg total) by mouth daily       Objective     BP 92/64 (BP Location: Left arm, Patient Position: Sitting, Cuff Size: Large)   Pulse 72     Physical Exam  Vitals and nursing note reviewed  Constitutional:       Appearance: Normal appearance  HENT:      Head: Normocephalic and atraumatic  Mouth/Throat:      Mouth: Mucous membranes are moist    Eyes:      General: No scleral icterus  Neck:      Vascular: No carotid bruit  Cardiovascular:      Rate and Rhythm: Normal rate and regular rhythm  Heart sounds: Normal heart sounds  Pulmonary:      Effort: Pulmonary effort is normal       Breath sounds: Normal breath sounds  Musculoskeletal:      Cervical back: Neck supple  Right lower leg: No edema  Left lower leg: No edema  Skin:     General: Skin is warm and dry  Neurological:      Mental Status: She is alert  Mental status is at baseline        Gait: Gait abnormal       Comments: Wheelchair-bound   Psychiatric:         Mood and Affect: Mood normal        Nacho Monroy DO

## 2022-12-15 ENCOUNTER — HOME CARE VISIT (OUTPATIENT)
Dept: HOME HEALTH SERVICES | Facility: HOME HEALTHCARE | Age: 52
End: 2022-12-15

## 2022-12-15 DIAGNOSIS — R06.02 SOB (SHORTNESS OF BREATH): ICD-10-CM

## 2022-12-15 DIAGNOSIS — G35 MULTIPLE SCLEROSIS (HCC): Primary | ICD-10-CM

## 2022-12-15 DIAGNOSIS — G82.20 PARAPLEGIA (HCC): ICD-10-CM

## 2022-12-15 NOTE — CASE COMMUNICATION
Requesting script for outpatient referral to "Lizet Plascencia " for assessment for access to devices to allow for increased communication/socialization  [address] [facility]   Fax #[phone]    If you have any questions, please reply via inbasket or feel free to contact me on my cell [phone]  Thank you!   Chelly Antoine, Speech Therapist [facility]

## 2022-12-16 ENCOUNTER — HOME CARE VISIT (OUTPATIENT)
Dept: HOME HEALTH SERVICES | Facility: HOME HEALTHCARE | Age: 52
End: 2022-12-16

## 2022-12-16 VITALS
HEART RATE: 73 BPM | TEMPERATURE: 98.1 F | OXYGEN SATURATION: 100 % | SYSTOLIC BLOOD PRESSURE: 106 MMHG | DIASTOLIC BLOOD PRESSURE: 76 MMHG

## 2022-12-16 NOTE — CASE COMMUNICATION
For informational purposes only  Pt was out of compliance for 2x/week speech therapy order this week  Pt cancelled this morning due to pain  Requests f/u next week

## 2022-12-19 ENCOUNTER — HOME CARE VISIT (OUTPATIENT)
Dept: HOME HEALTH SERVICES | Facility: HOME HEALTHCARE | Age: 52
End: 2022-12-19

## 2022-12-20 ENCOUNTER — ANESTHESIA EVENT (OUTPATIENT)
Dept: PERIOP | Facility: HOSPITAL | Age: 52
End: 2022-12-20

## 2022-12-21 ENCOUNTER — TELEPHONE (OUTPATIENT)
Dept: NEUROLOGY | Facility: CLINIC | Age: 52
End: 2022-12-21

## 2022-12-21 ENCOUNTER — HOME CARE VISIT (OUTPATIENT)
Dept: HOME HEALTH SERVICES | Facility: HOME HEALTHCARE | Age: 52
End: 2022-12-21

## 2022-12-21 NOTE — Clinical Note
Recert will be completed tomorrow  I will request ST order to start in January 2023  Thank you,   sirena  ----- Message -----  From: Luis Armando Ridley, SLP  Sent: 12/21/2022  11:27 PM EST  To: Marcia Loza, KAYLEIGH, Renee Haddad, KAYLEIGH, *      FYI only, it is our policy to notify you of a deviation from visit pattern,  Pt cancelled speech therapy appointment this week due to pain  Opted to  after the holidays (week of January 2nd)  Pt's certification period ends next week  Speech therapy progress made but goals have not yet been acheived  Plan is to discharge pt at this time  Will request new orders for reassessment on recertification due to 3 week pause in services since last visit  Thank you

## 2022-12-21 NOTE — TELEPHONE ENCOUNTER
Recd vm:    Hi, calling for jaret fernandez  Her date birth Date of birth is [birth year]  She was put on gabapentin, which is helping her nerve pain in her jaw,  but she's about to run out today  Could you please give another round of gabapentin to help her? My number is [phone]  This is Monda Ivorian  Her  calling please give me a call back   Thank you    Returned call; reached vm of both ioana and also tried david; advised script was sent to pharmacy on 12/14 with refills

## 2022-12-21 NOTE — Clinical Note
Thank you so much, Sedrick Ocampo! Christine Coleman    ----- Message -----  From: Randy Benedict RN  Sent: 12/22/2022   4:03 PM EST  To: TERESITA Concepcion  Subject: RE:                                                Shar Blankenshipy will be completed tomorrow  I will request ST order to start in January 2023  Thank you,   sirena  ----- Message -----  From: TERESITA Concepcion  Sent: 12/21/2022  11:27 PM EST  To: Wanda Lewis, KAYLEIGH, Randy Benedict, RN, *      FYI only, it is our policy to notify you of a deviation from visit pattern,  Pt cancelled speech therapy appointment this week due to pain  Opted to  after the holidays (week of January 2nd)  Pt's certification period ends next week  Speech therapy progress made but goals have not yet been acheived  Plan is to discharge pt at this time  Will request new orders for reassessment on recertification due to 3 week pause in services since last visit  Thank you

## 2022-12-22 ENCOUNTER — HOSPITAL ENCOUNTER (OUTPATIENT)
Facility: HOSPITAL | Age: 52
Setting detail: OUTPATIENT SURGERY
Discharge: HOME | End: 2022-12-22
Attending: UROLOGY | Admitting: UROLOGY

## 2022-12-22 ENCOUNTER — APPOINTMENT (OUTPATIENT)
Dept: RADIOLOGY | Facility: HOSPITAL | Age: 52
End: 2022-12-22

## 2022-12-22 ENCOUNTER — ANESTHESIA (OUTPATIENT)
Dept: PERIOP | Facility: HOSPITAL | Age: 52
End: 2022-12-22

## 2022-12-22 VITALS
SYSTOLIC BLOOD PRESSURE: 118 MMHG | BODY MASS INDEX: 23.89 KG/M2 | DIASTOLIC BLOOD PRESSURE: 60 MMHG | HEIGHT: 66 IN | RESPIRATION RATE: 16 BRPM | OXYGEN SATURATION: 100 % | HEART RATE: 77 BPM | TEMPERATURE: 97.9 F

## 2022-12-22 DIAGNOSIS — N21.0 BLADDER STONES: ICD-10-CM

## 2022-12-22 RX ORDER — FENTANYL CITRATE 50 UG/ML
INJECTION, SOLUTION INTRAMUSCULAR; INTRAVENOUS AS NEEDED
Status: DISCONTINUED | OUTPATIENT
Start: 2022-12-22 | End: 2022-12-22

## 2022-12-22 RX ORDER — MAGNESIUM HYDROXIDE 1200 MG/15ML
LIQUID ORAL AS NEEDED
Status: DISCONTINUED | OUTPATIENT
Start: 2022-12-22 | End: 2022-12-22 | Stop reason: HOSPADM

## 2022-12-22 RX ORDER — LEVOFLOXACIN 500 MG/1
500 TABLET, FILM COATED ORAL EVERY 24 HOURS
Qty: 3 TABLET | Refills: 0 | Status: SHIPPED | OUTPATIENT
Start: 2022-12-22 | End: 2022-12-25

## 2022-12-22 RX ORDER — ONDANSETRON 2 MG/ML
INJECTION INTRAMUSCULAR; INTRAVENOUS AS NEEDED
Status: DISCONTINUED | OUTPATIENT
Start: 2022-12-22 | End: 2022-12-22

## 2022-12-22 RX ORDER — DEXAMETHASONE SODIUM PHOSPHATE 10 MG/ML
INJECTION, SOLUTION INTRAMUSCULAR; INTRAVENOUS AS NEEDED
Status: DISCONTINUED | OUTPATIENT
Start: 2022-12-22 | End: 2022-12-22

## 2022-12-22 RX ORDER — PROPOFOL 10 MG/ML
INJECTION, EMULSION INTRAVENOUS AS NEEDED
Status: DISCONTINUED | OUTPATIENT
Start: 2022-12-22 | End: 2022-12-22

## 2022-12-22 RX ORDER — HYDROMORPHONE HCL/PF 1 MG/ML
0.5 SYRINGE (ML) INJECTION
Status: DISCONTINUED | OUTPATIENT
Start: 2022-12-22 | End: 2022-12-22 | Stop reason: HOSPADM

## 2022-12-22 RX ORDER — LEVOFLOXACIN 5 MG/ML
750 INJECTION, SOLUTION INTRAVENOUS ONCE
Status: COMPLETED | OUTPATIENT
Start: 2022-12-22 | End: 2022-12-22

## 2022-12-22 RX ORDER — MEPERIDINE HYDROCHLORIDE 25 MG/ML
12.5 INJECTION INTRAMUSCULAR; INTRAVENOUS; SUBCUTANEOUS
Status: DISCONTINUED | OUTPATIENT
Start: 2022-12-22 | End: 2022-12-22 | Stop reason: HOSPADM

## 2022-12-22 RX ORDER — MIDAZOLAM HYDROCHLORIDE 2 MG/2ML
INJECTION, SOLUTION INTRAMUSCULAR; INTRAVENOUS AS NEEDED
Status: DISCONTINUED | OUTPATIENT
Start: 2022-12-22 | End: 2022-12-22

## 2022-12-22 RX ORDER — ONDANSETRON 2 MG/ML
4 INJECTION INTRAMUSCULAR; INTRAVENOUS ONCE AS NEEDED
Status: DISCONTINUED | OUTPATIENT
Start: 2022-12-22 | End: 2022-12-22 | Stop reason: HOSPADM

## 2022-12-22 RX ORDER — FENTANYL CITRATE/PF 50 MCG/ML
25 SYRINGE (ML) INJECTION
Status: DISCONTINUED | OUTPATIENT
Start: 2022-12-22 | End: 2022-12-22 | Stop reason: HOSPADM

## 2022-12-22 RX ORDER — SODIUM CHLORIDE, SODIUM LACTATE, POTASSIUM CHLORIDE, CALCIUM CHLORIDE 600; 310; 30; 20 MG/100ML; MG/100ML; MG/100ML; MG/100ML
125 INJECTION, SOLUTION INTRAVENOUS CONTINUOUS
Status: DISCONTINUED | OUTPATIENT
Start: 2022-12-22 | End: 2022-12-22 | Stop reason: HOSPADM

## 2022-12-22 RX ORDER — LIDOCAINE HYDROCHLORIDE 20 MG/ML
INJECTION, SOLUTION EPIDURAL; INFILTRATION; INTRACAUDAL; PERINEURAL AS NEEDED
Status: DISCONTINUED | OUTPATIENT
Start: 2022-12-22 | End: 2022-12-22

## 2022-12-22 RX ADMIN — SODIUM CHLORIDE, POTASSIUM CHLORIDE, SODIUM LACTATE AND CALCIUM CHLORIDE 125 ML/HR: 600; 310; 30; 20 INJECTION, SOLUTION INTRAVENOUS at 10:54

## 2022-12-22 RX ADMIN — MIDAZOLAM 2 MG: 1 INJECTION INTRAMUSCULAR; INTRAVENOUS at 12:39

## 2022-12-22 RX ADMIN — LIDOCAINE HYDROCHLORIDE 60 MG: 20 INJECTION, SOLUTION EPIDURAL; INFILTRATION; INTRACAUDAL at 12:42

## 2022-12-22 RX ADMIN — FENTANYL CITRATE 25 MCG: 50 INJECTION INTRAMUSCULAR; INTRAVENOUS at 12:59

## 2022-12-22 RX ADMIN — LEVOFLOXACIN: 5 INJECTION, SOLUTION INTRAVENOUS at 12:40

## 2022-12-22 RX ADMIN — ONDANSETRON 4 MG: 2 INJECTION INTRAMUSCULAR; INTRAVENOUS at 13:12

## 2022-12-22 RX ADMIN — SODIUM CHLORIDE, POTASSIUM CHLORIDE, SODIUM LACTATE AND CALCIUM CHLORIDE 125 ML/HR: 600; 310; 30; 20 INJECTION, SOLUTION INTRAVENOUS at 14:29

## 2022-12-22 RX ADMIN — LEVOFLOXACIN: 5 INJECTION, SOLUTION INTRAVENOUS at 12:36

## 2022-12-22 RX ADMIN — PROPOFOL 150 MG: 10 INJECTION, EMULSION INTRAVENOUS at 12:43

## 2022-12-22 RX ADMIN — DEXAMETHASONE SODIUM PHOSPHATE 5 MG: 10 INJECTION INTRAMUSCULAR; INTRAVENOUS at 12:59

## 2022-12-22 NOTE — CASE COMMUNICATION
FYI only, it is our policy to notify you of a deviation from visit pattern,  Pt cancelled speech therapy appointment this week due to pain  Opted to  after the holidays (week of January 2nd)  Pt's certification period ends next week  Speech therapy progress made but goals have not yet been acheived  Plan is to discharge pt at this time  Will request new orders for reassessment on recertification due to 3 week pause in servic es since last visit  Thank you

## 2022-12-22 NOTE — ANESTHESIA POSTPROCEDURE EVALUATION
Post-Op Assessment Note    CV Status:  Stable    Pain management: adequate     Mental Status:  Alert and awake   Hydration Status:  Euvolemic   PONV Controlled:  Controlled   Airway Patency:  Patent      Post Op Vitals Reviewed: Yes      Staff: Anesthesiologist         No notable events documented      /56 (12/22/22 1443)    Temp 97 8 °F (36 6 °C) (12/22/22 1443)    Pulse 81 (12/22/22 1443)   Resp 16 (12/22/22 1443)    SpO2 98 % (12/22/22 1443)

## 2022-12-22 NOTE — H&P
Cullen Gifford is a 72-year-old female with advanced multiple sclerosis  She is in a scooter chair  Her bladder is managed with an indwelling suprapubic tube  She last had upper tract imaging with a renal bladder ultrasound performed in the fall of 2021 which is normal   Her last creatinine level from March of 2021 is 0 49  She has a history of bladder stones  She recently had cystoscopy performed in the office in August 2022  Results as below  Flexible cystoscopy was then performed through the suprapubic tube tract  Bladder mucosa was normal   Bladder neck was normal   Two small bladder stones were identified measuring up to 1-1 5 cm in maximal dimension  /57   Pulse 83   Temp 97 5 °F (36 4 °C) (Temporal)   Resp 16   Ht 5' 6" (1 676 m)   SpO2 99%   BMI 23 89 kg/m²     On examination she is in no acute distress  She has no respiratory distress  Cardiac is regular  Abdomen is soft nontender nondistended  Signs of advanced MS from a neurologic standpoint noted      Impression:  Advanced multiple sclerosis, neurogenic bladder, recurrent bladder stones     Plan:  Recommend cystoscopy with cystolitholapaxy in the operating room  Risk of the procedure including, but not limited to, bleeding, infection, recurrence, need for additional surgery were discussed and reviewed    Informed consent obtained

## 2022-12-22 NOTE — OP NOTE
OPERATIVE REPORT  PATIENT NAME: Phill Tovar    :  1970  MRN: [id number]  Pt Location: [facility] OR ROOM 05    SURGERY DATE: 2022    Surgeon(s) and Role:     * Norman Augustine MD - Primary    Preop Diagnosis:  Bladder stones [N21 0]    Post-Op Diagnosis Codes: * Bladder stones [N21 0]    Procedure(s) (LRB):  Cystolitholapaxy w/  laser lithotripsy of bladder stones; SUPRAPUBIC CATHETER CHANGE (N/A)    Specimen(s):  ID Type Source Tests Collected by Time Destination   A : BLADDER STONES Calculus Urinary Bladder STONE ANALYSIS Norman Augustine MD 2022 1313        Estimated Blood Loss:   Minimal    Drains:  Suprapubic Catheter 24 Fr  (Active)   Number of days: 779       Suprapubic Catheter 24 Fr  (Active)   Number of days: 0       Anesthesia Type:   General    Operative Indications:  Bladder stones [N21 0]      Operative Findings:  Single large portion was identified and decimated with holmium laser lithotripsy and all fragments were small enough to be removed with the Nora  New 24 Upper sorbian silicone latex free catheter placed  Complications:   None    Procedure and Technique:  Geno Finnegan is a 49-year-old female with advanced multiple sclerosis  She has a neurogenic bladder managed with a chronic indwelling suprapubic tube  I recently performed cystoscopy and found a bladder stone  She presents to the operating room to undergo cystolitholapaxy  Levaquin was administered based on preoperative cultures  Risk and benefits of the procedure were discussed and reviewed  Informed consent was obtained  The patient was brought to the operating room on 2022  After the smooth induction of general LMA anesthesia, the patient was placed in the dorsolithotomy position  Her genitalia was prepped and draped in sterile fashion  Intravenous Levaquin was administered    A timeout was performed with all members of the operative team confirming the patient's identity and procedure to be performed  A 22 Czech suprapubic tube was exchanged at the start of the case  The tube was partially clamped to allow for continuous flow  A 22 Czech rigid cystoscope with 30 degree lens was inserted  The bladder mucosa was normal other than a single large bladder stone measuring at least 2 cm  The remainder of the bladder was free and clear mucosal abnormalities or lesions  Both ureteral orifice ease were visualized  A 1000 µm holmium laser fiber was utilized to decimated the stone until all fragments were small enough to be removed with the Ellik  Hemostasis was excellent  All stones were removed  The bladder was reinspected and both ureteral orifice ease were patent  The bladder was emptied and the cystoscope was removed  Overall the patient tolerated the procedure well and there were no complications  The patient was extubated in the operating room and transferred the PACU in stable condition at the conclusion of the case      Patient Disposition:  PACU stable and extubated        SIGNATURE: Sophie Valdez MD  DATE: December 22, 2022  TIME: 1:30 PM

## 2022-12-22 NOTE — ANESTHESIA PREPROCEDURE EVALUATION
Procedure:  LITHOLOPAXY HOLMIUM LASER, cystolitholapaxy w/  laser lithotripsy of bladder stones ([place])    Relevant Problems   CARDIO   (+) Familial hypercholesterolemia      GI/HEPATIC   (+) Swallowing difficulty      /RENAL   (+) Hydronephrosis with urinary obstruction due to ureteral calculus   (+) Nephrolithiasis      HEMATOLOGY   (+) Thrombocytopenia (HCC)      NEURO/PSYCH   (+) Continuous opioid dependence (HCC)   (+) Recurrent major depressive disorder, in full remission (HCC)      PULMONARY   (+) Acute respiratory failure with hypoxia (HCC)      Nervous and Auditory   (+) Functional quadriplegia secondary to MS (HCC)   (+) Multiple sclerosis (HCC)      Genitourinary   (+) Bladder calculus      Other   (+) Suprapubic catheter ([facility])   (+) Tremor        Physical Exam    Airway    Mallampati score: III  TM Distance: >3 FB  Neck ROM: limited     Dental   No notable dental hx     Cardiovascular  Rhythm: regular, Rate: normal, Cardiovascular exam normal    Pulmonary  Pulmonary exam normal Breath sounds clear to auscultation,     Other Findings        Anesthesia Plan  ASA Score- 4     Anesthesia Type- general with ASA Monitors  Additional Monitors:   Airway Plan: LMA  Comment: Resting tidal volume very low          Plan Factors-Exercise tolerance (METS): <4 METS  Chart reviewed  EKG reviewed  Existing labs reviewed  Patient summary reviewed  Patient is not a current smoker  Patient not instructed to abstain from smoking on day of procedure  Patient did not smoke on day of surgery  There is medical exclusion for perioperative obstructive sleep apnea risk education  Induction- intravenous  Postoperative Plan-     Informed Consent- Anesthetic plan and risks discussed with patient and spouse (Guera Barahona)

## 2022-12-23 ENCOUNTER — HOME CARE VISIT (OUTPATIENT)
Dept: HOME HEALTH SERVICES | Facility: HOME HEALTHCARE | Age: 52
End: 2022-12-23

## 2022-12-26 ENCOUNTER — HOME HEALTH ADMISSION (OUTPATIENT)
Dept: HOME HEALTH SERVICES | Facility: HOME HEALTHCARE | Age: 52
End: 2022-12-26

## 2022-12-27 ENCOUNTER — HOME CARE VISIT (OUTPATIENT)
Dept: HOME HEALTH SERVICES | Facility: HOME HEALTHCARE | Age: 52
End: 2022-12-27

## 2022-12-28 ENCOUNTER — TELEPHONE (OUTPATIENT)
Dept: UROLOGY | Facility: AMBULATORY SURGERY CENTER | Age: 52
End: 2022-12-28

## 2022-12-28 NOTE — TELEPHONE ENCOUNTER
Post Op Note    Freddy Moffett is a 46 y o  female s/p Cystolitholapaxy w/  laser lithotripsy of bladder stones; SUPRAPUBIC CATHETER CHANGE (N/A) performed 12/22/2022  Freddy Moffett is a patient of Dr Spike Frye [facility] [address] [facility]  Unable to reach patient or leave message due to VM being full  Will try again tomorrow  Patient does need 3 month follow up

## 2022-12-29 ENCOUNTER — HOME CARE VISIT (OUTPATIENT)
Dept: HOME HEALTH SERVICES | Facility: HOME HEALTHCARE | Age: 52
End: 2022-12-29

## 2022-12-29 VITALS
HEART RATE: 70 BPM | OXYGEN SATURATION: 99 % | DIASTOLIC BLOOD PRESSURE: 66 MMHG | SYSTOLIC BLOOD PRESSURE: 104 MMHG | TEMPERATURE: 97.8 F

## 2022-12-29 NOTE — TELEPHONE ENCOUNTER
Unable to reach patient again or leave message due to VM being full  Will try again tomorrow  Patient does need 3 month follow up

## 2022-12-30 ENCOUNTER — HOME CARE VISIT (OUTPATIENT)
Dept: HOME HEALTH SERVICES | Facility: HOME HEALTHCARE | Age: 52
End: 2022-12-30

## 2022-12-31 LAB
CA CARBONATE CRY STONE QL IR: 80 %
COLOR STONE: NORMAL
COMMENT-STONE3: NORMAL
COMPOSITION: NORMAL
LABORATORY COMMENT REPORT: NORMAL
PHOTO: NORMAL
SIZE STONE: NORMAL MM
SPEC SOURCE SUBJ: NORMAL
STONE ANALYSIS-IMP: NORMAL
TRI-PHOS MFR STONE: 20 %
WT STONE: 561 MG

## 2023-01-03 ENCOUNTER — HOME CARE VISIT (OUTPATIENT)
Dept: HOME HEALTH SERVICES | Facility: HOME HEALTHCARE | Age: 53
End: 2023-01-03

## 2023-01-03 NOTE — CASE COMMUNICATION
Pt seen by speech therapy this date  Deepika Mejía presents as mildly hypophonic in conversational speech but is able to utilize breath support and strategies to speak in sentences and connected sentences with adequate voicing and good speech intelligbility  Additionally, she reports tolerating a regular po diet and thin liquids with no difficulty or s/s aspiration; confirmed by family  No immediate ST needs identified at this time  Pt is in dependent in home exercise program for voice established prior to episode of trigeminal neuralgia pain that required ST to be discontinued  Please re-consult in the future if further concerns or decline occur  Pt in agreement with POC  Thank you

## 2023-01-06 DIAGNOSIS — I89.0 LYMPHEDEMA: ICD-10-CM

## 2023-01-06 RX ORDER — FUROSEMIDE 40 MG/1
TABLET ORAL
Qty: 30 TABLET | Refills: 11 | Status: SHIPPED | OUTPATIENT
Start: 2023-01-06

## 2023-01-09 ENCOUNTER — HOME CARE VISIT (OUTPATIENT)
Dept: HOME HEALTH SERVICES | Facility: HOME HEALTHCARE | Age: 53
End: 2023-01-09

## 2023-01-12 ENCOUNTER — HOME CARE VISIT (OUTPATIENT)
Dept: HOME HEALTH SERVICES | Facility: HOME HEALTHCARE | Age: 53
End: 2023-01-12

## 2023-01-12 VITALS
DIASTOLIC BLOOD PRESSURE: 60 MMHG | OXYGEN SATURATION: 96 % | HEART RATE: 96 BPM | TEMPERATURE: 98.1 F | SYSTOLIC BLOOD PRESSURE: 112 MMHG

## 2023-01-13 ENCOUNTER — HOME CARE VISIT (OUTPATIENT)
Dept: HOME HEALTH SERVICES | Facility: HOME HEALTHCARE | Age: 53
End: 2023-01-13

## 2023-01-26 ENCOUNTER — HOME CARE VISIT (OUTPATIENT)
Dept: HOME HEALTH SERVICES | Facility: HOME HEALTHCARE | Age: 53
End: 2023-01-26

## 2023-01-26 VITALS
TEMPERATURE: 97.9 F | SYSTOLIC BLOOD PRESSURE: 118 MMHG | DIASTOLIC BLOOD PRESSURE: 68 MMHG | OXYGEN SATURATION: 99 % | HEART RATE: 83 BPM

## 2023-01-26 RX ADMIN — GABAPENTIN 300 MG: 300 CAPSULE ORAL at 10:11

## 2023-02-04 DIAGNOSIS — I89.0 LYMPHEDEMA: ICD-10-CM

## 2023-02-04 RX ORDER — FUROSEMIDE 40 MG/1
TABLET ORAL
Qty: 90 TABLET | Refills: 4 | Status: SHIPPED | OUTPATIENT
Start: 2023-02-04

## 2023-02-13 ENCOUNTER — TELEPHONE (OUTPATIENT)
Dept: NEUROLOGY | Facility: CLINIC | Age: 53
End: 2023-02-13

## 2023-02-13 DIAGNOSIS — G50.0 TRIGEMINAL NEURALGIA: ICD-10-CM

## 2023-02-13 DIAGNOSIS — G35 MULTIPLE SCLEROSIS (HCC): ICD-10-CM

## 2023-02-13 RX ORDER — OXCARBAZEPINE 150 MG/1
300 TABLET, FILM COATED ORAL 2 TIMES DAILY
Qty: 120 TABLET | Refills: 2 | Status: SHIPPED | OUTPATIENT
Start: 2023-02-13

## 2023-02-13 NOTE — TELEPHONE ENCOUNTER
Patient can safely consider  mg 3 times daily or patient may safely consider 1 tablet in the morning 2 tablets at night or patient also can transition to [address] [facility] 300 mg twice daily-new prescription was provided

## 2023-02-13 NOTE — TELEPHONE ENCOUNTER
Recd vm:    Hi, david fernandez, calling for my wife, Georgina Cali  Her date of birth is [birth year] She sees  Dr Eusebio Lopes  she wants to see if she can get her meds called in  for a refill please call me at [phone]  Thank you  Patient of Dr Velia Smith visit 11/15    Patient's  said the oxcarbazepine is helping more than the gabapentin was; taking 150 mg bid; reporting may need to increase oxcarbazepine to  tid to provide a full day of coverage from pain as if she takes second dose a little earlier in the afternoon  due to pain, she awakens from sleep at 1 AM;l if she takes in the AM and PM, she gets some breakthrough pain in the afternoon  Please provide recommendation  Thank you      [facility]

## 2023-02-20 ENCOUNTER — HOME CARE VISIT (OUTPATIENT)
Dept: HOME HEALTH SERVICES | Facility: HOME HEALTHCARE | Age: 53
End: 2023-02-20

## 2023-02-20 ENCOUNTER — OFFICE VISIT (OUTPATIENT)
Dept: FAMILY MEDICINE CLINIC | Facility: CLINIC | Age: 53
End: 2023-02-20

## 2023-02-20 VITALS
HEART RATE: 76 BPM | DIASTOLIC BLOOD PRESSURE: 68 MMHG | HEIGHT: 66 IN | SYSTOLIC BLOOD PRESSURE: 98 MMHG | WEIGHT: 196 LBS | BODY MASS INDEX: 31.5 KG/M2

## 2023-02-20 DIAGNOSIS — G89.4 CHRONIC PAIN SYNDROME: ICD-10-CM

## 2023-02-20 DIAGNOSIS — D69.6 THROMBOCYTOPENIA (HCC): ICD-10-CM

## 2023-02-20 DIAGNOSIS — G35 MULTIPLE SCLEROSIS (HCC): Chronic | ICD-10-CM

## 2023-02-20 DIAGNOSIS — F11.20 CONTINUOUS OPIOID DEPENDENCE (HCC): ICD-10-CM

## 2023-02-20 DIAGNOSIS — Z93.59 SUPRAPUBIC CATHETER (HCC): ICD-10-CM

## 2023-02-20 DIAGNOSIS — J96.01 ACUTE RESPIRATORY FAILURE WITH HYPOXIA (HCC): ICD-10-CM

## 2023-02-20 DIAGNOSIS — G35 FUNCTIONAL QUADRIPLEGIA SECONDARY TO MS (HCC): Chronic | ICD-10-CM

## 2023-02-20 DIAGNOSIS — E55.9 VITAMIN D DEFICIENCY: ICD-10-CM

## 2023-02-20 DIAGNOSIS — R60.9 PERIPHERAL EDEMA: ICD-10-CM

## 2023-02-20 DIAGNOSIS — Z00.00 HEALTH CARE MAINTENANCE: ICD-10-CM

## 2023-02-20 DIAGNOSIS — I89.0 LYMPHEDEMA: ICD-10-CM

## 2023-02-20 DIAGNOSIS — F33.42 RECURRENT MAJOR DEPRESSIVE DISORDER, IN FULL REMISSION (HCC): ICD-10-CM

## 2023-02-20 DIAGNOSIS — K63.9 MURAL THICKENING OF SIGMOID COLON: ICD-10-CM

## 2023-02-20 DIAGNOSIS — Z79.899 HIGH RISK MEDICATION USE: ICD-10-CM

## 2023-02-20 DIAGNOSIS — R55 SYNCOPE, UNSPECIFIED SYNCOPE TYPE: ICD-10-CM

## 2023-02-20 DIAGNOSIS — Z02.89 MEDICATION MANAGEMENT CONTRACT SIGNED: ICD-10-CM

## 2023-02-20 DIAGNOSIS — E78.01 FAMILIAL HYPERCHOLESTEROLEMIA: ICD-10-CM

## 2023-02-20 DIAGNOSIS — M48.02 SPINAL STENOSIS OF CERVICAL REGION: Primary | ICD-10-CM

## 2023-02-20 DIAGNOSIS — I95.9 HYPOTENSION, UNSPECIFIED HYPOTENSION TYPE: ICD-10-CM

## 2023-02-20 DIAGNOSIS — E53.8 VITAMIN B12 DEFICIENCY: ICD-10-CM

## 2023-02-20 DIAGNOSIS — R53.2 FUNCTIONAL QUADRIPLEGIA SECONDARY TO MS (HCC): Chronic | ICD-10-CM

## 2023-02-20 PROBLEM — R60.0 PERIPHERAL EDEMA: Status: ACTIVE | Noted: 2023-02-20

## 2023-02-20 RX ORDER — FUROSEMIDE 20 MG/1
20 TABLET ORAL DAILY
Qty: 30 TABLET | Refills: 5 | Status: SHIPPED | OUTPATIENT
Start: 2023-02-20

## 2023-02-20 NOTE — PATIENT INSTRUCTIONS
Continue furosemide 40 mg  Start furosemide 20 mg daily  Monitor blood pressure  Discussed that the loss of conscious/syncope with your neurologist  Continue to monitor blood pressure at home  Return in 6 months for office visit and opioid follow-up sooner if needed        Goals of care:  Maximize your health and quality of life by: Increasing your level of function and activity  Decreasing the negative effects of pain on your life  Minimizing the risks and side effects of medications and ensuring safe use of opioid medication     Ways for you to help meet your goals:  Maintain a healthy lifestyle  This includes proper nutrition, regular physical activity as able, try for 8 hours of sleep per night, use stress reduction strategies, avoid triggers  Risks and side effects of opioid use:  Prescription opioids carry serious risks of addiction and  overdose, especially with prolonged use  An opioid overdose,  often marked by slowed breathing, can cause sudden death  The  use of prescription opioids can have a number of side effects as  well, even when taken as directed: Tolerance--meaning you might need to take more of a medication for the same pain relief  Physical dependence--meaning you have symptoms of withdrawal when a medication is stopped  Increased sensitivity to pain  Constipation  Nausea, vomiting, dry mouth  Sleepiness and dizziness   Confusion  Depression  Low levels of testosterone that can result in lower sex drive, energy, and strength  Itching and sweating    If you are prescribed opioids for pain:  Never take opioids in greater amounts or more often than prescribed  Help prevent misuse and abuse  - Never sell or share prescription opioids         - Never use another person’s prescription opioids  ‡Store prescription opioids in a secure place and out of reach of others (this may include visitors, children, friends, and family)    Safely dispose of unused prescription opioids: Find your community drug take-back program or your pharmacy mail-back program, or flush them down the toilet, following guidance from the Food and Drug Administration (www fda gov/Drugs/ResourcesForYou)  ‡Visit www cdc gov/drugoverdose to learn about the risks of opioid abuse and overdose  If you believe you may be struggling with addiction, tell your health care provider and ask for guidance or call [facility] National Helpl[facility] at 6-381-662-KPNO

## 2023-02-20 NOTE — PROGRESS NOTES
Assessment and Plan:     1  Thickening of sigmoid colon patient and  declined any evaluation  2  Acute respiratory failure with hypoxemia, stable continue present therapy #3  MS with functional quadriplegia, patient is wheelchair-bound follows with neurology  4  Of care maintenance Medicare AWV completed  5  Lymphedema/peripheral edema we will decrease furosemide from 40 down to 20 mg daily  6  Hypotension/syncope  Med changes above  will monitor blood pressure patient to follow with neurology  7  Vitamin deficiencies B12 and D, stable continue present therapy  8  MDD, in remission continue present therapy  9  Thrombocytopenia platelets are normal continue to monitor 10  Suprapubic catheter, stable follows with urology  10    Patient to return in 6 months for office visit and opioid follow-up  Problem List Items Addressed This Visit        Digestive    Mural thickening of sigmoid colon     Patient has been have no interest in following this up         Relevant Orders    CBC    Comprehensive metabolic panel    Lipid Panel with Direct LDL reflex    TSH, 3rd generation with Free T4 reflex    Vitamin B12    Vitamin D 25 hydroxy       Respiratory    Acute respiratory failure with hypoxia (HCC)     Stable doing well         Relevant Orders    CBC    Comprehensive metabolic panel    Lipid Panel with Direct LDL reflex    TSH, 3rd generation with Free T4 reflex    Vitamin B12    Vitamin D 25 hydroxy       Nervous and Auditory    Multiple sclerosis (HCC) (Chronic)     Is with neurology         Relevant Orders    CBC    Comprehensive metabolic panel    Lipid Panel with Direct LDL reflex    TSH, 3rd generation with Free T4 reflex    Vitamin B12    Vitamin D 25 hydroxy    Functional quadriplegia secondary to MS (HCC) (Chronic)     Follows with neurology         Relevant Orders    CBC    Comprehensive metabolic panel    Lipid Panel with Direct LDL reflex    TSH, 3rd generation with Free T4 reflex Vitamin B12    Vitamin D 25 hydroxy       Hematopoietic and Hemostatic    Thrombocytopenia (HCC)     Normal at the present time continue to monitor         Relevant Orders    CBC    Comprehensive metabolic panel    Lipid Panel with Direct LDL reflex    TSH, 3rd generation with Free T4 reflex    Vitamin B12    Vitamin D 25 hydroxy       Other    Suprapubic catheter (HCC) (Chronic)     Is with urology         Relevant Orders    CBC    Comprehensive metabolic panel    Lipid Panel with Direct LDL reflex    TSH, 3rd generation with Free T4 reflex    Vitamin B12    Vitamin D 25 hydroxy    Recurrent major depressive disorder, in full remission ([facility] [address] )     Able continue Cymbalta         Relevant Orders    CBC    Comprehensive metabolic panel    Lipid Panel with Direct LDL reflex    TSH, 3rd generation with Free T4 reflex    Vitamin B12    Vitamin D 25 hydroxy    Familial hypercholesterolemia     Stable continue present therapy         Relevant Orders    CBC    Comprehensive metabolic panel    Lipid Panel with Direct LDL reflex    TSH, 3rd generation with Free T4 reflex    Vitamin B12    Vitamin D 25 hydroxy    Lymphedema     Chronic/stable we will decrease furosemide from 40 to 20 mg daily if edema recurs call office         Relevant Orders    CBC    Comprehensive metabolic panel    Lipid Panel with Direct LDL reflex    TSH, 3rd generation with Free T4 reflex    Vitamin B12    Vitamin D 25 hydroxy    Spinal stenosis of cervical region - Primary     See opioid visit         Relevant Orders    Urine Drug Screen    Methylphenidate    Fentanyl with Confirmation    Buprenorphine w/ Naloxone    Naltrexone    Gabapentin    Pregabalin    Synthetic Stimulants    [facility] Prescribed Medications    CBC    Comprehensive metabolic panel    Lipid Panel with Direct LDL reflex    TSH, 3rd generation with Free T4 reflex    Vitamin B12    Vitamin D 25 hydroxy    Vitamin B12 deficiency     Able continue present therapy         Relevant Orders CBC    Comprehensive metabolic panel    Lipid Panel with Direct LDL reflex    TSH, 3rd generation with Free T4 reflex    Vitamin B12    Vitamin D 25 hydroxy    Vitamin D deficiency     Continue present therapy         Relevant Orders    CBC    Comprehensive metabolic panel    Lipid Panel with Direct LDL reflex    TSH, 3rd generation with Free T4 reflex    Vitamin B12    Vitamin D 25 hydroxy    Health care maintenance     Care AWV completed         Relevant Orders    CBC    Comprehensive metabolic panel    Lipid Panel with Direct LDL reflex    TSH, 3rd generation with Free T4 reflex    Vitamin B12    Vitamin D 25 hydroxy    Medication management contract signed     Contract signed for Percocet         Relevant Orders    CBC    Comprehensive metabolic panel    Lipid Panel with Direct LDL reflex    TSH, 3rd generation with Free T4 reflex    Vitamin B12    Vitamin D 25 hydroxy    Continuous opioid dependence (HCC)    Relevant Orders    Urine Drug Screen    Methylphenidate    Fentanyl with Confirmation    Buprenorphine w/ Naloxone    Naltrexone    Gabapentin    Pregabalin    Synthetic Stimulants    [facility] Prescribed Medications    CBC    Comprehensive metabolic panel    Lipid Panel with Direct LDL reflex    TSH, 3rd generation with Free T4 reflex    Vitamin B12    Vitamin D 25 hydroxy    Peripheral edema     Decrease furosemide from 40 to 20 mg daily  will monitor blood pressure         Relevant Medications    furosemide (LASIX) 20 mg tablet    Other Relevant Orders    CBC    Comprehensive metabolic panel    Lipid Panel with Direct LDL reflex    TSH, 3rd generation with Free T4 reflex    Vitamin B12    Vitamin D 25 hydroxy   Other Visit Diagnoses     Chronic pain syndrome        Relevant Orders    Urine Drug Screen    Methylphenidate    Fentanyl with Confirmation    Buprenorphine w/ Naloxone    Naltrexone    Gabapentin    Pregabalin    Synthetic Stimulants    [facility] Prescribed Medications    CBC Comprehensive metabolic panel    Lipid Panel with Direct LDL reflex    TSH, 3rd generation with Free T4 reflex    Vitamin B12    Vitamin D 25 hydroxy    High risk medication use        Relevant Orders    Urine Drug Screen    Methylphenidate    Fentanyl with Confirmation    Buprenorphine w/ Naloxone    Naltrexone    Gabapentin    Pregabalin    Synthetic Stimulants    [facility] Prescribed Medications    CBC    Comprehensive metabolic panel    Lipid Panel with Direct LDL reflex    TSH, 3rd generation with Free T4 reflex    Vitamin B12    Vitamin D 25 hydroxy    Hypotension, unspecified hypotension type        Relevant Orders    CBC    Comprehensive metabolic panel    Lipid Panel with Direct LDL reflex    TSH, 3rd generation with Free T4 reflex    Vitamin B12    Vitamin D 25 hydroxy    Syncope, unspecified syncope type        Relevant Orders    CBC    Comprehensive metabolic panel    Lipid Panel with Direct LDL reflex    TSH, 3rd generation with Free T4 reflex    Vitamin B12    Vitamin D 25 hydroxy             Preventive health issues were discussed with patient, and age appropriate screening tests were ordered as noted in patient's After Visit Summary  Personalized health advice and appropriate referrals for health education or preventive services given if needed, as noted in patient's After Visit Summary  History of Present Illness:     Patient presents for a Medicare Wellness Visit    Patient's only complaint is when she gets moved by her  occasionally her blood pressure goes too low and does lose consciousness occurs few times a month  Blood work was discussed we will decrease her furosemide and will monitor  Patient is to notify her neurologist about this     Patient Care Team:  Amy Charles DO as PCP - General (Family Medicine)  MD Amy Julien DO     Review of Systems:     Review of Systems   Constitutional: Negative  HENT: Negative  Eyes: Negative  Respiratory: Negative  Cardiovascular:        HPI   Gastrointestinal: Negative  Endocrine: Negative  Genitourinary: Negative  Musculoskeletal:        HPI   Skin: Negative  Allergic/Immunologic: Negative  Neurological:        HPI   Hematological: Negative  Psychiatric/Behavioral: Negative           Problem List:     Patient Active Problem List   Diagnosis   • Suprapubic catheter [facility])   • Bladder calculus   • Constipation   • Recurrent major depressive disorder, in full remission ([facility] [address] )   • Swallowing difficulty   • Dizziness   • Hyperglycemia   • Familial hypercholesterolemia   • Lymphedema   • Multiple sclerosis (HCC)   • Muscle spasticity   • Muscle weakness   • Nephrolithiasis   • Neurogenic bladder   • Sleep disorder   • Spinal stenosis of cervical region   • Tremor   • Urinary incontinence   • Vision loss   • Vitamin B12 deficiency   • Vitamin D deficiency   • Functional quadriplegia secondary to MS [facility])   • Allergic rhinitis   • Screening mammogram, encounter for   • Health care maintenance   • Medication management contract signed   • Urinary tract infection associated with cystostomy catheter ([facility] [address] )   • Thrombocytopenia ([facility] [address] )   • Serum total bilirubin elevated   • Hydronephrosis with urinary obstruction due to ureteral calculus   • Mural thickening of sigmoid colon   • Candidal vaginitis   • Alkaline phosphatase elevation   • Abnormal thyroid function test   • Ureteral calculus, left   • Increased endometrial stripe thickness   • Acute respiratory failure with hypoxia ([facility])   • Continuous opioid dependence ([facility] [address] )   • Peripheral edema      Past Medical and Surgical History:     Past Medical History:   Diagnosis Date   • Anesthesia     "prefers if able to be put to sleep on stretcher before placed on table if possible due to severe spasticity   • Bladder stones    • Chronic pain disorder     neck   • Contracture, right shoulder     right arm and hand   • Dependent on wheelchair motorized   • Edema     dependant lower extremities   • Elevated alkaline phosphatase level     Mildly elevated total, normal isoenzymes 4/15/15, normal 1/17; last assessed: [id number]   • Fernandez catheter in place     #05 to large bag(silicone catheter)   • Gallbladder disease    • History of femur fracture     right leg   • History of UTI    • MS (multiple sclerosis) ([facility])    • Muscle spasticity     especially with touch   • Muscle weakness    • Muscular dystrophy ([facility])    • Neck pain    • Neurogenic bladder    • Paralysis ([facility])     bilateral lower extremities   • Port-A-Cath in place     left chest," hasn't used in approx 1 yr or so"   • Pressure injury of skin     right ischium   • Sacral pressure sore     "shearing, tegaderm in place,"   • Swallowing difficulty    • Tinnitus    • Urinary incontinence      Past Surgical History:   Procedure Laterality Date   • BLADDER STONE REMOVAL N/A 12/4/2017    Procedure: Marge Rae;  Surgeon: Riddhi Villanueva MD;  Location: [facility];  Service: Urology   • BLADDER SURGERY     • CHOLECYSTECTOMY     • CYSTOSCOPY  06/15/2020   • ESOPHAGOGASTRODUODENOSCOPY     • FL RETROGRADE PYELOGRAM  10/2/2020   • FL RETROGRADE PYELOGRAM  11/3/2020   • KIDNEY STONE SURGERY     • PORTACATH PLACEMENT Left    • ND CYSTO BLADDER W/URETERAL CATHETERIZATION Left 10/2/2020    Procedure: CYSTOSCOPY LEFT RETROGRADE ; LEFT URETEROSCOPY; PYELOGRAM WITH STENT INSERTION AND [address];  Surgeon: Guy Awan MD;  Location: [facility];  Service: Urology   • ND CYSTO/URETERO W/LITHOTRIPSY &INDWELL STENT INSRT Left 11/3/2020    Procedure: CYSTOSCOPY URETEROSCOPY WITH RETROGRADE PYELOGRAM AND EXCHANGE STENT URETERAL, SP TUBE EXCHANGE, BASKET STONE EXTRACTION, BLADDER STONE EXTRACTION;  Surgeon: Riddhi Villanueva MD;  Location: [facility];  Service: Urology   • ND LITHOLAPAXY SMPL/SM <2 5 CM N/A 12/22/2022    Procedure: Cystolitholapaxy w/  laser lithotripsy of bladder stones; SUPRAPUBIC CATHETER CHANGE;  Surgeon: Ernie Cottrell MD;  Location: [facility] OR;  Service: Urology   • SUPRAPUBIC CYSTOSTOMY  02/25/2014    last assessed: [id number]      Family History:     Family History   Problem Relation Age of Onset   • Diabetes Mother    • Hyperlipidemia Mother    • Hypertension Mother    • COPD Father    • Hypertension Father    • Hyperlipidemia Sister    • Alzheimer's disease Family    • Diabetes Family    • Hypertension Family    • Heart disease Family       Social History:     Social History     Socioeconomic History   • Marital status: /Civil Union     Spouse name: None   • Number of children: None   • Years of education: None   • Highest education level: None   Occupational History   • None   Tobacco Use   • Smoking status: Never   • Smokeless tobacco: Never   Vaping Use   • Vaping Use: Never used   Substance and Sexual Activity   • Alcohol use: Not Currently   • Drug use: No   • Sexual activity: Yes   Other Topics Concern   • None   Social History Narrative    Caffeine use    Currently on disability    Daily coffee consumption (2 cups/day)    Educational level- completed 2nd year college    Lives with adult children    Not currently employed    Wheelchair dependent      Social Determinants of Health     Financial Resource Strain: Low Risk    • Difficulty of Paying Living Expenses: Not hard at all   Food Insecurity: Not on file   Transportation Needs: No Transportation Needs   • Lack of Transportation (Medical): No   • Lack of Transportation (Non-Medical):  No   Physical Activity: Not on file   Stress: Not on file   Social Connections: Not on file   Intimate Partner Violence: Not on file   Housing Stability: Not on file      Medications and Allergies:     Current Outpatient Medications   Medication Sig Dispense Refill   • baclofen 20 mg tablet TAKE 1 TAB BY MOUTH 5 TIMES AS NEEDED 450 tablet 2   • DULoxetine (CYMBALTA) 30 mg delayed release capsule TAKE 1 CAPSULE BY MOUTH EVERY DAY 30 capsule 5   • furosemide (LASIX) 20 mg tablet Take 1 tablet (20 mg total) by mouth daily 30 tablet 5   • gabapentin (Neurontin) 300 mg capsule Take 1 capsule (300 mg total) by mouth 3 (three) times a day 90 capsule 4   • OXcarbazepine (Trileptal) 150 mg tablet Take 2 tablets (300 mg total) by mouth 2 (two) times a day 120 tablet 2   • oxybutynin (DITROPAN-XL) 10 MG 24 hr tablet Take 1 tablet (10 mg total) by mouth daily 90 tablet 3   • oxyCODONE-acetaminophen (PERCOCET) 5-325 mg per tablet Take 1 tablet by mouth every 4 (four) hours as needed for moderate pain Max Daily Amount: 6 tablets 40 tablet 0   • rosuvastatin (CRESTOR) 5 mg tablet TAKE 1 TABLET (5 MG TOTAL) BY MOUTH DAILY  90 tablet 4     No current facility-administered medications for this visit  Allergies   Allergen Reactions   • Latex Blisters     Added based on information entered during case entry, please review and add reactions, type, and severity as needed    blisters      Immunizations:     Immunization History   Administered Date(s) Administered   • COVID-19 MODERNA VACC 0 5 ML IM 04/10/2021, 05/08/2021   • INFLUENZA 10/20/2004   • Influenza Quadrivalent Preservative Free 3 years and older IM 10/30/2014, 11/24/2015, 01/17/2017   • Influenza, injectable, quadrivalent, preservative free 0 5 mL 09/10/2018   • Influenza, seasonal, injectable, preservative free 01/26/2018   • Tdap 11/24/2015      Health Maintenance:         Topic Date Due   • Cervical Cancer Screening  Never done   • Colorectal Cancer Screening  Never done   • Breast Cancer Screening: Mammogram  08/11/2016   • HIV Screening  Completed   • Hepatitis C Screening  Completed         Topic Date Due   • COVID-19 Vaccine (3 - Booster for Tahir Longest series) 07/03/2021   • Influenza Vaccine (1) 09/01/2022      Medicare Screening Tests and Risk Assessments:     Myra Chen is here for her Subsequent Wellness visit       Health Risk Assessment:   Patient rates overall health as fair  Patient feels that their physical health rating is slightly worse  Patient is satisfied with their life  Eyesight was rated as same  Hearing was rated as same  Patient feels that their emotional and mental health rating is same  Patients states they are sometimes angry  Patient states they are sometimes unusually tired/fatigued  Pain experienced in the last 7 days has been a lot  Patient's pain rating has been 10/10  Patient states that she has experienced no weight loss or gain in last 6 months  See 2nd note    Depression Screening:   PHQ-9 Score: 0      Fall Risk Screening: In the past year, patient has experienced: no history of falling in past year      Urinary Incontinence Screening:   Patient has leaked urine accidently in the last six months  Home Safety:  Patient has trouble with stairs inside or outside of their home  Patient has working smoke alarms and has working carbon monoxide detector  Home safety hazards include: none  Nutrition:   Current diet is Regular  Medications:   Patient is currently taking over-the-counter supplements  OTC medications include: see medication list  Patient is able to manage medications   assists    Activities of Daily Living (ADLs)/Instrumental Activities of Daily Living (IADLs):   Walk and transfer into and out of bed and chair?: No  Dress and groom yourself?: No    Bathe or shower yourself?: No    Feed yourself?  No  Do your laundry/housekeeping?: No  Manage your money, pay your bills and track your expenses?: No  Make your own meals?: No    Do your own shopping?: No    ADL comments:  assists    Previous Hospitalizations:   Any hospitalizations or ED visits within the last 12 months?: Yes    How many hospitalizations have you had in the last year?: 1-2    Hospitalization Comments: Bladder stone 12/22/2022    Advance Care Planning:   Living will: No    Durable POA for healthcare: No    Advanced directive: No    Advanced directive counseling given: Yes    Five wishes given: Yes    Patient declined ACP directive: Yes    End of Life Decisions reviewed with patient: Yes    Provider agrees with end of life decisions: Yes      Cognitive Screening:   Provider or family/friend/caregiver concerned regarding cognition?: No    PREVENTIVE SCREENINGS      Cardiovascular Screening:    General: Screening Not Indicated and History Lipid Disorder      Diabetes Screening:     General: Screening Current      Colorectal Cancer Screening:     General: Risks and Benefits Discussed    Due for: Cologuard      Breast Cancer Screening:     General: Risks and Benefits Discussed and Patient Declines      Cervical Cancer Screening:    General: Risks and Benefits Discussed and Patient Declines      Osteoporosis Screening:    General: Screening Not Indicated      Abdominal Aortic Aneurysm (AAA) Screening:        General: Screening Not Indicated      Lung Cancer Screening:     General: Screening Not Indicated      Hepatitis C Screening:    General: Screening Current    Screening, Brief Intervention, and Referral to Treatment (SBIRT)    Screening  Typical number of drinks in a day: 0  Typical number of drinks in a week: 0  Interpretation: Low risk drinking behavior  AUDIT-C Screenin) How often did you have a drink containing alcohol in the past year? monthly or less  2) How many drinks did you have on a typical day when you were drinking in the past year?  1 to 2  3) How often did you have 6 or more drinks on one occasion in the past year? never    AUDIT-C Score: 1  Interpretation: Score 0-2 (female): Negative screen for alcohol misuse    Single Item Drug Screening:  How often have you used an illegal drug (including marijuana) or a prescription medication for non-medical reasons in the past year? never    Single Item Drug Screen Score: 0  Interpretation: Negative screen for possible drug use disorder    Review of Current Opioid Use  Opioid Risk Tool (ORT) Score: 1  Opioid Risk Tool (ORT) Interpretation: Score 0-3: Low risk for opioid misuse    No results found  Physical Exam:     BP 98/68   Pulse 76   Ht 5' 6" (1 676 m)   Wt 88 9 kg (196 lb)   BMI 31 64 kg/m²     Physical Exam  Vitals and nursing note reviewed  Constitutional:       Appearance: Normal appearance  HENT:      Head: Normocephalic and atraumatic  Mouth/Throat:      Mouth: Mucous membranes are moist    Eyes:      General: No scleral icterus  Neck:      Vascular: No carotid bruit  Cardiovascular:      Rate and Rhythm: Normal rate and regular rhythm  Heart sounds: Normal heart sounds  Pulmonary:      Effort: Pulmonary effort is normal       Breath sounds: Normal breath sounds  Abdominal:      General: Bowel sounds are normal       Palpations: Abdomen is soft  Tenderness: There is no abdominal tenderness  Musculoskeletal:      Cervical back: Neck supple  Right lower leg: No edema  Left lower leg: No edema  Skin:     General: Skin is warm and dry  Neurological:      Mental Status: She is alert  Motor: Weakness present        Comments: Wheelchair-bound, positive quadriplegic   Psychiatric:         Mood and Affect: Mood normal           Nacho Monroy DO

## 2023-02-20 NOTE — PROGRESS NOTES
Assessment/Plan     Problem List Items Addressed This Visit        Digestive    Mural thickening of sigmoid colon     Patient has been have no interest in following this up         Relevant Orders    CBC    Comprehensive metabolic panel    Lipid Panel with Direct LDL reflex    TSH, 3rd generation with Free T4 reflex    Vitamin B12    Vitamin D 25 hydroxy       Respiratory    Acute respiratory failure with hypoxia (HCC)     Stable doing well         Relevant Orders    CBC    Comprehensive metabolic panel    Lipid Panel with Direct LDL reflex    TSH, 3rd generation with Free T4 reflex    Vitamin B12    Vitamin D 25 hydroxy       Nervous and Auditory    Multiple sclerosis (HCC) (Chronic)     Is with neurology         Relevant Orders    CBC    Comprehensive metabolic panel    Lipid Panel with Direct LDL reflex    TSH, 3rd generation with Free T4 reflex    Vitamin B12    Vitamin D 25 hydroxy    Functional quadriplegia secondary to MS (HCC) (Chronic)     Follows with neurology         Relevant Orders    CBC    Comprehensive metabolic panel    Lipid Panel with Direct LDL reflex    TSH, 3rd generation with Free T4 reflex    Vitamin B12    Vitamin D 25 hydroxy       Hematopoietic and Hemostatic    Thrombocytopenia (HCC)     Normal at the present time continue to monitor         Relevant Orders    CBC    Comprehensive metabolic panel    Lipid Panel with Direct LDL reflex    TSH, 3rd generation with Free T4 reflex    Vitamin B12    Vitamin D 25 hydroxy       Other    Suprapubic catheter (HCC) (Chronic)     Is with urology         Relevant Orders    CBC    Comprehensive metabolic panel    Lipid Panel with Direct LDL reflex    TSH, 3rd generation with Free T4 reflex    Vitamin B12    Vitamin D 25 hydroxy    Recurrent major depressive disorder, in full remission ([facility] [address] )     Able continue Cymbalta         Relevant Orders    CBC    Comprehensive metabolic panel    Lipid Panel with Direct LDL reflex    TSH, 3rd generation with Free T4 reflex    Vitamin B12    Vitamin D 25 hydroxy    Familial hypercholesterolemia     Stable continue present therapy         Relevant Orders    CBC    Comprehensive metabolic panel    Lipid Panel with Direct LDL reflex    TSH, 3rd generation with Free T4 reflex    Vitamin B12    Vitamin D 25 hydroxy    Lymphedema     Chronic/stable we will decrease furosemide from 40 to 20 mg daily if edema recurs call office         Relevant Orders    CBC    Comprehensive metabolic panel    Lipid Panel with Direct LDL reflex    TSH, 3rd generation with Free T4 reflex    Vitamin B12    Vitamin D 25 hydroxy    Spinal stenosis of cervical region - Primary     See opioid visit         Relevant Orders    Urine Drug Screen    Methylphenidate    Fentanyl with Confirmation    Buprenorphine w/ Naloxone    Naltrexone    Gabapentin    Pregabalin    Synthetic Stimulants    [facility] Prescribed Medications    CBC    Comprehensive metabolic panel    Lipid Panel with Direct LDL reflex    TSH, 3rd generation with Free T4 reflex    Vitamin B12    Vitamin D 25 hydroxy    Vitamin B12 deficiency     Able continue present therapy         Relevant Orders    CBC    Comprehensive metabolic panel    Lipid Panel with Direct LDL reflex    TSH, 3rd generation with Free T4 reflex    Vitamin B12    Vitamin D 25 hydroxy    Vitamin D deficiency     Continue present therapy         Relevant Orders    CBC    Comprehensive metabolic panel    Lipid Panel with Direct LDL reflex    TSH, 3rd generation with Free T4 reflex    Vitamin B12    Vitamin D 25 hydroxy    Health care maintenance     Care AWV completed         Relevant Orders    CBC    Comprehensive metabolic panel    Lipid Panel with Direct LDL reflex    TSH, 3rd generation with Free T4 reflex    Vitamin B12    Vitamin D 25 hydroxy    Medication management contract signed     Contract signed for Percocet         Relevant Orders    CBC    Comprehensive metabolic panel    Lipid Panel with Direct LDL reflex    TSH, 3rd generation with Free T4 reflex    Vitamin B12    Vitamin D 25 hydroxy    Continuous opioid dependence (HCC)    Relevant Orders    Urine Drug Screen    Methylphenidate    Fentanyl with Confirmation    Buprenorphine w/ Naloxone    Naltrexone    Gabapentin    Pregabalin    Synthetic Stimulants    [facility] Prescribed Medications    CBC    Comprehensive metabolic panel    Lipid Panel with Direct LDL reflex    TSH, 3rd generation with Free T4 reflex    Vitamin B12    Vitamin D 25 hydroxy    Peripheral edema     Decrease furosemide from 40 to 20 mg daily  will monitor blood pressure         Relevant Medications    furosemide (LASIX) 20 mg tablet    Other Relevant Orders    CBC    Comprehensive metabolic panel    Lipid Panel with Direct LDL reflex    TSH, 3rd generation with Free T4 reflex    Vitamin B12    Vitamin D 25 hydroxy   Other Visit Diagnoses     Chronic pain syndrome        Relevant Orders    Urine Drug Screen    Methylphenidate    Fentanyl with Confirmation    Buprenorphine w/ Naloxone    Naltrexone    Gabapentin    Pregabalin    Synthetic Stimulants    [facility] Prescribed Medications    CBC    Comprehensive metabolic panel    Lipid Panel with Direct LDL reflex    TSH, 3rd generation with Free T4 reflex    Vitamin B12    Vitamin D 25 hydroxy    High risk medication use        Relevant Orders    Urine Drug Screen    Methylphenidate    Fentanyl with Confirmation    Buprenorphine w/ Naloxone    Naltrexone    Gabapentin    Pregabalin    Synthetic Stimulants    [facility] Prescribed Medications    CBC    Comprehensive metabolic panel    Lipid Panel with Direct LDL reflex    TSH, 3rd generation with Free T4 reflex    Vitamin B12    Vitamin D 25 hydroxy    Hypotension, unspecified hypotension type        Relevant Orders    CBC    Comprehensive metabolic panel    Lipid Panel with Direct LDL reflex    TSH, 3rd generation with Free T4 reflex    Vitamin B12    Vitamin D 25 hydroxy    Syncope, unspecified syncope type        Relevant Orders    CBC    Comprehensive metabolic panel    Lipid Panel with Direct LDL reflex    TSH, 3rd generation with Free T4 reflex    Vitamin B12    Vitamin D 25 hydroxy         Treatment Plan: Continue present therapy UDS was sent to lab, contract was signed    Treatment Goals: Patient comfort    Opiate risks  There are risks associated with opioid medications, including dependence, addiction and tolerance  The patient understands and agrees to use these medications only as prescribed  Potential side effects of the medications include, but are not limited to, constipation, drowsiness, addiction, impaired judgment and risk of fatal overdose if not taken as prescribed  The patient was warned against driving while taking sedation medications  Sharing medications is a felony  At this point in time, the patient is showing no signs of addiction, abuse, diversion or suicidal ideation  Opioid agreement  Pain management agreement was reviewed with patient and signed/updated during visit      Drug screen  Drug screen collected during today's visit      [place] review  [place] or [place]  reviewed  No red flags were identified; safe to proceed with prescription          Subjective     Opioid Management:   Type of visit: Initial    Patient has been on Percocet as needed for many many years    And has been stable    Current pain description: Chronic unrelenting pain from cervical spine at times secondary to MS and spinal stenosis    Functional status: Wheelchair-bound with quadriplegia secondary to MS    Goals of care: Patient comfort    Social Support System  Patient receives support from their:     Screening Tools/Assessments:    PHQ-2/9:  PHQ-9 score: 0    Opioid Risk Tool (ORT):  Current ORT Score: 1 (low risk for opiate abuse)    SOAPP:  Current SOAPP Score: 2 (negative, low risk patient)    Brief Pain Inventory (BPI):  1) Throughout our lives, most of us have had pain from time to time (such as minor headaches, sprains, and toothaches)  Have you had pain other than these everyday kinds of pain today? Yes  2) Where is your pain located? Neck  3) Rate your pain at its worst in the last 24 hours: 10  4) Rate your pain at its least in the last 24 hours: 6  5) Rate your average level of pain: 8  6) Rate your pain right now: 6  7) What treatments or medications are you receiving for your pain? Oxycodone  8) In the past 24 hours, how much relief have pain treatments or medication provided?  100%  9) During the past 24 hours, pain has interfered with your:     A) General activity: 8     B) Mood: 10     C) Walking ability: 10     D) Normal work (work outside the home & housework): 10     E) Relations with other people: 8     F) Sleep: 10     G) Enjoyment of life: 10    Opioid agreement:  Active Opioid agreement on file?: Yes    Opioid agreement signed date: 2/21/2020  Opioid agreement expiration date: 2/20/2021    Naloxone:  Currently prescribed Naloxone (Narcan): No    Reason not prescribing Naloxone: not clinically warranted    Other treatments tried/failed:   Rest, heat, cold compresses, muscle relaxants, home exercises, physical therapy and massage    Patient has tried multiple modalities over the years and only gets relief with Percocet as needed does not overuse this medication    Second note    Pain Medications             baclofen 20 mg tablet TAKE 1 TAB BY MOUTH 5 TIMES AS NEEDED    DULoxetine (CYMBALTA) 30 mg delayed release capsule TAKE 1 CAPSULE BY MOUTH EVERY DAY    gabapentin (Neurontin) 300 mg capsule Take 1 capsule (300 mg total) by mouth 3 (three) times a day    OXcarbazepine (Trileptal) 150 mg tablet Take 2 tablets (300 mg total) by mouth 2 (two) times a day    oxyCODONE-acetaminophen (PERCOCET) 5-325 mg per tablet Take 1 tablet by mouth every 4 (four) hours as needed for moderate pain Max Daily Amount: 6 tablets         Outpatient Morphine Milligram Equivalents Per Day     2/20/23 and after 45 MME/Day    Order Name Dose Route Frequency Maximum MME/Day     oxyCODONE-acetaminophen (PERCOCET) 5-325 mg per tablet 1 tablet Oral Every 4 hours PRN 45 MME/Day    Total Potential Morphine Milligram Equivalents Per Day 45 MME/Day    Calculation Information        oxyCODONE-acetaminophen (PERCOCET) 5-325 mg per tablet    oxyCODONE-acetaminophen 5-325 mg Tabs: maximum daily dose of 30 mg of opioid in combo * morphine equivalence factor of 1 5 = 45 MME/Day                         PDMP Review       Value Time User    PDMP Reviewed  Yes 2/20/2023  3:58 PM Nacho Monroy DO         Review of Systems   Musculoskeletal:        HPI   Neurological:        HPI     Objective     BP 98/68   Pulse 76   Ht 5' 6" (1 676 m)   Wt 88 9 kg (196 lb)   BMI 31 64 kg/m²     Physical Exam  Vitals and nursing note reviewed  Constitutional:       Appearance: Normal appearance  HENT:      Head: Normocephalic and atraumatic  Mouth/Throat:      Mouth: Mucous membranes are moist    Eyes:      General: No scleral icterus  Neck:      Vascular: No carotid bruit  Cardiovascular:      Rate and Rhythm: Normal rate and regular rhythm  Heart sounds: Normal heart sounds  Pulmonary:      Effort: Pulmonary effort is normal       Breath sounds: Normal breath sounds  Abdominal:      General: Bowel sounds are normal       Palpations: Abdomen is soft  Tenderness: There is no abdominal tenderness  Musculoskeletal:      Cervical back: Neck supple  Right lower leg: No edema  Left lower leg: No edema  Skin:     General: Skin is warm and dry  Neurological:      Mental Status: She is alert  Motor: Weakness present        Gait: Gait abnormal       Comments: Wheelchair-bound and quadriplegic   Psychiatric:         Mood and Affect: Mood normal          Nacho Monroy DO

## 2023-02-23 ENCOUNTER — TELEPHONE (OUTPATIENT)
Dept: OTHER | Facility: OTHER | Age: 53
End: 2023-02-23

## 2023-02-23 ENCOUNTER — HOME CARE VISIT (OUTPATIENT)
Dept: HOME HEALTH SERVICES | Facility: HOME HEALTHCARE | Age: 53
End: 2023-02-23

## 2023-02-23 VITALS
TEMPERATURE: 97.7 F | HEART RATE: 76 BPM | OXYGEN SATURATION: 100 % | DIASTOLIC BLOOD PRESSURE: 64 MMHG | SYSTOLIC BLOOD PRESSURE: 116 MMHG

## 2023-02-23 DIAGNOSIS — R39.9 UTI SYMPTOMS: Primary | ICD-10-CM

## 2023-02-23 LAB
6-BETA NALTREXOL UR CFM-MCNC: NEGATIVE NG/ML
6MAM UR QL: NEGATIVE NG/ML
AMPHETAMINES UR QL: NEGATIVE NG/ML
BARBITURATES UR QL: NEGATIVE NG/ML
BENZODIAZ UR QL: NEGATIVE NG/ML
BUPRENORPHINE UR QL: NEGATIVE NG/ML
BUTYLONE: NEGATIVE NG/ML
BZE UR QL: NEGATIVE NG/ML
CREAT UR-MCNC: 15 MG/DL
DRUG SCREEN COMMENT UR-IMP: ABNORMAL
ETHANOL UR QL: NEGATIVE NG/ML
FENTANYL: NEGATIVE NG/ML
GABAPENTIN UR-MCNC: 5458 NG/ML
MDPV: NEGATIVE NG/ML
MEPHEDRONE: NEGATIVE NG/ML
METHADONE UR QL: NEGATIVE NG/ML
METHYLONE: NEGATIVE NG/ML
NALTREXONE UR-MCNC: NEGATIVE NG/ML
NOROXYCODONE SAL CFM-MCNC: 266 NG/ML
OPIATES UR QL: NEGATIVE NG/ML
OXIDANTS UR QL: NEGATIVE MCG/ML
OXYCODONE UR QL: POSITIVE NG/ML
OXYCODONE UR-MCNC: NEGATIVE NG/ML
OXYMORPHONE UR-MCNC: NEGATIVE NG/ML
PCP UR QL: NEGATIVE NG/ML
PH UR: 7.7 [PH] (ref 4.5–9)
SL AMB MEDMATCH NOROXYCODONE: ABNORMAL
SL AMB PREGABALIN: NEGATIVE NG/ML
SL AMB RITALINIC ACID: NEGATIVE NG/ML
SP GR UR: 1
THC UR QL: NEGATIVE NG/ML

## 2023-02-23 RX ADMIN — BACLOFEN 20 MG: 20 TABLET ORAL at 11:44

## 2023-02-23 RX ADMIN — OXCARBAZEPINE 300 MG: 150 TABLET, FILM COATED ORAL at 11:44

## 2023-02-23 NOTE — TELEPHONE ENCOUNTER
Patient gets a yeasty odor around her suprapubic catheter  Please contact Bear Maria with [facility] @ [phone]  Requesting new medication order  complains of pain/discomfort

## 2023-02-23 NOTE — TELEPHONE ENCOUNTER
Returned call to patients [address]  Iris Janay reports patient has a very yeasty odor with brown discharge around her SPT site  SPT changed today via visiting nurse  Skin surrounding SPT site is not red or excoriated  They were using Dakin's solution on area, however no improvement  Patient does not have any s/s of infection- denies fevers/chills  Patient is leaving for [place] tomorrow- visiting nurse requesting Diflucan for patient due to yeast/odor as it works quickly and patient is leaving for [place]  Home care also believes antifungal cream may be beneficial  Will forward to provider for review

## 2023-02-24 RX ORDER — FLUCONAZOLE 150 MG/1
TABLET ORAL
Qty: 2 TABLET | Refills: 0 | Status: SHIPPED | OUTPATIENT
Start: 2023-02-24 | End: 2023-03-02

## 2023-02-24 RX ORDER — CLOTRIMAZOLE 1 %
CREAM (GRAM) TOPICAL 2 TIMES DAILY PRN
Qty: 30 G | Refills: 0 | Status: SHIPPED | OUTPATIENT
Start: 2023-02-24

## 2023-02-24 RX ORDER — LEVOFLOXACIN 750 MG/1
750 TABLET ORAL EVERY 24 HOURS
Qty: 7 TABLET | Refills: 0 | Status: SHIPPED | OUTPATIENT
Start: 2023-02-24 | End: 2023-03-03

## 2023-02-24 NOTE — TELEPHONE ENCOUNTER
Diflucan sent  Prn course of levaquin also sent given upcoming travel and previous sensitivities  Can start if symptomatic otherwise leave it   Clotrimazole cream also sent for any pericatheter rash/drainage

## 2023-02-24 NOTE — TELEPHONE ENCOUNTER
Called and left detailed voicemail for patients spouse, Eva Goss with [facility] PA-C's note, scripts for medications, and recommendations  Asked  to call back with any questions or concerns

## 2023-03-07 DIAGNOSIS — G50.0 TRIGEMINAL NEURALGIA: ICD-10-CM

## 2023-03-07 DIAGNOSIS — G35 MULTIPLE SCLEROSIS (HCC): ICD-10-CM

## 2023-03-07 RX ORDER — OXCARBAZEPINE 150 MG/1
TABLET, FILM COATED ORAL
Qty: 360 TABLET | Refills: 1 | Status: SHIPPED | OUTPATIENT
Start: 2023-03-07

## 2023-03-23 ENCOUNTER — HOME CARE VISIT (OUTPATIENT)
Dept: HOME HEALTH SERVICES | Facility: HOME HEALTHCARE | Age: 53
End: 2023-03-23

## 2023-03-23 DIAGNOSIS — F32.A DEPRESSION, UNSPECIFIED DEPRESSION TYPE: ICD-10-CM

## 2023-03-23 RX ORDER — DULOXETIN HYDROCHLORIDE 30 MG/1
CAPSULE, DELAYED RELEASE ORAL
Qty: 30 CAPSULE | Refills: 5 | Status: SHIPPED | OUTPATIENT
Start: 2023-03-23

## 2023-03-23 NOTE — TELEPHONE ENCOUNTER
future appointment scheduled for 6/7/23  Last seen in office 2/20/23   Please send for pt   Thank you   Psychiatry: Antidepressants - SNRI Passed 03/23/2023 10:17 AM   Protocol Details  Valid encounter within last 6 months    Last BP in normal range and within 180 days

## 2023-03-28 ENCOUNTER — TELEPHONE (OUTPATIENT)
Dept: UROLOGY | Facility: AMBULATORY SURGERY CENTER | Age: 53
End: 2023-03-28

## 2023-03-28 ENCOUNTER — HOME CARE VISIT (OUTPATIENT)
Dept: HOME HEALTH SERVICES | Facility: HOME HEALTHCARE | Age: 53
End: 2023-03-28

## 2023-03-28 NOTE — TELEPHONE ENCOUNTER
Patient last seen for surgery with Dr Sejal Breaux on 12/22/22  Gevena Sicard [facility] manager calling to inform Dr Sejal Breaux that the patient has been discharged from their services because she left the service area and went to [place]  She said while the patient was in [place], her cath blocked and her  changed her cath for her, which is her concern because medicare can deny services if the  continues to change her cath  Gevena Sicard said to get these services to restart the patient is going to need a face to face visit for documentation for medicare and to inform the patient that either the VNA does the changes or her  but it can't be both  She stated they have tried contacting the patient multiple times but she has not returned their calls and is requesting if our office could try contacting the patient       Any questions, Gevena Sicard can be reached at [phone]

## 2023-03-29 NOTE — TELEPHONE ENCOUNTER
Attempted to contact home number ending in [phone] and no voicemail box was set up  Left generic message to patient's  to contact the office in regards to message received from A  Office number provided

## 2023-03-29 NOTE — CASE COMMUNICATION
Hi Doctor Sondra Daly:  Ximena Wolfe left the [facility] service area and went to [place]  By Medicare definition, this indicates that she was not homebound during the time she was away  While she was there her  changed her catheter  By Medicare definition she does not have a skilled nursing need if her  is able to change her catheter  If she and her  feel he is not able safely change Ashlie's catheter, and if she is homebound, we wo uld be glad to readmit Ximena Wolef for [address] after she has a face to face visit with you     Thank you,   Sukumar Phelan RN

## 2023-03-31 ENCOUNTER — TELEPHONE (OUTPATIENT)
Dept: UROLOGY | Facility: AMBULATORY SURGERY CENTER | Age: 53
End: 2023-03-31

## 2023-03-31 NOTE — TELEPHONE ENCOUNTER
Mario Frazier left the [facility] service area and went to [place]  By Medicare definition, this indicates that she was not homebound during the time she was away    While she was there her  changed her catheter   By Medicare definition she does not have a skilled nursing need if her  is able to change her catheter  If she and her  feel he is not able safely change Ashlie's catheter, and if she is homebound, we wo uld be glad to readmit Mario Frazier for [address] after she has a face to face visit with you     Thank you,   Madhu Hawkins RN

## 2023-03-31 NOTE — TELEPHONE ENCOUNTER
Spoke to [employer] and attempted to schedule an appointment for Jared Chen and he decided that Jared Chen should call and schedule as most of the appointment were in the am

## 2023-06-07 ENCOUNTER — OFFICE VISIT (OUTPATIENT)
Dept: FAMILY MEDICINE CLINIC | Facility: CLINIC | Age: 53
End: 2023-06-07
Payer: MEDICARE

## 2023-06-07 VITALS — DIASTOLIC BLOOD PRESSURE: 82 MMHG | TEMPERATURE: 97.4 F | HEART RATE: 76 BPM | SYSTOLIC BLOOD PRESSURE: 126 MMHG

## 2023-06-07 DIAGNOSIS — E53.8 VITAMIN B12 DEFICIENCY: ICD-10-CM

## 2023-06-07 DIAGNOSIS — G35 MULTIPLE SCLEROSIS (HCC): Chronic | ICD-10-CM

## 2023-06-07 DIAGNOSIS — G35 FUNCTIONAL QUADRIPLEGIA SECONDARY TO MS (HCC): Chronic | ICD-10-CM

## 2023-06-07 DIAGNOSIS — E55.9 VITAMIN D DEFICIENCY: ICD-10-CM

## 2023-06-07 DIAGNOSIS — D69.6 THROMBOCYTOPENIA (HCC): ICD-10-CM

## 2023-06-07 DIAGNOSIS — N31.9 NEUROGENIC BLADDER: Primary | ICD-10-CM

## 2023-06-07 DIAGNOSIS — F11.20 CONTINUOUS OPIOID DEPENDENCE (HCC): ICD-10-CM

## 2023-06-07 DIAGNOSIS — Z93.59 SUPRAPUBIC CATHETER (HCC): Chronic | ICD-10-CM

## 2023-06-07 DIAGNOSIS — R73.9 HYPERGLYCEMIA: ICD-10-CM

## 2023-06-07 DIAGNOSIS — R53.2 FUNCTIONAL QUADRIPLEGIA SECONDARY TO MS (HCC): Chronic | ICD-10-CM

## 2023-06-07 DIAGNOSIS — D49.2 SKIN NEOPLASM: ICD-10-CM

## 2023-06-07 DIAGNOSIS — R60.9 PERIPHERAL EDEMA: ICD-10-CM

## 2023-06-07 DIAGNOSIS — F33.42 RECURRENT MAJOR DEPRESSIVE DISORDER, IN FULL REMISSION (HCC): ICD-10-CM

## 2023-06-07 DIAGNOSIS — E78.01 FAMILIAL HYPERCHOLESTEROLEMIA: ICD-10-CM

## 2023-06-07 DIAGNOSIS — M48.02 SPINAL STENOSIS OF CERVICAL REGION: ICD-10-CM

## 2023-06-07 PROBLEM — K63.9 MURAL THICKENING OF SIGMOID COLON: Status: RESOLVED | Noted: 2020-10-07 | Resolved: 2023-06-07

## 2023-06-07 PROCEDURE — 99214 OFFICE O/P EST MOD 30 MIN: CPT | Performed by: FAMILY MEDICINE

## 2023-06-07 RX ORDER — OXYBUTYNIN CHLORIDE 10 MG/1
10 TABLET, EXTENDED RELEASE ORAL DAILY
Qty: 90 TABLET | Refills: 3 | Status: SHIPPED | OUTPATIENT
Start: 2023-06-07

## 2023-06-07 RX ORDER — OXYCODONE HYDROCHLORIDE AND ACETAMINOPHEN 5; 325 MG/1; MG/1
1 TABLET ORAL EVERY 4 HOURS PRN
Qty: 40 TABLET | Refills: 0 | Status: SHIPPED | OUTPATIENT
Start: 2023-06-07

## 2023-06-07 RX ORDER — DULOXETIN HYDROCHLORIDE 20 MG/1
20 CAPSULE, DELAYED RELEASE ORAL DAILY
Qty: 90 CAPSULE | Refills: 3 | Status: SHIPPED | OUTPATIENT
Start: 2023-06-07

## 2023-06-07 NOTE — PROGRESS NOTES
Name: Narda Pedro      : 1970      MRN: [id number]  Encounter Provider: Ike Chandra DO  Encounter Date: 2023   Encounter department: [facility] PRIMARY [facility]    Assessment & Plan     1  Multiple sclerosis/spinal stenosis cervical area  PDMP reviewed  Percocet reordered  2  Neurogenic bladder  Oxybutynin reordered  3  Quadriplegia secondary to MS, wheelchair-bound follows with neurology  4  Thrombocytopenia patient will complete blood work  5  Vitamin deficiencies, B12 and D, will complete blood work  6  Suprapubic catheter follows with urology  7  MDD, patient feels that she would like to decrease Cymbalta we will go from 30 to 20 mg daily  8  Peripheral edema doing well we will wean off Lasix  Patient take Lasix 20 mg tablets every other day for 2 weeks then Monday and Friday for 2 weeks then discontinue if edema recurs restart and call office  Benign  Hypercholesterolemia, patient to complete blood work  10  Continuous opioid dependence, patient uses Percocet very sparingly up-to-date with UDS and office visits  11  Skin neoplasm, chest, discussed ABCD does meet criteria refer to dermatology, Dr Nikky Casarez  12  Return at scheduled appointment in August sooner if needed      1  Neurogenic bladder  Assessment & Plan:  Oxybutynin reordered    Orders:  -     oxybutynin (DITROPAN-XL) 10 MG 24 hr tablet; Take 1 tablet (10 mg total) by mouth daily    2  Spinal stenosis of cervical region  Assessment & Plan:  PDMP reviewed Percocet reordered    Orders:  -     oxyCODONE-acetaminophen (PERCOCET) 5-325 mg per tablet; Take 1 tablet by mouth every 4 (four) hours as needed for moderate pain Max Daily Amount: 6 tablets    3  Multiple sclerosis (HCC)  -     oxybutynin (DITROPAN-XL) 10 MG 24 hr tablet; Take 1 tablet (10 mg total) by mouth daily  -     DULoxetine (CYMBALTA) 20 mg capsule; Take 1 capsule (20 mg total) by mouth daily    4  Functional quadriplegia secondary to MS ([address] )    5  Thrombocytopenia ([facility] )    6  Vitamin D deficiency    7  Vitamin B12 deficiency    8  Suprapubic catheter ([facility] )    9  Recurrent major depressive disorder, in full remission ([facility] )  -     DULoxetine (CYMBALTA) 20 mg capsule; Take 1 capsule (20 mg total) by mouth daily    10  Peripheral edema    11  Hyperglycemia    12  Familial hypercholesterolemia    13  Continuous opioid dependence ([place] [id number] )  Assessment & Plan:  PDMP reviewed, Percocet reordered patient is up-to-date with contract and UDS      14  Skin neoplasm  -     Ambulatory Referral to Dermatology; Future      BMI Counseling: There is no height or weight on file to calculate BMI  The BMI is above normal  Nutrition recommendations include reducing intake of cholesterol  Rationale for BMI follow-up plan is due to patient being overweight or obese  Subjective      Unfortunately patient did not do blood work she will complete the next few weeks  Patient believes that her Cymbalta is working very well would like to decrease the dose will go from 30-20  In addition patient feels much better on decreased dose of Lasix she has had no edema we will try and wean this off  Review of Systems   Constitutional: Negative  HENT: Negative  Eyes: Negative  Respiratory: Negative  Cardiovascular:        HPI   Gastrointestinal: Negative  Endocrine: Negative  Genitourinary: Negative  Musculoskeletal: Negative  Skin: Negative  Allergic/Immunologic: Negative  Neurological: Negative  Hematological: Negative  Psychiatric/Behavioral:        HPI       Current Outpatient Medications on File Prior to Visit   Medication Sig   • baclofen 20 mg tablet TAKE 1 TAB BY MOUTH 5 TIMES AS NEEDED   • clotrimazole (LOTRIMIN) 1 % cream Apply topically 2 (two) times a day as needed (yeast rash)   • furosemide (LASIX) 20 mg tablet TAKE 1 TABLET BY MOUTH EVERY DAY   • OXcarbazepine (TRILEPTAL) 150 mg tablet TAKE 2 TABLETS BY MOUTH 2 TIMES A DAY   (Patient taking differently: 150 mg One time a day )   • rosuvastatin (CRESTOR) 5 mg tablet TAKE 1 TABLET (5 MG TOTAL) BY MOUTH DAILY     • [DISCONTINUED] DULoxetine (CYMBALTA) 30 mg delayed release capsule Take 1 capsule (30 mg total) by mouth daily   • [DISCONTINUED] oxybutynin (DITROPAN-XL) 10 MG 24 hr tablet Take 1 tablet (10 mg total) by mouth daily   • [DISCONTINUED] oxyCODONE-acetaminophen (PERCOCET) 5-325 mg per tablet Take 1 tablet by mouth every 4 (four) hours as needed for moderate pain Max Daily Amount: 6 tablets       Objective     /82 (BP Location: Right arm, Patient Position: Sitting, Cuff Size: Standard)   Pulse 76   Temp (!) 97 4 °F (36 3 °C) (Temporal)     Physical Exam  Nacho Monroy DO

## 2023-06-07 NOTE — PATIENT INSTRUCTIONS
Complete blood work as ordered  Follow with dermatology, Dr Paul Gann, for evaluation of skin lesion right anterior chest  Discontinue Cymbalta 30 mg start Cymbalta 20 mg daily  Wean off furosemide/Lasix 20 mg tablets as follows:  1 tablet every other day for 2 weeks then Monday and Friday for 2 weeks then discontinue    If edema recurs restart medication call office  Return in August as scheduled

## 2023-08-04 LAB — HEMOCCULT STL QL IA: NEGATIVE

## 2023-08-22 ENCOUNTER — TELEPHONE (OUTPATIENT)
Dept: FAMILY MEDICINE CLINIC | Facility: CLINIC | Age: 53
End: 2023-08-22

## 2023-08-22 NOTE — TELEPHONE ENCOUNTER
Patient did not show today for her scheduled follow-up, 8/22/2023. Please call patient to have her complete blood work that was ordered. She also needs this follow-up for her opioid management.   It should be a 40-minute visit

## 2023-11-02 DIAGNOSIS — G35 MULTIPLE SCLEROSIS (HCC): Chronic | ICD-10-CM

## 2023-11-02 RX ORDER — BACLOFEN 20 MG/1
TABLET ORAL
Qty: 150 TABLET | Refills: 8 | Status: SHIPPED | OUTPATIENT
Start: 2023-11-02

## 2023-11-09 ENCOUNTER — TELEPHONE (OUTPATIENT)
Age: 53
End: 2023-11-09

## 2023-11-09 DIAGNOSIS — N21.0 BLADDER STONES: Primary | ICD-10-CM

## 2023-11-09 DIAGNOSIS — R33.9 URINARY RETENTION: ICD-10-CM

## 2023-11-09 NOTE — TELEPHONE ENCOUNTER
Patient asked if she should go for her bi-annual u/s and KUB before her next visit?  If, so could someone call her back to let her know the orders are in the system so she can schedule u/s.    CB: [phone]

## 2023-11-10 NOTE — TELEPHONE ENCOUNTER
Called and left VM for patient/spouse that patient will require US and KUB prior to upcoming appointment in February. Advised orders were placed in patients chart and left phone number for central scheduling on VM to call to schedule imaging prior to visit.

## 2023-11-21 ENCOUNTER — TELEPHONE (OUTPATIENT)
Dept: NEUROLOGY | Facility: CLINIC | Age: 53
End: 2023-11-21

## 2023-11-21 ENCOUNTER — OFFICE VISIT (OUTPATIENT)
Dept: NEUROLOGY | Facility: CLINIC | Age: 53
End: 2023-11-21
Payer: MEDICARE

## 2023-11-21 VITALS
HEART RATE: 102 BPM | SYSTOLIC BLOOD PRESSURE: 122 MMHG | OXYGEN SATURATION: 98 % | DIASTOLIC BLOOD PRESSURE: 62 MMHG | TEMPERATURE: 97.5 F

## 2023-11-21 DIAGNOSIS — Z93.59 SUPRAPUBIC CATHETER (HCC): ICD-10-CM

## 2023-11-21 DIAGNOSIS — G35 FUNCTIONAL QUADRIPLEGIA SECONDARY TO MS (HCC): Chronic | ICD-10-CM

## 2023-11-21 DIAGNOSIS — R13.10 DYSPHAGIA, UNSPECIFIED TYPE: ICD-10-CM

## 2023-11-21 DIAGNOSIS — J96.01 ACUTE RESPIRATORY FAILURE WITH HYPOXIA (HCC): ICD-10-CM

## 2023-11-21 DIAGNOSIS — H93.13 TINNITUS OF BOTH EARS: ICD-10-CM

## 2023-11-21 DIAGNOSIS — R49.8 HYPOPHONIA: ICD-10-CM

## 2023-11-21 DIAGNOSIS — R53.2 FUNCTIONAL QUADRIPLEGIA SECONDARY TO MS (HCC): Chronic | ICD-10-CM

## 2023-11-21 DIAGNOSIS — G35 MULTIPLE SCLEROSIS (HCC): Primary | Chronic | ICD-10-CM

## 2023-11-21 PROCEDURE — 99214 OFFICE O/P EST MOD 30 MIN: CPT | Performed by: PSYCHIATRY & NEUROLOGY

## 2023-11-21 NOTE — LETTER
December 5, 2023    [address] [facility] [address] [address]      Our office has been unsuccessful in trying to reach you by phone regarding:      ? Other:____help at home options_____________________________________________      Please contact  Kari Hamman at [phone] if interested in more information about community services.         Sincerely,      Kari Hamman     [phone]  [facility]

## 2023-11-21 NOTE — PROGRESS NOTES
Patient ID: Reinaldo Marr is a 48 y.o. female. Assessment/Plan:           Problem List Items Addressed This Visit          Digestive    Swallowing difficulty    Relevant Orders    Referral to [address] [employer] [facility]       Respiratory    Acute respiratory failure with hypoxia ([address])       Nervous and Auditory    Multiple sclerosis (HCC) - Primary (Chronic)    Relevant Orders    [facility]    Functional quadriplegia secondary to MS [facility]) (Chronic)    Relevant Orders    Referral to [address] [employer] [facility]       Other    Suprapubic catheter ([address]) (Chronic)    Relevant Orders    Referral to [address] [employer] [facility]    Tinnitus of both ears    Hypophonia    Relevant Orders    Referral to [facility]      Mrs. Denis Burroughs has presented to [facility] multiple sclerosis center for follow-up on multiple sclerosis and related issues. Since last office visit in November 2022, patient describes no hospitalization, no major infection or MS relapses. Patient has been quadriplegic with progressive worsening of the hypophonia reported. Patient is not on any disease modifying regimen at this point. Patient stated she has no nursing team evaluation since sometimes at this point who can help with replacement of suprapubic catheter; patient stated she has complete resolution of skin wounds or decubital ulcers and she is no longer having home aids. Patient stated she has been home alone and has been alone through the day. Patient was offered to consider evaluation by physical therapy, speech therapy,  for the [place]. Patient is not safe to stay home alone considering patient is not capable to complete any ADLs. Cystolitholapaxy w/  laser lithotripsy of bladder stones; SUPRAPUBIC CATHETER CHANGE completed in December 5378 with no complication has been reported.   Patient completed CT scan of the head in October 2020 with no significant intracranial pathology described. Patient has stiffness in the right upper extremity with flaccid paralysis appreciated upper lower extremity. Patient has been taking baclofen 20 mg in the morning and maybe 40 mg at night with no cramps or spasticity described. Patient has been given gabapentin for intermittent facial pain but nonconcerning findings described today. Intermittent ringing in the ears especially during the quiet period of her day reported. Tinnitus treatment resources:  [employer] will take you to Dr. Cari Singh web site. Discussion is 90 minutes long. On boog[employer]. org there is a 27-minute discussion about the same topics - CBT and sound therapy. Sound therapy at [address] [place] [address] who was recommended by [facility]. [place] emergency dept. [email or web address]://MedWhat. [employer] is the stephan with lot of it is free to try. [employer] - Online Tinnitus Treatment - Start Today  Decrease caffeine intake. Avoid tobacco, high doses of aspirin or ibuprofen; these make tinnitus wors. Avoid loud sounds. These can make tinnitus worse. Use a radio set at night between stations at night. Use a sound generator at night (which you can buy on [employer]) or tinnitus phone applications. Settings that can be very good for tinnitus include shower sounds, rainfall, and ocean sounds, although you should try the other settings to see what works best for you. You can also download sound files from the internet and play them on your computer or smartphone. Effective sounds include white noise, pink noise, and grey noise. You can listen to these on youtube prior to purchasing them for your phone or computer. Use a fan in the bedroom at night (during summer). Tinnitus specific therapies include cognitive behavior therapy, tinnitus retraining therapy and neuromonics device. They are not covered by insurance. They help you ignore the sounds of the tinnitus, which then decreases the tinnitus sound further.  They require many months of attendance (12 to 18 months) to be effective. Cognitive behavior therapy Vernon Zaragoza [phone]) or Dereck Valentine ([phone])   Trial of transcendental meditation, acupuncture or yoga. ALLEY Vidal, [address] [place] [address] Audiology [phone]   [address] [place], [address] [facility] [id number]   Medications for treatment of anxiety and depression can significantly improve tinnitus for people with anxiety or depression. If you have these conditions and are under the care of a psychiatrist, you should discuss improved management of them with your psychiatrist.   Tinnitus worsens when you think about it, so try not to focus your thoughts on it or arrange your life around this sound. If you have hearing loss, use of hearing aids can significantly improve your tinnitus as well as improve your hearing. Acupuncture can be helpful in the management of your tinnitus. Transcendental Meditation can be helpful in the management of your tinnitus. Therapeutic massage can be helpful in the management of your tinnitus. Subjective: Flaccid quadriparesis with motor dysfunction, multiple sclerosis    HPI  Mrs. Mar Phillips has presented to [facility] multiple [address] [facility] for follow-up on multiple sclerosis related issues. Patient presented with her     Patient described progressive worsening of hypophonia and dysphagia. Patient has flaccid paralysis involving upper and lower extremities with no recent hospitalization and no recent infection been described. Patient was able to accommodate suprapubic catheter change once and he is no longer receiving services from home aids or nursing team despite patient stays home for most of the day on her own. Patient had no recent hospitalization, no urinary tract infection described. Patient see primary care physician every 6 months.      Patient presented with tetraplegia while wheelchair-bound with progressive worsening in her dysphagia and hypophonia has been noted. -Cystolitholapaxy w/  laser lithotripsy of bladder stones; SUPRAPUBIC CATHETER CHANGE  on 12/23/2022, patient is very excited and she is ready for procedure. Patient has been taking baclofen 20 mg up to 2 times a day, we extensively discussed side effects of medication including constipation considering patient has multiple sclerosis with gastric paresis or bowel dysfunction at the baseline. Patient should consider Colace or other means of stool softeners. No significant double vision described but patient agrees she has left eye ophthalmoplegia that is resolved visual dysfunction. The following portions of the patient's history were reviewed and updated as appropriate: She  has a past medical history of Anesthesia, Bladder stones, Chronic pain disorder, Contracture, right shoulder, Dependent on wheelchair, Edema, Elevated alkaline phosphatase level, Fernandez catheter in place, Gallbladder disease, History of femur fracture, History of UTI, MS (multiple sclerosis) ([address]), Muscle spasticity, Muscle weakness, Muscular dystrophy ([address]), Neck pain, Neurogenic bladder, Paralysis ([address]), Port-A-Cath in place, Pressure injury of skin, Sacral pressure sore, Swallowing difficulty, Tinnitus, and Urinary incontinence.   She   Patient Active Problem List    Diagnosis Date Noted    Tinnitus of both ears 11/21/2023    Hypophonia 11/21/2023    Peripheral edema 02/20/2023    Acute respiratory failure with hypoxia ([address]) 09/08/2022    Continuous opioid dependence ([address]) 09/08/2022    Increased endometrial stripe thickness 03/10/2021    Ureteral calculus, left 11/03/2020    Alkaline phosphatase elevation 10/28/2020    Abnormal thyroid function test 10/28/2020    Candidal vaginitis 10/19/2020    Hydronephrosis with urinary obstruction due to ureteral calculus 10/02/2020    Thrombocytopenia ([address]) 10/01/2020 Serum total bilirubin elevated 10/01/2020    Urinary tract infection associated with cystostomy catheter  09/30/2020    Medication management contract signed 02/21/2020    Screening mammogram, encounter for 05/29/2019    Health care maintenance 05/29/2019    Allergic rhinitis 03/26/2018    Functional quadriplegia secondary to MS [facility]) 01/25/2018    Suprapubic catheter ([address]) 11/17/2017    Nephrolithiasis 04/22/2016    Bladder calculus 09/02/2015    Muscle spasticity 04/21/2015    Vitamin B12 deficiency 01/16/2015    Constipation 01/14/2015    Hyperglycemia 11/25/2014    Familial hypercholesterolemia 11/25/2014    Recurrent major depressive disorder, in full remission ([address]) 01/22/2014    Lymphedema 01/22/2014    Spinal stenosis of cervical region 01/22/2014    Urinary incontinence 01/22/2014    Vitamin D deficiency 11/18/2013    Swallowing difficulty 11/04/2013    Dizziness 11/04/2013    Muscle weakness 11/04/2013    Neurogenic bladder 11/04/2013    Sleep disorder 11/04/2013    Tremor 11/04/2013    Vision loss 11/04/2013    Multiple sclerosis ([address]) 10/21/2013     She  has a past surgical history that includes Cholecystectomy; Kidney stone surgery; Portacath placement (Left); Esophagogastroduodenoscopy; Bladder stone removal (N/A, 12/4/2017); Bladder surgery; Suprapubic cystostomy (02/25/2014); Cystoscopy (06/15/2020); FL retrograde pyelogram (10/2/2020); pr cysto bladder w/ureteral catheterization (Left, 10/2/2020); pr cysto/uretero w/lithotripsy &indwell stent insrt (Left, 11/3/2020); FL retrograde pyelogram (11/3/2020); and pr litholapaxy smpl/sm <2.5 cm (N/A, 12/22/2022). Her family history includes Alzheimer's disease in her family; COPD in her father; Diabetes in her family and mother; Heart disease in her family; Hyperlipidemia in her mother and sister; Hypertension in her family, father, and mother. She  reports that she has never smoked.  She has never used smokeless tobacco. She reports that she does not currently use alcohol. She reports that she does not use drugs. Current Outpatient Medications   Medication Sig Dispense Refill    baclofen 20 mg tablet TAKE 1 TABLET BY MOUTH 5 TIMES AS NEEDED 150 tablet 8    DULoxetine (CYMBALTA) 20 mg capsule Take 1 capsule (20 mg total) by mouth daily 90 capsule 3    oxybutynin (DITROPAN-XL) 10 MG 24 hr tablet Take 1 tablet (10 mg total) by mouth daily 90 tablet 3    oxyCODONE-acetaminophen (PERCOCET) 5-325 mg per tablet Take 1 tablet by mouth every 4 (four) hours as needed for moderate pain Max Daily Amount: 6 tablets 40 tablet 0    rosuvastatin (CRESTOR) 5 mg tablet TAKE 1 TABLET (5 MG TOTAL) BY MOUTH DAILY. 90 tablet 4    clotrimazole (LOTRIMIN) 1 % cream Apply topically 2 (two) times a day as needed (yeast rash) (Patient not taking: Reported on 11/21/2023) 30 g 0    furosemide (LASIX) 20 mg tablet TAKE 1 TABLET BY MOUTH EVERY DAY (Patient not taking: Reported on 11/21/2023) 90 tablet 3     No current facility-administered medications for this visit. Current Outpatient Medications on File Prior to Visit   Medication Sig    baclofen 20 mg tablet TAKE 1 TABLET BY MOUTH 5 TIMES AS NEEDED    DULoxetine (CYMBALTA) 20 mg capsule Take 1 capsule (20 mg total) by mouth daily    oxybutynin (DITROPAN-XL) 10 MG 24 hr tablet Take 1 tablet (10 mg total) by mouth daily    oxyCODONE-acetaminophen (PERCOCET) 5-325 mg per tablet Take 1 tablet by mouth every 4 (four) hours as needed for moderate pain Max Daily Amount: 6 tablets    rosuvastatin (CRESTOR) 5 mg tablet TAKE 1 TABLET (5 MG TOTAL) BY MOUTH DAILY. clotrimazole (LOTRIMIN) 1 % cream Apply topically 2 (two) times a day as needed (yeast rash) (Patient not taking: Reported on 11/21/2023)    furosemide (LASIX) 20 mg tablet TAKE 1 TABLET BY MOUTH EVERY DAY (Patient not taking: Reported on 11/21/2023)    [DISCONTINUED] OXcarbazepine (TRILEPTAL) 150 mg tablet TAKE 2 TABLETS BY MOUTH 2 TIMES A DAY.  (Patient not taking: Reported on 11/21/2023)     No current facility-administered medications on file prior to visit. She is allergic to latex. .         Objective:    Blood pressure 122/62, pulse 102, temperature 97.5 °F (36.4 °C), temperature source Temporal, SpO2 98 %. Physical Exam    Neurological Exam    CONSTITUTIONAL: NAD, pleasant. NECK: supple, no lymphadenopathy, no thyromegaly, no JVD. CARDIOVASCULAR: RRR, normal S1S2, no murmurs, no rubs. RESP: clear to auscultation bilaterally, no wheezes/rhonchi/rales. ABDOMEN: soft, non tender, non distended. SKIN: no rash or skin lesions. EXTREMITIES: no edema, pulses 2+bilaterally. PSYCH: appropriate mood and affect  NEUROLOGIC COMPREHENSIVE EXAM: Patient is oriented to person, place and time, NAD; appropriate affect. CN II, III, IV, V, VI, VII,VIII,IX,X,XI-XII intact with EOMI, PERRLA, OKN intact, VF grossly intact, fundi poorly visualized secondary to pupillary constriction; symmetric face noted. Motor: 5/5 UE/LE bilateral symmetric; Sensory: intact to light touch and pinprick bilaterally; normal vibration sensation feet bilaterally; Coordination within normal limits on FTN and YVONNE testing; DTR: 2/4 through, no Babinski, no clonus. Tandem gait is intact. Romberg: absent. ROS:    Review of Systems   Constitutional:  Negative for appetite change, fatigue and fever. HENT: Negative. Negative for hearing loss, tinnitus, trouble swallowing and voice change. Eyes:  Positive for photophobia, pain and visual disturbance. Respiratory: Negative. Negative for shortness of breath. Cardiovascular: Negative. Negative for palpitations. Gastrointestinal: Negative. Negative for nausea and vomiting. Endocrine: Positive for cold intolerance and heat intolerance. Genitourinary: Negative. Negative for dysuria, frequency and urgency. Musculoskeletal:  Positive for back pain, gait problem and neck pain. Negative for myalgias. Skin: Negative. Negative for rash.    Allergic/Immunologic: Negative. Neurological:  Positive for tremors and weakness. Negative for dizziness, seizures, syncope, facial asymmetry, speech difficulty, light-headedness, numbness and headaches. Hematological:  Bruises/bleeds easily. Psychiatric/Behavioral: Negative. Negative for confusion, hallucinations and sleep disturbance. All other systems reviewed and are negative.

## 2023-11-21 NOTE — TELEPHONE ENCOUNTER
Please offer resources for home aid and related questions as patient is not capable to perform any ADLs due to quadriplegia

## 2023-11-22 ENCOUNTER — HOME HEALTH ADMISSION (OUTPATIENT)
Dept: HOME HEALTH SERVICES | Facility: HOME HEALTHCARE | Age: 53
End: 2023-11-22

## 2023-11-22 NOTE — TELEPHONE ENCOUNTER
MSW phoned Kady Johnston (spouse) and lvm requesting a call back . MSW to discuss :   Help at home    potential services available from a Waiver - in home aide assistance, home modifications, transportation, etc. patient will need to qualify based on their level of physical assistance required and their finances (must meet Medical Assistance guidelines - income must be less than $2385 per month and assets less than $8000 which excludes one house and one car). application process can take up to 3 months. If interested in applying to call [place] [facility] patient will have two phone assessments to determine eligibility, will need to complete an [place] application, and will need to provide proof of finances in order for her eligibility to be determined. [facility] Agency on aging   Options program and Caregiver support program  -Funded primarily through the [address] [facility], the [address] [employer] ([address] [facility]) serves individuals who are either financially or clinically ineligible for Medical Assistance (MA) Long-Term Services and Supports. OPTIONS services are provided to eligible consumers aged 60+ to assist them in maintaining independence at the highest level of functioning in the community and help delay the need for more costly care/services    Private pay home care   -home care services can range between $25 to $30 an hour    Placement   Care [place] [address] [facility]  [phone]   recommend appropriate levels of care such as; Independent Living, Assisted Living, Memory Care or [facility].

## 2023-11-23 ENCOUNTER — HOME CARE VISIT (OUTPATIENT)
Dept: HOME HEALTH SERVICES | Facility: HOME HEALTHCARE | Age: 53
End: 2023-11-23

## 2023-11-24 NOTE — TELEPHONE ENCOUNTER
BRAD [facility] to home number at [phone] requesting a call back to discuss information about community resources.

## 2023-11-25 DIAGNOSIS — G35 MULTIPLE SCLEROSIS (HCC): Chronic | ICD-10-CM

## 2023-11-25 RX ORDER — BACLOFEN 20 MG/1
TABLET ORAL
Qty: 450 TABLET | Refills: 3 | Status: SHIPPED | OUTPATIENT
Start: 2023-11-25

## 2023-11-28 ENCOUNTER — HOME CARE VISIT (OUTPATIENT)
Dept: HOME HEALTH SERVICES | Facility: HOME HEALTHCARE | Age: 53
End: 2023-11-28

## 2023-11-30 DIAGNOSIS — R53.2 FUNCTIONAL QUADRIPLEGIA SECONDARY TO MS (HCC): Chronic | ICD-10-CM

## 2023-11-30 DIAGNOSIS — Z93.59 SUPRAPUBIC CATHETER (HCC): Chronic | ICD-10-CM

## 2023-11-30 DIAGNOSIS — E55.9 VITAMIN D DEFICIENCY: ICD-10-CM

## 2023-11-30 DIAGNOSIS — D69.6 THROMBOCYTOPENIA (HCC): ICD-10-CM

## 2023-11-30 DIAGNOSIS — R94.6 ABNORMAL THYROID FUNCTION TEST: ICD-10-CM

## 2023-11-30 DIAGNOSIS — E53.8 VITAMIN B12 DEFICIENCY: ICD-10-CM

## 2023-11-30 DIAGNOSIS — T83.510S URINARY TRACT INFECTION ASSOCIATED WITH CYSTOSTOMY CATHETER, SEQUELA: ICD-10-CM

## 2023-11-30 DIAGNOSIS — N21.0 BLADDER CALCULUS: ICD-10-CM

## 2023-11-30 DIAGNOSIS — R93.89 INCREASED ENDOMETRIAL STRIPE THICKNESS: ICD-10-CM

## 2023-11-30 DIAGNOSIS — R74.01 TRANSAMINITIS: ICD-10-CM

## 2023-11-30 DIAGNOSIS — Z01.818 PREOPERATIVE CLEARANCE: ICD-10-CM

## 2023-11-30 DIAGNOSIS — G35 FUNCTIONAL QUADRIPLEGIA SECONDARY TO MS (HCC): Chronic | ICD-10-CM

## 2023-11-30 DIAGNOSIS — N39.0 URINARY TRACT INFECTION ASSOCIATED WITH CYSTOSTOMY CATHETER, SEQUELA: ICD-10-CM

## 2023-11-30 DIAGNOSIS — K63.9 MURAL THICKENING OF SIGMOID COLON: ICD-10-CM

## 2023-11-30 DIAGNOSIS — D50.9 MICROCYTIC ANEMIA: ICD-10-CM

## 2023-11-30 DIAGNOSIS — E78.01 FAMILIAL HYPERCHOLESTEROLEMIA: ICD-10-CM

## 2023-11-30 DIAGNOSIS — G82.20 PARAPLEGIA (HCC): ICD-10-CM

## 2023-11-30 DIAGNOSIS — G35 MULTIPLE SCLEROSIS (HCC): Chronic | ICD-10-CM

## 2023-11-30 DIAGNOSIS — R73.9 HYPERGLYCEMIA: ICD-10-CM

## 2023-11-30 RX ORDER — ROSUVASTATIN CALCIUM 5 MG/1
5 TABLET, COATED ORAL DAILY
Qty: 30 TABLET | Refills: 0 | Status: SHIPPED | OUTPATIENT
Start: 2023-11-30

## 2023-12-04 NOTE — TELEPHONE ENCOUNTER
BRAD [facility] to home number at [phone] requesting a call back to discuss information about community resources.

## 2024-01-01 DIAGNOSIS — R94.6 ABNORMAL THYROID FUNCTION TEST: ICD-10-CM

## 2024-01-01 DIAGNOSIS — R74.01 TRANSAMINITIS: ICD-10-CM

## 2024-01-01 DIAGNOSIS — G82.20 PARAPLEGIA (HCC): ICD-10-CM

## 2024-01-01 DIAGNOSIS — T83.510S URINARY TRACT INFECTION ASSOCIATED WITH CYSTOSTOMY CATHETER, SEQUELA: ICD-10-CM

## 2024-01-01 DIAGNOSIS — G35 FUNCTIONAL QUADRIPLEGIA SECONDARY TO MS (HCC): Chronic | ICD-10-CM

## 2024-01-01 DIAGNOSIS — D50.9 MICROCYTIC ANEMIA: ICD-10-CM

## 2024-01-01 DIAGNOSIS — R73.9 HYPERGLYCEMIA: ICD-10-CM

## 2024-01-01 DIAGNOSIS — Z93.59 SUPRAPUBIC CATHETER (HCC): Chronic | ICD-10-CM

## 2024-01-01 DIAGNOSIS — N21.0 BLADDER CALCULUS: ICD-10-CM

## 2024-01-01 DIAGNOSIS — D69.6 THROMBOCYTOPENIA (HCC): ICD-10-CM

## 2024-01-01 DIAGNOSIS — Z01.818 PREOPERATIVE CLEARANCE: ICD-10-CM

## 2024-01-01 DIAGNOSIS — R53.2 FUNCTIONAL QUADRIPLEGIA SECONDARY TO MS (HCC): Chronic | ICD-10-CM

## 2024-01-01 DIAGNOSIS — E78.01 FAMILIAL HYPERCHOLESTEROLEMIA: ICD-10-CM

## 2024-01-01 DIAGNOSIS — R93.89 INCREASED ENDOMETRIAL STRIPE THICKNESS: ICD-10-CM

## 2024-01-01 DIAGNOSIS — K63.9 MURAL THICKENING OF SIGMOID COLON: ICD-10-CM

## 2024-01-01 DIAGNOSIS — N39.0 URINARY TRACT INFECTION ASSOCIATED WITH CYSTOSTOMY CATHETER, SEQUELA: ICD-10-CM

## 2024-01-01 DIAGNOSIS — E53.8 VITAMIN B12 DEFICIENCY: ICD-10-CM

## 2024-01-01 DIAGNOSIS — G35 MULTIPLE SCLEROSIS (HCC): Chronic | ICD-10-CM

## 2024-01-01 DIAGNOSIS — E55.9 VITAMIN D DEFICIENCY: ICD-10-CM

## 2024-01-03 RX ORDER — ROSUVASTATIN CALCIUM 5 MG/1
5 TABLET, COATED ORAL DAILY
Qty: 90 TABLET | Refills: 1 | Status: ON HOLD | OUTPATIENT
Start: 2024-01-03

## 2024-01-05 ENCOUNTER — APPOINTMENT (INPATIENT)
Dept: RADIOLOGY | Facility: HOSPITAL | Age: 54
DRG: 177 | End: 2024-01-05
Payer: MEDICARE

## 2024-01-05 ENCOUNTER — HOSPITAL ENCOUNTER (INPATIENT)
Facility: HOSPITAL | Age: 54
LOS: 10 days | Discharge: HOME | DRG: 177 | End: 2024-01-15
Attending: EMERGENCY MEDICINE | Admitting: FAMILY MEDICINE
Payer: MEDICARE

## 2024-01-05 ENCOUNTER — APPOINTMENT (EMERGENCY)
Dept: RADIOLOGY | Facility: HOSPITAL | Age: 54
DRG: 177 | End: 2024-01-05
Payer: MEDICARE

## 2024-01-05 DIAGNOSIS — N39.0 COMPLICATED UTI (URINARY TRACT INFECTION): ICD-10-CM

## 2024-01-05 DIAGNOSIS — J98.8 CONGESTION OF UPPER AIRWAY: ICD-10-CM

## 2024-01-05 DIAGNOSIS — J96.91 HYPOXIC RESPIRATORY FAILURE (HCC): ICD-10-CM

## 2024-01-05 DIAGNOSIS — U07.1 COVID-19 VIRUS INFECTION: ICD-10-CM

## 2024-01-05 DIAGNOSIS — J06.9 VIRAL UPPER RESPIRATORY TRACT INFECTION: Primary | ICD-10-CM

## 2024-01-05 DIAGNOSIS — R60.0 LOWER EXTREMITY EDEMA: ICD-10-CM

## 2024-01-05 DIAGNOSIS — G35 MULTIPLE SCLEROSIS (HCC): ICD-10-CM

## 2024-01-05 DIAGNOSIS — G82.50 QUADRIPLEGIA (HCC): ICD-10-CM

## 2024-01-05 DIAGNOSIS — T17.908A ASPIRATION INTO AIRWAY: ICD-10-CM

## 2024-01-05 DIAGNOSIS — U07.1 COVID-19: ICD-10-CM

## 2024-01-05 DIAGNOSIS — M48.02 SPINAL STENOSIS OF CERVICAL REGION: ICD-10-CM

## 2024-01-05 LAB
ALBUMIN SERPL BCP-MCNC: 3.9 G/DL (ref 3.5–5)
ALP SERPL-CCNC: 68 U/L (ref 34–104)
ALT SERPL W P-5'-P-CCNC: 16 U/L (ref 7–52)
ANION GAP SERPL CALCULATED.3IONS-SCNC: 10 MMOL/L
AST SERPL W P-5'-P-CCNC: 21 U/L (ref 13–39)
BACTERIA UR QL AUTO: ABNORMAL /HPF
BASOPHILS # BLD AUTO: 0.02 THOUSANDS/ÂΜL (ref 0–0.1)
BASOPHILS NFR BLD AUTO: 0 % (ref 0–1)
BILIRUB SERPL-MCNC: 0.52 MG/DL (ref 0.2–1)
BILIRUB UR QL STRIP: NEGATIVE
BUN SERPL-MCNC: 8 MG/DL (ref 5–25)
CALCIUM SERPL-MCNC: 8.9 MG/DL (ref 8.4–10.2)
CHLORIDE SERPL-SCNC: 97 MMOL/L (ref 96–108)
CLARITY UR: ABNORMAL
CO2 SERPL-SCNC: 26 MMOL/L (ref 21–32)
COLOR UR: YELLOW
CREAT SERPL-MCNC: 0.32 MG/DL (ref 0.6–1.3)
EOSINOPHIL # BLD AUTO: 0.01 THOUSAND/ÂΜL (ref 0–0.61)
EOSINOPHIL NFR BLD AUTO: 0 % (ref 0–6)
ERYTHROCYTE [DISTWIDTH] IN BLOOD BY AUTOMATED COUNT: 13 % (ref 11.6–15.1)
FLUAV RNA RESP QL NAA+PROBE: NEGATIVE
FLUBV RNA RESP QL NAA+PROBE: NEGATIVE
GFR SERPL CREATININE-BSD FRML MDRD: 128 ML/MIN/1.73SQ M
GLUCOSE SERPL-MCNC: 97 MG/DL (ref 65–140)
GLUCOSE UR STRIP-MCNC: NEGATIVE MG/DL
HCT VFR BLD AUTO: 41.1 % (ref 34.8–46.1)
HGB BLD-MCNC: 13.3 G/DL (ref 11.5–15.4)
HGB UR QL STRIP.AUTO: ABNORMAL
HYALINE CASTS #/AREA URNS LPF: ABNORMAL /LPF
IMM GRANULOCYTES # BLD AUTO: 0.03 THOUSAND/UL (ref 0–0.2)
IMM GRANULOCYTES NFR BLD AUTO: 0 % (ref 0–2)
KETONES UR STRIP-MCNC: ABNORMAL MG/DL
LEUKOCYTE ESTERASE UR QL STRIP: ABNORMAL
LYMPHOCYTES # BLD AUTO: 0.86 THOUSANDS/ÂΜL (ref 0.6–4.47)
LYMPHOCYTES NFR BLD AUTO: 10 % (ref 14–44)
MCH RBC QN AUTO: 27.9 PG (ref 26.8–34.3)
MCHC RBC AUTO-ENTMCNC: 32.4 G/DL (ref 31.4–37.4)
MCV RBC AUTO: 86 FL (ref 82–98)
MONOCYTES # BLD AUTO: 0.43 THOUSAND/ÂΜL (ref 0.17–1.22)
MONOCYTES NFR BLD AUTO: 5 % (ref 4–12)
MUCOUS THREADS UR QL AUTO: ABNORMAL
NEUTROPHILS # BLD AUTO: 7.57 THOUSANDS/ÂΜL (ref 1.85–7.62)
NEUTS SEG NFR BLD AUTO: 85 % (ref 43–75)
NITRITE UR QL STRIP: POSITIVE
NON-SQ EPI CELLS URNS QL MICRO: ABNORMAL /HPF
NRBC BLD AUTO-RTO: 0 /100 WBCS
PH UR STRIP.AUTO: 6 [PH]
PLATELET # BLD AUTO: 195 THOUSANDS/UL (ref 149–390)
PMV BLD AUTO: 9.1 FL (ref 8.9–12.7)
POTASSIUM SERPL-SCNC: 3.6 MMOL/L (ref 3.5–5.3)
PROCALCITONIN SERPL-MCNC: 0.06 NG/ML
PROT SERPL-MCNC: 7 G/DL (ref 6.4–8.4)
PROT UR STRIP-MCNC: ABNORMAL MG/DL
RBC # BLD AUTO: 4.77 MILLION/UL (ref 3.81–5.12)
RBC #/AREA URNS AUTO: ABNORMAL /HPF
RSV RNA RESP QL NAA+PROBE: NEGATIVE
SARS-COV-2 RNA RESP QL NAA+PROBE: POSITIVE
SODIUM SERPL-SCNC: 133 MMOL/L (ref 135–147)
SP GR UR STRIP.AUTO: 1.02 (ref 1–1.03)
UROBILINOGEN UR STRIP-ACNC: 2 MG/DL
WBC # BLD AUTO: 8.92 THOUSAND/UL (ref 4.31–10.16)
WBC #/AREA URNS AUTO: ABNORMAL /HPF
WBC CLUMPS # UR AUTO: PRESENT /UL

## 2024-01-05 PROCEDURE — 85025 COMPLETE CBC W/AUTO DIFF WBC: CPT

## 2024-01-05 PROCEDURE — 71045 X-RAY EXAM CHEST 1 VIEW: CPT

## 2024-01-05 PROCEDURE — 0241U HB NFCT DS VIR RESP RNA 4 TRGT: CPT | Performed by: FAMILY MEDICINE

## 2024-01-05 PROCEDURE — 81001 URINALYSIS AUTO W/SCOPE: CPT

## 2024-01-05 PROCEDURE — 99223 1ST HOSP IP/OBS HIGH 75: CPT | Performed by: FAMILY MEDICINE

## 2024-01-05 PROCEDURE — 87147 CULTURE TYPE IMMUNOLOGIC: CPT

## 2024-01-05 PROCEDURE — 87077 CULTURE AEROBIC IDENTIFY: CPT

## 2024-01-05 PROCEDURE — 99285 EMERGENCY DEPT VISIT HI MDM: CPT

## 2024-01-05 PROCEDURE — 80053 COMPREHEN METABOLIC PANEL: CPT

## 2024-01-05 PROCEDURE — 36415 COLL VENOUS BLD VENIPUNCTURE: CPT

## 2024-01-05 PROCEDURE — 87040 BLOOD CULTURE FOR BACTERIA: CPT

## 2024-01-05 PROCEDURE — 87086 URINE CULTURE/COLONY COUNT: CPT

## 2024-01-05 PROCEDURE — 87186 SC STD MICRODIL/AGAR DIL: CPT

## 2024-01-05 PROCEDURE — 96360 HYDRATION IV INFUSION INIT: CPT

## 2024-01-05 PROCEDURE — 84145 PROCALCITONIN (PCT): CPT

## 2024-01-05 PROCEDURE — 74022 RADEX COMPL AQT ABD SERIES: CPT

## 2024-01-05 PROCEDURE — 92610 EVALUATE SWALLOWING FUNCTION: CPT

## 2024-01-05 PROCEDURE — 99285 EMERGENCY DEPT VISIT HI MDM: CPT | Performed by: EMERGENCY MEDICINE

## 2024-01-05 RX ORDER — PRAVASTATIN SODIUM 40 MG
40 TABLET ORAL
Status: DISCONTINUED | OUTPATIENT
Start: 2024-01-06 | End: 2024-01-15 | Stop reason: HOSPADM

## 2024-01-05 RX ORDER — BACLOFEN 20 MG/1
20 TABLET ORAL 4 TIMES DAILY PRN
Status: DISCONTINUED | OUTPATIENT
Start: 2024-01-05 | End: 2024-01-08

## 2024-01-05 RX ORDER — ACETAMINOPHEN 650 MG/1
650 SUPPOSITORY RECTAL EVERY 6 HOURS PRN
Status: DISCONTINUED | OUTPATIENT
Start: 2024-01-05 | End: 2024-01-06

## 2024-01-05 RX ORDER — DIAZEPAM 5 MG/ML
5 INJECTION, SOLUTION INTRAMUSCULAR; INTRAVENOUS EVERY 6 HOURS PRN
Status: DISCONTINUED | OUTPATIENT
Start: 2024-01-06 | End: 2024-01-15 | Stop reason: HOSPADM

## 2024-01-05 RX ORDER — OXYCODONE HYDROCHLORIDE 5 MG/1
5 TABLET ORAL EVERY 6 HOURS PRN
Status: DISCONTINUED | OUTPATIENT
Start: 2024-01-05 | End: 2024-01-05

## 2024-01-05 RX ORDER — DOCUSATE SODIUM 100 MG/1
100 CAPSULE, LIQUID FILLED ORAL 2 TIMES DAILY
Status: DISCONTINUED | OUTPATIENT
Start: 2024-01-05 | End: 2024-01-05

## 2024-01-05 RX ORDER — BISACODYL 10 MG
10 SUPPOSITORY, RECTAL RECTAL DAILY PRN
Status: DISCONTINUED | OUTPATIENT
Start: 2024-01-05 | End: 2024-01-08

## 2024-01-05 RX ORDER — POLYETHYLENE GLYCOL 3350 17 G/17G
17 POWDER, FOR SOLUTION ORAL DAILY PRN
Status: DISCONTINUED | OUTPATIENT
Start: 2024-01-05 | End: 2024-01-15 | Stop reason: HOSPADM

## 2024-01-05 RX ORDER — OXYBUTYNIN CHLORIDE 10 MG/1
10 TABLET, EXTENDED RELEASE ORAL DAILY
Status: DISCONTINUED | OUTPATIENT
Start: 2024-01-05 | End: 2024-01-15 | Stop reason: HOSPADM

## 2024-01-05 RX ORDER — DIAZEPAM 5 MG/ML
2.5 INJECTION, SOLUTION INTRAMUSCULAR; INTRAVENOUS EVERY 6 HOURS PRN
Status: DISCONTINUED | OUTPATIENT
Start: 2024-01-05 | End: 2024-01-05

## 2024-01-05 RX ORDER — HYDROMORPHONE HCL IN WATER/PF 6 MG/30 ML
0.2 PATIENT CONTROLLED ANALGESIA SYRINGE INTRAVENOUS EVERY 4 HOURS PRN
Status: DISCONTINUED | OUTPATIENT
Start: 2024-01-05 | End: 2024-01-07

## 2024-01-05 RX ORDER — POLYETHYLENE GLYCOL 3350 17 G/17G
17 POWDER, FOR SOLUTION ORAL DAILY
Status: DISCONTINUED | OUTPATIENT
Start: 2024-01-05 | End: 2024-01-05

## 2024-01-05 RX ORDER — ENOXAPARIN SODIUM 100 MG/ML
40 INJECTION SUBCUTANEOUS DAILY
Status: DISCONTINUED | OUTPATIENT
Start: 2024-01-05 | End: 2024-01-06

## 2024-01-05 RX ORDER — SODIUM CHLORIDE, SODIUM GLUCONATE, SODIUM ACETATE, POTASSIUM CHLORIDE, MAGNESIUM CHLORIDE, SODIUM PHOSPHATE, DIBASIC, AND POTASSIUM PHOSPHATE .53; .5; .37; .037; .03; .012; .00082 G/100ML; G/100ML; G/100ML; G/100ML; G/100ML; G/100ML; G/100ML
50 INJECTION, SOLUTION INTRAVENOUS CONTINUOUS
Status: DISCONTINUED | OUTPATIENT
Start: 2024-01-05 | End: 2024-01-06

## 2024-01-05 RX ORDER — ACETAMINOPHEN 325 MG/1
650 TABLET ORAL EVERY 6 HOURS PRN
Status: DISCONTINUED | OUTPATIENT
Start: 2024-01-05 | End: 2024-01-05

## 2024-01-05 RX ORDER — DIAZEPAM 5 MG/ML
2.5 INJECTION, SOLUTION INTRAMUSCULAR; INTRAVENOUS ONCE
Status: COMPLETED | OUTPATIENT
Start: 2024-01-05 | End: 2024-01-05

## 2024-01-05 RX ORDER — DULOXETIN HYDROCHLORIDE 20 MG/1
20 CAPSULE, DELAYED RELEASE ORAL DAILY
Status: DISCONTINUED | OUTPATIENT
Start: 2024-01-05 | End: 2024-01-15 | Stop reason: HOSPADM

## 2024-01-05 RX ORDER — ONDANSETRON 2 MG/ML
4 INJECTION INTRAMUSCULAR; INTRAVENOUS EVERY 6 HOURS PRN
Status: DISCONTINUED | OUTPATIENT
Start: 2024-01-05 | End: 2024-01-15 | Stop reason: HOSPADM

## 2024-01-05 RX ADMIN — ENOXAPARIN SODIUM 40 MG: 40 INJECTION SUBCUTANEOUS at 14:20

## 2024-01-05 RX ADMIN — DIAZEPAM 2.5 MG: 10 INJECTION, SOLUTION INTRAMUSCULAR; INTRAVENOUS at 21:09

## 2024-01-05 RX ADMIN — SODIUM CHLORIDE, SODIUM GLUCONATE, SODIUM ACETATE, POTASSIUM CHLORIDE, MAGNESIUM CHLORIDE, SODIUM PHOSPHATE, DIBASIC, AND POTASSIUM PHOSPHATE 75 ML/HR: .53; .5; .37; .037; .03; .012; .00082 INJECTION, SOLUTION INTRAVENOUS at 14:20

## 2024-01-05 RX ADMIN — SODIUM CHLORIDE 1000 ML: 0.9 INJECTION, SOLUTION INTRAVENOUS at 12:16

## 2024-01-05 RX ADMIN — SODIUM CHLORIDE, SODIUM GLUCONATE, SODIUM ACETATE, POTASSIUM CHLORIDE, MAGNESIUM CHLORIDE, SODIUM PHOSPHATE, DIBASIC, AND POTASSIUM PHOSPHATE 75 ML/HR: .53; .5; .37; .037; .03; .012; .00082 INJECTION, SOLUTION INTRAVENOUS at 21:49

## 2024-01-05 RX ADMIN — CEFTRIAXONE SODIUM 1000 MG: 10 INJECTION, POWDER, FOR SOLUTION INTRAVENOUS at 14:20

## 2024-01-05 RX ADMIN — ONDANSETRON 4 MG: 2 INJECTION INTRAMUSCULAR; INTRAVENOUS at 23:48

## 2024-01-05 RX ADMIN — DIAZEPAM 2.5 MG: 10 INJECTION, SOLUTION INTRAMUSCULAR; INTRAVENOUS at 23:33

## 2024-01-05 NOTE — ASSESSMENT & PLAN NOTE
53-year-old female with PMH of multiple sclerosis, progressive quadriplegia, wheelchair-bound, neurogenic bladder with SPC, dysphagia, presented to ED with complaint of worsening nasal congestion, drainage and inability to cough, leading to difficulty with breathing x 3 days.  Family member reports patient has been sick with viral URI, contracted from family members patient was hypoxic on presentation, however currently responding well to 3 L oxygen via nasal cannula.  No leukocytosis on labs  Chest x-ray negative for pneumonia or volume overload.  Obtain COVID/flu/RSV  Frequent suctioning, Respiratory protocol  Chest PT  Currently being treated with Rocephin for UTI

## 2024-01-05 NOTE — RESPIRATORY THERAPY NOTE
"RT Protocol Note  Fanny Schulz 53 y.o. female MRN: [id number]  Unit/Bed#: ED 29 Encounter: [id number]    Assessment    Principal Problem:    Acute respiratory failure with hypoxia (HCC)  Active Problems:    Suprapubic catheter (HCC)    Constipation    Recurrent major depressive disorder, in full remission (HCC)    Swallowing difficulty    Functional quadriplegia secondary to MS (HCC)    Continuous opioid dependence ([facility])      Home Pulmonary Medications:         Past Medical History:   Diagnosis Date    Anesthesia     \"prefers if able to be put to sleep on stretcher before placed on table if possible due to severe spasticity    Bladder stones     Chronic pain disorder     neck    Contracture, right shoulder     right arm and hand    Dependent on wheelchair     motorized    Edema     dependant lower extremities    Elevated alkaline phosphatase level     Mildly elevated total, normal isoenzymes 4/15/15, normal 1/17; last assessed: 24Nov2015    Fernandez catheter in place     #24 to large bag(silicone catheter)    Gallbladder disease     History of femur fracture     right leg    History of UTI     MS (multiple sclerosis) ([facility])     Muscle spasticity     especially with touch    Muscle weakness     Muscular dystrophy ([facility])     Neck pain     Neurogenic bladder     Paralysis ([facility])     bilateral lower extremities    Port-A-Cath in place     left chest,\" hasn't used in approx 1 yr or so\"    Pressure injury of skin     right ischium    Sacral pressure sore     \"shearing, tegaderm in place,\"    Swallowing difficulty     Tinnitus     Urinary incontinence      Social History     Socioeconomic History    Marital status: /Civil Union     Spouse name: None    Number of children: None    Years of education: None    Highest education level: None   Occupational History    None   Tobacco Use    Smoking status: Never    Smokeless tobacco: Never   Vaping Use    Vaping status: Never Used   Substance and Sexual Activity    Alcohol " use: Not Currently    Drug use: No    Sexual activity: Yes   Other Topics Concern    None   Social History Narrative    Caffeine use    Currently on disability    Daily coffee consumption (2 cups/day)    Educational level- completed 2nd year college    Lives with adult children    Not currently employed    Wheelchair dependent      Social Determinants of Health     Financial Resource Strain: Low Risk  (2/20/2023)    Overall Financial Resource Strain (CARDIA)     Difficulty of Paying Living Expenses: Not hard at all   Food Insecurity: Not on file   Transportation Needs: No Transportation Needs (2/20/2023)    PRAPARE - Transportation     Lack of Transportation (Medical): No     Lack of Transportation (Non-Medical): No   Physical Activity: Not on file   Stress: Not on file   Social Connections: Not on file   Intimate Partner Violence: Not on file   Housing Stability: Not on file       Subjective         Objective    Physical Exam:   Assessment Type: Assess only  General Appearance: Alert, Awake  Chest Assessment: (P) Chest expansion symmetrical  Bilateral Breath Sounds: (P) Diminished, Clear  Suction: (P) Nasal    Vitals:  Blood pressure 145/73, pulse 100, temperature 98.4 °F (36.9 °C), temperature source Oral, resp. rate 20, SpO2 95%.          Imaging and other studies: I have personally reviewed pertinent reports.            Plan    Respiratory Plan: Mild Distress pathway        Resp Comments: (P) Pt has hx MS, quadriplegia. Pt had some secretions to be removed by NT suctioning. Done by Matt, no other rx needed at this time. Pt positive for COVID. Will keep protocol open for airway clearance. No other rx needed at this time. Xray clear

## 2024-01-05 NOTE — ED PROVIDER NOTES
"History  Chief Complaint   Patient presents with    Cold Like Symptoms     Patient with progressive MS. Unable to move her extremities and she does not have the ability to cough. She caught a cold and has been unable to cough the mucus out      Patient is a 53-year-old female with a past medical history of multiple sclerosis, quadriplegia presenting with increased congestion, likely due to URI, with sick contacts.  Patient is brought in by family members secondary to inability to tolerate her congestion.  Patient is unable to cough secondary to her multiple sclerosis is unable to clear her congestion.  Patient presented mildly tachypneic, tachycardic, satting in the low 90s        Prior to Admission Medications   Prescriptions Last Dose Informant Patient Reported? Taking?   DULoxetine (CYMBALTA) 20 mg capsule  Self No No   Sig: Take 1 capsule (20 mg total) by mouth daily   baclofen 20 mg tablet   No No   Sig: TAKE 1 TABLET BY MOUTH 5 TIMES AS NEEDED   clotrimazole (LOTRIMIN) 1 % cream  Self No No   Sig: Apply topically 2 (two) times a day as needed (yeast rash)   Patient not taking: Reported on 11/21/2023   furosemide (LASIX) 20 mg tablet  Self No No   Sig: TAKE 1 TABLET BY MOUTH EVERY DAY   Patient not taking: Reported on 11/21/2023   oxyCODONE-acetaminophen (PERCOCET) 5-325 mg per tablet  Self No No   Sig: Take 1 tablet by mouth every 4 (four) hours as needed for moderate pain Max Daily Amount: 6 tablets   oxybutynin (DITROPAN-XL) 10 MG 24 hr tablet  Self No No   Sig: Take 1 tablet (10 mg total) by mouth daily   rosuvastatin (CRESTOR) 5 mg tablet   No No   Sig: TAKE 1 TABLET (5 MG TOTAL) BY MOUTH DAILY.      Facility-Administered Medications: None       Past Medical History:   Diagnosis Date    Anesthesia     \"prefers if able to be put to sleep on stretcher before placed on table if possible due to severe spasticity    Bladder stones     Chronic pain disorder     neck    Contracture, right shoulder     right arm " "and hand    Dependent on wheelchair     motorized    Edema     dependant lower extremities    Elevated alkaline phosphatase level     Mildly elevated total, normal isoenzymes 4/15/15, normal 1/17; last assessed: 24Nov2015    Fernandez catheter in place     #24 to large bag(silicone catheter)    Gallbladder disease     History of femur fracture     right leg    History of UTI     MS (multiple sclerosis) ([facility])     Muscle spasticity     especially with touch    Muscle weakness     Muscular dystrophy ([facility])     Neck pain     Neurogenic bladder     Paralysis ([facility])     bilateral lower extremities    Port-A-Cath in place     left chest,\" hasn't used in approx 1 yr or so\"    Pressure injury of skin     right ischium    Sacral pressure sore     \"shearing, tegaderm in place,\"    Swallowing difficulty     Tinnitus     Urinary incontinence        Past Surgical History:   Procedure Laterality Date    BLADDER STONE REMOVAL N/A 12/4/2017    Procedure: CYSTO, LITHOLOPAXY HOLMIUM LASER;  Surgeon: Damir Osborne MD;  Location: [facility];  Service: Urology    BLADDER SURGERY      CHOLECYSTECTOMY      CYSTOSCOPY  06/15/2020    ESOPHAGOGASTRODUODENOSCOPY      FL RETROGRADE PYELOGRAM  10/2/2020    FL RETROGRADE PYELOGRAM  11/3/2020    KIDNEY STONE SURGERY      PORTACATH PLACEMENT Left     SC CYSTO BLADDER W/URETERAL CATHETERIZATION Left 10/2/2020    Procedure: CYSTOSCOPY LEFT RETROGRADE ; LEFT URETEROSCOPY; PYELOGRAM WITH STENT INSERTION AND SUPERPUBIC EXCHANGE;  Surgeon: Philip Mcdonald MD;  Location: [facility];  Service: Urology    SC CYSTO/URETERO W/LITHOTRIPSY &INDWELL STENT INSRT Left 11/3/2020    Procedure: CYSTOSCOPY URETEROSCOPY WITH RETROGRADE PYELOGRAM AND EXCHANGE STENT URETERAL, SP TUBE EXCHANGE, BASKET STONE EXTRACTION, BLADDER STONE EXTRACTION;  Surgeon: Damir Osborne MD;  Location: [facility];  Service: Urology    SC LITHOLAPAXY SMPL/SM <2.5 CM N/A 12/22/2022    Procedure: Cystolitholapaxy w/  laser lithotripsy of " bladder stones; SUPRAPUBIC CATHETER CHANGE;  Surgeon: Damir Osborne MD;  Location: [facility];  Service: Urology    SUPRAPUBIC CYSTOSTOMY  02/25/2014    last assessed: [id number]       Family History   Problem Relation Age of Onset    Diabetes Mother     Hyperlipidemia Mother     Hypertension Mother     COPD Father     Hypertension Father     Hyperlipidemia Sister     Alzheimer's disease Family     Diabetes Family     Hypertension Family     Heart disease Family      I have reviewed and agree with the history as documented.    E-Cigarette/Vaping    E-Cigarette Use Never User      E-Cigarette/Vaping Substances    Nicotine No     THC No     CBD No     Flavoring No     Other No     Unknown No      Social History     Tobacco Use    Smoking status: Never    Smokeless tobacco: Never   Vaping Use    Vaping status: Never Used   Substance Use Topics    Alcohol use: Not Currently    Drug use: No        Review of Systems   All other systems reviewed and are negative.      Physical Exam  ED Triage Vitals   Temperature Pulse Respirations Blood Pressure SpO2   01/05/24 1115 01/05/24 1115 01/05/24 1115 01/05/24 1115 01/05/24 1115   99.3 °F (37.4 °C) 90 20 129/77 92 %      Temp Source Heart Rate Source Patient Position - Orthostatic VS BP Location FiO2 (%)   01/05/24 1115 01/05/24 1115 01/05/24 1115 01/05/24 1115 --   Temporal Monitor Sitting Left arm       Pain Score       01/05/24 1145       No Pain             Orthostatic Vital Signs  Vitals:    01/05/24 1115 01/05/24 1145 01/05/24 1215 01/05/24 1230   BP: 129/77 140/77 148/86 154/79   Pulse: 90 100 (!) 108 94   Patient Position - Orthostatic VS: Sitting Sitting Sitting Sitting       Physical Exam  Vitals and nursing note reviewed.   Constitutional:       General: She is not in acute distress.     Appearance: She is not ill-appearing.   HENT:      Head: Normocephalic and atraumatic.      Nose: Congestion present.      Mouth/Throat:      Mouth: Mucous membranes are moist.       Pharynx: Oropharynx is clear.   Cardiovascular:      Rate and Rhythm: Regular rhythm. Tachycardia present.   Pulmonary:      Effort: Pulmonary effort is normal.      Breath sounds: Normal breath sounds.   Abdominal:      General: Abdomen is flat. Bowel sounds are normal.      Palpations: Abdomen is soft.      Comments: Suprapubic catheter in place, no surrounding erythema or discharge   Musculoskeletal:         General: Normal range of motion.      Cervical back: Normal range of motion.   Skin:     General: Skin is warm and dry.   Neurological:      Mental Status: She is alert and oriented to person, place, and time.      Comments: Patient is quadriplegic, unable to move, but able to hear, understand, answer questions yes or no   Psychiatric:         Mood and Affect: Mood normal.         ED Medications  Medications   sodium chloride 0.9 % bolus 1,000 mL (1,000 mL Intravenous New Bag 1/5/24 1216)       Diagnostic Studies  Results Reviewed       Procedure Component Value Units Date/Time    Comprehensive metabolic panel [[id number]]  (Abnormal) Collected: 01/05/24 1215    Lab Status: Final result Specimen: Blood from Arm, Left Updated: 01/05/24 1256     Sodium 133 mmol/L      Potassium 3.6 mmol/L      Chloride 97 mmol/L      CO2 26 mmol/L      ANION GAP 10 mmol/L      BUN 8 mg/dL      Creatinine 0.32 mg/dL      Glucose 97 mg/dL      Calcium 8.9 mg/dL      AST 21 U/L      ALT 16 U/L      Alkaline Phosphatase 68 U/L      Total Protein 7.0 g/dL      Albumin 3.9 g/dL      Total Bilirubin 0.52 mg/dL      eGFR 128 ml/min/1.73sq m     Narrative:      National Kidney Disease Foundation guidelines for Chronic Kidney Disease (CKD):     Stage 1 with normal or high GFR (GFR > 90 mL/min/1.73 square meters)    Stage 2 Mild CKD (GFR = 60-89 mL/min/1.73 square meters)    Stage 3A Moderate CKD (GFR = 45-59 mL/min/1.73 square meters)    Stage 3B Moderate CKD (GFR = 30-44 mL/min/1.73 square meters)    Stage 4 Severe CKD (GFR = 15-29  mL/min/1.73 square meters)    Stage 5 End Stage CKD (GFR <15 mL/min/1.73 square meters)  Note: GFR calculation is accurate only with a steady state creatinine    Blood culture [[id number]] Collected: 01/05/24 1235    Lab Status: In process Specimen: Blood from Arm, Left Updated: 01/05/24 1241    Blood culture [[id number]] Collected: 01/05/24 1225    Lab Status: In process Specimen: Blood from Arm, Left Updated: 01/05/24 1235    CBC and differential [[id number]]  (Abnormal) Collected: 01/05/24 1215    Lab Status: Final result Specimen: Blood from Arm, Left Updated: 01/05/24 1228     WBC 8.92 Thousand/uL      RBC 4.77 Million/uL      Hemoglobin 13.3 g/dL      Hematocrit 41.1 %      MCV 86 fL      MCH 27.9 pg      MCHC 32.4 g/dL      RDW 13.0 %      MPV 9.1 fL      Platelets 195 Thousands/uL      nRBC 0 /100 WBCs      Neutrophils Relative 85 %      Immat GRANS % 0 %      Lymphocytes Relative 10 %      Monocytes Relative 5 %      Eosinophils Relative 0 %      Basophils Relative 0 %      Neutrophils Absolute 7.57 Thousands/µL      Immature Grans Absolute 0.03 Thousand/uL      Lymphocytes Absolute 0.86 Thousands/µL      Monocytes Absolute 0.43 Thousand/µL      Eosinophils Absolute 0.01 Thousand/µL      Basophils Absolute 0.02 Thousands/µL     Procalcitonin [[id number]] Collected: 01/05/24 1215    Lab Status: In process Specimen: Blood from Arm, Left Updated: 01/05/24 1224    UA w Reflex to Microscopic w Reflex to Culture [[id number]]     Lab Status: No result Specimen: Urine                    XR chest 1 view portable    (Results Pending)         Procedures  Procedures      ED Course                             SBIRT 20yo+      Flowsheet Row Most Recent Value   Initial Alcohol Screen: US AUDIT-C     1. How often do you have a drink containing alcohol? 0 Filed at: 01/05/2024 1116   2. How many drinks containing alcohol do you have on a typical day you are drinking?  0 Filed at: 01/05/2024 1116   3a. Male UNDER 65: How often do  you have five or more drinks on one occasion? 0 Filed at: 01/05/2024 1116   3b. FEMALE Any Age, or MALE 65+: How often do you have 4 or more drinks on one occassion? 0 Filed at: 01/05/2024 1116   Audit-C Score 0 Filed at: 01/05/2024 1116   HEATHER: How many times in the past year have you...    Used an illegal drug or used a prescription medication for non-medical reasons? Never Filed at: 01/05/2024 1116                  Medical Decision Making  Patient is a 53-year-old female with PMH of multiple sclerosis leading to quadriplegia who presents to the ED with congestion leading to difficulty maintaining her airway.  Patient is unable to cough secondary to her MS.  Is on 3 L nasal cannula in the room    Vital signs stable aside from tachycardia and increasing oxygen requirement responsive to O2, afebrile. On exam patient is quadriplegic, suprapubic catheter shows no overlying infection, urine is dark.    History and physical exam most consistent with viral URI leading to congestion, which patient is unable to tolerate secondary to her underlying disease. However, differential diagnosis included but not limited to pneumonia, urinary tract infection, electrolyte abnormality, dehydration, MARVIN. Plan laboratory and septic workup, supplemental oxygen, admission to the hospital for chest physiotherapy.    View ED course above for further discussion on patient workup.       All labs reviewed and utilized in the medical decision making process  All radiology studies independently viewed by me and interpreted by the radiologist.  I reviewed all testing with the patient.       Amount and/or Complexity of Data Reviewed  Labs: ordered.  Radiology: ordered.    Risk  Decision regarding hospitalization.          Disposition  Final diagnoses:   Viral upper respiratory tract infection   Congestion of upper airway   Quadriplegia (HCC)   Multiple sclerosis (HCC)     Time reflects when diagnosis was documented in both MDM as applicable and  the Disposition within this note       Time User Action Codes Description Comment    1/5/2024  1:30 PM Cassius Alcaraz Add [J06.9] Viral upper respiratory tract infection     1/5/2024  1:30 PM Cassius Alcaraz Add [N42.1] Congestion and hemorrhage of prostate     1/5/2024  1:30 PM Lissa Cassius BREE Remove [N42.1] Congestion and hemorrhage of prostate     1/5/2024  1:30 PM Cassius Alcaraz Add [J98.8] Congestion of upper airway     1/5/2024  1:31 PM Cassius Alcaraz Add [G82.50] Quadriplegia (HCC)     1/5/2024  1:31 PM Cassius Alcaraz Add [G35] Multiple sclerosis (HCC)           ED Disposition       ED Disposition   Admit    Condition   Stable    Date/Time   Fri Jan 5, 2024 1238    Comment   Case was discussed with Dr Hickey and the patient's admission status was agreed to be Admission Status: inpatient status to the service of Dr. Hickey .               Follow-up Information    None         Patient's Medications   Discharge Prescriptions    No medications on file     No discharge procedures on file.    PDMP Review         Value Time User    PDMP Reviewed  Yes 6/7/2023  2:32 PM Nacho Monroy DO             ED Provider  Attending physically available and evaluated Fanny Schulz. I managed the patient along with the ED Attending.    Electronically Signed by           Cassius Alcaraz MD  01/05/24 1481

## 2024-01-05 NOTE — ED ATTENDING ATTESTATION
1/5/2024  I, Rachel Jewell MD, saw and evaluated the patient. I have discussed the patient with the resident/non-physician practitioner and agree with the resident's/non-physician practitioner's findings, Plan of Care, and MDM as documented in the resident's/non-physician practitioner's note, except where noted. All available labs and Radiology studies were reviewed.  I was present for key portions of any procedure(s) performed by the resident/non-physician practitioner and I was immediately available to provide assistance.       At this point I agree with the current assessment done in the Emergency Department.  I have conducted an independent evaluation of this patient a history and physical is as follows:  This is a 53-year-old woman with a history of progressive multiple sclerosis.  The patient is wheelchair-bound and cannot move her extremities.  She usually can phonate.  The patient recently got a URI from her family.  The patient has been unable to cough the mucus out, is unable to clear her throat, and does not have the ability to cough.  The patient is unable to take her medications because she is having difficulty swallowing, and she has not been able to eat or drink anything.  The patient has a suprapubic catheter in place, and her urine has been more concentrated than usual.  She has not had fevers.  She is not vomiting.  She has not moved her bowels for 10 days.  Review of systems is limited because patient is unable to speak to me, but per her family the remainder of review of systems is negative.  On exam the patient appears chronically ill.  Her mucous membranes are dry.  Her vital signs are remarkable for an oxygen saturation as low as 83% with good waveform.  On 3 L nasal cannula, she is 91%.  The patient's neck is supple.  Her heart is slightly tachycardic at 108.  There are no murmurs, rubs, or gallops.  Her lungs are decreased at the bases.  The patient has transmitted upper airway  sounds.  Her abdomen is soft and nontender with no masses, rebound, or guarding.  Her extremities are contractured.MEDICAL DECISION MAKING    Number and Complexity of Problems  Differential diagnosis: Hypoxic respiratory failure, viral pneumonia, bacterial pneumonia, MS progression    Medical Decision Making Data  External documents reviewed: Neurology note from November reviewed.  At that time, the patient was having dysphonia, hypoxia, and difficulty swallowing  My EKG interpretation:   My CT interpretation:   My X-ray interpretation: No infiltrate  My ultrasound interpretation:     XR chest 1 view portable   Final Result      No acute cardiopulmonary disease.                  Workstation performed: [id number]         XR abdomen obstruction series    (Results Pending)       Labs Reviewed   CBC AND DIFFERENTIAL - Abnormal       Result Value Ref Range Status    WBC 8.92  4.31 - 10.16 Thousand/uL Final    RBC 4.77  3.81 - 5.12 Million/uL Final    Hemoglobin 13.3  11.5 - 15.4 g/dL Final    Hematocrit 41.1  34.8 - 46.1 % Final    MCV 86  82 - 98 fL Final    MCH 27.9  26.8 - 34.3 pg Final    MCHC 32.4  31.4 - 37.4 g/dL Final    RDW 13.0  11.6 - 15.1 % Final    MPV 9.1  8.9 - 12.7 fL Final    Platelets 195  149 - 390 Thousands/uL Final    nRBC 0  /100 WBCs Final    Neutrophils Relative 85 (*) 43 - 75 % Final    Immat GRANS % 0  0 - 2 % Final    Lymphocytes Relative 10 (*) 14 - 44 % Final    Monocytes Relative 5  4 - 12 % Final    Eosinophils Relative 0  0 - 6 % Final    Basophils Relative 0  0 - 1 % Final    Neutrophils Absolute 7.57  1.85 - 7.62 Thousands/µL Final    Immature Grans Absolute 0.03  0.00 - 0.20 Thousand/uL Final    Lymphocytes Absolute 0.86  0.60 - 4.47 Thousands/µL Final    Monocytes Absolute 0.43  0.17 - 1.22 Thousand/µL Final    Eosinophils Absolute 0.01  0.00 - 0.61 Thousand/µL Final    Basophils Absolute 0.02  0.00 - 0.10 Thousands/µL Final   COMPREHENSIVE METABOLIC PANEL - Abnormal    Sodium 133  (*) 135 - 147 mmol/L Final    Potassium 3.6  3.5 - 5.3 mmol/L Final    Chloride 97  96 - 108 mmol/L Final    CO2 26  21 - 32 mmol/L Final    ANION GAP 10  mmol/L Final    BUN 8  5 - 25 mg/dL Final    Creatinine 0.32 (*) 0.60 - 1.30 mg/dL Final    Comment: Standardized to IDMS reference method    Glucose 97  65 - 140 mg/dL Final    Comment: If the patient is fasting, the ADA then defines impaired fasting glucose as > 100 mg/dL and diabetes as > or equal to 123 mg/dL.    Calcium 8.9  8.4 - 10.2 mg/dL Final    AST 21  13 - 39 U/L Final    ALT 16  7 - 52 U/L Final    Comment: Specimen collection should occur prior to Sulfasalazine administration due to the potential for falsely depressed results.     Alkaline Phosphatase 68  34 - 104 U/L Final    Total Protein 7.0  6.4 - 8.4 g/dL Final    Albumin 3.9  3.5 - 5.0 g/dL Final    Total Bilirubin 0.52  0.20 - 1.00 mg/dL Final    Comment: Use of this assay is not recommended for patients undergoing treatment with eltrombopag due to the potential for falsely elevated results.  N-acetyl-p-benzoquinone imine (metabolite of Acetaminophen) will generate erroneously low results in samples for patients that have taken an overdose of Acetaminophen.    eGFR 128  ml/min/1.73sq m Final    Narrative:     National Kidney Disease Foundation guidelines for Chronic Kidney Disease (CKD):     Stage 1 with normal or high GFR (GFR > 90 mL/min/1.73 square meters)    Stage 2 Mild CKD (GFR = 60-89 mL/min/1.73 square meters)    Stage 3A Moderate CKD (GFR = 45-59 mL/min/1.73 square meters)    Stage 3B Moderate CKD (GFR = 30-44 mL/min/1.73 square meters)    Stage 4 Severe CKD (GFR = 15-29 mL/min/1.73 square meters)    Stage 5 End Stage CKD (GFR <15 mL/min/1.73 square meters)  Note: GFR calculation is accurate only with a steady state creatinine   UA W REFLEX TO MICROSCOPIC WITH REFLEX TO CULTURE - Abnormal    Color, UA Yellow   Final    Clarity, UA Turbid   Final    Specific Gravity, UA 1.021  1.003 -  1.030 Final    pH, UA 6.0  4.5, 5.0, 5.5, 6.0, 6.5, 7.0, 7.5, 8.0 Final    Leukocytes, UA Large (*) Negative Final    Nitrite, UA Positive (*) Negative Final    Protein,  (2+) (*) Negative mg/dl Final    Glucose, UA Negative  Negative mg/dl Final    Ketones, UA >=150 (4+) (*) Negative mg/dl Final    Urobilinogen, UA 2.0 (*) <2.0 mg/dl mg/dl Final    Bilirubin, UA Negative  Negative Final    Occult Blood, UA Moderate (*) Negative Final   URINE MICROSCOPIC - Abnormal    RBC, UA Innumerable (*) None Seen, 1-2 /hpf Final    WBC, UA Innumerable (*) None Seen, 1-2 /hpf Final    Epithelial Cells Occasional  None Seen, Occasional /hpf Final    Bacteria, UA Occasional  None Seen, Occasional /hpf Final    MUCUS THREADS Innumerable (*) None Seen Final    Hyaline Casts, UA 5-10 (*) None Seen /lpf Final    WBC Clumps Present   Final   BLOOD CULTURE   BLOOD CULTURE   COVID19, INFLUENZA A/B, RSV PCR, [facility]   URINE CULTURE   PROCALCITONIN TEST       Labs reviewed by me are significant for:     Clinical decision rules/scores are significant for:     Discussed case with:   Considered admission for: Hypoxic respiratory failure, severe MS    Treatment and Disposition  ED course: Patient seen and examined.  Patient oxygen requirement.  Patient placed on oxygen by nasal cannula, will admit the patient hospital for hypoxic respiratory failure  Shared decision making:   Code status:     ED Course         Critical Care Time  Procedures

## 2024-01-05 NOTE — H&P
[facility]  H&P  Name: Fanny Schulz 53 y.o. female I MRN: [id number]  Unit/Bed#: ED 29 I Date of Admission: 1/5/2024   Date of Service: 1/5/2024 I Hospital Day: 0      Assessment/Plan   * Acute respiratory failure with hypoxia (HCC)  Assessment & Plan  53-year-old female with PMH of multiple sclerosis, progressive quadriplegia, wheelchair-bound, neurogenic bladder with SPC, dysphagia, presented to ED with complaint of worsening nasal congestion, drainage and inability to cough, leading to difficulty with breathing x 3 days.  Family member reports patient has been sick with viral URI, contracted from family members patient was hypoxic on presentation, however currently responding well to 3 L oxygen via nasal cannula.  No leukocytosis on labs  Chest x-ray negative for pneumonia or volume overload.  Obtain COVID/flu/RSV  Frequent suctioning, Respiratory protocol  Chest PT  Currently being treated with Rocephin for UTI    Continuous opioid dependence ([facility])  Assessment & Plan  PDMP reviewed    Functional quadriplegia secondary to MS ([facility])  Assessment & Plan  Noted   Supportive treatment  Baclofen as needed    Swallowing difficulty  Assessment & Plan  Progressive from MS.  Speech therapy consult     Recurrent major depressive disorder, in full remission ([facility])  Assessment & Plan  Resume cymbalta    Constipation  Assessment & Plan  Did not have BM for 10 days.  Obtain Abd xray    Suprapubic catheter ([facility])  Assessment & Plan  Has SPC secondary to neurogenic bladder.  Continue Ditropan  Urine is appearing dark  Start IVF  UA positive, start IV Rocephin. Note patient has colonization of enterococcus as per family.            VTE Prophylaxis: Enoxaparin (Lovenox)  / sequential compression device   Code Status: full code   Discussion with family: sister and son present at bedside    Anticipated Length of Stay:  Patient will be admitted on an Inpatient basis with an anticipated length  "of stay of  > 2 midnights.   Justification for Hospital Stay: acute resp failure with hypoxia    Total Time for Visit, including Counseling / Coordination of Care: 60 minutes.  Greater than 50% of this total time spent on direct patient counseling and coordination of care.    Chief Complaint:   increased nasal secretion, chest congestion, difficulty coughing     History of Present Illness:    Fanny Schulz is a  53-year-old female with PMH of multiple sclerosis, progressive quadriplegia, wheelchair-bound, neurogenic bladder with SPC, dysphagia, presented to ED with complaint of worsening nasal congestion, drainage and inability to cough, leading to difficulty with breathing.  Family member reports patient has been sick with viral URI, contracted from family members patient was hypoxic on presentation, however currently responding well to 3 L oxygen via nasal cannula.  On my exam patient is awake, alert, oriented however unable to speak due to significant phlegm and increased chest congestion.  Not in any respiratory distress.  Able to nod yes or no, cannot phonate some words.  No abdominal distention, no tenderness    Review of Systems:    Review of Systems   Unable to perform ROS: Other       Past Medical and Surgical History:     Past Medical History:   Diagnosis Date    Anesthesia     \"prefers if able to be put to sleep on stretcher before placed on table if possible due to severe spasticity    Bladder stones     Chronic pain disorder     neck    Contracture, right shoulder     right arm and hand    Dependent on wheelchair     motorized    Edema     dependant lower extremities    Elevated alkaline phosphatase level     Mildly elevated total, normal isoenzymes 4/15/15, normal 1/17; last assessed: 24Nov2015    Fernandez catheter in place     #24 to large bag(silicone catheter)    Gallbladder disease     History of femur fracture     right leg    History of UTI     MS (multiple sclerosis) ([facility])     Muscle spasticity " "    especially with touch    Muscle weakness     Muscular dystrophy (HCC)     Neck pain     Neurogenic bladder     Paralysis (HCC)     bilateral lower extremities    Port-A-Cath in place     left chest,\" hasn't used in approx 1 yr or so\"    Pressure injury of skin     right ischium    Sacral pressure sore     \"shearing, tegaderm in place,\"    Swallowing difficulty     Tinnitus     Urinary incontinence        Past Surgical History:   Procedure Laterality Date    BLADDER STONE REMOVAL N/A 12/4/2017    Procedure: CYSTO, LITHOLOPAXY HOLMIUM LASER;  Surgeon: Damir Osborne MD;  [facility];  Service: Urology    BLADDER SURGERY      CHOLECYSTECTOMY      CYSTOSCOPY  06/15/2020    ESOPHAGOGASTRODUODENOSCOPY      FL RETROGRADE PYELOGRAM  10/2/2020    FL RETROGRADE PYELOGRAM  11/3/2020    KIDNEY STONE SURGERY      PORTACATH PLACEMENT Left     WA CYSTO BLADDER W/URETERAL CATHETERIZATION Left 10/2/2020    Procedure: CYSTOSCOPY LEFT RETROGRADE ; LEFT URETEROSCOPY; PYELOGRAM WITH STENT INSERTION AND SUPERPUBIC EXCHANGE;  Surgeon: Philip Mcdonald MD;  [facility];  Service: Urology    WA CYSTO/URETERO W/LITHOTRIPSY &INDWELL STENT INSRT Left 11/3/2020    Procedure: CYSTOSCOPY URETEROSCOPY WITH RETROGRADE PYELOGRAM AND EXCHANGE STENT URETERAL, SP TUBE EXCHANGE, BASKET STONE EXTRACTION, BLADDER STONE EXTRACTION;  Surgeon: Damir Osborne MD;  [facility];  Service: Urology    WA LITHOLAPAXY SMPL/SM <2.5 CM N/A 12/22/2022    Procedure: Cystolitholapaxy w/  laser lithotripsy of bladder stones; SUPRAPUBIC CATHETER CHANGE;  Surgeon: Damir Osborne MD;  [facility];  Service: Urology    SUPRAPUBIC CYSTOSTOMY  02/25/2014    last assessed: [id number]       Meds/Allergies:    Prior to Admission medications    Medication Sig Start Date End Date Taking? Authorizing Provider   baclofen 20 mg tablet TAKE 1 TABLET BY MOUTH 5 TIMES AS NEEDED 11/25/23   Kaykay Santiago MD   clotrimazole " (LOTRIMIN) 1 % cream Apply topically 2 (two) times a day as needed (yeast rash)  Patient not taking: Reported on 11/21/2023 2/24/23   Marimar Andrade PA-C   DULoxetine (CYMBALTA) 20 mg capsule Take 1 capsule (20 mg total) by mouth daily 6/7/23   Nacho Monroy DO   furosemide (LASIX) 20 mg tablet TAKE 1 TABLET BY MOUTH EVERY DAY  Patient not taking: Reported on 11/21/2023 3/6/23   Nacho Monroy DO   oxybutynin (DITROPAN-XL) 10 MG 24 hr tablet Take 1 tablet (10 mg total) by mouth daily 6/7/23   Nacho Monroy DO   oxyCODONE-acetaminophen (PERCOCET) 5-325 mg per tablet Take 1 tablet by mouth every 4 (four) hours as needed for moderate pain Max Daily Amount: 6 tablets 6/7/23   Nacho Monroy DO   rosuvastatin (CRESTOR) 5 mg tablet TAKE 1 TABLET (5 MG TOTAL) BY MOUTH DAILY. 1/3/24   Nacho Monroy DO     I have reviewed home medications using allscripts.    Allergies:   Allergies   Allergen Reactions    Latex Blisters     Added based on information entered during case entry, please review and add reactions, type, and severity as needed    blisters       Social History:     Marital Status: /Civil Union      Substance Use History:   Social History     Substance and Sexual Activity   Alcohol Use Not Currently     Social History     Tobacco Use   Smoking Status Never   Smokeless Tobacco Never     Social History     Substance and Sexual Activity   Drug Use No       Family History:    Family History   Problem Relation Age of Onset    Diabetes Mother     Hyperlipidemia Mother     Hypertension Mother     COPD Father     Hypertension Father     Hyperlipidemia Sister     Alzheimer's disease Family     Diabetes Family     Hypertension Family     Heart disease Family        Physical Exam:     Vitals:   Blood Pressure: 145/71 (01/05/24 1330)  Pulse: 94 (01/05/24 1330)  Temperature: 98.4 °F (36.9 °C) (01/05/24 1215)  Temp Source: Oral (01/05/24 1215)  Respirations: 18 (01/05/24 1330)  SpO2: 98 %  (01/05/24 1330)    Physical Exam  Vitals and nursing note reviewed.   Constitutional:       General: She is not in acute distress.     Appearance: She is well-developed. She is ill-appearing.   HENT:      Head: Normocephalic and atraumatic.   Eyes:      Conjunctiva/sclera: Conjunctivae normal.   Cardiovascular:      Rate and Rhythm: Normal rate and regular rhythm.      Heart sounds: No murmur heard.  Pulmonary:      Effort: Pulmonary effort is normal. No respiratory distress.      Breath sounds: Normal breath sounds.      Comments: Upper airway rhonchi  Abdominal:      Palpations: Abdomen is soft.      Tenderness: There is no abdominal tenderness.   Musculoskeletal:         General: No swelling.      Cervical back: Neck supple.   Skin:     General: Skin is warm and dry.      Capillary Refill: Capillary refill takes less than 2 seconds.   Neurological:      Mental Status: She is alert.      Comments: quadriplegia   Psychiatric:         Mood and Affect: Mood normal.            Additional Data:     Lab Results: I have personally reviewed pertinent reports.      Results from last 7 days   Lab Units 01/05/24  1215   WBC Thousand/uL 8.92   HEMOGLOBIN g/dL 13.3   HEMATOCRIT % 41.1   PLATELETS Thousands/uL 195   NEUTROS PCT % 85*   LYMPHS PCT % 10*   MONOS PCT % 5   EOS PCT % 0     Results from last 7 days   Lab Units 01/05/24  1215   SODIUM mmol/L 133*   POTASSIUM mmol/L 3.6   CHLORIDE mmol/L 97   CO2 mmol/L 26   BUN mg/dL 8   CREATININE mg/dL 0.32*   ANION GAP mmol/L 10   CALCIUM mg/dL 8.9   ALBUMIN g/dL 3.9   TOTAL BILIRUBIN mg/dL 0.52   ALK PHOS U/L 68   ALT U/L 16   AST U/L 21   GLUCOSE RANDOM mg/dL 97                       Imaging: I have personally reviewed pertinent reports.      XR chest 1 view portable   Final Result by Phill Avendano MD (01/05 1338)      No acute cardiopulmonary disease.                  Workstation performed: [id number]              AllscriJudicata / Chronicity Records Reviewed: Yes     ** Please Note:  This note has been constructed using a voice recognition system. **

## 2024-01-05 NOTE — SPEECH THERAPY NOTE
"Speech-Language Pathology Bedside Swallow Evaluation      Patient Name: Fanny Schulz    Today's Date: 1/5/2024     Problem List  Principal Problem:    Acute respiratory failure with hypoxia (HCC)  Active Problems:    Suprapubic catheter (HCC)    Constipation    Recurrent major depressive disorder, in full remission (HCC)    Swallowing difficulty    Functional quadriplegia secondary to MS (HCC)    Continuous opioid dependence (HCC)      Past Medical History  Past Medical History:   Diagnosis Date    Anesthesia     \"prefers if able to be put to sleep on stretcher before placed on table if possible due to severe spasticity    Bladder stones     Chronic pain disorder     neck    Contracture, right shoulder     right arm and hand    Dependent on wheelchair     motorized    Edema     dependant lower extremities    Elevated alkaline phosphatase level     Mildly elevated total, normal isoenzymes 4/15/15, normal 1/17; last assessed: 24Nov2015    Fernandez catheter in place     #24 to large bag(silicone catheter)    Gallbladder disease     History of femur fracture     right leg    History of UTI     MS (multiple sclerosis) (HCC)     Muscle spasticity     especially with touch    Muscle weakness     Muscular dystrophy (HCC)     Neck pain     Neurogenic bladder     Paralysis (HCC)     bilateral lower extremities    Port-A-Cath in place     left chest,\" hasn't used in approx 1 yr or so\"    Pressure injury of skin     right ischium    Sacral pressure sore     \"shearing, tegaderm in place,\"    Swallowing difficulty     Tinnitus     Urinary incontinence        Past Surgical History  Past Surgical History:   Procedure Laterality Date    BLADDER STONE REMOVAL N/A 12/4/2017    Procedure: CYSTO, LITHOLOPAXY HOLMIUM LASER;  Surgeon: Damir Osborne MD;  [facility];  Service: Urology    BLADDER SURGERY      CHOLECYSTECTOMY      CYSTOSCOPY  06/15/2020    ESOPHAGOGASTRODUODENOSCOPY      FL RETROGRADE PYELOGRAM  10/2/2020    FL " RETROGRADE PYELOGRAM  11/3/2020    KIDNEY STONE SURGERY      PORTACATH PLACEMENT Left     IA CYSTO BLADDER W/URETERAL CATHETERIZATION Left 10/2/2020    Procedure: CYSTOSCOPY LEFT RETROGRADE ; LEFT URETEROSCOPY; PYELOGRAM WITH STENT INSERTION AND SUPERPUBIC EXCHANGE;  Surgeon: Philip Mcdonald MD;  [facility];  Service: Urology    IA CYSTO/URETERO W/LITHOTRIPSY &INDWELL STENT INSRT Left 11/3/2020    Procedure: CYSTOSCOPY URETEROSCOPY WITH RETROGRADE PYELOGRAM AND EXCHANGE STENT URETERAL, SP TUBE EXCHANGE, BASKET STONE EXTRACTION, BLADDER STONE EXTRACTION;  Surgeon: Damir Osborne MD;  [facility];  Service: Urology    IA LITHOLAPAXY SMPL/SM <2.5 CM N/A 12/22/2022    Procedure: Cystolitholapaxy w/  laser lithotripsy of bladder stones; SUPRAPUBIC CATHETER CHANGE;  Surgeon: Damir Osborne MD;  [facility];  Service: Urology    SUPRAPUBIC CYSTOSTOMY  02/25/2014    last assessed: [id number]       Summary   Pt presented with s/s suggestive of moderate-severe oropharyngeal dysphagia. The patient is assessed with puree solids with honey thick and nectar thick liquids. SLP feeds patient. Oral opening is adequate. Transfer is prolonged. Suspect poor oral control and spillage into pharynx before swallow is initiated. Suspect pharyngeal residue with all as evidenced by multiple (and audible) swallows. Cough x1 observed with honey thick liquids. The patient appears to have increased congestion as evaluation progresses. She has weak cough and is unable to expectorate mucous into oral cavity.     Risk/s for Aspiration: high      Recommended Diet: NPO   Recommended Form of Meds: non-oral if possible   Other Recommendations: Continue frequent oral care; VBS when able     Current Medical Status  Chief Complaint:   increased nasal secretion, chest congestion, difficulty coughing    History of Present Illness:   Fanny Schulz is a  53-year-old female with PMH of multiple sclerosis,  progressive quadriplegia, wheelchair-bound, neurogenic bladder with SPC, dysphagia, presented to ED with complaint of worsening nasal congestion, drainage and inability to cough, leading to difficulty with breathing.  Family member reports patient has been sick with viral URI, contracted from family members patient was hypoxic on presentation, however currently responding well to 3 L oxygen via nasal cannula.  On my exam patient is awake, alert, oriented however unable to speak due to significant phlegm and increased chest congestion.  Not in any respiratory distress.  Able to nod yes or no, cannot phonate some words.  No abdominal distention, no tenderness    Current Precautions:  Fall  Aspiration  Contact  AIrborne  C  Allergies:  No known food allergies  Past medical history:  Please see H&P for details    Special Studies:  Chest xray 1/5/24:   No acute cardiopulmonary disease.     VBS completed 2015. Report and images are not in EMR    Social/Education/Vocational Hx:  Pt lives with family    Swallow Information   Current Risks for Dysphagia & Aspiration: known history of dysphagia and MS  Current Symptoms/Concerns: coughing with po and congestion with audible swallows  Current Diet: NPO   Baseline Diet: regular diet and thin liquids    Baseline Assessment   Behavior/Cognition: alert  Speech/Language Status: able to participate in basic conversation and able to follow commands  Patient Positioning: upright in bed  Pain Status/Interventions/Response to Interventions: No report of or nonverbal indications of pain.     Swallow Mechanism Exam  Facial: symmetrical  Labial: WFL  Lingual: WFL  Velum: unable to visualize  Mandible: adequate ROM  Dentition: adequate  Vocal quality:weak and dysphonic   Volitional Cough: weak   Respiratory Status: on 2 L O2      Consistencies Assessed and Performance   Consistencies Administered: nectar thick, honey thick, and puree  Materials administered included applesauce with nectar  thick and honey thick liquids    Oral Stage: moderate-severe  Retrieval of all via tsp is adequate. Transfer is prolonged. Suspect poor oral control with spillage over BOT.     Pharyngeal Stage: moderate-severe  Swallow Mechanics:  Swallowing initiation appeared delayed.  Suspect premature spillage and decreased pharyngeal constriction as evidenced by multiple and audible swallows.     Esophageal Concerns: none reported    Summary and Recommendations (see above)    Results Reviewed with: patient, RN, MD, and family     Treatment Recommended: dysphagia therapy      Frequency of treatment: 3-5x week, as able     Patient Stated Goal: none stated     Dysphagia LTG  -Patient will demonstrate safe and effective oral intake (without overt s/s significant oral/pharyngeal dysphagia including s/s penetration or aspiration) for the highest appropriate diet level.     Short Term Goals:  -Pt will tolerate trials of Dysphagia 1/pureed diet and honey thick liquid with no significant s/s oral or pharyngeal dysphagia across 1-3 diagnostic session/s    -Patient will comply with a Video/Modified Barium Swallow study for more complete assessment of swallowing anatomy/physiology/aspiration risk and to assess efficacy of treatment techniques so as to best guide treatment plan (once off covid precautions)    Speech Therapy Prognosis   Prognosis:  guarded     Prognosis Considerations: medical status and progressive disease process

## 2024-01-05 NOTE — ASSESSMENT & PLAN NOTE
Has SPC secondary to neurogenic bladder.  Continue Ditropan  Urine is appearing dark  Start IVF  UA positive, start IV Rocephin. Note patient has colonization of enterococcus as per family.

## 2024-01-06 PROBLEM — U07.1 COVID-19 VIRUS INFECTION: Status: ACTIVE | Noted: 2024-01-06

## 2024-01-06 PROBLEM — E78.00 HYPERCHOLESTEREMIA: Status: ACTIVE | Noted: 2024-01-06

## 2024-01-06 LAB
ALBUMIN SERPL BCP-MCNC: 3.6 G/DL (ref 3.5–5)
ALP SERPL-CCNC: 52 U/L (ref 34–104)
ALT SERPL W P-5'-P-CCNC: 12 U/L (ref 7–52)
ANION GAP SERPL CALCULATED.3IONS-SCNC: 13 MMOL/L
ANION GAP SERPL CALCULATED.3IONS-SCNC: 15 MMOL/L
AST SERPL W P-5'-P-CCNC: 17 U/L (ref 13–39)
ATRIAL RATE: 108 BPM
ATRIAL RATE: 112 BPM
ATRIAL RATE: 99 BPM
BILIRUB SERPL-MCNC: 0.41 MG/DL (ref 0.2–1)
BNP SERPL-MCNC: 76 PG/ML (ref 0–100)
BUN SERPL-MCNC: 6 MG/DL (ref 5–25)
BUN SERPL-MCNC: 7 MG/DL (ref 5–25)
CALCIUM SERPL-MCNC: 8.3 MG/DL (ref 8.4–10.2)
CALCIUM SERPL-MCNC: 8.3 MG/DL (ref 8.4–10.2)
CARDIAC TROPONIN I PNL SERPL HS: 7 NG/L (ref 8–18)
CHLORIDE SERPL-SCNC: 102 MMOL/L (ref 96–108)
CHLORIDE SERPL-SCNC: 99 MMOL/L (ref 96–108)
CK SERPL-CCNC: 79 U/L (ref 26–192)
CO2 SERPL-SCNC: 19 MMOL/L (ref 21–32)
CO2 SERPL-SCNC: 19 MMOL/L (ref 21–32)
CREAT SERPL-MCNC: 0.31 MG/DL (ref 0.6–1.3)
CREAT SERPL-MCNC: 0.31 MG/DL (ref 0.6–1.3)
CRP SERPL QL: 88 MG/L
D DIMER PPP FEU-MCNC: 1.28 UG/ML FEU
ERYTHROCYTE [DISTWIDTH] IN BLOOD BY AUTOMATED COUNT: 13 % (ref 11.6–15.1)
GFR SERPL CREATININE-BSD FRML MDRD: 129 ML/MIN/1.73SQ M
GFR SERPL CREATININE-BSD FRML MDRD: 129 ML/MIN/1.73SQ M
GLUCOSE SERPL-MCNC: 114 MG/DL (ref 65–140)
GLUCOSE SERPL-MCNC: 117 MG/DL (ref 65–140)
HCT VFR BLD AUTO: 37 % (ref 34.8–46.1)
HGB BLD-MCNC: 12.1 G/DL (ref 11.5–15.4)
L PNEUMO1 AG UR QL IA.RAPID: NEGATIVE
LACTATE SERPL-SCNC: 1.8 MMOL/L (ref 0.5–2)
MAGNESIUM SERPL-MCNC: 2 MG/DL (ref 1.9–2.7)
MCH RBC QN AUTO: 28 PG (ref 26.8–34.3)
MCHC RBC AUTO-ENTMCNC: 32.7 G/DL (ref 31.4–37.4)
MCV RBC AUTO: 86 FL (ref 82–98)
P AXIS: 22 DEGREES
P AXIS: 39 DEGREES
P AXIS: 54 DEGREES
PLATELET # BLD AUTO: 173 THOUSANDS/UL (ref 149–390)
PMV BLD AUTO: 9.4 FL (ref 8.9–12.7)
POTASSIUM SERPL-SCNC: 3.5 MMOL/L (ref 3.5–5.3)
POTASSIUM SERPL-SCNC: 3.6 MMOL/L (ref 3.5–5.3)
PR INTERVAL: 128 MS
PR INTERVAL: 146 MS
PR INTERVAL: 152 MS
PROCALCITONIN SERPL-MCNC: 0.08 NG/ML
PROT SERPL-MCNC: 6.1 G/DL (ref 6.4–8.4)
QRS AXIS: 102 DEGREES
QRS AXIS: 70 DEGREES
QRS AXIS: 79 DEGREES
QRSD INTERVAL: 66 MS
QRSD INTERVAL: 72 MS
QRSD INTERVAL: 75 MS
QT INTERVAL: 298 MS
QT INTERVAL: 317 MS
QT INTERVAL: 478 MS
QTC INTERVAL: 399 MS
QTC INTERVAL: 407 MS
QTC INTERVAL: 652 MS
RBC # BLD AUTO: 4.32 MILLION/UL (ref 3.81–5.12)
S PNEUM AG UR QL: NEGATIVE
SODIUM SERPL-SCNC: 133 MMOL/L (ref 135–147)
SODIUM SERPL-SCNC: 134 MMOL/L (ref 135–147)
T WAVE AXIS: -50 DEGREES
T WAVE AXIS: -53 DEGREES
T WAVE AXIS: 107 DEGREES
VENTRICULAR RATE: 108 BPM
VENTRICULAR RATE: 112 BPM
VENTRICULAR RATE: 99 BPM
WBC # BLD AUTO: 10.3 THOUSAND/UL (ref 4.31–10.16)

## 2024-01-06 PROCEDURE — 94664 DEMO&/EVAL PT USE INHALER: CPT

## 2024-01-06 PROCEDURE — 85379 FIBRIN DEGRADATION QUANT: CPT | Performed by: PHYSICIAN ASSISTANT

## 2024-01-06 PROCEDURE — 83735 ASSAY OF MAGNESIUM: CPT | Performed by: INTERNAL MEDICINE

## 2024-01-06 PROCEDURE — 92526 ORAL FUNCTION THERAPY: CPT

## 2024-01-06 PROCEDURE — 94640 AIRWAY INHALATION TREATMENT: CPT

## 2024-01-06 PROCEDURE — 94760 N-INVAS EAR/PLS OXIMETRY 1: CPT

## 2024-01-06 PROCEDURE — 93005 ELECTROCARDIOGRAM TRACING: CPT

## 2024-01-06 PROCEDURE — 82550 ASSAY OF CK (CPK): CPT | Performed by: INTERNAL MEDICINE

## 2024-01-06 PROCEDURE — 99232 SBSQ HOSP IP/OBS MODERATE 35: CPT | Performed by: INTERNAL MEDICINE

## 2024-01-06 PROCEDURE — 99291 CRITICAL CARE FIRST HOUR: CPT | Performed by: INTERNAL MEDICINE

## 2024-01-06 PROCEDURE — 86140 C-REACTIVE PROTEIN: CPT | Performed by: PHYSICIAN ASSISTANT

## 2024-01-06 PROCEDURE — 80053 COMPREHEN METABOLIC PANEL: CPT | Performed by: PHYSICIAN ASSISTANT

## 2024-01-06 PROCEDURE — 87449 NOS EACH ORGANISM AG IA: CPT | Performed by: STUDENT IN AN ORGANIZED HEALTH CARE EDUCATION/TRAINING PROGRAM

## 2024-01-06 PROCEDURE — 85027 COMPLETE CBC AUTOMATED: CPT | Performed by: PHYSICIAN ASSISTANT

## 2024-01-06 PROCEDURE — 83880 ASSAY OF NATRIURETIC PEPTIDE: CPT | Performed by: PHYSICIAN ASSISTANT

## 2024-01-06 PROCEDURE — 94669 MECHANICAL CHEST WALL OSCILL: CPT

## 2024-01-06 PROCEDURE — XW033E5 INTRODUCTION OF REMDESIVIR ANTI-INFECTIVE INTO PERIPHERAL VEIN, PERCUTANEOUS APPROACH, NEW TECHNOLOGY GROUP 5: ICD-10-PCS | Performed by: STUDENT IN AN ORGANIZED HEALTH CARE EDUCATION/TRAINING PROGRAM

## 2024-01-06 PROCEDURE — 80048 BASIC METABOLIC PNL TOTAL CA: CPT | Performed by: INTERNAL MEDICINE

## 2024-01-06 PROCEDURE — 84484 ASSAY OF TROPONIN QUANT: CPT | Performed by: PHYSICIAN ASSISTANT

## 2024-01-06 PROCEDURE — 83605 ASSAY OF LACTIC ACID: CPT | Performed by: INTERNAL MEDICINE

## 2024-01-06 PROCEDURE — 84145 PROCALCITONIN (PCT): CPT | Performed by: PHYSICIAN ASSISTANT

## 2024-01-06 RX ORDER — LEVALBUTEROL INHALATION SOLUTION 1.25 MG/3ML
1.25 SOLUTION RESPIRATORY (INHALATION) EVERY 6 HOURS
Status: DISCONTINUED | OUTPATIENT
Start: 2024-01-06 | End: 2024-01-06

## 2024-01-06 RX ORDER — FUROSEMIDE 10 MG/ML
20 INJECTION INTRAMUSCULAR; INTRAVENOUS ONCE
Status: COMPLETED | OUTPATIENT
Start: 2024-01-06 | End: 2024-01-06

## 2024-01-06 RX ORDER — ENOXAPARIN SODIUM 100 MG/ML
30 INJECTION SUBCUTANEOUS EVERY 12 HOURS SCHEDULED
Status: DISCONTINUED | OUTPATIENT
Start: 2024-01-06 | End: 2024-01-15 | Stop reason: HOSPADM

## 2024-01-06 RX ORDER — LEVALBUTEROL INHALATION SOLUTION 1.25 MG/3ML
1.25 SOLUTION RESPIRATORY (INHALATION)
Status: DISCONTINUED | OUTPATIENT
Start: 2024-01-06 | End: 2024-01-08

## 2024-01-06 RX ORDER — DEXTROSE AND SODIUM CHLORIDE 5; .9 G/100ML; G/100ML
40 INJECTION, SOLUTION INTRAVENOUS CONTINUOUS
Status: DISCONTINUED | OUTPATIENT
Start: 2024-01-06 | End: 2024-01-08

## 2024-01-06 RX ORDER — ACETAMINOPHEN 10 MG/ML
1000 INJECTION, SOLUTION INTRAVENOUS EVERY 6 HOURS PRN
Status: DISCONTINUED | OUTPATIENT
Start: 2024-01-06 | End: 2024-01-07

## 2024-01-06 RX ORDER — SODIUM CHLORIDE FOR INHALATION 3 %
4 VIAL, NEBULIZER (ML) INHALATION
Status: DISCONTINUED | OUTPATIENT
Start: 2024-01-06 | End: 2024-01-08

## 2024-01-06 RX ORDER — DEXAMETHASONE SODIUM PHOSPHATE 10 MG/ML
6 INJECTION, SOLUTION INTRAMUSCULAR; INTRAVENOUS EVERY 24 HOURS
Status: COMPLETED | OUTPATIENT
Start: 2024-01-06 | End: 2024-01-14

## 2024-01-06 RX ADMIN — DIAZEPAM 5 MG: 10 INJECTION, SOLUTION INTRAMUSCULAR; INTRAVENOUS at 06:28

## 2024-01-06 RX ADMIN — LEVALBUTEROL HYDROCHLORIDE 1.25 MG: 1.25 SOLUTION RESPIRATORY (INHALATION) at 02:00

## 2024-01-06 RX ADMIN — LEVALBUTEROL HYDROCHLORIDE 1.25 MG: 1.25 SOLUTION RESPIRATORY (INHALATION) at 14:38

## 2024-01-06 RX ADMIN — ENOXAPARIN SODIUM 30 MG: 30 INJECTION SUBCUTANEOUS at 21:05

## 2024-01-06 RX ADMIN — CEFEPIME 2000 MG: 2 INJECTION, POWDER, FOR SOLUTION INTRAVENOUS at 15:33

## 2024-01-06 RX ADMIN — DEXTROSE AND SODIUM CHLORIDE 75 ML/HR: 5; .9 INJECTION, SOLUTION INTRAVENOUS at 23:21

## 2024-01-06 RX ADMIN — AZITHROMYCIN MONOHYDRATE 500 MG: 500 INJECTION, POWDER, LYOPHILIZED, FOR SOLUTION INTRAVENOUS at 11:45

## 2024-01-06 RX ADMIN — DEXTROSE AND SODIUM CHLORIDE 75 ML/HR: 5; .9 INJECTION, SOLUTION INTRAVENOUS at 13:42

## 2024-01-06 RX ADMIN — REMDESIVIR 200 MG: 100 INJECTION, POWDER, LYOPHILIZED, FOR SOLUTION INTRAVENOUS at 03:24

## 2024-01-06 RX ADMIN — LEVALBUTEROL HYDROCHLORIDE 1.25 MG: 1.25 SOLUTION RESPIRATORY (INHALATION) at 19:52

## 2024-01-06 RX ADMIN — DEXAMETHASONE SODIUM PHOSPHATE 6 MG: 10 INJECTION, SOLUTION INTRAMUSCULAR; INTRAVENOUS at 01:15

## 2024-01-06 RX ADMIN — CEFTRIAXONE SODIUM 1000 MG: 10 INJECTION, POWDER, FOR SOLUTION INTRAVENOUS at 13:42

## 2024-01-06 RX ADMIN — SODIUM CHLORIDE SOLN NEBU 3% 4 ML: 3 NEBU SOLN at 02:00

## 2024-01-06 RX ADMIN — CEFEPIME 2000 MG: 2 INJECTION, POWDER, FOR SOLUTION INTRAVENOUS at 23:04

## 2024-01-06 RX ADMIN — SODIUM CHLORIDE SOLN NEBU 3% 4 ML: 3 NEBU SOLN at 19:58

## 2024-01-06 RX ADMIN — HYDROMORPHONE HYDROCHLORIDE 0.2 MG: 0.2 INJECTION, SOLUTION INTRAMUSCULAR; INTRAVENOUS; SUBCUTANEOUS at 10:35

## 2024-01-06 RX ADMIN — DEXAMETHASONE SODIUM PHOSPHATE 6 MG: 10 INJECTION, SOLUTION INTRAMUSCULAR; INTRAVENOUS at 23:37

## 2024-01-06 RX ADMIN — HYDROMORPHONE HYDROCHLORIDE 0.2 MG: 0.2 INJECTION, SOLUTION INTRAMUSCULAR; INTRAVENOUS; SUBCUTANEOUS at 16:15

## 2024-01-06 RX ADMIN — DIAZEPAM 5 MG: 10 INJECTION, SOLUTION INTRAMUSCULAR; INTRAVENOUS at 21:05

## 2024-01-06 RX ADMIN — HYDROMORPHONE HYDROCHLORIDE 0.2 MG: 0.2 INJECTION, SOLUTION INTRAMUSCULAR; INTRAVENOUS; SUBCUTANEOUS at 23:37

## 2024-01-06 RX ADMIN — VANCOMYCIN HYDROCHLORIDE 2000 MG: 10 INJECTION, POWDER, LYOPHILIZED, FOR SOLUTION INTRAVENOUS at 16:11

## 2024-01-06 RX ADMIN — SODIUM CHLORIDE, SODIUM GLUCONATE, SODIUM ACETATE, POTASSIUM CHLORIDE, MAGNESIUM CHLORIDE, SODIUM PHOSPHATE, DIBASIC, AND POTASSIUM PHOSPHATE 50 ML/HR: .53; .5; .37; .037; .03; .012; .00082 INJECTION, SOLUTION INTRAVENOUS at 10:30

## 2024-01-06 RX ADMIN — IPRATROPIUM BROMIDE 0.5 MG: 0.5 SOLUTION RESPIRATORY (INHALATION) at 19:53

## 2024-01-06 RX ADMIN — ENOXAPARIN SODIUM 40 MG: 40 INJECTION SUBCUTANEOUS at 10:21

## 2024-01-06 RX ADMIN — LEVALBUTEROL HYDROCHLORIDE 1.25 MG: 1.25 SOLUTION RESPIRATORY (INHALATION) at 08:48

## 2024-01-06 RX ADMIN — FUROSEMIDE 20 MG: 10 INJECTION, SOLUTION INTRAMUSCULAR; INTRAVENOUS at 21:13

## 2024-01-06 RX ADMIN — IPRATROPIUM BROMIDE 0.5 MG: 0.5 SOLUTION RESPIRATORY (INHALATION) at 14:39

## 2024-01-06 NOTE — ASSESSMENT & PLAN NOTE
Due to aspiration/COVID infection. Mainly aspiration per pulm   History of multiple sclerosis with progressive worsening of dysphagia  Presented as below   Overnight O2 requirements worsened from 6L to HFNC at 60L  Wean O2 as able  Plan as below

## 2024-01-06 NOTE — ASSESSMENT & PLAN NOTE
Has quadriplegia and progressive dysphagia and hypophonia  Follows with neurology as outpatient  Supportive care

## 2024-01-06 NOTE — RESPIRATORY THERAPY NOTE
01/06/24 0015   Respiratory Assessment   Assessment Type Assess only   General Appearance Alert;Awake   Respiratory Pattern Shallow;Spontaneous   Chest Assessment Chest expansion symmetrical   Bilateral Breath Sounds Diminished;Rhonchi   Cough None  (pt unable to cough due to MS)   Suction Oral  (deep oral suction)   Resp Comments RT called to deep suction pt. Pt now on 6L NC. No longer able to protect airway without scheduled open suctioning. Provider contacted as pt will need to be moved to a negative pressure room for scheduled deep oropharynx and NT suctioning, pt is unable to generate any cough due to her past medical history. Provider in agreement, will place appropriate orders. RT will continue to monitor.   O2 Device 6L NC   Oral Suctioning/Secretions   Suction Type Oropharyngeal   Suction Device Catheter  (10 Fr)   Secretion Amount Copious   Secretion Color Clear   Secretion Consistency Thick   Suction Tolerance Tolerated fairly well   Suctioning Adverse Effects Nausea;Desaturation;Anxiety;Tachycardia  (pt gagging while being suctioned, suggested NT but pt prefers oral/orpharyngeal)   Additional Assessments   Pulse 105   Respirations 22   SpO2 91 %

## 2024-01-06 NOTE — ASSESSMENT & PLAN NOTE
Has chronic suprapubic catheter  Urine culture with Pseudomonas aeruginosa, Enterococcus faecalis  Cefepime/vancomycin  ID consult

## 2024-01-06 NOTE — SPEECH THERAPY NOTE
"Speech Language/Pathology    Speech/Language Pathology Progress Note    Patient Name: Fanny Schulz  Today's Date: 1/6/2024     Problem List  Principal Problem:    Acute respiratory failure with hypoxia (HCC)  Active Problems:    Suprapubic catheter (HCC)    Constipation    Recurrent major depressive disorder, in full remission (HCC)    Swallowing difficulty    Functional quadriplegia secondary to MS (HCC)    Continuous opioid dependence (HCC)       Past Medical History  Past Medical History:   Diagnosis Date    Anesthesia     \"prefers if able to be put to sleep on stretcher before placed on table if possible due to severe spasticity    Bladder stones     Chronic pain disorder     neck    Contracture, right shoulder     right arm and hand    Dependent on wheelchair     motorized    Edema     dependant lower extremities    Elevated alkaline phosphatase level     Mildly elevated total, normal isoenzymes 4/15/15, normal 1/17; last assessed: 24Nov2015    Fernandez catheter in place     #24 to large bag(silicone catheter)    Gallbladder disease     History of femur fracture     right leg    History of UTI     MS (multiple sclerosis) (HCC)     Muscle spasticity     especially with touch    Muscle weakness     Muscular dystrophy (HCC)     Neck pain     Neurogenic bladder     Paralysis (HCC)     bilateral lower extremities    Port-A-Cath in place     left chest,\" hasn't used in approx 1 yr or so\"    Pressure injury of skin     right ischium    Sacral pressure sore     \"shearing, tegaderm in place,\"    Swallowing difficulty     Tinnitus     Urinary incontinence         Past Surgical History  Past Surgical History:   Procedure Laterality Date    BLADDER STONE REMOVAL N/A 12/4/2017    Procedure: CYSTO, LITHOLOPAXY HOLMIUM LASER;  Surgeon: Damir Osborne MD;  [facility];  Service: Urology    BLADDER SURGERY      CHOLECYSTECTOMY      CYSTOSCOPY  06/15/2020    ESOPHAGOGASTRODUODENOSCOPY      FL RETROGRADE PYELOGRAM  " 10/2/2020    FL RETROGRADE PYELOGRAM  11/3/2020    KIDNEY STONE SURGERY      PORTACATH PLACEMENT Left     ID CYSTO BLADDER W/URETERAL CATHETERIZATION Left 10/2/2020    Procedure: CYSTOSCOPY LEFT RETROGRADE ; LEFT URETEROSCOPY; PYELOGRAM WITH STENT INSERTION AND SUPERPUBIC EXCHANGE;  Surgeon: Philip Mcdonald MD;  [facility];  Service: Urology    ID CYSTO/URETERO W/LITHOTRIPSY &INDWELL STENT INSRT Left 11/3/2020    Procedure: CYSTOSCOPY URETEROSCOPY WITH RETROGRADE PYELOGRAM AND EXCHANGE STENT URETERAL, SP TUBE EXCHANGE, BASKET STONE EXTRACTION, BLADDER STONE EXTRACTION;  Surgeon: Damir Osborne MD;  [facility];  Service: Urology    ID LITHOLAPAXY SMPL/SM <2.5 CM N/A 12/22/2022    Procedure: Cystolitholapaxy w/  laser lithotripsy of bladder stones; SUPRAPUBIC CATHETER CHANGE;  Surgeon: Damir Osborne MD;  [facility];  Service: Urology    SUPRAPUBIC CYSTOSTOMY  02/25/2014    last assessed: [id number]         Subjective:  Pt seen for dysphagia tx. Pt alert, sitting upright in bed.  in room for session.     Objective:  Pt was changed to HFNC overnight. Sats were in the mid to upper 90s during session. Vocal quality is somewhat wet, with reduced loudness, increased breathiness. Pt unable to elicit a cued cough or throat clear. Pt was trialed with 2 ice chips, 2 small sips of water via straw, and half teaspoon of applesauce. Bolus retrieval was adequate. Some difficulty drawing liquid via straw. Bolus manipulation and transfer appeared prolonged, with suspected reduced bolus control,  ?premature spill into pharynx. Hyolaryngeal elevation observed and palpated, suspected reduced hyolaryngeal motion, suspect delayed pharyngeal swallows. There was a weak cough produced immediately after swallowing the applesauce. No cough with ice chips or thin water, but increased wet vocal quality. Pt was suctioned several times, with min return.  Discussed recommendation to remain NPO, due  to high aspiration risk, with pt and . Both verbalized understanding. Discussed with RN as well.     Assessment:  Pt appears to be at high aspiration risk, given multiple factors: MS diagnosis exacerbated by Covid, increased O2 need, HFNC, suspected reduced airway protection (wet gurgly vocal quality, unable to elicit a volitional cough or throat clear). Oropharyngeal swallow is prolonged/slow and appears weak.     Plan/Recommendations:  Continue NPO. Continue with dysphagia tx. Pt may need short term alternate nutrition. Likely VBS when off Covid precautions.

## 2024-01-06 NOTE — QUICK NOTE
Progress Note - Triage Asssessment   Fanny Scuhlz 53 y.o. female MRN: [id number]    Time Called ( Time): 3:15 am  Date Called: 01/06/24  Room#: MS-758  Person requesting evaluation: LAURA SLIM PA    Situation:    53 year old female with history of MS and quadriplegia, who was admitted on 01/05/2024 due to acute hypoxic respiratory failure secondary to  COVID-19 infection. She was originally requiring 6L NC and recently with worsening hypoxia requiring up to 90% FiO2 to maintain SpO2 > 90%. On evaluation, patient is alert and oriented. No evidence of respiratory distress. She is currently on HFNC 50L/50% with SpO2 96%.      Interventions:   None         Triage Assessment:     Patient can be upgraded to SD-2 under SLIM  Recommend pulmonary consult.  Please, TT MICU fellow role if FiO2 requirements increase.     Recommendations discussed with primary team.

## 2024-01-06 NOTE — CONSULTS
Consult Note - Pulmonary and Critical Care Medicine  Fanny Schulz 53 y.o. female MRN: [id number]  Unit/Bed#: [facility] 203-01 Encounter: [id number]    Consult requested location: Unit/Bed#: [facility] 203-01  Physician Requesting Consult: Ingris Cotton MD   Reason for Consult: Acute hypoxic respiratory failure    History/ Recent events  53-year-old female with past medical history of multiple sclerosis, lower extremity paralysis, suspected chronic aspiration presents for worsening hypoxic respiratory failure in setting of acute COVID-19 infection.  Per chart, patient has weak cough and is unable to verbalize at this time.  He is currently on high flow nasal cannula    Physical Exam  VS /61   Pulse (!) 118   Temp 97.7 °F (36.5 °C) (Oral)   Resp 22   SpO2 99%   Moderate respiratory distress  S1-S2  Diffuse rhonchi  Soft, nontender  + Lower extremity pitting edema, anasarca  Awake and alert but unable to answer questions    Labs: I have personally reviewed pertinent lab results.  Results from last 7 days   Lab Units 01/06/24  0312 01/05/24  1215   WBC Thousand/uL 10.30* 8.92   HEMOGLOBIN g/dL 12.1 13.3   HEMATOCRIT % 37.0 41.1   PLATELETS Thousands/uL 173 195   NEUTROS PCT %  --  85*   MONOS PCT %  --  5   EOS PCT %  --  0      Results from last 7 days   Lab Units 01/06/24  0312 01/05/24  1215   SODIUM mmol/L 133* 133*   POTASSIUM mmol/L 3.5 3.6   CHLORIDE mmol/L 99 97   CO2 mmol/L 19* 26   BUN mg/dL 7 8   CREATININE mg/dL 0.31* 0.32*   CALCIUM mg/dL 8.3* 8.9   ALK PHOS U/L 52 68   ALT U/L 12 16   AST U/L 17 21             Results Reviewed       Procedure Component Value Units Date/Time    Blood culture [[id number]] Collected: 01/05/24 1225    Lab Status: Preliminary result Specimen: Blood from Arm, Left Updated: 01/05/24 1601     Blood Culture Received in Microbiology Lab. Culture in Progress.    Blood culture [[id number]] Collected: 01/05/24 1235    Lab Status: Preliminary result Specimen: Blood from Arm, Left  Updated: 01/05/24 1601     Blood Culture Received in Microbiology Lab. Culture in Progress.    Procalcitonin [[id number]]  (Normal) Collected: 01/05/24 1215    Lab Status: Final result Specimen: Blood from Arm, Left Updated: 01/05/24 1529     Procalcitonin 0.06 ng/ml     COVID/FLU/RSV [[id number]]  (Abnormal) Collected: 01/05/24 1344    Lab Status: Final result Specimen: Nares from Nose Updated: 01/05/24 1525     SARS-CoV-2 Positive     INFLUENZA A PCR Negative     INFLUENZA B PCR Negative     RSV PCR Negative    Narrative:      FOR PEDIATRIC PATIENTS - copy/paste COVID Guidelines URL to browser: [email or web address]    SARS-CoV-2 assay is a Nucleic Acid Amplification assay intended for the  qualitative detection of nucleic acid from SARS-CoV-2 in nasopharyngeal  swabs. Results are for the presumptive identification of SARS-CoV-2 RNA.    Positive results are indicative of infection with SARS-CoV-2, the virus  causing COVID-19, but do not rule out bacterial infection or co-infection  with other viruses. Laboratories within the [place] and its  territories are required to report all positive results to the appropriate  public health authorities. Negative results do not preclude SARS-CoV-2  infection and should not be used as the sole basis for treatment or other  patient management decisions. Negative results must be combined with  clinical observations, patient history, and epidemiological information.  This test has not been FDA cleared or approved.    This test has been authorized by FDA under an Emergency Use Authorization  (EUA). This test is only authorized for the duration of time the  declaration that circumstances exist justifying the authorization of the  emergency use of an in vitro diagnostic tests for detection of SARS-CoV-2  virus and/or diagnosis of COVID-19 infection under section 564(b)(1) of  the Act, 21 U.S.C. 360bbb-3(b)(1), unless the  authorization is terminated  or revoked sooner. The test has been validated but independent review by FDA  and CLIA is pending.    Test performed using [employer] GeneXpert: This RT-PCR assay targets N2,  a region unique to SARS-CoV-2. A conserved region in the E-gene was chosen  for pan-Sarbecovirus detection which includes SARS-CoV-2.    According to CMS-2020-01-R, this platform meets the definition of high-throughput technology.    Urine Microscopic [[id number]]  (Abnormal) Collected: 01/05/24 1342    Lab Status: Final result Specimen: Urine, Suprapubic catheter Updated: 01/05/24 1423     RBC, UA Innumerable /hpf      WBC, UA Innumerable /hpf      Epithelial Cells Occasional /hpf      Bacteria, UA Occasional /hpf      MUCUS THREADS Innumerable     Hyaline Casts, UA 5-10 /lpf      WBC Clumps Present    Urine culture [[id number]] Collected: 01/05/24 1342    Lab Status: In process Specimen: Urine, Suprapubic catheter Updated: 01/05/24 1423    UA w Reflex to Microscopic w Reflex to Culture [[id number]]  (Abnormal) Collected: 01/05/24 1342    Lab Status: Final result Specimen: Urine, Suprapubic catheter Updated: 01/05/24 1405     Color, UA Yellow     Clarity, UA Turbid     Specific Gravity, UA 1.021     pH, UA 6.0     Leukocytes, UA Large     Nitrite, UA Positive     Protein,  (2+) mg/dl      Glucose, UA Negative mg/dl      Ketones, UA >=150 (4+) mg/dl      Urobilinogen, UA 2.0 mg/dl      Bilirubin, UA Negative     Occult Blood, UA Moderate    Comprehensive metabolic panel [[id number]]  (Abnormal) Collected: 01/05/24 1215    Lab Status: Final result Specimen: Blood from Arm, Left Updated: 01/05/24 1256     Sodium 133 mmol/L      Potassium 3.6 mmol/L      Chloride 97 mmol/L      CO2 26 mmol/L      ANION GAP 10 mmol/L      BUN 8 mg/dL      Creatinine 0.32 mg/dL      Glucose 97 mg/dL      Calcium 8.9 mg/dL      AST 21 U/L      ALT 16 U/L      Alkaline Phosphatase 68 U/L      Total Protein 7.0 g/dL      Albumin 3.9 g/dL       Total Bilirubin 0.52 mg/dL      eGFR 128 ml/min/1.73sq m     Narrative:      National Kidney Disease Foundation guidelines for Chronic Kidney Disease (CKD):     Stage 1 with normal or high GFR (GFR > 90 mL/min/1.73 square meters)    Stage 2 Mild CKD (GFR = 60-89 mL/min/1.73 square meters)    Stage 3A Moderate CKD (GFR = 45-59 mL/min/1.73 square meters)    Stage 3B Moderate CKD (GFR = 30-44 mL/min/1.73 square meters)    Stage 4 Severe CKD (GFR = 15-29 mL/min/1.73 square meters)    Stage 5 End Stage CKD (GFR <15 mL/min/1.73 square meters)  Note: GFR calculation is accurate only with a steady state creatinine    CBC and differential [[id number]]  (Abnormal) Collected: 01/05/24 1215    Lab Status: Final result Specimen: Blood from Arm, Left Updated: 01/05/24 1228     WBC 8.92 Thousand/uL      RBC 4.77 Million/uL      Hemoglobin 13.3 g/dL      Hematocrit 41.1 %      MCV 86 fL      MCH 27.9 pg      MCHC 32.4 g/dL      RDW 13.0 %      MPV 9.1 fL      Platelets 195 Thousands/uL      nRBC 0 /100 WBCs      Neutrophils Relative 85 %      Immat GRANS % 0 %      Lymphocytes Relative 10 %      Monocytes Relative 5 %      Eosinophils Relative 0 %      Basophils Relative 0 %      Neutrophils Absolute 7.57 Thousands/µL      Immature Grans Absolute 0.03 Thousand/uL      Lymphocytes Absolute 0.86 Thousands/µL      Monocytes Absolute 0.43 Thousand/µL      Eosinophils Absolute 0.01 Thousand/µL      Basophils Absolute 0.02 Thousands/µL              MICRO  Results from last 7 days   Lab Units 01/05/24  1235 01/05/24  1225   BLOOD CULTURE  Received in Microbiology Lab. Culture in Progress. Received in Microbiology Lab. Culture in Progress.       Imaging/Radiology, Pathology  I have personally reviewed pertinent reports.      Current Medications    Current Facility-Administered Medications:     acetaminophen (TYLENOL) rectal suppository 650 mg, 650 mg, Rectal, Q6H PRN, Apryl Aragon PA-C    azithromycin (ZITHROMAX) 500 mg in sodium  chloride 0.9 % 250 mL IVPB, 500 mg, Intravenous, Q24H, Fortunato Eid     baclofen tablet 20 mg, 20 mg, Oral, 4x Daily PRN, Tiffany Hickey MD    bisacodyl (DULCOLAX) rectal suppository 10 mg, 10 mg, Rectal, Daily PRN, Tiffany Hickey MD    ceftriaxone (ROCEPHIN) 1 g/50 mL in dextrose IVPB, 1,000 mg, Intravenous, Q24H, Tiffany Hickey MD    dexamethasone (PF) (DECADRON) injection 6 mg, 6 mg, Intravenous, Q24H, Apryl Aragon PA-C, 6 mg at 01/06/24 0115    diazepam (VALIUM) injection 5 mg, 5 mg, Intravenous, Q6H PRN, Apryl Aragon PA-C, 5 mg at 01/06/24 0628    DULoxetine (CYMBALTA) delayed release capsule 20 mg, 20 mg, Oral, Daily, Tiffany Hickey MD    enoxaparin (LOVENOX) subcutaneous injection 40 mg, 40 mg, Subcutaneous, Daily, Tiffany Hickey MD, 40 mg at 01/06/24 1021    HYDROmorphone HCl (DILAUDID) injection 0.2 mg, 0.2 mg, Intravenous, Q4H PRN, Apryl Aragon PA-C, 0.2 mg at 01/06/24 1035    levalbuterol (XOPENEX) inhalation solution 1.25 mg, 1.25 mg, Nebulization, Q6H, Tiffany Hickey MD, 1.25 mg at 01/06/24 0848    multi-electrolyte (PLASMALYTE-A/ISOLYTE-S PH 7.4) IV solution, 50 mL/hr, Intravenous, Continuous, Apryl Aragon PA-C, Last Rate: 50 mL/hr at 01/06/24 1030, 50 mL/hr at 01/06/24 1030    ondansetron (ZOFRAN) injection 4 mg, 4 mg, Intravenous, Q6H PRN, Tiffany Hickey MD, 4 mg at 01/05/24 2348    oxybutynin (DITROPAN-XL) 24 hr tablet 10 mg, 10 mg, Oral, Daily, Tiffany Hickey MD    polyethylene glycol (MIRALAX) packet 17 g, 17 g, Oral, Daily PRN, Tiffany Hickey MD    pravastatin (PRAVACHOL) tablet 40 mg, 40 mg, Oral, Daily With Dinner, Tiffany Hickey MD    [COMPLETED] remdesivir (Veklury) 200 mg in sodium chloride 0.9 % 290 mL IVPB, 200 mg, Intravenous, Q24H, 200 mg at 01/06/24 0324 **FOLLOWED BY** [START ON 1/7/2024] remdesivir (Veklury) 100 mg in sodium chloride 0.9 % 270 mL IVPB, 100 mg, Intravenous, Q24H, Apryl Aragon PA-C    sodium chloride 3 % inhalation solution 4 mL, 4 mL, Nebulization, Q6H,  "Thelma Dhaliwal PA-C, 4 mL at 01/06/24 0200       Assessment  Acute hypoxic respiratory failure  Suspected aspiration  SARS-CoV-2 infection  Anion gap metabolic acidosis    Plan  Continue titrate supplemental oxygen for goal SpO2 greater than 92% the setting of acute hypoxic respiratory failure.  Patient currently requiring high flow nasal cannula at 50 L/min and 60% FiO2 with a subsequent oxygen saturation of 94%.  Continue aggressive pulmonary toileting.  Continue hypertonic saline nebs.  Continue Xopenex.  Add ipratropium nebs.  Add oscillatory vest.  Agree with remdesivir for 5 days and Decadron for up to 10 days or until off supplemental oxygen  Recommend minimizing respiratory depressant medications including opiates and benzodiazepines, but will defer this to patient's primary service  Of anion gap metabolic acidosis.  Potential etiologies include lactic acidosis or ketosis.  Workup per primary service.  Pulmonary medicine will continue to follow.  If patient condition deteriorates, consider transfer to intensive care unit      Critical Care Time Statement  Upon my evaluation, this patient has a high probability of imminent or life-threatening deterioration due to severe hypoxic respiratory failure requiring high flow nasal cannula which required my direct attention, intervention, and personal management.     I have personally provided 35 minutes of critical care time, exclusive of procedures, teaching, and any prior time recorded by providers other than myself. Time includes review of laboratory data, radiology results, discussion with consultants, and monitoring for potential decompensation.    Charlie Corral MD    Note: Portions of the record may have been created with voice recognition software. Occasional wrong word or \"sound a like\" substitutions may have occurred due to the inherent limitations of voice recognition software. Read the chart carefully and recognize, using context, where substitutions " have occurred.

## 2024-01-06 NOTE — RESPIRATORY THERAPY NOTE
01/06/24 0254   Respiratory Assessment   Assessment Type Post-treatment   General Appearance Alert;Awake   Respiratory Pattern Shallow;Symmetrical;Spontaneous;Assisted   Chest Assessment Chest expansion symmetrical;Trachea midline   Bilateral Breath Sounds Diminished;Coarse;Rhonchi  (very diminished)   Cough Unable to assess;None  (unable)   Suction Oral   Resp Comments Pt still unable to maintain her SATs on MFNC, placed on HFNC, 50L/90%. suctioned and neb given. sats on HFNC 87-92%. Provider at bedside.   O2 Device HFNC   Oxygen Therapy/Pulse Ox   O2 Device High flow nasal cannula   FiO2 (%) 90   O2 Flow Rate (L/min) 50 L/min   SpO2 92 %   SpO2 Activity At Rest   $ Pulse Oximetry Spot Check Charge Completed

## 2024-01-06 NOTE — PROGRESS NOTES
[facility]  Progress Note  Name: Fanny Schulz I  MRN: [id number]  Unit/Bed#: [facility] 203-01 I Date of Admission: 1/5/2024   Date of Service: 1/6/2024 I Hospital Day: 1    Assessment/Plan   * Acute respiratory failure with hypoxia (HCC)  Assessment & Plan  Due to aspiration/COVID infection. Mainly aspiration per pulm   History of multiple sclerosis with progressive worsening of dysphagia  Presented as below   Overnight O2 requirements worsened from 6L to HFNC at 60L  Wean O2 as able  Plan as below    COVID-19 virus infection  Assessment & Plan  Presented on 1/5/24 with nasal congestion and inability to cough with shortness of breath, inability to swallow in the setting of multiple sclerosis with quadriplegia and progressive worsening of dysphagia and hypophonia  Tested positive for COVID  On Remdesivir, Decadron (ct at 6 mg daily per pulm)  Ceftriaxone changed to Cefepime due to Pseudomonas UTI, ct Azithromycin  Trend CRP  Daily CBCD, CMP      Dysphagia  Assessment & Plan  Progressive worsening in the setting of MS  Keep NPO per speech  IVF's with D5    Possible urinary tract infection associated with cystostomy catheter  Assessment & Plan  Has chronic suprapubic catheter  Urine culture with Pseudomonas aeruginosa, Enterococcus faecalis  Cefepime/vancomycin  ID consult    Suprapubic catheter (HCC)  Assessment & Plan  Neurogenic bladder with suprapubic catheter in the setting of MS    Multiple sclerosis ([facility])  Assessment & Plan  Has quadriplegia and progressive dysphagia and hypophonia  Follows with neurology as outpatient  Supportive care    Functional quadriplegia secondary to MS ([facility])  Assessment & Plan  Supportive care  Due to strict NPO status unable to get po baclofen. Monitor for signs of baclofen withdrawal. Prn iv valium in the interim.    Recurrent major depressive disorder, in full remission ([facility])  Assessment & Plan  Restart home Cymbalta 20 mg when able to take  PO      Hypercholesteremia  Assessment & Plan  Restart statin when able to take PO (equivalent formulary Pravastatin of home Rosuvastatin)    Continuous opioid dependence (HCC)  Assessment & Plan  Home med: Percocet 5/325 mg q 4h  PDMP reviewed  Uses very sparingly per PCP note  Use Dilaudid IV prn while NPO         VTE Pharmacologic Prophylaxis: VTE Score: 7 High Risk (Score >/= 5) - Pharmacological DVT Prophylaxis Ordered: enoxaparin (Lovenox). Sequential Compression Devices Ordered.    Mobility:   Basic Mobility Inpatient Raw Score: 6  JH-HLM Goal: 2: Bed activities/Dependent transfer  JH-HLM Achieved: 2: Bed activities/Dependent transfer  HLM Goal achieved. Continue to encourage appropriate mobility.    Patient Centered Rounds: Discussed with RN   Discussions with Specialists or Other Care Team Provider: Discussed with pulmonology    Education and Discussions with Family / Patient: Updated  ( and daughter) at bedside.    Total Time Spent on Date of Encounter in care of patient: 20 mins. This time was spent on one or more of the following: performing physical exam; counseling and coordination of care; obtaining or reviewing history; documenting in the medical record; reviewing/ordering tests, medications or procedures; communicating with other healthcare professionals and discussing with patient's family/caregivers.    Current Length of Stay: 1 day(s)  Current Patient Status: Inpatient   Certification Statement: The patient will continue to require additional inpatient hospital stay due to as above    Code Status: Level 1 - Full Code    Subjective:   Worsening hypoxia overnight and currently requiring high flow nasal cannula at 60 L.  No fever.     Objective:     Vitals:   Temp (24hrs), Av.5 °F (36.9 °C), Min:97.7 °F (36.5 °C), Max:99.1 °F (37.3 °C)    Temp:  [97.7 °F (36.5 °C)-99.1 °F (37.3 °C)] 97.7 °F (36.5 °C)  HR:  [] 118  Resp:  [20-22] 22  BP: (113-145)/(54-73) 124/61  SpO2:   [85 %-99 %] 99 %    Physical Exam:   Physical Exam  Vitals reviewed.   HENT:      Head: Normocephalic.      Nose: Nose normal.      Mouth/Throat:      Mouth: Mucous membranes are dry.   Cardiovascular:      Rate and Rhythm: Regular rhythm. Tachycardia present.   Pulmonary:      Effort: Pulmonary effort is normal.      Breath sounds: Rhonchi present.   Abdominal:      General: Bowel sounds are normal. There is no distension.      Palpations: Abdomen is soft.   Musculoskeletal:      Right lower leg: Edema present.      Left lower leg: Edema present.   Skin:     General: Skin is warm and dry.   Neurological:      Mental Status: She is alert.      Comments: Hypophonia   Quadriplegia          Additional Data:     Labs:  Results from last 7 days   Lab Units 01/06/24  0312 01/05/24  1215   WBC Thousand/uL 10.30* 8.92   HEMOGLOBIN g/dL 12.1 13.3   HEMATOCRIT % 37.0 41.1   PLATELETS Thousands/uL 173 195   NEUTROS PCT %  --  85*   LYMPHS PCT %  --  10*   MONOS PCT %  --  5   EOS PCT %  --  0     Results from last 7 days   Lab Units 01/06/24  1352 01/06/24  0312   SODIUM mmol/L 134* 133*   POTASSIUM mmol/L 3.6 3.5   CHLORIDE mmol/L 102 99   CO2 mmol/L 19* 19*   BUN mg/dL 6 7   CREATININE mg/dL 0.31* 0.31*   ANION GAP mmol/L 13 15   CALCIUM mg/dL 8.3* 8.3*   ALBUMIN g/dL  --  3.6   TOTAL BILIRUBIN mg/dL  --  0.41   ALK PHOS U/L  --  52   ALT U/L  --  12   AST U/L  --  17   GLUCOSE RANDOM mg/dL 117 114                 Results from last 7 days   Lab Units 01/06/24  1352 01/06/24  0312 01/05/24  1215   LACTIC ACID mmol/L 1.8  --   --    PROCALCITONIN ng/ml  --  0.08 0.06       Lines/Drains:  Invasive Devices       Central Venous Catheter Line  Duration             Port A Cath Left Chest -- days              Peripheral Intravenous Line  Duration             Peripheral IV 01/05/24 Left;Ventral (anterior) Forearm 1 day              Drain  Duration             Suprapubic Catheter 24 Fr. 380 days                    Central Line:  Goal  for removal: Maintain port    Recent Cultures (last 7 days):   Results from last 7 days   Lab Units 01/05/24  1342 01/05/24  1235 01/05/24  1225   BLOOD CULTURE   --  Received in Microbiology Lab. Culture in Progress. Received in Microbiology Lab. Culture in Progress.   URINE CULTURE  >100,000 cfu/ml Pseudomonas aeruginosa*  >100,000 cfu/ml Enterococcus faecalis*  --   --        Last 24 Hours Medication List:   Current Facility-Administered Medications   Medication Dose Route Frequency Provider Last Rate    acetaminophen  650 mg Rectal Q6H PRN Apryl Aragon PA-C      azithromycin  500 mg Intravenous Q24H Fortunato Eid,  mg (01/06/24 1145)    baclofen  20 mg Oral 4x Daily PRN Tiffany Hickey MD      bisacodyl  10 mg Rectal Daily PRN Tiffany Hickey MD      cefepime  2,000 mg Intravenous Q8H Ingris Cotton MD      dexamethasone  6 mg Intravenous Q24H Apryl Aragon PA-C      dextrose 5 % and sodium chloride 0.9 %  75 mL/hr Intravenous Continuous Ingris Cotton MD 75 mL/hr (01/06/24 1342)    diazepam  5 mg Intravenous Q6H PRN Apryl Aragon PA-C      DULoxetine  20 mg Oral Daily Tiffany Hickey MD      enoxaparin  30 mg Subcutaneous Q12H JOVI Ingris Cotton MD      HYDROmorphone  0.2 mg Intravenous Q4H PRN Apryl Aragon PA-C      ipratropium  0.5 mg Nebulization Q6H Ingris Cotton MD      levalbuterol  1.25 mg Nebulization Q6H Tiffany Hickey MD      ondansetron  4 mg Intravenous Q6H PRN Tiffany Hickey MD      oxybutynin  10 mg Oral Daily Tiffany Hickey MD      polyethylene glycol  17 g Oral Daily PRN Tiffany Hickey MD      pravastatin  40 mg Oral Daily With Dinner Tiffany Hickey MD      [START ON 1/7/2024] remdesivir  100 mg Intravenous Q24H Apryl Aragon PA-C      sodium chloride  4 mL Nebulization Q6H Thelma Dhaliwal PA-C      vancomycin  2,000 mg Intravenous Once Ingris Cotton MD          Today, Patient Was Seen By: Ingris Cotton MD    **Please Note: This note may have been constructed  using a voice recognition system.**

## 2024-01-06 NOTE — PLAN OF CARE
Problem: PAIN - ADULT  Goal: Verbalizes/displays adequate comfort level or baseline comfort level  Description: Interventions:  - Encourage patient to monitor pain and request assistance  - Assess pain using appropriate pain scale  - Administer analgesics based on type and severity of pain and evaluate response  - Implement non-pharmacological measures as appropriate and evaluate response  - Consider cultural and social influences on pain and pain management  - Notify physician/advanced practitioner if interventions unsuccessful or patient reports new pain  Outcome: Progressing     Problem: INFECTION - ADULT  Goal: Absence or prevention of progression during hospitalization  Description: INTERVENTIONS:  - Assess and monitor for signs and symptoms of infection  - Monitor lab/diagnostic results  - Monitor all insertion sites, i.e. indwelling lines, tubes, and drains  - Monitor endotracheal if appropriate and nasal secretions for changes in amount and color  - [place] appropriate cooling/warming therapies per order  - Administer medications as ordered  - Instruct and encourage patient and family to use good hand hygiene technique  - Identify and instruct in appropriate isolation precautions for identified infection/condition  Outcome: Progressing  Goal: Absence of fever/infection during neutropenic period  Description: INTERVENTIONS:  - Monitor WBC    Outcome: Progressing     Problem: SAFETY ADULT  Goal: Patient will remain free of falls  Description: INTERVENTIONS:  - Educate patient/family on patient safety including physical limitations  - Instruct patient to call for assistance with activity   - Consult OT/PT to assist with strengthening/mobility   - Keep Call bell within reach  - Keep bed low and locked with side rails adjusted as appropriate  - Keep care items and personal belongings within reach  - Initiate and maintain comfort rounds  - Make Fall Risk Sign visible to staff  - Offer Toileting every 2 Hours,  in advance of need  - Initiate/Maintain bed alarm  - Obtain necessary fall risk management equipment:   - Apply yellow socks and bracelet for high fall risk patients  - Consider moving patient to room near nurses station  Outcome: Progressing  Goal: Maintain or return to baseline ADL function  Description: INTERVENTIONS:  -  Assess patient's ability to carry out ADLs; assess patient's baseline for ADL function and identify physical deficits which impact ability to perform ADLs (bathing, care of mouth/teeth, toileting, grooming, dressing, etc.)  - Assess/evaluate cause of self-care deficits   - Assess range of motion  - Assess patient's mobility; develop plan if impaired  - Assess patient's need for assistive devices and provide as appropriate  - Encourage maximum independence but intervene and supervise when necessary  - Involve family in performance of ADLs  - Assess for home care needs following discharge   - Consider OT consult to assist with ADL evaluation and planning for discharge  - Provide patient education as appropriate  Outcome: Progressing  Goal: Maintains/Returns to pre admission functional level  Description: INTERVENTIONS:  - Perform AM-PAC 6 Click Basic Mobility/ Daily Activity assessment daily.  - Set and communicate daily mobility goal to care team and patient/family/caregiver.   - Collaborate with rehabilitation services on mobility goals if consulted  - Perform Range of Motion 3 times a day.  - Reposition patient every 2 hours.  - Dangle patient 3 times a day  - Stand patient 3 times a day  - Ambulate patient 3 times a day  - Out of bed to chair 3 times a day   - Out of bed for meals 3 times a day  - Out of bed for toileting  - Record patient progress and toleration of activity level   Outcome: Progressing     Problem: DISCHARGE PLANNING  Goal: Discharge to home or other facility with appropriate resources  Description: INTERVENTIONS:  - Identify barriers to discharge w/patient and caregiver  -  Arrange for needed discharge resources and transportation as appropriate  - Identify discharge learning needs (meds, wound care, etc.)  - Arrange for interpretive services to assist at discharge as needed  - Refer to Case Management Department for coordinating discharge planning if the patient needs post-hospital services based on physician/advanced practitioner order or complex needs related to functional status, cognitive ability, or social support system  Outcome: Progressing     Problem: Knowledge Deficit  Goal: Patient/family/caregiver demonstrates understanding of disease process, treatment plan, medications, and discharge instructions  Description: Complete learning assessment and assess knowledge base.  Interventions:  - Provide teaching at level of understanding  - Provide teaching via preferred learning methods  Outcome: Progressing     Problem: RESPIRATORY - ADULT  Goal: Achieves optimal ventilation and oxygenation  Description: INTERVENTIONS:  - Assess for changes in respiratory status  - Assess for changes in mentation and behavior  - Position to facilitate oxygenation and minimize respiratory effort  - Oxygen administered by appropriate delivery if ordered  - Initiate smoking cessation education as indicated  - Encourage broncho-pulmonary hygiene including cough, deep breathe, Incentive Spirometry  - Assess the need for suctioning and aspirate as needed  - Assess and instruct to report SOB or any respiratory difficulty  - Respiratory Therapy support as indicated  Outcome: Progressing

## 2024-01-06 NOTE — ASSESSMENT & PLAN NOTE
Presented on 1/5/24 with nasal congestion and inability to cough with shortness of breath, inability to swallow in the setting of multiple sclerosis with quadriplegia and progressive worsening of dysphagia and hypophonia  Tested positive for COVID  On Remdesivir, Decadron (ct at 6 mg daily per pulm)  Ceftriaxone changed to Cefepime due to Pseudomonas UTI, ct Azithromycin  Trend CRP  Daily CBCD, CMP

## 2024-01-06 NOTE — ASSESSMENT & PLAN NOTE
Home med: Percocet 5/325 mg q 4h  PDMP reviewed  Uses very sparingly per PCP note  Use Dilaudid IV prn while NPO

## 2024-01-06 NOTE — ASSESSMENT & PLAN NOTE
Supportive care  Due to strict NPO status unable to get po baclofen. Monitor for signs of baclofen withdrawal. Prn iv valium in the interim.

## 2024-01-07 PROBLEM — R82.71 BACTERIURIA WITH PYURIA: Status: ACTIVE | Noted: 2020-09-30

## 2024-01-07 PROBLEM — R82.81 BACTERIURIA WITH PYURIA: Status: ACTIVE | Noted: 2020-09-30

## 2024-01-07 LAB
ALBUMIN SERPL BCP-MCNC: 3.4 G/DL (ref 3.5–5)
ALP SERPL-CCNC: 48 U/L (ref 34–104)
ALT SERPL W P-5'-P-CCNC: 12 U/L (ref 7–52)
ANION GAP SERPL CALCULATED.3IONS-SCNC: 9 MMOL/L
AST SERPL W P-5'-P-CCNC: 15 U/L (ref 13–39)
BASOPHILS # BLD AUTO: 0.01 THOUSANDS/ÂΜL (ref 0–0.1)
BASOPHILS NFR BLD AUTO: 0 % (ref 0–1)
BILIRUB SERPL-MCNC: 0.37 MG/DL (ref 0.2–1)
BUN SERPL-MCNC: 9 MG/DL (ref 5–25)
CALCIUM ALBUM COR SERPL-MCNC: 8.6 MG/DL (ref 8.3–10.1)
CALCIUM SERPL-MCNC: 8.1 MG/DL (ref 8.4–10.2)
CHLORIDE SERPL-SCNC: 106 MMOL/L (ref 96–108)
CO2 SERPL-SCNC: 23 MMOL/L (ref 21–32)
CREAT SERPL-MCNC: 0.4 MG/DL (ref 0.6–1.3)
CRP SERPL QL: 121.9 MG/L
EOSINOPHIL # BLD AUTO: 0 THOUSAND/ÂΜL (ref 0–0.61)
EOSINOPHIL NFR BLD AUTO: 0 % (ref 0–6)
ERYTHROCYTE [DISTWIDTH] IN BLOOD BY AUTOMATED COUNT: 13.2 % (ref 11.6–15.1)
GFR SERPL CREATININE-BSD FRML MDRD: 119 ML/MIN/1.73SQ M
GLUCOSE SERPL-MCNC: 199 MG/DL (ref 65–140)
HCT VFR BLD AUTO: 36.1 % (ref 34.8–46.1)
HGB BLD-MCNC: 11.6 G/DL (ref 11.5–15.4)
IMM GRANULOCYTES # BLD AUTO: 0.05 THOUSAND/UL (ref 0–0.2)
IMM GRANULOCYTES NFR BLD AUTO: 1 % (ref 0–2)
LYMPHOCYTES # BLD AUTO: 0.25 THOUSANDS/ÂΜL (ref 0.6–4.47)
LYMPHOCYTES NFR BLD AUTO: 3 % (ref 14–44)
MCH RBC QN AUTO: 27.9 PG (ref 26.8–34.3)
MCHC RBC AUTO-ENTMCNC: 32.1 G/DL (ref 31.4–37.4)
MCV RBC AUTO: 87 FL (ref 82–98)
MONOCYTES # BLD AUTO: 0.18 THOUSAND/ÂΜL (ref 0.17–1.22)
MONOCYTES NFR BLD AUTO: 2 % (ref 4–12)
NEUTROPHILS # BLD AUTO: 7.34 THOUSANDS/ÂΜL (ref 1.85–7.62)
NEUTS SEG NFR BLD AUTO: 94 % (ref 43–75)
NRBC BLD AUTO-RTO: 0 /100 WBCS
PLATELET # BLD AUTO: 176 THOUSANDS/UL (ref 149–390)
PMV BLD AUTO: 9.3 FL (ref 8.9–12.7)
POTASSIUM SERPL-SCNC: 3.1 MMOL/L (ref 3.5–5.3)
PROCALCITONIN SERPL-MCNC: 0.2 NG/ML
PROT SERPL-MCNC: 6.2 G/DL (ref 6.4–8.4)
RBC # BLD AUTO: 4.16 MILLION/UL (ref 3.81–5.12)
SODIUM SERPL-SCNC: 138 MMOL/L (ref 135–147)
VANCOMYCIN SERPL-MCNC: 19.2 UG/ML (ref 10–20)
WBC # BLD AUTO: 7.83 THOUSAND/UL (ref 4.31–10.16)

## 2024-01-07 PROCEDURE — 94640 AIRWAY INHALATION TREATMENT: CPT

## 2024-01-07 PROCEDURE — 80202 ASSAY OF VANCOMYCIN: CPT | Performed by: INTERNAL MEDICINE

## 2024-01-07 PROCEDURE — 94664 DEMO&/EVAL PT USE INHALER: CPT

## 2024-01-07 PROCEDURE — 80053 COMPREHEN METABOLIC PANEL: CPT | Performed by: INTERNAL MEDICINE

## 2024-01-07 PROCEDURE — 94760 N-INVAS EAR/PLS OXIMETRY 1: CPT

## 2024-01-07 PROCEDURE — 86140 C-REACTIVE PROTEIN: CPT | Performed by: STUDENT IN AN ORGANIZED HEALTH CARE EDUCATION/TRAINING PROGRAM

## 2024-01-07 PROCEDURE — 84145 PROCALCITONIN (PCT): CPT | Performed by: STUDENT IN AN ORGANIZED HEALTH CARE EDUCATION/TRAINING PROGRAM

## 2024-01-07 PROCEDURE — 92526 ORAL FUNCTION THERAPY: CPT

## 2024-01-07 PROCEDURE — 99223 1ST HOSP IP/OBS HIGH 75: CPT | Performed by: STUDENT IN AN ORGANIZED HEALTH CARE EDUCATION/TRAINING PROGRAM

## 2024-01-07 PROCEDURE — 99232 SBSQ HOSP IP/OBS MODERATE 35: CPT | Performed by: INTERNAL MEDICINE

## 2024-01-07 PROCEDURE — 85025 COMPLETE CBC W/AUTO DIFF WBC: CPT | Performed by: INTERNAL MEDICINE

## 2024-01-07 PROCEDURE — 94669 MECHANICAL CHEST WALL OSCILL: CPT

## 2024-01-07 RX ORDER — SENNOSIDES 8.6 MG
1 TABLET ORAL
Status: DISCONTINUED | OUTPATIENT
Start: 2024-01-07 | End: 2024-01-08

## 2024-01-07 RX ORDER — DOCUSATE SODIUM 100 MG/1
100 CAPSULE, LIQUID FILLED ORAL 2 TIMES DAILY
Status: DISCONTINUED | OUTPATIENT
Start: 2024-01-07 | End: 2024-01-08

## 2024-01-07 RX ORDER — POTASSIUM CHLORIDE 14.9 MG/ML
20 INJECTION INTRAVENOUS 2 TIMES DAILY
Status: DISPENSED | OUTPATIENT
Start: 2024-01-07 | End: 2024-01-08

## 2024-01-07 RX ORDER — FUROSEMIDE 20 MG/1
20 TABLET ORAL DAILY
Status: DISCONTINUED | OUTPATIENT
Start: 2024-01-07 | End: 2024-01-15 | Stop reason: HOSPADM

## 2024-01-07 RX ORDER — OXYCODONE HYDROCHLORIDE AND ACETAMINOPHEN 5; 325 MG/1; MG/1
1 TABLET ORAL EVERY 4 HOURS PRN
Status: DISCONTINUED | OUTPATIENT
Start: 2024-01-07 | End: 2024-01-15 | Stop reason: HOSPADM

## 2024-01-07 RX ADMIN — IPRATROPIUM BROMIDE 0.5 MG: 0.5 SOLUTION RESPIRATORY (INHALATION) at 19:43

## 2024-01-07 RX ADMIN — SENNOSIDES 8.6 MG: 8.6 TABLET, FILM COATED ORAL at 21:49

## 2024-01-07 RX ADMIN — SODIUM CHLORIDE SOLN NEBU 3% 4 ML: 3 NEBU SOLN at 02:14

## 2024-01-07 RX ADMIN — REMDESIVIR 100 MG: 100 INJECTION, POWDER, LYOPHILIZED, FOR SOLUTION INTRAVENOUS at 00:04

## 2024-01-07 RX ADMIN — ENOXAPARIN SODIUM 30 MG: 30 INJECTION SUBCUTANEOUS at 09:47

## 2024-01-07 RX ADMIN — IPRATROPIUM BROMIDE 0.5 MG: 0.5 SOLUTION RESPIRATORY (INHALATION) at 06:59

## 2024-01-07 RX ADMIN — LEVALBUTEROL HYDROCHLORIDE 1.25 MG: 1.25 SOLUTION RESPIRATORY (INHALATION) at 06:59

## 2024-01-07 RX ADMIN — FUROSEMIDE 20 MG: 20 TABLET ORAL at 16:02

## 2024-01-07 RX ADMIN — VANCOMYCIN HYDROCHLORIDE 1250 MG: 10 INJECTION, POWDER, LYOPHILIZED, FOR SOLUTION INTRAVENOUS at 02:06

## 2024-01-07 RX ADMIN — SODIUM CHLORIDE SOLN NEBU 3% 4 ML: 3 NEBU SOLN at 19:43

## 2024-01-07 RX ADMIN — IPRATROPIUM BROMIDE 0.5 MG: 0.5 SOLUTION RESPIRATORY (INHALATION) at 02:14

## 2024-01-07 RX ADMIN — LEVALBUTEROL HYDROCHLORIDE 1.25 MG: 1.25 SOLUTION RESPIRATORY (INHALATION) at 13:23

## 2024-01-07 RX ADMIN — SODIUM CHLORIDE SOLN NEBU 3% 4 ML: 3 NEBU SOLN at 07:02

## 2024-01-07 RX ADMIN — DIAZEPAM 5 MG: 10 INJECTION, SOLUTION INTRAMUSCULAR; INTRAVENOUS at 06:31

## 2024-01-07 RX ADMIN — SODIUM CHLORIDE SOLN NEBU 3% 4 ML: 3 NEBU SOLN at 13:24

## 2024-01-07 RX ADMIN — POTASSIUM CHLORIDE 20 MEQ: 14.9 INJECTION, SOLUTION INTRAVENOUS at 09:47

## 2024-01-07 RX ADMIN — PRAVASTATIN SODIUM 40 MG: 40 TABLET ORAL at 16:02

## 2024-01-07 RX ADMIN — CEFEPIME 2000 MG: 2 INJECTION, POWDER, FOR SOLUTION INTRAVENOUS at 09:46

## 2024-01-07 RX ADMIN — LEVALBUTEROL HYDROCHLORIDE 1.25 MG: 1.25 SOLUTION RESPIRATORY (INHALATION) at 02:14

## 2024-01-07 RX ADMIN — OXYCODONE HYDROCHLORIDE AND ACETAMINOPHEN 1 TABLET: 5; 325 TABLET ORAL at 22:03

## 2024-01-07 RX ADMIN — IPRATROPIUM BROMIDE 0.5 MG: 0.5 SOLUTION RESPIRATORY (INHALATION) at 13:23

## 2024-01-07 RX ADMIN — AZITHROMYCIN MONOHYDRATE 500 MG: 500 INJECTION, POWDER, LYOPHILIZED, FOR SOLUTION INTRAVENOUS at 09:47

## 2024-01-07 RX ADMIN — LEVALBUTEROL HYDROCHLORIDE 1.25 MG: 1.25 SOLUTION RESPIRATORY (INHALATION) at 19:43

## 2024-01-07 RX ADMIN — ENOXAPARIN SODIUM 30 MG: 30 INJECTION SUBCUTANEOUS at 21:49

## 2024-01-07 RX ADMIN — BACLOFEN 20 MG: 20 TABLET ORAL at 21:58

## 2024-01-07 NOTE — PLAN OF CARE
Problem: PAIN - ADULT  Goal: Verbalizes/displays adequate comfort level or baseline comfort level  Description: Interventions:  - Encourage patient to monitor pain and request assistance  - Assess pain using appropriate pain scale  - Administer analgesics based on type and severity of pain and evaluate response  - Implement non-pharmacological measures as appropriate and evaluate response  - Consider cultural and social influences on pain and pain management  - Notify physician/advanced practitioner if interventions unsuccessful or patient reports new pain  Outcome: Progressing     Problem: INFECTION - ADULT  Goal: Absence or prevention of progression during hospitalization  Description: INTERVENTIONS:  - Assess and monitor for signs and symptoms of infection  - Monitor lab/diagnostic results  - Monitor all insertion sites, i.e. indwelling lines, tubes, and drains  - Monitor endotracheal if appropriate and nasal secretions for changes in amount and color  - [place] appropriate cooling/warming therapies per order  - Administer medications as ordered  - Instruct and encourage patient and family to use good hand hygiene technique  - Identify and instruct in appropriate isolation precautions for identified infection/condition  Outcome: Progressing  Goal: Absence of fever/infection during neutropenic period  Description: INTERVENTIONS:  - Monitor WBC    Outcome: Progressing     Problem: SAFETY ADULT  Goal: Patient will remain free of falls  Description: INTERVENTIONS:  - Educate patient/family on patient safety including physical limitations  - Instruct patient to call for assistance with activity   - Consult OT/PT to assist with strengthening/mobility   - Keep Call bell within reach  - Keep bed low and locked with side rails adjusted as appropriate  - Keep care items and personal belongings within reach  - Initiate and maintain comfort rounds  - Make Fall Risk Sign visible to staff  - Offer Toileting every 2 Hours,  in advance of need  - Initiate/Maintain alarm  - Obtain necessary fall risk management equipment  - Apply yellow socks and bracelet for high fall risk patients  - Consider moving patient to room near nurses station  Outcome: Progressing  Goal: Maintain or return to baseline ADL function  Description: INTERVENTIONS:  -  Assess patient's ability to carry out ADLs; assess patient's baseline for ADL function and identify physical deficits which impact ability to perform ADLs (bathing, care of mouth/teeth, toileting, grooming, dressing, etc.)  - Assess/evaluate cause of self-care deficits   - Assess range of motion  - Assess patient's mobility; develop plan if impaired  - Assess patient's need for assistive devices and provide as appropriate  - Encourage maximum independence but intervene and supervise when necessary  - Involve family in performance of ADLs  - Assess for home care needs following discharge   - Consider OT consult to assist with ADL evaluation and planning for discharge  - Provide patient education as appropriate  Outcome: Progressing  Goal: Maintains/Returns to pre admission functional level  Description: INTERVENTIONS:  - Perform AM-PAC 6 Click Basic Mobility/ Daily Activity assessment daily.  - Set and communicate daily mobility goal to care team and patient/family/caregiver.   - Collaborate with rehabilitation services on mobility goals if consulted  - Perform Range of Motion 4 times a day.  - Reposition patient every 2 hours.  - Dangle patient 3 times a day  - Stand patient  times a day  - Ambulate patient  times a day  - Out of bed to chair  times a day   - Out of bed for meals times a day  - Out of bed for toileting  - Record patient progress and toleration of activity level   Outcome: Progressing     Problem: DISCHARGE PLANNING  Goal: Discharge to home or other facility with appropriate resources  Description: INTERVENTIONS:  - Identify barriers to discharge w/patient and caregiver  - Arrange for  needed discharge resources and transportation as appropriate  - Identify discharge learning needs (meds, wound care, etc.)  - Arrange for interpretive services to assist at discharge as needed  - Refer to Case Management Department for coordinating discharge planning if the patient needs post-hospital services based on physician/advanced practitioner order or complex needs related to functional status, cognitive ability, or social support system  Outcome: Progressing     Problem: Knowledge Deficit  Goal: Patient/family/caregiver demonstrates understanding of disease process, treatment plan, medications, and discharge instructions  Description: Complete learning assessment and assess knowledge base.  Interventions:  - Provide teaching at level of understanding  - Provide teaching via preferred learning methods  Outcome: Progressing     Problem: RESPIRATORY - ADULT  Goal: Achieves optimal ventilation and oxygenation  Description: INTERVENTIONS:  - Assess for changes in respiratory status  - Assess for changes in mentation and behavior  - Position to facilitate oxygenation and minimize respiratory effort  - Oxygen administered by appropriate delivery if ordered  - Initiate smoking cessation education as indicated  - Encourage broncho-pulmonary hygiene including cough, deep breathe, Incentive Spirometry  - Assess the need for suctioning and aspirate as needed  - Assess and instruct to report SOB or any respiratory difficulty  - Respiratory Therapy support as indicated  Outcome: Progressing     Problem: Prexisting or High Potential for Compromised Skin Integrity  Goal: Skin integrity is maintained or improved  Description: INTERVENTIONS:  - Identify patients at risk for skin breakdown  - Assess and monitor skin integrity  - Assess and monitor nutrition and hydration status  - Monitor labs   - Assess for incontinence   - Turn and reposition patient  - Assist with mobility/ambulation  - Relieve pressure over bony  prominences  - Avoid friction and shearing  - Provide appropriate hygiene as needed including keeping skin clean and dry  - Evaluate need for skin moisturizer/barrier cream  - Collaborate with interdisciplinary team   - Patient/family teaching  - Consider wound care consult   Outcome: Progressing

## 2024-01-07 NOTE — SPEECH THERAPY NOTE
"Speech Language/Pathology    Speech/Language Pathology Progress Note    Patient Name: Fanny Schulz  Today's Date: 1/7/2024     Problem List  Principal Problem:    Acute respiratory failure with hypoxia (HCC)  Active Problems:    Suprapubic catheter (HCC)    Constipation    Recurrent major depressive disorder, in full remission (HCC)    Dysphagia    Multiple sclerosis (HCC)    Functional quadriplegia secondary to MS (HCC)    Possible urinary tract infection associated with cystostomy catheter    Continuous opioid dependence (HCC)    COVID-19 virus infection    Hypercholesteremia       Past Medical History  Past Medical History:   Diagnosis Date    Anesthesia     \"prefers if able to be put to sleep on stretcher before placed on table if possible due to severe spasticity    Bladder stones     Chronic pain disorder     neck    Contracture, right shoulder     right arm and hand    Dependent on wheelchair     motorized    Edema     dependant lower extremities    Elevated alkaline phosphatase level     Mildly elevated total, normal isoenzymes 4/15/15, normal 1/17; last assessed: [id number]    Fernandez catheter in place     #24 to large bag(silicone catheter)    Gallbladder disease     History of femur fracture     right leg    History of UTI     MS (multiple sclerosis) ([facility])     Muscle spasticity     especially with touch    Muscle weakness     Muscular dystrophy ([facility])     Neck pain     Neurogenic bladder     Paralysis ([facility])     bilateral lower extremities    Port-A-Cath in place     left chest,\" hasn't used in approx 1 yr or so\"    Pressure injury of skin     right ischium    Sacral pressure sore     \"shearing, tegaderm in place,\"    Swallowing difficulty     Tinnitus     Urinary incontinence         Past Surgical History  Past Surgical History:   Procedure Laterality Date    BLADDER STONE REMOVAL N/A 12/4/2017    Procedure: CYSTO, LITHOLOPAXY HOLMIUM LASER;  Surgeon: Damir Osborne MD;  Location: [facility] OR;  Service: " "Urology    BLADDER SURGERY      CHOLECYSTECTOMY      CYSTOSCOPY  06/15/2020    ESOPHAGOGASTRODUODENOSCOPY      FL RETROGRADE PYELOGRAM  10/2/2020    FL RETROGRADE PYELOGRAM  11/3/2020    KIDNEY STONE SURGERY      PORTACATH PLACEMENT Left     HI CYSTO BLADDER W/URETERAL CATHETERIZATION Left 10/2/2020    Procedure: CYSTOSCOPY LEFT RETROGRADE ; LEFT URETEROSCOPY; PYELOGRAM WITH STENT INSERTION AND SUPERPUBIC EXCHANGE;  Surgeon: Philip Mcdonald MD;  Location: [facility];  Service: Urology    HI CYSTO/URETERO W/LITHOTRIPSY &INDWELL STENT INSRT Left 11/3/2020    Procedure: CYSTOSCOPY URETEROSCOPY WITH RETROGRADE PYELOGRAM AND EXCHANGE STENT URETERAL, SP TUBE EXCHANGE, BASKET STONE EXTRACTION, BLADDER STONE EXTRACTION;  Surgeon: Damir Osborne MD;  Location: [facility];  Service: Urology    HI LITHOLAPAXY SMPL/SM <2.5 CM N/A 12/22/2022    Procedure: Cystolitholapaxy w/  laser lithotripsy of bladder stones; SUPRAPUBIC CATHETER CHANGE;  Surgeon: Damir Osborne MD;  Location: [facility];  Service: Urology    SUPRAPUBIC CYSTOSTOMY  02/25/2014    last assessed: [id number]         Subjective:  \"I feel better\" Patient awake and alert.     Objective:  Patient seen for dysphagia therapy. Sister is at bedside. Oral care is completed with oral swabs and suction kit. Patient tolerated well. Voice is stronger today and not gurgly/wet. SLP feeds patient. She is assessed with applesauce, honey thick and nectar thick liquids via tsp, and thin liquids via straw. Oral retrieval is adequate. Min delayed oral transfer time observed, and suspect decreased oral control with thin liquids. Suspect swallow initiation time is min delayed. Despite this, laryngeal rise appears adequate. Swallow is not audible today. The patient does not present with any overt s/s aspiration with trials. She continues on HFNC. O2 remains stable throughout.      Assessment:  The patient tolerated trials well. Overall appears stronger today. "     Plan/Recommendations:  Recommend to begin puree diet and thin liquids. If coughing occurs with thin, RN encouraged to change to nectar thick liquids for safety. Discussed need for VBS, once off isolation precautions. Continue ST.

## 2024-01-07 NOTE — ASSESSMENT & PLAN NOTE
Presented on 1/5/24 with nasal congestion and inability to cough with shortness of breath, inability to swallow in the setting of multiple sclerosis with quadriplegia and progressive worsening of dysphagia and hypophonia  Tested positive for COVID  On Remdesivir, Decadron (ct at 6 mg daily per pulm) -   Trend CRP  Daily CBCD, CMP

## 2024-01-07 NOTE — CONSULTS
Consultation - Infectious Disease   Fanny Schulz 53 y.o. female MRN: [id number]  Unit/Bed#: [facility] 203-01 Encounter: [id number]      IMPRESSION & RECOMMENDATIONS:     1. Acute hypoxic respiratory failure.  The patient is currently requiring high flow nasal cannula oxygen.  COVID PCR is positive.  Chest x-ray shows no consolidations.  Blood cultures show no growth, procalcitonin is negative x 3.  There is no evidence of an acute bacterial pneumonia.  Suspect hypoxia is multifactorial due to neuromuscular weakness, COVID, aspiration due to chronic dysphagia.   -Continue IV remdesivir x 5 days   -Continue dexamethasone x 10 days per pulmonary and COVID pathway   -Stop IV vancomycin, cefepime, azithromycin and monitor off antibiotics   -Monitor fever curve, vitals   -Appreciate speech and therapy evaluation for dysphagia, aspiration   -pulmonary following    -Aspiration precautions, aggressive pulmonary toilet   -Monitor CBC tomorrow to monitor infection    2.  COVID infection.  PCR positive on admission.  Chest x-ray without abnormalities.  Patient is requiring high flow nasal cannula but suspect hypoxia is multifactorial as above.   -Continue IV remdesivir x 5 days and dexamethasone per COVID pathway   -No additional antibiotics indicated    3.  Chronic bacteriuria and pyuria.  In patient with a chronic suprapubic catheter.  Urine cultures growing Enterococcus faecalis and Pseudomonas aeruginosa with which the patient has been colonized.  The patient presented with respiratory symptoms and has no clinical evidence of UTI.  An abnormal UA and culture in a patient with a chronic Fernandez catheter  do not require treatment without other localizing signs of infection   -Stop IV vancomycin and cefepime and monitor off antibiotics   -recommend exchange of SPC, the patient is past due    4.  Multiple sclerosis with functional quadriplegia.  Continue supportive care    5.  Neurogenic bladder status post suprapubic catheter.   With chronic urinary colonization as above    6.  Dysphagia, due to MS.  Continue aspiration precautions, speech therapy evaluation    I have discussed the above management to discontinue antibiotics and continue IV remdesivir and Decadron with the patient and her sister at bedside, Dr. Cotton from [facility] and they agree. ID will follow.    I have performed an extensive review of the medical records in Epic including review of the notes, radiographs, and laboratory results     HISTORY OF PRESENT ILLNESS:  Reason for Consult: Positive urine culture, COVID  HPI: Fanny Schulz is a 53-year-old woman with a history of multiple sclerosis, quadriplegia, suspected chronic aspiration, neurogenic bladder status post SPC, dysphagia who was brought to the [facility] ED on 1/5/2024 with worsening nasal congestion, chest congestion, shortness of breath.  Multiple members of the patient's family have been sick with a viral URI.  The patient denies any fever, chills, abdominal pain, trouble with her suprapubic catheter at home.  She does note that the suprapubic catheter is changed every 4 weeks but that she is late for an exchange.  On presentation, the patient was afebrile without leukocytosis.  She was hypoxic requiring 3 L nasal cannula oxygen.  COVID PCR is positive.  Chest x-ray did not show any abnormalities.  The patient's oxygen requirements increased to high flow nasal cannula and she was seen by pulmonary yesterday.  They recommend remdesivir and Decadron.  Despite lack of abdominal or urinary symptoms, a UA was checked which showed innumerable white blood cells.  She was started on IV vancomycin, cefepime, azithromycin.  Urine cultures growing over 100 K Pseudomonas aeruginosa and over 100 K Enterococcus faecalis.  The patient has chronic urinary colonization with both Pseudomonas and Enterococcus faecalis.  ID is consulted for further evaluation.    REVIEW OF SYSTEMS:  A complete review of systems is  "negative other than that noted in the HPI.    PAST MEDICAL HISTORY:  Past Medical History:   Diagnosis Date    Anesthesia     \"prefers if able to be put to sleep on stretcher before placed on table if possible due to severe spasticity    Bladder stones     Chronic pain disorder     neck    Contracture, right shoulder     right arm and hand    Dependent on wheelchair     motorized    Edema     dependant lower extremities    Elevated alkaline phosphatase level     Mildly elevated total, normal isoenzymes 4/15/15, normal 1/17; last assessed: [id number]    Fernandez catheter in place     #24 to large bag(silicone catheter)    Gallbladder disease     History of femur fracture     right leg    History of UTI     MS (multiple sclerosis) ([facility])     Muscle spasticity     especially with touch    Muscle weakness     Muscular dystrophy ([facility])     Neck pain     Neurogenic bladder     Paralysis ([facility])     bilateral lower extremities    Port-A-Cath in place     left chest,\" hasn't used in approx 1 yr or so\"    Pressure injury of skin     right ischium    Sacral pressure sore     \"shearing, tegaderm in place,\"    Swallowing difficulty     Tinnitus     Urinary incontinence      Past Surgical History:   Procedure Laterality Date    BLADDER STONE REMOVAL N/A 12/4/2017    Procedure: CYSTO, LITHOLOPAXY HOLMIUM LASER;  Surgeon: Damir Osborne MD;  Location: [facility];  Service: Urology    BLADDER SURGERY      CHOLECYSTECTOMY      CYSTOSCOPY  06/15/2020    ESOPHAGOGASTRODUODENOSCOPY      FL RETROGRADE PYELOGRAM  10/2/2020    FL RETROGRADE PYELOGRAM  11/3/2020    KIDNEY STONE SURGERY      PORTACATH PLACEMENT Left     OH CYSTO BLADDER W/URETERAL CATHETERIZATION Left 10/2/2020    Procedure: CYSTOSCOPY LEFT RETROGRADE ; LEFT URETEROSCOPY; PYELOGRAM WITH STENT INSERTION AND SUPERPUBIC EXCHANGE;  Surgeon: Philip Mcdonald MD;  Location: [facility];  Service: Urology    OH CYSTO/URETERO W/LITHOTRIPSY &INDWELL STENT INSRT Left 11/3/2020    " Procedure: CYSTOSCOPY URETEROSCOPY WITH RETROGRADE PYELOGRAM AND EXCHANGE STENT URETERAL, SP TUBE EXCHANGE, BASKET STONE EXTRACTION, BLADDER STONE EXTRACTION;  Surgeon: Damir Osborne MD;  Location:  MAIN OR;  Service: Urology    KY LITHOLAPAXY SMPL/SM <2.5 CM N/A 2022    Procedure: Cystolitholapaxy w/  laser lithotripsy of bladder stones; SUPRAPUBIC CATHETER CHANGE;  Surgeon: Damir Osborne MD;  Location: [facility];  Service: Urology    SUPRAPUBIC CYSTOSTOMY  2014    last assessed: [id number]       FAMILY HISTORY:  Non-contributory    SOCIAL HISTORY:  Social History   Social History     Substance and Sexual Activity   Alcohol Use Not Currently     Social History     Substance and Sexual Activity   Drug Use No     Social History     Tobacco Use   Smoking Status Never   Smokeless Tobacco Never       ALLERGIES:  Allergies   Allergen Reactions    Latex Blisters     Added based on information entered during case entry, please review and add reactions, type, and severity as needed    blisters       MEDICATIONS:  All current active medications have been reviewed.      PHYSICAL EXAM:  Temp:  [97.7 °F (36.5 °C)-98.7 °F (37.1 °C)] 98.5 °F (36.9 °C)  HR:  [] 98  Resp:  [13-22] 18  BP: ()/(51-64) 112/64  SpO2:  [95 %-100 %] 97 %  Temp (24hrs), Av.3 °F (36.8 °C), Min:97.7 °F (36.5 °C), Max:98.7 °F (37.1 °C)  Current: Temperature: 98.5 °F (36.9 °C)    Intake/Output Summary (Last 24 hours) at 2024 1240  Last data filed at 2024 1200  Gross per 24 hour   Intake 2660.92 ml   Output 1625 ml   Net 1035.92 ml       General Appearance:  Appearing well, nontoxic, and in no distress   Head:  Normocephalic, without obvious abnormality, atraumatic   Eyes:  Conjunctiva pink and sclera anicteric, both eyes   Nose: Nares normal, mucosa normal, no drainage   Throat: Oropharynx moist without lesions   Neck: Supple, symmetrical, no adenopathy, no tenderness/mass/nodules   Back:   Symmetric, no  curvature, ROM normal, no CVA tenderness   Lungs:   Decreased breath sounds bilaterally, respirations unlabored on high flow nasal cannula   Chest Wall:  No tenderness or deformity   Heart:  RRR; no murmur, rub or gallop   Abdomen:   Soft, suprapubic catheter in place, no surrounding tenderness   Extremities: Upper and lower extremity contractures   Skin: No rashes or lesions. No draining wounds noted.   Lymph nodes: Cervical, supraclavicular nodes normal   Neurologic: Alert and oriented times 3, dysarthria       LABS, IMAGING, & OTHER STUDIES:  Lab Results:  I have personally reviewed pertinent labs.  Results from last 7 days   Lab Units 01/07/24  0634 01/06/24  0312 01/05/24  1215   WBC Thousand/uL 7.83 10.30* 8.92   HEMOGLOBIN g/dL 11.6 12.1 13.3   PLATELETS Thousands/uL 176 173 195     Results from last 7 days   Lab Units 01/07/24  0634 01/06/24  1352 01/06/24  0312 01/05/24  1215   SODIUM mmol/L 138 134* 133* 133*   POTASSIUM mmol/L 3.1* 3.6 3.5 3.6   CHLORIDE mmol/L 106 102 99 97   CO2 mmol/L 23 19* 19* 26   BUN mg/dL 9 6 7 8   CREATININE mg/dL 0.40* 0.31* 0.31* 0.32*   EGFR ml/min/1.73sq m 119 129 129 128   CALCIUM mg/dL 8.1* 8.3* 8.3* 8.9   AST U/L 15  --  17 21   ALT U/L 12  --  12 16   ALK PHOS U/L 48  --  52 68     Results from last 7 days   Lab Units 01/06/24  1227 01/05/24  1342 01/05/24  1235 01/05/24  1225   BLOOD CULTURE   --   --  No Growth at 24 hrs. No Growth at 24 hrs.   URINE CULTURE   --  >100,000 cfu/ml Pseudomonas aeruginosa*  >100,000 cfu/ml Enterococcus faecalis*  --   --    LEGIONELLA URINARY ANTIGEN  Negative  --   --   --      Results from last 7 days   Lab Units 01/07/24  0634 01/06/24  0312 01/05/24  1215   PROCALCITONIN ng/ml 0.20 0.08 0.06     Results from last 7 days   Lab Units 01/07/24  0634 01/06/24 0312   CRP mg/L 121.9* 88.0*         Results from last 7 days   Lab Units 01/06/24  0312   D-DIMER QUANTITATIVE ug/ml FEU 1.28*       Imaging Studies:   I have personally reviewed  pertinent imaging study reports and images in PACS.    Chest x-ray personally reviewed, no consolidations

## 2024-01-07 NOTE — PROGRESS NOTES
[facility]  Progress Note  Name: Fanny Schulz I  MRN: [id number]  Unit/Bed#: [facility] 203-01 I Date of Admission: 1/5/2024   Date of Service: 1/7/2024 I Hospital Day: 2    Assessment/Plan   * Acute respiratory failure with hypoxia (HCC)  Assessment & Plan  Due to aspiration/COVID infection. Mainly aspiration per pulm   History of multiple sclerosis with progressive worsening of dysphagia  Presented as below   O2 requirement 6L on presentation - worsened overnight on 1/5 to HFNC  Currently on HFNC 50L/60%  Wean O2 as able  Plan as below    COVID-19 virus infection  Assessment & Plan  Presented on 1/5/24 with nasal congestion and inability to cough with shortness of breath, inability to swallow in the setting of multiple sclerosis with quadriplegia and progressive worsening of dysphagia and hypophonia  Tested positive for COVID  On Remdesivir, Decadron (ct at 6 mg daily per pulm) -   Trend CRP  Daily CBCD, CMP      Dysphagia  Assessment & Plan  Progressive worsening noted as outpatient in the setting of MS  Cleared by speech for pureed diet with thin liquids    Bacteriuria with pyuria  Assessment & Plan  Has chronic suprapubic catheter  Urine culture with Pseudomonas aeruginosa, Enterococcus faecalis  Colonization per ID  Being monitored off antibiotics    Suprapubic catheter (HCC)  Assessment & Plan  Neurogenic bladder with suprapubic catheter in the setting of MS    Multiple sclerosis (HCC)  Assessment & Plan  Has quadriplegia and progressive dysphagia and hypophonia  Follows with neurology as outpatient  Supportive care    Functional quadriplegia secondary to MS (HCC)  Assessment & Plan  Supportive care  Ct baclofen prn    Recurrent major depressive disorder, in full remission (HCC)  Assessment & Plan  Ct home Cymbalta 20 mg daily       Hypercholesteremia  Assessment & Plan  Ct equivalent formulary Pravastatin of home Rosuvastatin    Continuous opioid dependence  ([facility])  Assessment & Plan  Home med: Percocet 5/325 mg q 4h  PDMP reviewed  Uses very sparingly per PCP note    Constipation  Assessment & Plan  Bowel regimen         VTE Pharmacologic Prophylaxis: VTE Score: 7 High Risk (Score >/= 5) - Pharmacological DVT Prophylaxis Ordered: enoxaparin (Lovenox). Sequential Compression Devices Ordered.    Mobility:   Basic Mobility Inpatient Raw Score: 6  JH-HLM Goal: 2: Bed activities/Dependent transfer  JH-HLM Achieved: 1: Laying in bed  HLM Goal NOT achieved. Continue with multidisciplinary rounding and encourage appropriate mobility to improve upon HLM goals.    Patient Centered Rounds: Discussed with RN   Discussions with Specialists or Other Care Team Provider: Discussed with infectious disease    Education and Discussions with Family / Patient: Updated  (sister) at bedside.called  - left a message    Total Time Spent on Date of Encounter in care of patient: 20 mins. This time was spent on one or more of the following: performing physical exam; counseling and coordination of care; obtaining or reviewing history; documenting in the medical record; reviewing/ordering tests, medications or procedures; communicating with other healthcare professionals and discussing with patient's family/caregivers.    Current Length of Stay: 2 day(s)  Current Patient Status: Inpatient   Certification Statement: The patient will continue to require additional inpatient hospital stay due to hypoxia    Code Status: Level 1 - Full Code    Subjective:   Shortness of breath better.    Objective:     Vitals:   Temp (24hrs), Av.3 °F (36.8 °C), Min:97.7 °F (36.5 °C), Max:98.7 °F (37.1 °C)    Temp:  [97.7 °F (36.5 °C)-98.7 °F (37.1 °C)] 97.8 °F (36.6 °C)  HR:  [] 106  Resp:  [13-22] 19  BP: ()/(49-64) 101/49  SpO2:  [95 %-100 %] 98 %    Physical Exam:   Physical Exam  Vitals reviewed.   HENT:      Head: Normocephalic.      Nose: Nose normal.      Mouth/Throat:       Mouth: Mucous membranes are moist.   Eyes:      Extraocular Movements: Extraocular movements intact.   Cardiovascular:      Rate and Rhythm: Normal rate and regular rhythm.   Pulmonary:      Effort: Pulmonary effort is normal. No respiratory distress.      Breath sounds: Normal breath sounds. No wheezing.   Abdominal:      General: Bowel sounds are normal. There is no distension.      Palpations: Abdomen is soft.      Tenderness: There is no abdominal tenderness.   Musculoskeletal:         General: No swelling.      Cervical back: Neck supple.   Skin:     General: Skin is warm and dry.   Neurological:      Mental Status: She is alert.      Comments: Hypophonia  Quadriplegia   Psychiatric:         Mood and Affect: Mood normal.          Additional Data:     Labs:  Results from last 7 days   Lab Units 01/07/24  0634   WBC Thousand/uL 7.83   HEMOGLOBIN g/dL 11.6   HEMATOCRIT % 36.1   PLATELETS Thousands/uL 176   NEUTROS PCT % 94*   LYMPHS PCT % 3*   MONOS PCT % 2*   EOS PCT % 0     Results from last 7 days   Lab Units 01/07/24  0634   SODIUM mmol/L 138   POTASSIUM mmol/L 3.1*   CHLORIDE mmol/L 106   CO2 mmol/L 23   BUN mg/dL 9   CREATININE mg/dL 0.40*   ANION GAP mmol/L 9   CALCIUM mg/dL 8.1*   ALBUMIN g/dL 3.4*   TOTAL BILIRUBIN mg/dL 0.37   ALK PHOS U/L 48   ALT U/L 12   AST U/L 15   GLUCOSE RANDOM mg/dL 199*                 Results from last 7 days   Lab Units 01/07/24  0634 01/06/24  1352 01/06/24  0312 01/05/24  1215   LACTIC ACID mmol/L  --  1.8  --   --    PROCALCITONIN ng/ml 0.20  --  0.08 0.06       Lines/Drains:  Invasive Devices       Central Venous Catheter Line  Duration             Port A Cath Left Chest -- days              Peripheral Intravenous Line  Duration             Peripheral IV 01/05/24 Left;Ventral (anterior) Forearm 2 days              Drain  Duration             Suprapubic Catheter 24 Fr. 381 days                    Central Line:  Goal for removal: Maintain PORT    Recent Cultures (last 7  days):   Results from last 7 days   Lab Units 01/06/24  1227 01/05/24  1342 01/05/24  1235 01/05/24  1225   BLOOD CULTURE   --   --  No Growth at 48 hrs. No Growth at 48 hrs.   URINE CULTURE   --  >100,000 cfu/ml Pseudomonas aeruginosa*  >100,000 cfu/ml Enterococcus faecalis*  --   --    LEGIONELLA URINARY ANTIGEN  Negative  --   --   --        Last 24 Hours Medication List:   Current Facility-Administered Medications   Medication Dose Route Frequency Provider Last Rate    baclofen  20 mg Oral 4x Daily PRN Tiffany Hickey MD      bisacodyl  10 mg Rectal Daily PRN Tiffany Hickey MD      dexamethasone  6 mg Intravenous Q24H Apryl Aragon PA-C      dextrose 5 % and sodium chloride 0.9 %  40 mL/hr Intravenous Continuous Ingris Cotton MD 40 mL/hr (01/07/24 1123)    diazepam  5 mg Intravenous Q6H PRN Apryl Aragon PA-C      docusate sodium  100 mg Oral BID Ingris Cotton MD      DULoxetine  20 mg Oral Daily Tiffany Hickey MD      enoxaparin  30 mg Subcutaneous Q12H [facility] Ingris Cotton MD      furosemide  20 mg Oral Daily Ingris Cotton MD      ipratropium  0.5 mg Nebulization Q6H Ingris Cotton MD      levalbuterol  1.25 mg Nebulization Q6H Tiffany Hickey MD      ondansetron  4 mg Intravenous Q6H PRN Tiffany Hickey MD      oxybutynin  10 mg Oral Daily Tiffany Hickey MD      oxyCODONE-acetaminophen  1 tablet Oral Q4H PRN Ingris Cotton MD      polyethylene glycol  17 g Oral Daily PRN Tiffany Hickey MD      potassium chloride  20 mEq Intravenous BID Ingris Cotton MD 20 mEq (01/07/24 0947)    pravastatin  40 mg Oral Daily With Dinner Tiffany Hickey MD      remdesivir  100 mg Intravenous Q24H Apryl Aragon PA-C Stopped (01/07/24 0101)    senna  1 tablet Oral HS Ingris Cotton MD      sodium chloride  4 mL Nebulization Q6H Thelma Dhaliwal PA-C          Today, Patient Was Seen By: Ingris Cotton MD    **Please Note: This note may have been constructed using a voice recognition system.**

## 2024-01-07 NOTE — ASSESSMENT & PLAN NOTE
Progressive worsening noted as outpatient in the setting of MS  Cleared by speech for pureed diet with thin liquids

## 2024-01-07 NOTE — ASSESSMENT & PLAN NOTE
Due to aspiration/COVID infection. Mainly aspiration per pulm   History of multiple sclerosis with progressive worsening of dysphagia  Presented as below   O2 requirement 6L on presentation - worsened overnight on 1/5 to HFNC  Currently on HFNC 50L/60%  Wean O2 as able  Plan as below

## 2024-01-07 NOTE — PROGRESS NOTES
Fanny Schulz is a 53 y.o. female who is currently ordered Vancomycin IV with management by the Pharmacy Consult service.  Relevant clinical data and objective / subjective history reviewed.  Vancomycin Assessment:  Indication and Goal AUC/Trough: Urinary tract infection (goal -600, trough >10), -600, trough >10  Clinical Status: stable  Micro:     Renal Function:  SCr: 0.4 mg/dL  CrCl: 181 mL/min  Renal replacement: Not on dialysis  Days of Therapy: 2  Current Dose: 1250mg IV q12h  Vancomycin Plan:  New Dosing: continue current dosing  Estimated AUC: 463 mcg*hr/mL  Estimated Trough: 13.7 mcg/mL  Next Level: Random 1/14 at 0600  Renal Function Monitoring: Daily BMP and UOP  Pharmacy will continue to follow closely for s/sx of nephrotoxicity, infusion reactions and appropriateness of therapy.  BMP and CBC will be ordered per protocol. We will continue to follow the patient’s culture results and clinical progress daily.    Antonella Bautista, Pharmacist

## 2024-01-07 NOTE — ASSESSMENT & PLAN NOTE
Has chronic suprapubic catheter  Urine culture with Pseudomonas aeruginosa, Enterococcus faecalis  Colonization per ID  Being monitored off antibiotics  Discussed with ID

## 2024-01-08 PROBLEM — R60.0 LOWER EXTREMITY EDEMA: Status: ACTIVE | Noted: 2024-01-08

## 2024-01-08 LAB
ALBUMIN SERPL BCP-MCNC: 3.2 G/DL (ref 3.5–5)
ALP SERPL-CCNC: 42 U/L (ref 34–104)
ALT SERPL W P-5'-P-CCNC: 10 U/L (ref 7–52)
ANION GAP SERPL CALCULATED.3IONS-SCNC: 7 MMOL/L
ANISOCYTOSIS BLD QL SMEAR: PRESENT
AST SERPL W P-5'-P-CCNC: 12 U/L (ref 13–39)
BACTERIA UR CULT: ABNORMAL
BACTERIA UR CULT: ABNORMAL
BASOPHILS # BLD MANUAL: 0 THOUSAND/UL (ref 0–0.1)
BASOPHILS NFR MAR MANUAL: 0 % (ref 0–1)
BILIRUB SERPL-MCNC: 0.33 MG/DL (ref 0.2–1)
BUN SERPL-MCNC: 8 MG/DL (ref 5–25)
CALCIUM ALBUM COR SERPL-MCNC: 8.9 MG/DL (ref 8.3–10.1)
CALCIUM SERPL-MCNC: 8.3 MG/DL (ref 8.4–10.2)
CHLORIDE SERPL-SCNC: 106 MMOL/L (ref 96–108)
CO2 SERPL-SCNC: 26 MMOL/L (ref 21–32)
CREAT SERPL-MCNC: 0.2 MG/DL (ref 0.6–1.3)
CRP SERPL QL: 89.5 MG/L
EOSINOPHIL # BLD MANUAL: 0 THOUSAND/UL (ref 0–0.4)
EOSINOPHIL NFR BLD MANUAL: 0 % (ref 0–6)
ERYTHROCYTE [DISTWIDTH] IN BLOOD BY AUTOMATED COUNT: 13.1 % (ref 11.6–15.1)
GFR SERPL CREATININE-BSD FRML MDRD: 149 ML/MIN/1.73SQ M
GIANT PLATELETS BLD QL SMEAR: PRESENT
GLUCOSE SERPL-MCNC: 172 MG/DL (ref 65–140)
HCT VFR BLD AUTO: 32.6 % (ref 34.8–46.1)
HGB BLD-MCNC: 10.3 G/DL (ref 11.5–15.4)
LYMPHOCYTES # BLD AUTO: 0.29 THOUSAND/UL (ref 0.6–4.47)
LYMPHOCYTES # BLD AUTO: 3 % (ref 14–44)
MAGNESIUM SERPL-MCNC: 1.9 MG/DL (ref 1.9–2.7)
MCH RBC QN AUTO: 27.5 PG (ref 26.8–34.3)
MCHC RBC AUTO-ENTMCNC: 31.6 G/DL (ref 31.4–37.4)
MCV RBC AUTO: 87 FL (ref 82–98)
MICROCYTES BLD QL AUTO: PRESENT
MONOCYTES # BLD AUTO: 0.07 THOUSAND/UL (ref 0–1.22)
MONOCYTES NFR BLD: 1 % (ref 4–12)
NEUTROPHILS # BLD MANUAL: 6.85 THOUSAND/UL (ref 1.85–7.62)
NEUTS BAND NFR BLD MANUAL: 7 % (ref 0–8)
NEUTS SEG NFR BLD AUTO: 88 % (ref 43–75)
PLATELET # BLD AUTO: 196 THOUSANDS/UL (ref 149–390)
PLATELET BLD QL SMEAR: ADEQUATE
PMV BLD AUTO: 9.7 FL (ref 8.9–12.7)
POIKILOCYTOSIS BLD QL SMEAR: PRESENT
POLYCHROMASIA BLD QL SMEAR: PRESENT
POTASSIUM SERPL-SCNC: 3.4 MMOL/L (ref 3.5–5.3)
PROT SERPL-MCNC: 5.8 G/DL (ref 6.4–8.4)
RBC # BLD AUTO: 3.74 MILLION/UL (ref 3.81–5.12)
RBC MORPH BLD: PRESENT
SODIUM SERPL-SCNC: 139 MMOL/L (ref 135–147)
VARIANT LYMPHS # BLD AUTO: 1 %
WBC # BLD AUTO: 7.21 THOUSAND/UL (ref 4.31–10.16)

## 2024-01-08 PROCEDURE — 92526 ORAL FUNCTION THERAPY: CPT

## 2024-01-08 PROCEDURE — 94668 MNPJ CHEST WALL SBSQ: CPT | Performed by: SOCIAL WORKER

## 2024-01-08 PROCEDURE — 94664 DEMO&/EVAL PT USE INHALER: CPT

## 2024-01-08 PROCEDURE — 99232 SBSQ HOSP IP/OBS MODERATE 35: CPT | Performed by: INTERNAL MEDICINE

## 2024-01-08 PROCEDURE — 94669 MECHANICAL CHEST WALL OSCILL: CPT

## 2024-01-08 PROCEDURE — 94640 AIRWAY INHALATION TREATMENT: CPT | Performed by: SOCIAL WORKER

## 2024-01-08 PROCEDURE — 94669 MECHANICAL CHEST WALL OSCILL: CPT | Performed by: SOCIAL WORKER

## 2024-01-08 PROCEDURE — 85027 COMPLETE CBC AUTOMATED: CPT | Performed by: INTERNAL MEDICINE

## 2024-01-08 PROCEDURE — 94640 AIRWAY INHALATION TREATMENT: CPT

## 2024-01-08 PROCEDURE — 85007 BL SMEAR W/DIFF WBC COUNT: CPT | Performed by: INTERNAL MEDICINE

## 2024-01-08 PROCEDURE — 83735 ASSAY OF MAGNESIUM: CPT | Performed by: INTERNAL MEDICINE

## 2024-01-08 PROCEDURE — 80053 COMPREHEN METABOLIC PANEL: CPT | Performed by: INTERNAL MEDICINE

## 2024-01-08 PROCEDURE — 86140 C-REACTIVE PROTEIN: CPT | Performed by: INTERNAL MEDICINE

## 2024-01-08 PROCEDURE — 94668 MNPJ CHEST WALL SBSQ: CPT

## 2024-01-08 PROCEDURE — 94760 N-INVAS EAR/PLS OXIMETRY 1: CPT

## 2024-01-08 RX ORDER — ALBUTEROL SULFATE 2.5 MG/3ML
2.5 SOLUTION RESPIRATORY (INHALATION) EVERY 4 HOURS PRN
Status: DISCONTINUED | OUTPATIENT
Start: 2024-01-08 | End: 2024-01-15 | Stop reason: HOSPADM

## 2024-01-08 RX ORDER — BACLOFEN 20 MG/1
40 TABLET ORAL EVERY EVENING
Status: DISCONTINUED | OUTPATIENT
Start: 2024-01-08 | End: 2024-01-15 | Stop reason: HOSPADM

## 2024-01-08 RX ORDER — SODIUM CHLORIDE, SODIUM GLUCONATE, SODIUM ACETATE, POTASSIUM CHLORIDE, MAGNESIUM CHLORIDE, SODIUM PHOSPHATE, DIBASIC, AND POTASSIUM PHOSPHATE .53; .5; .37; .037; .03; .012; .00082 G/100ML; G/100ML; G/100ML; G/100ML; G/100ML; G/100ML; G/100ML
40 INJECTION, SOLUTION INTRAVENOUS CONTINUOUS
Status: DISCONTINUED | OUTPATIENT
Start: 2024-01-08 | End: 2024-01-13

## 2024-01-08 RX ORDER — POTASSIUM CHLORIDE 20 MEQ/1
40 TABLET, EXTENDED RELEASE ORAL ONCE
Status: COMPLETED | OUTPATIENT
Start: 2024-01-08 | End: 2024-01-08

## 2024-01-08 RX ORDER — MAGNESIUM SULFATE HEPTAHYDRATE 40 MG/ML
2 INJECTION, SOLUTION INTRAVENOUS ONCE
Status: COMPLETED | OUTPATIENT
Start: 2024-01-08 | End: 2024-01-08

## 2024-01-08 RX ADMIN — FUROSEMIDE 20 MG: 20 TABLET ORAL at 09:25

## 2024-01-08 RX ADMIN — LEVALBUTEROL HYDROCHLORIDE 1.25 MG: 1.25 SOLUTION RESPIRATORY (INHALATION) at 07:24

## 2024-01-08 RX ADMIN — MAGNESIUM SULFATE HEPTAHYDRATE 2 G: 40 INJECTION, SOLUTION INTRAVENOUS at 09:26

## 2024-01-08 RX ADMIN — BACLOFEN 40 MG: 20 TABLET ORAL at 19:04

## 2024-01-08 RX ADMIN — OXYCODONE HYDROCHLORIDE AND ACETAMINOPHEN 1 TABLET: 5; 325 TABLET ORAL at 13:12

## 2024-01-08 RX ADMIN — DULOXETINE HYDROCHLORIDE 20 MG: 20 CAPSULE, DELAYED RELEASE ORAL at 09:25

## 2024-01-08 RX ADMIN — BACLOFEN 20 MG: 20 TABLET ORAL at 01:41

## 2024-01-08 RX ADMIN — IPRATROPIUM BROMIDE 0.5 MG: 0.5 SOLUTION RESPIRATORY (INHALATION) at 13:44

## 2024-01-08 RX ADMIN — REMDESIVIR 100 MG: 100 INJECTION, POWDER, LYOPHILIZED, FOR SOLUTION INTRAVENOUS at 00:25

## 2024-01-08 RX ADMIN — PRAVASTATIN SODIUM 40 MG: 40 TABLET ORAL at 16:28

## 2024-01-08 RX ADMIN — SODIUM CHLORIDE SOLN NEBU 3% 4 ML: 3 NEBU SOLN at 07:24

## 2024-01-08 RX ADMIN — BACLOFEN 20 MG: 20 TABLET ORAL at 09:58

## 2024-01-08 RX ADMIN — OXYBUTYNIN 10 MG: 10 TABLET, FILM COATED, EXTENDED RELEASE ORAL at 09:25

## 2024-01-08 RX ADMIN — IPRATROPIUM BROMIDE 0.5 MG: 0.5 SOLUTION RESPIRATORY (INHALATION) at 07:24

## 2024-01-08 RX ADMIN — IPRATROPIUM BROMIDE 0.5 MG: 0.5 SOLUTION RESPIRATORY (INHALATION) at 01:40

## 2024-01-08 RX ADMIN — DEXAMETHASONE SODIUM PHOSPHATE 6 MG: 10 INJECTION, SOLUTION INTRAMUSCULAR; INTRAVENOUS at 00:26

## 2024-01-08 RX ADMIN — LEVALBUTEROL HYDROCHLORIDE 1.25 MG: 1.25 SOLUTION RESPIRATORY (INHALATION) at 01:40

## 2024-01-08 RX ADMIN — SODIUM CHLORIDE, SODIUM GLUCONATE, SODIUM ACETATE, POTASSIUM CHLORIDE, MAGNESIUM CHLORIDE, SODIUM PHOSPHATE, DIBASIC, AND POTASSIUM PHOSPHATE 40 ML/HR: .53; .5; .37; .037; .03; .012; .00082 INJECTION, SOLUTION INTRAVENOUS at 13:14

## 2024-01-08 RX ADMIN — ENOXAPARIN SODIUM 30 MG: 30 INJECTION SUBCUTANEOUS at 21:16

## 2024-01-08 RX ADMIN — LEVALBUTEROL HYDROCHLORIDE 1.25 MG: 1.25 SOLUTION RESPIRATORY (INHALATION) at 13:44

## 2024-01-08 RX ADMIN — OXYCODONE HYDROCHLORIDE AND ACETAMINOPHEN 1 TABLET: 5; 325 TABLET ORAL at 21:16

## 2024-01-08 RX ADMIN — ENOXAPARIN SODIUM 30 MG: 30 INJECTION SUBCUTANEOUS at 09:26

## 2024-01-08 RX ADMIN — POTASSIUM CHLORIDE 40 MEQ: 1500 TABLET, EXTENDED RELEASE ORAL at 09:25

## 2024-01-08 NOTE — PROGRESS NOTES
PULMONOLOGY PROGRESS NOTE     Name: Fanny Schulz   Age & Sex: 53 y.o. female   MRN: [id number]  Unit/Bed#: [facility] 203-01   Encounter: [id number]    PATIENT INFORMATION     Name: Fanny Schulz   Age & Sex: 53 y.o. female   MRN: [id number]  Hospital Stay Days: 3    ASSESSMENT/PLAN     Assessment:  Acute hypoxic respiratory failure improving  COVID-19 infection  History of MS  History of quadriplegia in the context of MS    Plan:  Maintain SpO2 >90%, wean down oxygen as tolerated  As patient is off of high flow nasal cannula and oxygen requirement is improving, remdesivir may be discontinued at this time.  Recommend reducing dexamethasone to daily as well  Albuterol as needed.  DC pulmonary toileting    SUBJECTIVE     Patient seen and examined. No acute events overnight.  Patient currently is on 6 L nasal cannula SpO2. She notes great improvement in shortness of breath.  Patient has had a reduction in productive cough as well.     OBJECTIVE     Vitals:    24 0730 24 0827 24 0959 24 1600   BP: 113/55  116/68 (!) 101/48   BP Location: Left arm      Pulse:   98 96   Resp:   22 17   Temp: 97.8 °F (36.6 °C)  98.1 °F (36.7 °C) 98.1 °F (36.7 °C)   TempSrc: Oral   Oral   SpO2:  98% 95% 97%      Temperature:   Temp (24hrs), Av °F (36.7 °C), Min:97.8 °F (36.6 °C), Max:98.4 °F (36.9 °C)    Temperature: 98.1 °F (36.7 °C)  Intake & Output:  I/O          07 07 07    I.V. 1432.5 588.4     IV Piggyback 570 650     Total Intake 2002.5 1238.4     Urine 2175 950     Stool 0 0     Total Output 2175 950     Net -172.5 +288.4            Unmeasured Stool Occurrence 1 x 1 x           Weights:        There is no height or weight on file to calculate BMI.  Weight (last 2 days)       None          Physical Exam  Cardiovascular:      Pulses: Normal pulses.      Heart sounds: No murmur heard.     No friction rub. No gallop.   Pulmonary:      Effort:  No respiratory distress.      Breath sounds: No stridor. No wheezing, rhonchi or rales.   Chest:      Chest wall: No tenderness.   Abdominal:      General: Abdomen is flat.      Palpations: Abdomen is soft.   Musculoskeletal:      Right lower leg: No edema.      Left lower leg: No edema.   Skin:     Capillary Refill: Capillary refill takes less than 2 seconds.   Neurological:      Mental Status: She is alert and oriented to person, place, and time.   Psychiatric:         Mood and Affect: Mood normal.         Behavior: Behavior normal.           LABORATORY DATA     Labs: I have personally reviewed pertinent reports.  Results from last 7 days   Lab Units 01/08/24  0524 01/07/24  0634 01/06/24  0312 01/05/24  1215   WBC Thousand/uL 7.21 7.83 10.30* 8.92   HEMOGLOBIN g/dL 10.3* 11.6 12.1 13.3   HEMATOCRIT % 32.6* 36.1 37.0 41.1   PLATELETS Thousands/uL 196 176 173 195   NEUTROS PCT %  --  94*  --  85*   MONOS PCT %  --  2*  --  5   MONO PCT % 1*  --   --   --    EOS PCT % 0 0  --  0      Results from last 7 days   Lab Units 01/08/24  0524 01/07/24  0634 01/06/24  1352 01/06/24  0312   POTASSIUM mmol/L 3.4* 3.1* 3.6 3.5   CHLORIDE mmol/L 106 106 102 99   CO2 mmol/L 26 23 19* 19*   BUN mg/dL 8 9 6 7   CREATININE mg/dL 0.20* 0.40* 0.31* 0.31*   CALCIUM mg/dL 8.3* 8.1* 8.3* 8.3*   ALK PHOS U/L 42 48  --  52   ALT U/L 10 12  --  12   AST U/L 12* 15  --  17     Results from last 7 days   Lab Units 01/08/24  0524 01/06/24  1352   MAGNESIUM mg/dL 1.9 2.0              Results from last 7 days   Lab Units 01/06/24  1352   LACTIC ACID mmol/L 1.8             ABG:       Micro:   Results from last 7 days   Lab Units 01/06/24  1227 01/05/24  1342 01/05/24  1235 01/05/24  1225   BLOOD CULTURE   --   --  No Growth at 72 hrs. No Growth at 72 hrs.   URINE CULTURE   --  >100,000 cfu/ml Pseudomonas aeruginosa*  >100,000 cfu/ml Enterococcus faecalis*  --   --    LEGIONELLA URINARY ANTIGEN  Negative  --   --   --    STREP PNEUMONIAE ANTIGEN,  "URINE  Negative  --   --   --          IMAGING & DIAGNOSTIC TESTING     Radiology Results: I have personally reviewed pertinent reports.  XR abdomen obstruction series    Result Date: 1/6/2024  Impression: Mild distention of small and large bowel loops with no dilatation or air-fluid levels to suggest obstruction. Mild distention of the stomach with a small amount of fluid layering in the dependent portion of the stomach on the decubitus view. Workstation performed: [id number]     XR chest 1 view portable    Result Date: 1/5/2024  Impression: No acute cardiopulmonary disease. Workstation performed: [id number]     Other Diagnostic Testing: I have personally reviewed pertinent reports.    ACTIVE MEDICATIONS     Current Facility-Administered Medications   Medication Dose Route Frequency    albuterol inhalation solution 2.5 mg  2.5 mg Nebulization Q4H PRN    baclofen tablet 40 mg  40 mg Oral QPM    dexamethasone (PF) (DECADRON) injection 6 mg  6 mg Intravenous Q24H    diazepam (VALIUM) injection 5 mg  5 mg Intravenous Q6H PRN    DULoxetine (CYMBALTA) delayed release capsule 20 mg  20 mg Oral Daily    enoxaparin (LOVENOX) subcutaneous injection 30 mg  30 mg Subcutaneous Q12H JOVI    furosemide (LASIX) tablet 20 mg  20 mg Oral Daily    multi-electrolyte (PLASMALYTE-A/ISOLYTE-S PH 7.4) IV solution  40 mL/hr Intravenous Continuous    ondansetron (ZOFRAN) injection 4 mg  4 mg Intravenous Q6H PRN    oxybutynin (DITROPAN-XL) 24 hr tablet 10 mg  10 mg Oral Daily    oxyCODONE-acetaminophen (PERCOCET) 5-325 mg per tablet 1 tablet  1 tablet Oral Q4H PRN    polyethylene glycol (MIRALAX) packet 17 g  17 g Oral Daily PRN    pravastatin (PRAVACHOL) tablet 40 mg  40 mg Oral Daily With Dinner    remdesivir (Veklury) 100 mg in sodium chloride 0.9 % 270 mL IVPB  100 mg Intravenous Q24H       Disclaimer: Portions of the record may have been created with voice recognition software. Occasional wrong word or \"sound a like\" substitutions may " have occurred due to the inherent limitations of voice recognition software. Careful consideration should be taken to recognize, using context, where substitutions have occurred.    Fortunato Frost DO   Pulmonary and Critical Care Fellow, PGY-IV  [facility]

## 2024-01-08 NOTE — ASSESSMENT & PLAN NOTE
Presented on 1/5/24 with nasal congestion and inability to cough with shortness of breath, inability to swallow in the setting of multiple sclerosis with quadriplegia and progressive worsening of dysphagia and hypophonia  Tested positive for COVID  On Remdesivir, Decadron (ct at 6 mg daily per pulm)   Trend CRP  Daily CBCD, CMP

## 2024-01-08 NOTE — RESPIRATORY THERAPY NOTE
01/08/24 0883   Respiratory Assessment   Resp Comments Pt transitioned to 6l NC. SpO2 98%, no resp distress noted. Will cont to monitor per resp protocol

## 2024-01-08 NOTE — PROGRESS NOTES
Progress Note - Infectious Disease   Fanny Schulz 53 y.o. female MRN: [id number]  Unit/Bed#: [facility] 203-01 Encounter: [id number]      Impression/Recommendations:  Moderate COVID, currently on remdesivir and dexamethasone.  Patient is clinically improved.  No evidence of bacterial superinfection.  Continue remdesivir and dexamethasone.  No need for antibiotic.  Monitor respiratory symptoms.    Asymptomatic bacteriuria, likely bladder colonization in patient with neurogenic bladder, with chronic SPC in place.  No antibiotic needed for this.    Acute hypoxic respiratory failure, multifactorial, including COVID.  Patient is clinically improved.  O2 requirement has decreased significantly.  COVID treatment as in above.  O2 support and weaning per primary service.    Neurogenic bladder, status post SPC, with chronic bladder colonization.    Advanced MS with functional quadriplegia.    Dysphagia, with high risk for aspiration.    Discussed with patient.    Antibiotics:  Remdesivir    Subjective:  Patient's dyspnea is better today.  Cough mild, nonproductive.  No abdominal or flank pain.  Temperature stays down.  No chills.  No diarrhea.    Objective:  Vitals:  Temp:  [97.8 °F (36.6 °C)-98.4 °F (36.9 °C)] 98.1 °F (36.7 °C)  HR:  [] 98  Resp:  [0-22] 22  BP: ()/(49-69) 116/68  SpO2:  [95 %-98 %] 95 %  Temp (24hrs), Av °F (36.7 °C), Min:97.8 °F (36.6 °C), Max:98.4 °F (36.9 °C)  Current: Temperature: 98.1 °F (36.7 °C)    Physical Exam:     General: Awake, alert, cooperative, no distress.   Neck:  Supple. No mass.  No lymphadenopathy.   Lungs: Decreased breath sounds, no rales, no wheezing, respirations unlabored.   Heart:  Regular rate and rhythm, S1 and S2 normal, no murmur.   Abdomen: Soft, nondistended, non-tender, bowel sounds active all four quadrants, no masses, no organomegaly.   Extremities: No edema. No erythema/warmth. No ulcer. Nontender to palpation.   Skin:  No rash.   Neuro: Diffuse leg  weakness.     Invasive Devices       Central Venous Catheter Line  Duration             Port A Cath Left Chest -- days              Peripheral Intravenous Line  Duration             Peripheral IV 01/05/24 Left;Ventral (anterior) Forearm 3 days    Peripheral IV 01/08/24 Distal;Right;Ventral (anterior) Forearm <1 day              Drain  Duration             Suprapubic Catheter 24 Fr. 382 days                    Labs studies:   I have personally reviewed pertinent labs.  Results from last 7 days   Lab Units 01/08/24  0524 01/07/24  0634 01/06/24  1352 01/06/24  0312   POTASSIUM mmol/L 3.4* 3.1* 3.6 3.5   CHLORIDE mmol/L 106 106 102 99   CO2 mmol/L 26 23 19* 19*   BUN mg/dL 8 9 6 7   CREATININE mg/dL 0.20* 0.40* 0.31* 0.31*   EGFR ml/min/1.73sq m 149 119 129 129   CALCIUM mg/dL 8.3* 8.1* 8.3* 8.3*   AST U/L 12* 15  --  17   ALT U/L 10 12  --  12   ALK PHOS U/L 42 48  --  52     Results from last 7 days   Lab Units 01/08/24  0524 01/07/24  0634 01/06/24  0312   WBC Thousand/uL 7.21 7.83 10.30*   HEMOGLOBIN g/dL 10.3* 11.6 12.1   PLATELETS Thousands/uL 196 176 173     Results from last 7 days   Lab Units 01/06/24  1227 01/05/24  1342 01/05/24  1235 01/05/24  1225   BLOOD CULTURE   --   --  No Growth at 48 hrs. No Growth at 48 hrs.   URINE CULTURE   --  >100,000 cfu/ml Pseudomonas aeruginosa*  >100,000 cfu/ml Enterococcus faecalis*  --   --    LEGIONELLA URINARY ANTIGEN  Negative  --   --   --        Imaging Studies:   I have personally reviewed pertinent imaging study reports and images in PACS.    EKG, Pathology, and Other Studies:   I have personally reviewed pertinent reports.

## 2024-01-08 NOTE — ASSESSMENT & PLAN NOTE
Due to aspiration/COVID infection. Mainly aspiration per pulm   History of multiple sclerosis with progressive worsening of dysphagia  Presented as below   O2 requirement 6L on presentation - worsened overnight on 1/5 to HFNC  O2 weaned down to 4L through NC  Wean O2 as able  Plan as below

## 2024-01-08 NOTE — RESPIRATORY THERAPY NOTE
Resp care   01/08/24 1680   Respiratory Assessment   Resp Comments spoke with slpm. ok to change udn to prn for wheezing/sob. sat 94% on rma(02 disconnected from wall). Will cont vest therapy and pt instructed on flutter to be used with staff and family. Per pt, she still feels congested.

## 2024-01-08 NOTE — CONSULTS
Vancomycin IV Pharmacy-to-Dose Consultation    Fanny Schulz is a 53 y.o. female who was receiving Vancomycin IV with management by the Pharmacy Consult service for treatment of Urinary tract infection (goal -600, trough >10)  .       The patient’s Vancomycin therapy has been completed / discontinued. Thank you for allowing us to take part in this patient's care. Pharmacy will sign-off now; please call or re-consult if there are any questions.        Yen SANTO.Ph

## 2024-01-08 NOTE — PROGRESS NOTES
[facility]  Progress Note  Name: Fanny Schulz I  MRN: [id number]  Unit/Bed#: [facility] 203-01 I Date of Admission: 1/5/2024   Date of Service: 1/8/2024 I Hospital Day: 3    Assessment/Plan   * Acute respiratory failure with hypoxia (HCC)  Assessment & Plan  Due to aspiration/COVID infection. Mainly aspiration per pulm   History of multiple sclerosis with progressive worsening of dysphagia  Presented as below   O2 requirement 6L on presentation - worsened overnight on 1/5 to HFNC  O2 weaned down to 4L through NC  Wean O2 as able  Plan as below    COVID-19 virus infection  Assessment & Plan  Presented on 1/5/24 with nasal congestion and inability to cough with shortness of breath, inability to swallow in the setting of multiple sclerosis with quadriplegia and progressive worsening of dysphagia and hypophonia  Tested positive for COVID  On Remdesivir, Decadron (ct at 6 mg daily per pulm)   Trend CRP  Daily CBCD, CMP      Dysphagia  Assessment & Plan  Progressive worsening noted as outpatient in the setting of MS  Diet advanced to dental soft with thin liquids per speech recommendation    Bacteriuria with pyuria  Assessment & Plan  Has chronic suprapubic catheter  Urine culture with Pseudomonas aeruginosa, Enterococcus faecalis  Colonization per ID  Being monitored off antibiotics    Suprapubic catheter (HCC)  Assessment & Plan  Neurogenic bladder with suprapubic catheter in the setting of MS    Multiple sclerosis (HCC)  Assessment & Plan  Has quadriplegia and progressive dysphagia and hypophonia  Follows with neurology as outpatient  Supportive care    Functional quadriplegia secondary to MS (HCC)  Assessment & Plan  Supportive care  Uses Baclofen 20 mg in am and 40 mg in pm - ordered    Recurrent major depressive disorder, in full remission (formerly [facility])  Assessment & Plan  Ct home Cymbalta 20 mg daily       Hypercholesteremia  Assessment & Plan  Ct equivalent formulary Pravastatin of  home Rosuvastatin    Continuous opioid dependence (HCC)  Assessment & Plan  Home med: Percocet 5/325 mg q 4h - continued  PDMP reviewed  Uses very sparingly per PCP note    Constipation  Assessment & Plan  Bowel regimen    Chronic lower extremity edema  Assessment & Plan  Ct home med Lasix 20 mg daily         VTE Pharmacologic Prophylaxis: VTE Score: 7 High Risk (Score >/= 5) - Pharmacological DVT Prophylaxis Ordered: enoxaparin (Lovenox). Sequential Compression Devices Ordered.    Mobility:   Basic Mobility Inpatient Raw Score: 6  -HLM Goal: 2: Bed activities/Dependent transfer  -HLM Achieved: 1: Laying in bed  HLM Goal NOT achieved. Continue with multidisciplinary rounding and encourage appropriate mobility to improve upon HLM goals.    Patient Centered Rounds: Discussed with RN     Education and Discussions with Family / Patient: Updated  () via phone.    Total Time Spent on Date of Encounter in care of patient: 20 mins. This time was spent on one or more of the following: performing physical exam; counseling and coordination of care; obtaining or reviewing history; documenting in the medical record; reviewing/ordering tests, medications or procedures; communicating with other healthcare professionals and discussing with patient's family/caregivers.    Current Length of Stay: 3 day(s)  Current Patient Status: Inpatient   Certification Statement: The patient will continue to require additional inpatient hospital stay due to Covid infection with hypoxia    Code Status: Level 1 - Full Code    Subjective:   Shortness of breath better. Swallowing improved. No fever. O2 requirements improved to 4L through nasal canula.    Objective:     Vitals:   Temp (24hrs), Av °F (36.7 °C), Min:97.8 °F (36.6 °C), Max:98.4 °F (36.9 °C)    Temp:  [97.8 °F (36.6 °C)-98.4 °F (36.9 °C)] 98.1 °F (36.7 °C)  HR:  [] 96  Resp:  [0-22] 17  BP: ()/(48-69) 101/48  SpO2:  [95 %-98 %] 97 %    Physical  Exam:   Physical Exam  Vitals reviewed.   HENT:      Head: Normocephalic.      Nose: Nose normal.      Mouth/Throat:      Mouth: Mucous membranes are moist.   Eyes:      Extraocular Movements: Extraocular movements intact.   Cardiovascular:      Rate and Rhythm: Normal rate and regular rhythm.   Pulmonary:      Effort: Pulmonary effort is normal. No respiratory distress.      Breath sounds: Normal breath sounds. No wheezing.   Abdominal:      General: Bowel sounds are normal. There is no distension.      Palpations: Abdomen is soft.      Tenderness: There is no abdominal tenderness.   Musculoskeletal:         General: No swelling.      Cervical back: Neck supple.   Skin:     General: Skin is warm and dry.   Neurological:      Mental Status: She is alert.      Comments: Hypophonia  Quadriplegia          Additional Data:     Labs:  Results from last 7 days   Lab Units 01/08/24  0524 01/07/24  0634   WBC Thousand/uL 7.21 7.83   HEMOGLOBIN g/dL 10.3* 11.6   HEMATOCRIT % 32.6* 36.1   PLATELETS Thousands/uL 196 176   BANDS PCT % 7  --    NEUTROS PCT %  --  94*   LYMPHS PCT %  --  3*   LYMPHO PCT % 3*  --    MONOS PCT %  --  2*   MONO PCT % 1*  --    EOS PCT % 0 0     Results from last 7 days   Lab Units 01/08/24  0524   SODIUM mmol/L 139   POTASSIUM mmol/L 3.4*   CHLORIDE mmol/L 106   CO2 mmol/L 26   BUN mg/dL 8   CREATININE mg/dL 0.20*   ANION GAP mmol/L 7   CALCIUM mg/dL 8.3*   ALBUMIN g/dL 3.2*   TOTAL BILIRUBIN mg/dL 0.33   ALK PHOS U/L 42   ALT U/L 10   AST U/L 12*   GLUCOSE RANDOM mg/dL 172*                 Results from last 7 days   Lab Units 01/07/24  0634 01/06/24  1352 01/06/24  0312 01/05/24  1215   LACTIC ACID mmol/L  --  1.8  --   --    PROCALCITONIN ng/ml 0.20  --  0.08 0.06       Lines/Drains:  Invasive Devices       Central Venous Catheter Line  Duration             Port A Cath Left Chest -- days              Peripheral Intravenous Line  Duration             Peripheral IV 01/05/24 Left;Ventral (anterior)  Forearm 3 days    Peripheral IV 01/08/24 Distal;Right;Ventral (anterior) Forearm <1 day              Drain  Duration             Suprapubic Catheter 24 Fr. 382 days                    Central Line:  Goal for removal: Maintain PORT    Recent Cultures (last 7 days):   Results from last 7 days   Lab Units 01/06/24  1227 01/05/24  1342 01/05/24  1235 01/05/24  1225   BLOOD CULTURE   --   --  No Growth at 72 hrs. No Growth at 72 hrs.   URINE CULTURE   --  >100,000 cfu/ml Pseudomonas aeruginosa*  >100,000 cfu/ml Enterococcus faecalis*  --   --    LEGIONELLA URINARY ANTIGEN  Negative  --   --   --        Last 24 Hours Medication List:   Current Facility-Administered Medications   Medication Dose Route Frequency Provider Last Rate    albuterol  2.5 mg Nebulization Q4H PRN Ingris Cotton MD      baclofen  40 mg Oral QPM Ingris Cotton MD      dexamethasone  6 mg Intravenous Q24H Apryl Aragon PA-C      diazepam  5 mg Intravenous Q6H PRN Apryl Aragon PA-C      DULoxetine  20 mg Oral Daily Tiffany Hickey MD      enoxaparin  30 mg Subcutaneous Q12H JOVI Ingris Cotton MD      furosemide  20 mg Oral Daily Ingris Cotton MD      multi-electrolyte  40 mL/hr Intravenous Continuous Ingris Cotton MD 40 mL/hr (01/08/24 1314)    ondansetron  4 mg Intravenous Q6H PRN Tiffany Hickey MD      oxybutynin  10 mg Oral Daily Tiffany Hickey MD      oxyCODONE-acetaminophen  1 tablet Oral Q4H PRN Ingris Cotton MD      polyethylene glycol  17 g Oral Daily PRN Tiffany Hickey MD      pravastatin  40 mg Oral Daily With Dinner Tiffany Hickey MD      remdesivir  100 mg Intravenous Q24H Apryl Aragon PA-C Stopped (01/08/24 0130)        Today, Patient Was Seen By: Ingris Cotton MD    **Please Note: This note may have been constructed using a voice recognition system.**

## 2024-01-08 NOTE — ASSESSMENT & PLAN NOTE
Progressive worsening noted as outpatient in the setting of MS  Diet advanced to dental soft with thin liquids per speech recommendation

## 2024-01-08 NOTE — SPEECH THERAPY NOTE
"Speech Language/Pathology    Speech/Language Pathology Progress Note    Patient Name: Fanny Schulz  Today's Date: 1/8/2024     Problem List  Principal Problem:    Acute respiratory failure with hypoxia (HCC)  Active Problems:    Suprapubic catheter (HCC)    Constipation    Recurrent major depressive disorder, in full remission (HCC)    Dysphagia    Multiple sclerosis (HCC)    Functional quadriplegia secondary to MS (HCC)    Bacteriuria with pyuria    Continuous opioid dependence ([facility])    COVID-19 virus infection    Hypercholesteremia       Past Medical History  Past Medical History:   Diagnosis Date    Anesthesia     \"prefers if able to be put to sleep on stretcher before placed on table if possible due to severe spasticity    Bladder stones     Chronic pain disorder     neck    Contracture, right shoulder     right arm and hand    Dependent on wheelchair     motorized    Edema     dependant lower extremities    Elevated alkaline phosphatase level     Mildly elevated total, normal isoenzymes 4/15/15, normal 1/17; last assessed: 24Nov2015    Fernandez catheter in place     #24 to large bag(silicone catheter)    Gallbladder disease     History of femur fracture     right leg    History of UTI     MS (multiple sclerosis) ([facility])     Muscle spasticity     especially with touch    Muscle weakness     Muscular dystrophy ([facility])     Neck pain     Neurogenic bladder     Paralysis ([facility])     bilateral lower extremities    Port-A-Cath in place     left chest,\" hasn't used in approx 1 yr or so\"    Pressure injury of skin     right ischium    Sacral pressure sore     \"shearing, tegaderm in place,\"    Swallowing difficulty     Tinnitus     Urinary incontinence         Past Surgical History  Past Surgical History:   Procedure Laterality Date    BLADDER STONE REMOVAL N/A 12/4/2017    Procedure: CYSTO, LITHOLOPAXY HOLMIUM LASER;  Surgeon: Damir Osborne MD;  Location: [facility] OR;  Service: Urology    BLADDER SURGERY      " CHOLECYSTECTOMY      CYSTOSCOPY  06/15/2020    ESOPHAGOGASTRODUODENOSCOPY      FL RETROGRADE PYELOGRAM  10/2/2020    FL RETROGRADE PYELOGRAM  11/3/2020    KIDNEY STONE SURGERY      PORTACATH PLACEMENT Left     LA CYSTO BLADDER W/URETERAL CATHETERIZATION Left 10/2/2020    Procedure: CYSTOSCOPY LEFT RETROGRADE ; LEFT URETEROSCOPY; PYELOGRAM WITH STENT INSERTION AND SUPERPUBIC EXCHANGE;  Surgeon: Philip Mcdonald MD;  Location: [facility];  Service: Urology    LA CYSTO/URETERO W/LITHOTRIPSY &INDWELL STENT INSRT Left 11/3/2020    Procedure: CYSTOSCOPY URETEROSCOPY WITH RETROGRADE PYELOGRAM AND EXCHANGE STENT URETERAL, SP TUBE EXCHANGE, BASKET STONE EXTRACTION, BLADDER STONE EXTRACTION;  Surgeon: Damir Osborne MD;  Location: [facility];  Service: Urology    LA LITHOLAPAXY SMPL/SM <2.5 CM N/A 12/22/2022    Procedure: Cystolitholapaxy w/  laser lithotripsy of bladder stones; SUPRAPUBIC CATHETER CHANGE;  Surgeon: Damir Osborne MD;  Location: [facility];  Service: Urology    SUPRAPUBIC CYSTOSTOMY  02/25/2014    last assessed: [id number]         Subjective:  Pt seen for dysphagia tx. Pt was positioned fully upright in bed. Pt alert, sister in room.     Objective:  Reviewed chart, spoke to RN. Pt was changed to midlow O2 from HFNC this morning, and is reportedly tolerating well. Vocal quality is much improved today, although still somewhat weak, no wet/gurgly quality. Cued cough improved. Pt was fed a portion of lunch, including cream of mushroom soup, pureed pears, and thin liquids via straw. Bolus manipulation and transfer appeared prompt. ?mildly delayed pharyngeal swallows. Pt coughed x1 immediately after consecutive sips of thin via straw. Pt cued to take smaller, single sips. With further sips there were no other s/s of aspiration. No s/s of aspiration with pureed. Pt was trialed with regular solids (hard shortbread cookie). Mastication and bolus formation was mildly prolonged but functional. There were no  overt s/s of aspiration. Sats remained stable in the mid to upper 90s. Discussed with pt and sister recommendation to advance diet; pt may do better with soft, chopped, moist foods for now, while still fatigued, and to see if she can tolerate midflow O2. Both are in agreement with this. Discussed with RN, messaged physician with recommendation.     Assessment:  Good toleration of pureed, thin liquids, and trials of regular solids. Mastication was mildly prolonged but functional. S/s of possible aspiration x1, with larger, consecutive sips of thin liquid via straw. No further s/s of aspiration.     Plan/Recommendations:  Advance diet to dysphagia 3/soft advanced and thin liquids. ST to see for dysphagia tx. Suspect diet may be advanced to regular soon.

## 2024-01-08 NOTE — ASSESSMENT & PLAN NOTE
Has chronic suprapubic catheter  Urine culture with Pseudomonas aeruginosa, Enterococcus faecalis  Colonization per ID  Being monitored off antibiotics

## 2024-01-08 NOTE — CASE MANAGEMENT
Case Management Assessment & Discharge Planning Note    Patient name Fanny Schulz  Location [facility] 203/[facility] 203-01 MRN [id number]  : 1970 Date 2024       Current Admission Date: 2024  Current Admission Diagnosis:Acute respiratory failure with hypoxia (HCC)   Patient Active Problem List    Diagnosis Date Noted    COVID-19 virus infection 2024    Hypercholesteremia 2024    Tinnitus of both ears 2023    Hypophonia 2023    Peripheral edema 2023    Acute respiratory failure with hypoxia (HCC) 2022    Continuous opioid dependence ([facility]) 2022    Increased endometrial stripe thickness 03/10/2021    Ureteral calculus, left 2020    Alkaline phosphatase elevation 10/28/2020    Abnormal thyroid function test 10/28/2020    Candidal vaginitis 10/19/2020    Hydronephrosis with urinary obstruction due to ureteral calculus 10/02/2020    Thrombocytopenia ([facility]) 10/01/2020    Serum total bilirubin elevated 10/01/2020    Bacteriuria with pyuria 2020    Medication management contract signed 2020    Screening mammogram, encounter for 2019    Health care maintenance 2019    Allergic rhinitis 2018    Functional quadriplegia secondary to MS ([facility]) 2018    Suprapubic catheter ([facility]) 2017    Nephrolithiasis 2016    Bladder calculus 2015    Muscle spasticity 2015    Vitamin B12 deficiency 2015    Constipation 2015    Hyperglycemia 2014    Familial hypercholesterolemia 2014    Recurrent major depressive disorder, in full remission ([facility]) 2014    Lymphedema 2014    Spinal stenosis of cervical region 2014    Urinary incontinence 2014    Vitamin D deficiency 2013    Dysphagia 2013    Dizziness 2013    Muscle weakness 2013    Neurogenic bladder 2013    Sleep disorder 2013    Tremor 2013    Vision loss 2013    Multiple sclerosis  ([facility]) 10/21/2013      LOS (days): 3  Geometric Mean LOS (GMLOS) (days):   Days to GMLOS:     OBJECTIVE:    Risk of Unplanned Readmission Score: 16.5         Current admission status: Inpatient       Preferred Pharmacy:   [facility]pharmacy [id number] - [place] - [address] [facility] [address]  [address] [address]  Phone: [phone] Fax: [phone]    Primary Care Provider: Nacho Monroy DO    Primary Insurance: MEDICARE  Secondary Insurance: BLUE CROSS    ASSESSMENT:  Active Health Care Proxies    There are no active Health Care Proxies on file.       Advance Directives  Does patient have a Health Care POA?: No  Was patient offered paperwork?: Yes  Does patient have Advance Directives?: No  Was patient offered paperwork?: Yes         Readmission Root Cause  30 Day Readmission: No    Patient Information  Admitted from:: Home  Mental Status: Alert  During Assessment patient was accompanied by: Sister, Other-Comment (Cornelia)  Assessment information provided by:: Sister, Patient  Primary Caregiver: Spouse  Caregiver's Name:: Nacho Schulz  Caregiver's Relationship to Patient:: Significant Other  Caregiver's Telephone Number:: [phone]  Support Systems: Spouse/significant other, Family members  What city do you live in?: [place]  Home entry access options. Select all that apply.: Ramp  Type of Current Residence: [facility]  Living Arrangements: Lives w/ Spouse/significant other    Activities of Daily Living Prior to Admission  Functional Status: Total dependent  Completes ADLs independently?: No  Level of ADL dependence: Total Dependent  Ambulates independently?: No  Level of ambulatory dependence: Total Dependent  Does patient use assisted devices?: Yes  Assisted Devices (DME) used: Meño lift, Wheelchair  Does patient have a history of Outpatient Therapy (PT/OT)?: Yes  Does the patient have a history of Short-Term Rehab?: No  Does patient have a history of HHC?: Yes  Does patient  currently have HHC?: No         Patient Information Continued  Income Source: SSI/SSD  Does patient have prescription coverage?: Yes  Does patient receive dialysis treatments?: No  Does patient have a history of substance abuse?: No  Does patient have a history of Mental Health Diagnosis?: No         Means of Transportation  Means of Transport to Appts:: Family transport      Housing Stability: Not on file   Food Insecurity: Not on file   Transportation Needs: No Transportation Needs (2/20/2023)    PRAPARE - Transportation     Lack of Transportation (Medical): No     Lack of Transportation (Non-Medical): No   Utilities: Not on file       DISCHARGE DETAILS:    Discharge planning discussed with:: Cornelia, patient's sister  [place] of Choice: Yes     CM contacted family/caregiver?: Yes             Contacts  Patient Contacts: Cornelia Way (Sister)  Relationship to Patient:: Family  Contact Method: Phone  Phone Number: [phone]  Reason/Outcome: Continuity of Care            Additional Comments: CM called patient's room to obtain open assessment information. Patient's sister, Cornelia, answered the phone. CM introduced self and explained role. CM asked if patient would like to speak for an assessment. Patient requested that Cornelia speak to CM for assessment. Patient lives at home with her , Nacho, and he is her primary caregiver. Patient is dependent on caregiver. Patient has had OT and HHC in the past but is not connected currently. Patient would like to return home at discharge.

## 2024-01-08 NOTE — PLAN OF CARE
Problem: PAIN - ADULT  Goal: Verbalizes/displays adequate comfort level or baseline comfort level  Description: Interventions:  - Encourage patient to monitor pain and request assistance  - Assess pain using appropriate pain scale  - Administer analgesics based on type and severity of pain and evaluate response  - Implement non-pharmacological measures as appropriate and evaluate response  - Consider cultural and social influences on pain and pain management  - Notify physician/advanced practitioner if interventions unsuccessful or patient reports new pain  Outcome: Progressing     Problem: INFECTION - ADULT  Goal: Absence or prevention of progression during hospitalization  Description: INTERVENTIONS:  - Assess and monitor for signs and symptoms of infection  - Monitor lab/diagnostic results  - Monitor all insertion sites, i.e. indwelling lines, tubes, and drains  - Monitor endotracheal if appropriate and nasal secretions for changes in amount and color  - [place] appropriate cooling/warming therapies per order  - Administer medications as ordered  - Instruct and encourage patient and family to use good hand hygiene technique  - Identify and instruct in appropriate isolation precautions for identified infection/condition  Outcome: Progressing     Problem: SAFETY ADULT  Goal: Patient will remain free of falls  Description: INTERVENTIONS:  - Educate patient/family on patient safety including physical limitations  - Instruct patient to call for assistance with activity   - Consult OT/PT to assist with strengthening/mobility   - Keep Call bell within reach  - Keep bed low and locked with side rails adjusted as appropriate  - Keep care items and personal belongings within reach  - Initiate and maintain comfort rounds  - Make Fall Risk Sign visible to staff  - Offer Toileting every 2 Hours, in advance of need  - Initiate/Maintain bed alarm  - Apply yellow socks and bracelet for high fall risk patients  - Consider  moving patient to room near nurses station  Outcome: Progressing     Problem: RESPIRATORY - ADULT  Goal: Achieves optimal ventilation and oxygenation  Description: INTERVENTIONS:  - Assess for changes in respiratory status  - Assess for changes in mentation and behavior  - Position to facilitate oxygenation and minimize respiratory effort  - Oxygen administered by appropriate delivery if ordered  - Initiate smoking cessation education as indicated  - Encourage broncho-pulmonary hygiene including cough, deep breathe, Incentive Spirometry  - Assess the need for suctioning and aspirate as needed  - Assess and instruct to report SOB or any respiratory difficulty  - Respiratory Therapy support as indicated  Outcome: Progressing

## 2024-01-09 LAB
ALBUMIN SERPL BCP-MCNC: 3.2 G/DL (ref 3.5–5)
ALP SERPL-CCNC: 41 U/L (ref 34–104)
ALT SERPL W P-5'-P-CCNC: 15 U/L (ref 7–52)
ANION GAP SERPL CALCULATED.3IONS-SCNC: 7 MMOL/L
AST SERPL W P-5'-P-CCNC: 18 U/L (ref 13–39)
BASOPHILS # BLD AUTO: 0.02 THOUSANDS/ÂΜL (ref 0–0.1)
BASOPHILS NFR BLD AUTO: 0 % (ref 0–1)
BILIRUB SERPL-MCNC: 0.33 MG/DL (ref 0.2–1)
BUN SERPL-MCNC: 8 MG/DL (ref 5–25)
CALCIUM ALBUM COR SERPL-MCNC: 9 MG/DL (ref 8.3–10.1)
CALCIUM SERPL-MCNC: 8.4 MG/DL (ref 8.4–10.2)
CHLORIDE SERPL-SCNC: 106 MMOL/L (ref 96–108)
CO2 SERPL-SCNC: 25 MMOL/L (ref 21–32)
CREAT SERPL-MCNC: 0.21 MG/DL (ref 0.6–1.3)
CRP SERPL QL: 46.1 MG/L
EOSINOPHIL # BLD AUTO: 0 THOUSAND/ÂΜL (ref 0–0.61)
EOSINOPHIL NFR BLD AUTO: 0 % (ref 0–6)
ERYTHROCYTE [DISTWIDTH] IN BLOOD BY AUTOMATED COUNT: 13 % (ref 11.6–15.1)
EST. AVERAGE GLUCOSE BLD GHB EST-MCNC: 103 MG/DL
GFR SERPL CREATININE-BSD FRML MDRD: 147 ML/MIN/1.73SQ M
GLUCOSE SERPL-MCNC: 154 MG/DL (ref 65–140)
HBA1C MFR BLD: 5.2 %
HCT VFR BLD AUTO: 32.3 % (ref 34.8–46.1)
HGB BLD-MCNC: 10.6 G/DL (ref 11.5–15.4)
IMM GRANULOCYTES # BLD AUTO: 0.15 THOUSAND/UL (ref 0–0.2)
IMM GRANULOCYTES NFR BLD AUTO: 2 % (ref 0–2)
LYMPHOCYTES # BLD AUTO: 0.58 THOUSANDS/ÂΜL (ref 0.6–4.47)
LYMPHOCYTES NFR BLD AUTO: 7 % (ref 14–44)
MAGNESIUM SERPL-MCNC: 2.3 MG/DL (ref 1.9–2.7)
MCH RBC QN AUTO: 28.5 PG (ref 26.8–34.3)
MCHC RBC AUTO-ENTMCNC: 32.8 G/DL (ref 31.4–37.4)
MCV RBC AUTO: 87 FL (ref 82–98)
MONOCYTES # BLD AUTO: 0.24 THOUSAND/ÂΜL (ref 0.17–1.22)
MONOCYTES NFR BLD AUTO: 3 % (ref 4–12)
NEUTROPHILS # BLD AUTO: 7.05 THOUSANDS/ÂΜL (ref 1.85–7.62)
NEUTS SEG NFR BLD AUTO: 88 % (ref 43–75)
NRBC BLD AUTO-RTO: 0 /100 WBCS
PLATELET # BLD AUTO: 274 THOUSANDS/UL (ref 149–390)
PMV BLD AUTO: 9.6 FL (ref 8.9–12.7)
POTASSIUM SERPL-SCNC: 4.2 MMOL/L (ref 3.5–5.3)
PROT SERPL-MCNC: 5.6 G/DL (ref 6.4–8.4)
RBC # BLD AUTO: 3.72 MILLION/UL (ref 3.81–5.12)
SODIUM SERPL-SCNC: 138 MMOL/L (ref 135–147)
WBC # BLD AUTO: 8.04 THOUSAND/UL (ref 4.31–10.16)

## 2024-01-09 PROCEDURE — 99232 SBSQ HOSP IP/OBS MODERATE 35: CPT | Performed by: STUDENT IN AN ORGANIZED HEALTH CARE EDUCATION/TRAINING PROGRAM

## 2024-01-09 PROCEDURE — 94669 MECHANICAL CHEST WALL OSCILL: CPT | Performed by: SOCIAL WORKER

## 2024-01-09 PROCEDURE — 83735 ASSAY OF MAGNESIUM: CPT | Performed by: INTERNAL MEDICINE

## 2024-01-09 PROCEDURE — 80053 COMPREHEN METABOLIC PANEL: CPT | Performed by: INTERNAL MEDICINE

## 2024-01-09 PROCEDURE — 94664 DEMO&/EVAL PT USE INHALER: CPT | Performed by: SOCIAL WORKER

## 2024-01-09 PROCEDURE — 99232 SBSQ HOSP IP/OBS MODERATE 35: CPT | Performed by: INTERNAL MEDICINE

## 2024-01-09 PROCEDURE — 83036 HEMOGLOBIN GLYCOSYLATED A1C: CPT | Performed by: INTERNAL MEDICINE

## 2024-01-09 PROCEDURE — 85025 COMPLETE CBC W/AUTO DIFF WBC: CPT | Performed by: INTERNAL MEDICINE

## 2024-01-09 PROCEDURE — 86140 C-REACTIVE PROTEIN: CPT | Performed by: INTERNAL MEDICINE

## 2024-01-09 PROCEDURE — 94668 MNPJ CHEST WALL SBSQ: CPT | Performed by: SOCIAL WORKER

## 2024-01-09 PROCEDURE — 94760 N-INVAS EAR/PLS OXIMETRY 1: CPT | Performed by: SOCIAL WORKER

## 2024-01-09 RX ORDER — BACLOFEN 20 MG/1
20 TABLET ORAL EVERY 24 HOURS
Status: DISCONTINUED | OUTPATIENT
Start: 2024-01-10 | End: 2024-01-15 | Stop reason: HOSPADM

## 2024-01-09 RX ORDER — BACLOFEN 20 MG/1
20 TABLET ORAL ONCE
Status: COMPLETED | OUTPATIENT
Start: 2024-01-09 | End: 2024-01-09

## 2024-01-09 RX ADMIN — FUROSEMIDE 20 MG: 20 TABLET ORAL at 08:50

## 2024-01-09 RX ADMIN — BACLOFEN 20 MG: 20 TABLET ORAL at 12:11

## 2024-01-09 RX ADMIN — OXYBUTYNIN 10 MG: 10 TABLET, FILM COATED, EXTENDED RELEASE ORAL at 08:50

## 2024-01-09 RX ADMIN — ENOXAPARIN SODIUM 30 MG: 30 INJECTION SUBCUTANEOUS at 20:02

## 2024-01-09 RX ADMIN — SODIUM CHLORIDE, SODIUM GLUCONATE, SODIUM ACETATE, POTASSIUM CHLORIDE, MAGNESIUM CHLORIDE, SODIUM PHOSPHATE, DIBASIC, AND POTASSIUM PHOSPHATE 40 ML/HR: .53; .5; .37; .037; .03; .012; .00082 INJECTION, SOLUTION INTRAVENOUS at 18:50

## 2024-01-09 RX ADMIN — DULOXETINE HYDROCHLORIDE 20 MG: 20 CAPSULE, DELAYED RELEASE ORAL at 08:50

## 2024-01-09 RX ADMIN — REMDESIVIR 100 MG: 100 INJECTION, POWDER, LYOPHILIZED, FOR SOLUTION INTRAVENOUS at 00:13

## 2024-01-09 RX ADMIN — OXYCODONE HYDROCHLORIDE AND ACETAMINOPHEN 1 TABLET: 5; 325 TABLET ORAL at 20:02

## 2024-01-09 RX ADMIN — ENOXAPARIN SODIUM 30 MG: 30 INJECTION SUBCUTANEOUS at 08:50

## 2024-01-09 RX ADMIN — DEXAMETHASONE SODIUM PHOSPHATE 6 MG: 10 INJECTION, SOLUTION INTRAMUSCULAR; INTRAVENOUS at 00:13

## 2024-01-09 RX ADMIN — PRAVASTATIN SODIUM 40 MG: 40 TABLET ORAL at 16:10

## 2024-01-09 RX ADMIN — BACLOFEN 40 MG: 20 TABLET ORAL at 19:39

## 2024-01-09 NOTE — RESPIRATORY THERAPY NOTE
Resp care   01/09/24 0900   Respiratory Assessment   Resp Comments Pt c.o congestion. oral sx for small amt yellow secretions. Bs remain coarse, no wheezing after vest. Pt too weak to use flutter at this time,Cont vest therapy, prn nebs and titrate 02.   Oral Suctioning/Secretions   Suction Type Oral   Suction Device Yankauer   Secretion Amount Small   Secretion Color Yellow   Additional Assessments   SpO2 94 %

## 2024-01-09 NOTE — PLAN OF CARE
Problem: PAIN - ADULT  Goal: Verbalizes/displays adequate comfort level or baseline comfort level  Description: Interventions:  - Encourage patient to monitor pain and request assistance  - Assess pain using appropriate pain scale  - Administer analgesics based on type and severity of pain and evaluate response  - Implement non-pharmacological measures as appropriate and evaluate response  - Consider cultural and social influences on pain and pain management  - Notify physician/advanced practitioner if interventions unsuccessful or patient reports new pain  Outcome: Progressing     Problem: INFECTION - ADULT  Goal: Absence or prevention of progression during hospitalization  Description: INTERVENTIONS:  - Assess and monitor for signs and symptoms of infection  - Monitor lab/diagnostic results  - Monitor all insertion sites, i.e. indwelling lines, tubes, and drains  - Monitor endotracheal if appropriate and nasal secretions for changes in amount and color  - [place] appropriate cooling/warming therapies per order  - Administer medications as ordered  - Instruct and encourage patient and family to use good hand hygiene technique  - Identify and instruct in appropriate isolation precautions for identified infection/condition  Outcome: Progressing  Goal: Absence of fever/infection during neutropenic period  Description: INTERVENTIONS:  - Monitor WBC    Outcome: Progressing     Problem: SAFETY ADULT  Goal: Patient will remain free of falls  Description: INTERVENTIONS:  - Educate patient/family on patient safety including physical limitations  - Instruct patient to call for assistance with activity   - Consult OT/PT to assist with strengthening/mobility   - Keep Call bell within reach  - Keep bed low and locked with side rails adjusted as appropriate  - Keep care items and personal belongings within reach  - Initiate and maintain comfort rounds  - Make Fall Risk Sign visible to staff  - Offer Toileting every 2 Hours,  in advance of need  - Initiate/Maintain alarm  - Obtain necessary fall risk management equipment  - Apply yellow socks and bracelet for high fall risk patients  - Consider moving patient to room near nurses station  Outcome: Progressing  Goal: Maintain or return to baseline ADL function  Description: INTERVENTIONS:  -  Assess patient's ability to carry out ADLs; assess patient's baseline for ADL function and identify physical deficits which impact ability to perform ADLs (bathing, care of mouth/teeth, toileting, grooming, dressing, etc.)  - Assess/evaluate cause of self-care deficits   - Assess range of motion  - Assess patient's mobility; develop plan if impaired  - Assess patient's need for assistive devices and provide as appropriate  - Encourage maximum independence but intervene and supervise when necessary  - Involve family in performance of ADLs  - Assess for home care needs following discharge   - Consider OT consult to assist with ADL evaluation and planning for discharge  - Provide patient education as appropriate  Outcome: Progressing  Goal: Maintains/Returns to pre admission functional level  Description: INTERVENTIONS:  - Perform AM-PAC 6 Click Basic Mobility/ Daily Activity assessment daily.  - Set and communicate daily mobility goal to care team and patient/family/caregiver.   - Collaborate with rehabilitation services on mobility goals if consulted  - Perform Range of Motion 3 times a day.  - Reposition patient every 2 hours.  - Dangle patient 3 times a day  - Stand patient 3 times a day  - Ambulate patient 3 times a day  - Out of bed to chair 3 times a day   - Out of bed for meals 3 times a day  - Out of bed for toileting  - Record patient progress and toleration of activity level   Outcome: Progressing     Problem: DISCHARGE PLANNING  Goal: Discharge to home or other facility with appropriate resources  Description: INTERVENTIONS:  - Identify barriers to discharge w/patient and caregiver  - Arrange  for needed discharge resources and transportation as appropriate  - Identify discharge learning needs (meds, wound care, etc.)  - Arrange for interpretive services to assist at discharge as needed  - Refer to Case Management Department for coordinating discharge planning if the patient needs post-hospital services based on physician/advanced practitioner order or complex needs related to functional status, cognitive ability, or social support system  Outcome: Progressing     Problem: Knowledge Deficit  Goal: Patient/family/caregiver demonstrates understanding of disease process, treatment plan, medications, and discharge instructions  Description: Complete learning assessment and assess knowledge base.  Interventions:  - Provide teaching at level of understanding  - Provide teaching via preferred learning methods  Outcome: Progressing     Problem: RESPIRATORY - ADULT  Goal: Achieves optimal ventilation and oxygenation  Description: INTERVENTIONS:  - Assess for changes in respiratory status  - Assess for changes in mentation and behavior  - Position to facilitate oxygenation and minimize respiratory effort  - Oxygen administered by appropriate delivery if ordered  - Initiate smoking cessation education as indicated  - Encourage broncho-pulmonary hygiene including cough, deep breathe, Incentive Spirometry  - Assess the need for suctioning and aspirate as needed  - Assess and instruct to report SOB or any respiratory difficulty  - Respiratory Therapy support as indicated  Outcome: Progressing     Problem: Prexisting or High Potential for Compromised Skin Integrity  Goal: Skin integrity is maintained or improved  Description: INTERVENTIONS:  - Identify patients at risk for skin breakdown  - Assess and monitor skin integrity  - Assess and monitor nutrition and hydration status  - Monitor labs   - Assess for incontinence   - Turn and reposition patient  - Assist with mobility/ambulation  - Relieve pressure over bony  prominences  - Avoid friction and shearing  - Provide appropriate hygiene as needed including keeping skin clean and dry  - Evaluate need for skin moisturizer/barrier cream  - Collaborate with interdisciplinary team   - Patient/family teaching  - Consider wound care consult   Outcome: Progressing     Problem: Nutrition/Hydration-ADULT  Goal: Nutrient/Hydration intake appropriate for improving, restoring or maintaining nutritional needs  Description: Monitor and assess patient's nutrition/hydration status for malnutrition. Collaborate with interdisciplinary team and initiate plan and interventions as ordered.  Monitor patient's weight and dietary intake as ordered or per policy. Utilize nutrition screening tool and intervene as necessary. Determine patient's food preferences and provide high-protein, high-caloric foods as appropriate.     INTERVENTIONS:  - Monitor oral intake, urinary output, labs, and treatment plans  - Assess nutrition and hydration status and recommend course of action  - Evaluate amount of meals eaten  - Assist patient with eating if necessary   - Allow adequate time for meals  - Recommend/ encourage appropriate diets, oral nutritional supplements, and vitamin/mineral supplements  - Order, calculate, and assess calorie counts as needed  - Recommend, monitor, and adjust tube feedings and TPN/PPN based on assessed needs  - Assess need for intravenous fluids  - Provide specific nutrition/hydration education as appropriate  - Include patient/family/caregiver in decisions related to nutrition  Outcome: Progressing

## 2024-01-09 NOTE — CASE MANAGEMENT
Case Management Discharge Planning Note    Patient name Fanny Colin [facility] 203/[facility] 203- MRN [id number]  : 1970 Date 2024       Current Admission Date: 2024  Current Admission Diagnosis:Acute respiratory failure with hypoxia ([facility])   Patient Active Problem List    Diagnosis Date Noted    Chronic lower extremity edema 2024    COVID-19 virus infection 2024    Hypercholesteremia 2024    Tinnitus of both ears 2023    Hypophonia 2023    Peripheral edema 2023    Acute respiratory failure with hypoxia ([facility]) 2022    Continuous opioid dependence ([facility]) 2022    Increased endometrial stripe thickness 03/10/2021    Ureteral calculus, left 2020    Alkaline phosphatase elevation 10/28/2020    Abnormal thyroid function test 10/28/2020    Candidal vaginitis 10/19/2020    Hydronephrosis with urinary obstruction due to ureteral calculus 10/02/2020    Thrombocytopenia ([facility]) 10/01/2020    Serum total bilirubin elevated 10/01/2020    Bacteriuria with pyuria 2020    Medication management contract signed 2020    Screening mammogram, encounter for 2019    Health care maintenance 2019    Allergic rhinitis 2018    Functional quadriplegia secondary to MS ([facility]) 2018    Suprapubic catheter ([facility]) 2017    Nephrolithiasis 2016    Bladder calculus 2015    Muscle spasticity 2015    Vitamin B12 deficiency 2015    Constipation 2015    Hyperglycemia 2014    Familial hypercholesterolemia 2014    Recurrent major depressive disorder, in full remission ([facility]) 2014    Lymphedema 2014    Spinal stenosis of cervical region 2014    Urinary incontinence 2014    Vitamin D deficiency 2013    Dysphagia 2013    Dizziness 2013    Muscle weakness 2013    Neurogenic bladder 2013    Sleep disorder 2013    Tremor 2013    Vision loss  11/04/2013    Multiple sclerosis ([facility]) 10/21/2013      LOS (days): 4  Geometric Mean LOS (GMLOS) (days): 5  Days to GMLOS:0.9     OBJECTIVE:  Risk of Unplanned Readmission Score: 14.12         Current admission status: Inpatient   Preferred Pharmacy:   [id number] - [place] - [address] [address]  [address] [address]  Phone: [phone] Fax: [phone]    Primary Care Provider: Nacho Monroy DO    Primary Insurance: MEDICARE  Secondary Insurance: BLUE CROSS    DISCHARGE DETAILS:    Discharge planning discussed with:: Patient and Patient's , Nacho  Freedom of Choice: Yes  Comments - Freedom of Choice: Patient and her family would like her to return home at discharge.  CM contacted family/caregiver?: Yes          Treatment Team Recommendation: Home  Discharge Destination Plan:: Home  Transport at Discharge : Family           IMM Given (Date):: 01/09/24  IMM Given to:: Patient

## 2024-01-09 NOTE — PROGRESS NOTES
Progress Note - Infectious Disease   Fanny Schulz 53 y.o. female MRN: [id number]  Unit/Bed#: [facility] 203-01 Encounter: [id number]      Impression/Recommendations:  Moderate COVID, currently on remdesivir and dexamethasone.  Patient is clinically improved.  No evidence of bacterial superinfection.  Continue remdesivir and dexamethasone.  No need for antibiotic.  Monitor respiratory symptoms.     Asymptomatic bacteriuria, likely bladder colonization in patient with neurogenic bladder, with chronic SPC in place.  No antibiotic needed for this.     Acute hypoxic respiratory failure, multifactorial, including COVID.  Patient is clinically improved.  O2 requirement continues to decrease.  COVID treatment as in above.  O2 support and weaning per primary service.     Neurogenic bladder, status post SPC, with chronic bladder colonization.     Advanced MS with functional quadriplegia.     Dysphagia, with high risk for aspiration.     Discussed with patient.     Antibiotics:  Remdesivir     Subjective:  Patient's dyspnea continues to improve.  Cough mild, active of clear sputum.  No abdominal or flank pain.  Temperature stays down.  No chills.  No diarrhea.    Objective:  Vitals:  Temp:  [97.5 °F (36.4 °C)-98.7 °F (37.1 °C)] 98.5 °F (36.9 °C)  HR:  [66-96] 88  Resp:  [15-20] 20  BP: ()/(48-58) 107/53  SpO2:  [91 %-98 %] 92 %  Temp (24hrs), Av.1 °F (36.7 °C), Min:97.5 °F (36.4 °C), Max:98.7 °F (37.1 °C)  Current: Temperature: 98.5 °F (36.9 °C)    Physical Exam:     General: Awake, alert, cooperative, no distress.   Neck:  Supple. No mass.  No lymphadenopathy.   Lungs: Expansion symmetric, mild basilar rhonchi, no rales, no wheezing, respirations unlabored.   Heart:  Regular rate and rhythm, S1 and S2 normal, no murmur.   Abdomen: Soft, nondistended, non-tender, bowel sounds active all four quadrants, no masses, no organomegaly.   Extremities: No edema. No erythema/warmth. No ulcer. Nontender to  palpation.   Skin:  No rash.   Neuro: Moves all extremities.     Invasive Devices       Central Venous Catheter Line  Duration             Port A Cath Left Chest -- days              Peripheral Intravenous Line  Duration             Peripheral IV 01/08/24 Distal;Right;Ventral (anterior) Forearm 1 day              Drain  Duration             Suprapubic Catheter 24 Fr. 383 days                    Labs studies:   I have personally reviewed pertinent labs.  Results from last 7 days   Lab Units 01/09/24  0541 01/08/24  0524 01/07/24  0634   POTASSIUM mmol/L 4.2 3.4* 3.1*   CHLORIDE mmol/L 106 106 106   CO2 mmol/L 25 26 23   BUN mg/dL 8 8 9   CREATININE mg/dL 0.21* 0.20* 0.40*   EGFR ml/min/1.73sq m 147 149 119   CALCIUM mg/dL 8.4 8.3* 8.1*   AST U/L 18 12* 15   ALT U/L 15 10 12   ALK PHOS U/L 41 42 48     Results from last 7 days   Lab Units 01/09/24  0541 01/08/24  0524 01/07/24  0634   WBC Thousand/uL 8.04 7.21 7.83   HEMOGLOBIN g/dL 10.6* 10.3* 11.6   PLATELETS Thousands/uL 274 196 176     Results from last 7 days   Lab Units 01/06/24  1227 01/05/24  1342 01/05/24  1235 01/05/24  1225   BLOOD CULTURE   --   --  No Growth After 4 Days. No Growth After 4 Days.   URINE CULTURE   --  >100,000 cfu/ml Pseudomonas aeruginosa*  >100,000 cfu/ml Enterococcus faecalis*  --   --    LEGIONELLA URINARY ANTIGEN  Negative  --   --   --        Imaging Studies:   I have personally reviewed pertinent imaging study reports and images in PACS.    EKG, Pathology, and Other Studies:   I have personally reviewed pertinent reports.

## 2024-01-09 NOTE — PROGRESS NOTES
[facility]  Progress Note  Name: Fanny Schulz I  MRN: [id number]  Unit/Bed#: [facility] 818-01 I Date of Admission: 1/5/2024   Date of Service: 1/9/2024 I Hospital Day: 4    Assessment/Plan   * Acute respiratory failure with hypoxia (HCC)  Assessment & Plan  Due to aspiration/COVID infection. Mainly aspiration per pulm   History of multiple sclerosis with progressive worsening of dysphagia  Presented as below   O2 requirement 6L on presentation - worsened overnight on 1/5 to HFNC  O2 weaned down to 1L NC.  Wean O2 as able  Plan as below    COVID-19 virus infection  Assessment & Plan  Presented on 1/5/24 with nasal congestion and inability to cough with shortness of breath, inability to swallow in the setting of multiple sclerosis with quadriplegia and progressive worsening of dysphagia and hypophonia  Tested positive for COVID  On Remdesivir, Decadron (ct at 6 mg daily per pulm)   Trend CRP  Daily CBCD, CMP      Chronic lower extremity edema  Assessment & Plan  Ct home med Lasix 20 mg daily    Hypercholesteremia  Assessment & Plan  Ct equivalent formulary Pravastatin of home Rosuvastatin    Continuous opioid dependence (HCC)  Assessment & Plan  Home med: Percocet 5/325 mg q 4h - continued  PDMP reviewed  Uses very sparingly per PCP note    Bacteriuria with pyuria  Assessment & Plan  Has chronic suprapubic catheter  Urine culture with Pseudomonas aeruginosa, Enterococcus faecalis  Colonization per ID  Being monitored off antibiotics    Functional quadriplegia secondary to MS ([facility])  Assessment & Plan  Supportive care  Uses Baclofen 20 mg in am and 40 mg in pm - ordered    Multiple sclerosis (HCC)  Assessment & Plan  Has quadriplegia and progressive dysphagia and hypophonia  Follows with neurology as outpatient  Supportive care    Recurrent major depressive disorder, in full remission ([facility])  Assessment & Plan  Ct home Cymbalta 20 mg daily       Suprapubic catheter ([facility])  Assessment  & Plan  Neurogenic bladder with suprapubic catheter in the setting of MS               VTE Pharmacologic Prophylaxis: VTE Score: 7 Moderate Risk (Score 3-4) - Pharmacological DVT Prophylaxis Ordered: enoxaparin (Lovenox).    Mobility:   Basic Mobility Inpatient Raw Score: 6  JH-HLM Goal: 2: Bed activities/Dependent transfer  JH-HLM Achieved: 2: Bed activities/Dependent transfer  HLM Goal achieved. Continue to encourage appropriate mobility.    Patient Centered Rounds: I performed bedside rounds with nursing staff today.   Discussions with Specialists or Other Care Team Provider: Pulmonary.    Education and Discussions with Family / Patient: Updated  () at bedside.    Total Time Spent on Date of Encounter in care of patient: 42 mins. This time was spent on one or more of the following: performing physical exam; counseling and coordination of care; obtaining or reviewing history; documenting in the medical record; reviewing/ordering tests, medications or procedures; communicating with other healthcare professionals and discussing with patient's family/caregivers.    Current Length of Stay: 4 day(s)  Current Patient Status: Inpatient   Certification Statement: The patient will continue to require additional inpatient hospital stay due to continues to be on remdesivir.  Discharge Plan: Anticipate discharge in 24-48 hrs to home.    Code Status: Level 1 - Full Code    Subjective:   Patient seen at bedside, reports significant improvement in her symptoms since presentation.  No active complaints at this time.    Objective:     Vitals:   Temp (24hrs), Av.9 °F (36.6 °C), Min:97.2 °F (36.2 °C), Max:98.7 °F (37.1 °C)    Temp:  [97.2 °F (36.2 °C)-98.7 °F (37.1 °C)] 97.2 °F (36.2 °C)  HR:  [66-94] 76  Resp:  [15-20] 16  BP: ()/(51-62) 111/62  SpO2:  [90 %-98 %] 90 %  There is no height or weight on file to calculate BMI.     Input and Output Summary (last 24 hours):     Intake/Output Summary (Last  24 hours) at 1/9/2024 1850  Last data filed at 1/9/2024 0900  Gross per 24 hour   Intake 120 ml   Output 850 ml   Net -730 ml       Physical Exam:   Physical Exam  Constitutional:       Appearance: Normal appearance.   Cardiovascular:      Rate and Rhythm: Normal rate and regular rhythm.   Musculoskeletal:      Comments: Contractures and upper and lower extremities.   Skin:     General: Skin is warm and dry.   Neurological:      Mental Status: She is alert and oriented to person, place, and time.      Comments: Speech slow but understandable.          Additional Data:     Labs:  Results from last 7 days   Lab Units 01/09/24  0541 01/08/24  0524   WBC Thousand/uL 8.04 7.21   HEMOGLOBIN g/dL 10.6* 10.3*   HEMATOCRIT % 32.3* 32.6*   PLATELETS Thousands/uL 274 196   BANDS PCT %  --  7   NEUTROS PCT % 88*  --    LYMPHS PCT % 7*  --    LYMPHO PCT %  --  3*   MONOS PCT % 3*  --    MONO PCT %  --  1*   EOS PCT % 0 0     Results from last 7 days   Lab Units 01/09/24  0541   SODIUM mmol/L 138   POTASSIUM mmol/L 4.2   CHLORIDE mmol/L 106   CO2 mmol/L 25   BUN mg/dL 8   CREATININE mg/dL 0.21*   ANION GAP mmol/L 7   CALCIUM mg/dL 8.4   ALBUMIN g/dL 3.2*   TOTAL BILIRUBIN mg/dL 0.33   ALK PHOS U/L 41   ALT U/L 15   AST U/L 18   GLUCOSE RANDOM mg/dL 154*             Results from last 7 days   Lab Units 01/09/24  0541   HEMOGLOBIN A1C % 5.2     Results from last 7 days   Lab Units 01/07/24  0634 01/06/24  1352 01/06/24  0312 01/05/24  1215   LACTIC ACID mmol/L  --  1.8  --   --    PROCALCITONIN ng/ml 0.20  --  0.08 0.06       Lines/Drains:  Invasive Devices       Central Venous Catheter Line  Duration             Port A Cath Left Chest -- days              Peripheral Intravenous Line  Duration             Peripheral IV 01/08/24 Distal;Right;Ventral (anterior) Forearm 1 day              Drain  Duration             Suprapubic Catheter 24 Fr. 383 days                    Central Line:  Goal for removal: Will discontinue when meds  requiring line are completed.             Imaging: No pertinent imaging reviewed.    Recent Cultures (last 7 days):   Results from last 7 days   Lab Units 01/06/24  1227 01/05/24  1342 01/05/24  1235 01/05/24  1225   BLOOD CULTURE   --   --  No Growth After 4 Days. No Growth After 4 Days.   URINE CULTURE   --  >100,000 cfu/ml Pseudomonas aeruginosa*  >100,000 cfu/ml Enterococcus faecalis*  --   --    LEGIONELLA URINARY ANTIGEN  Negative  --   --   --        Last 24 Hours Medication List:   Current Facility-Administered Medications   Medication Dose Route Frequency Provider Last Rate    [Transfer Hold] albuterol  2.5 mg Nebulization Q4H PRN Ingris Cotton MD      [Transfer Hold] baclofen  20 mg Oral Q24H Cam Sotelo MD      [Transfer Hold] baclofen  40 mg Oral QPM Ingris Cotton MD      [Transfer Hold] dexamethasone  6 mg Intravenous Q24H Apryl Aragon PA-C      [Transfer Hold] diazepam  5 mg Intravenous Q6H PRN Apryl Aragon PA-C      [Transfer Hold] DULoxetine  20 mg Oral Daily Tiffany Hickey MD      [Transfer Hold] enoxaparin  30 mg Subcutaneous Q12H JOVI Ingris Cotton MD      [Transfer Hold] furosemide  20 mg Oral Daily Ingris Cotton MD      multi-electrolyte  40 mL/hr Intravenous Continuous Ingris Cotton MD 40 mL/hr (01/08/24 1314)    [Transfer Hold] ondansetron  4 mg Intravenous Q6H PRN Tiffany Hickey MD      [Transfer Hold] oxybutynin  10 mg Oral Daily Tiffany Hickey MD      [Transfer Hold] oxyCODONE-acetaminophen  1 tablet Oral Q4H PRN Ingris Cotton MD      [Transfer Hold] polyethylene glycol  17 g Oral Daily PRN Tiffany Hickey MD      [Transfer Hold] pravastatin  40 mg Oral Daily With Dinner Tiffany Hickey MD      [Transfer Hold] remdesivir  100 mg Intravenous Q24H Apryl Aragon PA-C 0 mg (01/08/24 0130)        Today, Patient Was Seen By: Stephanie Sanchez MD    **Please Note: This note may have been constructed using a voice recognition system.**

## 2024-01-09 NOTE — PROGRESS NOTES
PULMONOLOGY PROGRESS NOTE     Name: Fanny Schulz   Age & Sex: 53 y.o. female   MRN: [id number]  Unit/Bed#: [facility] 203-01   Encounter: [id number]    PATIENT INFORMATION     Name: Fanny Schulz   Age & Sex: 53 y.o. female   MRN: [id number]  Hospital Stay Days: 4    ASSESSMENT/PLAN     Assessment:  Acute hypoxic respiratory failure improving  COVID-19 infection  History of MS  History of quadriplegia in the context of MS    Plan:  Maintain SpO2 >90%, wean off of oxygen as tolerated  Continue remdesivir for 5 days and decadron for 10 days or patient is off of oxygen, whichever is first  Recommend reducing dexamethasone to daily as well  Albuterol as needed.    SUBJECTIVE     Patient seen and examined. No acute events overnight. SpO2 95% on 2L NC. Patient notes increase in sputum production, but denies shortness of breath, chest pain, back pain, nausea, vomiting.  Respiratory at bedside for airway clearance.    OBJECTIVE     Vitals:    24 1930 24 2310 24 0215 24 0310   BP: 98/51 101/58  102/54   BP Location:       Pulse: 90 94  66   Resp: 18 15  18   Temp: 97.9 °F (36.6 °C) 98.7 °F (37.1 °C)  97.9 °F (36.6 °C)   TempSrc: Oral Oral  Oral   SpO2: 94% 98% 95% 96%      Temperature:   Temp (24hrs), Av.1 °F (36.7 °C), Min:97.8 °F (36.6 °C), Max:98.7 °F (37.1 °C)    Temperature: 97.9 °F (36.6 °C)  Intake & Output:  I/O          07 0700  07 07 0701  01/10 07    I.V. 588.4      IV Piggyback 650      Total Intake 1238.4      Urine 950 300     Stool 0      Total Output 950 300     Net +288.4 -300            Unmeasured Stool Occurrence 1 x            Weights:        There is no height or weight on file to calculate BMI.  Weight (last 2 days)       None          Physical Exam  Constitutional:       General: She is not in acute distress.     Appearance: She is not toxic-appearing.   Cardiovascular:      Rate and Rhythm: Normal rate and regular rhythm.       Pulses: Normal pulses.      Heart sounds: No murmur heard.     No friction rub. No gallop.   Pulmonary:      Effort: Pulmonary effort is normal. No respiratory distress.      Breath sounds: No stridor. Rhonchi present. No wheezing or rales.   Chest:      Chest wall: No tenderness.   Abdominal:      General: Abdomen is flat.   Musculoskeletal:      Right lower leg: No edema.      Left lower leg: No edema.   Skin:     General: Skin is warm and dry.      Capillary Refill: Capillary refill takes less than 2 seconds.   Neurological:      Mental Status: She is alert and oriented to person, place, and time.   Psychiatric:         Mood and Affect: Mood normal.         Behavior: Behavior normal.           LABORATORY DATA     Labs: I have personally reviewed pertinent reports.  Results from last 7 days   Lab Units 01/09/24  0541 01/08/24  0524 01/07/24  0634 01/06/24  0312 01/05/24  1215   WBC Thousand/uL 8.04 7.21 7.83   < > 8.92   HEMOGLOBIN g/dL 10.6* 10.3* 11.6   < > 13.3   HEMATOCRIT % 32.3* 32.6* 36.1   < > 41.1   PLATELETS Thousands/uL 274 196 176   < > 195   NEUTROS PCT % 88*  --  94*  --  85*   MONOS PCT % 3*  --  2*  --  5   MONO PCT %  --  1*  --   --   --    EOS PCT % 0 0 0  --  0    < > = values in this interval not displayed.      Results from last 7 days   Lab Units 01/09/24  0541 01/08/24  0524 01/07/24  0634   POTASSIUM mmol/L 4.2 3.4* 3.1*   CHLORIDE mmol/L 106 106 106   CO2 mmol/L 25 26 23   BUN mg/dL 8 8 9   CREATININE mg/dL 0.21* 0.20* 0.40*   CALCIUM mg/dL 8.4 8.3* 8.1*   ALK PHOS U/L 41 42 48   ALT U/L 15 10 12   AST U/L 18 12* 15     Results from last 7 days   Lab Units 01/09/24  0541 01/08/24  0524 01/06/24  1352   MAGNESIUM mg/dL 2.3 1.9 2.0              Results from last 7 days   Lab Units 01/06/24  1352   LACTIC ACID mmol/L 1.8             ABG:       Micro:   Results from last 7 days   Lab Units 01/06/24  1227 01/05/24  1342 01/05/24  1235 01/05/24  1225   BLOOD CULTURE   --   --  No Growth at 72  hrs. No Growth at 72 hrs.   URINE CULTURE   --  >100,000 cfu/ml Pseudomonas aeruginosa*  >100,000 cfu/ml Enterococcus faecalis*  --   --    LEGIONELLA URINARY ANTIGEN  Negative  --   --   --    STREP PNEUMONIAE ANTIGEN, URINE  Negative  --   --   --          IMAGING & DIAGNOSTIC TESTING     Radiology Results: I have personally reviewed pertinent reports.  XR abdomen obstruction series    Result Date: 1/6/2024  Impression: Mild distention of small and large bowel loops with no dilatation or air-fluid levels to suggest obstruction. Mild distention of the stomach with a small amount of fluid layering in the dependent portion of the stomach on the decubitus view. Workstation performed: [id number]     XR chest 1 view portable    Result Date: 1/5/2024  Impression: No acute cardiopulmonary disease. Workstation performed: [id number]     Other Diagnostic Testing: I have personally reviewed pertinent reports.    ACTIVE MEDICATIONS     Current Facility-Administered Medications   Medication Dose Route Frequency    albuterol inhalation solution 2.5 mg  2.5 mg Nebulization Q4H PRN    baclofen tablet 40 mg  40 mg Oral QPM    dexamethasone (PF) (DECADRON) injection 6 mg  6 mg Intravenous Q24H    diazepam (VALIUM) injection 5 mg  5 mg Intravenous Q6H PRN    DULoxetine (CYMBALTA) delayed release capsule 20 mg  20 mg Oral Daily    enoxaparin (LOVENOX) subcutaneous injection 30 mg  30 mg Subcutaneous Q12H JOVI    furosemide (LASIX) tablet 20 mg  20 mg Oral Daily    multi-electrolyte (PLASMALYTE-A/ISOLYTE-S PH 7.4) IV solution  40 mL/hr Intravenous Continuous    ondansetron (ZOFRAN) injection 4 mg  4 mg Intravenous Q6H PRN    oxybutynin (DITROPAN-XL) 24 hr tablet 10 mg  10 mg Oral Daily    oxyCODONE-acetaminophen (PERCOCET) 5-325 mg per tablet 1 tablet  1 tablet Oral Q4H PRN    polyethylene glycol (MIRALAX) packet 17 g  17 g Oral Daily PRN    pravastatin (PRAVACHOL) tablet 40 mg  40 mg Oral Daily With Dinner    remdesivir (Veklury)  "100 mg in sodium chloride 0.9 % 270 mL IVPB  100 mg Intravenous Q24H       Disclaimer: Portions of the record may have been created with voice recognition software. Occasional wrong word or \"sound a like\" substitutions may have occurred due to the inherent limitations of voice recognition software. Careful consideration should be taken to recognize, using context, where substitutions have occurred.    Fortunato Frost DO   Pulmonary and Critical Care Fellow, PGY-IV  [facility]     "

## 2024-01-09 NOTE — ASSESSMENT & PLAN NOTE
Due to aspiration/COVID infection. Mainly aspiration per pulm   History of multiple sclerosis with progressive worsening of dysphagia  Presented as below   O2 requirement 6L on presentation - worsened overnight on 1/5 to HFNC  O2 weaned down to 1L NC.  Wean O2 as able  Plan as below

## 2024-01-10 LAB
ANION GAP SERPL CALCULATED.3IONS-SCNC: 6 MMOL/L
BACTERIA BLD CULT: NORMAL
BACTERIA BLD CULT: NORMAL
BUN SERPL-MCNC: 8 MG/DL (ref 5–25)
CALCIUM SERPL-MCNC: 8.4 MG/DL (ref 8.4–10.2)
CHLORIDE SERPL-SCNC: 106 MMOL/L (ref 96–108)
CO2 SERPL-SCNC: 29 MMOL/L (ref 21–32)
CREAT SERPL-MCNC: 0.2 MG/DL (ref 0.6–1.3)
ERYTHROCYTE [DISTWIDTH] IN BLOOD BY AUTOMATED COUNT: 12.8 % (ref 11.6–15.1)
GFR SERPL CREATININE-BSD FRML MDRD: 149 ML/MIN/1.73SQ M
GLUCOSE SERPL-MCNC: 157 MG/DL (ref 65–140)
HCT VFR BLD AUTO: 33.8 % (ref 34.8–46.1)
HGB BLD-MCNC: 10.7 G/DL (ref 11.5–15.4)
MCH RBC QN AUTO: 27.5 PG (ref 26.8–34.3)
MCHC RBC AUTO-ENTMCNC: 31.7 G/DL (ref 31.4–37.4)
MCV RBC AUTO: 87 FL (ref 82–98)
PLATELET # BLD AUTO: 311 THOUSANDS/UL (ref 149–390)
PMV BLD AUTO: 9.1 FL (ref 8.9–12.7)
POTASSIUM SERPL-SCNC: 4.2 MMOL/L (ref 3.5–5.3)
RBC # BLD AUTO: 3.89 MILLION/UL (ref 3.81–5.12)
SODIUM SERPL-SCNC: 141 MMOL/L (ref 135–147)
WBC # BLD AUTO: 7.13 THOUSAND/UL (ref 4.31–10.16)

## 2024-01-10 PROCEDURE — 94760 N-INVAS EAR/PLS OXIMETRY 1: CPT

## 2024-01-10 PROCEDURE — 80048 BASIC METABOLIC PNL TOTAL CA: CPT | Performed by: STUDENT IN AN ORGANIZED HEALTH CARE EDUCATION/TRAINING PROGRAM

## 2024-01-10 PROCEDURE — 85027 COMPLETE CBC AUTOMATED: CPT | Performed by: STUDENT IN AN ORGANIZED HEALTH CARE EDUCATION/TRAINING PROGRAM

## 2024-01-10 PROCEDURE — 99232 SBSQ HOSP IP/OBS MODERATE 35: CPT | Performed by: INTERNAL MEDICINE

## 2024-01-10 PROCEDURE — 94640 AIRWAY INHALATION TREATMENT: CPT

## 2024-01-10 PROCEDURE — 99232 SBSQ HOSP IP/OBS MODERATE 35: CPT | Performed by: STUDENT IN AN ORGANIZED HEALTH CARE EDUCATION/TRAINING PROGRAM

## 2024-01-10 PROCEDURE — 94668 MNPJ CHEST WALL SBSQ: CPT

## 2024-01-10 PROCEDURE — 92526 ORAL FUNCTION THERAPY: CPT

## 2024-01-10 PROCEDURE — 94664 DEMO&/EVAL PT USE INHALER: CPT

## 2024-01-10 PROCEDURE — 94669 MECHANICAL CHEST WALL OSCILL: CPT

## 2024-01-10 RX ADMIN — FUROSEMIDE 20 MG: 20 TABLET ORAL at 09:12

## 2024-01-10 RX ADMIN — DULOXETINE HYDROCHLORIDE 20 MG: 20 CAPSULE, DELAYED RELEASE ORAL at 09:12

## 2024-01-10 RX ADMIN — REMDESIVIR 100 MG: 100 INJECTION, POWDER, LYOPHILIZED, FOR SOLUTION INTRAVENOUS at 00:01

## 2024-01-10 RX ADMIN — ENOXAPARIN SODIUM 30 MG: 30 INJECTION SUBCUTANEOUS at 20:05

## 2024-01-10 RX ADMIN — BACLOFEN 20 MG: 20 TABLET ORAL at 04:42

## 2024-01-10 RX ADMIN — PRAVASTATIN SODIUM 40 MG: 40 TABLET ORAL at 17:24

## 2024-01-10 RX ADMIN — ENOXAPARIN SODIUM 30 MG: 30 INJECTION SUBCUTANEOUS at 09:12

## 2024-01-10 RX ADMIN — SODIUM CHLORIDE, SODIUM GLUCONATE, SODIUM ACETATE, POTASSIUM CHLORIDE, MAGNESIUM CHLORIDE, SODIUM PHOSPHATE, DIBASIC, AND POTASSIUM PHOSPHATE 40 ML/HR: .53; .5; .37; .037; .03; .012; .00082 INJECTION, SOLUTION INTRAVENOUS at 20:04

## 2024-01-10 RX ADMIN — BACLOFEN 40 MG: 20 TABLET ORAL at 20:05

## 2024-01-10 RX ADMIN — OXYCODONE HYDROCHLORIDE AND ACETAMINOPHEN 1 TABLET: 5; 325 TABLET ORAL at 13:39

## 2024-01-10 RX ADMIN — OXYBUTYNIN 10 MG: 10 TABLET, FILM COATED, EXTENDED RELEASE ORAL at 09:12

## 2024-01-10 RX ADMIN — DEXAMETHASONE SODIUM PHOSPHATE 6 MG: 10 INJECTION, SOLUTION INTRAMUSCULAR; INTRAVENOUS at 00:01

## 2024-01-10 NOTE — WOUND OSTOMY CARE
Consult Note - Wound   Fanny Schulz 53 y.o. female MRN: [id number]  Unit/Bed#: [facility] 818-01 Encounter: [id number]        History and Present Illness:  Patient is 54 yo female admitted to [facility] [place] with respiratory failure with hypoxia. Patient has history of **. Patient is incontinent of bowel and suprapubic catheter. Patient is dependent for all care due to progressive quadriplegia. Ordered P-500 specialty bed due to presence of wounds and poor mobility.    Assessment Findings:   B/L heels intact and blanching, preventative skin care orders placed.     HA DTI left buttocks- purple, intact, nonblanchable erythema, no drainage present.   HA evolving DTI right buttocks- small area of partial thickness skin loss with pink tissue with surrounding nonblanchable erythema, periwound skin is macerated, scant serosanguineous drainage.     Sacral area is pink and slow to hannah, preventative foam placed.     No induration, fluctuance, odor, warmth/temperature differences, redness, or purulence noted to the above noted wounds and skin areas assessed. New dressings applied per orders listed below. Patient tolerated well- no s/s of non-verbal pain or discomfort observed during the encounter. Bedside nurse aware of plan of care. See flow sheets for more detailed assessment findings. Wound care will continue to follow.     Skin care plans:  1-Hydraguard to bilateral heels BID and PRN  2-Elevate heels to offload pressure.  3-Ehob cushion in chair when out of bed.  4-Moisturize skin daily with skin nourishing cream.  5-Turn/reposition q2h or when medically stable for pressure re-distribution on skin.   6-Cleanse B/L buttocks and sacrum and left arm with soap and water. Apply Silicone bordered foam, bernice T for treatment to B/L buttocks and left arm and P for prevention to sacrum, and change every 3 days and PRN soilage/displacement  7-P-500 specialty bed       Wounds:  Wound 12/22/22 Other (comment) Buttocks Right (Active)   Wound  Image   01/10/24 0959   Wound Description Fragile;Pink 01/10/24 0959   Ruchi-wound Assessment Maceration 01/10/24 0959   Wound Length (cm) 1 cm 01/10/24 0959   Wound Width (cm) 1 cm 01/10/24 0959   Wound Depth (cm) 0.1 cm 01/10/24 0959   Wound Surface Area (cm^2) 1 cm^2 01/10/24 0959   Wound Volume (cm^3) 0.1 cm^3 01/10/24 0959   Calculated Wound Volume (cm^3) 0.1 cm^3 01/10/24 0959   Dressing Foam, Silicon (eg. Allevyn, etc) 01/10/24 0959   Wound packed? No 01/10/24 0959   Packing- # removed 0 01/10/24 0959   Packing- # inserted 0 01/10/24 0959   Dressing Changed New 01/10/24 0959   Patient Tolerance Tolerated well 01/10/24 0959   Dressing Status Dry;Clean;Intact 01/10/24 0959   Wound 01/09/24 Arm Left;Posterior (Active)   Wound Image   01/10/24 0953   Wound Description Clean;Dry 01/10/24 0959   Ruchi-wound Assessment Clean;Dry 01/10/24 0959   Wound Length (cm) 0.6 cm 01/10/24 0953   Wound Width (cm) 0.7 cm 01/10/24 0953   Wound Depth (cm) 0.1 cm 01/10/24 0953   Wound Surface Area (cm^2) 0.42 cm^2 01/10/24 0953   Wound Volume (cm^3) 0.042 cm^3 01/10/24 0953   Calculated Wound Volume (cm^3) 0.04 cm^3 01/10/24 0953   Drainage Amount Scant 01/10/24 0953   Drainage Description Sanguineous 01/10/24 0953   Non-staged Wound Description Partial thickness 01/10/24 0953   Treatments Cleansed 01/10/24 0953   Dressing Foam, Silicon (eg. Allevyn, etc) 01/10/24 0959   Wound packed? No 01/10/24 0953   Packing- # removed 0 01/10/24 0953   Packing- # inserted 0 01/10/24 0953   Dressing Changed New 01/10/24 0953   Patient Tolerance Tolerated well 01/10/24 0953   Dressing Status Clean;Dry;Intact 01/10/24 0953       Wound 01/10/24 Ischium Left;Posterior;Proximal (Active)   Wound Image   01/10/24 0956   Wound Description Non-blanchable erythema 01/10/24 0956   Ruchi-wound Assessment Clean;Dry;Intact 01/10/24 0956   Wound Length (cm) 1 cm 01/10/24 0956   Wound Width (cm) 0.9 cm 01/10/24 0956   Wound Depth (cm) 0 cm 01/10/24 0956   Wound  Surface Area (cm^2) 0.9 cm^2 01/10/24 0956   Wound Volume (cm^3) 0 cm^3 01/10/24 0956   Calculated Wound Volume (cm^3) 0 cm^3 01/10/24 0956   Drainage Amount None 01/10/24 0956   Non-staged Wound Description Not applicable 01/10/24 0956   Treatments Cleansed 01/10/24 0956   Dressing Foam, Silicon (eg. Allevyn, etc) 01/10/24 0956   Wound packed? No 01/10/24 0956   Packing- # removed 0 01/10/24 0956   Packing- # inserted 0 01/10/24 0956   Dressing Changed New 01/10/24 0956   Patient Tolerance Tolerated well 01/10/24 0956   Dressing Status Clean;Dry;Intact 01/10/24 0956       Wendy Bowen BSN, RN, CWOCN

## 2024-01-10 NOTE — ASSESSMENT & PLAN NOTE
Due to aspiration/COVID infection. Mainly aspiration per pulm   History of multiple sclerosis with progressive worsening of dysphagia  Presented as below   O2 requirement 6L on presentation - worsened overnight on 1/5 to HFNC  O2 weaned down to 2 L NC.  Wean O2 as able  Plan as below

## 2024-01-10 NOTE — PLAN OF CARE
Problem: PAIN - ADULT  Goal: Verbalizes/displays adequate comfort level or baseline comfort level  Description: Interventions:  - Encourage patient to monitor pain and request assistance  - Assess pain using appropriate pain scale  - Administer analgesics based on type and severity of pain and evaluate response  - Implement non-pharmacological measures as appropriate and evaluate response  - Consider cultural and social influences on pain and pain management  - Notify physician/advanced practitioner if interventions unsuccessful or patient reports new pain  Outcome: Progressing     Problem: INFECTION - ADULT  Goal: Absence or prevention of progression during hospitalization  Description: INTERVENTIONS:  - Assess and monitor for signs and symptoms of infection  - Monitor lab/diagnostic results  - Monitor all insertion sites, i.e. indwelling lines, tubes, and drains  - Monitor endotracheal if appropriate and nasal secretions for changes in amount and color  - [place] appropriate cooling/warming therapies per order  - Administer medications as ordered  - Instruct and encourage patient and family to use good hand hygiene technique  - Identify and instruct in appropriate isolation precautions for identified infection/condition  Outcome: Progressing  Goal: Absence of fever/infection during neutropenic period  Description: INTERVENTIONS:  - Monitor WBC    Outcome: Progressing     Problem: SAFETY ADULT  Goal: Patient will remain free of falls  Description: INTERVENTIONS:  - Educate patient/family on patient safety including physical limitations  - Instruct patient to call for assistance with activity   - Consult OT/PT to assist with strengthening/mobility   - Keep Call bell within reach  - Keep bed low and locked with side rails adjusted as appropriate  - Keep care items and personal belongings within reach  - Initiate and maintain comfort rounds  - Make Fall Risk Sign visible to staff  - Apply yellow socks and bracelet  for high fall risk patients  - Consider moving patient to room near nurses station  Outcome: Progressing     Problem: DISCHARGE PLANNING  Goal: Discharge to home or other facility with appropriate resources  Description: INTERVENTIONS:  - Identify barriers to discharge w/patient and caregiver  - Arrange for needed discharge resources and transportation as appropriate  - Identify discharge learning needs (meds, wound care, etc.)  - Arrange for interpretive services to assist at discharge as needed  - Refer to Case Management Department for coordinating discharge planning if the patient needs post-hospital services based on physician/advanced practitioner order or complex needs related to functional status, cognitive ability, or social support system  Outcome: Progressing     Problem: Knowledge Deficit  Goal: Patient/family/caregiver demonstrates understanding of disease process, treatment plan, medications, and discharge instructions  Description: Complete learning assessment and assess knowledge base.  Interventions:  - Provide teaching at level of understanding  - Provide teaching via preferred learning methods  Outcome: Progressing     Problem: RESPIRATORY - ADULT  Goal: Achieves optimal ventilation and oxygenation  Description: INTERVENTIONS:  - Assess for changes in respiratory status  - Assess for changes in mentation and behavior  - Position to facilitate oxygenation and minimize respiratory effort  - Oxygen administered by appropriate delivery if ordered  - Initiate smoking cessation education as indicated  - Encourage broncho-pulmonary hygiene including cough, deep breathe, Incentive Spirometry  - Assess the need for suctioning and aspirate as needed  - Assess and instruct to report SOB or any respiratory difficulty  - Respiratory Therapy support as indicated  Outcome: Progressing     Problem: Prexisting or High Potential for Compromised Skin Integrity  Goal: Skin integrity is maintained or  improved  Description: INTERVENTIONS:  - Identify patients at risk for skin breakdown  - Assess and monitor skin integrity  - Assess and monitor nutrition and hydration status  - Monitor labs   - Assess for incontinence   - Turn and reposition patient  - Assist with mobility/ambulation  - Relieve pressure over bony prominences  - Avoid friction and shearing  - Provide appropriate hygiene as needed including keeping skin clean and dry  - Evaluate need for skin moisturizer/barrier cream  - Collaborate with interdisciplinary team   - Patient/family teaching  - Consider wound care consult   Outcome: Progressing     Problem: Nutrition/Hydration-ADULT  Goal: Nutrient/Hydration intake appropriate for improving, restoring or maintaining nutritional needs  Description: Monitor and assess patient's nutrition/hydration status for malnutrition. Collaborate with interdisciplinary team and initiate plan and interventions as ordered.  Monitor patient's weight and dietary intake as ordered or per policy. Utilize nutrition screening tool and intervene as necessary. Determine patient's food preferences and provide high-protein, high-caloric foods as appropriate.     INTERVENTIONS:  - Monitor oral intake, urinary output, labs, and treatment plans  - Assess nutrition and hydration status and recommend course of action  - Evaluate amount of meals eaten  - Assist patient with eating if necessary   - Allow adequate time for meals  - Recommend/ encourage appropriate diets, oral nutritional supplements, and vitamin/mineral supplements  - Order, calculate, and assess calorie counts as needed  - Recommend, monitor, and adjust tube feedings and TPN/PPN based on assessed needs  - Assess need for intravenous fluids  - Provide specific nutrition/hydration education as appropriate  - Include patient/family/caregiver in decisions related to nutrition  Outcome: Progressing

## 2024-01-10 NOTE — PROGRESS NOTES
PULMONOLOGY PROGRESS NOTE     Name: Fanny Schulz   Age & Sex: 53 y.o. female   MRN: [id number]  Unit/Bed#: [facility] 818-01   Encounter: [id number]    PATIENT INFORMATION     Name: Fanny Schulz   Age & Sex: 53 y.o. female   MRN: [id number]  Hospital Stay Days: 5    ASSESSMENT/PLAN     Assessment:  Acute hypoxic respiratory failure   improving  COVID-19 infection  History of MS  History of quadriplegia in the context of MS    Plan:  Maintain SpO2 >90%, wean off of oxygen as tolerated  Continue remdesivir for 5 days and decadron for 10 days or patient is off of oxygen, whichever is first  Recommend reducing dexamethasone to daily as well  Albuterol as needed.  Pulmonary toileting with CPT    SUBJECTIVE     Patient seen and examined. No acute events overnight. SpO2 93% on 1L NC. Patient is feeling better, less SOB, and has been able to expectorate sputum more frequently given pulmonary toileting.  Also complaining of some lower extremity edema.    OBJECTIVE     Vitals:    24 1420 24 1500 24 1809 24 2305   BP: 107/53  111/62 119/67   Pulse: 88  76 84   Resp: 20  16 17   Temp:  98.5 °F (36.9 °C) (!) 97.2 °F (36.2 °C) 97.9 °F (36.6 °C)   TempSrc:  Oral     SpO2: 92%  90% 93%      Temperature:   Temp (24hrs), Av.8 °F (36.6 °C), Min:97.2 °F (36.2 °C), Max:98.5 °F (36.9 °C)    Temperature: 97.9 °F (36.6 °C)  Intake & Output:  I/O          0701   07 0701  01/10 0700 01/10 07 0700    P.O.  120     I.V.       IV Piggyback       Total Intake  120     Urine 300 1150     Stool       Total Output 300 1150     Net -300 -1030                  Weights:        There is no height or weight on file to calculate BMI.  Weight (last 2 days)       None          Physical Exam  Constitutional:       General: She is not in acute distress.     Appearance: She is not toxic-appearing.   Cardiovascular:      Rate and Rhythm: Regular rhythm.   Pulmonary:      Effort: No respiratory  distress.      Breath sounds: No stridor. Rhonchi present. No wheezing or rales.   Chest:      Chest wall: No tenderness.   Musculoskeletal:         General: Normal range of motion.      Right lower leg: Edema present.      Left lower leg: Edema present.   Skin:     General: Skin is warm.      Capillary Refill: Capillary refill takes less than 2 seconds.   Neurological:      Mental Status: She is alert and oriented to person, place, and time.   Psychiatric:         Mood and Affect: Mood normal.         Behavior: Behavior normal.           LABORATORY DATA     Labs: I have personally reviewed pertinent reports.  Results from last 7 days   Lab Units 01/10/24  0553 01/09/24  0541 01/08/24  0524 01/07/24  0634 01/06/24  0312 01/05/24  1215   WBC Thousand/uL 7.13 8.04 7.21 7.83   < > 8.92   HEMOGLOBIN g/dL 10.7* 10.6* 10.3* 11.6   < > 13.3   HEMATOCRIT % 33.8* 32.3* 32.6* 36.1   < > 41.1   PLATELETS Thousands/uL 311 274 196 176   < > 195   NEUTROS PCT %  --  88*  --  94*  --  85*   MONOS PCT %  --  3*  --  2*  --  5   MONO PCT %  --   --  1*  --   --   --    EOS PCT %  --  0 0 0  --  0    < > = values in this interval not displayed.      Results from last 7 days   Lab Units 01/10/24  0553 01/09/24  0541 01/08/24  0524 01/07/24  0634   POTASSIUM mmol/L 4.2 4.2 3.4* 3.1*   CHLORIDE mmol/L 106 106 106 106   CO2 mmol/L 29 25 26 23   BUN mg/dL 8 8 8 9   CREATININE mg/dL 0.20* 0.21* 0.20* 0.40*   CALCIUM mg/dL 8.4 8.4 8.3* 8.1*   ALK PHOS U/L  --  41 42 48   ALT U/L  --  15 10 12   AST U/L  --  18 12* 15     Results from last 7 days   Lab Units 01/09/24  0541 01/08/24  0524 01/06/24  1352   MAGNESIUM mg/dL 2.3 1.9 2.0              Results from last 7 days   Lab Units 01/06/24  1352   LACTIC ACID mmol/L 1.8             ABG:       Micro:   Results from last 7 days   Lab Units 01/06/24  1227 01/05/24  1342 01/05/24  1235 01/05/24  1225   BLOOD CULTURE   --   --  No Growth After 4 Days. No Growth After 4 Days.   URINE CULTURE   --   >100,000 cfu/ml Pseudomonas aeruginosa*  >100,000 cfu/ml Enterococcus faecalis*  --   --    LEGIONELLA URINARY ANTIGEN  Negative  --   --   --    STREP PNEUMONIAE ANTIGEN, URINE  Negative  --   --   --          IMAGING & DIAGNOSTIC TESTING     Radiology Results: I have personally reviewed pertinent reports.  XR abdomen obstruction series    Result Date: 1/6/2024  Impression: Mild distention of small and large bowel loops with no dilatation or air-fluid levels to suggest obstruction. Mild distention of the stomach with a small amount of fluid layering in the dependent portion of the stomach on the decubitus view. Workstation performed: [id number]     XR chest 1 view portable    Result Date: 1/5/2024  Impression: No acute cardiopulmonary disease. Workstation performed: [id number]     Other Diagnostic Testing: I have personally reviewed pertinent reports.    ACTIVE MEDICATIONS     Current Facility-Administered Medications   Medication Dose Route Frequency    albuterol inhalation solution 2.5 mg  2.5 mg Nebulization Q4H PRN    baclofen tablet 20 mg  20 mg Oral Q24H    baclofen tablet 40 mg  40 mg Oral QPM    dexamethasone (PF) (DECADRON) injection 6 mg  6 mg Intravenous Q24H    diazepam (VALIUM) injection 5 mg  5 mg Intravenous Q6H PRN    DULoxetine (CYMBALTA) delayed release capsule 20 mg  20 mg Oral Daily    enoxaparin (LOVENOX) subcutaneous injection 30 mg  30 mg Subcutaneous Q12H JOVI    furosemide (LASIX) tablet 20 mg  20 mg Oral Daily    multi-electrolyte (PLASMALYTE-A/ISOLYTE-S PH 7.4) IV solution  40 mL/hr Intravenous Continuous    ondansetron (ZOFRAN) injection 4 mg  4 mg Intravenous Q6H PRN    oxybutynin (DITROPAN-XL) 24 hr tablet 10 mg  10 mg Oral Daily    oxyCODONE-acetaminophen (PERCOCET) 5-325 mg per tablet 1 tablet  1 tablet Oral Q4H PRN    polyethylene glycol (MIRALAX) packet 17 g  17 g Oral Daily PRN    pravastatin (PRAVACHOL) tablet 40 mg  40 mg Oral Daily With Dinner       Disclaimer: Portions of  "the record may have been created with voice recognition software. Occasional wrong word or \"sound a like\" substitutions may have occurred due to the inherent limitations of voice recognition software. Careful consideration should be taken to recognize, using context, where substitutions have occurred.    Fortunato Frost DO   Pulmonary and Critical Care Fellow, PGY-IV  [facility]        "

## 2024-01-10 NOTE — SPEECH THERAPY NOTE
"Speech Language/Pathology    Speech/Language Pathology Progress Note    Patient Name: Fanny Schulz  Today's Date: 1/10/2024     Problem List  Principal Problem:    Acute respiratory failure with hypoxia ([facility])  Active Problems:    Suprapubic catheter (HCC)    Constipation    Recurrent major depressive disorder, in full remission (HCC)    Dysphagia    Multiple sclerosis (HCC)    Functional quadriplegia secondary to MS ([facility])    Bacteriuria with pyuria    Continuous opioid dependence ([facility])    COVID-19 virus infection    Hypercholesteremia    Chronic lower extremity edema       Past Medical History  Past Medical History:   Diagnosis Date    Anesthesia     \"prefers if able to be put to sleep on stretcher before placed on table if possible due to severe spasticity    Bladder stones     Chronic pain disorder     neck    Contracture, right shoulder     right arm and hand    Dependent on wheelchair     motorized    Edema     dependant lower extremities    Elevated alkaline phosphatase level     Mildly elevated total, normal isoenzymes 4/15/15, normal 1/17; last assessed: 24Nov2015    Fernandez catheter in place     #24 to large bag(silicone catheter)    Gallbladder disease     History of femur fracture     right leg    History of UTI     MS (multiple sclerosis) ([facility])     Muscle spasticity     especially with touch    Muscle weakness     Muscular dystrophy ([facility])     Neck pain     Neurogenic bladder     Paralysis ([facility])     bilateral lower extremities    Port-A-Cath in place     left chest,\" hasn't used in approx 1 yr or so\"    Pressure injury of skin     right ischium    Sacral pressure sore     \"shearing, tegaderm in place,\"    Swallowing difficulty     Tinnitus     Urinary incontinence         Past Surgical History  Past Surgical History:   Procedure Laterality Date    BLADDER STONE REMOVAL N/A 12/4/2017    Procedure: CYSTO, LITHOLOPAXY HOLMIUM LASER;  Surgeon: Damir Osborne MD;  Location: [facility];  Service: Urology    " "BLADDER SURGERY      CHOLECYSTECTOMY      CYSTOSCOPY  06/15/2020    ESOPHAGOGASTRODUODENOSCOPY      FL RETROGRADE PYELOGRAM  10/2/2020    FL RETROGRADE PYELOGRAM  11/3/2020    KIDNEY STONE SURGERY      PORTACATH PLACEMENT Left     VT CYSTO BLADDER W/URETERAL CATHETERIZATION Left 10/2/2020    Procedure: CYSTOSCOPY LEFT RETROGRADE ; LEFT URETEROSCOPY; PYELOGRAM WITH STENT INSERTION AND SUPERPUBIC EXCHANGE;  Surgeon: Philip Mcdonald MD;  Location: [facility];  Service: Urology    VT CYSTO/URETERO W/LITHOTRIPSY &INDWELL STENT INSRT Left 11/3/2020    Procedure: CYSTOSCOPY URETEROSCOPY WITH RETROGRADE PYELOGRAM AND EXCHANGE STENT URETERAL, SP TUBE EXCHANGE, BASKET STONE EXTRACTION, BLADDER STONE EXTRACTION;  Surgeon: Damir Osborne MD;  Location: [facility];  Service: Urology    VT LITHOLAPAXY SMPL/SM <2.5 CM N/A 12/22/2022    Procedure: Cystolitholapaxy w/  laser lithotripsy of bladder stones; SUPRAPUBIC CATHETER CHANGE;  Surgeon: Damir Osborne MD;  Location: [facility];  Service: Urology    SUPRAPUBIC CYSTOSTOMY  02/25/2014    last assessed: [id number]       Subjective:  \"[place] dunes are my favorite\". Patient is asleep, but wakes to verbal cues.     Objective:  Patient is seen for dysphagia therapy. She is sitting upright in bed and is able to participate in conversation. Voice continues to get stronger on a daily basis. Patient is assessed with thin liquids, puree and regular solids. Oral closure and bite strength is adequate. Mastication is timely and proficient. Thin liquids retrieved via straw are adequate. No oral residue observed. No other overt s/s of aspiration. Patient feels much stronger and would like to trial regular solids. She reports family has been present to help feed her. Patient also recalls coughing episode with thin liquids last evening and reports this happens, even at home, with fatigue.     Assessment:  The patient tolerates thin liquids, puree and regular solids well. "     Plan/Recommendations:  Upgrade diet to regular and thin liquids. Continue ST.

## 2024-01-10 NOTE — PROGRESS NOTES
Progress Note - Infectious Disease   Fanny Schulz 53 y.o. female MRN: [id number]  Unit/Bed#: [facility] 818-01 Encounter: [id number]      Impression/Recommendations:  Moderate COVID, currently on remdesivir and dexamethasone.  Patient is clinically improved.  No evidence of bacterial superinfection.  Patient completed remdesivir course.  Continue dexamethasone.  No need for antibiotic.  Monitor respiratory symptoms.     Asymptomatic bacteriuria, likely bladder colonization in patient with neurogenic bladder, with chronic SPC in place.  No antibiotic needed for this.     Acute hypoxic respiratory failure, multifactorial, including COVID.  Patient is clinically improved.  O2 requirement continues to decrease, now at 2 L.  COVID treatment as in above.  O2 support and weaning per primary service.     Neurogenic bladder, status post SPC, with chronic bladder colonization.     Advanced MS with functional quadriplegia.     Dysphagia, with high risk for aspiration.     Discussed with patient.     Antibiotics:  None     Subjective:  Patient's dyspnea continues to improve, minimal at rest.  Cough mild and improved, nonproductive now.  No abdominal or flank pain.  Temperature stays down.  No chills.  No diarrhea.     Objective:  Vitals:  Temp:  [97.2 °F (36.2 °C)-98.5 °F (36.9 °C)] 97.4 °F (36.3 °C)  HR:  [67-88] 67  Resp:  [16-20] 16  BP: (105-120)/(53-67) 120/58  SpO2:  [90 %-94 %] 90 %  Temp (24hrs), Av.7 °F (36.5 °C), Min:97.2 °F (36.2 °C), Max:98.5 °F (36.9 °C)  Current: Temperature: (!) 97.4 °F (36.3 °C)    Physical Exam:     General: Awake, alert, cooperative, no distress.   Neck:  Supple. No mass.  No lymphadenopathy.   Lungs: Expansion symmetric, no rales, no wheezing, respirations unlabored.   Heart:  Regular rate and rhythm, S1 and S2 normal, no murmur.   Abdomen: Soft, nondistended, non-tender, bowel sounds active all four quadrants, no masses, no organomegaly.   Extremities: No edema. No erythema/warmth. No  ulcer. Nontender to palpation.   Skin:  No rash.   Neuro: Stable chronic leg weakness.     Invasive Devices       Central Venous Catheter Line  Duration             Port A Cath Left Chest -- days              Peripheral Intravenous Line  Duration             Peripheral IV 01/08/24 Distal;Right;Ventral (anterior) Forearm 1 day              Drain  Duration             Suprapubic Catheter 24 Fr. 383 days                    Labs studies:   I have personally reviewed pertinent labs.  Results from last 7 days   Lab Units 01/10/24  0553 01/09/24  0541 01/08/24  0524 01/07/24  0634   POTASSIUM mmol/L 4.2 4.2 3.4* 3.1*   CHLORIDE mmol/L 106 106 106 106   CO2 mmol/L 29 25 26 23   BUN mg/dL 8 8 8 9   CREATININE mg/dL 0.20* 0.21* 0.20* 0.40*   EGFR ml/min/1.73sq m 149 147 149 119   CALCIUM mg/dL 8.4 8.4 8.3* 8.1*   AST U/L  --  18 12* 15   ALT U/L  --  15 10 12   ALK PHOS U/L  --  41 42 48     Results from last 7 days   Lab Units 01/10/24  0553 01/09/24  0541 01/08/24  0524   WBC Thousand/uL 7.13 8.04 7.21   HEMOGLOBIN g/dL 10.7* 10.6* 10.3*   PLATELETS Thousands/uL 311 274 196     Results from last 7 days   Lab Units 01/06/24  1227 01/05/24  1342 01/05/24  1235 01/05/24  1225   BLOOD CULTURE   --   --  No Growth After 4 Days. No Growth After 4 Days.   URINE CULTURE   --  >100,000 cfu/ml Pseudomonas aeruginosa*  >100,000 cfu/ml Enterococcus faecalis*  --   --    LEGIONELLA URINARY ANTIGEN  Negative  --   --   --        Imaging Studies:   I have personally reviewed pertinent imaging study reports and images in PACS.    EKG, Pathology, and Other Studies:   I have personally reviewed pertinent reports.

## 2024-01-10 NOTE — PROGRESS NOTES
[facility]  Progress Note  Name: Fanny Schulz I  MRN: [id number]  Unit/Bed#: [facility] 818-01 I Date of Admission: 1/5/2024   Date of Service: 1/10/2024 I Hospital Day: 5    Assessment/Plan   * Acute respiratory failure with hypoxia (HCC)  Assessment & Plan  Due to aspiration/COVID infection. Mainly aspiration per pulm   History of multiple sclerosis with progressive worsening of dysphagia  Presented as below   O2 requirement 6L on presentation - worsened overnight on 1/5 to HFNC  O2 weaned down to 2 L NC.  Wean O2 as able  Plan as below    COVID-19 virus infection  Assessment & Plan  Presented on 1/5/24 with nasal congestion and inability to cough with shortness of breath, inability to swallow in the setting of multiple sclerosis with quadriplegia and progressive worsening of dysphagia and hypophonia  Tested positive for COVID  Completed 5 days course of remdesivir on 1/10, Decadron (ct at 6 mg daily per pulm)   Trend CRP  Daily CBC, CMP      Chronic lower extremity edema  Assessment & Plan  Ct home med Lasix 20 mg daily    Hypercholesteremia  Assessment & Plan  Ct equivalent formulary Pravastatin of home Rosuvastatin    Continuous opioid dependence (HCC)  Assessment & Plan  Home med: Percocet 5/325 mg q 4h - continued  PDMP reviewed  Uses very sparingly per PCP note    Bacteriuria with pyuria  Assessment & Plan  Has chronic suprapubic catheter  Urine culture with Pseudomonas aeruginosa, Enterococcus faecalis  Colonization per ID  Being monitored off antibiotics    Functional quadriplegia secondary to MS ([facility])  Assessment & Plan  Supportive care  Uses Baclofen 20 mg in am and 40 mg in pm - ordered    Multiple sclerosis (HCC)  Assessment & Plan  Has quadriplegia and progressive dysphagia and hypophonia  Follows with neurology as outpatient  Supportive care    Recurrent major depressive disorder, in full remission ([facility])  Assessment & Plan  Ct home Cymbalta 20 mg daily        Suprapubic catheter (HCC)  Assessment & Plan  Neurogenic bladder with suprapubic catheter in the setting of MS               VTE Pharmacologic Prophylaxis: VTE Score: 7 Moderate Risk (Score 3-4) - Pharmacological DVT Prophylaxis Ordered: enoxaparin (Lovenox).    Mobility:   Basic Mobility Inpatient Raw Score: 6  -HLM Goal: 2: Bed activities/Dependent transfer  JH-HLM Achieved: 1: Laying in bed  HLM Goal NOT achieved. Continue with multidisciplinary rounding and encourage appropriate mobility to improve upon HLM goals.    Patient Centered Rounds: I performed bedside rounds with nursing staff today.   Discussions with Specialists or Other Care Team Provider: CM    Education and Discussions with Family / Patient:  PT .     Total Time Spent on Date of Encounter in care of patient: 36 mins. This time was spent on one or more of the following: performing physical exam; counseling and coordination of care; obtaining or reviewing history; documenting in the medical record; reviewing/ordering tests, medications or procedures; communicating with other healthcare professionals and discussing with patient's family/caregivers.    Current Length of Stay: 5 day(s)  Current Patient Status: Inpatient   Certification Statement: The patient will continue to require additional inpatient hospital stay due to ongoing management as outlined above  Discharge Plan: Anticipate discharge in 24-48 hrs to home with home services.    Code Status: Level 1 - Full Code    Subjective:   Patient seen at bedside, continues with copious respiratory secretions needing frequent suctioning.    Objective:     Vitals:   Temp (24hrs), Av.5 °F (36.4 °C), Min:97.2 °F (36.2 °C), Max:97.9 °F (36.6 °C)    Temp:  [97.2 °F (36.2 °C)-97.9 °F (36.6 °C)] 97.4 °F (36.3 °C)  HR:  [67-84] 71  Resp:  [16-19] 19  BP: (111-120)/(58-67) 117/62  SpO2:  [90 %-97 %] 97 %  There is no height or weight on file to calculate BMI.     Input and Output Summary (last 24  hours):     Intake/Output Summary (Last 24 hours) at 1/10/2024 1618  Last data filed at 1/10/2024 0451  Gross per 24 hour   Intake --   Output 600 ml   Net -600 ml       Physical Exam:   Physical Exam  HENT:      Head: Normocephalic and atraumatic.   Cardiovascular:      Pulses: Normal pulses.   Abdominal:      General: Bowel sounds are normal.      Palpations: Abdomen is soft.   Musculoskeletal:      Comments: Extremities contracted.   Skin:     General: Skin is warm and dry.   Neurological:      Mental Status: She is alert and oriented to person, place, and time.          Additional Data:     Labs:  Results from last 7 days   Lab Units 01/10/24  0553 01/09/24  0541 01/08/24  0524   WBC Thousand/uL 7.13 8.04 7.21   HEMOGLOBIN g/dL 10.7* 10.6* 10.3*   HEMATOCRIT % 33.8* 32.3* 32.6*   PLATELETS Thousands/uL 311 274 196   BANDS PCT %  --   --  7   NEUTROS PCT %  --  88*  --    LYMPHS PCT %  --  7*  --    LYMPHO PCT %  --   --  3*   MONOS PCT %  --  3*  --    MONO PCT %  --   --  1*   EOS PCT %  --  0 0     Results from last 7 days   Lab Units 01/10/24  0553 01/09/24  0541   SODIUM mmol/L 141 138   POTASSIUM mmol/L 4.2 4.2   CHLORIDE mmol/L 106 106   CO2 mmol/L 29 25   BUN mg/dL 8 8   CREATININE mg/dL 0.20* 0.21*   ANION GAP mmol/L 6 7   CALCIUM mg/dL 8.4 8.4   ALBUMIN g/dL  --  3.2*   TOTAL BILIRUBIN mg/dL  --  0.33   ALK PHOS U/L  --  41   ALT U/L  --  15   AST U/L  --  18   GLUCOSE RANDOM mg/dL 157* 154*             Results from last 7 days   Lab Units 01/09/24  0541   HEMOGLOBIN A1C % 5.2     Results from last 7 days   Lab Units 01/07/24  0634 01/06/24  1352 01/06/24  0312 01/05/24  1215   LACTIC ACID mmol/L  --  1.8  --   --    PROCALCITONIN ng/ml 0.20  --  0.08 0.06       Lines/Drains:  Invasive Devices       Central Venous Catheter Line  Duration             Port A Cath Left Chest -- days              Peripheral Intravenous Line  Duration             Peripheral IV 01/08/24 Distal;Right;Ventral (anterior) Forearm  2 days              Drain  Duration             Suprapubic Catheter 24 Fr. 384 days                    Central Line:  Goal for removal: Will discontinue when meds requiring line are completed.             Imaging: No pertinent imaging reviewed.    Recent Cultures (last 7 days):   Results from last 7 days   Lab Units 01/06/24  1227 01/05/24  1342 01/05/24  1235 01/05/24  1225   BLOOD CULTURE   --   --  No Growth After 5 Days. No Growth After 5 Days.   URINE CULTURE   --  >100,000 cfu/ml Pseudomonas aeruginosa*  >100,000 cfu/ml Enterococcus faecalis*  --   --    [facility]A URINARY ANTIGEN  Negative  --   --   --        Last 24 Hours Medication List:   Current Facility-Administered Medications   Medication Dose Route Frequency Provider Last Rate    albuterol  2.5 mg Nebulization Q4H PRN Stephanie Sanchez MD      baclofen  20 mg Oral Q24H Stephanie Sanchez MD      baclofen  40 mg Oral QPM Stephanie Sanchez MD      dexamethasone  6 mg Intravenous Q24H Stephanie Sanchez MD      diazepam  5 mg Intravenous Q6H PRN Stephanie Sanchez MD      DULoxetine  20 mg Oral Daily Stephanie Sanchez MD      enoxaparin  30 mg Subcutaneous Q12H JOVI Stephanie Sanchez MD      furosemide  20 mg Oral Daily Stephanie Sanchez MD      multi-electrolyte  40 mL/hr Intravenous Continuous Stephanie Sanchez MD 40 mL/hr (01/09/24 1850)    ondansetron  4 mg Intravenous Q6H PRN Stephanie Sanchez MD      oxybutynin  10 mg Oral Daily Stephanie Sanchez MD      oxyCODONE-acetaminophen  1 tablet Oral Q4H PRN Stephanie Sanchez MD      polyethylene glycol  17 g Oral Daily PRN Stephanie Sanchez MD      pravastatin  40 mg Oral Daily With Dinner Stephanie Sanchez MD          Today, Patient Was Seen By: Stephanie Sanchez MD    **Please Note: This note may have been constructed using a voice recognition system.**

## 2024-01-10 NOTE — DISCHARGE INSTR - OTHER ORDERS
Skin care plans:  1-Hydraguard to bilateral heels BID and PRN  2-Elevate heels to offload pressure.  3-Ehob cushion in chair when out of bed.  4-Moisturize skin daily with skin nourishing cream.  5-Turn/reposition q2h or when medically stable for pressure re-distribution on skin.   6-Cleanse B/L buttocks and sacrum and left arm with soap and water. Apply Silicone bordered foam, bernice T for treatment to B/L buttocks and left arm and P for prevention to sacrum, and change every 3 days and PRN soilage/displacement

## 2024-01-10 NOTE — ASSESSMENT & PLAN NOTE
Presented on 1/5/24 with nasal congestion and inability to cough with shortness of breath, inability to swallow in the setting of multiple sclerosis with quadriplegia and progressive worsening of dysphagia and hypophonia  Tested positive for COVID  Completed 5 days course of remdesivir on 1/10, Decadron (ct at 6 mg daily per pulm)   Trend CRP  Daily CBC, CMP

## 2024-01-11 LAB
ALBUMIN SERPL BCP-MCNC: 3.6 G/DL (ref 3.5–5)
ALP SERPL-CCNC: 42 U/L (ref 34–104)
ALT SERPL W P-5'-P-CCNC: 19 U/L (ref 7–52)
ANION GAP SERPL CALCULATED.3IONS-SCNC: 8 MMOL/L
AST SERPL W P-5'-P-CCNC: 10 U/L (ref 13–39)
BILIRUB SERPL-MCNC: 0.33 MG/DL (ref 0.2–1)
BUN SERPL-MCNC: 9 MG/DL (ref 5–25)
CALCIUM SERPL-MCNC: 8.5 MG/DL (ref 8.4–10.2)
CHLORIDE SERPL-SCNC: 102 MMOL/L (ref 96–108)
CO2 SERPL-SCNC: 29 MMOL/L (ref 21–32)
CREAT SERPL-MCNC: 0.28 MG/DL (ref 0.6–1.3)
D DIMER PPP FEU-MCNC: 0.48 UG/ML FEU
ERYTHROCYTE [DISTWIDTH] IN BLOOD BY AUTOMATED COUNT: 12.6 % (ref 11.6–15.1)
GFR SERPL CREATININE-BSD FRML MDRD: 134 ML/MIN/1.73SQ M
GLUCOSE SERPL-MCNC: 173 MG/DL (ref 65–140)
HCT VFR BLD AUTO: 36.1 % (ref 34.8–46.1)
HGB BLD-MCNC: 11.8 G/DL (ref 11.5–15.4)
MCH RBC QN AUTO: 28.4 PG (ref 26.8–34.3)
MCHC RBC AUTO-ENTMCNC: 32.7 G/DL (ref 31.4–37.4)
MCV RBC AUTO: 87 FL (ref 82–98)
PLATELET # BLD AUTO: 386 THOUSANDS/UL (ref 149–390)
PMV BLD AUTO: 8.9 FL (ref 8.9–12.7)
POTASSIUM SERPL-SCNC: 4.1 MMOL/L (ref 3.5–5.3)
PROT SERPL-MCNC: 5.8 G/DL (ref 6.4–8.4)
RBC # BLD AUTO: 4.16 MILLION/UL (ref 3.81–5.12)
SODIUM SERPL-SCNC: 139 MMOL/L (ref 135–147)
WBC # BLD AUTO: 9.03 THOUSAND/UL (ref 4.31–10.16)

## 2024-01-11 PROCEDURE — 99232 SBSQ HOSP IP/OBS MODERATE 35: CPT | Performed by: INTERNAL MEDICINE

## 2024-01-11 PROCEDURE — 80053 COMPREHEN METABOLIC PANEL: CPT | Performed by: STUDENT IN AN ORGANIZED HEALTH CARE EDUCATION/TRAINING PROGRAM

## 2024-01-11 PROCEDURE — 94664 DEMO&/EVAL PT USE INHALER: CPT

## 2024-01-11 PROCEDURE — 92526 ORAL FUNCTION THERAPY: CPT

## 2024-01-11 PROCEDURE — 85027 COMPLETE CBC AUTOMATED: CPT | Performed by: STUDENT IN AN ORGANIZED HEALTH CARE EDUCATION/TRAINING PROGRAM

## 2024-01-11 PROCEDURE — 94669 MECHANICAL CHEST WALL OSCILL: CPT

## 2024-01-11 PROCEDURE — 85379 FIBRIN DEGRADATION QUANT: CPT | Performed by: STUDENT IN AN ORGANIZED HEALTH CARE EDUCATION/TRAINING PROGRAM

## 2024-01-11 PROCEDURE — 99232 SBSQ HOSP IP/OBS MODERATE 35: CPT | Performed by: STUDENT IN AN ORGANIZED HEALTH CARE EDUCATION/TRAINING PROGRAM

## 2024-01-11 RX ADMIN — PRAVASTATIN SODIUM 40 MG: 40 TABLET ORAL at 16:50

## 2024-01-11 RX ADMIN — OXYCODONE HYDROCHLORIDE AND ACETAMINOPHEN 1 TABLET: 5; 325 TABLET ORAL at 21:58

## 2024-01-11 RX ADMIN — OXYBUTYNIN 10 MG: 10 TABLET, FILM COATED, EXTENDED RELEASE ORAL at 09:14

## 2024-01-11 RX ADMIN — FUROSEMIDE 20 MG: 20 TABLET ORAL at 09:14

## 2024-01-11 RX ADMIN — OXYCODONE HYDROCHLORIDE AND ACETAMINOPHEN 1 TABLET: 5; 325 TABLET ORAL at 00:21

## 2024-01-11 RX ADMIN — SODIUM CHLORIDE, SODIUM GLUCONATE, SODIUM ACETATE, POTASSIUM CHLORIDE, MAGNESIUM CHLORIDE, SODIUM PHOSPHATE, DIBASIC, AND POTASSIUM PHOSPHATE 40 ML/HR: .53; .5; .37; .037; .03; .012; .00082 INJECTION, SOLUTION INTRAVENOUS at 21:59

## 2024-01-11 RX ADMIN — BACLOFEN 20 MG: 20 TABLET ORAL at 04:46

## 2024-01-11 RX ADMIN — ENOXAPARIN SODIUM 30 MG: 30 INJECTION SUBCUTANEOUS at 09:14

## 2024-01-11 RX ADMIN — BACLOFEN 40 MG: 20 TABLET ORAL at 18:29

## 2024-01-11 RX ADMIN — DEXAMETHASONE SODIUM PHOSPHATE 6 MG: 10 INJECTION, SOLUTION INTRAMUSCULAR; INTRAVENOUS at 00:09

## 2024-01-11 RX ADMIN — DULOXETINE HYDROCHLORIDE 20 MG: 20 CAPSULE, DELAYED RELEASE ORAL at 09:14

## 2024-01-11 RX ADMIN — ENOXAPARIN SODIUM 30 MG: 30 INJECTION SUBCUTANEOUS at 21:58

## 2024-01-11 NOTE — PLAN OF CARE
Problem: PAIN - ADULT  Goal: Verbalizes/displays adequate comfort level or baseline comfort level  Description: Interventions:  - Encourage patient to monitor pain and request assistance  - Assess pain using appropriate pain scale  - Administer analgesics based on type and severity of pain and evaluate response  - Implement non-pharmacological measures as appropriate and evaluate response  - Consider cultural and social influences on pain and pain management  - Notify physician/advanced practitioner if interventions unsuccessful or patient reports new pain  1/10/2024 2056 by Gayle Waller RN  Outcome: Progressing  1/10/2024 2055 by Gayle Waller RN  Outcome: Progressing     Problem: INFECTION - ADULT  Goal: Absence or prevention of progression during hospitalization  Description: INTERVENTIONS:  - Assess and monitor for signs and symptoms of infection  - Monitor lab/diagnostic results  - Monitor all insertion sites, i.e. indwelling lines, tubes, and drains  - Monitor endotracheal if appropriate and nasal secretions for changes in amount and color  - [place] appropriate cooling/warming therapies per order  - Administer medications as ordered  - Instruct and encourage patient and family to use good hand hygiene technique  - Identify and instruct in appropriate isolation precautions for identified infection/condition  1/10/2024 2056 by Gayle Waller RN  Outcome: Progressing  1/10/2024 2055 by Gayle Waller RN  Outcome: Progressing  Goal: Absence of fever/infection during neutropenic period  Description: INTERVENTIONS:  - Monitor WBC    1/10/2024 2056 by Gayle Waller RN  Outcome: Progressing  1/10/2024 2055 by Gayle Waller RN  Outcome: Progressing     Problem: SAFETY ADULT  Goal: Patient will remain free of falls  Description: INTERVENTIONS:  - Educate patient/family on patient safety including physical limitations  - Instruct patient to call for assistance with activity   - Consult OT/PT to  assist with strengthening/mobility   - Keep Call bell within reach  - Keep bed low and locked with side rails adjusted as appropriate  - Keep care items and personal belongings within reach  - Initiate and maintain comfort rounds  - Make Fall Risk Sign visible to staff  - Offer Toileting every 2 Hours, in advance of need  - Initiate/Maintain bed alarm  - Apply yellow bracelet for high fall risk patients  - Consider moving patient to room near nurses station  1/10/2024 2056 by Gayle Waller RN  Outcome: Progressing  1/10/2024 2055 by Gayle Waller RN  Outcome: Progressing  Goal: Maintain or return to baseline ADL function  Description: INTERVENTIONS:  -  Assess patient's ability to carry out ADLs; assess patient's baseline for ADL function and identify physical deficits which impact ability to perform ADLs (bathing, care of mouth/teeth, toileting, grooming, dressing, etc.)  - Assess/evaluate cause of self-care deficits   - Assess range of motion  - Assess patient's mobility; develop plan if impaired  - Assess patient's need for assistive devices and provide as appropriate  - Encourage maximum independence but intervene and supervise when necessary  - Involve family in performance of ADLs  - Assess for home care needs following discharge   - Consider OT consult to assist with ADL evaluation and planning for discharge  - Provide patient education as appropriate  1/10/2024 2056 by Gayle Waller RN  Outcome: Progressing  1/10/2024 2055 by Gayle Waller RN  Outcome: Progressing  Goal: Maintains/Returns to pre admission functional level  Description: INTERVENTIONS:  - Perform AM-PAC 6 Click Basic Mobility/ Daily Activity assessment daily.  - Set and communicate daily mobility goal to care team and patient/family/caregiver.   - Collaborate with rehabilitation services on mobility goals if consulted  - Perform Range of Motion 3 times a day.  - Reposition patient every 2 hours.  - Out of bed for toileting  -  Record patient progress and toleration of activity level   1/10/2024 2056 by Gayle Waller RN  Outcome: Progressing  1/10/2024 2055 by Gayle Waller RN  Outcome: Progressing     Problem: DISCHARGE PLANNING  Goal: Discharge to home or other facility with appropriate resources  Description: INTERVENTIONS:  - Identify barriers to discharge w/patient and caregiver  - Arrange for needed discharge resources and transportation as appropriate  - Identify discharge learning needs (meds, wound care, etc.)  - Arrange for interpretive services to assist at discharge as needed  - Refer to Case Management Department for coordinating discharge planning if the patient needs post-hospital services based on physician/advanced practitioner order or complex needs related to functional status, cognitive ability, or social support system  1/10/2024 2056 by Gayle Waller RN  Outcome: Progressing  1/10/2024 2055 by Gayle Waller RN  Outcome: Progressing     Problem: Knowledge Deficit  Goal: Patient/family/caregiver demonstrates understanding of disease process, treatment plan, medications, and discharge instructions  Description: Complete learning assessment and assess knowledge base.  Interventions:  - Provide teaching at level of understanding  - Provide teaching via preferred learning methods  1/10/2024 2056 by Gayle Waller RN  Outcome: Progressing  1/10/2024 2055 by Gayle Waller RN  Outcome: Progressing     Problem: RESPIRATORY - ADULT  Goal: Achieves optimal ventilation and oxygenation  Description: INTERVENTIONS:  - Assess for changes in respiratory status  - Assess for changes in mentation and behavior  - Position to facilitate oxygenation and minimize respiratory effort  - Oxygen administered by appropriate delivery if ordered  - Initiate smoking cessation education as indicated  - Encourage broncho-pulmonary hygiene including cough, deep breathe, Incentive Spirometry  - Assess the need for suctioning and  aspirate as needed  - Assess and instruct to report SOB or any respiratory difficulty  - Respiratory Therapy support as indicated  1/10/2024 2056 by Gayle Waller RN  Outcome: Progressing  1/10/2024 2055 by Gayle Waller RN  Outcome: Progressing     Problem: Prexisting or High Potential for Compromised Skin Integrity  Goal: Skin integrity is maintained or improved  Description: INTERVENTIONS:  - Identify patients at risk for skin breakdown  - Assess and monitor skin integrity  - Assess and monitor nutrition and hydration status  - Monitor labs   - Assess for incontinence   - Turn and reposition patient  - Assist with mobility/ambulation  - Relieve pressure over bony prominences  - Avoid friction and shearing  - Provide appropriate hygiene as needed including keeping skin clean and dry  - Evaluate need for skin moisturizer/barrier cream  - Collaborate with interdisciplinary team   - Patient/family teaching  - Consider wound care consult   1/10/2024 2056 by Gayle Waller RN  Outcome: Progressing  1/10/2024 2055 by Gayle Waller RN  Outcome: Progressing     Problem: Nutrition/Hydration-ADULT  Goal: Nutrient/Hydration intake appropriate for improving, restoring or maintaining nutritional needs  Description: Monitor and assess patient's nutrition/hydration status for malnutrition. Collaborate with interdisciplinary team and initiate plan and interventions as ordered.  Monitor patient's weight and dietary intake as ordered or per policy. Utilize nutrition screening tool and intervene as necessary. Determine patient's food preferences and provide high-protein, high-caloric foods as appropriate.     INTERVENTIONS:  - Monitor oral intake, urinary output, labs, and treatment plans  - Assess nutrition and hydration status and recommend course of action  - Evaluate amount of meals eaten  - Assist patient with eating if necessary   - Allow adequate time for meals  - Recommend/ encourage appropriate diets, oral  nutritional supplements, and vitamin/mineral supplements  - Order, calculate, and assess calorie counts as needed  - Recommend, monitor, and adjust tube feedings and TPN/PPN based on assessed needs  - Assess need for intravenous fluids  - Provide specific nutrition/hydration education as appropriate  - Include patient/family/caregiver in decisions related to nutrition  1/10/2024 2056 by Gayle Waller RN  Outcome: Progressing  1/10/2024 2055 by Gayle Waller RN  Outcome: Progressing

## 2024-01-11 NOTE — PLAN OF CARE
Problem: PAIN - ADULT  Goal: Verbalizes/displays adequate comfort level or baseline comfort level  Description: Interventions:  - Encourage patient to monitor pain and request assistance  - Assess pain using appropriate pain scale  - Administer analgesics based on type and severity of pain and evaluate response  - Implement non-pharmacological measures as appropriate and evaluate response  - Consider cultural and social influences on pain and pain management  - Notify physician/advanced practitioner if interventions unsuccessful or patient reports new pain  Outcome: Progressing     Problem: INFECTION - ADULT  Goal: Absence or prevention of progression during hospitalization  Description: INTERVENTIONS:  - Assess and monitor for signs and symptoms of infection  - Monitor lab/diagnostic results  - Monitor all insertion sites, i.e. indwelling lines, tubes, and drains  - Monitor endotracheal if appropriate and nasal secretions for changes in amount and color  - [place] appropriate cooling/warming therapies per order  - Administer medications as ordered  - Instruct and encourage patient and family to use good hand hygiene technique  - Identify and instruct in appropriate isolation precautions for identified infection/condition  Outcome: Progressing  Goal: Absence of fever/infection during neutropenic period  Description: INTERVENTIONS:  - Monitor WBC    Outcome: Progressing     Problem: SAFETY ADULT  Goal: Patient will remain free of falls  Description: INTERVENTIONS:  - Educate patient/family on patient safety including physical limitations  - Instruct patient to call for assistance with activity   - Consult OT/PT to assist with strengthening/mobility   - Keep Call bell within reach  - Keep bed low and locked with side rails adjusted as appropriate  - Keep care items and personal belongings within reach  - Initiate and maintain comfort rounds  - Make Fall Risk Sign visible to staff  - Offer Toileting every 2 Hours,  in advance of need  - Initiate/Maintain bed alarm  - Apply yellow bracelet for high fall risk patients  - Consider moving patient to room near nurses station  Outcome: Progressing  Goal: Maintain or return to baseline ADL function  Description: INTERVENTIONS:  -  Assess patient's ability to carry out ADLs; assess patient's baseline for ADL function and identify physical deficits which impact ability to perform ADLs (bathing, care of mouth/teeth, toileting, grooming, dressing, etc.)  - Assess/evaluate cause of self-care deficits   - Assess range of motion  - Assess patient's mobility; develop plan if impaired  - Assess patient's need for assistive devices and provide as appropriate  - Encourage maximum independence but intervene and supervise when necessary  - Involve family in performance of ADLs  - Assess for home care needs following discharge   - Consider OT consult to assist with ADL evaluation and planning for discharge  - Provide patient education as appropriate  Outcome: Progressing  Goal: Maintains/Returns to pre admission functional level  Description: INTERVENTIONS:  - Perform AM-PAC 6 Click Basic Mobility/ Daily Activity assessment daily.  - Set and communicate daily mobility goal to care team and patient/family/caregiver.   - Collaborate with rehabilitation services on mobility goals if consulted  - Perform Range of Motion 3 times a day.  - Reposition patient every 2 hours.  - Out of bed for toileting  - Record patient progress and toleration of activity level   Outcome: Progressing     Problem: RESPIRATORY - ADULT  Goal: Achieves optimal ventilation and oxygenation  Description: INTERVENTIONS:  - Assess for changes in respiratory status  - Assess for changes in mentation and behavior  - Position to facilitate oxygenation and minimize respiratory effort  - Oxygen administered by appropriate delivery if ordered  - Initiate smoking cessation education as indicated  - Encourage broncho-pulmonary hygiene  including cough, deep breathe, Incentive Spirometry  - Assess the need for suctioning and aspirate as needed  - Assess and instruct to report SOB or any respiratory difficulty  - Respiratory Therapy support as indicated  Outcome: Progressing     Problem: Prexisting or High Potential for Compromised Skin Integrity  Goal: Skin integrity is maintained or improved  Description: INTERVENTIONS:  - Identify patients at risk for skin breakdown  - Assess and monitor skin integrity  - Assess and monitor nutrition and hydration status  - Monitor labs   - Assess for incontinence   - Turn and reposition patient  - Assist with mobility/ambulation  - Relieve pressure over bony prominences  - Avoid friction and shearing  - Provide appropriate hygiene as needed including keeping skin clean and dry  - Evaluate need for skin moisturizer/barrier cream  - Collaborate with interdisciplinary team   - Patient/family teaching  - Consider wound care consult   Outcome: Progressing     Problem: Nutrition/Hydration-ADULT  Goal: Nutrient/Hydration intake appropriate for improving, restoring or maintaining nutritional needs  Description: Monitor and assess patient's nutrition/hydration status for malnutrition. Collaborate with interdisciplinary team and initiate plan and interventions as ordered.  Monitor patient's weight and dietary intake as ordered or per policy. Utilize nutrition screening tool and intervene as necessary. Determine patient's food preferences and provide high-protein, high-caloric foods as appropriate.     INTERVENTIONS:  - Monitor oral intake, urinary output, labs, and treatment plans  - Assess nutrition and hydration status and recommend course of action  - Evaluate amount of meals eaten  - Assist patient with eating if necessary   - Allow adequate time for meals  - Recommend/ encourage appropriate diets, oral nutritional supplements, and vitamin/mineral supplements  - Order, calculate, and assess calorie counts as needed  -  Recommend, monitor, and adjust tube feedings and TPN/PPN based on assessed needs  - Assess need for intravenous fluids  - Provide specific nutrition/hydration education as appropriate  - Include patient/family/caregiver in decisions related to nutrition  Outcome: Progressing

## 2024-01-11 NOTE — PROGRESS NOTES
Progress Note - Infectious Disease   Fanny Schulz 53 y.o. female MRN: [id number]  Unit/Bed#: [facility] 818-01 Encounter: [id number]      Impression/Recommendations:  Moderate COVID, currently on remdesivir and dexamethasone.  Patient is clinically improved.  No evidence of bacterial superinfection.  Patient completed remdesivir course.  Continue dexamethasone.  No need for antibiotic.  Monitor respiratory symptoms.     Asymptomatic bacteriuria, likely bladder colonization in patient with neurogenic bladder, with chronic SPC in place.  No antibiotic needed for this.     Acute hypoxic respiratory failure, multifactorial, including COVID.  Patient is clinically improved.  O2 requirement continues to decrease, now at 2 L.  COVID treatment as in above.  O2 support and weaning per primary service.     Neurogenic bladder, status post SPC, with chronic bladder colonization.     Advanced MS with functional quadriplegia.     Dysphagia, with high risk for aspiration.     Discussed with patient.     Antibiotics:  None     Subjective:  Patient with minimal dyspnea at rest.  Cough mild and improved, mostly nonproductive.  No abdominal or flank pain.  Temperature stays down.  No chills.  No diarrhea.      Objective:  Vitals:  Temp:  [97.3 °F (36.3 °C)-98.3 °F (36.8 °C)] 97.3 °F (36.3 °C)  HR:  [71-75] 73  Resp:  [14-19] 16  BP: (117-132)/(62-77) 132/77  SpO2:  [95 %-98 %] 98 %  Temp (24hrs), Av.7 °F (36.5 °C), Min:97.3 °F (36.3 °C), Max:98.3 °F (36.8 °C)  Current: Temperature: (!) 97.3 °F (36.3 °C)    Physical Exam:     General: Awake, alert, cooperative, no distress.   Neck:  Supple. No mass.  No lymphadenopathy.   Lungs: Decreased breath sounds, no rales, no wheezing, respirations unlabored.   Heart:  Regular rate and rhythm, S1 and S2 normal, no murmur.   Abdomen: Soft, nondistended, non-tender, bowel sounds active all four quadrants, no masses, no organomegaly.   Extremities: No edema. No erythema/warmth. No ulcer.  Nontender to palpation.   Skin:  No rash.   Neuro: Stable extremity weakness.     Invasive Devices       Central Venous Catheter Line  Duration             Port A Cath Left Chest -- days              Peripheral Intravenous Line  Duration             Peripheral IV 01/08/24 Distal;Right;Ventral (anterior) Forearm 2 days              Drain  Duration             Suprapubic Catheter 24 Fr. 384 days                    Labs studies:   I have personally reviewed pertinent labs.  Results from last 7 days   Lab Units 01/11/24  0518 01/10/24  0553 01/09/24  0541 01/08/24  0524   POTASSIUM mmol/L 4.1 4.2 4.2 3.4*   CHLORIDE mmol/L 102 106 106 106   CO2 mmol/L 29 29 25 26   BUN mg/dL 9 8 8 8   CREATININE mg/dL 0.28* 0.20* 0.21* 0.20*   EGFR ml/min/1.73sq m 134 149 147 149   CALCIUM mg/dL 8.5 8.4 8.4 8.3*   AST U/L 10*  --  18 12*   ALT U/L 19  --  15 10   ALK PHOS U/L 42  --  41 42     Results from last 7 days   Lab Units 01/11/24  0518 01/10/24  0553 01/09/24  0541   WBC Thousand/uL 9.03 7.13 8.04   HEMOGLOBIN g/dL 11.8 10.7* 10.6*   PLATELETS Thousands/uL 386 311 274     Results from last 7 days   Lab Units 01/06/24  1227 01/05/24  1342 01/05/24  1235 01/05/24  1225   BLOOD CULTURE   --   --  No Growth After 5 Days. No Growth After 5 Days.   URINE CULTURE   --  >100,000 cfu/ml Pseudomonas aeruginosa*  >100,000 cfu/ml Enterococcus faecalis*  --   --    LEGIONELLA URINARY ANTIGEN  Negative  --   --   --        Imaging Studies:   I have personally reviewed pertinent imaging study reports and images in PACS.    EKG, Pathology, and Other Studies:   I have personally reviewed pertinent reports.

## 2024-01-11 NOTE — SPEECH THERAPY NOTE
"Speech Language/Pathology    Speech/Language Pathology Progress Note    Patient Name: Fanny Schulz  Today's Date: 1/11/2024     Problem List  Principal Problem:    Acute respiratory failure with hypoxia ([facility])  Active Problems:    Suprapubic catheter (HCC)    Constipation    Recurrent major depressive disorder, in full remission (HCC)    Dysphagia    Multiple sclerosis (HCC)    Functional quadriplegia secondary to MS (Former[facility])    Bacteriuria with pyuria    Continuous opioid dependence ([facility])    COVID-19 virus infection    Hypercholesteremia    Chronic lower extremity edema       Past Medical History  Past Medical History:   Diagnosis Date    Anesthesia     \"prefers if able to be put to sleep on stretcher before placed on table if possible due to severe spasticity    Bladder stones     Chronic pain disorder     neck    Contracture, right shoulder     right arm and hand    Dependent on wheelchair     motorized    Edema     dependant lower extremities    Elevated alkaline phosphatase level     Mildly elevated total, normal isoenzymes 4/15/15, normal 1/17; last assessed: [id number]    Fernandez catheter in place     #24 to large bag(silicone catheter)    Gallbladder disease     History of femur fracture     right leg    History of UTI     MS (multiple sclerosis) (Former[facility])     Muscle spasticity     especially with touch    Muscle weakness     Muscular dystrophy ([facility])     Neck pain     Neurogenic bladder     Paralysis ([facility])     bilateral lower extremities    Port-A-Cath in place     left chest,\" hasn't used in approx 1 yr or so\"    Pressure injury of skin     right ischium    Sacral pressure sore     \"shearing, tegaderm in place,\"    Swallowing difficulty     Tinnitus     Urinary incontinence         Past Surgical History  Past Surgical History:   Procedure Laterality Date    BLADDER STONE REMOVAL N/A 12/4/2017    Procedure: CYSTO, LITHOLOPAXY HOLMIUM LASER;  Surgeon: Damir Osborne MD;  Location: [facility];  Service: Urology    " "BLADDER SURGERY      CHOLECYSTECTOMY      CYSTOSCOPY  06/15/2020    ESOPHAGOGASTRODUODENOSCOPY      FL RETROGRADE PYELOGRAM  10/2/2020    FL RETROGRADE PYELOGRAM  11/3/2020    KIDNEY STONE SURGERY      PORTACATH PLACEMENT Left     MO CYSTO BLADDER W/URETERAL CATHETERIZATION Left 10/2/2020    Procedure: CYSTOSCOPY LEFT RETROGRADE ; LEFT URETEROSCOPY; PYELOGRAM WITH STENT INSERTION AND SUPERPUBIC EXCHANGE;  Surgeon: Philip Mcdonald MD;  Location: [facility];  Service: Urology    MO CYSTO/URETERO W/LITHOTRIPSY &INDWELL STENT INSRT Left 11/3/2020    Procedure: CYSTOSCOPY URETEROSCOPY WITH RETROGRADE PYELOGRAM AND EXCHANGE STENT URETERAL, SP TUBE EXCHANGE, BASKET STONE EXTRACTION, BLADDER STONE EXTRACTION;  Surgeon: Damir Osborne MD;  Location: [facility];  Service: Urology    MO LITHOLAPAXY SMPL/SM <2.5 CM N/A 12/22/2022    Procedure: Cystolitholapaxy w/  laser lithotripsy of bladder stones; SUPRAPUBIC CATHETER CHANGE;  Surgeon: Damir Osborne MD;  Location: [facility];  Service: Urology    SUPRAPUBIC CYSTOSTOMY  02/25/2014    last assessed: [id number]     Subjective:  \"It tastes really good today.\" Patient awake and alert.     Objective:  Patient is seen for dysphagia therapy. She is sitting upright in bed and accompanied by family. Family feeds patient. Voice remains clear and strong. Patient is assessed with regular solids and thin liquids during lunchtime. Oral closure and bite strength is adequate. Mastication is timely and proficient. Thin liquids retrieved via straw is adequate. No oral residue observed. No overt s/s of aspiration observed. Patient and family states that she is tolerating this diet well.     Assessment:  The patient tolerates thin liquids and regular solids well.     Plan/Recommendations:  Continue current diet. No further ST appears warranted. Please re-consult with concerns. Can consider VBS as OP in future if concerns for aspiration arise/continue.      "

## 2024-01-11 NOTE — PROGRESS NOTES
PULMONOLOGY PROGRESS NOTE     Name: Fanny Schulz   Age & Sex: 53 y.o. female   MRN: [id number]  Unit/Bed#: [facility] 818-01   Encounter: [id number]    PATIENT INFORMATION     Name: Fanny Schulz   Age & Sex: 53 y.o. female   MRN: [id number]  Hospital Stay Days: 6    ASSESSMENT/PLAN     Assessment:  Acute hypoxic respiratory failure   Improving  Legionella, strep, blood cultures negative  COVID-19 infection  Completed 5 day course of remdesivir  Received 2 days of cefepime, vanc, and ceftriaxone  D-Dimer 1.28  History of MS  History of quadriplegia in the context of MS    Plan:  Maintain SpO2 >90%  As patient is now off oxygen can remain on DVT prophylaxis dose in the setting of elevated d-dimer and discontinued oxygen supplementation.  Continue remdesivir for 5 days and decadron for 10 days or patient is off of oxygen, whichever is first  Decadron 6mg IV qd (day 7/10)  Albuterol as needed.  Pulmonary toileting with CPT      SUBJECTIVE     Patient seen and examined. No acute events overnight. SpO2 97% on 2L NC. Titrated patient off oxygen and she maintains SpO2 >90%. Patient denies sob, but notes that with CPT she has been able to expectorate much more sputum compared to prior to treatment. Denies leg pain, back pain, chest pain at this time.     OBJECTIVE     Vitals:    01/10/24 0831 01/10/24 1458 01/10/24 2030 01/10/24 2138   BP: 120/58 117/62  132/74   BP Location: Left arm      Pulse: 67 71  75   Resp: 16 19  14   Temp: (!) 97.4 °F (36.3 °C) (!) 97.4 °F (36.3 °C)  98.3 °F (36.8 °C)   TempSrc: Oral      SpO2: 90% 97% 97% 95%      Temperature:   Temp (24hrs), Av.7 °F (36.5 °C), Min:97.4 °F (36.3 °C), Max:98.3 °F (36.8 °C)    Temperature: 98.3 °F (36.8 °C)  Intake & Output:  I/O          0701  01/10 0700 01/10 0701  01/11 0700 01/11 0701  01/12 0700    P.O. 120      I.V.  2192.7     Total Intake 120 2192.7     Urine 1150 2750     Total Output 1150 2750     Net -1030 -557.3                  Weights:         There is no height or weight on file to calculate BMI.  Weight (last 2 days)       None          Physical Exam  HENT:      Head: Normocephalic and atraumatic.   Cardiovascular:      Rate and Rhythm: Normal rate and regular rhythm.      Pulses: Normal pulses.   Pulmonary:      Effort: Pulmonary effort is normal. No respiratory distress.      Breath sounds: No stridor. No wheezing, rhonchi or rales.   Chest:      Chest wall: No tenderness.   Abdominal:      General: Abdomen is flat.   Musculoskeletal:         General: Normal range of motion.      Right lower leg: Edema present.      Left lower leg: Edema present.   Skin:     Capillary Refill: Capillary refill takes less than 2 seconds.   Neurological:      Mental Status: She is alert and oriented to person, place, and time.   Psychiatric:         Mood and Affect: Mood normal.           LABORATORY DATA     Labs: I have personally reviewed pertinent reports.  Results from last 7 days   Lab Units 01/11/24  0518 01/10/24  0553 01/09/24  0541 01/08/24  0524 01/07/24  0634 01/06/24  0312 01/05/24  1215   WBC Thousand/uL 9.03 7.13 8.04 7.21 7.83   < > 8.92   HEMOGLOBIN g/dL 11.8 10.7* 10.6* 10.3* 11.6   < > 13.3   HEMATOCRIT % 36.1 33.8* 32.3* 32.6* 36.1   < > 41.1   PLATELETS Thousands/uL 386 311 274 196 176   < > 195   NEUTROS PCT %  --   --  88*  --  94*  --  85*   MONOS PCT %  --   --  3*  --  2*  --  5   MONO PCT %  --   --   --  1*  --   --   --    EOS PCT %  --   --  0 0 0  --  0    < > = values in this interval not displayed.      Results from last 7 days   Lab Units 01/11/24 0518 01/10/24  0553 01/09/24  0541 01/08/24 0524   POTASSIUM mmol/L 4.1 4.2 4.2 3.4*   CHLORIDE mmol/L 102 106 106 106   CO2 mmol/L 29 29 25 26   BUN mg/dL 9 8 8 8   CREATININE mg/dL 0.28* 0.20* 0.21* 0.20*   CALCIUM mg/dL 8.5 8.4 8.4 8.3*   ALK PHOS U/L 42  --  41 42   ALT U/L 19  --  15 10   AST U/L 10*  --  18 12*     Results from last 7 days   Lab Units 01/09/24  0541 01/08/24  0524  01/06/24  1352   MAGNESIUM mg/dL 2.3 1.9 2.0              Results from last 7 days   Lab Units 01/06/24  1352   LACTIC ACID mmol/L 1.8             ABG:       Micro:   Results from last 7 days   Lab Units 01/06/24  1227 01/05/24  1342 01/05/24  1235 01/05/24  1225   BLOOD CULTURE   --   --  No Growth After 5 Days. No Growth After 5 Days.   URINE CULTURE   --  >100,000 cfu/ml Pseudomonas aeruginosa*  >100,000 cfu/ml Enterococcus faecalis*  --   --    LEGIONELLA URINARY ANTIGEN  Negative  --   --   --    STREP PNEUMONIAE ANTIGEN, URINE  Negative  --   --   --          IMAGING & DIAGNOSTIC TESTING     Radiology Results: I have personally reviewed pertinent reports.  XR abdomen obstruction series    Result Date: 1/6/2024  Impression: Mild distention of small and large bowel loops with no dilatation or air-fluid levels to suggest obstruction. Mild distention of the stomach with a small amount of fluid layering in the dependent portion of the stomach on the decubitus view. Workstation performed: [id number]     XR chest 1 view portable    Result Date: 1/5/2024  Impression: No acute cardiopulmonary disease. Workstation performed: [id number]     Other Diagnostic Testing: I have personally reviewed pertinent reports.    ACTIVE MEDICATIONS     Current Facility-Administered Medications   Medication Dose Route Frequency    albuterol inhalation solution 2.5 mg  2.5 mg Nebulization Q4H PRN    baclofen tablet 20 mg  20 mg Oral Q24H    baclofen tablet 40 mg  40 mg Oral QPM    dexamethasone (PF) (DECADRON) injection 6 mg  6 mg Intravenous Q24H    diazepam (VALIUM) injection 5 mg  5 mg Intravenous Q6H PRN    DULoxetine (CYMBALTA) delayed release capsule 20 mg  20 mg Oral Daily    enoxaparin (LOVENOX) subcutaneous injection 30 mg  30 mg Subcutaneous Q12H JOVI    furosemide (LASIX) tablet 20 mg  20 mg Oral Daily    multi-electrolyte (PLASMALYTE-A/ISOLYTE-S PH 7.4) IV solution  40 mL/hr Intravenous Continuous    ondansetron (ZOFRAN)  "injection 4 mg  4 mg Intravenous Q6H PRN    oxybutynin (DITROPAN-XL) 24 hr tablet 10 mg  10 mg Oral Daily    oxyCODONE-acetaminophen (PERCOCET) 5-325 mg per tablet 1 tablet  1 tablet Oral Q4H PRN    polyethylene glycol (MIRALAX) packet 17 g  17 g Oral Daily PRN    pravastatin (PRAVACHOL) tablet 40 mg  40 mg Oral Daily With Dinner       Disclaimer: Portions of the record may have been created with voice recognition software. Occasional wrong word or \"sound a like\" substitutions may have occurred due to the inherent limitations of voice recognition software. Careful consideration should be taken to recognize, using context, where substitutions have occurred.    Fortunato Frost,    Pulmonary and Critical Care Fellow, PGY-IV  [facility]        "

## 2024-01-12 ENCOUNTER — APPOINTMENT (INPATIENT)
Dept: RADIOLOGY | Facility: HOSPITAL | Age: 54
DRG: 177 | End: 2024-01-12
Payer: MEDICARE

## 2024-01-12 LAB
ANION GAP SERPL CALCULATED.3IONS-SCNC: 8 MMOL/L
BUN SERPL-MCNC: 11 MG/DL (ref 5–25)
CALCIUM SERPL-MCNC: 8.5 MG/DL (ref 8.4–10.2)
CHLORIDE SERPL-SCNC: 101 MMOL/L (ref 96–108)
CO2 SERPL-SCNC: 31 MMOL/L (ref 21–32)
CREAT SERPL-MCNC: 0.2 MG/DL (ref 0.6–1.3)
DME PARACHUTE DELIVERY DATE REQUESTED: NORMAL
DME PARACHUTE ITEM DESCRIPTION: NORMAL
DME PARACHUTE ORDER STATUS: NORMAL
DME PARACHUTE SUPPLIER NAME: NORMAL
DME PARACHUTE SUPPLIER PHONE: NORMAL
ERYTHROCYTE [DISTWIDTH] IN BLOOD BY AUTOMATED COUNT: 12.9 % (ref 11.6–15.1)
GFR SERPL CREATININE-BSD FRML MDRD: 149 ML/MIN/1.73SQ M
GLUCOSE SERPL-MCNC: 138 MG/DL (ref 65–140)
HCT VFR BLD AUTO: 38 % (ref 34.8–46.1)
HGB BLD-MCNC: 12 G/DL (ref 11.5–15.4)
MCH RBC QN AUTO: 27.4 PG (ref 26.8–34.3)
MCHC RBC AUTO-ENTMCNC: 31.6 G/DL (ref 31.4–37.4)
MCV RBC AUTO: 87 FL (ref 82–98)
PLATELET # BLD AUTO: 444 THOUSANDS/UL (ref 149–390)
PMV BLD AUTO: 8.9 FL (ref 8.9–12.7)
POTASSIUM SERPL-SCNC: 4 MMOL/L (ref 3.5–5.3)
RBC # BLD AUTO: 4.38 MILLION/UL (ref 3.81–5.12)
SODIUM SERPL-SCNC: 140 MMOL/L (ref 135–147)
WBC # BLD AUTO: 9.72 THOUSAND/UL (ref 4.31–10.16)

## 2024-01-12 PROCEDURE — 94669 MECHANICAL CHEST WALL OSCILL: CPT

## 2024-01-12 PROCEDURE — 94760 N-INVAS EAR/PLS OXIMETRY 1: CPT

## 2024-01-12 PROCEDURE — 85027 COMPLETE CBC AUTOMATED: CPT | Performed by: STUDENT IN AN ORGANIZED HEALTH CARE EDUCATION/TRAINING PROGRAM

## 2024-01-12 PROCEDURE — 99232 SBSQ HOSP IP/OBS MODERATE 35: CPT | Performed by: STUDENT IN AN ORGANIZED HEALTH CARE EDUCATION/TRAINING PROGRAM

## 2024-01-12 PROCEDURE — 80048 BASIC METABOLIC PNL TOTAL CA: CPT | Performed by: STUDENT IN AN ORGANIZED HEALTH CARE EDUCATION/TRAINING PROGRAM

## 2024-01-12 PROCEDURE — 99232 SBSQ HOSP IP/OBS MODERATE 35: CPT | Performed by: INTERNAL MEDICINE

## 2024-01-12 PROCEDURE — 71045 X-RAY EXAM CHEST 1 VIEW: CPT

## 2024-01-12 RX ADMIN — SODIUM CHLORIDE, SODIUM GLUCONATE, SODIUM ACETATE, POTASSIUM CHLORIDE, MAGNESIUM CHLORIDE, SODIUM PHOSPHATE, DIBASIC, AND POTASSIUM PHOSPHATE 40 ML/HR: .53; .5; .37; .037; .03; .012; .00082 INJECTION, SOLUTION INTRAVENOUS at 23:11

## 2024-01-12 RX ADMIN — DEXAMETHASONE SODIUM PHOSPHATE 6 MG: 10 INJECTION, SOLUTION INTRAMUSCULAR; INTRAVENOUS at 01:01

## 2024-01-12 RX ADMIN — BACLOFEN 40 MG: 20 TABLET ORAL at 18:02

## 2024-01-12 RX ADMIN — DULOXETINE HYDROCHLORIDE 20 MG: 20 CAPSULE, DELAYED RELEASE ORAL at 08:35

## 2024-01-12 RX ADMIN — OXYBUTYNIN 10 MG: 10 TABLET, FILM COATED, EXTENDED RELEASE ORAL at 08:35

## 2024-01-12 RX ADMIN — BACLOFEN 20 MG: 20 TABLET ORAL at 05:22

## 2024-01-12 RX ADMIN — FUROSEMIDE 20 MG: 20 TABLET ORAL at 08:35

## 2024-01-12 RX ADMIN — PRAVASTATIN SODIUM 40 MG: 40 TABLET ORAL at 18:02

## 2024-01-12 RX ADMIN — ENOXAPARIN SODIUM 30 MG: 30 INJECTION SUBCUTANEOUS at 21:57

## 2024-01-12 RX ADMIN — ENOXAPARIN SODIUM 30 MG: 30 INJECTION SUBCUTANEOUS at 08:35

## 2024-01-12 NOTE — ASSESSMENT & PLAN NOTE
Due to aspiration/COVID infection. Mainly aspiration per pulm   History of multiple sclerosis with progressive worsening of dysphagia  Presented as below   O2 requirement 6L on presentation - worsened overnight on 1/5 to HFNC  O2 weaned down to 2 L NC, but still requiring frequent suctioning.\  Continue Decadron.  Wean O2 as able  Plan as below

## 2024-01-12 NOTE — PROGRESS NOTES
Progress Note - Infectious Disease   Fanny Schulz 53 y.o. female MRN: [id number]  Unit/Bed#: [facility] 818-01 Encounter: [id number]      Impression/Recommendations:  Moderate COVID, currently on remdesivir and dexamethasone.  Patient is clinically improved.  No evidence of bacterial superinfection.  Patient completed remdesivir course.  Complete dexamethasone course per primary service.  No need for antibiotic.  Monitor respiratory symptoms.     Asymptomatic bacteriuria, likely bladder colonization in patient with neurogenic bladder, with chronic SPC in place.  No antibiotic needed for this.     Acute hypoxic respiratory failure, multifactorial, including COVID.  Patient is clinically improved.  O2 requirement continues to decrease, now at 2 L.  COVID treatment as in above.  O2 support and weaning per primary service.     Neurogenic bladder, status post SPC, with chronic bladder colonization.     Advanced MS with functional quadriplegia.     Dysphagia, with high risk for aspiration.     Discussed with patient.    With no further active infection, I will sign off.  Thank you for the consultation.  Please do not hesitate to reconsult us for any further questions or issues.     Antibiotics:  None     Subjective:  Patient with minimal dyspnea at rest.  Cough minimal, mostly nonproductive.  No abdominal or flank pain.  Temperature stays down.  No chills.  No diarrhea.       Objective:  Vitals:  Temp:  [97.2 °F (36.2 °C)-98.1 °F (36.7 °C)] 97.8 °F (36.6 °C)  HR:  [] 78  Resp:  [14-16] 14  BP: (108-132)/(67-80) 125/80  SpO2:  [90 %-95 %] 95 %  Temp (24hrs), Av.6 °F (36.4 °C), Min:97.2 °F (36.2 °C), Max:98.1 °F (36.7 °C)  Current: Temperature: 97.8 °F (36.6 °C)    Physical Exam:     General: Awake, alert, cooperative, no distress.   Neck:  Supple. No mass.  No lymphadenopathy.   Lungs: Decreased breath sounds, no rales, no wheezing, respirations unlabored.   Heart:  Regular rate and rhythm, S1 and S2 normal, no  murmur.   Abdomen: Soft, nondistended, non-tender, bowel sounds active all four quadrants, no masses, no organomegaly.   Extremities: No edema. No erythema/warmth. No ulcer. Nontender to palpation.   Skin:  No rash.   Neuro: Stable extremity weakness.     Invasive Devices       Central Venous Catheter Line  Duration             Port A Cath Left Chest -- days              Peripheral Intravenous Line  Duration             Peripheral IV 01/08/24 Distal;Right;Ventral (anterior) Forearm 3 days              Drain  Duration             Suprapubic Catheter 24 Fr. 385 days                    Labs studies:   I have personally reviewed pertinent labs.  Results from last 7 days   Lab Units 01/12/24  0523 01/11/24  0518 01/10/24  0553 01/09/24  0541 01/08/24  0524   POTASSIUM mmol/L 4.0 4.1 4.2 4.2 3.4*   CHLORIDE mmol/L 101 102 106 106 106   CO2 mmol/L 31 29 29 25 26   BUN mg/dL 11 9 8 8 8   CREATININE mg/dL 0.20* 0.28* 0.20* 0.21* 0.20*   EGFR ml/min/1.73sq m 149 134 149 147 149   CALCIUM mg/dL 8.5 8.5 8.4 8.4 8.3*   AST U/L  --  10*  --  18 12*   ALT U/L  --  19  --  15 10   ALK PHOS U/L  --  42  --  41 42     Results from last 7 days   Lab Units 01/12/24  0523 01/11/24  0518 01/10/24  0553   WBC Thousand/uL 9.72 9.03 7.13   HEMOGLOBIN g/dL 12.0 11.8 10.7*   PLATELETS Thousands/uL 444* 386 311     Results from last 7 days   Lab Units 01/06/24  1227 01/05/24  1342 01/05/24  1235 01/05/24  1225   BLOOD CULTURE   --   --  No Growth After 5 Days. No Growth After 5 Days.   URINE CULTURE   --  >100,000 cfu/ml Pseudomonas aeruginosa*  >100,000 cfu/ml Enterococcus faecalis*  --   --    LEGIONELLA URINARY ANTIGEN  Negative  --   --   --        Imaging Studies:   I have personally reviewed pertinent imaging study reports and images in PACS.    EKG, Pathology, and Other Studies:   I have personally reviewed pertinent reports.

## 2024-01-12 NOTE — CASE MANAGEMENT
Case Management Discharge Planning Note    Patient name Fanny Schulz  [facility] 818/[facility] 818-01 MRN [id number]  : 1970 Date 2024       Current Admission Date: 2024  Current Admission Diagnosis:Acute respiratory failure with hypoxia (HCC)   Patient Active Problem List    Diagnosis Date Noted    Chronic lower extremity edema 2024    COVID-19 virus infection 2024    Hypercholesteremia 2024    Tinnitus of both ears 2023    Hypophonia 2023    Peripheral edema 2023    Acute respiratory failure with hypoxia ([facility]) 2022    Continuous opioid dependence ([facility]) 2022    Increased endometrial stripe thickness 03/10/2021    Ureteral calculus, left 2020    Alkaline phosphatase elevation 10/28/2020    Abnormal thyroid function test 10/28/2020    Candidal vaginitis 10/19/2020    Hydronephrosis with urinary obstruction due to ureteral calculus 10/02/2020    Thrombocytopenia ([facility]) 10/01/2020    Serum total bilirubin elevated 10/01/2020    Bacteriuria with pyuria 2020    Medication management contract signed 2020    Screening mammogram, encounter for 2019    Health care maintenance 2019    Allergic rhinitis 2018    Functional quadriplegia secondary to MS ([facility]ly [facility]) 2018    Suprapubic catheter ([facility]) 2017    Nephrolithiasis 2016    Bladder calculus 2015    Muscle spasticity 2015    Vitamin B12 deficiency 2015    Constipation 2015    Hyperglycemia 2014    Familial hypercholesterolemia 2014    Recurrent major depressive disorder, in full remission ([facility]) 2014    Lymphedema 2014    Spinal stenosis of cervical region 2014    Urinary incontinence 2014    Vitamin D deficiency 2013    Dysphagia 2013    Dizziness 2013    Muscle weakness 2013    Neurogenic bladder 2013    Sleep disorder 2013    Tremor 2013    Vision loss  11/04/2013    Multiple sclerosis (HCC) 10/21/2013      LOS (days): 7  Geometric Mean LOS (GMLOS) (days): 5  Days to GMLOS:-2.1     OBJECTIVE:  Risk of Unplanned Readmission Score: 13.13         Current admission status: Inpatient   Preferred Pharmacy:   [facility]/pharmacy #[id number] - [place] - [address] [address]  [address] [address]  Phone: [phone] Fax: [phone]    Primary Care Provider: Nacho Monroy DO    Primary Insurance: MEDICARE  Secondary Insurance: BLUE CROSS    DISCHARGE DETAILS:     Informed by provider, pt will need [employer] prior to d/c. Initiated process through [place].

## 2024-01-12 NOTE — ASSESSMENT & PLAN NOTE
Presented on 1/5/24 with nasal congestion and inability to cough with shortness of breath, inability to swallow in the setting of multiple sclerosis with quadriplegia and progressive worsening of dysphagia and hypophonia  Tested positive for COVID  Completed 5 days course of remdesivir on 1/10, Decadron (ct at 6 mg daily per pulm)

## 2024-01-12 NOTE — PROGRESS NOTES
[facility]  Progress Note  Name: Fanny Schulz I  MRN: [id number]  Unit/Bed#: [facility] 818-01 I Date of Admission: 1/5/2024   Date of Service: 1/12/2024 I Hospital Day: 7    Assessment/Plan   * Acute respiratory failure with hypoxia (HCC)  Assessment & Plan  Due to aspiration/COVID infection. Mainly aspiration per pulm   History of multiple sclerosis with progressive worsening of dysphagia  Presented as below   O2 requirement 6L on presentation - worsened overnight on 1/5 to HFNC  O2 weaned down to 2 L NC, but still requiring frequent suctioning.\  Continue Decadron.  Wean O2 as able  Plan as below    COVID-19 virus infection  Assessment & Plan  Presented on 1/5/24 with nasal congestion and inability to cough with shortness of breath, inability to swallow in the setting of multiple sclerosis with quadriplegia and progressive worsening of dysphagia and hypophonia  Tested positive for COVID  Completed 5 days course of remdesivir on 1/10, continue Decadron.      Chronic lower extremity edema  Assessment & Plan  Ct home med Lasix 20 mg daily    Hypercholesteremia  Assessment & Plan  Ct equivalent formulary Pravastatin of home Rosuvastatin    Continuous opioid dependence (HCC)  Assessment & Plan  Home med: Percocet 5/325 mg q 4h - continued  PDMP reviewed  Uses very sparingly per PCP note    Bacteriuria with pyuria  Assessment & Plan  Has chronic suprapubic catheter  Urine culture with Pseudomonas aeruginosa, Enterococcus faecalis  Colonization per ID  Being monitored off antibiotics    Functional quadriplegia secondary to MS ([facility])  Assessment & Plan  Supportive care  Uses Baclofen 20 mg in am and 40 mg in pm - ordered    Multiple sclerosis (HCC)  Assessment & Plan  Has quadriplegia and progressive dysphagia and hypophonia  Follows with neurology as outpatient  Supportive care    Recurrent major depressive disorder, in full remission ([facility])  Assessment & Plan  Ct home Cymbalta 20 mg  daily       Constipation  Assessment & Plan  Bowel regimen    Suprapubic catheter (HCC)  Assessment & Plan  Neurogenic bladder with suprapubic catheter in the setting of MS               VTE Pharmacologic Prophylaxis: VTE Score: 7 Moderate Risk (Score 3-4) - Pharmacological DVT Prophylaxis Ordered: enoxaparin (Lovenox).    Mobility:   Basic Mobility Inpatient Raw Score: 6  JH-HLM Goal: 2: Bed activities/Dependent transfer  JH-HLM Achieved: 2: Bed activities/Dependent transfer  HLM Goal achieved. Continue to encourage appropriate mobility.    Patient Centered Rounds: I performed bedside rounds with nursing staff today.   Discussions with Specialists or Other Care Team Provider: Pulmonary, Cm    Education and Discussions with Family / Patient:  Updated patient at bedside, offered to call  but patient stated  is in hospital and went to grab coffee she will update herself.     Total Time Spent on Date of Encounter in care of patient: 39 mins. This time was spent on one or more of the following: performing physical exam; counseling and coordination of care; obtaining or reviewing history; documenting in the medical record; reviewing/ordering tests, medications or procedures; communicating with other healthcare professionals and discussing with patient's family/caregivers.    Current Length of Stay: 7 day(s)  Current Patient Status: Inpatient   Certification Statement: The patient will continue to require additional inpatient hospital stay due to ongoing management as outlined above.  Discharge Plan: Anticipate discharge in 24-48 hrs to home with home services.    Code Status: Level 1 - Full Code    Subjective:   Patient seen at bedside, reports feeling better today.  Continues on 2 L nasal cannula but has not needed frequent deep suctioning since started on chest PT.  Discussed with pulmonary regarding outpatient VEST.  Objective:     Vitals:   Temp (24hrs), Av.6 °F (36.4 °C), Min:97.2 °F (36.2 °C),  Max:98.1 °F (36.7 °C)    Temp:  [97.2 °F (36.2 °C)-98.1 °F (36.7 °C)] 97.8 °F (36.6 °C)  HR:  [] 78  Resp:  [14-16] 14  BP: (108-132)/(67-80) 125/80  SpO2:  [90 %-95 %] 95 %  There is no height or weight on file to calculate BMI.     Input and Output Summary (last 24 hours):     Intake/Output Summary (Last 24 hours) at 1/12/2024 0915  Last data filed at 1/12/2024 0527  Gross per 24 hour   Intake 1334.67 ml   Output 2850 ml   Net -1515.33 ml       Physical Exam:   Physical Exam  Constitutional:       Appearance: Normal appearance.   HENT:      Head: Normocephalic and atraumatic.   Cardiovascular:      Pulses: Normal pulses.      Heart sounds: Normal heart sounds.   Pulmonary:      Effort: Pulmonary effort is normal.      Comments: Breath sounds are improving, no rhonchi or wheezes today.  Musculoskeletal:      Comments: Contracted extremities.   Skin:     General: Skin is warm and dry.   Neurological:      Mental Status: She is alert.   Psychiatric:         Mood and Affect: Mood normal.         Behavior: Behavior normal.          Additional Data:     Labs:  Results from last 7 days   Lab Units 01/12/24  0523 01/10/24  0553 01/09/24  0541 01/08/24  0524   WBC Thousand/uL 9.72   < > 8.04 7.21   HEMOGLOBIN g/dL 12.0   < > 10.6* 10.3*   HEMATOCRIT % 38.0   < > 32.3* 32.6*   PLATELETS Thousands/uL 444*   < > 274 196   BANDS PCT %  --   --   --  7   NEUTROS PCT %  --   --  88*  --    LYMPHS PCT %  --   --  7*  --    LYMPHO PCT %  --   --   --  3*   MONOS PCT %  --   --  3*  --    MONO PCT %  --   --   --  1*   EOS PCT %  --   --  0 0    < > = values in this interval not displayed.     Results from last 7 days   Lab Units 01/12/24  0523 01/11/24  0518   SODIUM mmol/L 140 139   POTASSIUM mmol/L 4.0 4.1   CHLORIDE mmol/L 101 102   CO2 mmol/L 31 29   BUN mg/dL 11 9   CREATININE mg/dL 0.20* 0.28*   ANION GAP mmol/L 8 8   CALCIUM mg/dL 8.5 8.5   ALBUMIN g/dL  --  3.6   TOTAL BILIRUBIN mg/dL  --  0.33   ALK PHOS U/L  --  42    ALT U/L  --  19   AST U/L  --  10*   GLUCOSE RANDOM mg/dL 138 173*             Results from last 7 days   Lab Units 01/09/24  0541   HEMOGLOBIN A1C % 5.2     Results from last 7 days   Lab Units 01/07/24  0634 01/06/24  1352 01/06/24  0312 01/05/24  1215   LACTIC ACID mmol/L  --  1.8  --   --    PROCALCITONIN ng/ml 0.20  --  0.08 0.06       Lines/Drains:  Invasive Devices       Central Venous Catheter Line  Duration             Port A Cath Left Chest -- days              Peripheral Intravenous Line  Duration             Peripheral IV 01/08/24 Distal;Right;Ventral (anterior) Forearm 3 days              Drain  Duration             Suprapubic Catheter 24 Fr. 385 days                    Central Line:  Goal for removal: Will discontinue when meds requiring line are completed.             Imaging: No pertinent imaging reviewed.    Recent Cultures (last 7 days):   Results from last 7 days   Lab Units 01/06/24  1227 01/05/24  1342 01/05/24  1235 01/05/24  1225   BLOOD CULTURE   --   --  No Growth After 5 Days. No Growth After 5 Days.   URINE CULTURE   --  >100,000 cfu/ml Pseudomonas aeruginosa*  >100,000 cfu/ml Enterococcus faecalis*  --   --    LEGIONELLA URINARY ANTIGEN  Negative  --   --   --        Last 24 Hours Medication List:   Current Facility-Administered Medications   Medication Dose Route Frequency Provider Last Rate    albuterol  2.5 mg Nebulization Q4H PRN Stephanie Sanchez MD      baclofen  20 mg Oral Q24H Stephanie Sanchez MD      baclofen  40 mg Oral QPM Stephanie Sanchez MD      dexamethasone  6 mg Intravenous Q24H Stephanie Sanchez MD      diazepam  5 mg Intravenous Q6H PRN Stephanie Sanchez MD      DULoxetine  20 mg Oral Daily Stephanie Sanchez MD      enoxaparin  30 mg Subcutaneous Q12H JOVI Stephanie Sanchez MD      furosemide  20 mg Oral Daily Stephanie Sanchez MD      multi-electrolyte  40 mL/hr Intravenous Continuous Stephanie Sanchez MD 40 mL/hr (01/11/24 2247)    ondansetron  4 mg Intravenous Q6H PRN Stephanie Sanchez MD      oxybutynin  10 mg Oral Daily  Stephanie Sanchez MD      oxyCODONE-acetaminophen  1 tablet Oral Q4H PRN Stephanie Sanchez MD      polyethylene glycol  17 g Oral Daily PRN Stephanie Sanchez MD      pravastatin  40 mg Oral Daily With Dinner Stephanie Sanchez MD          Today, Patient Was Seen By: Stephanie Sanchez MD    **Please Note: This note may have been constructed using a voice recognition system.**

## 2024-01-12 NOTE — ASSESSMENT & PLAN NOTE
Presented on 1/5/24 with nasal congestion and inability to cough with shortness of breath, inability to swallow in the setting of multiple sclerosis with quadriplegia and progressive worsening of dysphagia and hypophonia  Tested positive for COVID  Completed 5 days course of remdesivir on 1/10, continue Decadron.

## 2024-01-12 NOTE — ASSESSMENT & PLAN NOTE
Due to aspiration/COVID infection. Mainly aspiration per pulm   History of multiple sclerosis with progressive worsening of dysphagia  Presented as below   O2 requirement 6L on presentation - worsened overnight on 1/5 to HFNC  O2 weaned down to 1 L NC, but still requiring frequent suctioning.  Wean O2 as able  Plan as below

## 2024-01-12 NOTE — PROGRESS NOTES
[facility]  Progress Note  Name: Fanny Schulz I  MRN: [id number]  Unit/Bed#: [facility] 818-01 I Date of Admission: 1/5/2024   Date of Service: 1/11/2024 I Hospital Day: 6    Assessment/Plan   * Acute respiratory failure with hypoxia (HCC)  Assessment & Plan  Due to aspiration/COVID infection. Mainly aspiration per pulm   History of multiple sclerosis with progressive worsening of dysphagia  Presented as below   O2 requirement 6L on presentation - worsened overnight on 1/5 to HFNC  O2 weaned down to 1 L NC, but still requiring frequent suctioning.  Wean O2 as able  Plan as below    COVID-19 virus infection  Assessment & Plan  Presented on 1/5/24 with nasal congestion and inability to cough with shortness of breath, inability to swallow in the setting of multiple sclerosis with quadriplegia and progressive worsening of dysphagia and hypophonia  Tested positive for COVID  Completed 5 days course of remdesivir on 1/10, Decadron (ct at 6 mg daily per pulm)       Chronic lower extremity edema  Assessment & Plan  Ct home med Lasix 20 mg daily    Hypercholesteremia  Assessment & Plan  Ct equivalent formulary Pravastatin of home Rosuvastatin    Continuous opioid dependence (HCC)  Assessment & Plan  Home med: Percocet 5/325 mg q 4h - continued  PDMP reviewed  Uses very sparingly per PCP note    Bacteriuria with pyuria  Assessment & Plan  Has chronic suprapubic catheter  Urine culture with Pseudomonas aeruginosa, Enterococcus faecalis  Colonization per ID  Being monitored off antibiotics    Functional quadriplegia secondary to MS (HCC)  Assessment & Plan  Supportive care  Uses Baclofen 20 mg in am and 40 mg in pm - ordered    Multiple sclerosis (HCC)  Assessment & Plan  Has quadriplegia and progressive dysphagia and hypophonia  Follows with neurology as outpatient  Supportive care    Recurrent major depressive disorder, in full remission ([facility])  Assessment & Plan  Ct home Cymbalta 20 mg  daily       Suprapubic catheter (HCC)  Assessment & Plan  Neurogenic bladder with suprapubic catheter in the setting of MS               VTE Pharmacologic Prophylaxis: VTE Score: 7 Moderate Risk (Score 3-4) - Pharmacological DVT Prophylaxis Ordered: enoxaparin (Lovenox).    Mobility:   Basic Mobility Inpatient Raw Score: 6  JH-HLM Goal: 2: Bed activities/Dependent transfer  JH-HLM Achieved: 2: Bed activities/Dependent transfer  HLM Goal achieved. Continue to encourage appropriate mobility.    Patient Centered Rounds: I performed bedside rounds with nursing staff today.   Discussions with Specialists or Other Care Team Provider: Pulmonary    Education and Discussions with Family / Patient:  Pt at bedside .     Total Time Spent on Date of Encounter in care of patient: 40 mins. This time was spent on one or more of the following: performing physical exam; counseling and coordination of care; obtaining or reviewing history; documenting in the medical record; reviewing/ordering tests, medications or procedures; communicating with other healthcare professionals and discussing with patient's family/caregivers.    Current Length of Stay: 6 day(s)  Current Patient Status: Inpatient   Certification Statement: The patient will continue to require additional inpatient hospital stay due to ongoing management as outlined above.  Discharge Plan: Anticipate discharge in 48 hrs to home with home services.    Code Status: Level 1 - Full Code    Subjective:   Patient seen at bedside, continues with copious secretion.  She feels her secretions are stuck in deep but is afraid of NG suction.    Objective:     Vitals:   Temp (24hrs), Av.5 °F (36.4 °C), Min:97.2 °F (36.2 °C), Max:98.3 °F (36.8 °C)    Temp:  [97.2 °F (36.2 °C)-98.3 °F (36.8 °C)] 97.2 °F (36.2 °C)  HR:  [73-88] 88  Resp:  [14-16] 16  BP: (108-132)/(67-77) 108/67  SpO2:  [91 %-98 %] 91 %  There is no height or weight on file to calculate BMI.     Input and Output Summary  (last 24 hours):     Intake/Output Summary (Last 24 hours) at 1/11/2024 1905  Last data filed at 1/11/2024 1655  Gross per 24 hour   Intake 2192.67 ml   Output 2850 ml   Net -657.33 ml       Physical Exam:   Physical Exam  Constitutional:       Appearance: Normal appearance.   Cardiovascular:      Rate and Rhythm: Normal rate and regular rhythm.      Pulses: Normal pulses.      Heart sounds: Normal heart sounds.   Pulmonary:      Effort: Pulmonary effort is normal.      Comments: Dec breath sounds bilaterally.  Neurological:      Mental Status: She is alert and oriented to person, place, and time.          Additional Data:     Labs:  Results from last 7 days   Lab Units 01/11/24  0518 01/10/24  0553 01/09/24  0541 01/08/24  0524   WBC Thousand/uL 9.03   < > 8.04 7.21   HEMOGLOBIN g/dL 11.8   < > 10.6* 10.3*   HEMATOCRIT % 36.1   < > 32.3* 32.6*   PLATELETS Thousands/uL 386   < > 274 196   BANDS PCT %  --   --   --  7   NEUTROS PCT %  --   --  88*  --    LYMPHS PCT %  --   --  7*  --    LYMPHO PCT %  --   --   --  3*   MONOS PCT %  --   --  3*  --    MONO PCT %  --   --   --  1*   EOS PCT %  --   --  0 0    < > = values in this interval not displayed.     Results from last 7 days   Lab Units 01/11/24  0518   SODIUM mmol/L 139   POTASSIUM mmol/L 4.1   CHLORIDE mmol/L 102   CO2 mmol/L 29   BUN mg/dL 9   CREATININE mg/dL 0.28*   ANION GAP mmol/L 8   CALCIUM mg/dL 8.5   ALBUMIN g/dL 3.6   TOTAL BILIRUBIN mg/dL 0.33   ALK PHOS U/L 42   ALT U/L 19   AST U/L 10*   GLUCOSE RANDOM mg/dL 173*             Results from last 7 days   Lab Units 01/09/24  0541   HEMOGLOBIN A1C % 5.2     Results from last 7 days   Lab Units 01/07/24  0634 01/06/24  1352 01/06/24  0312 01/05/24  1215   LACTIC ACID mmol/L  --  1.8  --   --    PROCALCITONIN ng/ml 0.20  --  0.08 0.06       Lines/Drains:  Invasive Devices       Central Venous Catheter Line  Duration             Port A Cath Left Chest -- days              Peripheral Intravenous Line   Duration             Peripheral IV 01/08/24 Distal;Right;Ventral (anterior) Forearm 3 days              Drain  Duration             Suprapubic Catheter 24 Fr. 385 days                    Central Line:  Goal for removal: Will discontinue when meds requiring line are completed.             Imaging: No pertinent imaging reviewed.    Recent Cultures (last 7 days):   Results from last 7 days   Lab Units 01/06/24  1227 01/05/24  1342 01/05/24  1235 01/05/24  1225   BLOOD CULTURE   --   --  No Growth After 5 Days. No Growth After 5 Days.   URINE CULTURE   --  >100,000 cfu/ml Pseudomonas aeruginosa*  >100,000 cfu/ml Enterococcus faecalis*  --   --    LEGIONELLA URINARY ANTIGEN  Negative  --   --   --        Last 24 Hours Medication List:   Current Facility-Administered Medications   Medication Dose Route Frequency Provider Last Rate    albuterol  2.5 mg Nebulization Q4H PRN Stephanie Sanchez MD      baclofen  20 mg Oral Q24H Stephanie Sanchez MD      baclofen  40 mg Oral QPM Stephanie Sanchez MD      dexamethasone  6 mg Intravenous Q24H Stephanie Sanchez MD      diazepam  5 mg Intravenous Q6H PRN Stephanie Sanchez MD      DULoxetine  20 mg Oral Daily Stephanie Sanchez MD      enoxaparin  30 mg Subcutaneous Q12H JOVI Stephanie Sanchez MD      furosemide  20 mg Oral Daily Stephanie Sanchez MD      multi-electrolyte  40 mL/hr Intravenous Continuous Stephanie Sanchez MD 40 mL/hr (01/10/24 2004)    ondansetron  4 mg Intravenous Q6H PRN Stephanie Sanchez MD      oxybutynin  10 mg Oral Daily Stephanie Sanchez MD      oxyCODONE-acetaminophen  1 tablet Oral Q4H PRN Stephanie Sanchez MD      polyethylene glycol  17 g Oral Daily PRN Stephanie Sanchez MD      pravastatin  40 mg Oral Daily With Dinner Stephanie Sanchez MD          Today, Patient Was Seen By: Stephanie Sanchez MD    **Please Note: This note may have been constructed using a voice recognition system.**

## 2024-01-12 NOTE — PROGRESS NOTES
Progress Note - Pulmonary   Fanny Schulz 53 y.o. female MRN: [id number]  Unit/Bed#: [facility] 818-01 Encounter: [id number]    Assessment:  Acute hypoxic respiratory failure   Improving, on 1-2 L NC   Legionella, strep, blood cultures negative  COVID-19 infection  Completed 5 day course of remdesivir  Received 2 days of cefepime, vanc, and ceftriaxone  D-Dimer 1.28  History of MS  History of quadriplegia in the context of MS    Plan:  Maintain SpO2 >90%, wean O2 as tolerated   Completed remdesivir for 5 days  Continue Decadron 6mg daily for 10 days or until patient is off of oxygen, whichever is first  Albuterol as needed.  Pulmonary toileting with CPT, incentive spirometer ordered   Continue VEST therapy as outpatient, order placed       Subjective:   Seen and evaluated at bedside this morning. She reports feeling better today with improved shortness of breath and amount of secretions. She has been using vest therapy with improvement. She has not required any NT suctioning today so far.     Objective:     Vitals: Blood pressure 125/80, pulse 78, temperature 97.8 °F (36.6 °C), resp. rate 14, SpO2 95%.,There is no height or weight on file to calculate BMI.      Intake/Output Summary (Last 24 hours) at 1/12/2024 0856  Last data filed at 1/12/2024 0527  Gross per 24 hour   Intake 1334.67 ml   Output 2850 ml   Net -1515.33 ml       Invasive Devices       Central Venous Catheter Line  Duration             Port A Cath Left Chest -- days              Peripheral Intravenous Line  Duration             Peripheral IV 01/08/24 Distal;Right;Ventral (anterior) Forearm 3 days              Drain  Duration             Suprapubic Catheter 24 Fr. 385 days                    Physical Exam:   General: No acute distress, sitting in bed   HENT: Normocephalic, atraumatic.  PERRL, EOMI, Moist mucous membranes.  Neck: Supple  Lungs: Clear to auscultation bilaterally, no wheezes, rales, rhonchi.  On room air 1L NC  Heart: Regular rate and  rhythm, S1-S2 present, no murmurs rubs or gallops  Abdomen: Soft, nontender, nondistended, no organomegaly  Extremities: Warm and well perfused. Bilateral LE edema   Pulses: 2+ and symmetric  Skin: Warm, dry, no rashes or lesions  Neurologic: No focal neurologic deficits     Labs: CBC:   Lab Results   Component Value Date    WBC 9.72 01/12/2024    HGB 12.0 01/12/2024    HCT 38.0 01/12/2024    MCV 87 01/12/2024     (H) 01/12/2024    RBC 4.38 01/12/2024    MCH 27.4 01/12/2024    MCHC 31.6 01/12/2024    RDW 12.9 01/12/2024    MPV 8.9 01/12/2024   , CMP:   Lab Results   Component Value Date    SODIUM 140 01/12/2024    K 4.0 01/12/2024     01/12/2024    CO2 31 01/12/2024    BUN 11 01/12/2024    CREATININE 0.20 (L) 01/12/2024    CALCIUM 8.5 01/12/2024    EGFR 149 01/12/2024     Imaging and other studies: I have personally reviewed pertinent films in PACS

## 2024-01-13 LAB
ANION GAP SERPL CALCULATED.3IONS-SCNC: 9 MMOL/L
BUN SERPL-MCNC: 10 MG/DL (ref 5–25)
CALCIUM SERPL-MCNC: 8.1 MG/DL (ref 8.4–10.2)
CHLORIDE SERPL-SCNC: 101 MMOL/L (ref 96–108)
CO2 SERPL-SCNC: 30 MMOL/L (ref 21–32)
CREAT SERPL-MCNC: 0.25 MG/DL (ref 0.6–1.3)
ERYTHROCYTE [DISTWIDTH] IN BLOOD BY AUTOMATED COUNT: 13.2 % (ref 11.6–15.1)
GFR SERPL CREATININE-BSD FRML MDRD: 139 ML/MIN/1.73SQ M
GLUCOSE SERPL-MCNC: 143 MG/DL (ref 65–140)
HCT VFR BLD AUTO: 38.3 % (ref 34.8–46.1)
HGB BLD-MCNC: 12.2 G/DL (ref 11.5–15.4)
MCH RBC QN AUTO: 28.1 PG (ref 26.8–34.3)
MCHC RBC AUTO-ENTMCNC: 31.9 G/DL (ref 31.4–37.4)
MCV RBC AUTO: 88 FL (ref 82–98)
PLATELET # BLD AUTO: 446 THOUSANDS/UL (ref 149–390)
PMV BLD AUTO: 9 FL (ref 8.9–12.7)
POTASSIUM SERPL-SCNC: 4 MMOL/L (ref 3.5–5.3)
RBC # BLD AUTO: 4.34 MILLION/UL (ref 3.81–5.12)
SODIUM SERPL-SCNC: 140 MMOL/L (ref 135–147)
WBC # BLD AUTO: 11.27 THOUSAND/UL (ref 4.31–10.16)

## 2024-01-13 PROCEDURE — 94760 N-INVAS EAR/PLS OXIMETRY 1: CPT

## 2024-01-13 PROCEDURE — 99232 SBSQ HOSP IP/OBS MODERATE 35: CPT | Performed by: STUDENT IN AN ORGANIZED HEALTH CARE EDUCATION/TRAINING PROGRAM

## 2024-01-13 PROCEDURE — 94669 MECHANICAL CHEST WALL OSCILL: CPT

## 2024-01-13 PROCEDURE — 94664 DEMO&/EVAL PT USE INHALER: CPT

## 2024-01-13 PROCEDURE — 94668 MNPJ CHEST WALL SBSQ: CPT

## 2024-01-13 PROCEDURE — 85027 COMPLETE CBC AUTOMATED: CPT | Performed by: STUDENT IN AN ORGANIZED HEALTH CARE EDUCATION/TRAINING PROGRAM

## 2024-01-13 PROCEDURE — 80048 BASIC METABOLIC PNL TOTAL CA: CPT | Performed by: STUDENT IN AN ORGANIZED HEALTH CARE EDUCATION/TRAINING PROGRAM

## 2024-01-13 RX ADMIN — ENOXAPARIN SODIUM 30 MG: 30 INJECTION SUBCUTANEOUS at 20:07

## 2024-01-13 RX ADMIN — DULOXETINE HYDROCHLORIDE 20 MG: 20 CAPSULE, DELAYED RELEASE ORAL at 09:41

## 2024-01-13 RX ADMIN — OXYCODONE HYDROCHLORIDE AND ACETAMINOPHEN 1 TABLET: 5; 325 TABLET ORAL at 00:53

## 2024-01-13 RX ADMIN — BACLOFEN 40 MG: 20 TABLET ORAL at 20:07

## 2024-01-13 RX ADMIN — OXYBUTYNIN 10 MG: 10 TABLET, FILM COATED, EXTENDED RELEASE ORAL at 09:40

## 2024-01-13 RX ADMIN — DEXAMETHASONE SODIUM PHOSPHATE 6 MG: 10 INJECTION, SOLUTION INTRAMUSCULAR; INTRAVENOUS at 00:44

## 2024-01-13 RX ADMIN — PRAVASTATIN SODIUM 40 MG: 40 TABLET ORAL at 17:44

## 2024-01-13 RX ADMIN — BACLOFEN 20 MG: 20 TABLET ORAL at 04:53

## 2024-01-13 RX ADMIN — FUROSEMIDE 20 MG: 20 TABLET ORAL at 09:41

## 2024-01-13 RX ADMIN — ENOXAPARIN SODIUM 30 MG: 30 INJECTION SUBCUTANEOUS at 09:41

## 2024-01-13 NOTE — PLAN OF CARE
Problem: PAIN - ADULT  Goal: Verbalizes/displays adequate comfort level or baseline comfort level  Description: Interventions:  - Encourage patient to monitor pain and request assistance  - Assess pain using appropriate pain scale  - Administer analgesics based on type and severity of pain and evaluate response  - Implement non-pharmacological measures as appropriate and evaluate response  - Consider cultural and social influences on pain and pain management  - Notify physician/advanced practitioner if interventions unsuccessful or patient reports new pain  Outcome: Progressing     Problem: INFECTION - ADULT  Goal: Absence or prevention of progression during hospitalization  Description: INTERVENTIONS:  - Assess and monitor for signs and symptoms of infection  - Monitor lab/diagnostic results  - Monitor all insertion sites, i.e. indwelling lines, tubes, and drains  - Monitor endotracheal if appropriate and nasal secretions for changes in amount and color  - [place] appropriate cooling/warming therapies per order  - Administer medications as ordered  - Instruct and encourage patient and family to use good hand hygiene technique  - Identify and instruct in appropriate isolation precautions for identified infection/condition  Outcome: Progressing  Goal: Absence of fever/infection during neutropenic period  Description: INTERVENTIONS:  - Monitor WBC    Outcome: Progressing     Problem: SAFETY ADULT  Goal: Patient will remain free of falls  Description: INTERVENTIONS:  - Educate patient/family on patient safety including physical limitations  - Instruct patient to call for assistance with activity   - Consult OT/PT to assist with strengthening/mobility   - Keep Call bell within reach  - Keep bed low and locked with side rails adjusted as appropriate  - Keep care items and personal belongings within reach  - Initiate and maintain comfort rounds  - Make Fall Risk Sign visible to staff  - Offer Toileting every 2 Hours,  in advance of need  - Initiate/Maintain bed alarm  - Apply yellow bracelet for high fall risk patients  - Consider moving patient to room near nurses station  Outcome: Progressing  Goal: Maintain or return to baseline ADL function  Description: INTERVENTIONS:  -  Assess patient's ability to carry out ADLs; assess patient's baseline for ADL function and identify physical deficits which impact ability to perform ADLs (bathing, care of mouth/teeth, toileting, grooming, dressing, etc.)  - Assess/evaluate cause of self-care deficits   - Assess range of motion  - Assess patient's mobility; develop plan if impaired  - Assess patient's need for assistive devices and provide as appropriate  - Encourage maximum independence but intervene and supervise when necessary  - Involve family in performance of ADLs  - Assess for home care needs following discharge   - Consider OT consult to assist with ADL evaluation and planning for discharge  - Provide patient education as appropriate  Outcome: Progressing  Goal: Maintains/Returns to pre admission functional level  Description: INTERVENTIONS:  - Perform AM-PAC 6 Click Basic Mobility/ Daily Activity assessment daily.  - Set and communicate daily mobility goal to care team and patient/family/caregiver.   - Collaborate with rehabilitation services on mobility goals if consulted  - Perform Range of Motion 3 times a day.  - Reposition patient every 2 hours.  - Out of bed for toileting  - Record patient progress and toleration of activity level   Outcome: Progressing     Problem: DISCHARGE PLANNING  Goal: Discharge to home or other facility with appropriate resources  Description: INTERVENTIONS:  - Identify barriers to discharge w/patient and caregiver  - Arrange for needed discharge resources and transportation as appropriate  - Identify discharge learning needs (meds, wound care, etc.)  - Arrange for interpretive services to assist at discharge as needed  - Refer to Case Management  Department for coordinating discharge planning if the patient needs post-hospital services based on physician/advanced practitioner order or complex needs related to functional status, cognitive ability, or social support system  Outcome: Progressing

## 2024-01-13 NOTE — PROGRESS NOTES
[facility]  Progress Note  Name: Fanny Schulz I  MRN: [id number]  Unit/Bed#: [facility] 818-01 I Date of Admission: 1/5/2024   Date of Service: 1/13/2024 I Hospital Day: 8    Assessment/Plan   * Acute respiratory failure with hypoxia (HCC)  Assessment & Plan  Due to aspiration/COVID infection. Mainly aspiration per pulm   History of multiple sclerosis with progressive worsening of dysphagia  Presented as below   O2 requirement 6L on presentation - worsened overnight on 1/5 to HFNC  O2 weaned down to 2 L NC, will need percussion vest on discharge.  Continue Decadron.  Wean O2 as able  Plan as below.    COVID-19 virus infection  Assessment & Plan  Presented on 1/5/24 with nasal congestion and inability to cough with shortness of breath, inability to swallow in the setting of multiple sclerosis with quadriplegia and progressive worsening of dysphagia and hypophonia  Tested positive for COVID  Completed 5 days course of remdesivir on 1/10, continue Decadron to complete 10 days course.      Chronic lower extremity edema  Assessment & Plan  Ct home med Lasix 20 mg daily    Hypercholesteremia  Assessment & Plan  Ct equivalent formulary Pravastatin of home Rosuvastatin    Continuous opioid dependence (HCC)  Assessment & Plan  Home med: Percocet 5/325 mg q 4h - continued  PDMP reviewed  Uses very sparingly per PCP note    Bacteriuria with pyuria  Assessment & Plan  Has chronic suprapubic catheter  Urine culture with Pseudomonas aeruginosa, Enterococcus faecalis  Colonization per ID  Being monitored off antibiotics    Functional quadriplegia secondary to MS (HCC)  Assessment & Plan  Supportive care  Uses Baclofen 20 mg in am and 40 mg in pm - ordered    Multiple sclerosis (HCC)  Assessment & Plan  Has quadriplegia and progressive dysphagia and hypophonia  Follows with neurology as outpatient  Supportive care    Recurrent major depressive disorder, in full remission ([facility])  Assessment &  Plan  Ct home Cymbalta 20 mg daily       Constipation  Assessment & Plan  Bowel regimen    Suprapubic catheter (HCC)  Assessment & Plan  Neurogenic bladder with suprapubic catheter in the setting of MS               VTE Pharmacologic Prophylaxis: VTE Score: 7 Moderate Risk (Score 3-4) - Pharmacological DVT Prophylaxis Ordered: enoxaparin (Lovenox).    Mobility:   Basic Mobility Inpatient Raw Score: 6  JH-HLM Goal: 2: Bed activities/Dependent transfer  JH-HLM Achieved: 1: Laying in bed  HLM Goal NOT achieved. Continue with multidisciplinary rounding and encourage appropriate mobility to improve upon HLM goals.    Patient Centered Rounds: I performed bedside rounds with nursing staff today.   Discussions with Specialists or Other Care Team Provider:     Education and Discussions with Family / Patient:  Pt at bedside .     Total Time Spent on Date of Encounter in care of patient: 44 mins. This time was spent on one or more of the following: performing physical exam; counseling and coordination of care; obtaining or reviewing history; documenting in the medical record; reviewing/ordering tests, medications or procedures; communicating with other healthcare professionals and discussing with patient's family/caregivers.    Current Length of Stay: 8 day(s)  Current Patient Status: Inpatient   Certification Statement: The patient will continue to require additional inpatient hospital stay due to ongoing management as outlined above.  Discharge Plan: Anticipate discharge in 24-48 hrs to home with home services.    Code Status: Level 1 - Full Code    Subjective:   Patient seen at bedside, no active complaints.  Overnight events noted, patient desatted when stat chest x-ray was obtained, pending final read.    Objective:     Vitals:   Temp (24hrs), Av.9 °F (36.6 °C), Min:97.7 °F (36.5 °C), Max:98.1 °F (36.7 °C)    Temp:  [97.7 °F (36.5 °C)-98.1 °F (36.7 °C)] 97.7 °F (36.5 °C)  HR:  [] 81  Resp:  [15-22] 15  BP:  (109-115)/(67-75) 115/67  SpO2:  [86 %-98 %] 98 %  There is no height or weight on file to calculate BMI.     Input and Output Summary (last 24 hours):     Intake/Output Summary (Last 24 hours) at 1/13/2024 0957  Last data filed at 1/13/2024 0522  Gross per 24 hour   Intake --   Output 2650 ml   Net -2650 ml       Physical Exam:   Physical Exam  Constitutional:       Appearance: Normal appearance.   HENT:      Head: Normocephalic and atraumatic.   Eyes:      Extraocular Movements: Extraocular movements intact.      Conjunctiva/sclera: Conjunctivae normal.   Cardiovascular:      Rate and Rhythm: Normal rate and regular rhythm.      Pulses: Normal pulses.      Heart sounds: Normal heart sounds.   Abdominal:      General: Bowel sounds are normal.      Palpations: Abdomen is soft.   Musculoskeletal:         General: Normal range of motion.   Skin:     General: Skin is warm and dry.   Neurological:      Mental Status: She is alert.          Additional Data:     Labs:  Results from last 7 days   Lab Units 01/13/24  0519 01/10/24  0553 01/09/24  0541 01/08/24  0524   WBC Thousand/uL 11.27*   < > 8.04 7.21   HEMOGLOBIN g/dL 12.2   < > 10.6* 10.3*   HEMATOCRIT % 38.3   < > 32.3* 32.6*   PLATELETS Thousands/uL 446*   < > 274 196   BANDS PCT %  --   --   --  7   NEUTROS PCT %  --   --  88*  --    LYMPHS PCT %  --   --  7*  --    LYMPHO PCT %  --   --   --  3*   MONOS PCT %  --   --  3*  --    MONO PCT %  --   --   --  1*   EOS PCT %  --   --  0 0    < > = values in this interval not displayed.     Results from last 7 days   Lab Units 01/13/24  0519 01/12/24  0523 01/11/24  0518   SODIUM mmol/L 140   < > 139   POTASSIUM mmol/L 4.0   < > 4.1   CHLORIDE mmol/L 101   < > 102   CO2 mmol/L 30   < > 29   BUN mg/dL 10   < > 9   CREATININE mg/dL 0.25*   < > 0.28*   ANION GAP mmol/L 9   < > 8   CALCIUM mg/dL 8.1*   < > 8.5   ALBUMIN g/dL  --   --  3.6   TOTAL BILIRUBIN mg/dL  --   --  0.33   ALK PHOS U/L  --   --  42   ALT U/L  --   --   19   AST U/L  --   --  10*   GLUCOSE RANDOM mg/dL 143*   < > 173*    < > = values in this interval not displayed.             Results from last 7 days   Lab Units 01/09/24  0541   HEMOGLOBIN A1C % 5.2     Results from last 7 days   Lab Units 01/07/24  0634 01/06/24  1352   LACTIC ACID mmol/L  --  1.8   PROCALCITONIN ng/ml 0.20  --        Lines/Drains:  Invasive Devices       Central Venous Catheter Line  Duration             Port A Cath Left Chest -- days              Peripheral Intravenous Line  Duration             Peripheral IV 01/13/24 Right;Ventral (anterior) Forearm <1 day              Drain  Duration             Suprapubic Catheter 24 Fr. 386 days                    Central Line:  Goal for removal: Will discontinue when hemodynamically stable.             Imaging: Reviewed radiology reports from this admission including: chest xray    Recent Cultures (last 7 days):   Results from last 7 days   Lab Units 01/06/24  1227   LEGIONELLA URINARY ANTIGEN  Negative       Last 24 Hours Medication List:   Current Facility-Administered Medications   Medication Dose Route Frequency Provider Last Rate    albuterol  2.5 mg Nebulization Q4H PRN Stephanie Sanchez MD      baclofen  20 mg Oral Q24H Stephanie Sanchez MD      baclofen  40 mg Oral QPM Stephanie Sanchez MD      dexamethasone  6 mg Intravenous Q24H Stephanie Sanchez MD      diazepam  5 mg Intravenous Q6H PRN Stephanie Sanchez MD      DULoxetine  20 mg Oral Daily Stephanie Sanchez MD      enoxaparin  30 mg Subcutaneous Q12H JOVI Stephanie Sanchez MD      furosemide  20 mg Oral Daily Stephanie Sanchez MD      multi-electrolyte  40 mL/hr Intravenous Continuous Stephanie Sanchez MD 40 mL/hr (01/12/24 9077)    ondansetron  4 mg Intravenous Q6H PRN Stephanie Sanchez MD      oxybutynin  10 mg Oral Daily Stephanie Sanchez MD      oxyCODONE-acetaminophen  1 tablet Oral Q4H PRN Stephanie Sanchez MD      polyethylene glycol  17 g Oral Daily PRN Stephanie Sanchez MD      pravastatin  40 mg Oral Daily With Dinner Stephanie Sanchez MD          Today, Patient Was  Seen By: Stephanie Sanchez MD    **Please Note: This note may have been constructed using a voice recognition system.**

## 2024-01-13 NOTE — ASSESSMENT & PLAN NOTE
Due to aspiration/COVID infection. Mainly aspiration per pulm   History of multiple sclerosis with progressive worsening of dysphagia  Presented as below   O2 requirement 6L on presentation - worsened overnight on 1/5 to HFNC  O2 weaned down to 2 L NC, will need percussion vest on discharge.  Continue Decadron.  Wean O2 as able  Plan as below.

## 2024-01-13 NOTE — ASSESSMENT & PLAN NOTE
Presented on 1/5/24 with nasal congestion and inability to cough with shortness of breath, inability to swallow in the setting of multiple sclerosis with quadriplegia and progressive worsening of dysphagia and hypophonia  Tested positive for COVID  Completed 5 days course of remdesivir on 1/10, continue Decadron to complete 10 days course.

## 2024-01-13 NOTE — ASSESSMENT & PLAN NOTE
Bowel regimen   Patient can be offered appt on Wednesday, Sept 18th with Dr. Patton in hold spot. Thank you    Routing comment

## 2024-01-13 NOTE — NURSING NOTE
Pt on 2L receiving chest wall oscillation. Pt desating to 79%. Pt increased to 6L. Post chest wall oscillation pt 86-88% on 6L. Pt lungs clear but diminished, no complaints of shortness of breath. /75, , RR 22. Respiratory at bedside, Christopher Francisca with slim notified. CXR ordered. Pt placed on 10L mid flow nasal cannula.

## 2024-01-14 LAB — VANCOMYCIN SERPL-MCNC: <3 UG/ML (ref 10–20)

## 2024-01-14 PROCEDURE — 80202 ASSAY OF VANCOMYCIN: CPT | Performed by: STUDENT IN AN ORGANIZED HEALTH CARE EDUCATION/TRAINING PROGRAM

## 2024-01-14 PROCEDURE — 94760 N-INVAS EAR/PLS OXIMETRY 1: CPT

## 2024-01-14 PROCEDURE — 99232 SBSQ HOSP IP/OBS MODERATE 35: CPT | Performed by: STUDENT IN AN ORGANIZED HEALTH CARE EDUCATION/TRAINING PROGRAM

## 2024-01-14 PROCEDURE — 94669 MECHANICAL CHEST WALL OSCILL: CPT

## 2024-01-14 PROCEDURE — 94664 DEMO&/EVAL PT USE INHALER: CPT

## 2024-01-14 RX ADMIN — OXYBUTYNIN 10 MG: 10 TABLET, FILM COATED, EXTENDED RELEASE ORAL at 08:56

## 2024-01-14 RX ADMIN — PRAVASTATIN SODIUM 40 MG: 40 TABLET ORAL at 17:00

## 2024-01-14 RX ADMIN — FUROSEMIDE 20 MG: 20 TABLET ORAL at 08:56

## 2024-01-14 RX ADMIN — BACLOFEN 20 MG: 20 TABLET ORAL at 06:02

## 2024-01-14 RX ADMIN — ENOXAPARIN SODIUM 30 MG: 30 INJECTION SUBCUTANEOUS at 08:56

## 2024-01-14 RX ADMIN — DEXAMETHASONE SODIUM PHOSPHATE 6 MG: 10 INJECTION, SOLUTION INTRAMUSCULAR; INTRAVENOUS at 00:51

## 2024-01-14 RX ADMIN — DEXAMETHASONE SODIUM PHOSPHATE 6 MG: 10 INJECTION, SOLUTION INTRAMUSCULAR; INTRAVENOUS at 22:20

## 2024-01-14 RX ADMIN — DULOXETINE HYDROCHLORIDE 20 MG: 20 CAPSULE, DELAYED RELEASE ORAL at 08:56

## 2024-01-14 RX ADMIN — ENOXAPARIN SODIUM 30 MG: 30 INJECTION SUBCUTANEOUS at 22:20

## 2024-01-14 RX ADMIN — BACLOFEN 40 MG: 20 TABLET ORAL at 22:20

## 2024-01-14 RX ADMIN — OXYCODONE HYDROCHLORIDE AND ACETAMINOPHEN 1 TABLET: 5; 325 TABLET ORAL at 00:58

## 2024-01-14 NOTE — ASSESSMENT & PLAN NOTE
Due to aspiration/COVID infection. Mainly aspiration per pulm   History of multiple sclerosis with progressive worsening of dysphagia  Presented as below   O2 requirement 6L on presentation - worsened overnight on 1/5 to HFNC  O2 weaned down to 2 L NC, will need percussion vest on discharge.  Continue Decadron day 9/10.  Wean O2 as able  Plan as below.

## 2024-01-14 NOTE — ASSESSMENT & PLAN NOTE
Dr. Kamara Presented on 1/5/24 with nasal congestion and inability to cough with shortness of breath, inability to swallow in the setting of multiple sclerosis with quadriplegia and progressive worsening of dysphagia and hypophonia  Tested positive for COVID  Completed 5 days course of remdesivir on 1/10, continue Decadron to complete 10 days course.

## 2024-01-14 NOTE — PLAN OF CARE
Problem: PAIN - ADULT  Goal: Verbalizes/displays adequate comfort level or baseline comfort level  Description: Interventions:  - Encourage patient to monitor pain and request assistance  - Assess pain using appropriate pain scale  - Administer analgesics based on type and severity of pain and evaluate response  - Implement non-pharmacological measures as appropriate and evaluate response  - Consider cultural and social influences on pain and pain management  - Notify physician/advanced practitioner if interventions unsuccessful or patient reports new pain  Outcome: Progressing     Problem: INFECTION - ADULT  Goal: Absence or prevention of progression during hospitalization  Description: INTERVENTIONS:  - Assess and monitor for signs and symptoms of infection  - Monitor lab/diagnostic results  - Monitor all insertion sites, i.e. indwelling lines, tubes, and drains  - Monitor endotracheal if appropriate and nasal secretions for changes in amount and color  - [place] appropriate cooling/warming therapies per order  - Administer medications as ordered  - Instruct and encourage patient and family to use good hand hygiene technique  - Identify and instruct in appropriate isolation precautions for identified infection/condition  Outcome: Progressing     Problem: DISCHARGE PLANNING  Goal: Discharge to home or other facility with appropriate resources  Description: INTERVENTIONS:  - Identify barriers to discharge w/patient and caregiver  - Arrange for needed discharge resources and transportation as appropriate  - Identify discharge learning needs (meds, wound care, etc.)  - Arrange for interpretive services to assist at discharge as needed  - Refer to Case Management Department for coordinating discharge planning if the patient needs post-hospital services based on physician/advanced practitioner order or complex needs related to functional status, cognitive ability, or social support system  Outcome: Progressing      Problem: Knowledge Deficit  Goal: Patient/family/caregiver demonstrates understanding of disease process, treatment plan, medications, and discharge instructions  Description: Complete learning assessment and assess knowledge base.  Interventions:  - Provide teaching at level of understanding  - Provide teaching via preferred learning methods  Outcome: Progressing     Problem: RESPIRATORY - ADULT  Goal: Achieves optimal ventilation and oxygenation  Description: INTERVENTIONS:  - Assess for changes in respiratory status  - Assess for changes in mentation and behavior  - Position to facilitate oxygenation and minimize respiratory effort  - Oxygen administered by appropriate delivery if ordered  - Initiate smoking cessation education as indicated  - Encourage broncho-pulmonary hygiene including cough, deep breathe, Incentive Spirometry  - Assess the need for suctioning and aspirate as needed  - Assess and instruct to report SOB or any respiratory difficulty  - Respiratory Therapy support as indicated  Outcome: Progressing     Problem: Nutrition/Hydration-ADULT  Goal: Nutrient/Hydration intake appropriate for improving, restoring or maintaining nutritional needs  Description: Monitor and assess patient's nutrition/hydration status for malnutrition. Collaborate with interdisciplinary team and initiate plan and interventions as ordered.  Monitor patient's weight and dietary intake as ordered or per policy. Utilize nutrition screening tool and intervene as necessary. Determine patient's food preferences and provide high-protein, high-caloric foods as appropriate.     INTERVENTIONS:  - Monitor oral intake, urinary output, labs, and treatment plans  - Assess nutrition and hydration status and recommend course of action  - Evaluate amount of meals eaten  - Assist patient with eating if necessary   - Allow adequate time for meals  - Recommend/ encourage appropriate diets, oral nutritional supplements, and vitamin/mineral  supplements  - Order, calculate, and assess calorie counts as needed  - Recommend, monitor, and adjust tube feedings and TPN/PPN based on assessed needs  - Assess need for intravenous fluids  - Provide specific nutrition/hydration education as appropriate  - Include patient/family/caregiver in decisions related to nutrition  Outcome: Progressing     Problem: CARDIOVASCULAR - ADULT  Goal: Maintains optimal cardiac output and hemodynamic stability  Description: INTERVENTIONS:  - Monitor I/O, vital signs and rhythm  - Monitor for S/S and trends of decreased cardiac output  - Administer and titrate ordered vasoactive medications to optimize hemodynamic stability  - Assess quality of pulses, skin color and temperature  - Assess for signs of decreased coronary artery perfusion  - Instruct patient to report change in severity of symptoms  Outcome: Progressing     Problem: CARDIOVASCULAR - ADULT  Goal: Absence of cardiac dysrhythmias or at baseline rhythm  Description: INTERVENTIONS:  - Continuous cardiac monitoring, vital signs, obtain 12 lead EKG if ordered  - Administer antiarrhythmic and heart rate control medications as ordered  - Monitor electrolytes and administer replacement therapy as ordered  Outcome: Progressing     Problem: METABOLIC, FLUID AND ELECTROLYTES - ADULT  Goal: Electrolytes maintained within normal limits  Description: INTERVENTIONS:  - Monitor labs and assess patient for signs and symptoms of electrolyte imbalances  - Administer electrolyte replacement as ordered  - Monitor response to electrolyte replacements, including repeat lab results as appropriate  - Instruct patient on fluid and nutrition as appropriate  Outcome: Progressing     Problem: METABOLIC, FLUID AND ELECTROLYTES - ADULT  Goal: Fluid balance maintained  Description: INTERVENTIONS:  - Monitor labs   - Monitor I/O and WT  - Instruct patient on fluid and nutrition as appropriate  - Assess for signs & symptoms of volume excess or  deficit  Outcome: Progressing     Problem: SKIN/TISSUE INTEGRITY - ADULT  Goal: Incision(s), wounds(s) or drain site(s) healing without S/S of infection  Description: INTERVENTIONS  - Assess and document dressing, incision, wound bed, drain sites and surrounding tissue  - Provide patient and family education    Outcome: Progressing     Problem: HEMATOLOGIC - ADULT  Goal: Maintains hematologic stability  Description: INTERVENTIONS  - Assess for signs and symptoms of bleeding or hemorrhage  - Monitor labs  - Administer supportive blood products/factors as ordered and appropriate  Outcome: Progressing

## 2024-01-14 NOTE — ASSESSMENT & PLAN NOTE
Progressive worsening noted as outpatient in the setting of MS  Diet advanced to regular diet with thin liquids per speech recommendation

## 2024-01-14 NOTE — PLAN OF CARE
Problem: PAIN - ADULT  Goal: Verbalizes/displays adequate comfort level or baseline comfort level  Description: Interventions:  - Encourage patient to monitor pain and request assistance  - Assess pain using appropriate pain scale  - Administer analgesics based on type and severity of pain and evaluate response  - Implement non-pharmacological measures as appropriate and evaluate response  - Consider cultural and social influences on pain and pain management  - Notify physician/advanced practitioner if interventions unsuccessful or patient reports new pain  Outcome: Progressing     Problem: INFECTION - ADULT  Goal: Absence or prevention of progression during hospitalization  Description: INTERVENTIONS:  - Assess and monitor for signs and symptoms of infection  - Monitor lab/diagnostic results  - Monitor all insertion sites, i.e. indwelling lines, tubes, and drains  - Monitor endotracheal if appropriate and nasal secretions for changes in amount and color  - [place] appropriate cooling/warming therapies per order  - Administer medications as ordered  - Instruct and encourage patient and family to use good hand hygiene technique  - Identify and instruct in appropriate isolation precautions for identified infection/condition  Outcome: Progressing  Goal: Absence of fever/infection during neutropenic period  Description: INTERVENTIONS:  - Monitor WBC    Outcome: Progressing     Problem: SAFETY ADULT  Goal: Patient will remain free of falls  Description: INTERVENTIONS:  - Educate patient/family on patient safety including physical limitations  - Instruct patient to call for assistance with activity   - Consult OT/PT to assist with strengthening/mobility   - Keep Call bell within reach  - Keep bed low and locked with side rails adjusted as appropriate  - Keep care items and personal belongings within reach  - Initiate and maintain comfort rounds  - Make Fall Risk Sign visible to staff  - Offer Toileting every 2 Hours,  in advance of need  - Initiate/Maintain bed alarm  - Apply yellow bracelet for high fall risk patients  - Consider moving patient to room near nurses station  Outcome: Progressing  Goal: Maintain or return to baseline ADL function  Description: INTERVENTIONS:  -  Assess patient's ability to carry out ADLs; assess patient's baseline for ADL function and identify physical deficits which impact ability to perform ADLs (bathing, care of mouth/teeth, toileting, grooming, dressing, etc.)  - Assess/evaluate cause of self-care deficits   - Assess range of motion  - Assess patient's mobility; develop plan if impaired  - Assess patient's need for assistive devices and provide as appropriate  - Encourage maximum independence but intervene and supervise when necessary  - Involve family in performance of ADLs  - Assess for home care needs following discharge   - Consider OT consult to assist with ADL evaluation and planning for discharge  - Provide patient education as appropriate  Outcome: Progressing  Goal: Maintains/Returns to pre admission functional level  Description: INTERVENTIONS:  - Perform AM-PAC 6 Click Basic Mobility/ Daily Activity assessment daily.  - Set and communicate daily mobility goal to care team and patient/family/caregiver.   - Collaborate with rehabilitation services on mobility goals if consulted  - Perform Range of Motion 3 times a day.  - Reposition patient every 2 hours.  - Out of bed for toileting  - Record patient progress and toleration of activity level   Outcome: Progressing     Problem: DISCHARGE PLANNING  Goal: Discharge to home or other facility with appropriate resources  Description: INTERVENTIONS:  - Identify barriers to discharge w/patient and caregiver  - Arrange for needed discharge resources and transportation as appropriate  - Identify discharge learning needs (meds, wound care, etc.)  - Arrange for interpretive services to assist at discharge as needed  - Refer to Case Management  Department for coordinating discharge planning if the patient needs post-hospital services based on physician/advanced practitioner order or complex needs related to functional status, cognitive ability, or social support system  Outcome: Progressing     Problem: Knowledge Deficit  Goal: Patient/family/caregiver demonstrates understanding of disease process, treatment plan, medications, and discharge instructions  Description: Complete learning assessment and assess knowledge base.  Interventions:  - Provide teaching at level of understanding  - Provide teaching via preferred learning methods  Outcome: Progressing     Problem: RESPIRATORY - ADULT  Goal: Achieves optimal ventilation and oxygenation  Description: INTERVENTIONS:  - Assess for changes in respiratory status  - Assess for changes in mentation and behavior  - Position to facilitate oxygenation and minimize respiratory effort  - Oxygen administered by appropriate delivery if ordered  - Initiate smoking cessation education as indicated  - Encourage broncho-pulmonary hygiene including cough, deep breathe, Incentive Spirometry  - Assess the need for suctioning and aspirate as needed  - Assess and instruct to report SOB or any respiratory difficulty  - Respiratory Therapy support as indicated  Outcome: Progressing     Problem: Prexisting or High Potential for Compromised Skin Integrity  Goal: Skin integrity is maintained or improved  Description: INTERVENTIONS:  - Identify patients at risk for skin breakdown  - Assess and monitor skin integrity  - Assess and monitor nutrition and hydration status  - Monitor labs   - Assess for incontinence   - Turn and reposition patient  - Assist with mobility/ambulation  - Relieve pressure over bony prominences  - Avoid friction and shearing  - Provide appropriate hygiene as needed including keeping skin clean and dry  - Evaluate need for skin moisturizer/barrier cream  - Collaborate with interdisciplinary team   -  Patient/family teaching  - Consider wound care consult   Outcome: Progressing     Problem: Nutrition/Hydration-ADULT  Goal: Nutrient/Hydration intake appropriate for improving, restoring or maintaining nutritional needs  Description: Monitor and assess patient's nutrition/hydration status for malnutrition. Collaborate with interdisciplinary team and initiate plan and interventions as ordered.  Monitor patient's weight and dietary intake as ordered or per policy. Utilize nutrition screening tool and intervene as necessary. Determine patient's food preferences and provide high-protein, high-caloric foods as appropriate.     INTERVENTIONS:  - Monitor oral intake, urinary output, labs, and treatment plans  - Assess nutrition and hydration status and recommend course of action  - Evaluate amount of meals eaten  - Assist patient with eating if necessary   - Allow adequate time for meals  - Recommend/ encourage appropriate diets, oral nutritional supplements, and vitamin/mineral supplements  - Order, calculate, and assess calorie counts as needed  - Recommend, monitor, and adjust tube feedings and TPN/PPN based on assessed needs  - Assess need for intravenous fluids  - Provide specific nutrition/hydration education as appropriate  - Include patient/family/caregiver in decisions related to nutrition  Outcome: Progressing

## 2024-01-14 NOTE — PROGRESS NOTES
[facility]  Progress Note  Name: Fanny Schulz I  MRN: [id number]  Unit/Bed#: [facility] 818-01 I Date of Admission: 1/5/2024   Date of Service: 1/14/2024 I Hospital Day: 9    Assessment/Plan   * Acute respiratory failure with hypoxia (HCC)  Assessment & Plan  Due to aspiration/COVID infection. Mainly aspiration per pulm   History of multiple sclerosis with progressive worsening of dysphagia  Presented as below   O2 requirement 6L on presentation - worsened overnight on 1/5 to HFNC  O2 weaned down to 2 L NC, will need percussion vest on discharge.  Continue Decadron day 9/10.  Wean O2 as able  Plan as below.    COVID-19 virus infection  Assessment & Plan  Presented on 1/5/24 with nasal congestion and inability to cough with shortness of breath, inability to swallow in the setting of multiple sclerosis with quadriplegia and progressive worsening of dysphagia and hypophonia  Tested positive for COVID  Completed 5 days course of remdesivir on 1/10, continue Decadron to complete 10 days course.      Chronic lower extremity edema  Assessment & Plan  Ct home med Lasix 20 mg daily    Hypercholesteremia  Assessment & Plan  Ct equivalent formulary Pravastatin of home Rosuvastatin    Continuous opioid dependence (HCC)  Assessment & Plan  Home med: Percocet 5/325 mg q 4h - continued  PDMP reviewed  Uses very sparingly per PCP note    Bacteriuria with pyuria  Assessment & Plan  Has chronic suprapubic catheter  Urine culture with Pseudomonas aeruginosa, Enterococcus faecalis  Colonization per ID  Being monitored off antibiotics    Functional quadriplegia secondary to MS (HCC)  Assessment & Plan  Supportive care  Uses Baclofen 20 mg in am and 40 mg in pm - ordered    Multiple sclerosis (HCC)  Assessment & Plan  Has quadriplegia and progressive dysphagia and hypophonia  Follows with neurology as outpatient  Supportive care    Dysphagia  Assessment & Plan  Progressive worsening noted as  outpatient in the setting of MS  Diet advanced to regular diet with thin liquids per speech recommendation    Recurrent major depressive disorder, in full remission ([facility])  Assessment & Plan  Ct home Cymbalta 20 mg daily       Constipation  Assessment & Plan  Bowel regimen    Suprapubic catheter ([facility])  Assessment & Plan  Neurogenic bladder with suprapubic catheter in the setting of MS               VTE Pharmacologic Prophylaxis: VTE Score: 7 Moderate Risk (Score 3-4) - Pharmacological DVT Prophylaxis Ordered: enoxaparin (Lovenox).    Mobility:   Basic Mobility Inpatient Raw Score: 6  JH-HLM Goal: 2: Bed activities/Dependent transfer  JH-HLM Achieved: 2: Bed activities/Dependent transfer  HLM Goal achieved. Continue to encourage appropriate mobility.    Patient Centered Rounds: I performed bedside rounds with nursing staff today.   Discussions with Specialists or Other Care Team Provider: KEVIN    Education and Discussions with Family / Patient: Updated  (sister) at bedside.    Total Time Spent on Date of Encounter in care of patient: 39 mins. This time was spent on one or more of the following: performing physical exam; counseling and coordination of care; obtaining or reviewing history; documenting in the medical record; reviewing/ordering tests, medications or procedures; communicating with other healthcare professionals and discussing with patient's family/caregivers.    Current Length of Stay: 9 day(s)  Current Patient Status: Inpatient   Certification Statement: The patient will continue to require additional inpatient hospital stay due to ongoing management as outlined above.  Discharge Plan: Anticipate discharge in 24-48 hrs to home.    Code Status: Level 1 - Full Code    Subjective:   Patient seen at bedside, denies any complaints.    Objective:     Vitals:   Temp (24hrs), Av.2 °F (36.8 °C), Min:97.8 °F (36.6 °C), Max:98.4 °F (36.9 °C)    Temp:  [97.8 °F (36.6 °C)-98.4 °F (36.9 °C)] 98.4 °F  (36.9 °C)  HR:  [85-99] 85  Resp:  [14-17] 14  BP: (103-113)/(60-64) 107/64  SpO2:  [97 %-98 %] 98 %  There is no height or weight on file to calculate BMI.     Input and Output Summary (last 24 hours):     Intake/Output Summary (Last 24 hours) at 1/14/2024 1626  Last data filed at 1/14/2024 1234  Gross per 24 hour   Intake 240 ml   Output 1975 ml   Net -1735 ml       Physical Exam:   Physical Exam  Constitutional:       Appearance: Normal appearance.   Cardiovascular:      Rate and Rhythm: Normal rate and regular rhythm.      Pulses: Normal pulses.      Heart sounds: Normal heart sounds.   Pulmonary:      Effort: Pulmonary effort is normal.      Comments: Decreased breath sounds with faint rhonchi more on right side compared to left.  Abdominal:      General: Bowel sounds are normal.      Palpations: Abdomen is soft.   Musculoskeletal:         General: Normal range of motion.   Skin:     General: Skin is warm and dry.   Neurological:      Mental Status: She is alert.          Additional Data:     Labs:  Results from last 7 days   Lab Units 01/13/24  0519 01/10/24  0553 01/09/24  0541 01/08/24  0524   WBC Thousand/uL 11.27*   < > 8.04 7.21   HEMOGLOBIN g/dL 12.2   < > 10.6* 10.3*   HEMATOCRIT % 38.3   < > 32.3* 32.6*   PLATELETS Thousands/uL 446*   < > 274 196   BANDS PCT %  --   --   --  7   NEUTROS PCT %  --   --  88*  --    LYMPHS PCT %  --   --  7*  --    LYMPHO PCT %  --   --   --  3*   MONOS PCT %  --   --  3*  --    MONO PCT %  --   --   --  1*   EOS PCT %  --   --  0 0    < > = values in this interval not displayed.     Results from last 7 days   Lab Units 01/13/24  0519 01/12/24  0523 01/11/24  0518   SODIUM mmol/L 140   < > 139   POTASSIUM mmol/L 4.0   < > 4.1   CHLORIDE mmol/L 101   < > 102   CO2 mmol/L 30   < > 29   BUN mg/dL 10   < > 9   CREATININE mg/dL 0.25*   < > 0.28*   ANION GAP mmol/L 9   < > 8   CALCIUM mg/dL 8.1*   < > 8.5   ALBUMIN g/dL  --   --  3.6   TOTAL BILIRUBIN mg/dL  --   --  0.33   ALK  PHOS U/L  --   --  42   ALT U/L  --   --  19   AST U/L  --   --  10*   GLUCOSE RANDOM mg/dL 143*   < > 173*    < > = values in this interval not displayed.             Results from last 7 days   Lab Units 01/09/24  0541   HEMOGLOBIN A1C % 5.2           Lines/Drains:  Invasive Devices       Central Venous Catheter Line  Duration             Port A Cath Left Chest -- days              Peripheral Intravenous Line  Duration             Peripheral IV 01/13/24 Right;Ventral (anterior) Forearm 1 day              Drain  Duration             Suprapubic Catheter 24 Fr. 388 days                    Central Line:  Goal for removal: Will discontinue when meds requiring line are completed.             Imaging: Reviewed radiology reports from this admission including: chest xray    Recent Cultures (last 7 days):         Last 24 Hours Medication List:   Current Facility-Administered Medications   Medication Dose Route Frequency Provider Last Rate    albuterol  2.5 mg Nebulization Q4H PRN Stephanie Sanchez MD      baclofen  20 mg Oral Q24H Stephanie Sanchez MD      baclofen  40 mg Oral QPM Stephanie Sanchez MD      dexamethasone  6 mg Intravenous Q24H Stephanie Sanchez MD      diazepam  5 mg Intravenous Q6H PRN Stephanie Sanchez MD      DULoxetine  20 mg Oral Daily Stephanie Sanchez MD      enoxaparin  30 mg Subcutaneous Q12H JOVI Stephanie Sanchez MD      furosemide  20 mg Oral Daily Stephanie Sanchez MD      ondansetron  4 mg Intravenous Q6H PRN Stephanie Sanchez MD      oxybutynin  10 mg Oral Daily Stephanie Sanchez MD      oxyCODONE-acetaminophen  1 tablet Oral Q4H PRN Stephanie Sanchez MD      polyethylene glycol  17 g Oral Daily PRN Stephanie Sanchez MD      pravastatin  40 mg Oral Daily With Dinner Stephanie Sanchez MD          Today, Patient Was Seen By: Stephanie Sanchez MD    **Please Note: This note may have been constructed using a voice recognition system.**

## 2024-01-15 ENCOUNTER — TELEPHONE (OUTPATIENT)
Dept: FAMILY MEDICINE CLINIC | Facility: CLINIC | Age: 54
End: 2024-01-15

## 2024-01-15 ENCOUNTER — TRANSITIONAL CARE MANAGEMENT (OUTPATIENT)
Dept: FAMILY MEDICINE CLINIC | Facility: CLINIC | Age: 54
End: 2024-01-15

## 2024-01-15 VITALS
HEART RATE: 70 BPM | RESPIRATION RATE: 16 BRPM | OXYGEN SATURATION: 100 % | TEMPERATURE: 97.3 F | SYSTOLIC BLOOD PRESSURE: 102 MMHG | DIASTOLIC BLOOD PRESSURE: 65 MMHG

## 2024-01-15 LAB
ANION GAP SERPL CALCULATED.3IONS-SCNC: 5 MMOL/L
BUN SERPL-MCNC: 9 MG/DL (ref 5–25)
CALCIUM SERPL-MCNC: 8.9 MG/DL (ref 8.4–10.2)
CHLORIDE SERPL-SCNC: 103 MMOL/L (ref 96–108)
CO2 SERPL-SCNC: 28 MMOL/L (ref 21–32)
CREAT SERPL-MCNC: 0.23 MG/DL (ref 0.6–1.3)
ERYTHROCYTE [DISTWIDTH] IN BLOOD BY AUTOMATED COUNT: 13.3 % (ref 11.6–15.1)
GFR SERPL CREATININE-BSD FRML MDRD: 143 ML/MIN/1.73SQ M
GLUCOSE SERPL-MCNC: 159 MG/DL (ref 65–140)
HCT VFR BLD AUTO: 38.1 % (ref 34.8–46.1)
HGB BLD-MCNC: 12 G/DL (ref 11.5–15.4)
MCH RBC QN AUTO: 27.8 PG (ref 26.8–34.3)
MCHC RBC AUTO-ENTMCNC: 31.5 G/DL (ref 31.4–37.4)
MCV RBC AUTO: 88 FL (ref 82–98)
PLATELET # BLD AUTO: 397 THOUSANDS/UL (ref 149–390)
PMV BLD AUTO: 8.7 FL (ref 8.9–12.7)
POTASSIUM SERPL-SCNC: 4.2 MMOL/L (ref 3.5–5.3)
RBC # BLD AUTO: 4.32 MILLION/UL (ref 3.81–5.12)
SODIUM SERPL-SCNC: 136 MMOL/L (ref 135–147)
WBC # BLD AUTO: 10.28 THOUSAND/UL (ref 4.31–10.16)

## 2024-01-15 PROCEDURE — 80048 BASIC METABOLIC PNL TOTAL CA: CPT | Performed by: STUDENT IN AN ORGANIZED HEALTH CARE EDUCATION/TRAINING PROGRAM

## 2024-01-15 PROCEDURE — 94669 MECHANICAL CHEST WALL OSCILL: CPT

## 2024-01-15 PROCEDURE — 85027 COMPLETE CBC AUTOMATED: CPT | Performed by: STUDENT IN AN ORGANIZED HEALTH CARE EDUCATION/TRAINING PROGRAM

## 2024-01-15 PROCEDURE — 99239 HOSP IP/OBS DSCHRG MGMT >30: CPT | Performed by: STUDENT IN AN ORGANIZED HEALTH CARE EDUCATION/TRAINING PROGRAM

## 2024-01-15 PROCEDURE — 99232 SBSQ HOSP IP/OBS MODERATE 35: CPT | Performed by: INTERNAL MEDICINE

## 2024-01-15 PROCEDURE — 94664 DEMO&/EVAL PT USE INHALER: CPT

## 2024-01-15 RX ORDER — OXYCODONE HYDROCHLORIDE AND ACETAMINOPHEN 5; 325 MG/1; MG/1
1 TABLET ORAL EVERY 4 HOURS PRN
Qty: 15 TABLET | Refills: 0 | Status: SHIPPED | OUTPATIENT
Start: 2024-01-15 | End: 2024-01-25

## 2024-01-15 RX ORDER — FUROSEMIDE 20 MG/1
20 TABLET ORAL DAILY
Qty: 30 TABLET | Refills: 0 | Status: SHIPPED | OUTPATIENT
Start: 2024-01-16 | End: 2024-02-15

## 2024-01-15 RX ADMIN — DULOXETINE HYDROCHLORIDE 20 MG: 20 CAPSULE, DELAYED RELEASE ORAL at 08:59

## 2024-01-15 RX ADMIN — ENOXAPARIN SODIUM 30 MG: 30 INJECTION SUBCUTANEOUS at 08:59

## 2024-01-15 RX ADMIN — OXYCODONE HYDROCHLORIDE AND ACETAMINOPHEN 1 TABLET: 5; 325 TABLET ORAL at 02:18

## 2024-01-15 RX ADMIN — BACLOFEN 20 MG: 20 TABLET ORAL at 05:47

## 2024-01-15 RX ADMIN — OXYBUTYNIN 10 MG: 10 TABLET, FILM COATED, EXTENDED RELEASE ORAL at 08:59

## 2024-01-15 RX ADMIN — FUROSEMIDE 20 MG: 20 TABLET ORAL at 08:59

## 2024-01-15 NOTE — ASSESSMENT & PLAN NOTE
Progressive worsening noted as outpatient in the setting of MS  Diet advanced to regular diet with thin liquids per speech recommendation.

## 2024-01-15 NOTE — ASSESSMENT & PLAN NOTE
Neurogenic bladder with suprapubic catheter in the setting of MS.  Follow-up with urology in outpatient setting.

## 2024-01-15 NOTE — ASSESSMENT & PLAN NOTE
Due to aspiration/COVID infection. Mainly aspiration per pulm   History of multiple sclerosis with progressive worsening of dysphagia  Presented as below   O2 requirement 6L on presentation - worsened overnight on 1/5 to HFNC  O2 weaned down to 2 L NC, will need percussion vest on discharge.  Completed Decadron 10 days course.  Saturating well on room air at time of my evaluation today.

## 2024-01-15 NOTE — PROGRESS NOTES
"Progress Note - Pulmonary   Fanny Schulz 53 y.o. female MRN: [id number]  Unit/Bed#: [facility] 818-01 Encounter: [id number]    Assessment:  Acute hypoxemic respiratory failure- resolved  COVID19 infection  Asymptomatic bacteriuria  Suprapubic catheter  Advanced MS with functional quadriplegia  Dysphagia- high risk for aspiration    Plan:  Now on room air  Continue dexamethasone per primary team for COVID19  Incentive spirometry  An order for a theravest was ordered given her history of aspiration and advanced MS.  Patient is careful with diet/aspiration precautions    Addendum below  Patient has advanced MS with functional quadriplegia, dysphasia, with high risk for aspiration. Methods of airway clearance tried include chest PT and [place] coughing with failed results due to patient limitations, frailty of patient,  and caregivers ability to perform adequately.  End Addendum    Pulmonary will sign off.  Call back with questions.  Discussed with Dr Sanchez with [facility] via [employer]      Subjective:   Originally seen in consultation by our group 1/6/24 (admitted 1/5)  History of multiple sclerosis, LE paralysis, possible chronic aspiration, obesity.  Weas on HFNT.  Had COVID19 infection, aspiration.  Was on HFNT 50 lpm at that time FiO2 0.6.  Treated initially with remdesivir, decadron, nebs.  Last seen by pulmonary 1/12- at that time was doing much better and was on 1 lpm of supplemental oxygen.  Completed 5 days remdesivir 1/10.  Was briefly on antibiotics 1/5-1/7- off since then. Has been afebrile    Currently- 100% on 1 lpm of supplemental oxygen- I removed the oxygen she is saturating 97% on room air.  No dyspnea  No cough.  She reports she \"feels great\"    Objective:     Vitals: Blood pressure 102/65, pulse 70, temperature (!) 97.3 °F (36.3 °C), resp. rate 16, SpO2 100%.,There is no height or weight on file to calculate BMI.      Intake/Output Summary (Last 24 hours) at 1/15/2024 0938  Last data filed at 1/14/2024 " 1835  Gross per 24 hour   Intake 240 ml   Output 1600 ml   Net -1360 ml       Invasive Devices       Central Venous Catheter Line  Duration             Port A Cath Left Chest -- days              Peripheral Intravenous Line  Duration             Peripheral IV 01/13/24 Right;Ventral (anterior) Forearm 2 days              Drain  Duration             Suprapubic Catheter 24 Fr. 388 days                    Physical Exam:   Vitals:    01/15/24 0821   BP: 102/65   Pulse: 70   Resp: 16   Temp: (!) 97.3 °F (36.3 °C)   SpO2: 100%     General:  Patient is awake, alert, non-toxic and in no acute respiratory distress  Eyes: No scleral icterus  Neck: No JVD  CV:  Regular, +S1 and S2, No murmurs, gallops or rubs appreciated  Lungs: Clear to auscultation bilateral without wheeze, rales or rhonci  Abdomen: Soft, +BS, Non-tender, non-distended  Extremities: No clubbing, cyanosis.  Some trace edema  Neuro: Weakness all 4 extremities- stable  Skin: Warm, dry      Labs: I have personally reviewed pertinent lab results.  Lab Results   Component Value Date    WBC 10.28 (H) 01/15/2024    HGB 12.0 01/15/2024    HCT 38.1 01/15/2024    MCV 88 01/15/2024     (H) 01/15/2024     Lab Results   Component Value Date    SODIUM 136 01/15/2024    K 4.2 01/15/2024     01/15/2024    CO2 28 01/15/2024    BUN 9 01/15/2024    CREATININE 0.23 (L) 01/15/2024    GLUC 159 (H) 01/15/2024    CALCIUM 8.9 01/15/2024       Imaging and other studies: I have personally reviewed pertinent reports.   and I have personally reviewed pertinent films in PACS    CXR 1/12  Patchy right greater than left bibasilar opacities     CXR 1/5  No acute cardiopulmonary disease.

## 2024-01-15 NOTE — TELEPHONE ENCOUNTER
----- Message from Stephanie Sanchez MD sent at 1/15/2024  1:06 PM EST -----  Thank you for allowing us to participate in the care of your patient, Fanny Schulz, who was hospitalized from 1/5/2024 through 1/15/2024 with the admitting diagnosis of COVID.  Patient completed course of remdesivir and Decadron while inpatient.  Off oxygen on day of discharge.    Follow-up with PCP in outpatient setting.      If you have any additional questions or would like to discuss further, please feel free to contact me.    Stephanie Sanchez MD  [facility], Hospitalist  [phone]

## 2024-01-15 NOTE — DISCHARGE SUMMARY
[facility]  Discharge- Fanny Schulz 1970, 53 y.o. female MRN: [id number]  [facility] Encounter: [id number]  Primary Care Provider: Nacho Monroy DO   Date and time admitted to hospital: 1/5/2024 11:23 AM    * Acute respiratory failure with hypoxia (HCC)  Assessment & Plan  Due to aspiration/COVID infection. Mainly aspiration per pulm   History of multiple sclerosis with progressive worsening of dysphagia  Presented as below   O2 requirement 6L on presentation - worsened overnight on 1/5 to HFNC  O2 weaned down to 2 L NC, will need percussion vest on discharge.  Completed Decadron 10 days course.  Saturating well on room air at time of my evaluation today.    Chronic lower extremity edema  Assessment & Plan  Ct home med Lasix 20 mg daily    Hypercholesteremia  Assessment & Plan  Ct equivalent formulary Pravastatin of home Rosuvastatin    Continuous opioid dependence (HCC)  Assessment & Plan  Home med: Percocet 5/325 mg q 4h - continued  PDMP reviewed  Uses very sparingly per PCP note    Functional quadriplegia secondary to MS (HCC)  Assessment & Plan  Supportive care  Uses Baclofen 20 mg in am and 40 mg in pm - ordered    Multiple sclerosis (HCC)  Assessment & Plan  Has quadriplegia and progressive dysphagia and hypophonia  Follows with neurology as outpatient  Supportive care    Dysphagia  Assessment & Plan  Progressive worsening noted as outpatient in the setting of MS  Diet advanced to regular diet with thin liquids per speech recommendation.    Recurrent major depressive disorder, in full remission ([facility])  Assessment & Plan  Ct home Cymbalta 20 mg daily       Constipation  Assessment & Plan  Bowel regimen    Suprapubic catheter ([facility])  Assessment & Plan  Neurogenic bladder with suprapubic catheter in the setting of MS.  Follow-up with urology in outpatient setting.        Medical Problems       Resolved Problems  Date Reviewed: 1/15/2024   None        Discharging Physician / Practitioner: Stephanie Sanchez MD  PCP: Nacho Monroy DO  Admission Date:   Admission Orders (From admission, onward)       Ordered        01/05/24 1333  INPATIENT ADMISSION  Once                          Discharge Date: 01/15/24    Consultations During Hospital Stay:  ID.  Pulmonary    Procedures Performed:   None    Significant Findings / Test Results:   CXR:  IMPRESSION:     Patchy right greater than left bibasilar opacities suspicious for pneumonia. Follow-up radiographs are recommended in 4 weeks following treatment to ensure resolution.    Incidental Findings:       Test Results Pending at Discharge (will require follow up):   no     Outpatient Tests Requested:  no    Complications:  no    Reason for Admission: SOB, COVID +ve.    Hospital Course:   Fanny Schulz is a 53 y.o. female patient who originally presented to the hospital on 1/5/2024 due to acute respiratory failure in setting of COVID-positive.  Patient evaluated by pulmonary, recommended to continue dose based Decadron, completed 10 days course.  She also completed 5 days course of remdesivir during her stay.  Oxygen weaned, saturating well on room air on day of discharge.  Given history of multiple sclerosis and dysphagia, needed frequent suctioning hence percussion vest and suctioning device ordered to be delivered at home after discharge.  Patient evaluated by PT, recommended home with home health but patient's  is patient's caregiver, requested patient to be discharged home.    Please see above list of diagnoses and related plan for additional information.     Condition at Discharge: stable    Discharge Day Visit / Exam:   Subjective: Patient seen at bedside.  Reports feeling herself after long time.  Vitals: Blood Pressure: 102/65 (01/15/24 0821)  Pulse: 70 (01/15/24 0821)  Temperature: (!) 97.3 °F (36.3 °C) (01/15/24 0821)  Temp Source: Oral (01/10/24 0831)  Respirations: 16 (01/15/24 0821)  SpO2: 100 %  (01/15/24 0821)  Exam:   Physical Exam  Constitutional:       Appearance: Normal appearance.   Cardiovascular:      Rate and Rhythm: Normal rate and regular rhythm.      Pulses: Normal pulses.      Heart sounds: Normal heart sounds.   Abdominal:      General: Bowel sounds are normal.      Palpations: Abdomen is soft.   Musculoskeletal:      Comments: Extremities are contracted.   Neurological:      Mental Status: She is alert and oriented to person, place, and time.      Comments: Speech slow but understandable.          Discussion with Family: Updated  (sister) at bedside.    Discharge instructions/Information to patient and family:   See after visit summary for information provided to patient and family.      Provisions for Follow-Up Care:  See after visit summary for information related to follow-up care and any pertinent home health orders.      Mobility at time of Discharge:   Basic Mobility Inpatient Raw Score: 6  JH-HLM Goal: 2: Bed activities/Dependent transfer  JH-HLM Achieved: 2: Bed activities/Dependent transfer  HLM Goal achieved. Continue to encourage appropriate mobility.     Disposition:   Home    Planned Readmission: no     Discharge Statement:  I spent 46 minutes discharging the patient. This time was spent on the day of discharge. I had direct contact with the patient on the day of discharge. Greater than 50% of the total time was spent examining patient, answering all patient questions, arranging and discussing plan of care with patient as well as directly providing post-discharge instructions.  Additional time then spent on discharge activities.    Discharge Medications:  See after visit summary for reconciled discharge medications provided to patient and/or family.      **Please Note: This note may have been constructed using a voice recognition system**

## 2024-01-15 NOTE — CASE MANAGEMENT
Case Management Discharge Planning Note    Patient name Fanny Schulz  [facility] 818/[facility] 818-01 MRN [id number]  : 1970 Date 1/15/2024       Current Admission Date: 2024  Current Admission Diagnosis:Acute respiratory failure with hypoxia (HCC)   Patient Active Problem List    Diagnosis Date Noted    Chronic lower extremity edema 2024    COVID-19 virus infection 2024    Hypercholesteremia 2024    Tinnitus of both ears 2023    Hypophonia 2023    Peripheral edema 2023    Acute respiratory failure with hypoxia ([facility]) 2022    Continuous opioid dependence ([facility]) 2022    Increased endometrial stripe thickness 03/10/2021    Ureteral calculus, left 2020    Alkaline phosphatase elevation 10/28/2020    Abnormal thyroid function test 10/28/2020    Candidal vaginitis 10/19/2020    Hydronephrosis with urinary obstruction due to ureteral calculus 10/02/2020    Thrombocytopenia ([facility]) 10/01/2020    Serum total bilirubin elevated 10/01/2020    Bacteriuria with pyuria 2020    Medication management contract signed 2020    Screening mammogram, encounter for 2019    Health care maintenance 2019    Allergic rhinitis 2018    Functional quadriplegia secondary to MS ([facility]) 2018    Suprapubic catheter ([facility]) 2017    Nephrolithiasis 2016    Bladder calculus 2015    Muscle spasticity 2015    Vitamin B12 deficiency 2015    Constipation 2015    Hyperglycemia 2014    Familial hypercholesterolemia 2014    Recurrent major depressive disorder, in full remission ([facility]) 2014    Lymphedema 2014    Spinal stenosis of cervical region 2014    Urinary incontinence 2014    Vitamin D deficiency 2013    Dysphagia 2013    Dizziness 2013    Muscle weakness 2013    Neurogenic bladder 2013    Sleep disorder 2013    Tremor 2013    Vision loss  11/04/2013    Multiple sclerosis (HCC) 10/21/2013      LOS (days): 10  Geometric Mean LOS (GMLOS) (days): 5  Days to GMLOS:-4.9     OBJECTIVE:  Risk of Unplanned Readmission Score: 13.56         Current admission status: Inpatient   Preferred Pharmacy:   [id number] - [place] - [address] [address]  [address] [address]  Phone: [phone] Fax: [phone]    Primary Care Provider: Nacho Monroy DO    Primary Insurance: MEDICARE  Secondary Insurance: BLUE CROSS    DISCHARGE DETAILS:        TT received from provider re: family request for suction catheter/machine. Request placed through [employer], unknown if family will have balance, but will process and update accordingly.     Awaiting processing of AFFLO vest. Sent message via [employer] to [employer] liaison requesting update on status.     UPDATE 2:45PM: [employer] requesting updated clinical documentation. Documentation faxed.

## 2024-01-15 NOTE — PLAN OF CARE
Problem: PAIN - ADULT  Goal: Verbalizes/displays adequate comfort level or baseline comfort level  Description: Interventions:  - Encourage patient to monitor pain and request assistance  - Assess pain using appropriate pain scale  - Administer analgesics based on type and severity of pain and evaluate response  - Implement non-pharmacological measures as appropriate and evaluate response  - Consider cultural and social influences on pain and pain management  - Notify physician/advanced practitioner if interventions unsuccessful or patient reports new pain  Outcome: Progressing     Problem: INFECTION - ADULT  Goal: Absence or prevention of progression during hospitalization  Description: INTERVENTIONS:  - Assess and monitor for signs and symptoms of infection  - Monitor lab/diagnostic results  - Monitor all insertion sites, i.e. indwelling lines, tubes, and drains  - Monitor endotracheal if appropriate and nasal secretions for changes in amount and color  - [place] appropriate cooling/warming therapies per order  - Administer medications as ordered  - Instruct and encourage patient and family to use good hand hygiene technique  - Identify and instruct in appropriate isolation precautions for identified infection/condition  Outcome: Progressing     Problem: SAFETY ADULT  Goal: Patient will remain free of falls  Description: INTERVENTIONS:  - Educate patient/family on patient safety including physical limitations  - Instruct patient to call for assistance with activity   - Consult OT/PT to assist with strengthening/mobility   - Keep Call bell within reach  - Keep bed low and locked with side rails adjusted as appropriate  - Keep care items and personal belongings within reach  - Initiate and maintain comfort rounds  - Make Fall Risk Sign visible to staff  - Offer Toileting every 2 Hours, in advance of need  - Initiate/Maintain bed alarm  - Apply yellow bracelet for high fall risk patients  - Consider moving  patient to room near nurses station  Outcome: Progressing  Goal: Maintain or return to baseline ADL function  Description: INTERVENTIONS:  -  Assess patient's ability to carry out ADLs; assess patient's baseline for ADL function and identify physical deficits which impact ability to perform ADLs (bathing, care of mouth/teeth, toileting, grooming, dressing, etc.)  - Assess/evaluate cause of self-care deficits   - Assess range of motion  - Assess patient's mobility; develop plan if impaired  - Assess patient's need for assistive devices and provide as appropriate  - Encourage maximum independence but intervene and supervise when necessary  - Involve family in performance of ADLs  - Assess for home care needs following discharge   - Consider OT consult to assist with ADL evaluation and planning for discharge  - Provide patient education as appropriate  Outcome: Progressing  Goal: Maintains/Returns to pre admission functional level  Description: INTERVENTIONS:  - Perform AM-PAC 6 Click Basic Mobility/ Daily Activity assessment daily.  - Set and communicate daily mobility goal to care team and patient/family/caregiver.   - Collaborate with rehabilitation services on mobility goals if consulted  - Perform Range of Motion 3 times a day.  - Reposition patient every 2 hours.  - Out of bed for toileting  - Record patient progress and toleration of activity level   Outcome: Progressing     Problem: DISCHARGE PLANNING  Goal: Discharge to home or other facility with appropriate resources  Description: INTERVENTIONS:  - Identify barriers to discharge w/patient and caregiver  - Arrange for needed discharge resources and transportation as appropriate  - Identify discharge learning needs (meds, wound care, etc.)  - Arrange for interpretive services to assist at discharge as needed  - Refer to Case Management Department for coordinating discharge planning if the patient needs post-hospital services based on physician/advanced  practitioner order or complex needs related to functional status, cognitive ability, or social support system  Outcome: Progressing     Problem: Knowledge Deficit  Goal: Patient/family/caregiver demonstrates understanding of disease process, treatment plan, medications, and discharge instructions  Description: Complete learning assessment and assess knowledge base.  Interventions:  - Provide teaching at level of understanding  - Provide teaching via preferred learning methods  Outcome: Progressing     Problem: RESPIRATORY - ADULT  Goal: Achieves optimal ventilation and oxygenation  Description: INTERVENTIONS:  - Assess for changes in respiratory status  - Assess for changes in mentation and behavior  - Position to facilitate oxygenation and minimize respiratory effort  - Oxygen administered by appropriate delivery if ordered  - Initiate smoking cessation education as indicated  - Encourage broncho-pulmonary hygiene including cough, deep breathe, Incentive Spirometry  - Assess the need for suctioning and aspirate as needed  - Assess and instruct to report SOB or any respiratory difficulty  - Respiratory Therapy support as indicated  Outcome: Progressing     Problem: Prexisting or High Potential for Compromised Skin Integrity  Goal: Skin integrity is maintained or improved  Description: INTERVENTIONS:  - Identify patients at risk for skin breakdown  - Assess and monitor skin integrity  - Assess and monitor nutrition and hydration status  - Monitor labs   - Assess for incontinence   - Turn and reposition patient  - Assist with mobility/ambulation  - Relieve pressure over bony prominences  - Avoid friction and shearing  - Provide appropriate hygiene as needed including keeping skin clean and dry  - Evaluate need for skin moisturizer/barrier cream  - Collaborate with interdisciplinary team   - Patient/family teaching  - Consider wound care consult   Outcome: Progressing     Problem: Nutrition/Hydration-ADULT  Goal:  Nutrient/Hydration intake appropriate for improving, restoring or maintaining nutritional needs  Description: Monitor and assess patient's nutrition/hydration status for malnutrition. Collaborate with interdisciplinary team and initiate plan and interventions as ordered.  Monitor patient's weight and dietary intake as ordered or per policy. Utilize nutrition screening tool and intervene as necessary. Determine patient's food preferences and provide high-protein, high-caloric foods as appropriate.     INTERVENTIONS:  - Monitor oral intake, urinary output, labs, and treatment plans  - Assess nutrition and hydration status and recommend course of action  - Evaluate amount of meals eaten  - Assist patient with eating if necessary   - Allow adequate time for meals  - Recommend/ encourage appropriate diets, oral nutritional supplements, and vitamin/mineral supplements  - Order, calculate, and assess calorie counts as needed  - Recommend, monitor, and adjust tube feedings and TPN/PPN based on assessed needs  - Assess need for intravenous fluids  - Provide specific nutrition/hydration education as appropriate  - Include patient/family/caregiver in decisions related to nutrition  Outcome: Progressing     Problem: CARDIOVASCULAR - ADULT  Goal: Maintains optimal cardiac output and hemodynamic stability  Description: INTERVENTIONS:  - Monitor I/O, vital signs and rhythm  - Monitor for S/S and trends of decreased cardiac output  - Administer and titrate ordered vasoactive medications to optimize hemodynamic stability  - Assess quality of pulses, skin color and temperature  - Assess for signs of decreased coronary artery perfusion  - Instruct patient to report change in severity of symptoms  Outcome: Progressing  Goal: Absence of cardiac dysrhythmias or at baseline rhythm  Description: INTERVENTIONS:  - Continuous cardiac monitoring, vital signs, obtain 12 lead EKG if ordered  - Administer antiarrhythmic and heart rate control  medications as ordered  - Monitor electrolytes and administer replacement therapy as ordered  Outcome: Progressing     Problem: METABOLIC, FLUID AND ELECTROLYTES - ADULT  Goal: Electrolytes maintained within normal limits  Description: INTERVENTIONS:  - Monitor labs and assess patient for signs and symptoms of electrolyte imbalances  - Administer electrolyte replacement as ordered  - Monitor response to electrolyte replacements, including repeat lab results as appropriate  - Instruct patient on fluid and nutrition as appropriate  Outcome: Progressing  Goal: Fluid balance maintained  Description: INTERVENTIONS:  - Monitor labs   - Monitor I/O and WT  - Instruct patient on fluid and nutrition as appropriate  - Assess for signs & symptoms of volume excess or deficit  Outcome: Progressing     Problem: SKIN/TISSUE INTEGRITY - ADULT  Goal: Incision(s), wounds(s) or drain site(s) healing without S/S of infection  Description: INTERVENTIONS  - Assess and document dressing, incision, wound bed, drain sites and surrounding tissue  - Provide patient and family education  - Perform skin care/dressing changes every shift  Outcome: Progressing     Problem: HEMATOLOGIC - ADULT  Goal: Maintains hematologic stability  Description: INTERVENTIONS  - Assess for signs and symptoms of bleeding or hemorrhage  - Monitor labs  - Administer supportive blood products/factors as ordered and appropriate  Outcome: Progressing

## 2024-01-17 LAB

## 2024-01-24 ENCOUNTER — HOME HEALTH ADMISSION (OUTPATIENT)
Dept: HOME HEALTH SERVICES | Facility: HOME HEALTHCARE | Age: 54
End: 2024-01-24
Payer: MEDICARE

## 2024-01-24 ENCOUNTER — OFFICE VISIT (OUTPATIENT)
Dept: FAMILY MEDICINE CLINIC | Facility: CLINIC | Age: 54
End: 2024-01-24
Payer: MEDICARE

## 2024-01-24 VITALS
SYSTOLIC BLOOD PRESSURE: 114 MMHG | HEART RATE: 62 BPM | OXYGEN SATURATION: 100 % | DIASTOLIC BLOOD PRESSURE: 82 MMHG | TEMPERATURE: 96.8 F

## 2024-01-24 DIAGNOSIS — D69.6 THROMBOCYTOPENIA (HCC): ICD-10-CM

## 2024-01-24 DIAGNOSIS — F11.20 CONTINUOUS OPIOID DEPENDENCE (HCC): ICD-10-CM

## 2024-01-24 DIAGNOSIS — R53.2 FUNCTIONAL QUADRIPLEGIA SECONDARY TO MS (HCC): Chronic | ICD-10-CM

## 2024-01-24 DIAGNOSIS — U07.1 PNEUMONIA DUE TO COVID-19 VIRUS: Primary | ICD-10-CM

## 2024-01-24 DIAGNOSIS — J96.01 ACUTE RESPIRATORY FAILURE WITH HYPOXIA (HCC): ICD-10-CM

## 2024-01-24 DIAGNOSIS — L89.153 SACRAL DECUBITUS ULCER, STAGE III (HCC): ICD-10-CM

## 2024-01-24 DIAGNOSIS — F33.42 RECURRENT MAJOR DEPRESSIVE DISORDER, IN FULL REMISSION (HCC): ICD-10-CM

## 2024-01-24 DIAGNOSIS — L89.151 PRESSURE INJURY OF SACRAL REGION, STAGE 1: ICD-10-CM

## 2024-01-24 DIAGNOSIS — R94.6 ABNORMAL THYROID FUNCTION TEST: ICD-10-CM

## 2024-01-24 DIAGNOSIS — E78.01 FAMILIAL HYPERCHOLESTEROLEMIA: ICD-10-CM

## 2024-01-24 DIAGNOSIS — E53.8 VITAMIN B12 DEFICIENCY: ICD-10-CM

## 2024-01-24 DIAGNOSIS — E55.9 VITAMIN D DEFICIENCY: ICD-10-CM

## 2024-01-24 DIAGNOSIS — G35 FUNCTIONAL QUADRIPLEGIA SECONDARY TO MS (HCC): Chronic | ICD-10-CM

## 2024-01-24 DIAGNOSIS — J12.82 PNEUMONIA DUE TO COVID-19 VIRUS: Primary | ICD-10-CM

## 2024-01-24 PROBLEM — E78.00 HYPERCHOLESTEREMIA: Status: RESOLVED | Noted: 2024-01-06 | Resolved: 2024-01-24

## 2024-01-24 PROCEDURE — 99495 TRANSJ CARE MGMT MOD F2F 14D: CPT | Performed by: FAMILY MEDICINE

## 2024-01-24 NOTE — PATIENT INSTRUCTIONS
Complete blood work in the next few weeks  [facility] should be calling you to come out for wound care for the sacral decubitus  Follow with all specialist per their instructions  Repeat chest x-ray around 2/15/2024  Return in 4 weeks for opioid visit and Medicare AWV

## 2024-01-24 NOTE — PROGRESS NOTES
Assessment & Plan     1.  COVID-pneumonia, symptomatology resolved repeat chest x-ray around 2 months 15/24  2.  Acute respiratory failure, resolved  3.   vitamin deficiencies B12 and D, blood work is ordered  4.  MS with quadriplegia, stable follows with neurology  5.  Abnormal third function test check TSH  6.  Familial hypercholesterolemia on Crestor blood work lipid panel is ordered  7.  Sacral decubitus refer to [facility]  8.  Thrombocytopenia, platelet count stable  9.  Continues opioid dependence will schedule opioid visit with [facility] in 4 weeks  10.  MDD, in remission with Cymbalta  11.  Return in 4 weeks for [facility] and opioid visit sooner if needed        1. Pneumonia due to COVID-19 virus  Assessment & Plan:  Status post hospitalization, symptoms resolved, chest x-ray reordered as per discharge summary    Orders:  -     Vitamin B12; Future  -     Folate; Future  -     Vitamin D 25 hydroxy; Future  -     TSH, 3rd generation with Free T4 reflex  -     Lipid Panel with Direct LDL reflex  -     XR chest pa & lateral; Future; Expected date: 02/15/2024    2. Acute respiratory failure with hypoxia (HCC)  Assessment & Plan:  Status post hospitalization O2 saturation 100 send on room air    Orders:  -     Vitamin B12; Future  -     Folate; Future  -     Vitamin D 25 hydroxy; Future  -     TSH, 3rd generation with Free T4 reflex  -     Lipid Panel with Direct LDL reflex  -     XR chest pa & lateral; Future; Expected date: 02/15/2024    3. Vitamin B12 deficiency  -     Vitamin B12; Future  -     Folate; Future  -     Vitamin D 25 hydroxy; Future  -     TSH, 3rd generation with Free T4 reflex  -     Lipid Panel with Direct LDL reflex  -     XR chest pa & lateral; Future; Expected date: 02/15/2024    4. Functional quadriplegia secondary to MS (HCC)  Assessment & Plan:  Patient follows with neurology      5. Abnormal thyroid function test  Assessment & Plan:  TSH is ordered    Orders:  -     Vitamin B12; Future  -      Folate; Future  -     Vitamin D 25 hydroxy; Future  -     TSH, 3rd generation with Free T4 reflex  -     Lipid Panel with Direct LDL reflex  -     XR chest pa & lateral; Future; Expected date: 02/15/2024    6. Familial hypercholesterolemia  Assessment & Plan:  Patient is on rosuvastatin lipid panel was ordered    Orders:  -     Vitamin B12; Future  -     Folate; Future  -     Vitamin D 25 hydroxy; Future  -     TSH, 3rd generation with Free T4 reflex  -     Lipid Panel with Direct LDL reflex  -     XR chest pa & lateral; Future; Expected date: 02/15/2024    7. Vitamin D deficiency  -     Vitamin B12; Future  -     Folate; Future  -     Vitamin D 25 hydroxy; Future  -     TSH, 3rd generation with Free T4 reflex  -     Lipid Panel with Direct LDL reflex  -     XR chest pa & lateral; Future; Expected date: 02/15/2024    8. Sacral decubitus ulcer, stage III ([facility])  -     Referral to [facility]; Future    9. Pressure injury of sacral region, stage 1  -     [facility]; Future    10. Thrombocytopenia ([facility])  Assessment & Plan:  Platelets were normal in hospital      11. Continuous opioid dependence (HCC)  Assessment & Plan:  Set up opioid visit 4 weeks      12. Recurrent major depressive disorder, in full remission ([facility])  Assessment & Plan:  In remission on Cymbalta           Subjective     Transitional Care Management Review:   Fanny Schulz is a 53 y.o. female here for TCM follow up.     During the TCM phone call patient stated:  TCM Call     Date and time call was made  1/15/2024  5:32 PM    Hospital care reviewed  Records reviewed    Patient was hospitialized at  [facility]    Date of Admission  01/05/24    Date of discharge  01/15/24    Diagnosis  Acute respiratory failure with hypoxia (HCC)    Disposition  Home    Were the patients medications reviewed and updated  No    Current Symptoms  Fatigue    Fatigue severity  Mild      TCM Call     Post hospital issues   None    Should patient be enrolled in anticoag monitoring?  No    Scheduled for follow up?  Yes    Did you obtain your prescribed medications  Yes    Do you need help managing your prescriptions or medications  No    Is transportation to your appointment needed  No    I have advised the patient to call PCP with any new or worsening symptoms  Ananya ojeda made w/ [facility] 1/24/24        Patient is in with her .  Patient is doing well symptomatology has resolved patient has no chest pain shortness of breath wheezing no fever or chills.  However patient did develop sacral decubitus while in the hospital and recuperating.  Would like visiting nurse, she had this last year.      Review of Systems   Constitutional: Negative.    HENT: Negative.     Eyes: Negative.    Respiratory:          HPI   Cardiovascular: Negative.    Gastrointestinal: Negative.    Endocrine: Negative.    Genitourinary: Negative.    Musculoskeletal: Negative.    Skin:         HPI   Allergic/Immunologic: Negative.    Neurological: Negative.    Hematological: Negative.    Psychiatric/Behavioral: Negative.         Objective     /82 (BP Location: Right arm, Patient Position: Sitting, Cuff Size: Large)   Pulse 62   Temp (!) 96.8 °F (36 °C) (Temporal)   SpO2 100%      Physical Exam  Vitals and nursing note reviewed.   Constitutional:       Appearance: Normal appearance.   HENT:      Head: Normocephalic and atraumatic.      Mouth/Throat:      Mouth: Mucous membranes are moist.   Eyes:      General: No scleral icterus.  Cardiovascular:      Rate and Rhythm: Normal rate and regular rhythm.      Heart sounds: Normal heart sounds.   Pulmonary:      Effort: Pulmonary effort is normal.      Breath sounds: Normal breath sounds.   Abdominal:      General: Bowel sounds are normal.      Palpations: Abdomen is soft.      Tenderness: There is no abdominal tenderness.      Comments: Suprapubic catheter intact   Musculoskeletal:      Cervical back: Neck  supple.      Right lower leg: No edema.      Left lower leg: No edema.   Lymphadenopathy:      Cervical: No cervical adenopathy.   Skin:     General: Skin is warm and dry.      Comments: Sacral decub, left approximately 1 x 1 cm grade 1-2 right approximately 1.5 x 1.5 grade 2-3   Neurological:      Mental Status: She is alert. Mental status is at baseline.      Gait: Gait abnormal.      Comments: Wheelchair-bound, patient is at baseline neurologically   Psychiatric:         Mood and Affect: Mood normal.       Medications have been reviewed by provider in current encounter    Nacho Monroy DO

## 2024-01-25 ENCOUNTER — HOME CARE VISIT (OUTPATIENT)
Dept: HOME HEALTH SERVICES | Facility: HOME HEALTHCARE | Age: 54
End: 2024-01-25

## 2024-01-26 ENCOUNTER — HOME CARE VISIT (OUTPATIENT)
Dept: HOME HEALTH SERVICES | Facility: HOME HEALTHCARE | Age: 54
End: 2024-01-26

## 2024-01-29 ENCOUNTER — HOME CARE VISIT (OUTPATIENT)
Dept: HOME HEALTH SERVICES | Facility: HOME HEALTHCARE | Age: 54
End: 2024-01-29
Payer: MEDICARE

## 2024-01-29 ENCOUNTER — TELEPHONE (OUTPATIENT)
Age: 54
End: 2024-01-29

## 2024-01-29 VITALS
OXYGEN SATURATION: 97 % | RESPIRATION RATE: 18 BRPM | HEIGHT: 66 IN | DIASTOLIC BLOOD PRESSURE: 68 MMHG | HEART RATE: 100 BPM | SYSTOLIC BLOOD PRESSURE: 108 MMHG | BODY MASS INDEX: 31.64 KG/M2 | TEMPERATURE: 98.8 F

## 2024-01-29 DIAGNOSIS — M48.02 SPINAL STENOSIS OF CERVICAL REGION: ICD-10-CM

## 2024-01-29 PROCEDURE — 400013 VN SOC

## 2024-01-29 PROCEDURE — G0299 HHS/HOSPICE OF RN EA 15 MIN: HCPCS

## 2024-01-29 PROCEDURE — 10330081 VN NO-PAY CLAIM PROCEDURE

## 2024-01-29 RX ORDER — OXYCODONE HYDROCHLORIDE AND ACETAMINOPHEN 5; 325 MG/1; MG/1
1 TABLET ORAL EVERY 4 HOURS PRN
Qty: 15 TABLET | Refills: 0 | Status: SHIPPED | OUTPATIENT
Start: 2024-01-29 | End: 2024-02-08

## 2024-01-29 NOTE — TELEPHONE ENCOUNTER
Per Medical consent- left mess that Dr. Monroy is out and another provider sent enough for 15 days, but pt will have to follow up with Dr. Monroy for cont refills per TH

## 2024-01-29 NOTE — TELEPHONE ENCOUNTER
Patient called looking to get   oxyCODONE-acetaminophen (PERCOCET) 5-325 mg per tablet  refilled to the [facility] in [place].      If we can not refill please advise

## 2024-01-29 NOTE — TELEPHONE ENCOUNTER
Prescription for Percocet sent to the pharmacy.  Reviewed that the patient has been getting this from Dr. Monroy in the past.  With that, no specific changes recommended for the oxycodone.  Will only give her a short amount, and she will need to have follow-up office visit for TCM.

## 2024-01-30 PROCEDURE — 10330064 SYRINGE, LL 10CC (100/BX 12BX/CS)

## 2024-01-30 PROCEDURE — 10330064 CATH TRAY, FOLEY SYR 10CC (20/CS)

## 2024-01-30 PROCEDURE — 10330064 DRESSING, HYDROCELLULAR ADH STR FOAM 4"X

## 2024-01-30 PROCEDURE — 10330064 BAG, URINE DRN (20/CS)        BARD

## 2024-01-30 PROCEDURE — 10330064 CATH SECURE, STATLOCK FOLEY SWIVEL SIL P

## 2024-01-30 PROCEDURE — 10330064

## 2024-01-31 ENCOUNTER — HOME CARE VISIT (OUTPATIENT)
Dept: HOME HEALTH SERVICES | Facility: HOME HEALTHCARE | Age: 54
End: 2024-01-31
Payer: MEDICARE

## 2024-02-01 ENCOUNTER — HOME CARE VISIT (OUTPATIENT)
Dept: HOME HEALTH SERVICES | Facility: HOME HEALTHCARE | Age: 54
End: 2024-02-01
Payer: MEDICARE

## 2024-02-01 ENCOUNTER — OFFICE VISIT (OUTPATIENT)
Dept: WOUND CARE | Facility: HOSPITAL | Age: 54
End: 2024-02-01
Payer: COMMERCIAL

## 2024-02-01 VITALS — TEMPERATURE: 97.7 F

## 2024-02-01 DIAGNOSIS — G35 MULTIPLE SCLEROSIS (HCC): Chronic | ICD-10-CM

## 2024-02-01 DIAGNOSIS — L89.314 PRESSURE ULCER OF RIGHT ISCHIUM, STAGE IV (HCC): ICD-10-CM

## 2024-02-01 DIAGNOSIS — L89.320 PRESSURE ULCER OF ISCHIUM, UNSTAGEABLE, LEFT (HCC): Primary | ICD-10-CM

## 2024-02-01 PROCEDURE — 99214 OFFICE O/P EST MOD 30 MIN: CPT | Performed by: NURSE PRACTITIONER

## 2024-02-01 PROCEDURE — 99213 OFFICE O/P EST LOW 20 MIN: CPT | Performed by: NURSE PRACTITIONER

## 2024-02-01 PROCEDURE — 97597 DBRDMT OPN WND 1ST 20 CM/<: CPT | Performed by: NURSE PRACTITIONER

## 2024-02-01 NOTE — CASE COMMUNICATION
Ship to XX Pt. Home  Ordering MD (home health only) Emily MCRAE    Wound 1 Right Ischium Full XX   Frequency 3 x week and Wound 2 Left Ischium Full XX     Frequency  3 X week    All items are ordered by the each unless otherwise noted.  Orders should be for a 2 week period (unless noted by insurance)      Calcium Alginates  (In place of Aquacel or Maxsorb)  Alginate with Silver [id number]           2      Sub for Puracol:  Dermacol  Ag  NOT STOCKED  [id number]            2     Foam Dressings    Sub for Allevyn:  Hydrocellular Foam Adh, 4x4 [id number]        10    Skin Prep    1 box

## 2024-02-01 NOTE — PROGRESS NOTES
"Patient ID: Fanny Schulz is a 53 y.o. female Date of Birth 1970     Chief Complaint  Chief Complaint   Patient presents with    New Patient Visit     Initial visit for bilateral hip pressure ulcers started in the hospital while in for covid.  Has had for less than a month.       Allergies  Latex    Assessment:     Diagnoses and all orders for this visit:    Pressure ulcer of ischium, unstageable, left (HCC)  -     Wound cleansing and dressings Pressure Injury Right Ischium; Future  -     Wound cleansing and dressings Pressure Injury Left;Posterior;Proximal Ischium; Future    Pressure ulcer of right ischium, stage IV (HCC)  -     Wound cleansing and dressings Pressure Injury Right Ischium; Future  -     Wound cleansing and dressings Pressure Injury Left;Posterior;Proximal Ischium; Future    Multiple sclerosis (HCC)              Debridement   Wound 01/29/24 Pressure Injury Ischium Right    Universal Protocol:  Consent: Written consent obtained.  Consent given by: patient  Time out: Immediately prior to procedure a \"time out\" was called to verify the correct patient, procedure, equipment, support staff and site/side marked as required.  Timeout called at: 2/1/2024 9:25 AM.  Patient identity confirmed: verbally with patient    Debridement Details  Performed by: NP  Debridement type: selective  Pain control: lidocaine 4%      Post-debridement measurements  Length (cm): 1.5  Width (cm): 1  Depth (cm): 1.5  Percent debrided: 100%  Surface Area (cm^2): 1.5  Area Debrided (cm^2): 1.5  Volume (cm^3): 2.25    Devitalized tissue debrided: biofilm, fibrin and slough  Instrument(s) utilized: curette  Bleeding: small  Hemostasis obtained with: pressure  Procedural pain (0-10): 0  Post-procedural pain: 0   Response to treatment: procedure was tolerated well        Plan:  1.  Initial visit.  Wound debrided.  Patient has a stage IV pressure ulcer of her right ischium not showing any signs or symptoms of infection.  Will have " Puracol Ag gently tucked into wound covered with a silicone bordered foam dressing change 3 times a week  2.  Patient has an unstageable pressure ulcer of her left ischium not showing any signs or symptoms of infection.  Will have Maxorb AG applied to wound covered with a hydrocolloid dressing change 3 times a week.  3.  Patient is continue to offload pressure from her wounds as often as possible  4.  Counseled patient on the importance of maintaining a diet high in protein to enhance wound healing  5.  Patient will follow-up in 2 weeks     Wound 01/10/24 Pressure Injury Ischium Left;Posterior;Proximal (Active)   Wound Image Images linked 02/01/24 0839   Wound Description Eschar;Yellow;Slough 02/01/24 0838   Pressure Injury Stage U 02/01/24 0838   Ruchi-wound Assessment Intact 02/01/24 0838   Wound Length (cm) 3 cm 02/01/24 0838   Wound Width (cm) 5 cm 02/01/24 0838   Wound Depth (cm) 0.1 cm 02/01/24 0838   Wound Surface Area (cm^2) 15 cm^2 02/01/24 0838   Wound Volume (cm^3) 1.5 cm^3 02/01/24 0838   Calculated Wound Volume (cm^3) 1.5 cm^3 02/01/24 0838   Drainage Amount Moderate 02/01/24 0838   Drainage Description Serosanguineous 02/01/24 0838   Non-staged Wound Description Full thickness 02/01/24 0838   Dressing Status Intact 02/01/24 0838       Wound 01/29/24 Pressure Injury Ischium Right (Active)   Wound Image Images linked 02/01/24 0836   Wound Description White;Pink;Pale 02/01/24 0834   Pressure Injury Stage 4 02/01/24 0834   Ruchi-wound Assessment Intact 02/01/24 0834   Wound Length (cm) 1.5 cm 02/01/24 0834   Wound Width (cm) 1 cm 02/01/24 0834   Wound Depth (cm) 1.5 cm 02/01/24 0834   Wound Surface Area (cm^2) 1.5 cm^2 02/01/24 0834   Wound Volume (cm^3) 2.25 cm^3 02/01/24 0834   Calculated Wound Volume (cm^3) 2.25 cm^3 02/01/24 0834   Change in Wound Size % -350 02/01/24 0834   Number of underminings 1 02/01/24 0834   Undermining 1 1.7 02/01/24 0834   Undermining 1 is depth extending from circumferential,  deepest at 3 02/01/24 0834   Drainage Amount Moderate 02/01/24 0834   Drainage Description Serosanguineous 02/01/24 0834   Non-staged Wound Description Full thickness 02/01/24 0834   Dressing Status Intact 02/01/24 0834       Wound 01/10/24 Pressure Injury Ischium Left;Posterior;Proximal (Active)   Date First Assessed/Time First Assessed: 01/10/24 0956   Primary Wound Type: Pressure Injury  Location: Ischium  Wound Location Orientation: Left;Posterior;Proximal       Wound 01/29/24 Pressure Injury Ischium Right (Active)   Date First Assessed: 01/29/24   Present on Original Admission: Yes  Primary Wound Type: Pressure Injury  Location: Ischium  Wound Location Orientation: Right       [REMOVED] Wound 07/29/21 Pressure Injury Ischium Right (Removed)   Resolved Date: 08/02/22  Date First Assessed/Time First Assessed: 07/29/21 1536   Pre-Existing Wound: Yes  Primary Wound Type: Pressure Injury  Location: Ischium  Wound Location Orientation: Right       [REMOVED] Wound 12/22/22 Other (comment) Buttocks Left (Removed)   Resolved Date: 01/29/24  Date First Assessed/Time First Assessed: 12/22/22 1104   Primary Wound Type: Other (comment)  Location: Buttocks  Wound Location Orientation: Left       [REMOVED] Wound 12/22/22 Pressure Injury Buttocks Right (Removed)   Resolved Date: 01/29/24  Date First Assessed/Time First Assessed: 12/22/22 1106   Primary Wound Type: Pressure Injury  Location: Buttocks  Wound Location Orientation: Right       [REMOVED] Wound 12/22/22 Pressure Injury Heel Right (Removed)   Resolved Date: 01/29/24  Date First Assessed/Time First Assessed: 12/22/22 1110   Primary Wound Type: Pressure Injury  Location: Heel  Wound Location Orientation: Right       [REMOVED] Wound 12/22/22 Pressure Injury Thigh Anterior;Proximal;Right (Removed)   Resolved Date: 01/29/24  Date First Assessed/Time First Assessed: 12/22/22 1112   Primary Wound Type: Pressure Injury  Location: Thigh  Wound Location Orientation:  Anterior;Proximal;Right  Wound Description (Comments): BLEEDING EXCORIATED       [REMOVED] Wound 12/22/22 Pressure Injury Thigh Anterior;Left;Proximal (Removed)   Resolved Date: 01/29/24  Date First Assessed/Time First Assessed: 12/22/22 1112   Primary Wound Type: Pressure Injury  Location: Thigh  Wound Location Orientation: Anterior;Left;Proximal  Wound Description (Comments): BLEEDING EXCORIATED       [REMOVED] Wound 12/22/22 Vagina N/A (Removed)   Resolved Date: 01/29/24  Date First Assessed/Time First Assessed: 12/22/22 1313   Location: Vagina  Wound Location Orientation: N/A       [REMOVED] Wound 01/09/24 Right (Removed)   Resolved Date: 01/10/24  Date First Assessed/Time First Assessed: 01/09/24 1832   Wound Location Orientation: Right  Wound Outcome: Healed       [REMOVED] Wound 01/09/24 Flank Left (Removed)   Resolved Date: 01/10/24  Date First Assessed/Time First Assessed: 01/09/24 1835   Location: Flank  Wound Location Orientation: Left  Wound Outcome: Healed       [REMOVED] Wound 01/09/24 Arm Left;Posterior (Removed)   Resolved Date: 01/29/24  Date First Assessed/Time First Assessed: 01/09/24 1836   Location: Arm  Wound Location Orientation: Left;Posterior       Subjective:      .    Fanny returns to the wound center today for reopening of her stage IV pressure ulcer of her right ischium and an unstageable pressure ulcer of her left ischium.  Patient has a prior history of pressure ulcers to her bilateral ischial regions which were last healed in 2021.  Patient reports she was recently hospitalized from 1/5-1/15 for COVID-19.  She reports her wounds redeveloped during her hospitalization.  Patient was seen by wound care during her admission who ordered patient a specialty P500 low-air-loss bed and was managing her wounds with silicone bordered foam dressings.  Since being discharged from the hospital patient has visiting nurses who have been coming to her home helping with dressing changes.  Visiting  nurses have been managing her wounds with silicone bordered foam dressings and alginate changing her dressings 3 times a week.  Her  who is present with her today also reports he helps with dressing changes as well.  Patient owns a motorized wheelchair with a Roho cushion which she uses 24/7 including at night.  He reports her Roho cushion is less than 5 years old and they also own 2 other Roho cushions which are also less than 5 years old.  Patient's  reports she has wedges which they use to help her offload pressure while she is sitting or reclining in her chair.  Her  works during the day and he reports patient's sister will visit during the day when patient is at home alone.  Patient is not diabetic.  She denies any pain, fevers, or chills.        The following portions of the patient's history were reviewed and updated as appropriate: She  has a past medical history of Anesthesia, Bladder stones, Chronic pain disorder, Contracture, right shoulder, Dependent on wheelchair, Edema, Elevated alkaline phosphatase level, Fernandez catheter in place, Gallbladder disease, History of femur fracture, History of UTI, MS (multiple sclerosis) ([facility]), Muscle spasticity, Muscle weakness, Muscular dystrophy ([facility]), Neck pain, Neurogenic bladder, Paralysis ([facility]), Port-A-Cath in place, Pressure injury of skin, Sacral pressure sore, Swallowing difficulty, Tinnitus, and Urinary incontinence.  She   Patient Active Problem List    Diagnosis Date Noted    Chronic lower extremity edema 01/08/2024    Pneumonia due to COVID-19 virus 01/06/2024    Tinnitus of both ears 11/21/2023    Hypophonia 11/21/2023    Peripheral edema 02/20/2023    Continuous opioid dependence (HCC) 09/08/2022    Increased endometrial stripe thickness 03/10/2021    Ureteral calculus, left 11/03/2020    Alkaline phosphatase elevation 10/28/2020    Abnormal thyroid function test 10/28/2020    Candidal vaginitis 10/19/2020    Hydronephrosis with urinary  obstruction due to ureteral calculus 10/02/2020    Thrombocytopenia (HCC) 10/01/2020    Serum total bilirubin elevated 10/01/2020    Bacteriuria with pyuria 09/30/2020    Medication management contract signed 02/21/2020    Screening mammogram, encounter for 05/29/2019    Health care maintenance 05/29/2019    Allergic rhinitis 03/26/2018    Functional quadriplegia secondary to MS ([facility]) 01/25/2018    Suprapubic catheter ([facility]) 11/17/2017    Nephrolithiasis 04/22/2016    Bladder calculus 09/02/2015    Muscle spasticity 04/21/2015    Vitamin B12 deficiency 01/16/2015    Constipation 01/14/2015    Hyperglycemia 11/25/2014    Familial hypercholesterolemia 11/25/2014    Recurrent major depressive disorder, in full remission ([facility]) 01/22/2014    Lymphedema 01/22/2014    Spinal stenosis of cervical region 01/22/2014    Urinary incontinence 01/22/2014    Vitamin D deficiency 11/18/2013    Dysphagia 11/04/2013    Dizziness 11/04/2013    Muscle weakness 11/04/2013    Neurogenic bladder 11/04/2013    Sleep disorder 11/04/2013    Tremor 11/04/2013    Vision loss 11/04/2013    Multiple sclerosis ([facility]) 10/21/2013     She  has a past surgical history that includes Cholecystectomy; Kidney stone surgery; Portacath placement (Left); Esophagogastroduodenoscopy; Bladder stone removal (N/A, 12/4/2017); Bladder surgery; Suprapubic cystostomy (02/25/2014); Cystoscopy (06/15/2020); [place] retrograde pyelogram (10/2/2020); pr cysto bladder w/ureteral catheterization (Left, 10/2/2020); pr cysto/uretero w/lithotripsy &indwell stent insrt (Left, 11/3/2020); [place] retrograde pyelogram (11/3/2020); and pr litholapaxy smpl/sm <2.5 cm (N/A, 12/22/2022).  Her family history includes Alzheimer's disease in her family; COPD in her father; Diabetes in her family and mother; Heart disease in her family; Hyperlipidemia in her mother and sister; Hypertension in her family, father, and mother.  She  reports that she has never smoked. She has never used smokeless  tobacco. She reports that she does not currently use alcohol. She reports that she does not use drugs.  Current Outpatient Medications   Medication Sig Dispense Refill    baclofen 20 mg tablet TAKE 1 TABLET BY MOUTH 5 TIMES AS NEEDED 450 tablet 3    clotrimazole (LOTRIMIN) 1 % cream Apply topically 2 (two) times a day as needed (yeast rash) (Patient not taking: Reported on 11/21/2023) 30 g 0    DULoxetine (CYMBALTA) 20 mg capsule Take 1 capsule (20 mg total) by mouth daily 90 capsule 3    furosemide (LASIX) 20 mg tablet Take 1 tablet (20 mg total) by mouth daily 30 tablet 0    oxybutynin (DITROPAN-XL) 10 MG 24 hr tablet Take 1 tablet (10 mg total) by mouth daily 90 tablet 3    oxyCODONE-acetaminophen (PERCOCET) 5-325 mg per tablet Take 1 tablet by mouth every 4 (four) hours as needed for moderate pain for up to 10 days Max Daily Amount: 6 tablets 15 tablet 0    rosuvastatin (CRESTOR) 5 mg tablet TAKE 1 TABLET (5 MG TOTAL) BY MOUTH DAILY. 90 tablet 1     No current facility-administered medications for this visit.     She is allergic to latex..    Review of Systems   Constitutional: Negative.    HENT:  Negative for ear pain and hearing loss.    Eyes:  Negative for pain.   Respiratory:  Negative for chest tightness and shortness of breath.    Cardiovascular:  Positive for leg swelling. Negative for chest pain and palpitations.   Gastrointestinal:  Negative for diarrhea, nausea and vomiting.   Genitourinary:  Negative for dysuria.   Musculoskeletal:  Positive for gait problem.   Skin:  Positive for wound.   Neurological:  Positive for numbness. Negative for tremors and weakness.   Psychiatric/Behavioral:  Negative for behavioral problems, confusion and suicidal ideas.          Objective:       Wound 01/10/24 Pressure Injury Ischium Left;Posterior;Proximal (Active)   Wound Image Images linked 02/01/24 0839   Wound Description Eschar;Yellow;Slough 02/01/24 0838   Pressure Injury Stage U 02/01/24 0838   Ruchi-wound  Assessment Intact 02/01/24 0838   Wound Length (cm) 3 cm 02/01/24 0838   Wound Width (cm) 5 cm 02/01/24 0838   Wound Depth (cm) 0.1 cm 02/01/24 0838   Wound Surface Area (cm^2) 15 cm^2 02/01/24 0838   Wound Volume (cm^3) 1.5 cm^3 02/01/24 0838   Calculated Wound Volume (cm^3) 1.5 cm^3 02/01/24 0838   Drainage Amount Moderate 02/01/24 0838   Drainage Description Serosanguineous 02/01/24 0838   Non-staged Wound Description Full thickness 02/01/24 0838   Dressing Status Intact 02/01/24 0838       Wound 01/29/24 Pressure Injury Ischium Right (Active)   Wound Image Images linked 02/01/24 0836   Wound Description White;Pink;Pale 02/01/24 0834   Pressure Injury Stage 4 02/01/24 0834   Ruchi-wound Assessment Intact 02/01/24 0834   Wound Length (cm) 1.5 cm 02/01/24 0834   Wound Width (cm) 1 cm 02/01/24 0834   Wound Depth (cm) 1.5 cm 02/01/24 0834   Wound Surface Area (cm^2) 1.5 cm^2 02/01/24 0834   Wound Volume (cm^3) 2.25 cm^3 02/01/24 0834   Calculated Wound Volume (cm^3) 2.25 cm^3 02/01/24 0834   Change in Wound Size % -350 02/01/24 0834   Number of underminings 1 02/01/24 0834   Undermining 1 1.7 02/01/24 0834   Undermining 1 is depth extending from circumferential, deepest at 3 02/01/24 0834   Drainage Amount Moderate 02/01/24 0834   Drainage Description Serosanguineous 02/01/24 0834   Non-staged Wound Description Full thickness 02/01/24 0834   Dressing Status Intact 02/01/24 0834       Temp 97.7 °F (36.5 °C)     Physical Exam  Vitals and nursing note reviewed.   Constitutional:       General: She is not in acute distress.     Appearance: Normal appearance. She is overweight.   HENT:      Head: Normocephalic and atraumatic.   Eyes:      General:         Right eye: No discharge.         Left eye: No discharge.   Pulmonary:      Effort: Pulmonary effort is normal. No respiratory distress.   Musculoskeletal:      Cervical back: Normal range of motion and neck supple. No rigidity.      Right lower leg: Edema present.       Left lower leg: Edema present.      Comments: Wheelchair-bound   Skin:     General: Skin is warm and dry.      Findings: Wound present. No erythema.          Neurological:      General: No focal deficit present.      Mental Status: She is alert and oriented to person, place, and time. Mental status is at baseline.   Psychiatric:         Mood and Affect: Mood normal.         Behavior: Behavior normal.         Thought Content: Thought content normal.         Judgment: Judgment normal.                     Wound Instructions:  Orders Placed This Encounter   Procedures    Wound cleansing and dressings Pressure Injury Right Ischium     Wash your hands with soap and water.  Remove old dressing, discard into plastic bag and place in trash.  Cleanse the wound with soap and water prior to applying a clean dressing. Do not use tissue or cotton balls. Do not scrub the wound. Pat dry using gauze.  Shower yes   Skin prep to periwound  Apply Puracol AG to the right wound.  Cover with bordered foam dressing  Change dressing 3 x week     Standing Status:   Future     Standing Expiration Date:   2/1/2025    Wound cleansing and dressings Pressure Injury Left;Posterior;Proximal Ischium     Wash your hands with soap and water.  Remove old dressing, discard into plastic bag and place in trash.  Cleanse the wound with soap and water prior to applying a clean dressing. Do not use tissue or cotton balls. Do not scrub the wound. Pat dry using gauze.  Shower yes   Skin prep to periwound  Apply silver alginate to the left wound.  Cover with border foam dressing  Change dressing 3 x week    Above orders done today.     Standing Status:   Future     Standing Expiration Date:   2/1/2025        Diagnosis ICD-10-CM Associated Orders   1. Pressure ulcer of ischium, unstageable, left ([facility])  L89.320 Wound cleansing and dressings Pressure Injury Right Ischium     Wound cleansing and dressings Pressure Injury Left;Posterior;Proximal Ischium      2.  Pressure ulcer of right ischium, stage IV (HCC)  L89.314 Wound cleansing and dressings Pressure Injury Right Ischium     Wound cleansing and dressings Pressure Injury Left;Posterior;Proximal Ischium      3. Multiple sclerosis (HCC)  G35

## 2024-02-01 NOTE — PATIENT INSTRUCTIONS
Orders Placed This Encounter   Procedures    Wound cleansing and dressings Pressure Injury Right Ischium     Wash your hands with soap and water.  Remove old dressing, discard into plastic bag and place in trash.  Cleanse the wound with soap and water prior to applying a clean dressing. Do not use tissue or cotton balls. Do not scrub the wound. Pat dry using gauze.  Shower yes   Skin prep to periwound  Apply Puracol AG to the right wound.  Cover with bordered foam dressing  Change dressing 3 x week     Standing Status:   Future     Standing Expiration Date:   2/1/2025    Wound cleansing and dressings Pressure Injury Left;Posterior;Proximal Ischium     Wash your hands with soap and water.  Remove old dressing, discard into plastic bag and place in trash.  Cleanse the wound with soap and water prior to applying a clean dressing. Do not use tissue or cotton balls. Do not scrub the wound. Pat dry using gauze.  Shower yes   Skin prep to periwound  Apply silver alginate to the left wound.  Cover with border foam dressing  Change dressing 3 x week    Above orders done today.     Standing Status:   Future     Standing Expiration Date:   2/1/2025

## 2024-02-02 ENCOUNTER — HOME CARE VISIT (OUTPATIENT)
Dept: HOME HEALTH SERVICES | Facility: HOME HEALTHCARE | Age: 54
End: 2024-02-02
Payer: MEDICARE

## 2024-02-05 ENCOUNTER — HOME CARE VISIT (OUTPATIENT)
Dept: HOME HEALTH SERVICES | Facility: HOME HEALTHCARE | Age: 54
End: 2024-02-05
Payer: MEDICARE

## 2024-02-05 VITALS
SYSTOLIC BLOOD PRESSURE: 104 MMHG | OXYGEN SATURATION: 100 % | HEART RATE: 85 BPM | DIASTOLIC BLOOD PRESSURE: 44 MMHG | TEMPERATURE: 98.1 F

## 2024-02-05 DIAGNOSIS — M48.02 SPINAL STENOSIS OF CERVICAL REGION: Primary | ICD-10-CM

## 2024-02-05 PROCEDURE — G0299 HHS/HOSPICE OF RN EA 15 MIN: HCPCS

## 2024-02-05 PROCEDURE — 10330064 CATH TRAY, FOLEY SYR 10CC (20/CS)

## 2024-02-05 PROCEDURE — G0180 MD CERTIFICATION HHA PATIENT: HCPCS | Performed by: FAMILY MEDICINE

## 2024-02-05 RX ORDER — GABAPENTIN 100 MG/1
CAPSULE ORAL
Qty: 90 CAPSULE | Refills: 5 | Status: SHIPPED | OUTPATIENT
Start: 2024-02-05

## 2024-02-07 ENCOUNTER — HOME CARE VISIT (OUTPATIENT)
Dept: HOME HEALTH SERVICES | Facility: HOME HEALTHCARE | Age: 54
End: 2024-02-07
Payer: MEDICARE

## 2024-02-07 VITALS
HEART RATE: 86 BPM | RESPIRATION RATE: 18 BRPM | DIASTOLIC BLOOD PRESSURE: 72 MMHG | SYSTOLIC BLOOD PRESSURE: 128 MMHG | OXYGEN SATURATION: 98 % | TEMPERATURE: 98.7 F

## 2024-02-07 PROCEDURE — G0299 HHS/HOSPICE OF RN EA 15 MIN: HCPCS

## 2024-02-09 ENCOUNTER — HOME CARE VISIT (OUTPATIENT)
Dept: HOME HEALTH SERVICES | Facility: HOME HEALTHCARE | Age: 54
End: 2024-02-09
Payer: MEDICARE

## 2024-02-09 VITALS
DIASTOLIC BLOOD PRESSURE: 60 MMHG | HEART RATE: 83 BPM | OXYGEN SATURATION: 97 % | SYSTOLIC BLOOD PRESSURE: 110 MMHG | TEMPERATURE: 98.3 F

## 2024-02-09 DIAGNOSIS — M48.02 SPINAL STENOSIS OF CERVICAL REGION: ICD-10-CM

## 2024-02-09 PROCEDURE — G0299 HHS/HOSPICE OF RN EA 15 MIN: HCPCS

## 2024-02-09 NOTE — TELEPHONE ENCOUNTER
Reason for call:   [x] Refill   [] Prior Auth  [] Other:     Office:   [x] PCP/Provider -   [] Specialty/Provider -     Medication: Oxycodone-acetaminophen 5 - 325 mg, take 1 tablet by mouth every 4 hours as needed       Pharmacy: [facility] [place], [place]     Does the patient have enough for 3 days?   [] Yes   [x] No - Send as HP to POD

## 2024-02-12 ENCOUNTER — OFFICE VISIT (OUTPATIENT)
Dept: WOUND CARE | Facility: HOSPITAL | Age: 54
End: 2024-02-12
Payer: COMMERCIAL

## 2024-02-12 ENCOUNTER — HOME CARE VISIT (OUTPATIENT)
Dept: HOME HEALTH SERVICES | Facility: HOME HEALTHCARE | Age: 54
End: 2024-02-12
Payer: MEDICARE

## 2024-02-12 VITALS — TEMPERATURE: 97.8 F

## 2024-02-12 DIAGNOSIS — L89.314 PRESSURE ULCER OF RIGHT ISCHIUM, STAGE IV (HCC): ICD-10-CM

## 2024-02-12 DIAGNOSIS — G35 MULTIPLE SCLEROSIS (HCC): ICD-10-CM

## 2024-02-12 DIAGNOSIS — L89.320 PRESSURE ULCER OF ISCHIUM, UNSTAGEABLE, LEFT (HCC): Primary | ICD-10-CM

## 2024-02-12 PROCEDURE — 11042 DBRDMT SUBQ TIS 1ST 20SQCM/<: CPT | Performed by: NURSE PRACTITIONER

## 2024-02-12 RX ORDER — LIDOCAINE HYDROCHLORIDE 40 MG/ML
5 SOLUTION TOPICAL ONCE
Status: COMPLETED | OUTPATIENT
Start: 2024-02-12 | End: 2024-02-12

## 2024-02-12 RX ORDER — OXYCODONE HYDROCHLORIDE AND ACETAMINOPHEN 5; 325 MG/1; MG/1
1 TABLET ORAL EVERY 4 HOURS PRN
Qty: 50 TABLET | Refills: 0 | Status: SHIPPED | OUTPATIENT
Start: 2024-02-12 | End: 2024-02-22

## 2024-02-12 RX ADMIN — LIDOCAINE HYDROCHLORIDE 5 ML: 40 SOLUTION TOPICAL at 09:25

## 2024-02-12 NOTE — PATIENT INSTRUCTIONS
Orders Placed This Encounter   Procedures    Debridement     This order was created via procedure documentation    Debridement     This order was created via procedure documentation    Wound cleansing and dressings     · Wound cleansing and dressings Pressure Injury Right Ischium      Wash your hands with soap and water.  Remove old dressing, discard into plastic bag and place in trash.  Cleanse the wound with prophase prior to applying a clean dressing. Do not use tissue or cotton balls. Do not scrub the wound. Pat dry using gauze.  Shower yes   Skin prep to periwound  Apply Puracol AG to the right wound.  Cover with silicone border dressing  Change dressing 3 x week              · Wound cleansing and dressings Pressure Injury Left;Posterior;Proximal Ischium      Wash your hands with soap and water.  Remove old dressing, discard into plastic bag and place in trash.  Cleanse the wound with prophase prior to applying a clean dressing. Do not use tissue or cotton balls. Do not scrub the wound. Pat dry using gauze.  Shower yes   Skin prep to periwound  Apply silver alginate to the left wound.  Cover with hydrocolloid dressing  Change dressing 3 x week     Above orders done today.     Standing Status:   Future     Standing Expiration Date:   2/12/2025

## 2024-02-12 NOTE — TELEPHONE ENCOUNTER
PDMP reviewed patient does have a narcotic updated appointment schedule 2/21/2024.  Please call and remind patient she needs to make this appointment as I cannot keep refilling her narcotic prescriptions without this office visit

## 2024-02-12 NOTE — PROGRESS NOTES
"Patient ID: Fanny Schulz is a 53 y.o. female Date of Birth 1970     Chief Complaint  Chief Complaint   Patient presents with    Follow Up Wound Care Visit     Here for right and left ischial wounds.  Stays in motorized wheelchair for exam, turns to side.       Allergies  Latex    Assessment:     Diagnoses and all orders for this visit:    Pressure ulcer of ischium, unstageable, left (HCC)  -     Cancel: Wound cleansing and dressings; Future  -     Debridement Pressure Injury Left;Posterior;Proximal Ischium  -     Wound cleansing and dressings; Future  -     lidocaine (XYLOCAINE) 4 % topical solution 5 mL    Pressure ulcer of right ischium, stage IV (HCC)  -     Cancel: Wound cleansing and dressings; Future  -     Debridement Pressure Injury Right Ischium  -     Wound cleansing and dressings; Future  -     lidocaine (XYLOCAINE) 4 % topical solution 5 mL    Multiple sclerosis (HCC)              Debridement   Wound 01/10/24 Pressure Injury Ischium Left;Posterior;Proximal    Universal Protocol:  Consent: Written consent obtained.  Consent given by: patient  Time out: Immediately prior to procedure a \"time out\" was called to verify the correct patient, procedure, equipment, support staff and site/side marked as required.  Timeout called at: 2/12/2024 9:27 AM.  Patient identity confirmed: verbally with patient    Debridement Details  Performed by: NP  Debridement type: surgical  Level of debridement: subcutaneous tissue  Pain control: lidocaine 4%      Post-debridement measurements  Length (cm): 3  Width (cm): 4.8  Depth (cm): 0.2  Percent debrided: 100%  Surface Area (cm^2): 14.4  Area Debrided (cm^2): 14.4  Volume (cm^3): 2.88    Tissue and other material debrided: subcutaneous tissue  Devitalized tissue debrided: biofilm, fibrin, slough and eschar  Instrument(s) utilized: blade, forceps and curette  Bleeding: medium  Hemostasis obtained with: pressure  Procedural pain (0-10): 0  Post-procedural pain: 0 " "  Response to treatment: procedure was tolerated well    Debridement   Wound 01/29/24 Pressure Injury Ischium Right    Universal Protocol:  Consent: Written consent obtained.  Consent given by: patient  Time out: Immediately prior to procedure a \"time out\" was called to verify the correct patient, procedure, equipment, support staff and site/side marked as required.  Timeout called at: 2/12/2024 9:28 AM.  Patient identity confirmed: verbally with patient    Debridement Details  Performed by: NP  Debridement type: surgical  Level of debridement: subcutaneous tissue  Pain control: lidocaine 4%      Post-debridement measurements  Length (cm): 1.3  Width (cm): 1.3  Depth (cm): 0.9  Percent debrided: 100%  Surface Area (cm^2): 1.69  Area Debrided (cm^2): 1.69  Volume (cm^3): 1.52    Tissue and other material debrided: subcutaneous tissue  Devitalized tissue debrided: biofilm, fibrin and slough  Instrument(s) utilized: curette  Bleeding: medium  Hemostasis obtained with: pressure  Procedural pain (0-10): 0  Post-procedural pain: 0   Response to treatment: procedure was tolerated well        Plan:  1.  F/u visit.  Wounds debrided.  Wounds measuring slightly smaller.  Continue current plan of care.  Patient will follow-up in 2 weeks     Wound 01/10/24 Pressure Injury Ischium Left;Posterior;Proximal (Active)   Wound Image Images linked 02/12/24 0919   Wound Description Eschar;Slough 02/12/24 0911   Ruchi-wound Assessment Intact 02/12/24 0911   Wound Length (cm) 3 cm 02/12/24 0911   Wound Width (cm) 4.8 cm 02/12/24 0911   Wound Depth (cm) 0.1 cm 02/12/24 0911   Wound Surface Area (cm^2) 14.4 cm^2 02/12/24 0911   Wound Volume (cm^3) 1.44 cm^3 02/12/24 0911   Calculated Wound Volume (cm^3) 1.44 cm^3 02/12/24 0911   Drainage Amount Moderate 02/12/24 0911   Drainage Description Serosanguineous 02/12/24 0911   Non-staged Wound Description Full thickness 02/12/24 0911   Dressing Status Intact 02/12/24 0911       Wound 01/29/24 " Pressure Injury Ischium Right (Active)   Wound Image Images linked 02/12/24 0919   Wound Description White;Granulation tissue 02/12/24 0906   Ruchi-wound Assessment Intact 02/12/24 0906   Wound Length (cm) 1.3 cm 02/12/24 0906   Wound Width (cm) 1.3 cm 02/12/24 0906   Wound Depth (cm) 0.8 cm 02/12/24 0906   Wound Surface Area (cm^2) 1.69 cm^2 02/12/24 0906   Wound Volume (cm^3) 1.352 cm^3 02/12/24 0906   Calculated Wound Volume (cm^3) 1.35 cm^3 02/12/24 0906   Change in Wound Size % -170 02/12/24 0906   Drainage Amount Moderate 02/12/24 0906   Drainage Description Serosanguineous 02/12/24 0906   Non-staged Wound Description Full thickness 02/12/24 0906   Dressing Status Intact 02/12/24 0906       Wound 01/10/24 Pressure Injury Ischium Left;Posterior;Proximal (Active)   Date First Assessed/Time First Assessed: 01/10/24 0956   Primary Wound Type: Pressure Injury  Location: Ischium  Wound Location Orientation: Left;Posterior;Proximal       Wound 01/29/24 Pressure Injury Ischium Right (Active)   Date First Assessed: 01/29/24   Present on Original Admission: Yes  Primary Wound Type: Pressure Injury  Location: Ischium  Wound Location Orientation: Right       [REMOVED] Wound 07/29/21 Pressure Injury Ischium Right (Removed)   Resolved Date: 08/02/22  Date First Assessed/Time First Assessed: 07/29/21 1536   Pre-Existing Wound: Yes  Primary Wound Type: Pressure Injury  Location: Ischium  Wound Location Orientation: Right       [REMOVED] Wound 12/22/22 Other (comment) Buttocks Left (Removed)   Resolved Date: 01/29/24  Date First Assessed/Time First Assessed: 12/22/22 1104   Primary Wound Type: Other (comment)  Location: Buttocks  Wound Location Orientation: Left       [REMOVED] Wound 12/22/22 Pressure Injury Buttocks Right (Removed)   Resolved Date: 01/29/24  Date First Assessed/Time First Assessed: 12/22/22 1106   Primary Wound Type: Pressure Injury  Location: Buttocks  Wound Location Orientation: Right       [REMOVED] Wound  12/22/22 Pressure Injury Heel Right (Removed)   Resolved Date: 01/29/24  Date First Assessed/Time First Assessed: 12/22/22 1110   Primary Wound Type: Pressure Injury  Location: Heel  Wound Location Orientation: Right       [REMOVED] Wound 12/22/22 Pressure Injury Thigh Anterior;Proximal;Right (Removed)   Resolved Date: 01/29/24  Date First Assessed/Time First Assessed: 12/22/22 1112   Primary Wound Type: Pressure Injury  Location: Thigh  Wound Location Orientation: Anterior;Proximal;Right  Wound Description (Comments): BLEEDING EXCORIATED       [REMOVED] Wound 12/22/22 Pressure Injury Thigh Anterior;Left;Proximal (Removed)   Resolved Date: 01/29/24  Date First Assessed/Time First Assessed: 12/22/22 1112   Primary Wound Type: Pressure Injury  Location: Thigh  Wound Location Orientation: Anterior;Left;Proximal  Wound Description (Comments): BLEEDING EXCORIATED       [REMOVED] Wound 12/22/22 Vagina N/A (Removed)   Resolved Date: 01/29/24  Date First Assessed/Time First Assessed: 12/22/22 1313   Location: Vagina  Wound Location Orientation: N/A       [REMOVED] Wound 01/09/24 Right (Removed)   Resolved Date: 01/10/24  Date First Assessed/Time First Assessed: 01/09/24 1832   Wound Location Orientation: Right  Wound Outcome: Healed       [REMOVED] Wound 01/09/24 Flank Left (Removed)   Resolved Date: 01/10/24  Date First Assessed/Time First Assessed: 01/09/24 1835   Location: Flank  Wound Location Orientation: Left  Wound Outcome: Healed       [REMOVED] Wound 01/09/24 Arm Left;Posterior (Removed)   Resolved Date: 01/29/24  Date First Assessed/Time First Assessed: 01/09/24 1836   Location: Arm  Wound Location Orientation: Left;Posterior       Subjective:      .    F/you visit for pressure ulcers of her bilateral ischium's.  No new complaints.  She denies any pain, fevers, or chills.        The following portions of the patient's history were reviewed and updated as appropriate: She  has a past medical history of Anesthesia,  Bladder stones, Chronic pain disorder, Contracture, right shoulder, Dependent on wheelchair, Edema, Elevated alkaline phosphatase level, Fernandez catheter in place, Gallbladder disease, History of femur fracture, History of UTI, MS (multiple sclerosis) ([facility]), Muscle spasticity, Muscle weakness, Muscular dystrophy ([facility]), Neck pain, Neurogenic bladder, Paralysis ([facility]), Port-A-Cath in place, Pressure injury of skin, Sacral pressure sore, Swallowing difficulty, Tinnitus, and Urinary incontinence.  She   Patient Active Problem List    Diagnosis Date Noted    Chronic lower extremity edema 01/08/2024    Pneumonia due to COVID-19 virus 01/06/2024    Tinnitus of both ears 11/21/2023    Hypophonia 11/21/2023    Peripheral edema 02/20/2023    Continuous opioid dependence ([facility]) 09/08/2022    Increased endometrial stripe thickness 03/10/2021    Ureteral calculus, left 11/03/2020    Alkaline phosphatase elevation 10/28/2020    Abnormal thyroid function test 10/28/2020    Candidal vaginitis 10/19/2020    Hydronephrosis with urinary obstruction due to ureteral calculus 10/02/2020    Thrombocytopenia ([facility]) 10/01/2020    Serum total bilirubin elevated 10/01/2020    Bacteriuria with pyuria 09/30/2020    Medication management contract signed 02/21/2020    Screening mammogram, encounter for 05/29/2019    Health care maintenance 05/29/2019    Allergic rhinitis 03/26/2018    Functional quadriplegia secondary to MS ([facility]) 01/25/2018    Suprapubic catheter ([facility]) 11/17/2017    Nephrolithiasis 04/22/2016    Bladder calculus 09/02/2015    Muscle spasticity 04/21/2015    Vitamin B12 deficiency 01/16/2015    Constipation 01/14/2015    Hyperglycemia 11/25/2014    Familial hypercholesterolemia 11/25/2014    Recurrent major depressive disorder, in full remission ([facility]) 01/22/2014    Lymphedema 01/22/2014    Spinal stenosis of cervical region 01/22/2014    Urinary incontinence 01/22/2014    Vitamin D deficiency 11/18/2013    Dysphagia 11/04/2013     Dizziness 11/04/2013    Muscle weakness 11/04/2013    Neurogenic bladder 11/04/2013    Sleep disorder 11/04/2013    Tremor 11/04/2013    Vision loss 11/04/2013    Multiple sclerosis (HCC) 10/21/2013     She  has a past surgical history that includes Cholecystectomy; Kidney stone surgery; Portacath placement (Left); Esophagogastroduodenoscopy; Bladder stone removal (N/A, 12/4/2017); Bladder surgery; Suprapubic cystostomy (02/25/2014); Cystoscopy (06/15/2020); [place] retrograde pyelogram (10/2/2020); pr cysto bladder w/ureteral catheterization (Left, 10/2/2020); pr cysto/uretero w/lithotripsy &indwell stent insrt (Left, 11/3/2020); [place] retrograde pyelogram (11/3/2020); and pr litholapaxy smpl/sm <2.5 cm (N/A, 12/22/2022).  Her family history includes Alzheimer's disease in her family; COPD in her father; Diabetes in her family and mother; Heart disease in her family; Hyperlipidemia in her mother and sister; Hypertension in her family, father, and mother.  She  reports that she has never smoked. She has never used smokeless tobacco. She reports that she does not currently use alcohol. She reports that she does not use drugs.  Current Outpatient Medications   Medication Sig Dispense Refill    oxyCODONE-acetaminophen (PERCOCET) 5-325 mg per tablet Take 1 tablet by mouth every 4 (four) hours as needed for moderate pain for up to 10 days Max Daily Amount: 6 tablets 50 tablet 0    baclofen 20 mg tablet TAKE 1 TABLET BY MOUTH 5 TIMES AS NEEDED 450 tablet 3    clotrimazole (LOTRIMIN) 1 % cream Apply topically 2 (two) times a day as needed (yeast rash) (Patient not taking: Reported on 11/21/2023) 30 g 0    DULoxetine (CYMBALTA) 20 mg capsule Take 1 capsule (20 mg total) by mouth daily 90 capsule 3    furosemide (LASIX) 20 mg tablet Take 1 tablet (20 mg total) by mouth daily 30 tablet 0    gabapentin (NEURONTIN) 100 mg capsule Take 1 tablet at bedtime tonight, 1 tablet twice a day tomorrow, and then 1 tablet 3 times daily thereafter  90 capsule 5    oxybutynin (DITROPAN-XL) 10 MG 24 hr tablet Take 1 tablet (10 mg total) by mouth daily 90 tablet 3    rosuvastatin (CRESTOR) 5 mg tablet TAKE 1 TABLET (5 MG TOTAL) BY MOUTH DAILY. 90 tablet 1     No current facility-administered medications for this visit.     She is allergic to latex..    Review of Systems   Constitutional: Negative.    HENT:  Negative for ear pain and hearing loss.    Eyes:  Negative for pain.   Respiratory:  Negative for chest tightness and shortness of breath.    Cardiovascular:  Negative for chest pain, palpitations and leg swelling.   Gastrointestinal:  Negative for diarrhea, nausea and vomiting.   Genitourinary:  Negative for dysuria.   Musculoskeletal:  Positive for gait problem.   Skin:  Positive for wound.   Neurological:  Positive for numbness. Negative for tremors and weakness.   Psychiatric/Behavioral:  Negative for behavioral problems, confusion and suicidal ideas.          Objective:       Wound 01/10/24 Pressure Injury Ischium Left;Posterior;Proximal (Active)   Wound Image Images linked 02/12/24 0919   Wound Description Eschar;Slough 02/12/24 0911   Ruchi-wound Assessment Intact 02/12/24 0911   Wound Length (cm) 3 cm 02/12/24 0911   Wound Width (cm) 4.8 cm 02/12/24 0911   Wound Depth (cm) 0.1 cm 02/12/24 0911   Wound Surface Area (cm^2) 14.4 cm^2 02/12/24 0911   Wound Volume (cm^3) 1.44 cm^3 02/12/24 0911   Calculated Wound Volume (cm^3) 1.44 cm^3 02/12/24 0911   Drainage Amount Moderate 02/12/24 0911   Drainage Description Serosanguineous 02/12/24 0911   Non-staged Wound Description Full thickness 02/12/24 0911   Dressing Status Intact 02/12/24 0911       Wound 01/29/24 Pressure Injury Ischium Right (Active)   Wound Image Images linked 02/12/24 0919   Wound Description White;Granulation tissue 02/12/24 0906   Ruchi-wound Assessment Intact 02/12/24 0906   Wound Length (cm) 1.3 cm 02/12/24 0906   Wound Width (cm) 1.3 cm 02/12/24 0906   Wound Depth (cm) 0.8 cm 02/12/24  0906   Wound Surface Area (cm^2) 1.69 cm^2 02/12/24 0906   Wound Volume (cm^3) 1.352 cm^3 02/12/24 0906   Calculated Wound Volume (cm^3) 1.35 cm^3 02/12/24 0906   Change in Wound Size % -170 02/12/24 0906   Drainage Amount Moderate 02/12/24 0906   Drainage Description Serosanguineous 02/12/24 0906   Non-staged Wound Description Full thickness 02/12/24 0906   Dressing Status Intact 02/12/24 0906       Temp 97.8 °F (36.6 °C)                       Wound Instructions:  Orders Placed This Encounter   Procedures    Debridement Pressure Injury Left;Posterior;Proximal Ischium     This order was created via procedure documentation    Debridement Pressure Injury Right Ischium     This order was created via procedure documentation    Wound cleansing and dressings     · Wound cleansing and dressings Pressure Injury Right Ischium      Wash your hands with soap and water.  Remove old dressing, discard into plastic bag and place in trash.  Cleanse the wound with prophase prior to applying a clean dressing. Do not use tissue or cotton balls. Do not scrub the wound. Pat dry using gauze.  Shower yes   Skin prep to periwound  Apply Puracol AG to the right wound.  Cover with silicone border dressing  Change dressing 3 x week              · Wound cleansing and dressings Pressure Injury Left;Posterior;Proximal Ischium      Wash your hands with soap and water.  Remove old dressing, discard into plastic bag and place in trash.  Cleanse the wound with prophase prior to applying a clean dressing. Do not use tissue or cotton balls. Do not scrub the wound. Pat dry using gauze.  Shower yes   Skin prep to periwound  Apply silver alginate to the left wound.  Cover with hydrocolloid dressing  Change dressing 3 x week     Above orders done today.     Standing Status:   Future     Standing Expiration Date:   2/12/2025        Diagnosis ICD-10-CM Associated Orders   1. Pressure ulcer of ischium, unstageable, left ([facility])  L89.320 Debridement Pressure  Injury Left;Posterior;Proximal Ischium     Wound cleansing and dressings     lidocaine (XYLOCAINE) 4 % topical solution 5 mL      2. Pressure ulcer of right ischium, stage IV ([facility])  L89.314 Debridement Pressure Injury Right Ischium     Wound cleansing and dressings     lidocaine (XYLOCAINE) 4 % topical solution 5 mL      3. Multiple sclerosis ([facility])  G35

## 2024-02-14 ENCOUNTER — HOME CARE VISIT (OUTPATIENT)
Dept: HOME HEALTH SERVICES | Facility: HOME HEALTHCARE | Age: 54
End: 2024-02-14
Payer: MEDICARE

## 2024-02-14 PROCEDURE — G0299 HHS/HOSPICE OF RN EA 15 MIN: HCPCS

## 2024-02-14 PROCEDURE — 10330064 DRESSING, HYDROCOLLOID THIN STR 4"X4" (1

## 2024-02-14 NOTE — CASE COMMUNICATION
Ship to [facility] Pt. Home   Ordering MD (home health only)Dr Martinez    Wound 1 Left Ischium Full XX      Frequency 3 x weekly    All items are ordered by the each unless otherwise noted.  Orders should be for a 2 week period (unless noted by insurance)    Hydrocolloids   Thin [id number]       8

## 2024-02-15 VITALS
RESPIRATION RATE: 18 BRPM | HEART RATE: 76 BPM | DIASTOLIC BLOOD PRESSURE: 62 MMHG | OXYGEN SATURATION: 100 % | SYSTOLIC BLOOD PRESSURE: 110 MMHG

## 2024-02-16 ENCOUNTER — HOME CARE VISIT (OUTPATIENT)
Dept: HOME HEALTH SERVICES | Facility: HOME HEALTHCARE | Age: 54
End: 2024-02-16
Payer: MEDICARE

## 2024-02-16 VITALS
OXYGEN SATURATION: 95 % | HEART RATE: 78 BPM | DIASTOLIC BLOOD PRESSURE: 64 MMHG | TEMPERATURE: 98.3 F | SYSTOLIC BLOOD PRESSURE: 104 MMHG

## 2024-02-16 PROCEDURE — G0299 HHS/HOSPICE OF RN EA 15 MIN: HCPCS

## 2024-02-16 PROCEDURE — G0156 HHCP-SVS OF AIDE,EA 15 MIN: HCPCS

## 2024-02-18 PROCEDURE — 10330064 DRESSING, CALCIUM ALGINATE AG SHEET 4"X4

## 2024-02-18 PROCEDURE — 10330064 FOAM, ADH SIL W/BORDER 4"X4" (10/BX 20BX

## 2024-02-18 PROCEDURE — 10330064 DRESSING, HYDROCOLLOID THIN STR 4"X4" (1

## 2024-02-19 ENCOUNTER — HOME CARE VISIT (OUTPATIENT)
Dept: HOME HEALTH SERVICES | Facility: HOME HEALTHCARE | Age: 54
End: 2024-02-19
Payer: MEDICARE

## 2024-02-19 ENCOUNTER — TELEPHONE (OUTPATIENT)
Age: 54
End: 2024-02-19

## 2024-02-19 VITALS
SYSTOLIC BLOOD PRESSURE: 104 MMHG | DIASTOLIC BLOOD PRESSURE: 64 MMHG | TEMPERATURE: 98.2 F | HEART RATE: 79 BPM | OXYGEN SATURATION: 100 %

## 2024-02-19 DIAGNOSIS — R53.2 FUNCTIONAL QUADRIPLEGIA SECONDARY TO MS (HCC): Primary | Chronic | ICD-10-CM

## 2024-02-19 DIAGNOSIS — G35 FUNCTIONAL QUADRIPLEGIA SECONDARY TO MS (HCC): Primary | Chronic | ICD-10-CM

## 2024-02-19 PROCEDURE — G0299 HHS/HOSPICE OF RN EA 15 MIN: HCPCS

## 2024-02-19 NOTE — TELEPHONE ENCOUNTER
Tamera Quan /[facility] , request a Occupational Therapy Consult for Patient [id number] . Tamera States patient is quadriplegic and requires extra help transporting patient to and from bathing. Tamera states Patient isn't receiving proper hygiene care, and request Help from Home Health Care. Please advise Tamera at [phone], if any further questions.

## 2024-02-20 ENCOUNTER — HOME CARE VISIT (OUTPATIENT)
Dept: HOME HEALTH SERVICES | Facility: HOME HEALTHCARE | Age: 54
End: 2024-02-20
Payer: MEDICARE

## 2024-02-21 ENCOUNTER — HOME CARE VISIT (OUTPATIENT)
Dept: HOME HEALTH SERVICES | Facility: HOME HEALTHCARE | Age: 54
End: 2024-02-21
Payer: MEDICARE

## 2024-02-21 VITALS — HEART RATE: 96 BPM | SYSTOLIC BLOOD PRESSURE: 118 MMHG | DIASTOLIC BLOOD PRESSURE: 58 MMHG | OXYGEN SATURATION: 100 %

## 2024-02-21 VITALS
SYSTOLIC BLOOD PRESSURE: 118 MMHG | RESPIRATION RATE: 18 BRPM | DIASTOLIC BLOOD PRESSURE: 58 MMHG | HEART RATE: 95 BPM | OXYGEN SATURATION: 100 %

## 2024-02-21 PROBLEM — Z00.00 HEALTH CARE MAINTENANCE: Status: RESOLVED | Noted: 2019-05-29 | Resolved: 2024-02-21

## 2024-02-21 PROBLEM — R82.71 BACTERIURIA WITH PYURIA: Status: RESOLVED | Noted: 2020-09-30 | Resolved: 2024-02-21

## 2024-02-21 PROBLEM — J12.82 PNEUMONIA DUE TO COVID-19 VIRUS: Status: RESOLVED | Noted: 2024-01-06 | Resolved: 2024-02-21

## 2024-02-21 PROBLEM — R82.81 BACTERIURIA WITH PYURIA: Status: RESOLVED | Noted: 2020-09-30 | Resolved: 2024-02-21

## 2024-02-21 PROBLEM — U07.1 PNEUMONIA DUE TO COVID-19 VIRUS: Status: RESOLVED | Noted: 2024-01-06 | Resolved: 2024-02-21

## 2024-02-21 PROCEDURE — G0299 HHS/HOSPICE OF RN EA 15 MIN: HCPCS

## 2024-02-21 PROCEDURE — G0152 HHCP-SERV OF OT,EA 15 MIN: HCPCS

## 2024-02-23 ENCOUNTER — HOME CARE VISIT (OUTPATIENT)
Dept: HOME HEALTH SERVICES | Facility: HOME HEALTHCARE | Age: 54
End: 2024-02-23
Payer: MEDICARE

## 2024-02-23 VITALS
TEMPERATURE: 98.2 F | SYSTOLIC BLOOD PRESSURE: 102 MMHG | OXYGEN SATURATION: 95 % | DIASTOLIC BLOOD PRESSURE: 70 MMHG | HEART RATE: 89 BPM

## 2024-02-23 PROCEDURE — G0299 HHS/HOSPICE OF RN EA 15 MIN: HCPCS

## 2024-02-26 ENCOUNTER — HOME CARE VISIT (OUTPATIENT)
Dept: HOME HEALTH SERVICES | Facility: HOME HEALTHCARE | Age: 54
End: 2024-02-26
Payer: MEDICARE

## 2024-02-26 ENCOUNTER — OFFICE VISIT (OUTPATIENT)
Dept: WOUND CARE | Facility: HOSPITAL | Age: 54
End: 2024-02-26
Payer: MEDICARE

## 2024-02-26 VITALS — TEMPERATURE: 98.9 F

## 2024-02-26 DIAGNOSIS — L89.320 PRESSURE ULCER OF ISCHIUM, UNSTAGEABLE, LEFT (HCC): Primary | ICD-10-CM

## 2024-02-26 DIAGNOSIS — L89.314 PRESSURE ULCER OF RIGHT ISCHIUM, STAGE IV (HCC): ICD-10-CM

## 2024-02-26 PROCEDURE — 99213 OFFICE O/P EST LOW 20 MIN: CPT | Performed by: FAMILY MEDICINE

## 2024-02-26 PROCEDURE — 11042 DBRDMT SUBQ TIS 1ST 20SQCM/<: CPT | Performed by: FAMILY MEDICINE

## 2024-02-26 RX ORDER — LIDOCAINE 40 MG/G
CREAM TOPICAL ONCE
Status: COMPLETED | OUTPATIENT
Start: 2024-02-26 | End: 2024-02-26

## 2024-02-26 RX ADMIN — LIDOCAINE: 40 CREAM TOPICAL at 09:27

## 2024-02-26 NOTE — PROGRESS NOTES
"Patient ID: Fanny Schulz is a 53 y.o. female Date of Birth 1970     Chief Complaint  Chief Complaint   Patient presents with    Follow Up Wound Care Visit     Here for bilateral ischium wounds.       Allergies  Latex    Assessment:    No problem-specific Assessment & Plan notes found for this encounter.       Diagnoses and all orders for this visit:    Pressure ulcer of ischium, unstageable, left (HCC)  -     lidocaine (LMX) 4 % cream  -     Wound cleansing and dressings; Future  -     Debridement Pressure Injury Left;Posterior;Proximal Ischium    Pressure ulcer of right ischium, stage IV (HCC)              Debridement   Wound 01/10/24 Pressure Injury Ischium Left;Posterior;Proximal    Universal Protocol:  Consent: Verbal consent obtained.  Consent given by: patient  Time out: Immediately prior to procedure a \"time out\" was called to verify the correct patient, procedure, equipment, support staff and site/side marked as required.  Timeout called at: 2/26/2024 9:00 AM.  Patient understanding: patient states understanding of the procedure being performed  Patient identity confirmed: verbally with patient    Debridement Details  Performed by: physician  Debridement type: surgical  Level of debridement: subcutaneous tissue  Pain control: lidocaine 4%      Post-debridement measurements  Length (cm): 4.4  Width (cm): 4.4  Depth (cm): 1.4  Percent debrided: 100%  Surface Area (cm^2): 19.36  Area Debrided (cm^2): 19.36  Volume (cm^3): 27.1    Tissue and other material debrided: subcutaneous tissue  Devitalized tissue debrided: exudate and slough  Instrument(s) utilized: curette  Bleeding: small  Hemostasis obtained with: pressure  Response to treatment: procedure was tolerated well        Plan:  Left ischial wound measuring slightly larger but the slough was softer and easier to debride  Left ischial wound debrided as above  Continue wound management as below but changed from a hydrocolloid to a foam as the " secondary on the left ischium.  Pressure-relief  Adequate protein intake  Follow-up in 2 weeks or call sooner with questions or concerns    Wound 01/10/24 Pressure Injury Ischium Left;Posterior;Proximal (Active)   Wound Image Images linked 02/26/24 0932   Wound Description Granulation tissue;Eschar 02/26/24 0917   Ruchi-wound Assessment Intact 02/26/24 0917   Wound Length (cm) 4.4 cm 02/26/24 0917   Wound Width (cm) 4.4 cm 02/26/24 0917   Wound Depth (cm) 1.2 cm 02/26/24 0917   Wound Surface Area (cm^2) 19.36 cm^2 02/26/24 0917   Wound Volume (cm^3) 23.232 cm^3 02/26/24 0917   Calculated Wound Volume (cm^3) 23.23 cm^3 02/26/24 0917   Drainage Amount Moderate 02/26/24 0917   Drainage Description Foul smelling;Serosanguineous;Yellow;Tan 02/26/24 0917   Non-staged Wound Description Full thickness 02/26/24 0917   Dressing Status Intact;Leaking (Comment) 02/26/24 0917       Wound 01/29/24 Pressure Injury Ischium Right (Active)   Wound Image Images linked 02/26/24 0914   Wound Description Granulation tissue;Slough 02/26/24 0914   Ruchi-wound Assessment Intact 02/26/24 0914   Wound Length (cm) 1 cm 02/26/24 0914   Wound Width (cm) 1 cm 02/26/24 0914   Wound Depth (cm) 0.8 cm 02/26/24 0914   Wound Surface Area (cm^2) 1 cm^2 02/26/24 0914   Wound Volume (cm^3) 0.8 cm^3 02/26/24 0914   Calculated Wound Volume (cm^3) 0.8 cm^3 02/26/24 0914   Change in Wound Size % -60 02/26/24 0914   Undermining 1 1.5 02/26/24 0914   Undermining 1 is depth extending from 2 02/26/24 0914   Drainage Amount Moderate 02/26/24 0914   Drainage Description Serosanguineous 02/26/24 0914   Non-staged Wound Description Full thickness 02/26/24 0914   Dressing Status Intact 02/26/24 0914       Wound 01/10/24 Pressure Injury Ischium Left;Posterior;Proximal (Active)   Date First Assessed/Time First Assessed: 01/10/24 0956   Primary Wound Type: Pressure Injury  Location: Ischium  Wound Location Orientation: Left;Posterior;Proximal       Wound 01/29/24 Pressure  Injury Ischium Right (Active)   Date First Assessed: 01/29/24   Present on Original Admission: Yes  Primary Wound Type: Pressure Injury  Location: Ischium  Wound Location Orientation: Right       [REMOVED] Wound 07/29/21 Pressure Injury Ischium Right (Removed)   Resolved Date: 08/02/22  Date First Assessed/Time First Assessed: 07/29/21 1536   Pre-Existing Wound: Yes  Primary Wound Type: Pressure Injury  Location: Ischium  Wound Location Orientation: Right       [REMOVED] Wound 12/22/22 Other (comment) Buttocks Left (Removed)   Resolved Date: 01/29/24  Date First Assessed/Time First Assessed: 12/22/22 1104   Primary Wound Type: Other (comment)  Location: Buttocks  Wound Location Orientation: Left       [REMOVED] Wound 12/22/22 Pressure Injury Buttocks Right (Removed)   Resolved Date: 01/29/24  Date First Assessed/Time First Assessed: 12/22/22 1106   Primary Wound Type: Pressure Injury  Location: Buttocks  Wound Location Orientation: Right       [REMOVED] Wound 12/22/22 Pressure Injury Heel Right (Removed)   Resolved Date: 01/29/24  Date First Assessed/Time First Assessed: 12/22/22 1110   Primary Wound Type: Pressure Injury  Location: Heel  Wound Location Orientation: Right       [REMOVED] Wound 12/22/22 Pressure Injury Thigh Anterior;Proximal;Right (Removed)   Resolved Date: 01/29/24  Date First Assessed/Time First Assessed: 12/22/22 1112   Primary Wound Type: Pressure Injury  Location: Thigh  Wound Location Orientation: Anterior;Proximal;Right  Wound Description (Comments): BLEEDING EXCORIATED       [REMOVED] Wound 12/22/22 Pressure Injury Thigh Anterior;Left;Proximal (Removed)   Resolved Date: 01/29/24  Date First Assessed/Time First Assessed: 12/22/22 1112   Primary Wound Type: Pressure Injury  Location: Thigh  Wound Location Orientation: Anterior;Left;Proximal  Wound Description (Comments): BLEEDING EXCORIATED       [REMOVED] Wound 12/22/22 Vagina N/A (Removed)   Resolved Date: 01/29/24  Date First Assessed/Time  First Assessed: 12/22/22 1313   Location: Vagina  Wound Location Orientation: N/A       [REMOVED] Wound 01/09/24 Right (Removed)   Resolved Date: 01/10/24  Date First Assessed/Time First Assessed: 01/09/24 1832   Wound Location Orientation: Right  Wound Outcome: Healed       [REMOVED] Wound 01/09/24 Flank Left (Removed)   Resolved Date: 01/10/24  Date First Assessed/Time First Assessed: 01/09/24 1835   Location: Flank  Wound Location Orientation: Left  Wound Outcome: Healed       [REMOVED] Wound 01/09/24 Arm Left;Posterior (Removed)   Resolved Date: 01/29/24  Date First Assessed/Time First Assessed: 01/09/24 1836   Location: Arm  Wound Location Orientation: Left;Posterior       Subjective:      .    Patient presents for follow-up of pressure ulcers of bilateral ischial regions.  No increased pain or drainage.  Has been using silver alginate and hydrocolloid on left ischium and Puracol and foam on the right ischial wound.        The following portions of the patient's history were reviewed and updated as appropriate: She  has a past medical history of Anesthesia, Bladder stones, Chronic pain disorder, Contracture, right shoulder, Dependent on wheelchair, Edema, Elevated alkaline phosphatase level, Fernandez catheter in place, Gallbladder disease, History of femur fracture, History of UTI, MS (multiple sclerosis) ([facility]), Muscle spasticity, Muscle weakness, Muscular dystrophy ([facility]), Neck pain, Neurogenic bladder, Paralysis ([facility]), Port-A-Cath in place, Pressure injury of skin, Sacral pressure sore, Swallowing difficulty, Tinnitus, and Urinary incontinence.  She   Patient Active Problem List    Diagnosis Date Noted    Chronic lower extremity edema 01/08/2024    Tinnitus of both ears 11/21/2023    Hypophonia 11/21/2023    Peripheral edema 02/20/2023    Continuous opioid dependence (HCC) 09/08/2022    Increased endometrial stripe thickness 03/10/2021    Ureteral calculus, left 11/03/2020    Alkaline phosphatase elevation  10/28/2020    Abnormal thyroid function test 10/28/2020    Candidal vaginitis 10/19/2020    Hydronephrosis with urinary obstruction due to ureteral calculus 10/02/2020    Thrombocytopenia ([facility]) 10/01/2020    Serum total bilirubin elevated 10/01/2020    Medication management contract signed 02/21/2020    Screening mammogram, encounter for 05/29/2019    Allergic rhinitis 03/26/2018    Functional quadriplegia secondary to MS ([facility]) 01/25/2018    Suprapubic catheter ([facility]) 11/17/2017    Nephrolithiasis 04/22/2016    Bladder calculus 09/02/2015    Muscle spasticity 04/21/2015    Vitamin B12 deficiency 01/16/2015    Constipation 01/14/2015    Hyperglycemia 11/25/2014    Familial hypercholesterolemia 11/25/2014    Recurrent major depressive disorder, in full remission ([facility]) 01/22/2014    Lymphedema 01/22/2014    Spinal stenosis of cervical region 01/22/2014    Urinary incontinence 01/22/2014    Vitamin D deficiency 11/18/2013    Dysphagia 11/04/2013    Dizziness 11/04/2013    Muscle weakness 11/04/2013    Neurogenic bladder 11/04/2013    Sleep disorder 11/04/2013    Tremor 11/04/2013    Vision loss 11/04/2013    Multiple sclerosis ([facility]) 10/21/2013     She  reports that she has never smoked. She has never used smokeless tobacco. She reports that she does not currently use alcohol. She reports that she does not use drugs.  Current Outpatient Medications   Medication Sig Dispense Refill    baclofen 20 mg tablet TAKE 1 TABLET BY MOUTH 5 TIMES AS NEEDED 450 tablet 3    clotrimazole (LOTRIMIN) 1 % cream Apply topically 2 (two) times a day as needed (yeast rash) (Patient not taking: Reported on 11/21/2023) 30 g 0    DULoxetine (CYMBALTA) 20 mg capsule Take 1 capsule (20 mg total) by mouth daily 90 capsule 3    furosemide (LASIX) 20 mg tablet Take 1 tablet (20 mg total) by mouth daily 30 tablet 0    gabapentin (NEURONTIN) 100 mg capsule Take 1 tablet at bedtime tonight, 1 tablet twice a day tomorrow, and then 1 tablet 3 times daily  thereafter 90 capsule 5    oxybutynin (DITROPAN-XL) 10 MG 24 hr tablet Take 1 tablet (10 mg total) by mouth daily 90 tablet 3    rosuvastatin (CRESTOR) 5 mg tablet TAKE 1 TABLET (5 MG TOTAL) BY MOUTH DAILY. 90 tablet 1     No current facility-administered medications for this visit.     She is allergic to latex..    Review of Systems   Constitutional:  Negative for chills and fever.   HENT:  Negative for congestion and sneezing.    Respiratory:  Negative for cough.    Cardiovascular:  Positive for leg swelling.   Musculoskeletal:  Positive for gait problem.   Skin:  Positive for wound.   Psychiatric/Behavioral:  Negative for agitation.          Objective:       Wound 01/10/24 Pressure Injury Ischium Left;Posterior;Proximal (Active)   Wound Image Images linked 02/26/24 0932   Wound Description Granulation tissue;Eschar 02/26/24 0917   Ruchi-wound Assessment Intact 02/26/24 0917   Wound Length (cm) 4.4 cm 02/26/24 0917   Wound Width (cm) 4.4 cm 02/26/24 0917   Wound Depth (cm) 1.2 cm 02/26/24 0917   Wound Surface Area (cm^2) 19.36 cm^2 02/26/24 0917   Wound Volume (cm^3) 23.232 cm^3 02/26/24 0917   Calculated Wound Volume (cm^3) 23.23 cm^3 02/26/24 0917   Drainage Amount Moderate 02/26/24 0917   Drainage Description Foul smelling;Serosanguineous;Yellow;Tan 02/26/24 0917   Non-staged Wound Description Full thickness 02/26/24 0917   Dressing Status Intact;Leaking (Comment) 02/26/24 0917       Wound 01/29/24 Pressure Injury Ischium Right (Active)   Wound Image Images linked 02/26/24 0914   Wound Description Granulation tissue;Slough 02/26/24 0914   Ruchi-wound Assessment Intact 02/26/24 0914   Wound Length (cm) 1 cm 02/26/24 0914   Wound Width (cm) 1 cm 02/26/24 0914   Wound Depth (cm) 0.8 cm 02/26/24 0914   Wound Surface Area (cm^2) 1 cm^2 02/26/24 0914   Wound Volume (cm^3) 0.8 cm^3 02/26/24 0914   Calculated Wound Volume (cm^3) 0.8 cm^3 02/26/24 0914   Change in Wound Size % -60 02/26/24 0914   Undermining 1 1.5  02/26/24 0914   Undermining 1 is depth extending from 2 02/26/24 0914   Drainage Amount Moderate 02/26/24 0914   Drainage Description Serosanguineous 02/26/24 0914   Non-staged Wound Description Full thickness 02/26/24 0914   Dressing Status Intact 02/26/24 0914       Temp 98.9 °F (37.2 °C)     Physical Exam        Wound 01/10/24 Pressure Injury Ischium Left;Posterior;Proximal (Active)   Wound Image   02/26/24 0932   Wound Description Granulation tissue;Eschar 02/26/24 0917   Pressure Injury Stage U 02/01/24 0838   Ruchi-wound Assessment Intact 02/26/24 0917   Wound Length (cm) 4.4 cm 02/26/24 0917   Wound Width (cm) 4.4 cm 02/26/24 0917   Wound Depth (cm) 1.2 cm 02/26/24 0917   Wound Surface Area (cm^2) 19.36 cm^2 02/26/24 0917   Wound Volume (cm^3) 23.232 cm^3 02/26/24 0917   Calculated Wound Volume (cm^3) 23.23 cm^3 02/26/24 0917   Drainage Amount Moderate 02/26/24 0917   Drainage Description Foul smelling;Serosanguineous;Yellow;Tan 02/26/24 0917   Non-staged Wound Description Full thickness 02/26/24 0917   Dressing Status Intact;Leaking (Comment) 02/26/24 0917       Wound 01/29/24 Pressure Injury Ischium Right (Active)   Wound Image   02/26/24 0914   Wound Description Granulation tissue;Slough 02/26/24 0914   Pressure Injury Stage 4 02/01/24 0834   Ruchi-wound Assessment Intact 02/26/24 0914   Wound Length (cm) 1 cm 02/26/24 0914   Wound Width (cm) 1 cm 02/26/24 0914   Wound Depth (cm) 0.8 cm 02/26/24 0914   Wound Surface Area (cm^2) 1 cm^2 02/26/24 0914   Wound Volume (cm^3) 0.8 cm^3 02/26/24 0914   Calculated Wound Volume (cm^3) 0.8 cm^3 02/26/24 0914   Change in Wound Size % -60 02/26/24 0914   Number of underminings 1 02/01/24 0834   Undermining 1 1.5 02/26/24 0914   Undermining 1 is depth extending from 2 02/26/24 0914   Drainage Amount Moderate 02/26/24 0914   Drainage Description Serosanguineous 02/26/24 0914   Non-staged Wound Description Full thickness 02/26/24 0914   Dressing Status Intact 02/26/24 0914                        Wound Instructions:  Orders Placed This Encounter   Procedures    Wound cleansing and dressings     ·           Wound cleansing and dressings Pressure Injury Right Ischium                          Wash your hands with soap and water.  Remove old dressing, discard into plastic bag and place in trash.  Cleanse the wound with prophase prior to applying a clean dressing. Do not use tissue or cotton balls. Do not scrub the wound. Pat dry using gauze.  Shower yes   Skin prep to periwound  Apply Puracol AG to the right wound.  Cover with silicone border dressing  Change dressing 3 x week                                                      ·           Wound cleansing and dressings Pressure Injury Left;Posterior;Proximal Ischium                          Wash your hands with soap and water.  Remove old dressing, discard into plastic bag and place in trash.  Cleanse the wound with prophase prior to applying a clean dressing. Do not use tissue or cotton balls. Do not scrub the wound. Pat dry using gauze.  Shower yes   Skin prep to periwound  Apply silver alginate to the left wound.  Cover with silicone border dressing  Change dressing 3 x week  Done today     Standing Status:   Future     Standing Expiration Date:   2/26/2025    Debridement Pressure Injury Left;Posterior;Proximal Ischium     This order was created via procedure documentation        Diagnosis ICD-10-CM Associated Orders   1. Pressure ulcer of ischium, unstageable, left ([facility])  L89.320 lidocaine (LMX) 4 % cream     Wound cleansing and dressings     Debridement Pressure Injury Left;Posterior;Proximal Ischium      2. Pressure ulcer of right ischium, stage IV ([facility])  L89.314

## 2024-02-26 NOTE — PATIENT INSTRUCTIONS
Orders Placed This Encounter   Procedures    Wound cleansing and dressings     ·           Wound cleansing and dressings Pressure Injury Right Ischium                          Wash your hands with soap and water.  Remove old dressing, discard into plastic bag and place in trash.  Cleanse the wound with prophase prior to applying a clean dressing. Do not use tissue or cotton balls. Do not scrub the wound. Pat dry using gauze.  Shower yes   Skin prep to periwound  Apply Puracol AG to the right wound.  Cover with silicone border dressing  Change dressing 3 x week                                                      ·           Wound cleansing and dressings Pressure Injury Left;Posterior;Proximal Ischium                          Wash your hands with soap and water.  Remove old dressing, discard into plastic bag and place in trash.  Cleanse the wound with prophase prior to applying a clean dressing. Do not use tissue or cotton balls. Do not scrub the wound. Pat dry using gauze.  Shower yes   Skin prep to periwound  Apply silver alginate to the left wound.  Cover with silicone border dressing  Change dressing 3 x week  Done today     Standing Status:   Future     Standing Expiration Date:   2/26/2025

## 2024-02-27 ENCOUNTER — HOME CARE VISIT (OUTPATIENT)
Dept: HOME HEALTH SERVICES | Facility: HOME HEALTHCARE | Age: 54
End: 2024-02-27
Payer: MEDICARE

## 2024-02-27 VITALS — DIASTOLIC BLOOD PRESSURE: 64 MMHG | HEART RATE: 79 BPM | OXYGEN SATURATION: 97 % | SYSTOLIC BLOOD PRESSURE: 102 MMHG

## 2024-02-27 PROCEDURE — G0152 HHCP-SERV OF OT,EA 15 MIN: HCPCS

## 2024-02-28 ENCOUNTER — HOME CARE VISIT (OUTPATIENT)
Dept: HOME HEALTH SERVICES | Facility: HOME HEALTHCARE | Age: 54
End: 2024-02-28
Payer: MEDICARE

## 2024-02-29 ENCOUNTER — HOME CARE VISIT (OUTPATIENT)
Dept: HOME HEALTH SERVICES | Facility: HOME HEALTHCARE | Age: 54
End: 2024-02-29
Payer: MEDICARE

## 2024-03-01 ENCOUNTER — HOME CARE VISIT (OUTPATIENT)
Dept: HOME HEALTH SERVICES | Facility: HOME HEALTHCARE | Age: 54
End: 2024-03-01
Payer: MEDICARE

## 2024-03-01 VITALS
HEART RATE: 79 BPM | SYSTOLIC BLOOD PRESSURE: 100 MMHG | DIASTOLIC BLOOD PRESSURE: 60 MMHG | TEMPERATURE: 97.9 F | OXYGEN SATURATION: 100 %

## 2024-03-01 PROCEDURE — 10330064 BAG, URINE DRN (20/CS)        BARD

## 2024-03-01 PROCEDURE — 10330064 CATH TRAY, FOLEY SYR 10CC (20/CS)

## 2024-03-01 PROCEDURE — 10330064 DRESSING, WND DERMACOL AG W/COLLAGEN 4X4

## 2024-03-01 PROCEDURE — G0299 HHS/HOSPICE OF RN EA 15 MIN: HCPCS

## 2024-03-01 PROCEDURE — 10330064 FOAM, ADH SIL W/BORDER 4"X4" (10/BX 20BX

## 2024-03-04 ENCOUNTER — TELEPHONE (OUTPATIENT)
Dept: HOME HEALTH SERVICES | Facility: HOME HEALTHCARE | Age: 54
End: 2024-03-04

## 2024-03-04 ENCOUNTER — HOME CARE VISIT (OUTPATIENT)
Dept: HOME HEALTH SERVICES | Facility: HOME HEALTHCARE | Age: 54
End: 2024-03-04
Payer: MEDICARE

## 2024-03-06 ENCOUNTER — HOME CARE VISIT (OUTPATIENT)
Dept: HOME HEALTH SERVICES | Facility: HOME HEALTHCARE | Age: 54
End: 2024-03-06
Payer: MEDICARE

## 2024-03-06 VITALS
DIASTOLIC BLOOD PRESSURE: 60 MMHG | TEMPERATURE: 98.4 F | SYSTOLIC BLOOD PRESSURE: 110 MMHG | RESPIRATION RATE: 16 BRPM | HEART RATE: 88 BPM | OXYGEN SATURATION: 100 %

## 2024-03-06 VITALS — OXYGEN SATURATION: 98 % | SYSTOLIC BLOOD PRESSURE: 114 MMHG | HEART RATE: 99 BPM | DIASTOLIC BLOOD PRESSURE: 64 MMHG

## 2024-03-06 PROCEDURE — G0300 HHS/HOSPICE OF LPN EA 15 MIN: HCPCS

## 2024-03-06 PROCEDURE — G0152 HHCP-SERV OF OT,EA 15 MIN: HCPCS

## 2024-03-06 PROCEDURE — G0156 HHCP-SVS OF AIDE,EA 15 MIN: HCPCS

## 2024-03-08 ENCOUNTER — HOME CARE VISIT (OUTPATIENT)
Dept: HOME HEALTH SERVICES | Facility: HOME HEALTHCARE | Age: 54
End: 2024-03-08
Payer: MEDICARE

## 2024-03-08 VITALS
TEMPERATURE: 97.6 F | OXYGEN SATURATION: 99 % | HEART RATE: 84 BPM | DIASTOLIC BLOOD PRESSURE: 72 MMHG | SYSTOLIC BLOOD PRESSURE: 116 MMHG | RESPIRATION RATE: 20 BRPM

## 2024-03-08 PROCEDURE — G0299 HHS/HOSPICE OF RN EA 15 MIN: HCPCS

## 2024-03-11 ENCOUNTER — HOME CARE VISIT (OUTPATIENT)
Dept: HOME HEALTH SERVICES | Facility: HOME HEALTHCARE | Age: 54
End: 2024-03-11
Payer: MEDICARE

## 2024-03-11 ENCOUNTER — OFFICE VISIT (OUTPATIENT)
Dept: WOUND CARE | Facility: HOSPITAL | Age: 54
End: 2024-03-11
Payer: MEDICARE

## 2024-03-11 VITALS — TEMPERATURE: 98.7 F

## 2024-03-11 DIAGNOSIS — L89.314 PRESSURE ULCER OF RIGHT ISCHIUM, STAGE IV (HCC): ICD-10-CM

## 2024-03-11 DIAGNOSIS — L89.320 PRESSURE ULCER OF ISCHIUM, UNSTAGEABLE, LEFT (HCC): Primary | ICD-10-CM

## 2024-03-11 DIAGNOSIS — G35 MULTIPLE SCLEROSIS (HCC): ICD-10-CM

## 2024-03-11 PROCEDURE — 97597 DBRDMT OPN WND 1ST 20 CM/<: CPT | Performed by: NURSE PRACTITIONER

## 2024-03-11 NOTE — PROGRESS NOTES
"Patient ID: Fanny Schulz is a 53 y.o. female Date of Birth 1970     Chief Complaint  Chief Complaint   Patient presents with    Follow Up Wound Care Visit     Bilateral ischial wound follow-up.       Allergies  Latex    Assessment:     Diagnoses and all orders for this visit:    Pressure ulcer of ischium, unstageable, left (HCC)  -     Wound cleansing and dressings; Future    Pressure ulcer of right ischium, stage IV (HCC)  -     Wound cleansing and dressings; Future    Multiple sclerosis (HCC)    Other orders  -     Debridement  -     Debridement              Debridement   Wound 01/10/24 Pressure Injury Ischium Left;Posterior;Proximal    Universal Protocol:  Consent: Written consent obtained.  Consent given by: patient  Time out: Immediately prior to procedure a \"time out\" was called to verify the correct patient, procedure, equipment, support staff and site/side marked as required.  Timeout called at: 3/11/2024 9:33 AM.  Patient identity confirmed: verbally with patient    Debridement Details  Performed by: NP  Debridement type: selective  Pain control: lidocaine 4%      Post-debridement measurements  Length (cm): 5  Width (cm): 3.5  Depth (cm): 1.2  Percent debrided: 100%  Surface Area (cm^2): 17.5  Area Debrided (cm^2): 17.5  Volume (cm^3): 21    Devitalized tissue debrided: biofilm, fibrin and slough  Instrument(s) utilized: curette  Bleeding: medium  Hemostasis obtained with: pressure and silver nitrate  Procedural pain (0-10): 0  Post-procedural pain: 0   Response to treatment: procedure was tolerated well    Debridement   Wound 01/29/24 Pressure Injury Ischium Right    Universal Protocol:  Consent: Written consent obtained.  Consent given by: patient  Time out: Immediately prior to procedure a \"time out\" was called to verify the correct patient, procedure, equipment, support staff and site/side marked as required.  Timeout called at: 3/11/2024 9:34 AM.  Patient identity confirmed: verbally with " patient    Debridement Details  Performed by: NP  Debridement type: selective  Pain control: lidocaine 4%      Post-debridement measurements  Length (cm): 0.8  Width (cm): 1  Depth (cm): 0.8  Percent debrided: 100%  Surface Area (cm^2): 0.8  Area Debrided (cm^2): 0.8  Volume (cm^3): 0.64    Devitalized tissue debrided: biofilm, fibrin and slough  Instrument(s) utilized: curette  Bleeding: small  Hemostasis obtained with: pressure  Procedural pain (0-10): 0  Post-procedural pain: 0   Response to treatment: procedure was tolerated well        Plan:  1.  F/u visit.  Wounds debrided.  Wounds measuring relatively the same.  Left buttock wound still contains moist yellow slough in undermined area.  Will change treatment to Mesalt sheet gently tucked into both wound beds covered with silicone bordered foam dressings change daily.  2.  Continue frequent offloading of pressure from wounds.  3.  Continue maintaining a diet high in protein to enhance wound healing.  4.  Patient will follow-up in 2 weeks     Wound 01/10/24 Pressure Injury Ischium Left;Posterior;Proximal (Active)   Wound Image Images linked 03/11/24 0820   Wound Description Granulation tissue;Swelling 03/11/24 0820   Ruchi-wound Assessment Intact 03/11/24 0820   Wound Length (cm) 5 cm 03/11/24 0820   Wound Width (cm) 3.5 cm 03/11/24 0820   Wound Depth (cm) 1.2 cm 03/11/24 0820   Wound Surface Area (cm^2) 17.5 cm^2 03/11/24 0820   Wound Volume (cm^3) 21 cm^3 03/11/24 0820   Calculated Wound Volume (cm^3) 21 cm^3 03/11/24 0820   Number of underminings 1 03/11/24 0820   Undermining 1 2.8 03/11/24 0820   Undermining 1 is depth extending from 12-4 03/11/24 0820   Drainage Amount Large 03/11/24 0820   Drainage Description Tan;Foul smelling 03/11/24 0820   Non-staged Wound Description Full thickness 03/11/24 0820   Dressing Status Intact 03/11/24 0820       Wound 01/29/24 Pressure Injury Ischium Right (Active)   Wound Image Images linked 03/11/24 0817   Wound  Description Granulation tissue 03/11/24 0817   Ruchi-wound Assessment Intact 03/11/24 0817   Wound Length (cm) 0.8 cm 03/11/24 0817   Wound Width (cm) 1 cm 03/11/24 0817   Wound Depth (cm) 0.8 cm 03/11/24 0817   Wound Surface Area (cm^2) 0.8 cm^2 03/11/24 0817   Wound Volume (cm^3) 0.64 cm^3 03/11/24 0817   Calculated Wound Volume (cm^3) 0.64 cm^3 03/11/24 0817   Change in Wound Size % -28 03/11/24 0817   Undermining 1 1.5 03/11/24 0817   Undermining 1 is depth extending from 12-2 03/11/24 0817   Drainage Amount Moderate 03/11/24 0817   Drainage Description Serosanguineous 03/11/24 0817   Non-staged Wound Description Full thickness 03/11/24 0817   Dressing Status Intact 03/11/24 0817       Wound 01/10/24 Pressure Injury Ischium Left;Posterior;Proximal (Active)   Date First Assessed/Time First Assessed: 01/10/24 0956   Primary Wound Type: Pressure Injury  Location: Ischium  Wound Location Orientation: Left;Posterior;Proximal       Wound 01/29/24 Pressure Injury Ischium Right (Active)   Date First Assessed: 01/29/24   Present on Original Admission: Yes  Primary Wound Type: Pressure Injury  Location: Ischium  Wound Location Orientation: Right       [REMOVED] Wound 07/29/21 Pressure Injury Ischium Right (Removed)   Resolved Date: 08/02/22  Date First Assessed/Time First Assessed: 07/29/21 1536   Pre-Existing Wound: Yes  Primary Wound Type: Pressure Injury  Location: Ischium  Wound Location Orientation: Right       [REMOVED] Wound 12/22/22 Other (comment) Buttocks Left (Removed)   Resolved Date: 01/29/24  Date First Assessed/Time First Assessed: 12/22/22 1104   Primary Wound Type: Other (comment)  Location: Buttocks  Wound Location Orientation: Left       [REMOVED] Wound 12/22/22 Pressure Injury Buttocks Right (Removed)   Resolved Date: 01/29/24  Date First Assessed/Time First Assessed: 12/22/22 1106   Primary Wound Type: Pressure Injury  Location: Buttocks  Wound Location Orientation: Right       [REMOVED] Wound 12/22/22  Pressure Injury Heel Right (Removed)   Resolved Date: 01/29/24  Date First Assessed/Time First Assessed: 12/22/22 1110   Primary Wound Type: Pressure Injury  Location: Heel  Wound Location Orientation: Right       [REMOVED] Wound 12/22/22 Pressure Injury Thigh Anterior;Proximal;Right (Removed)   Resolved Date: 01/29/24  Date First Assessed/Time First Assessed: 12/22/22 1112   Primary Wound Type: Pressure Injury  Location: Thigh  Wound Location Orientation: Anterior;Proximal;Right  Wound Description (Comments): BLEEDING EXCORIATED       [REMOVED] Wound 12/22/22 Pressure Injury Thigh Anterior;Left;Proximal (Removed)   Resolved Date: 01/29/24  Date First Assessed/Time First Assessed: 12/22/22 1112   Primary Wound Type: Pressure Injury  Location: Thigh  Wound Location Orientation: Anterior;Left;Proximal  Wound Description (Comments): BLEEDING EXCORIATED       [REMOVED] Wound 12/22/22 Vagina N/A (Removed)   Resolved Date: 01/29/24  Date First Assessed/Time First Assessed: 12/22/22 1313   Location: Vagina  Wound Location Orientation: N/A       [REMOVED] Wound 01/09/24 Right (Removed)   Resolved Date: 01/10/24  Date First Assessed/Time First Assessed: 01/09/24 1832   Wound Location Orientation: Right  Wound Outcome: Healed       [REMOVED] Wound 01/09/24 Flank Left (Removed)   Resolved Date: 01/10/24  Date First Assessed/Time First Assessed: 01/09/24 1835   Location: Flank  Wound Location Orientation: Left  Wound Outcome: Healed       [REMOVED] Wound 01/09/24 Arm Left;Posterior (Removed)   Resolved Date: 01/29/24  Date First Assessed/Time First Assessed: 01/09/24 1836   Location: Arm  Wound Location Orientation: Left;Posterior       Subjective:      .    F/u visit for pressure ulcers of her left and right ischial areas.  No new complaints.  She denies any pain, fevers, or chills.        The following portions of the patient's history were reviewed and updated as appropriate: She  has a past medical history of Anesthesia,  Bladder stones, Chronic pain disorder, Contracture, right shoulder, Dependent on wheelchair, Edema, Elevated alkaline phosphatase level, Fernandez catheter in place, Gallbladder disease, History of femur fracture, History of UTI, MS (multiple sclerosis) ([facility]), Muscle spasticity, Muscle weakness, Muscular dystrophy ([facility]), Neck pain, Neurogenic bladder, Paralysis ([facility]), Port-A-Cath in place, Pressure injury of skin, Sacral pressure sore, Swallowing difficulty, Tinnitus, and Urinary incontinence.  She   Patient Active Problem List    Diagnosis Date Noted    Chronic lower extremity edema 01/08/2024    Tinnitus of both ears 11/21/2023    Hypophonia 11/21/2023    Peripheral edema 02/20/2023    Continuous opioid dependence ([facility]) 09/08/2022    Increased endometrial stripe thickness 03/10/2021    Ureteral calculus, left 11/03/2020    Alkaline phosphatase elevation 10/28/2020    Abnormal thyroid function test 10/28/2020    Candidal vaginitis 10/19/2020    Hydronephrosis with urinary obstruction due to ureteral calculus 10/02/2020    Thrombocytopenia ([facility]) 10/01/2020    Serum total bilirubin elevated 10/01/2020    Medication management contract signed 02/21/2020    Screening mammogram, encounter for 05/29/2019    Allergic rhinitis 03/26/2018    Functional quadriplegia secondary to MS ([facility]) 01/25/2018    Suprapubic catheter ([facility]) 11/17/2017    Nephrolithiasis 04/22/2016    Bladder calculus 09/02/2015    Muscle spasticity 04/21/2015    Vitamin B12 deficiency 01/16/2015    Constipation 01/14/2015    Hyperglycemia 11/25/2014    Familial hypercholesterolemia 11/25/2014    Recurrent major depressive disorder, in full remission ([facility]) 01/22/2014    Lymphedema 01/22/2014    Spinal stenosis of cervical region 01/22/2014    Urinary incontinence 01/22/2014    Vitamin D deficiency 11/18/2013    Dysphagia 11/04/2013    Dizziness 11/04/2013    Muscle weakness 11/04/2013    Neurogenic bladder 11/04/2013    Sleep disorder 11/04/2013    Tremor  11/04/2013    Vision loss 11/04/2013    Multiple sclerosis (HCC) 10/21/2013     She  has a past surgical history that includes Cholecystectomy; Kidney stone surgery; Portacath placement (Left); Esophagogastroduodenoscopy; Bladder stone removal (N/A, 12/4/2017); Bladder surgery; Suprapubic cystostomy (02/25/2014); Cystoscopy (06/15/2020); [place] retrograde pyelogram (10/2/2020); pr cysto bladder w/ureteral catheterization (Left, 10/2/2020); pr cysto/uretero w/lithotripsy &indwell stent insrt (Left, 11/3/2020); [place] retrograde pyelogram (11/3/2020); and pr litholapaxy smpl/sm <2.5 cm (N/A, 12/22/2022).  Her family history includes Alzheimer's disease in her family; COPD in her father; Diabetes in her family and mother; Heart disease in her family; Hyperlipidemia in her mother and sister; Hypertension in her family, father, and mother.  She  reports that she has never smoked. She has never used smokeless tobacco. She reports that she does not currently use alcohol. She reports that she does not use drugs.  Current Outpatient Medications   Medication Sig Dispense Refill    baclofen 20 mg tablet TAKE 1 TABLET BY MOUTH 5 TIMES AS NEEDED 450 tablet 3    clotrimazole (LOTRIMIN) 1 % cream Apply topically 2 (two) times a day as needed (yeast rash) (Patient not taking: Reported on 11/21/2023) 30 g 0    DULoxetine (CYMBALTA) 20 mg capsule Take 1 capsule (20 mg total) by mouth daily 90 capsule 3    furosemide (LASIX) 20 mg tablet Take 1 tablet (20 mg total) by mouth daily 30 tablet 0    gabapentin (NEURONTIN) 100 mg capsule Take 1 tablet at bedtime tonight, 1 tablet twice a day tomorrow, and then 1 tablet 3 times daily thereafter 90 capsule 5    oxybutynin (DITROPAN-XL) 10 MG 24 hr tablet Take 1 tablet (10 mg total) by mouth daily 90 tablet 3    rosuvastatin (CRESTOR) 5 mg tablet TAKE 1 TABLET (5 MG TOTAL) BY MOUTH DAILY. 90 tablet 1     No current facility-administered medications for this visit.     She is allergic to  latex..    Review of Systems   Constitutional: Negative.    HENT:  Negative for ear pain and hearing loss.    Eyes:  Negative for pain.   Respiratory:  Negative for chest tightness and shortness of breath.    Cardiovascular:  Negative for chest pain, palpitations and leg swelling.   Gastrointestinal:  Negative for diarrhea, nausea and vomiting.   Genitourinary:  Negative for dysuria.   Musculoskeletal:  Positive for gait problem.   Skin:  Positive for wound.   Neurological:  Positive for numbness. Negative for tremors and weakness.   Psychiatric/Behavioral:  Negative for behavioral problems, confusion and suicidal ideas.          Objective:       Wound 01/10/24 Pressure Injury Ischium Left;Posterior;Proximal (Active)   Wound Image Images linked 03/11/24 0820   Wound Description Granulation tissue;Swelling 03/11/24 0820   Ruchi-wound Assessment Intact 03/11/24 0820   Wound Length (cm) 5 cm 03/11/24 0820   Wound Width (cm) 3.5 cm 03/11/24 0820   Wound Depth (cm) 1.2 cm 03/11/24 0820   Wound Surface Area (cm^2) 17.5 cm^2 03/11/24 0820   Wound Volume (cm^3) 21 cm^3 03/11/24 0820   Calculated Wound Volume (cm^3) 21 cm^3 03/11/24 0820   Number of underminings 1 03/11/24 0820   Undermining 1 2.8 03/11/24 0820   Undermining 1 is depth extending from 12-4 03/11/24 0820   Drainage Amount Large 03/11/24 0820   Drainage Description Tan;Foul smelling 03/11/24 0820   Non-staged Wound Description Full thickness 03/11/24 0820   Dressing Status Intact 03/11/24 0820       Wound 01/29/24 Pressure Injury Ischium Right (Active)   Wound Image Images linked 03/11/24 0817   Wound Description Granulation tissue 03/11/24 0817   Ruchi-wound Assessment Intact 03/11/24 0817   Wound Length (cm) 0.8 cm 03/11/24 0817   Wound Width (cm) 1 cm 03/11/24 0817   Wound Depth (cm) 0.8 cm 03/11/24 0817   Wound Surface Area (cm^2) 0.8 cm^2 03/11/24 0817   Wound Volume (cm^3) 0.64 cm^3 03/11/24 0817   Calculated Wound Volume (cm^3) 0.64 cm^3 03/11/24 0817    Change in Wound Size % -28 03/11/24 0817   Undermining 1 1.5 03/11/24 0817   Undermining 1 is depth extending from 12-2 03/11/24 0817   Drainage Amount Moderate 03/11/24 0817   Drainage Description Serosanguineous 03/11/24 0817   Non-staged Wound Description Full thickness 03/11/24 0817   Dressing Status Intact 03/11/24 0817       Temp 98.7 °F (37.1 °C)               Wound Instructions:  Orders Placed This Encounter   Procedures    Wound cleansing and dressings     Bilateral ischial wounds    Wash your hands with soap and water.  Remove old dressing, discard into plastic bag and place in trash.  Cleanse the wound with dakins as needed if odor is present, wound cleanser other days,  prior to applying a clean dressing. Do not use tissue or cotton balls. Do not scrub the wound. Pat dry using gauze.  Shower no     Apply mesalt to the open wounds, tuck into undermined areas.  Cover with silicone border dressings    Change dressing daily  VNA to change every other day,  to change other days.  Done today     Standing Status:   Future     Standing Expiration Date:   3/11/2025    Debridement     This order was created via procedure documentation    Debridement     This order was created via procedure documentation        Diagnosis ICD-10-CM Associated Orders   1. Pressure ulcer of ischium, unstageable, left ([facility])  L89.320 Wound cleansing and dressings      2. Pressure ulcer of right ischium, stage IV ([facility])  L89.314 Wound cleansing and dressings      3. Multiple sclerosis ([facility])  G35

## 2024-03-11 NOTE — PATIENT INSTRUCTIONS
Orders Placed This Encounter   Procedures    Wound cleansing and dressings     Bilateral ischial wounds    Wash your hands with soap and water.  Remove old dressing, discard into plastic bag and place in trash.  Cleanse the wound with dakins as needed if odor is present, wound cleanser other days,  prior to applying a clean dressing. Do not use tissue or cotton balls. Do not scrub the wound. Pat dry using gauze.  Shower no     Apply mesalt to the open wounds, tuck into undermined areas.  Cover with silicone border dressings    Change dressing daily  VNA to change every other day,  to change other days.  Done today     Standing Status:   Future     Standing Expiration Date:   3/11/2025

## 2024-03-12 ENCOUNTER — HOME CARE VISIT (OUTPATIENT)
Dept: HOME HEALTH SERVICES | Facility: HOME HEALTHCARE | Age: 54
End: 2024-03-12
Payer: MEDICARE

## 2024-03-12 VITALS — DIASTOLIC BLOOD PRESSURE: 68 MMHG | SYSTOLIC BLOOD PRESSURE: 102 MMHG | OXYGEN SATURATION: 98 % | HEART RATE: 100 BPM

## 2024-03-12 PROCEDURE — G0152 HHCP-SERV OF OT,EA 15 MIN: HCPCS

## 2024-03-13 ENCOUNTER — HOME CARE VISIT (OUTPATIENT)
Dept: HOME HEALTH SERVICES | Facility: HOME HEALTHCARE | Age: 54
End: 2024-03-13
Payer: MEDICARE

## 2024-03-13 VITALS
DIASTOLIC BLOOD PRESSURE: 68 MMHG | SYSTOLIC BLOOD PRESSURE: 118 MMHG | TEMPERATURE: 98 F | OXYGEN SATURATION: 98 % | HEART RATE: 70 BPM | RESPIRATION RATE: 18 BRPM

## 2024-03-13 PROCEDURE — G0300 HHS/HOSPICE OF LPN EA 15 MIN: HCPCS

## 2024-03-13 PROCEDURE — G0156 HHCP-SVS OF AIDE,EA 15 MIN: HCPCS

## 2024-03-14 ENCOUNTER — HOME CARE VISIT (OUTPATIENT)
Dept: HOME HEALTH SERVICES | Facility: HOME HEALTHCARE | Age: 54
End: 2024-03-14
Payer: MEDICARE

## 2024-03-15 ENCOUNTER — APPOINTMENT (EMERGENCY)
Dept: RADIOLOGY | Facility: HOSPITAL | Age: 54
End: 2024-03-15
Payer: MEDICARE

## 2024-03-15 ENCOUNTER — LAB (OUTPATIENT)
Dept: LAB | Facility: CLINIC | Age: 54
End: 2024-03-15
Payer: MEDICARE

## 2024-03-15 ENCOUNTER — TELEPHONE (OUTPATIENT)
Age: 54
End: 2024-03-15

## 2024-03-15 ENCOUNTER — HOME CARE VISIT (OUTPATIENT)
Dept: HOME HEALTH SERVICES | Facility: HOME HEALTHCARE | Age: 54
End: 2024-03-15
Payer: MEDICARE

## 2024-03-15 ENCOUNTER — HOSPITAL ENCOUNTER (EMERGENCY)
Facility: HOSPITAL | Age: 54
Discharge: HOME | End: 2024-03-15
Attending: EMERGENCY MEDICINE
Payer: MEDICARE

## 2024-03-15 VITALS
TEMPERATURE: 98 F | OXYGEN SATURATION: 99 % | DIASTOLIC BLOOD PRESSURE: 58 MMHG | RESPIRATION RATE: 19 BRPM | SYSTOLIC BLOOD PRESSURE: 118 MMHG | HEART RATE: 94 BPM

## 2024-03-15 VITALS
HEART RATE: 105 BPM | DIASTOLIC BLOOD PRESSURE: 56 MMHG | OXYGEN SATURATION: 99 % | TEMPERATURE: 98 F | SYSTOLIC BLOOD PRESSURE: 88 MMHG

## 2024-03-15 DIAGNOSIS — N39.0 UTI (URINARY TRACT INFECTION): Primary | ICD-10-CM

## 2024-03-15 DIAGNOSIS — R32 URINARY INCONTINENCE, UNSPECIFIED TYPE: Primary | ICD-10-CM

## 2024-03-15 DIAGNOSIS — R32 URINARY INCONTINENCE, UNSPECIFIED TYPE: ICD-10-CM

## 2024-03-15 LAB
ALBUMIN SERPL BCP-MCNC: 3.9 G/DL (ref 3.5–5)
ALP SERPL-CCNC: 84 U/L (ref 34–104)
ALT SERPL W P-5'-P-CCNC: 11 U/L (ref 7–52)
ANION GAP SERPL CALCULATED.3IONS-SCNC: 11 MMOL/L (ref 4–13)
APTT PPP: 29 SECONDS (ref 23–37)
AST SERPL W P-5'-P-CCNC: 18 U/L (ref 13–39)
BACTERIA UR QL AUTO: ABNORMAL /HPF
BACTERIA UR QL AUTO: ABNORMAL /HPF
BASOPHILS # BLD AUTO: 0.05 THOUSANDS/ÂΜL (ref 0–0.1)
BASOPHILS NFR BLD AUTO: 0 % (ref 0–1)
BILIRUB SERPL-MCNC: 0.74 MG/DL (ref 0.2–1)
BILIRUB UR QL STRIP: NEGATIVE
BILIRUB UR QL STRIP: NEGATIVE
BUN SERPL-MCNC: 7 MG/DL (ref 5–25)
CALCIUM SERPL-MCNC: 9.3 MG/DL (ref 8.4–10.2)
CHLORIDE SERPL-SCNC: 93 MMOL/L (ref 96–108)
CLARITY UR: ABNORMAL
CLARITY UR: CLEAR
CO2 SERPL-SCNC: 27 MMOL/L (ref 21–32)
COLOR UR: ABNORMAL
COLOR UR: YELLOW
CREAT SERPL-MCNC: 0.29 MG/DL (ref 0.6–1.3)
D DIMER PPP FEU-MCNC: 3.12 UG/ML FEU
EOSINOPHIL # BLD AUTO: 0.04 THOUSAND/ÂΜL (ref 0–0.61)
EOSINOPHIL NFR BLD AUTO: 0 % (ref 0–6)
ERYTHROCYTE [DISTWIDTH] IN BLOOD BY AUTOMATED COUNT: 14.3 % (ref 11.6–15.1)
GFR SERPL CREATININE-BSD FRML MDRD: 132 ML/MIN/1.73SQ M
GLUCOSE SERPL-MCNC: 132 MG/DL (ref 65–140)
GLUCOSE UR STRIP-MCNC: NEGATIVE MG/DL
GLUCOSE UR STRIP-MCNC: NEGATIVE MG/DL
HCT VFR BLD AUTO: 35.4 % (ref 34.8–46.1)
HGB BLD-MCNC: 11.4 G/DL (ref 11.5–15.4)
HGB UR QL STRIP.AUTO: ABNORMAL
HGB UR QL STRIP.AUTO: ABNORMAL
IMM GRANULOCYTES # BLD AUTO: 0.09 THOUSAND/UL (ref 0–0.2)
IMM GRANULOCYTES NFR BLD AUTO: 1 % (ref 0–2)
INR PPP: 1.08 (ref 0.84–1.19)
KETONES UR STRIP-MCNC: ABNORMAL MG/DL
KETONES UR STRIP-MCNC: ABNORMAL MG/DL
LACTATE SERPL-SCNC: 1.5 MMOL/L (ref 0.5–2)
LEUKOCYTE ESTERASE UR QL STRIP: ABNORMAL
LEUKOCYTE ESTERASE UR QL STRIP: ABNORMAL
LIPASE SERPL-CCNC: 16 U/L (ref 11–82)
LYMPHOCYTES # BLD AUTO: 1.52 THOUSANDS/ÂΜL (ref 0.6–4.47)
LYMPHOCYTES NFR BLD AUTO: 12 % (ref 14–44)
MCH RBC QN AUTO: 27.4 PG (ref 26.8–34.3)
MCHC RBC AUTO-ENTMCNC: 32.2 G/DL (ref 31.4–37.4)
MCV RBC AUTO: 85 FL (ref 82–98)
MONOCYTES # BLD AUTO: 0.81 THOUSAND/ÂΜL (ref 0.17–1.22)
MONOCYTES NFR BLD AUTO: 6 % (ref 4–12)
MUCOUS THREADS UR QL AUTO: ABNORMAL
MUCOUS THREADS UR QL AUTO: ABNORMAL
NEUTROPHILS # BLD AUTO: 10.45 THOUSANDS/ÂΜL (ref 1.85–7.62)
NEUTS SEG NFR BLD AUTO: 81 % (ref 43–75)
NITRITE UR QL STRIP: NEGATIVE
NITRITE UR QL STRIP: NEGATIVE
NON-SQ EPI CELLS URNS QL MICRO: ABNORMAL /HPF
NON-SQ EPI CELLS URNS QL MICRO: ABNORMAL /HPF
NRBC BLD AUTO-RTO: 0 /100 WBCS
PH UR STRIP.AUTO: 7 [PH]
PH UR STRIP.AUTO: 7.5 [PH]
PLATELET # BLD AUTO: 331 THOUSANDS/UL (ref 149–390)
PMV BLD AUTO: 9.5 FL (ref 8.9–12.7)
POTASSIUM SERPL-SCNC: 3.8 MMOL/L (ref 3.5–5.3)
PROCALCITONIN SERPL-MCNC: 0.05 NG/ML
PROT SERPL-MCNC: 7 G/DL (ref 6.4–8.4)
PROT UR STRIP-MCNC: ABNORMAL MG/DL
PROT UR STRIP-MCNC: ABNORMAL MG/DL
PROTHROMBIN TIME: 13.9 SECONDS (ref 11.6–14.5)
RBC # BLD AUTO: 4.16 MILLION/UL (ref 3.81–5.12)
RBC #/AREA URNS AUTO: ABNORMAL /HPF
RBC #/AREA URNS AUTO: ABNORMAL /HPF
SODIUM SERPL-SCNC: 131 MMOL/L (ref 135–147)
SP GR UR STRIP.AUTO: 1.01 (ref 1–1.03)
SP GR UR STRIP.AUTO: 1.04 (ref 1–1.03)
UROBILINOGEN UR STRIP-ACNC: <2 MG/DL
UROBILINOGEN UR STRIP-ACNC: <2 MG/DL
WBC # BLD AUTO: 12.96 THOUSAND/UL (ref 4.31–10.16)
WBC #/AREA URNS AUTO: ABNORMAL /HPF
WBC #/AREA URNS AUTO: ABNORMAL /HPF
WBC CLUMPS # UR AUTO: PRESENT /UL

## 2024-03-15 PROCEDURE — 83605 ASSAY OF LACTIC ACID: CPT

## 2024-03-15 PROCEDURE — 84145 PROCALCITONIN (PCT): CPT

## 2024-03-15 PROCEDURE — 87086 URINE CULTURE/COLONY COUNT: CPT

## 2024-03-15 PROCEDURE — 87077 CULTURE AEROBIC IDENTIFY: CPT

## 2024-03-15 PROCEDURE — 85379 FIBRIN DEGRADATION QUANT: CPT | Performed by: EMERGENCY MEDICINE

## 2024-03-15 PROCEDURE — 74177 CT ABD & PELVIS W/CONTRAST: CPT

## 2024-03-15 PROCEDURE — G0299 HHS/HOSPICE OF RN EA 15 MIN: HCPCS

## 2024-03-15 PROCEDURE — 85730 THROMBOPLASTIN TIME PARTIAL: CPT

## 2024-03-15 PROCEDURE — 87186 SC STD MICRODIL/AGAR DIL: CPT

## 2024-03-15 PROCEDURE — 85610 PROTHROMBIN TIME: CPT

## 2024-03-15 PROCEDURE — 81001 URINALYSIS AUTO W/SCOPE: CPT

## 2024-03-15 PROCEDURE — 99285 EMERGENCY DEPT VISIT HI MDM: CPT

## 2024-03-15 PROCEDURE — 96361 HYDRATE IV INFUSION ADD-ON: CPT

## 2024-03-15 PROCEDURE — 80053 COMPREHEN METABOLIC PANEL: CPT

## 2024-03-15 PROCEDURE — 87147 CULTURE TYPE IMMUNOLOGIC: CPT

## 2024-03-15 PROCEDURE — 83690 ASSAY OF LIPASE: CPT

## 2024-03-15 PROCEDURE — 99285 EMERGENCY DEPT VISIT HI MDM: CPT | Performed by: EMERGENCY MEDICINE

## 2024-03-15 PROCEDURE — 36415 COLL VENOUS BLD VENIPUNCTURE: CPT

## 2024-03-15 PROCEDURE — 96360 HYDRATION IV INFUSION INIT: CPT

## 2024-03-15 PROCEDURE — 71275 CT ANGIOGRAPHY CHEST: CPT

## 2024-03-15 PROCEDURE — 87040 BLOOD CULTURE FOR BACTERIA: CPT

## 2024-03-15 PROCEDURE — 93005 ELECTROCARDIOGRAM TRACING: CPT

## 2024-03-15 PROCEDURE — 85025 COMPLETE CBC W/AUTO DIFF WBC: CPT

## 2024-03-15 RX ORDER — CIPROFLOXACIN 500 MG/1
500 TABLET, FILM COATED ORAL ONCE
Status: COMPLETED | OUTPATIENT
Start: 2024-03-15 | End: 2024-03-15

## 2024-03-15 RX ORDER — CIPROFLOXACIN 500 MG/1
500 TABLET, FILM COATED ORAL 2 TIMES DAILY
Qty: 10 TABLET | Refills: 0 | Status: SHIPPED | OUTPATIENT
Start: 2024-03-15 | End: 2024-03-20

## 2024-03-15 RX ADMIN — CIPROFLOXACIN 500 MG: 500 TABLET ORAL at 17:10

## 2024-03-15 RX ADMIN — SODIUM CHLORIDE 500 ML: 0.9 INJECTION, SOLUTION INTRAVENOUS at 17:13

## 2024-03-15 RX ADMIN — SODIUM CHLORIDE 1000 ML: 0.9 INJECTION, SOLUTION INTRAVENOUS at 13:21

## 2024-03-15 RX ADMIN — IOHEXOL 100 ML: 350 INJECTION, SOLUTION INTRAVENOUS at 14:15

## 2024-03-15 NOTE — DISCHARGE INSTRUCTIONS
Follow-up with your primary care doctor within 1 week.    Return to the ER if symptoms worsen or if you have any other concerns.    You have been prescribed a quinolone antibiotic. As was discussed, this is felt to be the best antibiotic for your condition. It has the rare potential to cause the following symptoms:  Increased risk of tendon rupture or damage  Increased risk of aortic dissection  Disturbances in attention, disorientation, agitation, nervousness, memory impairment, and delerium (confusion)  Low blood sugar  Increased risk of symptoms in patients with myasthenia gravis  Increased risk of irreversible peripheral neuropathy (nerve damage)  If you notice any joint pain, pain in the arms or legs, feel confused or disoriented, have chest pain, or notice any other symptoms related to the above conditions, then you should immediately stop the antibiotic, and be seen by a doctor as soon as possible for re-evaluation.

## 2024-03-15 NOTE — ED ATTENDING ATTESTATION
3/15/2024  I, Jemal Hills DO, saw and evaluated the patient. I have discussed the patient with the resident/non-physician practitioner and agree with the resident's/non-physician practitioner's findings, Plan of Care, and MDM as documented in the resident's/non-physician practitioner's note, except where noted. All available labs and Radiology studies were reviewed.  I was present for key portions of any procedure(s) performed by the resident/non-physician practitioner and I was immediately available to provide assistance.       At this point I agree with the current assessment done in the Emergency Department.  I have conducted an independent evaluation of this patient a history and physical is as follows:    Patient is a 53-year-old female with a history of multiple sclerosis, chronic spasticity, wheelchair dependent, with chronic contractures, chronic urinary incontinence with a suprapubic catheter, neurogenic bladder, previous cholecystectomy, brought in with her sister.  Visiting nurse came today to evaluate the patient and to help with routine care, noted that she was hypotensive blood pressure in the low 90s systolic and tachycardic.  The visiting nurse changed the suprapubic catheter, obtain a urine sample and sent that off but was concerned regarding the tachycardia and recommend the patient be evaluated.  Patient has previously had an episode of ureteral colic which she could not really since due to her MS, and had to be treated for urinary infection due to the stone.  Patient suprapubic catheter was last changed 1 week ago, normally gets changed every month.  There has been no cloudiness to the urine.  The patient herself just says that yesterday today she is just feeling a little more fatigued.  She has some discomfort in her left lower leg where 2 days ago it accidentally got caught on a rail when she was getting transferred.  She has not had any falls, no vomiting, no chest pain, no cough, no  headache.  Patient not had any antibiotics in the last 2 months, most recent hospitalization was January 5 through January 15, 2020 for which she was admitted for COVID infection with aspiration,    General:  Patient is well-appearing  Head:  Atraumatic  Eyes:  Conjunctiva pink  ENT:  Mucous membranes are moist  Neck:  Supple  Cardiac:  S1-S2, without murmurs  Lungs:  Clear to auscultation bilaterally  Abdomen:  Soft, nontender, normal bowel sounds, no CVA tenderness, no tympany, no rigidity, no guarding, suprapubic catheter draining yellow urine, no purulent drainage or discharge surrounding the site  Extremities: Patient is chronic contractures, no bony tenderness to the arms or legs,  Neurologic:  Awake, fluent speech, normal comprehension. AAOx3.  Slight sensation to light touch throughout the entire body, very minimal movement of the arms and legs.  Skin:  Pink warm and dry, slight dry skin around her sacral area but there is no sacral decubiti or significant rash.  No petechia or purpura.  Psychiatric:  Alert, pleasant, cooperative          ED Course  ED Course as of 03/15/24 1239   Fri Mar 15, 2024   1225 ECG interpreted by me, sinus rhythm, rate of 115, normal axis, there is some nonspecific lateral T wave changes, no acute ST segment elevation or depression, no significant acute change from January 16, 2024     PE Study with CT Abdomen and Pelvis with contrast   Final Result   Addendum (preliminary) 1 of 1   ADDENDUM:      Further review performed due to clinical concern. Note is made of a mildly    prominent right extrarenal pelvis and ectatic proximal ureter which tapers    smoothly into the pelvis, favored to be physiologic. No obstruction seen.    A rounded calcification in the    right pelvis on series 2 image 366 is stable from previous examinations    and felt to reflect a phlebolith immediately posterior to the ureter, as    better demonstrated on series 2 image 100 one of the 10/2/2020 CT exam.       Final      1.  No pulmonary embolism.   2.  No acute findings in the chest, abdomen, pelvis.   3.  Chronic bilateral decubitus ulcers extending to the ischial tuberosities with the right demonstrating a truncated appearance which may be the sequela of osteomyelitis. Soft tissue thickening of the left decubitus ulcer may be correlated for any    evidence of infection.                     Workstation performed: [id number]             Labs Reviewed   CBC AND DIFFERENTIAL - Abnormal       Result Value Ref Range Status    WBC 12.96 (*) 4.31 - 10.16 Thousand/uL Final    RBC 4.16  3.81 - 5.12 Million/uL Final    Hemoglobin 11.4 (*) 11.5 - 15.4 g/dL Final    Hematocrit 35.4  34.8 - 46.1 % Final    MCV 85  82 - 98 fL Final    MCH 27.4  26.8 - 34.3 pg Final    MCHC 32.2  31.4 - 37.4 g/dL Final    RDW 14.3  11.6 - 15.1 % Final    MPV 9.5  8.9 - 12.7 fL Final    Platelets 331  149 - 390 Thousands/uL Final    nRBC 0  /100 WBCs Final    Neutrophils Relative 81 (*) 43 - 75 % Final    Immature Grans % 1  0 - 2 % Final    Lymphocytes Relative 12 (*) 14 - 44 % Final    Monocytes Relative 6  4 - 12 % Final    Eosinophils Relative 0  0 - 6 % Final    Basophils Relative 0  0 - 1 % Final    Neutrophils Absolute 10.45 (*) 1.85 - 7.62 Thousands/µL Final    Absolute Immature Grans 0.09  0.00 - 0.20 Thousand/uL Final    Absolute Lymphocytes 1.52  0.60 - 4.47 Thousands/µL Final    Absolute Monocytes 0.81  0.17 - 1.22 Thousand/µL Final    Eosinophils Absolute 0.04  0.00 - 0.61 Thousand/µL Final    Basophils Absolute 0.05  0.00 - 0.10 Thousands/µL Final   COMPREHENSIVE METABOLIC PANEL - Abnormal    Sodium 131 (*) 135 - 147 mmol/L Final    Potassium 3.8  3.5 - 5.3 mmol/L Final    Chloride 93 (*) 96 - 108 mmol/L Final    CO2 27  21 - 32 mmol/L Final    ANION GAP 11  4 - 13 mmol/L Final    BUN 7  5 - 25 mg/dL Final    Creatinine 0.29 (*) 0.60 - 1.30 mg/dL Final    Comment: Standardized to IDMS reference method    Glucose 132  65 - 140 mg/dL  Final    Comment: If the patient is fasting, the ADA then defines impaired fasting glucose as > 100 mg/dL and diabetes as > or equal to 123 mg/dL.    Calcium 9.3  8.4 - 10.2 mg/dL Final    AST 18  13 - 39 U/L Final    ALT 11  7 - 52 U/L Final    Comment: Specimen collection should occur prior to Sulfasalazine administration due to the potential for falsely depressed results.     Alkaline Phosphatase 84  34 - 104 U/L Final    Total Protein 7.0  6.4 - 8.4 g/dL Final    Albumin 3.9  3.5 - 5.0 g/dL Final    Total Bilirubin 0.74  0.20 - 1.00 mg/dL Final    Comment: Use of this assay is not recommended for patients undergoing treatment with eltrombopag due to the potential for falsely elevated results.  N-acetyl-p-benzoquinone imine (metabolite of Acetaminophen) will generate erroneously low results in samples for patients that have taken an overdose of Acetaminophen.    eGFR 132  ml/min/1.73sq m Final    Narrative:     National Kid[employer] guidelines for Chronic Kidney Disease (CKD):     Stage 1 with normal or high GFR (GFR > 90 mL/min/1.73 square meters)    Stage 2 Mild CKD (GFR = 60-89 mL/min/1.73 square meters)    Stage 3A Moderate CKD (GFR = 45-59 mL/min/1.73 square meters)    Stage 3B Moderate CKD (GFR = 30-44 mL/min/1.73 square meters)    Stage 4 Severe CKD (GFR = 15-29 mL/min/1.73 square meters)    Stage 5 End Stage CKD (GFR <15 mL/min/1.73 square meters)  Note: GFR calculation is accurate only with a steady state creatinine   D-DIMER, QUANTITATIVE - Abnormal    D-Dimer, Quant 3.12 (*) <0.50 ug/ml FEU Final    Comment: Reference and upper limits to exclude DVT and PE are the same.  Do not use to exclude if clinical symptoms are present.  Pregnant women:  1st trimester:  <0.22 - 1.06 ug/ml FEU  2nd trimester:  <0.22 - 1.88 ug/ml FEU  3rd trimester:   0.24 - 3.28 ug/ml FEU    Note: Normal ranges may not apply to patients who are transgender, non-binary, or whose legal sex, sex at birth, and gender  identity differ.      Narrative:     In the evaluation for possible pulmonary embolism, in the appropriate (Well's Score of 4 or less) patient, the age adjusted d-dimer cutoff for this patient can be calculated as:    Age x 0.01 (in ug/mL) for Age-adjusted D-dimer exclusion threshold for a patient over 50 years.   UA W REFLEX TO MICROSCOPIC WITH REFLEX TO CULTURE - Abnormal    Color, UA Light Yellow   Final    Clarity, UA Clear   Final    Specific Gravity, UA 1.038 (*) 1.003 - 1.030 Final    pH, UA 7.0  4.5, 5.0, 5.5, 6.0, 6.5, 7.0, 7.5, 8.0 Final    Leukocytes, UA Large (*) Negative Final    Nitrite, UA Negative  Negative Final    Protein, UA Trace (*) Negative mg/dl Final    Glucose, UA Negative  Negative mg/dl Final    Ketones, UA 20 (1+) (*) Negative mg/dl Final    Urobilinogen, UA <2.0  <2.0 mg/dl mg/dl Final    Bilirubin, UA Negative  Negative Final    Occult Blood, UA Trace (*) Negative Final   URINE MICROSCOPIC - Abnormal    RBC, UA 2-4 (*) None Seen, 1-2 /hpf Final    WBC, UA 10-20 (*) None Seen, 1-2 /hpf Final    Epithelial Cells Occasional  None Seen, Occasional /hpf Final    Bacteria, UA Occasional  None Seen, Occasional /hpf Final    MUCUS THREADS Occasional (*) None Seen Final    WBC Clumps Present   Final   LACTIC ACID, PLASMA (W/REFLEX IF RESULT > 2.0) - Normal    LACTIC ACID 1.5  0.5 - 2.0 mmol/L Final    Narrative:     Result may be elevated if tourniquet was used during collection.   PROCALCITONIN TEST - Normal    Procalcitonin 0.05  <=0.25 ng/ml Final    Comment: Suspected Lower Respiratory Tract Infection (LRTI):  - LESS than or EQUAL to 0.25 ng/mL:   low likelihood for bacterial LRTI; antibiotics DISCOURAGED.  - GREATER than 0.25 ng/mL:   increased likelihood for bacterial LRTI; antibiotics ENCOURAGED.    Suspected Sepsis:  - Strongly consider initiating antibiotics in ALL UNSTABLE patients.  - LESS than or EQUAL to 0.5 ng/mL:   low likelihood for bacterial sepsis; antibiotics DISCOURAGED.  -  GREATER than 0.5 ng/mL:   increased likelihood for bacterial sepsis; antibiotics ENCOURAGED.  - GREATER than 2 ng/mL:   high risk for severe sepsis / septic shock; antibiotics strongly ENCOURAGED.    Decisions on antibiotic use should not be based solely on Procalcitonin (PCT) levels. If PCT is low but uncertainty exists with stopping antibiotics, repeat PCT in 6-24 hours to confirm the low level. If antibiotics are administered (regardless if initial PCT was high or low), repeat PCT every 1-2 days to consider early antibiotic cessation (when GREATER than 80% decrease from the peak OR when PCT drops below designated cutoffs, whichever comes first), so long as the infection is NOT one that typically requires prolonged treatment durations (e.g., bone/joint infections, endocarditis, Staph. aureus bacteremia).    Situations of FALSE-POSITIVE Procalcitonin values:  1) Newborns < 72 hours old  2) Massive stress from severe trauma / burns, major surgery, acute pancreatitis, cardiogenic / hemorrhagic shock, sickle cell crisis, or other organ perfusion abnormalities  3) Malaria and some Candidal infections  4) Treatment with agents that stimulate cytokines (e.g., OKT3, anti-lymphocyte globulins, alemtuzumab, IL-2, granulocyte transfusion [NOT GCSFs])  5) Chronic renal disease causes elevated baseline levels (consider GREATER than 0.75 ng/mL as an abnormal cut-off); initiating HD/CRRT may cause transient decreases  6) Paraneoplastic syndromes from medullary thyroid or SCLC, some forms of vasculitis, and acute [id number] disease    Situations of FALSE-NEGATIVE Procalcitonin values:  1) Too early in clinical course for PCT to have reached its peak (may repeat in 6-24 hours to confirm low level)  2) Localized infection WITHOUT systemic (SIRS / sepsis) response (e.g., an abscess, osteomyelitis, cystitis)  3) Mycobacteria (e.g., Tuberculosis, MAC)  4) Cystic fibrosis exacerbations     PROTIME-INR - Normal    Protime 13.9  11.6  - 14.5 seconds Final    INR 1.08  0.84 - 1.19 Final   APTT - Normal    PTT 29  23 - 37 seconds Final    Comment: Therapeutic Heparin Range =  60-90 seconds   LIPASE - Normal    Lipase 16  11 - 82 u/L Final   BLOOD CULTURE    Blood Culture Received in Microbiology Lab. Culture in Progress.   Preliminary   BLOOD CULTURE    Blood Culture Received in Microbiology Lab. Culture in Progress.   Preliminary   URINE CULTURE     Patient reassessed multiple times, less tachycardic, feeling better.  At this point the cause the patient's symptoms is unclear.  She may have a urinary tract infection, may also represent colonization.  No sign of obvious pulmonary embolism, no sign of sepsis.  No sign of obstructing ureteral calculi.  I do not believe she has pyelonephritis.  Will treat with antibiotics for urinary tract infection.  Given previous sensitivities showed Pseudomonas, will use ciprofloxacin.  The risks of quinolone usage including tendon rupture, peripheral neuropathy, neuropsychiatric changes, mysasthenia gravis crisis, hypoglycemia, and aortic dissection were discussed with the patient. Given the patient's medical condition, it was decided that quinolone usage would be appropriate. The patient was advised to avoid high impact activities for the next 2 months, as well as to followup with PCP or the emergency department if they develop any concerning complications   Admission was considered however patient and sister would prefer discharge home with outpatient follow-up.  Supportive care, importance of follow-up and return precautions were discussed with patient and sister  who expressed understanding.    DIAGNOSIS:  Acute urinary tract infection, acute tachycardia, chronic neurogenic bladder    MEDICAL DECISION MAKING CODING    Patient presents with acute new problem with:  Threat to life or bodily function    Chronic conditions affecting care: As per HPI    COLLECTION AND INTERPRETATION OF DATA  Additional history  obtained from: Sister  I reviewed prior external notes, including January 15, 2020 for hospital discharge summary    I ordered each unique test  Tests reviewed personally by me:  ECG: See my ED course  Labs: See above  Imaging: I independently reviewed the CT chest abdomen pelvis PE study and found no acute pathology.    Discussion with other providers: Discussed with radiologist regarding possible ureteral calculi      RISK  Drugs (OTC, Rx, Controlled substances): Prescription management  All of the patient's current prescription medications should be continued.    Treatment:  Consideration of admission: See above      Surgery  -I considered surgery may be necessary prior to completion of the work up but afterwards there is no indication for immediate surgery    Critical Care Time  Procedures

## 2024-03-15 NOTE — TELEPHONE ENCOUNTER
Tamera from [facility] visiting nurses is due to change patient's catheter today and would like an order for a UA C&S. She would like a call back to advise.

## 2024-03-16 NOTE — RESULT ENCOUNTER NOTE
Urine is abnormal. Culture is pending. PT was seen in the ER and treated for UTI with quinolone. Await further results.

## 2024-03-17 LAB
ATRIAL RATE: 115 BPM
BACTERIA BLD CULT: NORMAL
BACTERIA BLD CULT: NORMAL
BACTERIA UR CULT: ABNORMAL
P AXIS: 46 DEGREES
PR INTERVAL: 122 MS
QRS AXIS: 79 DEGREES
QRSD INTERVAL: 68 MS
QT INTERVAL: 294 MS
QTC INTERVAL: 406 MS
T WAVE AXIS: -51 DEGREES
VENTRICULAR RATE: 115 BPM

## 2024-03-17 PROCEDURE — 93010 ELECTROCARDIOGRAM REPORT: CPT | Performed by: INTERNAL MEDICINE

## 2024-03-18 ENCOUNTER — HOME CARE VISIT (OUTPATIENT)
Dept: HOME HEALTH SERVICES | Facility: HOME HEALTHCARE | Age: 54
End: 2024-03-18
Payer: MEDICARE

## 2024-03-18 VITALS
OXYGEN SATURATION: 94 % | HEART RATE: 106 BPM | SYSTOLIC BLOOD PRESSURE: 118 MMHG | TEMPERATURE: 98.6 F | RESPIRATION RATE: 16 BRPM | DIASTOLIC BLOOD PRESSURE: 76 MMHG

## 2024-03-18 PROCEDURE — G0299 HHS/HOSPICE OF RN EA 15 MIN: HCPCS

## 2024-03-19 ENCOUNTER — HOME CARE VISIT (OUTPATIENT)
Dept: HOME HEALTH SERVICES | Facility: HOME HEALTHCARE | Age: 54
End: 2024-03-19
Payer: MEDICARE

## 2024-03-19 DIAGNOSIS — G35 FUNCTIONAL QUADRIPLEGIA SECONDARY TO MS (HCC): Primary | ICD-10-CM

## 2024-03-19 DIAGNOSIS — R53.2 FUNCTIONAL QUADRIPLEGIA SECONDARY TO MS (HCC): Primary | ICD-10-CM

## 2024-03-20 ENCOUNTER — TELEPHONE (OUTPATIENT)
Dept: HOME HEALTH SERVICES | Facility: HOME HEALTHCARE | Age: 54
End: 2024-03-20

## 2024-03-20 ENCOUNTER — HOME CARE VISIT (OUTPATIENT)
Dept: HOME HEALTH SERVICES | Facility: HOME HEALTHCARE | Age: 54
End: 2024-03-20
Payer: MEDICARE

## 2024-03-20 LAB
BACTERIA BLD CULT: NORMAL
BACTERIA BLD CULT: NORMAL

## 2024-03-21 ENCOUNTER — HOME CARE VISIT (OUTPATIENT)
Dept: HOME HEALTH SERVICES | Facility: HOME HEALTHCARE | Age: 54
End: 2024-03-21
Payer: MEDICARE

## 2024-03-21 VITALS — OXYGEN SATURATION: 98 % | DIASTOLIC BLOOD PRESSURE: 70 MMHG | SYSTOLIC BLOOD PRESSURE: 110 MMHG | HEART RATE: 85 BPM

## 2024-03-21 PROCEDURE — G0152 HHCP-SERV OF OT,EA 15 MIN: HCPCS

## 2024-03-22 ENCOUNTER — HOME CARE VISIT (OUTPATIENT)
Dept: HOME HEALTH SERVICES | Facility: HOME HEALTHCARE | Age: 54
End: 2024-03-22
Payer: MEDICARE

## 2024-03-22 VITALS
TEMPERATURE: 98.6 F | OXYGEN SATURATION: 96 % | DIASTOLIC BLOOD PRESSURE: 68 MMHG | RESPIRATION RATE: 20 BRPM | HEART RATE: 78 BPM | SYSTOLIC BLOOD PRESSURE: 118 MMHG

## 2024-03-22 PROCEDURE — G0299 HHS/HOSPICE OF RN EA 15 MIN: HCPCS

## 2024-03-24 ENCOUNTER — HOME CARE VISIT (OUTPATIENT)
Dept: HOME HEALTH SERVICES | Facility: HOME HEALTHCARE | Age: 54
End: 2024-03-24
Payer: MEDICARE

## 2024-03-25 ENCOUNTER — HOME CARE VISIT (OUTPATIENT)
Dept: HOME HEALTH SERVICES | Facility: HOME HEALTHCARE | Age: 54
End: 2024-03-25
Payer: MEDICARE

## 2024-03-25 ENCOUNTER — OFFICE VISIT (OUTPATIENT)
Dept: WOUND CARE | Facility: HOSPITAL | Age: 54
End: 2024-03-25
Payer: MEDICARE

## 2024-03-25 VITALS
DIASTOLIC BLOOD PRESSURE: 62 MMHG | RESPIRATION RATE: 26 BRPM | TEMPERATURE: 96.8 F | HEART RATE: 64 BPM | SYSTOLIC BLOOD PRESSURE: 90 MMHG

## 2024-03-25 DIAGNOSIS — L89.314 PRESSURE ULCER OF RIGHT ISCHIUM, STAGE IV (HCC): ICD-10-CM

## 2024-03-25 DIAGNOSIS — L89.323 PRESSURE ULCER OF ISCHIUM, LEFT, STAGE III (HCC): Primary | ICD-10-CM

## 2024-03-25 DIAGNOSIS — G35 MULTIPLE SCLEROSIS (HCC): ICD-10-CM

## 2024-03-25 PROCEDURE — 97597 DBRDMT OPN WND 1ST 20 CM/<: CPT | Performed by: NURSE PRACTITIONER

## 2024-03-25 PROCEDURE — 99212 OFFICE O/P EST SF 10 MIN: CPT | Performed by: NURSE PRACTITIONER

## 2024-03-25 RX ORDER — LIDOCAINE HYDROCHLORIDE 40 MG/ML
5 SOLUTION TOPICAL ONCE
Status: COMPLETED | OUTPATIENT
Start: 2024-03-25 | End: 2024-03-25

## 2024-03-25 RX ADMIN — LIDOCAINE HYDROCHLORIDE 5 ML: 40 SOLUTION TOPICAL at 08:38

## 2024-03-25 NOTE — PATIENT INSTRUCTIONS
Orders Placed This Encounter   Procedures    Wound cleansing and dressings     Left ischial wound:     Wash your hands with soap and water.  Remove old dressing, discard into plastic bag and place in trash.  Cleanse the wound with dakins as needed if odor is present, wound cleanser other days,  prior to applying a clean dressing. Do not use tissue or cotton balls. Do not scrub the wound. Pat dry using gauze.  Shower no      Apply Silver alginate rope or sheet to the open wounds, tuck into undermined areas.  Cover with silicone border dressings     Change dressing daily    VNA to change every other day,  to change other days.    Right ischial wound:    Wash your hands with soap and water.  Remove old dressing, discard into plastic bag and place in trash.  Cleanse the wound with dakins as needed if odor is present, wound cleanser other days,  prior to applying a clean dressing. Do not use tissue or cotton balls. Do not scrub the wound. Pat dry using gauze.  Shower no      Apply Silver alginate to the open wounds, tuck into undermined areas.  Cover with silicone border dressings     Change dressing every other day    VNA to change every other day,  to change other days if needed        Done today     Standing Status:   Future     Standing Expiration Date:   3/25/2025

## 2024-03-25 NOTE — PROGRESS NOTES
"Patient ID: Fanny Schulz is a 53 y.o. female Date of Birth 1970     Chief Complaint  Chief Complaint   Patient presents with    Follow Up Wound Care Visit     Bilateral ischial wounds       Allergies  Latex    Assessment:     Diagnoses and all orders for this visit:    Pressure ulcer of ischium, left, stage III (HCC)  -     lidocaine (XYLOCAINE) 4 % topical solution 5 mL  -     Cancel: Wound cleansing and dressings; Future  -     Wound cleansing and dressings; Future    Pressure ulcer of right ischium, stage IV (HCC)  -     lidocaine (XYLOCAINE) 4 % topical solution 5 mL  -     Cancel: Wound cleansing and dressings; Future  -     Wound cleansing and dressings; Future    Multiple sclerosis (HCC)  -     Cancel: Wound cleansing and dressings; Future  -     Wound cleansing and dressings; Future    Other orders  -     Debridement Pressure Injury Left;Posterior;Proximal Ischium              Debridement   Wound 01/10/24 Pressure Injury Ischium Left;Posterior;Proximal    Universal Protocol:  Consent: Written consent obtained.  Consent given by: patient  Time out: Immediately prior to procedure a \"time out\" was called to verify the correct patient, procedure, equipment, support staff and site/side marked as required.  Timeout called at: 3/25/2024 10:20 AM.  Patient identity confirmed: verbally with patient    Debridement Details  Performed by: NP  Debridement type: selective  Pain control: lidocaine 4%      Post-debridement measurements  Length (cm): 4  Width (cm): 3.5  Depth (cm): 1.5  Percent debrided: 100%  Surface Area (cm^2): 14  Area Debrided (cm^2): 14  Volume (cm^3): 21    Devitalized tissue debrided: biofilm, fibrin and slough  Instrument(s) utilized: curette  Bleeding: small  Hemostasis obtained with: pressure  Procedural pain (0-10): 0  Post-procedural pain: 0   Response to treatment: procedure was tolerated well    Debridement   Wound 01/29/24 Pressure Injury Ischium Right    Universal " "Protocol:  Consent: Written consent obtained.  Consent given by: patient  Time out: Immediately prior to procedure a \"time out\" was called to verify the correct patient, procedure, equipment, support staff and site/side marked as required.  Timeout called at: 3/25/2024 11:00 AM.  Patient identity confirmed: verbally with patient    Debridement Details  Performed by: NP  Debridement type: selective  Pain control: lidocaine 4%      Post-debridement measurements  Length (cm): 1  Width (cm): 1  Depth (cm): 0.8  Percent debrided: 100%  Surface Area (cm^2): 1  Area Debrided (cm^2): 1  Volume (cm^3): 0.8    Devitalized tissue debrided: biofilm, fibrin and slough  Instrument(s) utilized: curette  Bleeding: small  Hemostasis obtained with: pressure  Procedural pain (0-10): 0  Post-procedural pain: 0   Response to treatment: procedure was tolerated well        Plan:  1.  F/u visit.  Wounds debrided.  Wounds improving with increased granulation tissue present in wound beds.  2.  Left ischial wound has fully evolved into a stage III pressure ulcer.  3.  Will change treatment to both wounds to silver alginate covered with silicone bordered foam dressings change daily.  4.  Continue intake with Jamarcus  5.  Continue frequent offloading of pressure from wounds.  6.  Patient will follow-up in 2 weeks     Wound 01/10/24 Pressure Injury Ischium Left;Posterior;Proximal (Active)   Wound Image Images linked 03/25/24 0826   Wound Description Granulation tissue;Bleeding 03/25/24 0826   Pressure Injury Stage U 03/25/24 0826   Ruchi-wound Assessment Intact 03/25/24 0826   Wound Length (cm) 4 cm 03/25/24 0826   Wound Width (cm) 3.5 cm 03/25/24 0826   Wound Depth (cm) 1.5 cm 03/25/24 0826   Wound Surface Area (cm^2) 14 cm^2 03/25/24 0826   Wound Volume (cm^3) 21 cm^3 03/25/24 0826   Calculated Wound Volume (cm^3) 21 cm^3 03/25/24 0826   Undermining 1 4 03/25/24 0826   Undermining 1 is depth extending from 12-4 03/25/24 0826   Drainage Amount " Large 03/25/24 0826   Drainage Description Tan;Foul smelling;Serosanguineous 03/25/24 0826   Dressing Status Intact 03/25/24 0826       Wound 01/29/24 Pressure Injury Ischium Right (Active)   Wound Image Images linked 03/25/24 0831   Wound Description Granulation tissue;Yellow 03/25/24 0836   Ruchi-wound Assessment Intact 03/25/24 0836   Wound Length (cm) 1 cm 03/25/24 0836   Wound Width (cm) 1 cm 03/25/24 0836   Wound Depth (cm) 0.8 cm 03/25/24 0836   Wound Surface Area (cm^2) 1 cm^2 03/25/24 0836   Wound Volume (cm^3) 0.8 cm^3 03/25/24 0836   Calculated Wound Volume (cm^3) 0.8 cm^3 03/25/24 0836   Change in Wound Size % -60 03/25/24 0836   Undermining 1 1.4 03/25/24 0836   Undermining 1 is depth extending from 12-2 03/25/24 0836   Drainage Amount Moderate 03/25/24 0836   Drainage Description Serosanguineous;Tan 03/25/24 0836   Non-staged Wound Description Full thickness 03/25/24 0836   Dressing Status Intact 03/25/24 0836       Wound 01/10/24 Pressure Injury Ischium Left;Posterior;Proximal (Active)   Date First Assessed/Time First Assessed: 01/10/24 0956   Primary Wound Type: Pressure Injury  Location: Ischium  Wound Location Orientation: Left;Posterior;Proximal       Wound 01/29/24 Pressure Injury Ischium Right (Active)   Date First Assessed: 01/29/24   Present on Original Admission: Yes  Primary Wound Type: Pressure Injury  Location: Ischium  Wound Location Orientation: Right       [REMOVED] Wound 07/29/21 Pressure Injury Ischium Right (Removed)   Resolved Date: 08/02/22  Date First Assessed/Time First Assessed: 07/29/21 1536   Pre-Existing Wound: Yes  Primary Wound Type: Pressure Injury  Location: Ischium  Wound Location Orientation: Right       [REMOVED] Wound 12/22/22 Other (comment) Buttocks Left (Removed)   Resolved Date: 01/29/24  Date First Assessed/Time First Assessed: 12/22/22 1104   Primary Wound Type: Other (comment)  Location: Buttocks  Wound Location Orientation: Left       [REMOVED] Wound 12/22/22  Pressure Injury Buttocks Right (Removed)   Resolved Date: 01/29/24  Date First Assessed/Time First Assessed: 12/22/22 1106   Primary Wound Type: Pressure Injury  Location: Buttocks  Wound Location Orientation: Right       [REMOVED] Wound 12/22/22 Pressure Injury Heel Right (Removed)   Resolved Date: 01/29/24  Date First Assessed/Time First Assessed: 12/22/22 1110   Primary Wound Type: Pressure Injury  Location: Heel  Wound Location Orientation: Right       [REMOVED] Wound 12/22/22 Pressure Injury Thigh Anterior;Proximal;Right (Removed)   Resolved Date: 01/29/24  Date First Assessed/Time First Assessed: 12/22/22 1112   Primary Wound Type: Pressure Injury  Location: Thigh  Wound Location Orientation: Anterior;Proximal;Right  Wound Description (Comments): BLEEDING EXCORIATED       [REMOVED] Wound 12/22/22 Pressure Injury Thigh Anterior;Left;Proximal (Removed)   Resolved Date: 01/29/24  Date First Assessed/Time First Assessed: 12/22/22 1112   Primary Wound Type: Pressure Injury  Location: Thigh  Wound Location Orientation: Anterior;Left;Proximal  Wound Description (Comments): BLEEDING EXCORIATED       [REMOVED] Wound 12/22/22 Vagina N/A (Removed)   Resolved Date: 01/29/24  Date First Assessed/Time First Assessed: 12/22/22 1313   Location: Vagina  Wound Location Orientation: N/A       [REMOVED] Wound 01/09/24 Right (Removed)   Resolved Date: 01/10/24  Date First Assessed/Time First Assessed: 01/09/24 1832   Wound Location Orientation: Right  Wound Outcome: Healed       [REMOVED] Wound 01/09/24 Flank Left (Removed)   Resolved Date: 01/10/24  Date First Assessed/Time First Assessed: 01/09/24 1835   Location: Flank  Wound Location Orientation: Left  Wound Outcome: Healed       [REMOVED] Wound 01/09/24 Arm Left;Posterior (Removed)   Resolved Date: 01/29/24  Date First Assessed/Time First Assessed: 01/09/24 1836   Location: Arm  Wound Location Orientation: Left;Posterior       Subjective:      .    F/u visit for pressure  ulcers of left and right ischial regions.  No new complaints.  She denies any pain, fevers, or chills.  Patient reports she has been taking Jamarcus daily to enhance her wound healing.        The following portions of the patient's history were reviewed and updated as appropriate: She  has a past medical history of Anesthesia, Bladder stones, Chronic pain disorder, Contracture, right shoulder, Dependent on wheelchair, Edema, Elevated alkaline phosphatase level, Fernandez catheter in place, Gallbladder disease, History of femur fracture, History of UTI, MS (multiple sclerosis) ([facility]), Muscle spasticity, Muscle weakness, Muscular dystrophy ([facility]), Neck pain, Neurogenic bladder, Paralysis ([facility]), Port-A-Cath in place, Pressure injury of skin, Sacral pressure sore, Swallowing difficulty, Tinnitus, and Urinary incontinence.  She   Patient Active Problem List    Diagnosis Date Noted    Chronic lower extremity edema 01/08/2024    Tinnitus of both ears 11/21/2023    Hypophonia 11/21/2023    Peripheral edema 02/20/2023    Continuous opioid dependence ([facility]) 09/08/2022    Increased endometrial stripe thickness 03/10/2021    Ureteral calculus, left 11/03/2020    Alkaline phosphatase elevation 10/28/2020    Abnormal thyroid function test 10/28/2020    Candidal vaginitis 10/19/2020    Hydronephrosis with urinary obstruction due to ureteral calculus 10/02/2020    Thrombocytopenia ([facility]) 10/01/2020    Serum total bilirubin elevated 10/01/2020    Medication management contract signed 02/21/2020    Screening mammogram, encounter for 05/29/2019    Allergic rhinitis 03/26/2018    Functional quadriplegia secondary to MS ([facility]) 01/25/2018    Suprapubic catheter ([facility]) 11/17/2017    Nephrolithiasis 04/22/2016    Bladder calculus 09/02/2015    Muscle spasticity 04/21/2015    Vitamin B12 deficiency 01/16/2015    Constipation 01/14/2015    Hyperglycemia 11/25/2014    Familial hypercholesterolemia 11/25/2014    Recurrent major depressive disorder, in full  remission ([facility]) 01/22/2014    Lymphedema 01/22/2014    Spinal stenosis of cervical region 01/22/2014    Urinary incontinence 01/22/2014    Vitamin D deficiency 11/18/2013    Dysphagia 11/04/2013    Dizziness 11/04/2013    Muscle weakness 11/04/2013    Neurogenic bladder 11/04/2013    Sleep disorder 11/04/2013    Tremor 11/04/2013    Vision loss 11/04/2013    Multiple sclerosis ([facility]) 10/21/2013     She  has a past surgical history that includes Cholecystectomy; Kidney stone surgery; Portacath placement (Left); Esophagogastroduodenoscopy; Bladder stone removal (N/A, 12/4/2017); Bladder surgery; Suprapubic cystostomy (02/25/2014); Cystoscopy (06/15/2020); [place] retrograde pyelogram (10/2/2020); pr cysto bladder w/ureteral catheterization (Left, 10/2/2020); pr cysto/uretero w/lithotripsy &indwell stent insrt (Left, 11/3/2020); [place] retrograde pyelogram (11/3/2020); and pr litholapaxy smpl/sm <2.5 cm (N/A, 12/22/2022).  Her family history includes Alzheimer's disease in her family; COPD in her father; Diabetes in her family and mother; Heart disease in her family; Hyperlipidemia in her mother and sister; Hypertension in her family, father, and mother.  She  reports that she has never smoked. She has never used smokeless tobacco. She reports that she does not currently use alcohol. She reports that she does not use drugs.  Current Outpatient Medications   Medication Sig Dispense Refill    baclofen 20 mg tablet TAKE 1 TABLET BY MOUTH 5 TIMES AS NEEDED 450 tablet 3    clotrimazole (LOTRIMIN) 1 % cream Apply topically 2 (two) times a day as needed (yeast rash) (Patient not taking: Reported on 11/21/2023) 30 g 0    DULoxetine (CYMBALTA) 20 mg capsule Take 1 capsule (20 mg total) by mouth daily 90 capsule 3    furosemide (LASIX) 20 mg tablet Take 1 tablet (20 mg total) by mouth daily 30 tablet 0    gabapentin (NEURONTIN) 100 mg capsule Take 1 tablet at bedtime tonight, 1 tablet twice a day tomorrow, and then 1 tablet 3 times daily  thereafter 90 capsule 5    oxybutynin (DITROPAN-XL) 10 MG 24 hr tablet Take 1 tablet (10 mg total) by mouth daily 90 tablet 3    rosuvastatin (CRESTOR) 5 mg tablet TAKE 1 TABLET (5 MG TOTAL) BY MOUTH DAILY. 90 tablet 1     No current facility-administered medications for this visit.     She is allergic to latex..    Review of Systems   Constitutional: Negative.    HENT:  Negative for ear pain and hearing loss.    Eyes:  Negative for pain.   Respiratory:  Negative for chest tightness and shortness of breath.    Cardiovascular:  Negative for chest pain, palpitations and leg swelling.   Gastrointestinal:  Negative for diarrhea, nausea and vomiting.   Genitourinary:  Negative for dysuria.   Musculoskeletal:  Positive for gait problem.   Skin:  Positive for wound.   Neurological:  Positive for numbness. Negative for tremors and weakness.   Psychiatric/Behavioral:  Negative for behavioral problems, confusion and suicidal ideas.          Objective:       Wound 01/10/24 Pressure Injury Ischium Left;Posterior;Proximal (Active)   Wound Image Images linked 03/25/24 0826   Wound Description Granulation tissue;Bleeding 03/25/24 0826   Pressure Injury Stage U 03/25/24 0826   Ruchi-wound Assessment Intact 03/25/24 0826   Wound Length (cm) 4 cm 03/25/24 0826   Wound Width (cm) 3.5 cm 03/25/24 0826   Wound Depth (cm) 1.5 cm 03/25/24 0826   Wound Surface Area (cm^2) 14 cm^2 03/25/24 0826   Wound Volume (cm^3) 21 cm^3 03/25/24 0826   Calculated Wound Volume (cm^3) 21 cm^3 03/25/24 0826   Undermining 1 4 03/25/24 0826   Undermining 1 is depth extending from 12-4 03/25/24 0826   Drainage Amount Large 03/25/24 0826   Drainage Description Tan;Foul smelling;Serosanguineous 03/25/24 0826   Dressing Status Intact 03/25/24 0826       Wound 01/29/24 Pressure Injury Ischium Right (Active)   Wound Image Images linked 03/25/24 0831   Wound Description Granulation tissue;Yellow 03/25/24 0836   Ruchi-wound Assessment Intact 03/25/24 0836   Wound  Length (cm) 1 cm 03/25/24 0836   Wound Width (cm) 1 cm 03/25/24 0836   Wound Depth (cm) 0.8 cm 03/25/24 0836   Wound Surface Area (cm^2) 1 cm^2 03/25/24 0836   Wound Volume (cm^3) 0.8 cm^3 03/25/24 0836   Calculated Wound Volume (cm^3) 0.8 cm^3 03/25/24 0836   Change in Wound Size % -60 03/25/24 0836   Undermining 1 1.4 03/25/24 0836   Undermining 1 is depth extending from 12-2 03/25/24 0836   Drainage Amount Moderate 03/25/24 0836   Drainage Description Serosanguineous;Tan 03/25/24 0836   Non-staged Wound Description Full thickness 03/25/24 0836   Dressing Status Intact 03/25/24 0836       BP 90/62   Pulse 64   Temp (!) 96.8 °F (36 °C)   Resp (!) 26               Wound Instructions:  Orders Placed This Encounter   Procedures    Wound cleansing and dressings     Left ischial wound:     Wash your hands with soap and water.  Remove old dressing, discard into plastic bag and place in trash.  Cleanse the wound with dakins as needed if odor is present, wound cleanser other days,  prior to applying a clean dressing. Do not use tissue or cotton balls. Do not scrub the wound. Pat dry using gauze.  Shower no      Apply Silver alginate rope or sheet to the open wounds, tuck into undermined areas.  Cover with silicone border dressings     Change dressing daily    VNA to change every other day,  to change other days.    Right ischial wound:    Wash your hands with soap and water.  Remove old dressing, discard into plastic bag and place in trash.  Cleanse the wound with dakins as needed if odor is present, wound cleanser other days,  prior to applying a clean dressing. Do not use tissue or cotton balls. Do not scrub the wound. Pat dry using gauze.  Shower no      Apply Silver alginate to the open wounds, tuck into undermined areas.  Cover with silicone border dressings     Change dressing every other day    VNA to change every other day,  to change other days if needed        Done today     Standing Status:    Future     Standing Expiration Date:   3/25/2025    Debridement Pressure Injury Left;Posterior;Proximal Ischium     This order was created via procedure documentation        Diagnosis ICD-10-CM Associated Orders   1. Pressure ulcer of ischium, left, stage III ([facility])  L89.323 lidocaine (XYLOCAINE) 4 % topical solution 5 mL     Wound cleansing and dressings      2. Pressure ulcer of right ischium, stage IV ([facility])  L89.314 lidocaine (XYLOCAINE) 4 % topical solution 5 mL     Wound cleansing and dressings      3. Multiple sclerosis ([facility])  G35 Wound cleansing and dressings

## 2024-03-26 ENCOUNTER — TELEPHONE (OUTPATIENT)
Dept: FAMILY MEDICINE CLINIC | Facility: CLINIC | Age: 54
End: 2024-03-26

## 2024-03-26 ENCOUNTER — HOME CARE VISIT (OUTPATIENT)
Dept: HOME HEALTH SERVICES | Facility: HOME HEALTHCARE | Age: 54
End: 2024-03-26
Payer: MEDICARE

## 2024-03-26 DIAGNOSIS — Z93.59 SUPRAPUBIC CATHETER (HCC): ICD-10-CM

## 2024-03-26 DIAGNOSIS — G35 FUNCTIONAL QUADRIPLEGIA SECONDARY TO MS (HCC): Primary | ICD-10-CM

## 2024-03-26 DIAGNOSIS — F11.20 CONTINUOUS OPIOID DEPENDENCE (HCC): ICD-10-CM

## 2024-03-26 DIAGNOSIS — L89.153 SACRAL DECUBITUS ULCER, STAGE III (HCC): ICD-10-CM

## 2024-03-26 DIAGNOSIS — G82.20 PARAPLEGIA (HCC): ICD-10-CM

## 2024-03-26 DIAGNOSIS — G35 MULTIPLE SCLEROSIS (HCC): ICD-10-CM

## 2024-03-26 DIAGNOSIS — R53.2 FUNCTIONAL QUADRIPLEGIA SECONDARY TO MS (HCC): Primary | ICD-10-CM

## 2024-03-26 PROCEDURE — G0155 HHCP-SVS OF CSW,EA 15 MIN: HCPCS

## 2024-03-26 NOTE — CASE COMMUNICATION
Ship to [facility] Pt. Home   Ordering MD (home health only) ramiro MCRAE    Wound 1 Right Ischium Full  XXX   Frequency Daily  Wound 2 Left ischium Full XX      Frequency QOD    All items are ordered by the each unless otherwise noted.  Orders should be for a 2 week period (unless noted by insurance)      Calcium Alginates  (In place of Aquacel or Maxsorb)  Alginate rope  [id number]   3  Ag sheet    3       Foam Dressings    Sub for Allevyn :  Hydrocellular Foam Adh, 4x4 [id number]          10

## 2024-03-26 NOTE — TELEPHONE ENCOUNTER
----- Message from STEFANY Owens sent at 3/26/2024  2:38 PM EDT -----  Regarding: Palliative Care Referral  Hi, I was out to see Ashlie today and we engaged in a lengthly discussion on Palliative Care, more specifically the home based program through [facility]. Would you be able to place an Ambulatory Referral for Palliative Care in [facility] [place]? [facility] so they can see if this would be a good next step for her from a long term approach. Thank you in advance for your time. Maude Restrepo

## 2024-03-27 ENCOUNTER — HOME CARE VISIT (OUTPATIENT)
Dept: HOME HEALTH SERVICES | Facility: HOME HEALTHCARE | Age: 54
End: 2024-03-27
Payer: MEDICARE

## 2024-03-27 ENCOUNTER — TELEPHONE (OUTPATIENT)
Dept: HOME HEALTH SERVICES | Facility: HOME HEALTHCARE | Age: 54
End: 2024-03-27

## 2024-03-27 VITALS
HEART RATE: 88 BPM | DIASTOLIC BLOOD PRESSURE: 68 MMHG | TEMPERATURE: 98.6 F | RESPIRATION RATE: 18 BRPM | SYSTOLIC BLOOD PRESSURE: 122 MMHG | OXYGEN SATURATION: 98 %

## 2024-03-27 PROCEDURE — G0299 HHS/HOSPICE OF RN EA 15 MIN: HCPCS

## 2024-04-01 ENCOUNTER — TELEPHONE (OUTPATIENT)
Age: 54
End: 2024-04-01

## 2024-04-01 NOTE — TELEPHONE ENCOUNTER
Maude from [facility] called and stated that Dr. Monroy put in a referral for this pt and they need a Medication list and the most recent office notes for the pt faxed to [phone] and if you should have any questions, please call back at [phone] opt [phone].

## 2024-04-03 ENCOUNTER — HOME CARE VISIT (OUTPATIENT)
Dept: HOME HEALTH SERVICES | Facility: HOME HEALTHCARE | Age: 54
End: 2024-04-03
Payer: MEDICARE

## 2024-04-05 ENCOUNTER — HOME CARE VISIT (OUTPATIENT)
Dept: HOME HEALTH SERVICES | Facility: HOME HEALTHCARE | Age: 54
End: 2024-04-05
Payer: MEDICARE

## 2024-04-05 VITALS
HEART RATE: 60 BPM | SYSTOLIC BLOOD PRESSURE: 100 MMHG | DIASTOLIC BLOOD PRESSURE: 68 MMHG | OXYGEN SATURATION: 100 % | TEMPERATURE: 98.3 F

## 2024-04-05 PROCEDURE — G0299 HHS/HOSPICE OF RN EA 15 MIN: HCPCS

## 2024-04-05 PROCEDURE — 400014 VN F/U

## 2024-04-08 ENCOUNTER — OFFICE VISIT (OUTPATIENT)
Dept: WOUND CARE | Facility: HOSPITAL | Age: 54
End: 2024-04-08
Payer: MEDICARE

## 2024-04-08 ENCOUNTER — HOME CARE VISIT (OUTPATIENT)
Dept: HOME HEALTH SERVICES | Facility: HOME HEALTHCARE | Age: 54
End: 2024-04-08
Payer: MEDICARE

## 2024-04-08 DIAGNOSIS — G35 MULTIPLE SCLEROSIS (HCC): ICD-10-CM

## 2024-04-08 DIAGNOSIS — L89.314 PRESSURE ULCER OF RIGHT ISCHIUM, STAGE IV (HCC): ICD-10-CM

## 2024-04-08 DIAGNOSIS — L89.323 PRESSURE ULCER OF ISCHIUM, LEFT, STAGE III (HCC): Primary | ICD-10-CM

## 2024-04-08 PROCEDURE — 97597 DBRDMT OPN WND 1ST 20 CM/<: CPT | Performed by: NURSE PRACTITIONER

## 2024-04-08 RX ORDER — LIDOCAINE HYDROCHLORIDE 40 MG/ML
5 SOLUTION TOPICAL ONCE
Status: COMPLETED | OUTPATIENT
Start: 2024-04-08 | End: 2024-04-08

## 2024-04-08 RX ADMIN — LIDOCAINE HYDROCHLORIDE 5 ML: 40 SOLUTION TOPICAL at 08:42

## 2024-04-08 NOTE — PATIENT INSTRUCTIONS
Orders Placed This Encounter   Procedures    Debridement Pressure Injury Left;Posterior;Proximal Ischium     This order was created via procedure documentation    Debridement Pressure Injury Right Ischium     This order was created via procedure documentation    Wound cleansing and dressings     Left ischial wound:     Wash your hands with soap and water.  Remove old dressing, discard into plastic bag and place in trash.  Cleanse the wound with dakins as needed if odor is present, wound cleanser other days,  prior to applying a clean dressing. Do not use tissue or cotton balls. Do not scrub the wound. Pat dry using gauze.  Shower no      Apply Silver alginate rope , Aquacell AG rope, to the open wounds, tuck into undermined areas, tape tail t skin.  Cover with, convamax, abd  and paper tape, if outer dressing does not hold go back to silicone dressing over convamax.     Change dressing every other day     VNA to change every other day,  to change other days.     Right ischial wound:     Wash your hands with soap and water.  Remove old dressing, discard into plastic bag and place in trash.  Cleanse the wound with dakins as needed if odor is present, wound cleanser other days,  prior to applying a clean dressing. Do not use tissue or cotton balls. Do not scrub the wound. Pat dry using gauze.  Shower no      Apply Silver alginate rope, Aquacell AG rope, to the open wounds, tuck into undermined areas.  Cover with silicone border dressing     Change dressing every other day     VNA to change every other day,  to change other days if needed     Standing Status:   Future     Standing Expiration Date:   4/8/2025

## 2024-04-08 NOTE — PROGRESS NOTES
"Patient ID: Fanny Schulz is a 54 y.o. female Date of Birth 1970     Chief Complaint  No chief complaint on file.      Allergies  Latex    Assessment:     Diagnoses and all orders for this visit:    Pressure ulcer of ischium, left, stage III (HCC)  -     lidocaine (XYLOCAINE) 4 % topical solution 5 mL  -     Cancel: Wound cleansing and dressings; Future  -     Wound cleansing and dressings; Future    Pressure ulcer of right ischium, stage IV (HCC)  -     lidocaine (XYLOCAINE) 4 % topical solution 5 mL  -     Cancel: Wound cleansing and dressings; Future  -     Wound cleansing and dressings; Future    Multiple sclerosis (HCC)    Other orders  -     Debridement Pressure Injury Left;Posterior;Proximal Ischium  -     Debridement Pressure Injury Right Ischium              Debridement   Wound 01/10/24 Pressure Injury Ischium Left;Posterior;Proximal    Universal Protocol:  Consent: Written consent obtained.  Consent given by: patient  Time out: Immediately prior to procedure a \"time out\" was called to verify the correct patient, procedure, equipment, support staff and site/side marked as required.  Timeout called at: 4/8/2024 9:04 AM.  Patient identity confirmed: verbally with patient    Debridement Details  Performed by: NP  Debridement type: selective  Pain control: lidocaine 4%      Post-debridement measurements  Length (cm): 2.5  Width (cm): 4  Depth (cm): 3.4  Percent debrided: 100%  Surface Area (cm^2): 10  Area Debrided (cm^2): 10  Volume (cm^3): 34    Devitalized tissue debrided: biofilm, fibrin and slough  Instrument(s) utilized: curette  Bleeding: small  Hemostasis obtained with: pressure  Procedural pain (0-10): 0  Post-procedural pain: 0   Response to treatment: procedure was tolerated well    Debridement   Wound 01/29/24 Pressure Injury Ischium Right    Universal Protocol:  Consent: Written consent obtained.  Consent given by: patient  Time out: Immediately prior to procedure a \"time out\" was called to " verify the correct patient, procedure, equipment, support staff and site/side marked as required.  Timeout called at: 4/8/2024 9:05 AM.  Patient identity confirmed: verbally with patient    Debridement Details  Performed by: NP  Debridement type: selective  Pain control: lidocaine 4%      Post-debridement measurements  Length (cm): 1  Width (cm): 1  Depth (cm): 1  Percent debrided: 100%  Surface Area (cm^2): 1  Area Debrided (cm^2): 1  Volume (cm^3): 1    Devitalized tissue debrided: biofilm, fibrin and slough  Instrument(s) utilized: curette  Bleeding: small  Hemostasis obtained with: pressure  Procedural pain (0-10): 0  Post-procedural pain: 0   Response to treatment: procedure was tolerated well        Plan:  1.  F/U visit.  Wounds debrided.  Wounds measuring relatively the same but have clean granular wound bases.  2.  We will have Aquacel Ag rope applied to both wounds covered with ConvaMax secured with tape change daily.  Skin-Prep is to be applied to both periwound's prior to each dressing change  3.  Continue frequent offloading of pressure from wounds  4.  Patient will follow-up in 2 weeks     Wound 01/10/24 Pressure Injury Ischium Left;Posterior;Proximal (Active)   Wound Image Images linked 04/08/24 0835   Wound Description Granulation tissue;Slough 04/08/24 0835   Ruchi-wound Assessment Intact 04/08/24 0835   Wound Length (cm) 2.5 cm 04/08/24 0835   Wound Width (cm) 4 cm 04/08/24 0835   Wound Depth (cm) 3.4 cm 04/08/24 0835   Wound Surface Area (cm^2) 10 cm^2 04/08/24 0835   Wound Volume (cm^3) 34 cm^3 04/08/24 0835   Calculated Wound Volume (cm^3) 34 cm^3 04/08/24 0835   Undermining 1 5 04/08/24 0835   Undermining 1 is depth extending from 12-2 04/08/24 0835   Drainage Amount Large 04/08/24 0835   Drainage Description Tan;Yellow;Serosanguineous 04/08/24 0835   Non-staged Wound Description Full thickness 04/08/24 0835   Dressing Status Intact 04/08/24 0835       Wound 01/29/24 Pressure Injury Ischium Right  (Active)   Wound Image Images linked 04/08/24 0839   Wound Description Granulation tissue 04/08/24 0839   Ruchi-wound Assessment Intact 04/08/24 0839   Wound Length (cm) 1 cm 04/08/24 0839   Wound Width (cm) 1 cm 04/08/24 0839   Wound Depth (cm) 1 cm 04/08/24 0839   Wound Surface Area (cm^2) 1 cm^2 04/08/24 0839   Wound Volume (cm^3) 1 cm^3 04/08/24 0839   Calculated Wound Volume (cm^3) 1 cm^3 04/08/24 0839   Change in Wound Size % -100 04/08/24 0839   Undermining 1 1 04/08/24 0839   Undermining 1 is depth extending from 12-2 04/08/24 0839   Drainage Amount Moderate 04/08/24 0839   Drainage Description Tan 04/08/24 0839   Non-staged Wound Description Full thickness 04/08/24 0839   Dressing Status Intact 04/08/24 0839       Wound 01/10/24 Pressure Injury Ischium Left;Posterior;Proximal (Active)   Date First Assessed/Time First Assessed: 01/10/24 0956   Primary Wound Type: Pressure Injury  Location: Ischium  Wound Location Orientation: Left;Posterior;Proximal       Wound 01/29/24 Pressure Injury Ischium Right (Active)   Date First Assessed: 01/29/24   Present on Original Admission: Yes  Primary Wound Type: Pressure Injury  Location: Ischium  Wound Location Orientation: Right       [REMOVED] Wound 07/29/21 Pressure Injury Ischium Right (Removed)   Resolved Date: 08/02/22  Date First Assessed/Time First Assessed: 07/29/21 1536   Pre-Existing Wound: Yes  Primary Wound Type: Pressure Injury  Location: Ischium  Wound Location Orientation: Right       [REMOVED] Wound 12/22/22 Other (comment) Buttocks Left (Removed)   Resolved Date: 01/29/24  Date First Assessed/Time First Assessed: 12/22/22 1104   Primary Wound Type: Other (comment)  Location: Buttocks  Wound Location Orientation: Left       [REMOVED] Wound 12/22/22 Pressure Injury Buttocks Right (Removed)   Resolved Date: 01/29/24  Date First Assessed/Time First Assessed: 12/22/22 1106   Primary Wound Type: Pressure Injury  Location: Buttocks  Wound Location Orientation:  Right       [REMOVED] Wound 12/22/22 Pressure Injury Heel Right (Removed)   Resolved Date: 01/29/24  Date First Assessed/Time First Assessed: 12/22/22 1110   Primary Wound Type: Pressure Injury  Location: Heel  Wound Location Orientation: Right       [REMOVED] Wound 12/22/22 Pressure Injury Thigh Anterior;Proximal;Right (Removed)   Resolved Date: 01/29/24  Date First Assessed/Time First Assessed: 12/22/22 1112   Primary Wound Type: Pressure Injury  Location: Thigh  Wound Location Orientation: Anterior;Proximal;Right  Wound Description (Comments): BLEEDING EXCORIATED       [REMOVED] Wound 12/22/22 Pressure Injury Thigh Anterior;Left;Proximal (Removed)   Resolved Date: 01/29/24  Date First Assessed/Time First Assessed: 12/22/22 1112   Primary Wound Type: Pressure Injury  Location: Thigh  Wound Location Orientation: Anterior;Left;Proximal  Wound Description (Comments): BLEEDING EXCORIATED       [REMOVED] Wound 12/22/22 Vagina N/A (Removed)   Resolved Date: 01/29/24  Date First Assessed/Time First Assessed: 12/22/22 1313   Location: Vagina  Wound Location Orientation: N/A       [REMOVED] Wound 01/09/24 Right (Removed)   Resolved Date: 01/10/24  Date First Assessed/Time First Assessed: 01/09/24 1832   Wound Location Orientation: Right  Wound Outcome: Healed       [REMOVED] Wound 01/09/24 Flank Left (Removed)   Resolved Date: 01/10/24  Date First Assessed/Time First Assessed: 01/09/24 1835   Location: Flank  Wound Location Orientation: Left  Wound Outcome: Healed       [REMOVED] Wound 01/09/24 Arm Left;Posterior (Removed)   Resolved Date: 01/29/24  Date First Assessed/Time First Assessed: 01/09/24 1836   Location: Arm  Wound Location Orientation: Left;Posterior       Subjective:      .    F/u visit for pressure injuries of bilateral ischial regions.  Patient's  who is present with patient today reports Ashlie's wounds are draining a moderate to large amount of drainage.  They have been using both ConvaMax and silicone  bordered foam dressings for drainage management.  He reports the drainage does not leak through the ConvaMax dressing onto the silicone bordered foam dressing.  He has been changing the dressing daily.        The following portions of the patient's history were reviewed and updated as appropriate: She  has a past medical history of Anesthesia, Bladder stones, Chronic pain disorder, Contracture, right shoulder, Dependent on wheelchair, Edema, Elevated alkaline phosphatase level, Fernandez catheter in place, Gallbladder disease, History of femur fracture, History of UTI, MS (multiple sclerosis) ([facility]), Muscle spasticity, Muscle weakness, Muscular dystrophy ([facility]), Neck pain, Neurogenic bladder, Paralysis ([facility]), Port-A-Cath in place, Pressure injury of skin, Sacral pressure sore, Swallowing difficulty, Tinnitus, and Urinary incontinence.  She   Patient Active Problem List    Diagnosis Date Noted    Chronic lower extremity edema 01/08/2024    Tinnitus of both ears 11/21/2023    Hypophonia 11/21/2023    Peripheral edema 02/20/2023    Continuous opioid dependence ([facility]) 09/08/2022    Increased endometrial stripe thickness 03/10/2021    Ureteral calculus, left 11/03/2020    Alkaline phosphatase elevation 10/28/2020    Abnormal thyroid function test 10/28/2020    Candidal vaginitis 10/19/2020    Hydronephrosis with urinary obstruction due to ureteral calculus 10/02/2020    Thrombocytopenia ([facility]) 10/01/2020    Serum total bilirubin elevated 10/01/2020    Medication management contract signed 02/21/2020    Screening mammogram, encounter for 05/29/2019    Allergic rhinitis 03/26/2018    Functional quadriplegia secondary to MS ([facility]) 01/25/2018    Suprapubic catheter ([facility]) 11/17/2017    Nephrolithiasis 04/22/2016    Bladder calculus 09/02/2015    Muscle spasticity 04/21/2015    Vitamin B12 deficiency 01/16/2015    Constipation 01/14/2015    Hyperglycemia 11/25/2014    Familial hypercholesterolemia 11/25/2014    Recurrent major  depressive disorder, in full remission (HCC) 01/22/2014    Lymphedema 01/22/2014    Spinal stenosis of cervical region 01/22/2014    Urinary incontinence 01/22/2014    Vitamin D deficiency 11/18/2013    Dysphagia 11/04/2013    Dizziness 11/04/2013    Muscle weakness 11/04/2013    Neurogenic bladder 11/04/2013    Sleep disorder 11/04/2013    Tremor 11/04/2013    Vision loss 11/04/2013    Multiple sclerosis (HCC) 10/21/2013     She  has a past surgical history that includes Cholecystectomy; Kidney stone surgery; Portacath placement (Left); Esophagogastroduodenoscopy; Bladder stone removal (N/A, 12/4/2017); Bladder surgery; Suprapubic cystostomy (02/25/2014); Cystoscopy (06/15/2020); [place] retrograde pyelogram (10/2/2020); pr cysto bladder w/ureteral catheterization (Left, 10/2/2020); pr cysto/uretero w/lithotripsy &indwell stent insrt (Left, 11/3/2020); [place] retrograde pyelogram (11/3/2020); and pr litholapaxy smpl/sm <2.5 cm (N/A, 12/22/2022).  Her family history includes Alzheimer's disease in her family; COPD in her father; Diabetes in her family and mother; Heart disease in her family; Hyperlipidemia in her mother and sister; Hypertension in her family, father, and mother.  She  reports that she has never smoked. She has never used smokeless tobacco. She reports that she does not currently use alcohol. She reports that she does not use drugs.  Current Outpatient Medications   Medication Sig Dispense Refill    baclofen 20 mg tablet TAKE 1 TABLET BY MOUTH 5 TIMES AS NEEDED (Patient taking differently: TAKE 1 TABLET BY MOUTH 5 TIMES AS NEEDED for muscle spasms) 450 tablet 3    clotrimazole (LOTRIMIN) 1 % cream Apply topically 2 (two) times a day as needed (yeast rash) (Patient not taking: Reported on 11/21/2023) 30 g 0    DULoxetine (CYMBALTA) 20 mg capsule Take 1 capsule (20 mg total) by mouth daily 90 capsule 3    furosemide (LASIX) 20 mg tablet Take 1 tablet (20 mg total) by mouth daily 30 tablet 0    gabapentin  (NEURONTIN) 100 mg capsule Take 1 tablet at bedtime tonight, 1 tablet twice a day tomorrow, and then 1 tablet 3 times daily thereafter (Patient taking differently: Take 100 mg by mouth 3 (three) times a day. Indications: Neuropathic Pain) 90 capsule 5    oxybutynin (DITROPAN-XL) 10 MG 24 hr tablet Take 1 tablet (10 mg total) by mouth daily 90 tablet 3    rosuvastatin (CRESTOR) 5 mg tablet TAKE 1 TABLET (5 MG TOTAL) BY MOUTH DAILY. 90 tablet 1     No current facility-administered medications for this visit.     She is allergic to latex..    Review of Systems   Constitutional: Negative.    HENT:  Negative for ear pain and hearing loss.    Eyes:  Negative for pain.   Respiratory:  Negative for chest tightness and shortness of breath.    Cardiovascular:  Negative for chest pain, palpitations and leg swelling.   Gastrointestinal:  Negative for diarrhea, nausea and vomiting.   Genitourinary:  Negative for dysuria.   Musculoskeletal:  Positive for gait problem.   Skin:  Positive for wound.   Neurological:  Positive for numbness. Negative for tremors and weakness.   Psychiatric/Behavioral:  Negative for behavioral problems, confusion and suicidal ideas.          Objective:       Wound 01/10/24 Pressure Injury Ischium Left;Posterior;Proximal (Active)   Wound Image Images linked 04/08/24 0835   Wound Description Granulation tissue;Slough 04/08/24 0835   Ruchi-wound Assessment Intact 04/08/24 0835   Wound Length (cm) 2.5 cm 04/08/24 0835   Wound Width (cm) 4 cm 04/08/24 0835   Wound Depth (cm) 3.4 cm 04/08/24 0835   Wound Surface Area (cm^2) 10 cm^2 04/08/24 0835   Wound Volume (cm^3) 34 cm^3 04/08/24 0835   Calculated Wound Volume (cm^3) 34 cm^3 04/08/24 0835   Undermining 1 5 04/08/24 0835   Undermining 1 is depth extending from 12-2 04/08/24 0835   Drainage Amount Large 04/08/24 0835   Drainage Description Tan;Yellow;Serosanguineous 04/08/24 0835   Non-staged Wound Description Full thickness 04/08/24 0835   Dressing Status  Intact 04/08/24 0835       Wound 01/29/24 Pressure Injury Ischium Right (Active)   Wound Image Images linked 04/08/24 0839   Wound Description Granulation tissue 04/08/24 0839   Ruchi-wound Assessment Intact 04/08/24 0839   Wound Length (cm) 1 cm 04/08/24 0839   Wound Width (cm) 1 cm 04/08/24 0839   Wound Depth (cm) 1 cm 04/08/24 0839   Wound Surface Area (cm^2) 1 cm^2 04/08/24 0839   Wound Volume (cm^3) 1 cm^3 04/08/24 0839   Calculated Wound Volume (cm^3) 1 cm^3 04/08/24 0839   Change in Wound Size % -100 04/08/24 0839   Undermining 1 1 04/08/24 0839   Undermining 1 is depth extending from 12-2 04/08/24 0839   Drainage Amount Moderate 04/08/24 0839   Drainage Description Tan 04/08/24 0839   Non-staged Wound Description Full thickness 04/08/24 0839   Dressing Status Intact 04/08/24 0839       There were no vitals taken for this visit.              Wound Instructions:  Orders Placed This Encounter   Procedures    Debridement Pressure Injury Left;Posterior;Proximal Ischium     This order was created via procedure documentation    Debridement Pressure Injury Right Ischium     This order was created via procedure documentation    Wound cleansing and dressings     Left ischial wound:     Wash your hands with soap and water.  Remove old dressing, discard into plastic bag and place in trash.  Cleanse the wound with dakins as needed if odor is present, wound cleanser other days,  prior to applying a clean dressing. Do not use tissue or cotton balls. Do not scrub the wound. Pat dry using gauze.  Shower no      Apply Silver alginate rope , Aquacell AG rope, to the open wounds, tuck into undermined areas, tape tail t skin.  Cover with, convamax, abd  and paper tape, if outer dressing does not hold go back to silicone dressing over convamax.     Change dressing every other day     VNA to change every other day,  to change other days.     Right ischial wound:     Wash your hands with soap and water.  Remove old dressing,  discard into plastic bag and place in trash.  Cleanse the wound with dakins as needed if odor is present, wound cleanser other days,  prior to applying a clean dressing. Do not use tissue or cotton balls. Do not scrub the wound. Pat dry using gauze.  Shower no      Apply Silver alginate rope, [employer] rope, to the open wounds, tuck into undermined areas.  Cover with silicone border dressing     Change dressing every other day     VNA to change every other day,  to change other days if needed     Standing Status:   Future     Standing Expiration Date:   4/8/2025        Diagnosis ICD-10-CM Associated Orders   1. Pressure ulcer of ischium, left, stage III (HCC)  L89.323 lidocaine (XYLOCAINE) 4 % topical solution 5 mL     Wound cleansing and dressings      2. Pressure ulcer of right ischium, stage IV (HCC)  L89.314 lidocaine (XYLOCAINE) 4 % topical solution 5 mL     Wound cleansing and dressings      3. Multiple sclerosis ([facility])  G35

## 2024-04-10 ENCOUNTER — TELEPHONE (OUTPATIENT)
Age: 54
End: 2024-04-10

## 2024-04-10 ENCOUNTER — HOME CARE VISIT (OUTPATIENT)
Dept: HOME HEALTH SERVICES | Facility: HOME HEALTHCARE | Age: 54
End: 2024-04-10
Payer: MEDICARE

## 2024-04-10 DIAGNOSIS — R60.0 LOWER EXTREMITY EDEMA: ICD-10-CM

## 2024-04-10 PROCEDURE — G0156 HHCP-SVS OF AIDE,EA 15 MIN: HCPCS

## 2024-04-10 RX ORDER — FUROSEMIDE 20 MG/1
20 TABLET ORAL DAILY
Qty: 30 TABLET | Refills: 5 | Status: SHIPPED | OUTPATIENT
Start: 2024-04-10 | End: 2024-10-07

## 2024-04-10 NOTE — TELEPHONE ENCOUNTER
Patient called the RX Refill Line. Message is being forwarded to the office.     Patients spouse is requesting a refill for a medication that is not on current med list. Please review and send to pharmacy if appropriate    Please contact patient at [phone]    Reason for call:   [x] Refill   [] Prior Auth  [] Other:     Office:   [x] PCP/Provider - Parviz/Kishor MANDUJANO  [] Specialty/Provider -     Medication: oxyCODONE-acetaminophen (PERCOCET) 5-325 mg     Dose/Frequency: Take 1 tablet by mouth every 4 (four) hours as needed for moderate pain     Quantity: 50    Pharmacy: [facility]/pharmacy #[id number] [facility] [address] [address]     Does the patient have enough for 3 days?   [] Yes   [x] No - Send as HP to POD

## 2024-04-10 NOTE — TELEPHONE ENCOUNTER
Please review medication not showing on current med list. Last filled with pharmacy on 2/12/24 for quantity of #50

## 2024-04-10 NOTE — TELEPHONE ENCOUNTER
Reason for call: Last prescribed by Hospital   [x] Refill   [] Prior Auth  [] Other:     Office:   [x] PCP/Provider - Parviz/Kishor MANDUJANO  [] Specialty/Provider -     Medication: furosemide (LASIX) 20 mg tablet      Dose/Frequency: : Take 1 tablet (20 mg total) by mouth daily     Quantity: 30    Pharmacy: [facility]/pharmacy #[id number] [facility] [address] [address]     Does the patient have enough for 3 days?   [] Yes   [x] No - Send as HP to POD

## 2024-04-11 NOTE — TELEPHONE ENCOUNTER
Left detailed message. Consent on file. Patient to call back to schedule opioid follow up and urine will be needed for appointment.

## 2024-04-12 ENCOUNTER — HOME CARE VISIT (OUTPATIENT)
Dept: HOME HEALTH SERVICES | Facility: HOME HEALTHCARE | Age: 54
End: 2024-04-12
Payer: MEDICARE

## 2024-04-12 VITALS
SYSTOLIC BLOOD PRESSURE: 100 MMHG | HEART RATE: 82 BPM | OXYGEN SATURATION: 6 % | TEMPERATURE: 98.5 F | DIASTOLIC BLOOD PRESSURE: 60 MMHG

## 2024-04-12 PROCEDURE — G0299 HHS/HOSPICE OF RN EA 15 MIN: HCPCS

## 2024-04-16 ENCOUNTER — HOME CARE VISIT (OUTPATIENT)
Dept: HOME HEALTH SERVICES | Facility: HOME HEALTHCARE | Age: 54
End: 2024-04-16
Payer: MEDICARE

## 2024-04-19 ENCOUNTER — HOME CARE VISIT (OUTPATIENT)
Dept: HOME HEALTH SERVICES | Facility: HOME HEALTHCARE | Age: 54
End: 2024-04-19
Payer: MEDICARE

## 2024-04-19 VITALS
OXYGEN SATURATION: 96 % | HEART RATE: 88 BPM | TEMPERATURE: 98.5 F | SYSTOLIC BLOOD PRESSURE: 112 MMHG | DIASTOLIC BLOOD PRESSURE: 60 MMHG

## 2024-04-19 PROCEDURE — G0299 HHS/HOSPICE OF RN EA 15 MIN: HCPCS

## 2024-04-22 ENCOUNTER — TELEPHONE (OUTPATIENT)
Dept: WOUND CARE | Facility: HOSPITAL | Age: 54
End: 2024-04-22

## 2024-04-25 ENCOUNTER — HOME CARE VISIT (OUTPATIENT)
Dept: HOME HEALTH SERVICES | Facility: HOME HEALTHCARE | Age: 54
End: 2024-04-25
Payer: MEDICARE

## 2024-04-25 ENCOUNTER — OFFICE VISIT (OUTPATIENT)
Dept: WOUND CARE | Facility: HOSPITAL | Age: 54
End: 2024-04-25
Payer: COMMERCIAL

## 2024-04-25 DIAGNOSIS — L89.314 PRESSURE ULCER OF RIGHT ISCHIUM, STAGE IV (HCC): ICD-10-CM

## 2024-04-25 DIAGNOSIS — L89.323 PRESSURE ULCER OF ISCHIUM, LEFT, STAGE III (HCC): Primary | ICD-10-CM

## 2024-04-25 DIAGNOSIS — G35 MULTIPLE SCLEROSIS (HCC): ICD-10-CM

## 2024-04-25 PROCEDURE — 97597 DBRDMT OPN WND 1ST 20 CM/<: CPT | Performed by: NURSE PRACTITIONER

## 2024-04-25 PROCEDURE — 99214 OFFICE O/P EST MOD 30 MIN: CPT | Performed by: NURSE PRACTITIONER

## 2024-04-25 RX ORDER — LIDOCAINE HYDROCHLORIDE 40 MG/ML
5 SOLUTION TOPICAL ONCE
Status: COMPLETED | OUTPATIENT
Start: 2024-04-25 | End: 2024-04-25

## 2024-04-25 RX ADMIN — LIDOCAINE HYDROCHLORIDE 5 ML: 40 SOLUTION TOPICAL at 11:15

## 2024-04-25 NOTE — PROGRESS NOTES
"Patient ID: Fanny Schulz is a 54 y.o. female Date of Birth 1970     Chief Complaint  Chief Complaint   Patient presents with    Follow Up Wound Care Visit     Nonhealing left and right ischial wounds       Allergies  Latex    Assessment:     Diagnoses and all orders for this visit:    Pressure ulcer of ischium, left, stage III (HCC)  -     lidocaine (XYLOCAINE) 4 % topical solution 5 mL  -     Wound cleansing and dressings; Future  -     Wound Procedure Treatment Pressure Injury Left;Posterior;Proximal Ischium  -     XR hip/pelv 2-3 vws left if performed; Future    Pressure ulcer of right ischium, stage IV (HCC)  -     lidocaine (XYLOCAINE) 4 % topical solution 5 mL  -     Wound cleansing and dressings; Future  -     Wound Procedure Treatment Pressure Injury Right Ischium  -     XR hip/pelv 2-3 vws right if performed; Future    Multiple sclerosis (HCC)              Debridement   Wound 01/10/24 Pressure Injury Ischium Left;Posterior;Proximal    Universal Protocol:  Consent: Written consent obtained.  Consent given by: patient  Time out: Immediately prior to procedure a \"time out\" was called to verify the correct patient, procedure, equipment, support staff and site/side marked as required.  Timeout called at: 4/25/2024 12:54 PM.  Patient identity confirmed: verbally with patient    Debridement Details  Performed by: NP  Debridement type: selective  Pain control: lidocaine 4%      Post-debridement measurements  Length (cm): 2.3  Width (cm): 3.4  Depth (cm): 1.8  Percent debrided: 100%  Surface Area (cm^2): 7.82  Area Debrided (cm^2): 7.82  Volume (cm^3): 14.08    Devitalized tissue debrided: biofilm, fibrin and slough  Instrument(s) utilized: curette  Bleeding: small  Hemostasis obtained with: pressure  Procedural pain (0-10): 0  Post-procedural pain: 0   Response to treatment: procedure was tolerated well    Debridement   Wound 01/29/24 Pressure Injury Ischium Right    Universal Protocol:  Consent: Written " "consent obtained.  Consent given by: patient  Time out: Immediately prior to procedure a \"time out\" was called to verify the correct patient, procedure, equipment, support staff and site/side marked as required.  Timeout called at: 4/25/2024 12:55 PM.  Patient identity confirmed: verbally with patient    Debridement Details  Performed by: NP  Debridement type: selective  Pain control: lidocaine 4%      Post-debridement measurements  Length (cm): 0.9  Width (cm): 0.9  Depth (cm): 0.8  Percent debrided: 100%  Surface Area (cm^2): 0.81  Area Debrided (cm^2): 0.81  Volume (cm^3): 0.65    Devitalized tissue debrided: biofilm, fibrin and slough  Instrument(s) utilized: curette  Bleeding: small  Hemostasis obtained with: pressure  Procedural pain (0-10): 0  Post-procedural pain: 0   Response to treatment: procedure was tolerated well        Plan:  1.  F/U visit.  Wounds debrided.  Wounds measuring the same.  Left ischial wound still contains significant undermining measuring 5 cm deep.  Continue current plan of care for now  2.  Will order x-ray of bilateral buttocks to rule out osteomyelitis  3.  If x-ray comes back negative for osteo, would like to initiate NP WT for her left ischial wound due to the significant depth and undermining present in wound.  Would recommend use of both white foam and black foam on 150 mmHg on continuous suction.  Will order wound VAC once x-ray results are obtained  4.  Patient will follow-up in 2 weeks     Wound 01/10/24 Pressure Injury Ischium Left;Posterior;Proximal (Active)   Wound Image Images linked 04/25/24 1104   Wound Description Granulation tissue;Slough 04/25/24 1104   Ruchi-wound Assessment Intact 04/25/24 1104   Wound Length (cm) 2.3 cm 04/25/24 1104   Wound Width (cm) 3.4 cm 04/25/24 1104   Wound Depth (cm) 1.8 cm 04/25/24 1104   Wound Surface Area (cm^2) 7.82 cm^2 04/25/24 1104   Wound Volume (cm^3) 14.076 cm^3 04/25/24 1104   Calculated Wound Volume (cm^3) 14.08 cm^3 04/25/24 " 1104   Undermining 1 5 04/25/24 1104   Undermining 1 is depth extending from 12-2 04/25/24 1104   Drainage Amount Large 04/25/24 1104   Drainage Description Tan;Yellow;Serosanguineous 04/25/24 1104   Non-staged Wound Description Full thickness 04/25/24 1104   Dressing Status Intact 04/25/24 1104       Wound 01/29/24 Pressure Injury Ischium Right (Active)   Wound Image Images linked 04/25/24 1103   Wound Description Granulation tissue;Yellow;White;Slough;Rolled edges 04/25/24 1059   Pressure Injury Stage 4 04/25/24 1059   Ruchi-wound Assessment Intact 04/25/24 1059   Wound Length (cm) 0.9 cm 04/25/24 1059   Wound Width (cm) 0.9 cm 04/25/24 1059   Wound Depth (cm) 0.8 cm 04/25/24 1059   Wound Surface Area (cm^2) 0.81 cm^2 04/25/24 1059   Wound Volume (cm^3) 0.648 cm^3 04/25/24 1059   Calculated Wound Volume (cm^3) 0.65 cm^3 04/25/24 1059   Change in Wound Size % -30 04/25/24 1059   Drainage Amount Moderate 04/25/24 1059   Drainage Description Tan;Serosanguineous 04/25/24 1059   Non-staged Wound Description Full thickness 04/25/24 1059   Dressing Status Intact 04/25/24 1059       Wound 01/10/24 Pressure Injury Ischium Left;Posterior;Proximal (Active)   Date First Assessed/Time First Assessed: 01/10/24 0956   Primary Wound Type: Pressure Injury  Location: Ischium  Wound Location Orientation: Left;Posterior;Proximal       Wound 01/29/24 Pressure Injury Ischium Right (Active)   Date First Assessed: 01/29/24   Present on Original Admission: Yes  Primary Wound Type: Pressure Injury  Location: Ischium  Wound Location Orientation: Right       [REMOVED] Wound 07/29/21 Pressure Injury Ischium Right (Removed)   Resolved Date: 08/02/22  Date First Assessed/Time First Assessed: 07/29/21 1536   Pre-Existing Wound: Yes  Primary Wound Type: Pressure Injury  Location: Ischium  Wound Location Orientation: Right       [REMOVED] Wound 12/22/22 Other (comment) Buttocks Left (Removed)   Resolved Date: 01/29/24  Date First Assessed/Time  First Assessed: 12/22/22 1104   Primary Wound Type: Other (comment)  Location: Buttocks  Wound Location Orientation: Left       [REMOVED] Wound 12/22/22 Pressure Injury Buttocks Right (Removed)   Resolved Date: 01/29/24  Date First Assessed/Time First Assessed: 12/22/22 1106   Primary Wound Type: Pressure Injury  Location: Buttocks  Wound Location Orientation: Right       [REMOVED] Wound 12/22/22 Pressure Injury Heel Right (Removed)   Resolved Date: 01/29/24  Date First Assessed/Time First Assessed: 12/22/22 1110   Primary Wound Type: Pressure Injury  Location: Heel  Wound Location Orientation: Right       [REMOVED] Wound 12/22/22 Pressure Injury Thigh Anterior;Proximal;Right (Removed)   Resolved Date: 01/29/24  Date First Assessed/Time First Assessed: 12/22/22 1112   Primary Wound Type: Pressure Injury  Location: Thigh  Wound Location Orientation: Anterior;Proximal;Right  Wound Description (Comments): BLEEDING EXCORIATED       [REMOVED] Wound 12/22/22 Pressure Injury Thigh Anterior;Left;Proximal (Removed)   Resolved Date: 01/29/24  Date First Assessed/Time First Assessed: 12/22/22 1112   Primary Wound Type: Pressure Injury  Location: Thigh  Wound Location Orientation: Anterior;Left;Proximal  Wound Description (Comments): BLEEDING EXCORIATED       [REMOVED] Wound 12/22/22 Vagina N/A (Removed)   Resolved Date: 01/29/24  Date First Assessed/Time First Assessed: 12/22/22 1313   Location: Vagina  Wound Location Orientation: N/A       [REMOVED] Wound 01/09/24 Right (Removed)   Resolved Date: 01/10/24  Date First Assessed/Time First Assessed: 01/09/24 1832   Wound Location Orientation: Right  Wound Outcome: Healed       [REMOVED] Wound 01/09/24 Flank Left (Removed)   Resolved Date: 01/10/24  Date First Assessed/Time First Assessed: 01/09/24 1835   Location: Flank  Wound Location Orientation: Left  Wound Outcome: Healed       [REMOVED] Wound 01/09/24 Arm Left;Posterior (Removed)   Resolved Date: 01/29/24  Date First  Assessed/Time First Assessed: 01/09/24 1836   Location: Arm  Wound Location Orientation: Left;Posterior       Subjective:      .    F/u visit for pressure ulcers of bilateral ischial areas.  No new complaints.  She denies any pain, fevers, or chills.        The following portions of the patient's history were reviewed and updated as appropriate: She  has a past medical history of Anesthesia, Bladder stones, Chronic pain disorder, Contracture, right shoulder, Dependent on wheelchair, Edema, Elevated alkaline phosphatase level, Fernandez catheter in place, Gallbladder disease, History of femur fracture, History of UTI, MS (multiple sclerosis) ([facility]), Muscle spasticity, Muscle weakness, Muscular dystrophy ([facility]), Neck pain, Neurogenic bladder, Paralysis ([facility]), Port-A-Cath in place, Pressure injury of skin, Sacral pressure sore, Swallowing difficulty, Tinnitus, and Urinary incontinence.  She   Patient Active Problem List    Diagnosis Date Noted    Chronic lower extremity edema 01/08/2024    Tinnitus of both ears 11/21/2023    Hypophonia 11/21/2023    Peripheral edema 02/20/2023    Continuous opioid dependence ([facility]) 09/08/2022    Increased endometrial stripe thickness 03/10/2021    Ureteral calculus, left 11/03/2020    Alkaline phosphatase elevation 10/28/2020    Abnormal thyroid function test 10/28/2020    Candidal vaginitis 10/19/2020    Hydronephrosis with urinary obstruction due to ureteral calculus 10/02/2020    Thrombocytopenia ([facility]) 10/01/2020    Serum total bilirubin elevated 10/01/2020    Medication management contract signed 02/21/2020    Screening mammogram, encounter for 05/29/2019    Allergic rhinitis 03/26/2018    Functional quadriplegia secondary to MS ([facility]) 01/25/2018    Suprapubic catheter ([facility]) 11/17/2017    Nephrolithiasis 04/22/2016    Bladder calculus 09/02/2015    Muscle spasticity 04/21/2015    Vitamin B12 deficiency 01/16/2015    Constipation 01/14/2015    Hyperglycemia 11/25/2014    Familial  hypercholesterolemia 11/25/2014    Recurrent major depressive disorder, in full remission (HCC) 01/22/2014    Lymphedema 01/22/2014    Spinal stenosis of cervical region 01/22/2014    Urinary incontinence 01/22/2014    Vitamin D deficiency 11/18/2013    Dysphagia 11/04/2013    Dizziness 11/04/2013    Muscle weakness 11/04/2013    Neurogenic bladder 11/04/2013    Sleep disorder 11/04/2013    Tremor 11/04/2013    Vision loss 11/04/2013    Multiple sclerosis (HCC) 10/21/2013     She  has a past surgical history that includes Cholecystectomy; Kidney stone surgery; Portacath placement (Left); Esophagogastroduodenoscopy; Bladder stone removal (N/A, 12/4/2017); Bladder surgery; Suprapubic cystostomy (02/25/2014); Cystoscopy (06/15/2020); [place] retrograde pyelogram (10/2/2020); pr cysto bladder w/ureteral catheterization (Left, 10/2/2020); pr cysto/uretero w/lithotripsy &indwell stent insrt (Left, 11/3/2020); [place] retrograde pyelogram (11/3/2020); and pr litholapaxy smpl/sm <2.5 cm (N/A, 12/22/2022).  Her family history includes Alzheimer's disease in her family; COPD in her father; Diabetes in her family and mother; Heart disease in her family; Hyperlipidemia in her mother and sister; Hypertension in her family, father, and mother.  She  reports that she has never smoked. She has never used smokeless tobacco. She reports that she does not currently use alcohol. She reports that she does not use drugs.  Current Outpatient Medications   Medication Sig Dispense Refill    baclofen 20 mg tablet TAKE 1 TABLET BY MOUTH 5 TIMES AS NEEDED (Patient taking differently: TAKE 1 TABLET BY MOUTH 5 TIMES AS NEEDED for muscle spasms) 450 tablet 3    clotrimazole (LOTRIMIN) 1 % cream Apply topically 2 (two) times a day as needed (yeast rash) (Patient not taking: Reported on 11/21/2023) 30 g 0    DULoxetine (CYMBALTA) 20 mg capsule Take 1 capsule (20 mg total) by mouth daily 90 capsule 3    furosemide (LASIX) 20 mg tablet Take 1 tablet (20 mg  total) by mouth daily 30 tablet 5    gabapentin (NEURONTIN) 100 mg capsule Take 1 tablet at bedtime tonight, 1 tablet twice a day tomorrow, and then 1 tablet 3 times daily thereafter (Patient taking differently: Take 100 mg by mouth 3 (three) times a day. Indications: Neuropathic Pain) 90 capsule 5    oxybutynin (DITROPAN-XL) 10 MG 24 hr tablet Take 1 tablet (10 mg total) by mouth daily 90 tablet 3    rosuvastatin (CRESTOR) 5 mg tablet TAKE 1 TABLET (5 MG TOTAL) BY MOUTH DAILY. 90 tablet 1     No current facility-administered medications for this visit.     She is allergic to latex..    Review of Systems   Constitutional: Negative.    HENT:  Negative for ear pain and hearing loss.    Eyes:  Negative for pain.   Respiratory:  Negative for chest tightness and shortness of breath.    Cardiovascular:  Negative for chest pain, palpitations and leg swelling.   Gastrointestinal:  Negative for diarrhea, nausea and vomiting.   Genitourinary:  Negative for dysuria.   Musculoskeletal:  Positive for gait problem.   Skin:  Positive for wound.   Neurological:  Positive for numbness. Negative for tremors and weakness.   Psychiatric/Behavioral:  Negative for behavioral problems, confusion and suicidal ideas.          Objective:       Wound 01/10/24 Pressure Injury Ischium Left;Posterior;Proximal (Active)   Wound Image Images linked 04/25/24 1104   Wound Description Granulation tissue;Slough 04/25/24 1104   Ruchi-wound Assessment Intact 04/25/24 1104   Wound Length (cm) 2.3 cm 04/25/24 1104   Wound Width (cm) 3.4 cm 04/25/24 1104   Wound Depth (cm) 1.8 cm 04/25/24 1104   Wound Surface Area (cm^2) 7.82 cm^2 04/25/24 1104   Wound Volume (cm^3) 14.076 cm^3 04/25/24 1104   Calculated Wound Volume (cm^3) 14.08 cm^3 04/25/24 1104   Undermining 1 5 04/25/24 1104   Undermining 1 is depth extending from 12-2 04/25/24 1104   Drainage Amount Large 04/25/24 1104   Drainage Description Tan;Yellow;Serosanguineous 04/25/24 1104   Non-staged Wound  Description Full thickness 04/25/24 1104   Dressing Status Intact 04/25/24 1104       Wound 01/29/24 Pressure Injury Ischium Right (Active)   Wound Image Images linked 04/25/24 1103   Wound Description Granulation tissue;Yellow;White;Slough;Rolled edges 04/25/24 1059   Pressure Injury Stage 4 04/25/24 1059   Ruchi-wound Assessment Intact 04/25/24 1059   Wound Length (cm) 0.9 cm 04/25/24 1059   Wound Width (cm) 0.9 cm 04/25/24 1059   Wound Depth (cm) 0.8 cm 04/25/24 1059   Wound Surface Area (cm^2) 0.81 cm^2 04/25/24 1059   Wound Volume (cm^3) 0.648 cm^3 04/25/24 1059   Calculated Wound Volume (cm^3) 0.65 cm^3 04/25/24 1059   Change in Wound Size % -30 04/25/24 1059   Drainage Amount Moderate 04/25/24 1059   Drainage Description Tan;Serosanguineous 04/25/24 1059   Non-staged Wound Description Full thickness 04/25/24 1059   Dressing Status Intact 04/25/24 1059       There were no vitals taken for this visit.              Wound Instructions:  Orders Placed This Encounter   Procedures    Wound cleansing and dressings     Wash your hands with soap and water.  Remove old dressing, discard into plastic bag and place in trash.  Cleanse the wound with dakins as needed if odor is present, wound cleanser other days,  prior to applying a clean dressing. Do not use tissue or cotton balls. Do not scrub the wound. Pat dry using gauze.  Shower no      Apply Silver alginate rope/Aquacell AG rope to the open wounds on the right and left ischium, tuck into undermined areas.  Cover with silicone border dressing     Change dressing every other day     VNA to change every other day,  to change other days if needed    Xray ordered today     Standing Status:   Future     Standing Expiration Date:   5/9/2024    Wound Procedure Treatment Pressure Injury Left;Posterior;Proximal Ischium     This order was created via procedure documentation    Wound Procedure Treatment Pressure Injury Right Ischium     This order was created via  procedure documentation    XR hip/pelv 2-3 vws left if performed     Stage 3 pressure ulcer of left ischium. Please rule out osteomyelitis     Standing Status:   Future     Standing Expiration Date:   4/25/2028     Scheduling Instructions:      Bring along any outside films relating to this procedure.           Order Specific Question:   Is the patient pregnant?     Answer:   No    XR hip/pelv 2-3 vws right if performed     Stage 4 pressure ulcer of right ischium. Please rule out osteomyelitis     Standing Status:   Future     Standing Expiration Date:   4/25/2028     Scheduling Instructions:      Bring along any outside films relating to this procedure.           Order Specific Question:   Is the patient pregnant?     Answer:   No        Diagnosis ICD-10-CM Associated Orders   1. Pressure ulcer of ischium, left, stage III ([facility])  L89.323 lidocaine (XYLOCAINE) 4 % topical solution 5 mL     Wound cleansing and dressings     Wound Procedure Treatment Pressure Injury Left;Posterior;Proximal Ischium     XR hip/pelv 2-3 vws left if performed      2. Pressure ulcer of right ischium, stage IV ([facility])  L89.314 lidocaine (XYLOCAINE) 4 % topical solution 5 mL     Wound cleansing and dressings     Wound Procedure Treatment Pressure Injury Right Ischium     XR hip/pelv 2-3 vws right if performed      3. Multiple sclerosis ([facility])  G35

## 2024-04-25 NOTE — PROGRESS NOTES
Wound Procedure Treatment Pressure Injury Right Ischium    Performed by: Ingris Miranda RN  Authorized by: CLEMENTINA Vincent  Associated wounds:   Wound 01/29/24 Pressure Injury Ischium Right  Wound cleansed with:  NSS  Applied primary dressing:  Other and Silver  Applied secondary dressing:  Foam    Aquacel Ag rope

## 2024-04-25 NOTE — PATIENT INSTRUCTIONS
Orders Placed This Encounter   Procedures    Wound cleansing and dressings     Wash your hands with soap and water.  Remove old dressing, discard into plastic bag and place in trash.  Cleanse the wound with dakins as needed if odor is present, wound cleanser other days,  prior to applying a clean dressing. Do not use tissue or cotton balls. Do not scrub the wound. Pat dry using gauze.  Shower no      Apply Silver alginate rope/Aquacell AG rope to the open wounds on the right and left ischium, tuck into undermined areas.  Cover with silicone border dressing     Change dressing every other day     VNA to change every other day,  to change other days if needed    Xray ordered today     Standing Status:   Future     Standing Expiration Date:   5/9/2024

## 2024-04-25 NOTE — PROGRESS NOTES
Wound Procedure Treatment Pressure Injury Left;Posterior;Proximal Ischium    Performed by: Ingris Miranda RN  Authorized by: CLEMENTINA Vincent  Associated wounds:   Wound 01/10/24 Pressure Injury Ischium Left;Posterior;Proximal  Wound cleansed with:  NSS  Applied primary dressing:  Silver and Other  Applied secondary dressing:  Foam    Aquacel Ag rope

## 2024-04-26 ENCOUNTER — HOME CARE VISIT (OUTPATIENT)
Dept: HOME HEALTH SERVICES | Facility: HOME HEALTHCARE | Age: 54
End: 2024-04-26
Payer: MEDICARE

## 2024-04-26 VITALS
DIASTOLIC BLOOD PRESSURE: 80 MMHG | TEMPERATURE: 98.3 F | OXYGEN SATURATION: 98 % | SYSTOLIC BLOOD PRESSURE: 120 MMHG | HEART RATE: 104 BPM

## 2024-04-26 PROCEDURE — G0299 HHS/HOSPICE OF RN EA 15 MIN: HCPCS

## 2024-05-02 ENCOUNTER — APPOINTMENT (OUTPATIENT)
Dept: RADIOLOGY | Facility: CLINIC | Age: 54
End: 2024-05-02
Payer: MEDICARE

## 2024-05-02 DIAGNOSIS — E78.01 FAMILIAL HYPERCHOLESTEROLEMIA: ICD-10-CM

## 2024-05-02 DIAGNOSIS — J12.82 PNEUMONIA DUE TO COVID-19 VIRUS: ICD-10-CM

## 2024-05-02 DIAGNOSIS — R94.6 ABNORMAL THYROID FUNCTION TEST: ICD-10-CM

## 2024-05-02 DIAGNOSIS — E55.9 VITAMIN D DEFICIENCY: ICD-10-CM

## 2024-05-02 DIAGNOSIS — N21.0 BLADDER STONES: ICD-10-CM

## 2024-05-02 DIAGNOSIS — J96.01 ACUTE RESPIRATORY FAILURE WITH HYPOXIA (HCC): ICD-10-CM

## 2024-05-02 DIAGNOSIS — R33.9 URINARY RETENTION: ICD-10-CM

## 2024-05-02 DIAGNOSIS — U07.1 PNEUMONIA DUE TO COVID-19 VIRUS: ICD-10-CM

## 2024-05-02 DIAGNOSIS — L89.314 PRESSURE ULCER OF RIGHT ISCHIUM, STAGE IV (HCC): ICD-10-CM

## 2024-05-02 DIAGNOSIS — L89.323 PRESSURE ULCER OF ISCHIUM, LEFT, STAGE III (HCC): ICD-10-CM

## 2024-05-02 DIAGNOSIS — E53.8 VITAMIN B12 DEFICIENCY: ICD-10-CM

## 2024-05-02 PROCEDURE — 73502 X-RAY EXAM HIP UNI 2-3 VIEWS: CPT

## 2024-05-02 PROCEDURE — 71046 X-RAY EXAM CHEST 2 VIEWS: CPT

## 2024-05-02 PROCEDURE — 74018 RADEX ABDOMEN 1 VIEW: CPT

## 2024-05-03 ENCOUNTER — HOME CARE VISIT (OUTPATIENT)
Dept: HOME HEALTH SERVICES | Facility: HOME HEALTHCARE | Age: 54
End: 2024-05-03
Payer: MEDICARE

## 2024-05-03 VITALS
SYSTOLIC BLOOD PRESSURE: 108 MMHG | DIASTOLIC BLOOD PRESSURE: 78 MMHG | OXYGEN SATURATION: 100 % | TEMPERATURE: 98.1 F | HEART RATE: 86 BPM

## 2024-05-03 PROCEDURE — G0299 HHS/HOSPICE OF RN EA 15 MIN: HCPCS

## 2024-05-10 ENCOUNTER — HOME CARE VISIT (OUTPATIENT)
Dept: HOME HEALTH SERVICES | Facility: HOME HEALTHCARE | Age: 54
End: 2024-05-10
Payer: MEDICARE

## 2024-05-10 VITALS
DIASTOLIC BLOOD PRESSURE: 68 MMHG | SYSTOLIC BLOOD PRESSURE: 102 MMHG | HEART RATE: 90 BPM | TEMPERATURE: 98.1 F | OXYGEN SATURATION: 96 %

## 2024-05-10 PROCEDURE — G0299 HHS/HOSPICE OF RN EA 15 MIN: HCPCS

## 2024-05-13 ENCOUNTER — OFFICE VISIT (OUTPATIENT)
Dept: WOUND CARE | Facility: HOSPITAL | Age: 54
End: 2024-05-13
Payer: MEDICARE

## 2024-05-13 ENCOUNTER — HOME CARE VISIT (OUTPATIENT)
Dept: HOME HEALTH SERVICES | Facility: HOME HEALTHCARE | Age: 54
End: 2024-05-13
Payer: MEDICARE

## 2024-05-13 VITALS
SYSTOLIC BLOOD PRESSURE: 102 MMHG | TEMPERATURE: 97.6 F | DIASTOLIC BLOOD PRESSURE: 70 MMHG | RESPIRATION RATE: 18 BRPM | HEART RATE: 68 BPM

## 2024-05-13 DIAGNOSIS — L89.323 PRESSURE ULCER OF ISCHIUM, LEFT, STAGE III (HCC): Primary | ICD-10-CM

## 2024-05-13 DIAGNOSIS — L89.314 PRESSURE ULCER OF RIGHT ISCHIUM, STAGE IV (HCC): ICD-10-CM

## 2024-05-13 DIAGNOSIS — G35 MULTIPLE SCLEROSIS (HCC): ICD-10-CM

## 2024-05-13 PROCEDURE — 97597 DBRDMT OPN WND 1ST 20 CM/<: CPT | Performed by: NURSE PRACTITIONER

## 2024-05-13 NOTE — PROGRESS NOTES
"Patient ID: Fanny Schulz is a 54 y.o. female Date of Birth 1970     Chief Complaint  Chief Complaint   Patient presents with    Follow Up Wound Care Visit     Wound care       Allergies  Latex    Assessment:     Diagnoses and all orders for this visit:    Pressure ulcer of ischium, left, stage III (HCC)  -     Cancel: Wound Procedure Treatment  -     Wound Procedure Treatment  -     Cancel: Wound cleansing and dressings; Future  -     Wound cleansing and dressings; Future    Pressure ulcer of right ischium, stage IV (HCC)  -     Cancel: Wound Procedure Treatment  -     Wound Procedure Treatment  -     Cancel: Wound cleansing and dressings; Future  -     Wound cleansing and dressings; Future    Multiple sclerosis (HCC)    Other orders  -     Debridement  -     Debridement              Debridement   Wound 01/10/24 Pressure Injury Ischium Left;Posterior;Proximal    Universal Protocol:  Consent: Written consent obtained.  Consent given by: patient  Time out: Immediately prior to procedure a \"time out\" was called to verify the correct patient, procedure, equipment, support staff and site/side marked as required.  Timeout called at: 5/13/2024 9:34 AM.  Patient identity confirmed: verbally with patient    Debridement Details  Performed by: NP  Debridement type: selective  Pain control: lidocaine 4%      Post-debridement measurements  Length (cm): 1.6  Width (cm): 2  Depth (cm): 1.2  Percent debrided: 100%  Surface Area (cm^2): 3.2  Area Debrided (cm^2): 3.2  Volume (cm^3): 3.84    Devitalized tissue debrided: biofilm, fibrin and slough  Instrument(s) utilized: curette  Bleeding: small  Hemostasis obtained with: pressure  Procedural pain (0-10): 0  Post-procedural pain: 0   Response to treatment: procedure was tolerated well    Debridement   Wound 01/29/24 Pressure Injury Ischium Right    Universal Protocol:  Consent: Written consent obtained.  Consent given by: patient  Time out: Immediately prior to procedure a " "\"time out\" was called to verify the correct patient, procedure, equipment, support staff and site/side marked as required.  Timeout called at: 5/13/2024 9:35 AM.  Patient identity confirmed: verbally with patient    Debridement Details  Performed by: NP  Debridement type: selective  Pain control: lidocaine 4%      Post-debridement measurements  Length (cm): 0.6  Width (cm): 0.6  Depth (cm): 0.6  Percent debrided: 100%  Surface Area (cm^2): 0.36  Area Debrided (cm^2): 0.36  Volume (cm^3): 0.22    Devitalized tissue debrided: biofilm, fibrin and slough  Instrument(s) utilized: curette  Bleeding: small  Hemostasis obtained with: pressure  Procedural pain (0-10): 0  Post-procedural pain: 0   Response to treatment: procedure was tolerated well        Plan:  1.  F/U visit.  Wounds debrided.  Wounds measuring slightly smaller with clean granular wound bases.  Left ischial wound still contains significant undermining from 10-3 o'clock measuring the deepest at 12 o'clock 5.4 cm.  2.  Reviewed results of x-ray of bilateral hip and pelvis which were both negative for osteomyelitis.  Will proceed with ordering a wound VAC for patient's left ischial wound to enhance wound healing.  White foam is to be applied in the areas of undermining from 10-3 o'clock covered by black foam and bridged to her left hip or thigh on 150 mmHg on continuous pressure change 3 times a week.  Will have wound VAC initiated by visiting nurses.  3.  Will continue to have Aquacel Ag applied to right ischial wound covered with silicone bordered foam dressing change 3 times a week  4.  Continue frequent offloading of pressure from wounds  5.  Patient will follow-up in 1 week     Wound 01/10/24 Pressure Injury Ischium Left;Posterior;Proximal (Active)   Wound Image Images linked 05/13/24 0852   Wound Description Granulation tissue 05/13/24 0852   Pressure Injury Stage U 05/13/24 0852   Ruchi-wound Assessment Intact 05/13/24 0852   Wound Length (cm) 1.65 cm " 05/13/24 0852   Wound Width (cm) 2 cm 05/13/24 0852   Wound Depth (cm) 1.2 cm 05/13/24 0852   Wound Surface Area (cm^2) 3.3 cm^2 05/13/24 0852   Wound Volume (cm^3) 3.96 cm^3 05/13/24 0852   Calculated Wound Volume (cm^3) 3.96 cm^3 05/13/24 0852   Undermining 1 5.4 05/13/24 0852   Undermining 1 is depth extending from 10.3 05/13/24 0852   Drainage Amount Large 05/13/24 0852   Drainage Description Tan;Serosanguineous 05/13/24 0852   Non-staged Wound Description Full thickness 05/13/24 0852   Treatments Cleansed 05/13/24 0852   Dressing Changed Changed 05/13/24 0852   Patient Tolerance Tolerated well 05/13/24 0852   Dressing Status Intact 05/13/24 0852       Wound 01/29/24 Pressure Injury Ischium Right (Active)   Wound Image Images linked 05/13/24 0854   Wound Description Rolled edges;White;Granulation tissue 05/13/24 0854   Ruchi-wound Assessment Intact 05/13/24 0854   Wound Length (cm) 0.6 cm 05/13/24 0854   Wound Width (cm) 0.6 cm 05/13/24 0854   Wound Depth (cm) 0.6 cm 05/13/24 0854   Wound Surface Area (cm^2) 0.36 cm^2 05/13/24 0854   Wound Volume (cm^3) 0.216 cm^3 05/13/24 0854   Calculated Wound Volume (cm^3) 0.22 cm^3 05/13/24 0854   Change in Wound Size % 56 05/13/24 0854   Number of underminings 1 05/13/24 0854   Undermining 1 0.8 05/13/24 0854   Undermining 1 is depth extending from 12-2 05/13/24 0854   Drainage Amount Moderate 05/13/24 0854   Drainage Description Serosanguineous 05/13/24 0854   Non-staged Wound Description Full thickness 05/13/24 0854   Treatments Cleansed 05/13/24 0854   Dressing Changed Changed 05/13/24 0854   Patient Tolerance Tolerated well 05/13/24 0854   Dressing Status Intact 05/13/24 0854       Wound 01/10/24 Pressure Injury Ischium Left;Posterior;Proximal (Active)   Date First Assessed/Time First Assessed: 01/10/24 0956   Primary Wound Type: Pressure Injury  Location: Ischium  Wound Location Orientation: Left;Posterior;Proximal       Wound 01/29/24 Pressure Injury Ischium Right  (Active)   Date First Assessed: 01/29/24   Present on Original Admission: Yes  Primary Wound Type: Pressure Injury  Location: Ischium  Wound Location Orientation: Right       [REMOVED] Wound 07/29/21 Pressure Injury Ischium Right (Removed)   Resolved Date: 08/02/22  Date First Assessed/Time First Assessed: 07/29/21 1536   Pre-Existing Wound: Yes  Primary Wound Type: Pressure Injury  Location: Ischium  Wound Location Orientation: Right       [REMOVED] Wound 12/22/22 Other (comment) Buttocks Left (Removed)   Resolved Date: 01/29/24  Date First Assessed/Time First Assessed: 12/22/22 1104   Primary Wound Type: Other (comment)  Location: Buttocks  Wound Location Orientation: Left       [REMOVED] Wound 12/22/22 Pressure Injury Buttocks Right (Removed)   Resolved Date: 01/29/24  Date First Assessed/Time First Assessed: 12/22/22 1106   Primary Wound Type: Pressure Injury  Location: Buttocks  Wound Location Orientation: Right       [REMOVED] Wound 12/22/22 Pressure Injury Heel Right (Removed)   Resolved Date: 01/29/24  Date First Assessed/Time First Assessed: 12/22/22 1110   Primary Wound Type: Pressure Injury  Location: Heel  Wound Location Orientation: Right       [REMOVED] Wound 12/22/22 Pressure Injury Thigh Anterior;Proximal;Right (Removed)   Resolved Date: 01/29/24  Date First Assessed/Time First Assessed: 12/22/22 1112   Primary Wound Type: Pressure Injury  Location: Thigh  Wound Location Orientation: Anterior;Proximal;Right  Wound Description (Comments): BLEEDING EXCORIATED       [REMOVED] Wound 12/22/22 Pressure Injury Thigh Anterior;Left;Proximal (Removed)   Resolved Date: 01/29/24  Date First Assessed/Time First Assessed: 12/22/22 1112   Primary Wound Type: Pressure Injury  Location: Thigh  Wound Location Orientation: Anterior;Left;Proximal  Wound Description (Comments): BLEEDING EXCORIATED       [REMOVED] Wound 12/22/22 Vagina N/A (Removed)   Resolved Date: 01/29/24  Date First Assessed/Time First Assessed:  12/22/22 1313   Location: Vagina  Wound Location Orientation: N/A       [REMOVED] Wound 01/09/24 Right (Removed)   Resolved Date: 01/10/24  Date First Assessed/Time First Assessed: 01/09/24 1832   Wound Location Orientation: Right  Wound Outcome: Healed       [REMOVED] Wound 01/09/24 Flank Left (Removed)   Resolved Date: 01/10/24  Date First Assessed/Time First Assessed: 01/09/24 1835   Location: Flank  Wound Location Orientation: Left  Wound Outcome: Healed       [REMOVED] Wound 01/09/24 Arm Left;Posterior (Removed)   Resolved Date: 01/29/24  Date First Assessed/Time First Assessed: 01/09/24 1836   Location: Arm  Wound Location Orientation: Left;Posterior       Subjective:      .    F/u visit for pressure ulcers of left and right ischium.  No new complaints.  Reviewed results of x-rays with patient and  today which were negative for osteomyelitis bilaterally.  Patient's  reports he has still been changing her dressing daily.  She denies any pain, fevers, or chills.        The following portions of the patient's history were reviewed and updated as appropriate: She  has a past medical history of Anesthesia, Bladder stones, Chronic pain disorder, Contracture, right shoulder, Dependent on wheelchair, Edema, Elevated alkaline phosphatase level, Fernandez catheter in place, Gallbladder disease, History of femur fracture, History of UTI, MS (multiple sclerosis) ([facility]), Muscle spasticity, Muscle weakness, Muscular dystrophy ([facility]), Neck pain, Neurogenic bladder, Paralysis ([facility]), Port-A-Cath in place, Pressure injury of skin, Sacral pressure sore, Swallowing difficulty, Tinnitus, and Urinary incontinence.  She   Patient Active Problem List    Diagnosis Date Noted    Chronic lower extremity edema 01/08/2024    Tinnitus of both ears 11/21/2023    Hypophonia 11/21/2023    Peripheral edema 02/20/2023    Continuous opioid dependence (HCC) 09/08/2022    Increased endometrial stripe thickness 03/10/2021    Ureteral  calculus, left 11/03/2020    Alkaline phosphatase elevation 10/28/2020    Abnormal thyroid function test 10/28/2020    Candidal vaginitis 10/19/2020    Hydronephrosis with urinary obstruction due to ureteral calculus 10/02/2020    Thrombocytopenia (HCC) 10/01/2020    Serum total bilirubin elevated 10/01/2020    Medication management contract signed 02/21/2020    Screening mammogram, encounter for 05/29/2019    Allergic rhinitis 03/26/2018    Functional quadriplegia secondary to MS ([facility]) 01/25/2018    Suprapubic catheter ([facility]) 11/17/2017    Nephrolithiasis 04/22/2016    Bladder calculus 09/02/2015    Muscle spasticity 04/21/2015    Vitamin B12 deficiency 01/16/2015    Constipation 01/14/2015    Hyperglycemia 11/25/2014    Familial hypercholesterolemia 11/25/2014    Recurrent major depressive disorder, in full remission ([facility]) 01/22/2014    Lymphedema 01/22/2014    Spinal stenosis of cervical region 01/22/2014    Urinary incontinence 01/22/2014    Vitamin D deficiency 11/18/2013    Dysphagia 11/04/2013    Dizziness 11/04/2013    Muscle weakness 11/04/2013    Neurogenic bladder 11/04/2013    Sleep disorder 11/04/2013    Tremor 11/04/2013    Vision loss 11/04/2013    Multiple sclerosis (HCC) 10/21/2013     She  has a past surgical history that includes Cholecystectomy; Kidney stone surgery; Portacath placement (Left); Esophagogastroduodenoscopy; Bladder stone removal (N/A, 12/4/2017); Bladder surgery; Suprapubic cystostomy (02/25/2014); Cystoscopy (06/15/2020); [place] retrograde pyelogram (10/2/2020); pr cysto bladder w/ureteral catheterization (Left, 10/2/2020); pr cysto/uretero w/lithotripsy &indwell stent insrt (Left, 11/3/2020); [place] retrograde pyelogram (11/3/2020); and pr litholapaxy smpl/sm <2.5 cm (N/A, 12/22/2022).  Her family history includes Alzheimer's disease in her family; COPD in her father; Diabetes in her family and mother; Heart disease in her family; Hyperlipidemia in her mother and sister; Hypertension in  her family, father, and mother.  She  reports that she has never smoked. She has never used smokeless tobacco. She reports that she does not currently use alcohol. She reports that she does not use drugs.  Current Outpatient Medications   Medication Sig Dispense Refill    baclofen 20 mg tablet TAKE 1 TABLET BY MOUTH 5 TIMES AS NEEDED (Patient taking differently: TAKE 1 TABLET BY MOUTH 5 TIMES AS NEEDED for muscle spasms) 450 tablet 3    clotrimazole (LOTRIMIN) 1 % cream Apply topically 2 (two) times a day as needed (yeast rash) (Patient not taking: Reported on 11/21/2023) 30 g 0    DULoxetine (CYMBALTA) 20 mg capsule Take 1 capsule (20 mg total) by mouth daily 90 capsule 3    furosemide (LASIX) 20 mg tablet Take 1 tablet (20 mg total) by mouth daily 30 tablet 5    gabapentin (NEURONTIN) 100 mg capsule Take 1 tablet at bedtime tonight, 1 tablet twice a day tomorrow, and then 1 tablet 3 times daily thereafter (Patient taking differently: Take 100 mg by mouth 3 (three) times a day. Indications: Neuropathic Pain) 90 capsule 5    oxybutynin (DITROPAN-XL) 10 MG 24 hr tablet Take 1 tablet (10 mg total) by mouth daily 90 tablet 3    rosuvastatin (CRESTOR) 5 mg tablet TAKE 1 TABLET (5 MG TOTAL) BY MOUTH DAILY. 90 tablet 1     No current facility-administered medications for this visit.     She is allergic to latex..    Review of Systems   Constitutional: Negative.    HENT:  Negative for ear pain and hearing loss.    Eyes:  Negative for pain.   Respiratory:  Negative for chest tightness and shortness of breath.    Cardiovascular:  Negative for chest pain, palpitations and leg swelling.   Gastrointestinal:  Negative for diarrhea, nausea and vomiting.   Genitourinary:  Negative for dysuria.   Musculoskeletal:  Positive for gait problem.   Skin:  Positive for wound.   Neurological:  Positive for numbness. Negative for tremors and weakness.   Psychiatric/Behavioral:  Negative for behavioral problems, confusion and suicidal ideas.           Objective:       Wound 01/10/24 Pressure Injury Ischium Left;Posterior;Proximal (Active)   Wound Image Images linked 05/13/24 0852   Wound Description Granulation tissue 05/13/24 0852   Pressure Injury Stage U 05/13/24 0852   Ruchi-wound Assessment Intact 05/13/24 0852   Wound Length (cm) 1.65 cm 05/13/24 0852   Wound Width (cm) 2 cm 05/13/24 0852   Wound Depth (cm) 1.2 cm 05/13/24 0852   Wound Surface Area (cm^2) 3.3 cm^2 05/13/24 0852   Wound Volume (cm^3) 3.96 cm^3 05/13/24 0852   Calculated Wound Volume (cm^3) 3.96 cm^3 05/13/24 0852   Undermining 1 5.4 05/13/24 0852   Undermining 1 is depth extending from 10.3 05/13/24 0852   Drainage Amount Large 05/13/24 0852   Drainage Description Tan;Serosanguineous 05/13/24 0852   Non-staged Wound Description Full thickness 05/13/24 0852   Treatments Cleansed 05/13/24 0852   Dressing Changed Changed 05/13/24 0852   Patient Tolerance Tolerated well 05/13/24 0852   Dressing Status Intact 05/13/24 0852       Wound 01/29/24 Pressure Injury Ischium Right (Active)   Wound Image Images linked 05/13/24 0854   Wound Description Rolled edges;White;Granulation tissue 05/13/24 0854   Ruchi-wound Assessment Intact 05/13/24 0854   Wound Length (cm) 0.6 cm 05/13/24 0854   Wound Width (cm) 0.6 cm 05/13/24 0854   Wound Depth (cm) 0.6 cm 05/13/24 0854   Wound Surface Area (cm^2) 0.36 cm^2 05/13/24 0854   Wound Volume (cm^3) 0.216 cm^3 05/13/24 0854   Calculated Wound Volume (cm^3) 0.22 cm^3 05/13/24 0854   Change in Wound Size % 56 05/13/24 0854   Number of underminings 1 05/13/24 0854   Undermining 1 0.8 05/13/24 0854   Undermining 1 is depth extending from 12-2 05/13/24 0854   Drainage Amount Moderate 05/13/24 0854   Drainage Description Serosanguineous 05/13/24 0854   Non-staged Wound Description Full thickness 05/13/24 0854   Treatments Cleansed 05/13/24 0854   Dressing Changed Changed 05/13/24 0854   Patient Tolerance Tolerated well 05/13/24 0854   Dressing Status Intact  05/13/24 0854       /70   Pulse 68   Temp 97.6 °F (36.4 °C)   Resp 18               Wound Instructions:  Orders Placed This Encounter   Procedures    Wound Procedure Treatment     This order was created via procedure documentation    Wound cleansing and dressings     Orders until VAC arrives    Wound cleansing and dressings       Wash your hands with soap and water.  Remove old dressing, discard into plastic bag and place in trash.  Cleanse the wound with dakins as needed if odor is present, wound cleanser other days,  prior to applying a clean dressing. Do not use tissue or cotton balls. Do not scrub the wound. Pat dry using gauze.  Shower no      Apply Silver alginate rope/Aquacell AG rope to the open wounds on the right and left ischium, tuck into undermined areas.  Cover with silicone border dressing     Change dressing every other day     VNA to change every other day,  to change other days if needed            New orders after VAC arrives    Right WOUND  Wound cleansing and dressings       Wash your hands with soap and water.  Remove old dressing, discard into plastic bag and place in trash.  Cleanse the wound with dakins as needed if odor is present, wound cleanser other days,  prior to applying a clean dressing. Do not use tissue or cotton balls. Do not scrub the wound. Pat dry using gauze.  Shower no      Apply Silver alginate rope/Aquacell AG rope to the open wounds on the right and left ischium, tuck into undermined areas.  Cover with silicone border dressing     Change dressing every other day     VNA to change every other day,  to change other days if needed      LEFT   WOUND Negative Pressure Wound Therapy Instructions    Apply black granufoam to wound bed,   PACK white foam to UNDERMINING   Set negative pressure on continuous at 150,   Bridge to the side.  VAC dressing to be changed three times per week as ordered.     Standing Status:   Future     Standing Expiration Date:    5/20/2024    Debridement     This order was created via procedure documentation    Debridement     This order was created via procedure documentation        Diagnosis ICD-10-CM Associated Orders   1. Pressure ulcer of ischium, left, stage III (HCC)  L89.323 Wound Procedure Treatment     Wound cleansing and dressings      2. Pressure ulcer of right ischium, stage IV (HCC)  L89.314 Wound Procedure Treatment     Wound cleansing and dressings      3. Multiple sclerosis (HCC)  G35

## 2024-05-13 NOTE — PATIENT INSTRUCTIONS
Orders Placed This Encounter   Procedures    Wound Procedure Treatment     This order was created via procedure documentation    Wound cleansing and dressings     Orders until VAC arrives    Wound cleansing and dressings       Wash your hands with soap and water.  Remove old dressing, discard into plastic bag and place in trash.  Cleanse the wound with dakins as needed if odor is present, wound cleanser other days,  prior to applying a clean dressing. Do not use tissue or cotton balls. Do not scrub the wound. Pat dry using gauze.  Shower no      Apply Silver alginate rope/Aquacell AG rope to the open wounds on the right and left ischium, tuck into undermined areas.  Cover with silicone border dressing     Change dressing every other day     VNA to change every other day,  to change other days if needed            New orders after VAC arrives    Right WOUND  Wound cleansing and dressings       Wash your hands with soap and water.  Remove old dressing, discard into plastic bag and place in trash.  Cleanse the wound with dakins as needed if odor is present, wound cleanser other days,  prior to applying a clean dressing. Do not use tissue or cotton balls. Do not scrub the wound. Pat dry using gauze.  Shower no      Apply Silver alginate rope/Aquacell AG rope to the open wounds on the right and left ischium, tuck into undermined areas.  Cover with silicone border dressing     Change dressing every other day     VNA to change every other day,  to change other days if needed      LEFT   WOUND Negative Pressure Wound Therapy Instructions    Apply black granufoam to wound bed,   PACK white foam to UNDERMINING   Set negative pressure on continuous at 150,   Bridge to the side.  VAC dressing to be changed three times per week as ordered.     Standing Status:   Future     Standing Expiration Date:   5/20/2024

## 2024-05-13 NOTE — PROGRESS NOTES
Wound Procedure Treatment    Performed by: Riddhi Perez RN  Authorized by: CLEMENTINA Vincent  Associated wounds:   Wound 01/10/24 Pressure Injury Ischium Left;Posterior;Proximal  Wound 01/29/24 Pressure Injury Ischium Right  Wound cleansed with:  Soap and water and NSS  Applied primary dressing:  Calcium alginate and Silver  Applied secondary dressing:  Foam

## 2024-05-14 ENCOUNTER — TELEPHONE (OUTPATIENT)
Dept: FAMILY MEDICINE CLINIC | Facility: CLINIC | Age: 54
End: 2024-05-14

## 2024-05-14 NOTE — TELEPHONE ENCOUNTER
----- Message from Satinder Porter MD sent at 5/13/2024  8:28 PM EDT -----  Tests were normal. Please follow up at next office visit as scheduled.

## 2024-05-17 ENCOUNTER — HOME CARE VISIT (OUTPATIENT)
Dept: HOME HEALTH SERVICES | Facility: HOME HEALTHCARE | Age: 54
End: 2024-05-17
Payer: MEDICARE

## 2024-05-17 VITALS
TEMPERATURE: 98.6 F | OXYGEN SATURATION: 98 % | SYSTOLIC BLOOD PRESSURE: 112 MMHG | DIASTOLIC BLOOD PRESSURE: 54 MMHG | HEART RATE: 88 BPM

## 2024-05-17 PROCEDURE — G0299 HHS/HOSPICE OF RN EA 15 MIN: HCPCS

## 2024-05-24 ENCOUNTER — HOME CARE VISIT (OUTPATIENT)
Dept: HOME HEALTH SERVICES | Facility: HOME HEALTHCARE | Age: 54
End: 2024-05-24
Payer: MEDICARE

## 2024-05-24 VITALS
HEART RATE: 84 BPM | TEMPERATURE: 98.1 F | SYSTOLIC BLOOD PRESSURE: 100 MMHG | OXYGEN SATURATION: 100 % | DIASTOLIC BLOOD PRESSURE: 50 MMHG

## 2024-05-24 PROCEDURE — G0299 HHS/HOSPICE OF RN EA 15 MIN: HCPCS

## 2024-05-28 PROCEDURE — G0179 MD RECERTIFICATION HHA PT: HCPCS | Performed by: FAMILY MEDICINE

## 2024-05-30 ENCOUNTER — NURSE TRIAGE (OUTPATIENT)
Age: 54
End: 2024-05-30

## 2024-05-30 DIAGNOSIS — R39.9 UTI SYMPTOMS: Primary | ICD-10-CM

## 2024-05-30 NOTE — TELEPHONE ENCOUNTER
Patient of Dr. Osborne, last seen 12/22/2022 for Lithotripsy. Patient was to return by 3/2023 but has not.    Patient woke this morning with a blocked SPT, her  wasn't able to flush so he changed it. Draining well now.    Patient does has VN services and they will be there tomorrow.    She is concerned for a UTI due to yellow cloudy urine and she c/o of burning at the urethra.    Denies fever or chills    UA and UCX ordered, VN will obtain tomorrow    Please advise if any other recommendations    Reason for Disposition   The patient has a sediment issue with catheter / intermittent blockages; but their catheter is draining    Additional Information   Negative: The patient has severe uncontrolled pain   Negative: The patient has severe hematuria with clots   Negative: The patient has a fever of 101 or higher   Negative: The patient has had no urinary discharge for greater than 6 hours and the office is closing / closed / after hours   Negative: The patient's SPT is displaced from the abdominal opening    Answer Questions - Initial Assessment  What type of catheter do you have: SPT    When was the last time you emptied your urinary drainage bag: this morning    Are you drinking and how much fluid have you had today: Water 64 ounces, sparkling water 12 ounces, occasionally juice    Did you check the tubing and webb to make sure there are not kinks in the tube: yes    Are you using a leg bag or a large urinary drainage bag (overnight bag): overnight bag    Is the webb connected to a stat loc on your thigh: Yes Is the stat loc too low: no    Is it causing discomfort: no    Is there enough slack allowing in the webb allowing you to walk, sit, and stand: yes    Are you having bladder spasms: yes    Do you have a visiting nurse or home health nurse that you can contact in case of issues that will come to your home for urgent visit: yes    Were you taught how to irrigate or flush your catheter: Yes: When flushing  are you meeting any resistance: yes    What type of irrigant were you prescribed: sterile water     Are you having any pain: Yes: Where is your pain located: urethral    Is the pain constant or intermittent: intermittent    Can you qualify the pain: burning    Pain level: 2    When your catheter is leaking, is that when you experience pain: no    Do you have blood in your urine: No    Protocols used: Catheter Problem

## 2024-05-31 ENCOUNTER — APPOINTMENT (OUTPATIENT)
Dept: LAB | Facility: CLINIC | Age: 54
End: 2024-05-31
Payer: MEDICARE

## 2024-05-31 ENCOUNTER — HOME CARE VISIT (OUTPATIENT)
Dept: HOME HEALTH SERVICES | Facility: HOME HEALTHCARE | Age: 54
End: 2024-05-31
Payer: MEDICARE

## 2024-05-31 VITALS — DIASTOLIC BLOOD PRESSURE: 64 MMHG | HEART RATE: 83 BPM | OXYGEN SATURATION: 98 % | SYSTOLIC BLOOD PRESSURE: 104 MMHG

## 2024-05-31 DIAGNOSIS — R39.9 UTI SYMPTOMS: ICD-10-CM

## 2024-05-31 LAB
AMORPH URATE CRY URNS QL MICRO: ABNORMAL
BACTERIA UR QL AUTO: ABNORMAL /HPF
BILIRUB UR QL STRIP: NEGATIVE
CLARITY UR: CLEAR
COLOR UR: COLORLESS
GLUCOSE UR STRIP-MCNC: NEGATIVE MG/DL
HGB UR QL STRIP.AUTO: ABNORMAL
KETONES UR STRIP-MCNC: NEGATIVE MG/DL
LEUKOCYTE ESTERASE UR QL STRIP: ABNORMAL
MUCOUS THREADS UR QL AUTO: ABNORMAL
NITRITE UR QL STRIP: POSITIVE
NON-SQ EPI CELLS URNS QL MICRO: ABNORMAL /HPF
PH UR STRIP.AUTO: 7 [PH]
PROT UR STRIP-MCNC: NEGATIVE MG/DL
RBC #/AREA URNS AUTO: ABNORMAL /HPF
SP GR UR STRIP.AUTO: 1.01 (ref 1–1.03)
UROBILINOGEN UR STRIP-ACNC: <2 MG/DL
WBC #/AREA URNS AUTO: ABNORMAL /HPF

## 2024-05-31 PROCEDURE — 81001 URINALYSIS AUTO W/SCOPE: CPT

## 2024-05-31 PROCEDURE — G0299 HHS/HOSPICE OF RN EA 15 MIN: HCPCS

## 2024-05-31 PROCEDURE — 87147 CULTURE TYPE IMMUNOLOGIC: CPT

## 2024-05-31 PROCEDURE — 87077 CULTURE AEROBIC IDENTIFY: CPT

## 2024-05-31 PROCEDURE — 87186 SC STD MICRODIL/AGAR DIL: CPT

## 2024-05-31 PROCEDURE — 87086 URINE CULTURE/COLONY COUNT: CPT

## 2024-05-31 PROCEDURE — 400014 VN F/U

## 2024-06-02 LAB
BACTERIA UR CULT: ABNORMAL
BACTERIA UR CULT: ABNORMAL

## 2024-06-03 ENCOUNTER — OFFICE VISIT (OUTPATIENT)
Dept: WOUND CARE | Facility: HOSPITAL | Age: 54
End: 2024-06-03
Payer: MEDICARE

## 2024-06-03 ENCOUNTER — HOME CARE VISIT (OUTPATIENT)
Dept: HOME HEALTH SERVICES | Facility: HOME HEALTHCARE | Age: 54
End: 2024-06-03
Payer: MEDICARE

## 2024-06-03 VITALS
RESPIRATION RATE: 16 BRPM | TEMPERATURE: 99.2 F | SYSTOLIC BLOOD PRESSURE: 110 MMHG | HEART RATE: 104 BPM | DIASTOLIC BLOOD PRESSURE: 76 MMHG

## 2024-06-03 DIAGNOSIS — G35 MULTIPLE SCLEROSIS (HCC): ICD-10-CM

## 2024-06-03 DIAGNOSIS — L89.314 PRESSURE ULCER OF RIGHT ISCHIUM, STAGE IV (HCC): ICD-10-CM

## 2024-06-03 DIAGNOSIS — L89.323 PRESSURE ULCER OF ISCHIUM, LEFT, STAGE III (HCC): Primary | ICD-10-CM

## 2024-06-03 PROCEDURE — 97597 DBRDMT OPN WND 1ST 20 CM/<: CPT | Performed by: NURSE PRACTITIONER

## 2024-06-03 PROCEDURE — 11042 DBRDMT SUBQ TIS 1ST 20SQCM/<: CPT | Performed by: NURSE PRACTITIONER

## 2024-06-03 PROCEDURE — 97606 NEG PRS WND THER DME>50 SQCM: CPT

## 2024-06-03 RX ORDER — LEVOFLOXACIN 500 MG/1
500 TABLET, FILM COATED ORAL EVERY 24 HOURS
Qty: 7 TABLET | Refills: 0 | Status: SHIPPED | OUTPATIENT
Start: 2024-06-03 | End: 2024-06-03

## 2024-06-03 RX ORDER — LEVOFLOXACIN 500 MG/1
500 TABLET, FILM COATED ORAL EVERY 24 HOURS
Qty: 7 TABLET | Refills: 0 | Status: SHIPPED | OUTPATIENT
Start: 2024-06-03 | End: 2024-06-10

## 2024-06-03 RX ORDER — LIDOCAINE HYDROCHLORIDE 40 MG/ML
5 SOLUTION TOPICAL ONCE
Status: COMPLETED | OUTPATIENT
Start: 2024-06-03 | End: 2024-06-03

## 2024-06-03 RX ADMIN — LIDOCAINE HYDROCHLORIDE 5 ML: 40 SOLUTION TOPICAL at 09:09

## 2024-06-03 NOTE — TELEPHONE ENCOUNTER
Patient's spouse Nacho called today to schedule the patient's office visit.    Pt is scheduled in [facility] with MARY BETH Swann on 8/16 at 12:40 PM.    Pt will  Rx from pharmacy.    No further action is needed at this time.

## 2024-06-03 NOTE — PATIENT INSTRUCTIONS
Orders Placed This Encounter   Procedures    Wound cleansing and dressings Pressure Injury Right Ischium     Right ischium WOUND:  Wound cleansing and dressings                                  Wash your hands with soap and water.  Remove old dressing, discard into plastic bag and place in trash.  Cleanse the wound with dakins as needed if odor is present, wound cleanser other days,  prior to applying a clean dressing. Do not use tissue or cotton balls. Do not scrub the wound. Pat dry using gauze.  Shower no      Apply Silver alginate rope/Aquacell AG rope to wound.  Cover with silicone border dressing  Change dressing every other day     VNA to change every other day,  to change other days if needed     Standing Status:   Future     Standing Expiration Date:   6/17/2024    Wound cleansing and dressings Pressure Injury Left;Posterior;Proximal Ischium     LEFT ischium  WOUND                          Wash your hands with soap and water.  Remove old dressing, discard into plastic bag and place in trash.  Cleanse the wound with dakins as needed if odor is present, wound cleanser other days,  prior to applying a clean dressing. Do not use tissue or cotton balls. Do not scrub the wound. Pat dry using gauze.  Shower no     Negative Pressure Wound Therapy Instructions       Tuck a triangle shaped piece of white foam into wound, ensure to tuck into 10-11 oclock undermining area  Apply black granufoam to wound bed  Set negative pressure on continuous at 150  Bridge to the side.  VAC dressing to be changed three times per week as ordered.     Standing Status:   Future     Standing Expiration Date:   6/17/2024

## 2024-06-03 NOTE — PROGRESS NOTES
Wound Procedure Treatment Pressure Injury Right Ischium    Performed by: Park Landa RN  Authorized by: CLEMENTINA Vincent    Associated wounds:   Wound 01/29/24 Pressure Injury Ischium Right  Wound cleansed with:  NSS  Applied primary dressing:  Silver, Calcium alginate and Silicone bordered foam

## 2024-06-03 NOTE — PROGRESS NOTES
"Patient ID: Fanny Schulz is a 54 y.o. female Date of Birth 1970     Chief Complaint  Chief Complaint   Patient presents with    Follow Up Wound Care Visit     Left and right ischium wounds        Allergies  Latex    Assessment:     Diagnoses and all orders for this visit:    Pressure ulcer of ischium, left, stage III (HCC)  -     lidocaine (XYLOCAINE) 4 % topical solution 5 mL  -     Wound cleansing and dressings Pressure Injury Left;Posterior;Proximal Ischium; Future  -     Negative Pressure Wound Therapy  -     Debridement Pressure Injury Left;Posterior;Proximal Ischium    Pressure ulcer of right ischium, stage IV (HCC)  -     Wound cleansing and dressings Pressure Injury Right Ischium; Future  -     lidocaine (XYLOCAINE) 4 % topical solution 5 mL  -     Wound Procedure Treatment Pressure Injury Right Ischium  -     Debridement Pressure Injury Right Ischium    Multiple sclerosis (HCC)              Debridement   Wound 01/10/24 Pressure Injury Ischium Left;Posterior;Proximal    Universal Protocol:  Consent: Written consent obtained.  Consent given by: patient  Time out: Immediately prior to procedure a \"time out\" was called to verify the correct patient, procedure, equipment, support staff and site/side marked as required.  Timeout called at: 6/3/2024 10:54 AM.  Patient identity confirmed: verbally with patient    Debridement Details  Performed by: NP  Debridement type: surgical  Level of debridement: subcutaneous tissue  Pain control: lidocaine 4%      Post-debridement measurements  Length (cm): 2  Width (cm): 2  Depth (cm): 2.7  Percent debrided: 100%  Surface Area (cm^2): 4  Area Debrided (cm^2): 4  Volume (cm^3): 10.8    Tissue and other material debrided: subcutaneous tissue  Devitalized tissue debrided: biofilm, fibrin and slough  Instrument(s) utilized: curette  Bleeding: medium  Hemostasis obtained with: pressure  Procedural pain (0-10): 0  Post-procedural pain: 0   Response to treatment: procedure " was tolerated well        Plan:  1.  F/U visit.  Wounds debrided.  Wounds measuring slightly larger but are not showing any signs or symptoms of infection.    2.  Left ischial wound still contains undermining from 10-3 o'clock with the deepest area at 11 o'clock measuring 5.4 cm.  Will recommend white foam be applied to area of undermining (cut in shape of triangle) to adequately fill dead space.  Will have black foam applied on top and bridged to her left hip or thigh change 3 times a week on 150 mmHg on continuous suction.    3.  Continue current plan of care for right ischial wound.    4.  Continue offloading pressure from wounds    5.  Continue maintaining a diet high in protein    6.  Patient will follow-up in 2 weeks     Wound 01/10/24 Pressure Injury Ischium Left;Posterior;Proximal (Active)   Wound Image Images linked 06/03/24 0925   Wound Description Granulation tissue;Yellow;Slough 06/03/24 0902   Pressure Injury Stage 3 06/03/24 0902   Ruchi-wound Assessment Maceration 06/03/24 0902   Wound Length (cm) 2 cm 06/03/24 0902   Wound Width (cm) 2 cm 06/03/24 0902   Wound Depth (cm) 2.6 cm 06/03/24 0902   Wound Surface Area (cm^2) 4 cm^2 06/03/24 0902   Wound Volume (cm^3) 10.4 cm^3 06/03/24 0902   Calculated Wound Volume (cm^3) 10.4 cm^3 06/03/24 0902   Undermining 1 5.4 06/03/24 0902   Undermining 1 is depth extending from 10-3 oclock, deepest at 11 oclock 06/03/24 0902   Drainage Description Tan;Serosanguineous;Bloody;Purulent 06/03/24 0902   Non-staged Wound Description Full thickness 06/03/24 0902   Dressing Wound V.A.C. 06/03/24 0902   Packing- # removed -- (1 piece of white foam and 1 piece of black foam) 06/03/24 0902   Dressing Status Intact 06/03/24 0902       Wound 01/29/24 Pressure Injury Ischium Right (Active)   Wound Image Images linked 06/03/24 0859   Wound Description Granulation tissue;Gray;Slough 06/03/24 0900   Pressure Injury Stage 4 06/03/24 0900   Ruchi-wound Assessment Maceration 06/03/24  0900   Wound Length (cm) 0.7 cm 06/03/24 0900   Wound Width (cm) 1 cm 06/03/24 0900   Wound Depth (cm) 0.6 cm 06/03/24 0900   Wound Surface Area (cm^2) 0.7 cm^2 06/03/24 0900   Wound Volume (cm^3) 0.42 cm^3 06/03/24 0900   Calculated Wound Volume (cm^3) 0.42 cm^3 06/03/24 0900   Change in Wound Size % 16 06/03/24 0900   Number of underminings 1 06/03/24 0900   Undermining 1 0.2 06/03/24 0900   Undermining 1 is depth extending from 12-2 oclock 06/03/24 0900   Drainage Amount Small 06/03/24 0900   Drainage Description Serosanguineous 06/03/24 0900   Non-staged Wound Description Full thickness 06/03/24 0900   Dressing Status Intact 06/03/24 0900       Wound 01/10/24 Pressure Injury Ischium Left;Posterior;Proximal (Active)   Date First Assessed/Time First Assessed: 01/10/24 0956   Primary Wound Type: Pressure Injury  Location: Ischium  Wound Location Orientation: Left;Posterior;Proximal       Wound 01/29/24 Pressure Injury Ischium Right (Active)   Date First Assessed: 01/29/24   Present on Original Admission: Yes  Primary Wound Type: Pressure Injury  Location: Ischium  Wound Location Orientation: Right       [REMOVED] Wound 07/29/21 Pressure Injury Ischium Right (Removed)   Resolved Date: 08/02/22  Date First Assessed/Time First Assessed: 07/29/21 1536   Pre-Existing Wound: Yes  Primary Wound Type: Pressure Injury  Location: Ischium  Wound Location Orientation: Right       [REMOVED] Wound 12/22/22 Other (comment) Buttocks Left (Removed)   Resolved Date: 01/29/24  Date First Assessed/Time First Assessed: 12/22/22 1104   Primary Wound Type: Other (comment)  Location: Buttocks  Wound Location Orientation: Left       [REMOVED] Wound 12/22/22 Pressure Injury Buttocks Right (Removed)   Resolved Date: 01/29/24  Date First Assessed/Time First Assessed: 12/22/22 1106   Primary Wound Type: Pressure Injury  Location: Buttocks  Wound Location Orientation: Right       [REMOVED] Wound 12/22/22 Pressure Injury Heel Right (Removed)    Resolved Date: 01/29/24  Date First Assessed/Time First Assessed: 12/22/22 1110   Primary Wound Type: Pressure Injury  Location: Heel  Wound Location Orientation: Right       [REMOVED] Wound 12/22/22 Pressure Injury Thigh Anterior;Proximal;Right (Removed)   Resolved Date: 01/29/24  Date First Assessed/Time First Assessed: 12/22/22 1112   Primary Wound Type: Pressure Injury  Location: Thigh  Wound Location Orientation: Anterior;Proximal;Right  Wound Description (Comments): BLEEDING EXCORIATED       [REMOVED] Wound 12/22/22 Pressure Injury Thigh Anterior;Left;Proximal (Removed)   Resolved Date: 01/29/24  Date First Assessed/Time First Assessed: 12/22/22 1112   Primary Wound Type: Pressure Injury  Location: Thigh  Wound Location Orientation: Anterior;Left;Proximal  Wound Description (Comments): BLEEDING EXCORIATED       [REMOVED] Wound 12/22/22 Vagina N/A (Removed)   Resolved Date: 01/29/24  Date First Assessed/Time First Assessed: 12/22/22 1313   Location: Vagina  Wound Location Orientation: N/A       [REMOVED] Wound 01/09/24 Right (Removed)   Resolved Date: 01/10/24  Date First Assessed/Time First Assessed: 01/09/24 1832   Wound Location Orientation: Right  Wound Outcome: Healed       [REMOVED] Wound 01/09/24 Flank Left (Removed)   Resolved Date: 01/10/24  Date First Assessed/Time First Assessed: 01/09/24 1835   Location: Flank  Wound Location Orientation: Left  Wound Outcome: Healed       [REMOVED] Wound 01/09/24 Arm Left;Posterior (Removed)   Resolved Date: 01/29/24  Date First Assessed/Time First Assessed: 01/09/24 1836   Location: Arm  Wound Location Orientation: Left;Posterior       Subjective:      .    F/u visit for pressure ulcers of left and right ischium.  No new complaints.  She denies any pain, fevers, or chills.  Tolerating wound VAC.        The following portions of the patient's history were reviewed and updated as appropriate: She  has a past medical history of Anesthesia, Bladder stones, Chronic  pain disorder, Contracture, right shoulder, Dependent on wheelchair, Edema, Elevated alkaline phosphatase level, Fernandez catheter in place, Gallbladder disease, History of femur fracture, History of UTI, MS (multiple sclerosis) ([facility]), Muscle spasticity, Muscle weakness, Muscular dystrophy ([facility]), Neck pain, Neurogenic bladder, Paralysis ([facility]), Port-A-Cath in place, Pressure injury of skin, Sacral pressure sore, Swallowing difficulty, Tinnitus, and Urinary incontinence.  She   Patient Active Problem List    Diagnosis Date Noted    Chronic lower extremity edema 01/08/2024    Tinnitus of both ears 11/21/2023    Hypophonia 11/21/2023    Peripheral edema 02/20/2023    Continuous opioid dependence ([facility]) 09/08/2022    Increased endometrial stripe thickness 03/10/2021    Ureteral calculus, left 11/03/2020    Alkaline phosphatase elevation 10/28/2020    Abnormal thyroid function test 10/28/2020    Candidal vaginitis 10/19/2020    Hydronephrosis with urinary obstruction due to ureteral calculus 10/02/2020    Thrombocytopenia ([facility]) 10/01/2020    Serum total bilirubin elevated 10/01/2020    Medication management contract signed 02/21/2020    Screening mammogram, encounter for 05/29/2019    Allergic rhinitis 03/26/2018    Functional quadriplegia secondary to MS ([facility]) 01/25/2018    Suprapubic catheter ([facility]) 11/17/2017    Nephrolithiasis 04/22/2016    Bladder calculus 09/02/2015    Muscle spasticity 04/21/2015    Vitamin B12 deficiency 01/16/2015    Constipation 01/14/2015    Hyperglycemia 11/25/2014    Familial hypercholesterolemia 11/25/2014    Recurrent major depressive disorder, in full remission ([facility]) 01/22/2014    Lymphedema 01/22/2014    Spinal stenosis of cervical region 01/22/2014    Urinary incontinence 01/22/2014    Vitamin D deficiency 11/18/2013    Dysphagia 11/04/2013    Dizziness 11/04/2013    Muscle weakness 11/04/2013    Neurogenic bladder 11/04/2013    Sleep disorder 11/04/2013    Tremor 11/04/2013    Vision loss  11/04/2013    Multiple sclerosis (HCC) 10/21/2013     She  has a past surgical history that includes Cholecystectomy; Kidney stone surgery; Portacath placement (Left); Esophagogastroduodenoscopy; Bladder stone removal (N/A, 12/4/2017); Bladder surgery; Suprapubic cystostomy (02/25/2014); Cystoscopy (06/15/2020); [place] retrograde pyelogram (10/2/2020); pr cysto bladder w/ureteral catheterization (Left, 10/2/2020); pr cysto/uretero w/lithotripsy &indwell stent insrt (Left, 11/3/2020); [place] retrograde pyelogram (11/3/2020); and pr litholapaxy smpl/sm <2.5 cm (N/A, 12/22/2022).  Her family history includes Alzheimer's disease in her family; COPD in her father; Diabetes in her family and mother; Heart disease in her family; Hyperlipidemia in her mother and sister; Hypertension in her family, father, and mother.  She  reports that she has never smoked. She has never used smokeless tobacco. She reports that she does not currently use alcohol. She reports that she does not use drugs.  Current Outpatient Medications   Medication Sig Dispense Refill    baclofen 20 mg tablet TAKE 1 TABLET BY MOUTH 5 TIMES AS NEEDED (Patient taking differently: TAKE 1 TABLET BY MOUTH 5 TIMES AS NEEDED for muscle spasms) 450 tablet 3    clotrimazole (LOTRIMIN) 1 % cream Apply topically 2 (two) times a day as needed (yeast rash) (Patient not taking: Reported on 11/21/2023) 30 g 0    DULoxetine (CYMBALTA) 20 mg capsule Take 1 capsule (20 mg total) by mouth daily 90 capsule 3    furosemide (LASIX) 20 mg tablet Take 1 tablet (20 mg total) by mouth daily 30 tablet 5    gabapentin (NEURONTIN) 100 mg capsule Take 1 tablet at bedtime tonight, 1 tablet twice a day tomorrow, and then 1 tablet 3 times daily thereafter (Patient taking differently: Take 100 mg by mouth 3 (three) times a day. Indications: Neuropathic Pain) 90 capsule 5    levofloxacin (LEVAQUIN) 500 mg tablet Take 1 tablet (500 mg total) by mouth every 24 hours for 7 days 7 tablet 0    oxybutynin  (DITROPAN-XL) 10 MG 24 hr tablet Take 1 tablet (10 mg total) by mouth daily 90 tablet 3    rosuvastatin (CRESTOR) 5 mg tablet TAKE 1 TABLET (5 MG TOTAL) BY MOUTH DAILY. 90 tablet 1     No current facility-administered medications for this visit.     She is allergic to latex..    Review of Systems   Constitutional: Negative.    HENT:  Negative for ear pain and hearing loss.    Eyes:  Negative for pain.   Respiratory:  Negative for chest tightness and shortness of breath.    Cardiovascular:  Negative for chest pain, palpitations and leg swelling.   Gastrointestinal:  Negative for diarrhea, nausea and vomiting.   Genitourinary:  Negative for dysuria.   Musculoskeletal:  Positive for gait problem.   Skin:  Positive for wound.   Neurological:  Positive for numbness. Negative for tremors and weakness.   Psychiatric/Behavioral:  Negative for behavioral problems, confusion and suicidal ideas.          Objective:       Wound 01/10/24 Pressure Injury Ischium Left;Posterior;Proximal (Active)   Wound Image Images linked 06/03/24 0925   Wound Description Granulation tissue;Yellow;Slough 06/03/24 0902   Pressure Injury Stage 3 06/03/24 0902   Ruchi-wound Assessment Maceration 06/03/24 0902   Wound Length (cm) 2 cm 06/03/24 0902   Wound Width (cm) 2 cm 06/03/24 0902   Wound Depth (cm) 2.6 cm 06/03/24 0902   Wound Surface Area (cm^2) 4 cm^2 06/03/24 0902   Wound Volume (cm^3) 10.4 cm^3 06/03/24 0902   Calculated Wound Volume (cm^3) 10.4 cm^3 06/03/24 0902   Undermining 1 5.4 06/03/24 0902   Undermining 1 is depth extending from 10-3 oclock, deepest at 11 oclock 06/03/24 0902   Drainage Description Tan;Serosanguineous;Bloody;Purulent 06/03/24 0902   Non-staged Wound Description Full thickness 06/03/24 0902   Dressing Wound V.A.C. 06/03/24 0902   Packing- # removed -- (1 piece of white foam and 1 piece of black foam) 06/03/24 0902   Dressing Status Intact 06/03/24 0902       Wound 01/29/24 Pressure Injury Ischium Right (Active)    Wound Image Images linked 06/03/24 0859   Wound Description Granulation tissue;Gray;Slough 06/03/24 0900   Pressure Injury Stage 4 06/03/24 0900   Ruchi-wound Assessment Maceration 06/03/24 0900   Wound Length (cm) 0.7 cm 06/03/24 0900   Wound Width (cm) 1 cm 06/03/24 0900   Wound Depth (cm) 0.6 cm 06/03/24 0900   Wound Surface Area (cm^2) 0.7 cm^2 06/03/24 0900   Wound Volume (cm^3) 0.42 cm^3 06/03/24 0900   Calculated Wound Volume (cm^3) 0.42 cm^3 06/03/24 0900   Change in Wound Size % 16 06/03/24 0900   Number of underminings 1 06/03/24 0900   Undermining 1 0.2 06/03/24 0900   Undermining 1 is depth extending from 12-2 oclock 06/03/24 0900   Drainage Amount Small 06/03/24 0900   Drainage Description Serosanguineous 06/03/24 0900   Non-staged Wound Description Full thickness 06/03/24 0900   Dressing Status Intact 06/03/24 0900       /76   Pulse 104   Temp 99.2 °F (37.3 °C)   Resp 16                   Wound Instructions:  Orders Placed This Encounter   Procedures    Wound cleansing and dressings Pressure Injury Right Ischium     Right ischium WOUND:  Wound cleansing and dressings                                  Wash your hands with soap and water.  Remove old dressing, discard into plastic bag and place in trash.  Cleanse the wound with dakins as needed if odor is present, wound cleanser other days,  prior to applying a clean dressing. Do not use tissue or cotton balls. Do not scrub the wound. Pat dry using gauze.  Shower no      Apply Silver alginate rope/Aquacell AG rope to wound.  Cover with silicone border dressing  Change dressing every other day     VNA to change every other day,  to change other days if needed     Standing Status:   Future     Standing Expiration Date:   6/17/2024    Wound cleansing and dressings Pressure Injury Left;Posterior;Proximal Ischium     LEFT ischium  WOUND                          Wash your hands with soap and water.  Remove old dressing, discard into plastic bag  and place in trash.  Cleanse the wound with dakins as needed if odor is present, wound cleanser other days,  prior to applying a clean dressing. Do not use tissue or cotton balls. Do not scrub the wound. Pat dry using gauze.  Shower no     Negative Pressure Wound Therapy Instructions       Tuck a triangle shaped piece of white foam into wound, ensure to tuck into 10-11 oclock undermining area  Apply black granufoam to wound bed  Set negative pressure on continuous at 150  Bridge to the side.  VAC dressing to be changed three times per week as ordered.     Standing Status:   Future     Standing Expiration Date:   6/17/2024    Wound Procedure Treatment Pressure Injury Right Ischium     This order was created via procedure documentation    Negative Pressure Wound Therapy     This order was created via procedure documentation    Debridement Pressure Injury Left;Posterior;Proximal Ischium     This order was created via procedure documentation    Debridement Pressure Injury Right Ischium     This order was created via procedure documentation        Diagnosis ICD-10-CM Associated Orders   1. Pressure ulcer of ischium, left, stage III ([facility])  L89.323 lidocaine (XYLOCAINE) 4 % topical solution 5 mL     Wound cleansing and dressings Pressure Injury Left;Posterior;Proximal Ischium     Negative Pressure Wound Therapy     Debridement Pressure Injury Left;Posterior;Proximal Ischium      2. Pressure ulcer of right ischium, stage IV ([facility])  L89.314 Wound cleansing and dressings Pressure Injury Right Ischium     lidocaine (XYLOCAINE) 4 % topical solution 5 mL     Wound Procedure Treatment Pressure Injury Right Ischium     Debridement Pressure Injury Right Ischium      3. Multiple sclerosis ([facility])  G35

## 2024-06-03 NOTE — PROGRESS NOTES
Wound Procedure Treatment Pressure Injury Left;Posterior;Proximal Ischium    Performed by: Park Landa RN  Authorized by: CLEMENTINA Vincent    Associated wounds:   Wound 01/10/24 Pressure Injury Ischium Left;Posterior;Proximal  Wound cleansed with:  NSS  Applied primary dressing:  Other    Wound vac applied

## 2024-06-03 NOTE — TELEPHONE ENCOUNTER
Called and LM with spouse the ABX was sent to [facility] pharmacy and it is recommended that she follow up - it is also recommended that she f/u in office  8/20 is first available if patient calls back to schedule

## 2024-06-03 NOTE — PROGRESS NOTES
"Debridement   Wound 01/29/24 Pressure Injury Ischium Right    Universal Protocol:  Consent: Written consent obtained.  Consent given by: patient  Time out: Immediately prior to procedure a \"time out\" was called to verify the correct patient, procedure, equipment, support staff and site/side marked as required.  Timeout called at: 6/3/2024 10:55 AM.  Patient identity confirmed: verbally with patient    Debridement Details  Performed by: NP  Debridement type: selective  Pain control: lidocaine 4%      Post-debridement measurements  Length (cm): 0.7  Width (cm): 1  Depth (cm): 0.6  Percent debrided: 100%  Surface Area (cm^2): 0.7  Area Debrided (cm^2): 0.7  Volume (cm^3): 0.42    Devitalized tissue debrided: biofilm, fibrin and slough  Instrument(s) utilized: curette  Bleeding: small  Hemostasis obtained with: pressure  Procedural pain (0-10): 0  Post-procedural pain: 0   Response to treatment: procedure was tolerated well        "

## 2024-06-03 NOTE — TELEPHONE ENCOUNTER
Pt spouse called stated that the pharmacy has not received the prescription for levoquin.    Please resend to [facility]

## 2024-06-05 ENCOUNTER — HOME CARE VISIT (OUTPATIENT)
Dept: HOME HEALTH SERVICES | Facility: HOME HEALTHCARE | Age: 54
End: 2024-06-05
Payer: MEDICARE

## 2024-06-05 VITALS
DIASTOLIC BLOOD PRESSURE: 68 MMHG | RESPIRATION RATE: 18 BRPM | OXYGEN SATURATION: 97 % | TEMPERATURE: 97.8 F | SYSTOLIC BLOOD PRESSURE: 110 MMHG | HEART RATE: 80 BPM

## 2024-06-05 LAB
BACTERIA UR CULT: ABNORMAL

## 2024-06-05 PROCEDURE — G0299 HHS/HOSPICE OF RN EA 15 MIN: HCPCS

## 2024-06-06 ENCOUNTER — TELEPHONE (OUTPATIENT)
Dept: OTHER | Facility: OTHER | Age: 54
End: 2024-06-06

## 2024-06-06 NOTE — TELEPHONE ENCOUNTER
Last office note mentions follow-up in 4 weeks for exam and opioid visit.  Would recommend office visit to review.  Of note, the last visit was in January.

## 2024-06-06 NOTE — TELEPHONE ENCOUNTER
PT's  called in to notify PT is in a lot of pain due to bed sores, he is wondering if Dr. Lakhani can call in percocet, or another pain med. Please call  back.

## 2024-06-07 ENCOUNTER — HOME CARE VISIT (OUTPATIENT)
Dept: HOME HEALTH SERVICES | Facility: HOME HEALTHCARE | Age: 54
End: 2024-06-07
Payer: MEDICARE

## 2024-06-07 VITALS
SYSTOLIC BLOOD PRESSURE: 106 MMHG | OXYGEN SATURATION: 99 % | DIASTOLIC BLOOD PRESSURE: 68 MMHG | HEART RATE: 65 BPM | TEMPERATURE: 98.3 F

## 2024-06-07 PROCEDURE — G0299 HHS/HOSPICE OF RN EA 15 MIN: HCPCS

## 2024-06-08 DIAGNOSIS — F33.42 RECURRENT MAJOR DEPRESSIVE DISORDER, IN FULL REMISSION (HCC): ICD-10-CM

## 2024-06-08 DIAGNOSIS — G35 MULTIPLE SCLEROSIS (HCC): Chronic | ICD-10-CM

## 2024-06-08 RX ORDER — DULOXETIN HYDROCHLORIDE 20 MG/1
20 CAPSULE, DELAYED RELEASE ORAL DAILY
Qty: 30 CAPSULE | Refills: 11 | Status: SHIPPED | OUTPATIENT
Start: 2024-06-08

## 2024-06-10 ENCOUNTER — HOME CARE VISIT (OUTPATIENT)
Dept: HOME HEALTH SERVICES | Facility: HOME HEALTHCARE | Age: 54
End: 2024-06-10
Payer: MEDICARE

## 2024-06-10 VITALS
HEART RATE: 76 BPM | TEMPERATURE: 97.8 F | OXYGEN SATURATION: 97 % | SYSTOLIC BLOOD PRESSURE: 100 MMHG | DIASTOLIC BLOOD PRESSURE: 60 MMHG

## 2024-06-10 PROCEDURE — G0299 HHS/HOSPICE OF RN EA 15 MIN: HCPCS

## 2024-06-12 ENCOUNTER — HOME CARE VISIT (OUTPATIENT)
Dept: HOME HEALTH SERVICES | Facility: HOME HEALTHCARE | Age: 54
End: 2024-06-12
Payer: MEDICARE

## 2024-06-12 VITALS
OXYGEN SATURATION: 100 % | RESPIRATION RATE: 18 BRPM | DIASTOLIC BLOOD PRESSURE: 70 MMHG | TEMPERATURE: 97.6 F | SYSTOLIC BLOOD PRESSURE: 128 MMHG | HEART RATE: 84 BPM

## 2024-06-12 PROCEDURE — G0299 HHS/HOSPICE OF RN EA 15 MIN: HCPCS

## 2024-06-14 ENCOUNTER — HOME CARE VISIT (OUTPATIENT)
Dept: HOME HEALTH SERVICES | Facility: HOME HEALTHCARE | Age: 54
End: 2024-06-14
Payer: MEDICARE

## 2024-06-14 VITALS
HEART RATE: 88 BPM | TEMPERATURE: 97.5 F | SYSTOLIC BLOOD PRESSURE: 100 MMHG | OXYGEN SATURATION: 96 % | DIASTOLIC BLOOD PRESSURE: 60 MMHG

## 2024-06-14 PROCEDURE — G0299 HHS/HOSPICE OF RN EA 15 MIN: HCPCS

## 2024-06-16 DIAGNOSIS — G35 MULTIPLE SCLEROSIS (HCC): Chronic | ICD-10-CM

## 2024-06-16 DIAGNOSIS — N31.9 NEUROGENIC BLADDER: ICD-10-CM

## 2024-06-16 RX ORDER — OXYBUTYNIN CHLORIDE 10 MG/1
10 TABLET, EXTENDED RELEASE ORAL DAILY
Qty: 90 TABLET | Refills: 1 | Status: SHIPPED | OUTPATIENT
Start: 2024-06-16

## 2024-06-17 ENCOUNTER — OFFICE VISIT (OUTPATIENT)
Dept: WOUND CARE | Facility: HOSPITAL | Age: 54
End: 2024-06-17
Payer: MEDICARE

## 2024-06-17 VITALS
DIASTOLIC BLOOD PRESSURE: 78 MMHG | TEMPERATURE: 98.1 F | RESPIRATION RATE: 16 BRPM | HEART RATE: 115 BPM | SYSTOLIC BLOOD PRESSURE: 100 MMHG

## 2024-06-17 DIAGNOSIS — L89.323 PRESSURE ULCER OF ISCHIUM, LEFT, STAGE III (HCC): Primary | ICD-10-CM

## 2024-06-17 DIAGNOSIS — L89.314 PRESSURE ULCER OF RIGHT ISCHIUM, STAGE IV (HCC): ICD-10-CM

## 2024-06-17 PROCEDURE — 97605 NEG PRS WND THER DME<=50SQCM: CPT

## 2024-06-17 PROCEDURE — 11042 DBRDMT SUBQ TIS 1ST 20SQCM/<: CPT | Performed by: NURSE PRACTITIONER

## 2024-06-17 PROCEDURE — 97597 DBRDMT OPN WND 1ST 20 CM/<: CPT | Performed by: NURSE PRACTITIONER

## 2024-06-17 RX ORDER — LIDOCAINE HYDROCHLORIDE 40 MG/ML
5 SOLUTION TOPICAL ONCE
Status: COMPLETED | OUTPATIENT
Start: 2024-06-17 | End: 2024-06-17

## 2024-06-17 RX ADMIN — LIDOCAINE HYDROCHLORIDE 5 ML: 40 SOLUTION TOPICAL at 09:00

## 2024-06-17 NOTE — PROGRESS NOTES
Wound Procedure Treatment Pressure Injury Right Ischium    Performed by: Ingris Miranda RN  Authorized by: CLEMENTINA Vincent    Associated wounds:   Wound 01/29/24 Pressure Injury Ischium Right  Wound cleansed with:  NSS  Applied primary dressing:  Calcium alginate, Silver and Silicone bordered foam

## 2024-06-17 NOTE — PROGRESS NOTES
Wound Procedure Treatment Pressure Injury Left;Posterior;Proximal Ischium    Performed by: Ingris Miranda RN  Authorized by: CLEMENTINA Vincent    Associated wounds:   Wound 01/10/24 Pressure Injury Ischium Left;Posterior;Proximal  Wound cleansed with:  NSS  Comments:  1 piece black foam into wound, Bridge VAC dressing applied at 125 mmHg  Negative Pressure Wound Therapy    Performed by: Ingris Miranda RN  Authorized by: CLEMENTINA Vincent    Performed by::  Clinician  Type::  KCI  Coverage Size (sq cm)::  7.7  Pressure Type::  Constant  Pressure Setting::  125 mmHG  Sponge Type::  Black  Sponge Size:  Small  Total Number Of Sponges Removed Prior To Application::  1  Total Number Of Sponges Used For This Application::  1 (plus 1 for cushioning)

## 2024-06-17 NOTE — PROGRESS NOTES
"Patient ID: Fanny Schulz is a 54 y.o. female Date of Birth 1970     Chief Complaint  Chief Complaint   Patient presents with    Follow Up Wound Care Visit     Bilateral ischial pressure ulcers. Pt arrived in power wheelchair. VAC on left ischial wound. Ran out of white foam last Monday and HC has been using black foam only.       Allergies  Latex    Assessment:     Diagnoses and all orders for this visit:    Pressure ulcer of ischium, left, stage III (HCC)  -     lidocaine (XYLOCAINE) 4 % topical solution 5 mL  -     Wound cleansing and dressings Pressure Injury Right Ischium; Future  -     Wound cleansing and dressings Pressure Injury Left;Posterior;Proximal Ischium; Future  -     Wound Procedure Treatment Pressure Injury Left;Posterior;Proximal Ischium  -     Negative Pressure Wound Therapy    Pressure ulcer of right ischium, stage IV (HCC)  -     lidocaine (XYLOCAINE) 4 % topical solution 5 mL  -     Wound cleansing and dressings Pressure Injury Right Ischium; Future  -     Wound cleansing and dressings Pressure Injury Left;Posterior;Proximal Ischium; Future  -     Wound Procedure Treatment Pressure Injury Right Ischium              Debridement   Wound 01/10/24 Pressure Injury Ischium Left;Posterior;Proximal    Universal Protocol:  Consent: Written consent obtained.  Consent given by: patient  Time out: Immediately prior to procedure a \"time out\" was called to verify the correct patient, procedure, equipment, support staff and site/side marked as required.  Timeout called at: 6/17/2024 1:02 PM.  Patient identity confirmed: verbally with patient    Debridement Details  Performed by: NP  Debridement type: surgical  Level of debridement: subcutaneous tissue  Pain control: lidocaine 4%      Post-debridement measurements  Length (cm): 1.7  Width (cm): 1.8  Depth (cm): 2.6  Percent debrided: 100%  Surface Area (cm^2): 3.06  Area Debrided (cm^2): 3.06  Volume (cm^3): 7.96    Tissue and other material debrided: " subcutaneous tissue  Devitalized tissue debrided: biofilm, fibrin and slough  Instrument(s) utilized: curette  Bleeding: small  Hemostasis obtained with: pressure and silver nitrate  Procedural pain (0-10): 0  Post-procedural pain: 0   Response to treatment: procedure was tolerated well        Plan:  1.  F/U visit.  Wounds debrided.  Left ischial wound measuring slightly larger but has a clean granular wound base.  Wound still probes close to bone, but no bone is visibly exposed.  2.  Will continue to use white foam gently tucked in deepest area of undermining.  Will have entire wound covered with black foam and bridged to her hip or thigh change 3 times a week on 150 mmHg continuous suction.  3.  Right ischial wound measuring slightly smaller.  Continue current plan of care.  4.  Continue frequent offloading of pressure from wounds.  5.  Continue maintaining a diet high in protein  6.  Counseled patient to continue to take ibuprofen as needed for pain management.  Counseled patient she may take ibuprofen 3 times a day if needed.  Patient verbalized agreement understanding     Wound 01/10/24 Pressure Injury Ischium Left;Posterior;Proximal (Active)   Wound Image Images linked 06/17/24 0941   Wound Description Granulation tissue;Yellow;Slough;LORIN 06/17/24 0849   Ruchi-wound Assessment Maceration;Pink 06/17/24 0849   Wound Length (cm) 1.7 cm 06/17/24 0849   Wound Width (cm) 1.8 cm 06/17/24 0849   Wound Depth (cm) 2.5 cm 06/17/24 0849   Wound Surface Area (cm^2) 3.06 cm^2 06/17/24 0849   Wound Volume (cm^3) 7.65 cm^3 06/17/24 0849   Calculated Wound Volume (cm^3) 7.65 cm^3 06/17/24 0849   Undermining 1 5.3 06/17/24 0849   Undermining 1 is depth extending from 11-4 oclock, deepest at 11 oclock 06/17/24 0849   Drainage Description Tan;Serosanguineous;Bloody;Purulent 06/17/24 0849   Non-staged Wound Description Full thickness 06/17/24 0849   Dressing Wound V.A.C. 06/17/24 0849   Dressing Status Intact 06/17/24 0849        Wound 01/29/24 Pressure Injury Ischium Right (Active)   Wound Image Images linked 06/17/24 0845   Wound Description Granulation tissue;Slough 06/17/24 0848   Pressure Injury Stage 4 06/17/24 0848   Ruchi-wound Assessment Maceration 06/17/24 0848   Wound Length (cm) 1 cm 06/17/24 0848   Wound Width (cm) 1 cm 06/17/24 0848   Wound Surface Area (cm^2) 1 cm^2 06/17/24 0848       Wound 01/10/24 Pressure Injury Ischium Left;Posterior;Proximal (Active)   Date First Assessed/Time First Assessed: 01/10/24 0956   Primary Wound Type: Pressure Injury  Location: Ischium  Wound Location Orientation: Left;Posterior;Proximal       Wound 01/29/24 Pressure Injury Ischium Right (Active)   Date First Assessed: 01/29/24   Present on Original Admission: Yes  Primary Wound Type: Pressure Injury  Location: Ischium  Wound Location Orientation: Right       [REMOVED] Wound 07/29/21 Pressure Injury Ischium Right (Removed)   Resolved Date: 08/02/22  Date First Assessed/Time First Assessed: 07/29/21 1536   Pre-Existing Wound: Yes  Primary Wound Type: Pressure Injury  Location: Ischium  Wound Location Orientation: Right       [REMOVED] Wound 12/22/22 Other (comment) Buttocks Left (Removed)   Resolved Date: 01/29/24  Date First Assessed/Time First Assessed: 12/22/22 1104   Primary Wound Type: Other (comment)  Location: Buttocks  Wound Location Orientation: Left       [REMOVED] Wound 12/22/22 Pressure Injury Buttocks Right (Removed)   Resolved Date: 01/29/24  Date First Assessed/Time First Assessed: 12/22/22 1106   Primary Wound Type: Pressure Injury  Location: Buttocks  Wound Location Orientation: Right       [REMOVED] Wound 12/22/22 Pressure Injury Heel Right (Removed)   Resolved Date: 01/29/24  Date First Assessed/Time First Assessed: 12/22/22 1110   Primary Wound Type: Pressure Injury  Location: Heel  Wound Location Orientation: Right       [REMOVED] Wound 12/22/22 Pressure Injury Thigh Anterior;Proximal;Right (Removed)   Resolved Date:  01/29/24  Date First Assessed/Time First Assessed: 12/22/22 1112   Primary Wound Type: Pressure Injury  Location: Thigh  Wound Location Orientation: Anterior;Proximal;Right  Wound Description (Comments): BLEEDING EXCORIATED       [REMOVED] Wound 12/22/22 Pressure Injury Thigh Anterior;Left;Proximal (Removed)   Resolved Date: 01/29/24  Date First Assessed/Time First Assessed: 12/22/22 1112   Primary Wound Type: Pressure Injury  Location: Thigh  Wound Location Orientation: Anterior;Left;Proximal  Wound Description (Comments): BLEEDING EXCORIATED       [REMOVED] Wound 12/22/22 Vagina N/A (Removed)   Resolved Date: 01/29/24  Date First Assessed/Time First Assessed: 12/22/22 1313   Location: Vagina  Wound Location Orientation: N/A       [REMOVED] Wound 01/09/24 Right (Removed)   Resolved Date: 01/10/24  Date First Assessed/Time First Assessed: 01/09/24 1832   Wound Location Orientation: Right  Wound Outcome: Healed       [REMOVED] Wound 01/09/24 Flank Left (Removed)   Resolved Date: 01/10/24  Date First Assessed/Time First Assessed: 01/09/24 1835   Location: Flank  Wound Location Orientation: Left  Wound Outcome: Healed       [REMOVED] Wound 01/09/24 Arm Left;Posterior (Removed)   Resolved Date: 01/29/24  Date First Assessed/Time First Assessed: 01/09/24 1836   Location: Arm  Wound Location Orientation: Left;Posterior       Subjective:      .    F/u visit for stage III pressure ulcer of her left ischium and stage IV pressure ulcer of her right ischium.  Patient reports she has been having pain associated to her wound VAC.  Patient rates her pain a 6 or 7 on a scale of 0-10.  She has been taking ibuprofen twice a day which has been bringing her pain down to a 4 on a scale of 0-10.  Visiting nurses have been managing her wound with both white and black foam on 150 mmHg on continuous suction.  However, patient and  report that visiting nurses ran out of white foam to dressing changes ago and have been only using the  black foam on 125 mmHg of continuous suction.  Patient's  reports the visiting nurse felt her wound was improving with use of both white and black foam on 150 mmHg.  Patient has been following wound care recommendations for right ischial pressure ulcer.  She denies any fevers or chills.        The following portions of the patient's history were reviewed and updated as appropriate: She  has a past medical history of Anesthesia, Bladder stones, Chronic pain disorder, Contracture, right shoulder, Dependent on wheelchair, Edema, Elevated alkaline phosphatase level, Fernandez catheter in place, Gallbladder disease, History of femur fracture, History of UTI, MS (multiple sclerosis) ([facility]), Muscle spasticity, Muscle weakness, Muscular dystrophy ([facility]), Neck pain, Neurogenic bladder, Paralysis ([facility]), Port-A-Cath in place, Pressure injury of skin, Sacral pressure sore, Swallowing difficulty, Tinnitus, and Urinary incontinence..    Review of Systems   Constitutional: Negative.    HENT:  Negative for ear pain and hearing loss.    Eyes:  Negative for pain.   Respiratory:  Negative for chest tightness and shortness of breath.    Cardiovascular:  Negative for chest pain, palpitations and leg swelling.   Gastrointestinal:  Negative for diarrhea, nausea and vomiting.   Genitourinary:  Negative for dysuria.   Musculoskeletal:  Positive for gait problem.   Skin:  Positive for wound.   Neurological:  Positive for numbness. Negative for tremors and weakness.   Psychiatric/Behavioral:  Negative for behavioral problems, confusion and suicidal ideas.          Objective:       Wound 01/10/24 Pressure Injury Ischium Left;Posterior;Proximal (Active)   Wound Image Images linked 06/17/24 0941   Wound Description Granulation tissue;Yellow;Slough;LORIN 06/17/24 0849   Ruchi-wound Assessment Maceration;Pink 06/17/24 0849   Wound Length (cm) 1.7 cm 06/17/24 0849   Wound Width (cm) 1.8 cm 06/17/24 0849   Wound Depth (cm) 2.5 cm 06/17/24 0849   Wound  Surface Area (cm^2) 3.06 cm^2 06/17/24 0849   Wound Volume (cm^3) 7.65 cm^3 06/17/24 0849   Calculated Wound Volume (cm^3) 7.65 cm^3 06/17/24 0849   Undermining 1 5.3 06/17/24 0849   Undermining 1 is depth extending from 11-4 oclock, deepest at 11 oclock 06/17/24 0849   Drainage Description Tan;Serosanguineous;Bloody;Purulent 06/17/24 0849   Non-staged Wound Description Full thickness 06/17/24 0849   Dressing Wound V.A.C. 06/17/24 0849   Dressing Status Intact 06/17/24 0849       Wound 01/29/24 Pressure Injury Ischium Right (Active)   Wound Image Images linked 06/17/24 0845   Wound Description Granulation tissue;Slough 06/17/24 0848   Pressure Injury Stage 4 06/17/24 0848   Ruchi-wound Assessment Maceration 06/17/24 0848   Wound Length (cm) 1 cm 06/17/24 0848   Wound Width (cm) 1 cm 06/17/24 0848   Wound Surface Area (cm^2) 1 cm^2 06/17/24 0848       /78   Pulse (!) 115   Temp 98.1 °F (36.7 °C)   Resp 16                   Wound Instructions:  Orders Placed This Encounter   Procedures    Wound cleansing and dressings Pressure Injury Right Ischium     Right ischium WOUND:  Wound cleansing and dressings                                  Wash your hands with soap and water.  Remove old dressing, discard into plastic bag and place in trash.  Cleanse the wound with dakins as needed if odor is present, wound cleanser other days,  prior to applying a clean dressing. Do not use tissue or cotton balls. Do not scrub the wound. Pat dry using gauze.  Shower no      Apply Silver alginate rope/Aquacell AG rope to wound.  Cover with silicone border dressing  Change dressing every other day     VNA to change every other day,  to change other days if needed     Standing Status:   Future     Standing Expiration Date:   7/1/2024    Wound cleansing and dressings Pressure Injury Left;Posterior;Proximal Ischium     LEFT ischium  WOUND                           Wash your hands with soap and water.  Remove old dressing,  discard into plastic bag and place in trash.  Cleanse the wound with dakins as needed if odor is present, wound cleanser other days,  prior to applying a clean dressing. Do not use tissue or cotton balls. Do not scrub the wound. Pat dry using gauze.  Shower no      Negative Pressure Wound Therapy Instructions        Tuck a rectangle shaped piece of white foam into wound into 10-11 oclock undermining area  Apply black granufoam to wound bed  Set negative pressure on continuous at 150  Bridge to the side.  VAC dressing to be changed three times per week as ordered.     Standing Status:   Future     Standing Expiration Date:   7/1/2024    Wound Procedure Treatment Pressure Injury Right Ischium     This order was created via procedure documentation    Wound Procedure Treatment Pressure Injury Left;Posterior;Proximal Ischium     This order was created via procedure documentation    Negative Pressure Wound Therapy     This order was created via procedure documentation        Diagnosis ICD-10-CM Associated Orders   1. Pressure ulcer of ischium, left, stage III (HCC)  L89.323 lidocaine (XYLOCAINE) 4 % topical solution 5 mL     Wound cleansing and dressings Pressure Injury Right Ischium     Wound cleansing and dressings Pressure Injury Left;Posterior;Proximal Ischium     Wound Procedure Treatment Pressure Injury Left;Posterior;Proximal Ischium     Negative Pressure Wound Therapy      2. Pressure ulcer of right ischium, stage IV (HCC)  L89.314 lidocaine (XYLOCAINE) 4 % topical solution 5 mL     Wound cleansing and dressings Pressure Injury Right Ischium     Wound cleansing and dressings Pressure Injury Left;Posterior;Proximal Ischium     Wound Procedure Treatment Pressure Injury Right Ischium

## 2024-06-17 NOTE — PROGRESS NOTES
"Debridement   Wound 01/29/24 Pressure Injury Ischium Right    Universal Protocol:  Consent: Written consent obtained.  Consent given by: patient  Time out: Immediately prior to procedure a \"time out\" was called to verify the correct patient, procedure, equipment, support staff and site/side marked as required.  Timeout called at: 6/17/2024 1:03 PM.  Patient identity confirmed: verbally with patient    Debridement Details  Performed by: NP  Debridement type: selective  Pain control: lidocaine 4%      Post-debridement measurements  Length (cm): 1  Width (cm): 1  Depth (cm): 0.3  Percent debrided: 100%  Surface Area (cm^2): 1  Area Debrided (cm^2): 1  Volume (cm^3): 0.3    Devitalized tissue debrided: biofilm, fibrin and slough  Instrument(s) utilized: curette  Bleeding: small  Hemostasis obtained with: pressure  Procedural pain (0-10): 0  Post-procedural pain: 0   Response to treatment: procedure was tolerated well        "

## 2024-06-17 NOTE — PATIENT INSTRUCTIONS
Orders Placed This Encounter   Procedures    Wound cleansing and dressings Pressure Injury Right Ischium     Right ischium WOUND:  Wound cleansing and dressings                                  Wash your hands with soap and water.  Remove old dressing, discard into plastic bag and place in trash.  Cleanse the wound with dakins as needed if odor is present, wound cleanser other days,  prior to applying a clean dressing. Do not use tissue or cotton balls. Do not scrub the wound. Pat dry using gauze.  Shower no      Apply Silver alginate rope/Aquacell AG rope to wound.  Cover with silicone border dressing  Change dressing every other day     VNA to change every other day,  to change other days if needed     Standing Status:   Future     Standing Expiration Date:   7/1/2024    Wound cleansing and dressings Pressure Injury Left;Posterior;Proximal Ischium     LEFT ischium  WOUND                           Wash your hands with soap and water.  Remove old dressing, discard into plastic bag and place in trash.  Cleanse the wound with dakins as needed if odor is present, wound cleanser other days,  prior to applying a clean dressing. Do not use tissue or cotton balls. Do not scrub the wound. Pat dry using gauze.  Shower no      Negative Pressure Wound Therapy Instructions        Tuck a rectangle shaped piece of white foam into wound into 10-11 oclock undermining area  Apply black granufoam to wound bed  Set negative pressure on continuous at 150  Bridge to the side.  VAC dressing to be changed three times per week as ordered.     Standing Status:   Future     Standing Expiration Date:   7/1/2024

## 2024-06-19 ENCOUNTER — HOME CARE VISIT (OUTPATIENT)
Dept: HOME HEALTH SERVICES | Facility: HOME HEALTHCARE | Age: 54
End: 2024-06-19
Payer: MEDICARE

## 2024-06-19 VITALS
TEMPERATURE: 96.8 F | SYSTOLIC BLOOD PRESSURE: 106 MMHG | DIASTOLIC BLOOD PRESSURE: 68 MMHG | OXYGEN SATURATION: 99 % | HEART RATE: 96 BPM | RESPIRATION RATE: 18 BRPM

## 2024-06-19 PROCEDURE — G0299 HHS/HOSPICE OF RN EA 15 MIN: HCPCS

## 2024-06-21 ENCOUNTER — HOME CARE VISIT (OUTPATIENT)
Dept: HOME HEALTH SERVICES | Facility: HOME HEALTHCARE | Age: 54
End: 2024-06-21
Payer: MEDICARE

## 2024-06-21 VITALS
HEART RATE: 80 BPM | TEMPERATURE: 97.6 F | RESPIRATION RATE: 18 BRPM | SYSTOLIC BLOOD PRESSURE: 110 MMHG | DIASTOLIC BLOOD PRESSURE: 70 MMHG | OXYGEN SATURATION: 98 %

## 2024-06-21 PROCEDURE — G0299 HHS/HOSPICE OF RN EA 15 MIN: HCPCS

## 2024-06-24 ENCOUNTER — HOME CARE VISIT (OUTPATIENT)
Dept: HOME HEALTH SERVICES | Facility: HOME HEALTHCARE | Age: 54
End: 2024-06-24
Payer: MEDICARE

## 2024-06-24 ENCOUNTER — OFFICE VISIT (OUTPATIENT)
Dept: WOUND CARE | Facility: HOSPITAL | Age: 54
End: 2024-06-24
Payer: MEDICARE

## 2024-06-24 VITALS
HEART RATE: 112 BPM | DIASTOLIC BLOOD PRESSURE: 58 MMHG | RESPIRATION RATE: 16 BRPM | SYSTOLIC BLOOD PRESSURE: 92 MMHG | TEMPERATURE: 96.8 F

## 2024-06-24 DIAGNOSIS — L30.8 DERMATITIS ASSOCIATED WITH MOISTURE: ICD-10-CM

## 2024-06-24 DIAGNOSIS — G35 MULTIPLE SCLEROSIS (HCC): ICD-10-CM

## 2024-06-24 DIAGNOSIS — L89.323 PRESSURE ULCER OF ISCHIUM, LEFT, STAGE III (HCC): Primary | ICD-10-CM

## 2024-06-24 DIAGNOSIS — L89.314 PRESSURE ULCER OF RIGHT ISCHIUM, STAGE IV (HCC): ICD-10-CM

## 2024-06-24 PROCEDURE — 97605 NEG PRS WND THER DME<=50SQCM: CPT

## 2024-06-24 PROCEDURE — 11042 DBRDMT SUBQ TIS 1ST 20SQCM/<: CPT | Performed by: NURSE PRACTITIONER

## 2024-06-24 PROCEDURE — 97597 DBRDMT OPN WND 1ST 20 CM/<: CPT | Performed by: NURSE PRACTITIONER

## 2024-06-24 PROCEDURE — 99213 OFFICE O/P EST LOW 20 MIN: CPT | Performed by: NURSE PRACTITIONER

## 2024-06-24 RX ORDER — LIDOCAINE 40 MG/G
CREAM TOPICAL ONCE
Status: COMPLETED | OUTPATIENT
Start: 2024-06-24 | End: 2024-06-24

## 2024-06-24 RX ADMIN — LIDOCAINE: 40 CREAM TOPICAL at 09:14

## 2024-06-24 NOTE — PROGRESS NOTES
"Debridement    Universal Protocol:  Consent: Written consent obtained.  Consent given by: patient  Time out: Immediately prior to procedure a \"time out\" was called to verify the correct patient, procedure, equipment, support staff and site/side marked as required.  Timeout called at: 6/24/2024 9:48 AM.  Patient identity confirmed: verbally with patient    Debridement Details  Performed by: NP  Debridement type: selective  Pain control: lidocaine 4%      Post-debridement measurements  Length (cm): 0.6  Width (cm): 1.2  Depth (cm): 0.6  Percent debrided: 100%  Surface Area (cm^2): 0.72  Area Debrided (cm^2): 0.72  Volume (cm^3): 0.43    Devitalized tissue debrided: biofilm, fibrin and slough  Instrument(s) utilized: curette  Bleeding: small  Hemostasis obtained with: pressure  Procedural pain (0-10): 0  Post-procedural pain: 0   Response to treatment: procedure was tolerated well        "

## 2024-06-24 NOTE — PROGRESS NOTES
Wound Procedure Treatment Pressure Injury Right Ischium    Performed by: Park Landa RN  Authorized by: CLEMENTINA Vincent    Associated wounds:   Wound 01/29/24 Pressure Injury Ischium Right  Applied primary dressing:  Calcium alginate and Silver  Applied secondary dressing:  Foam    Silver alginate applied followed by silicone bordered foam

## 2024-06-24 NOTE — PROGRESS NOTES
Negative Pressure Wound Therapy    Performed by: Park Landa RN  Authorized by: CLEMENTINA Vincent  Universal Protocol:  Risks and benefits: risks, benefits and alternatives were discussed  Patient understanding: patient states understanding of the procedure being performed  Patient identity confirmed: verbally with patient    Performed by::  Clinician  Type::  KCI  Coverage Size (sq cm)::  4  Pressure Type::  Constant  Pressure Setting::  150 mmHG  Sponge Type::  Combination  Total Number Of Sponges Removed Prior To Application::  2 (1 piece white and 1 piece black)  Total Number Of Sponges Used For This Application::  2 (1 piece white and 1 piece black)

## 2024-06-24 NOTE — PATIENT INSTRUCTIONS
Orders Placed This Encounter   Procedures    Wound cleansing and dressings     Right ischium WOUND:                                 Wash your hands with soap and water.  Remove old dressing, discard into plastic bag and place in trash.  Cleanse the wound with dakins as needed if odor is present, wound cleanser other days,  prior to applying a clean dressing. Do not use tissue or cotton balls. Do not scrub the wound. Pat dry using gauze.  Shower no      Apply Silver alginate rope/Aquacell AG rope to wound.  Cover with silicone border dressing  Change dressing every other day     VNA to change every other day,  to change other days if needed          ·      LEFT ischium  WOUND                           Wash your hands with soap and water.  Remove old dressing, discard into plastic bag and place in trash.  Cleanse the wound with dakins as needed if odor is present, wound cleanser other days,  prior to applying a clean dressing. Do not use tissue or cotton balls. Do not scrub the wound. Pat dry using gauze.  Shower no      Negative Pressure Wound Therapy Instructions        Tuck a rectangle shaped piece of white foam into wound into 10-11 oclock undermining area  Apply black granufoam to wound bed  Set negative pressure on continuous at 150  Bridge to the side.  VAC dressing to be changed three times per week as ordered.      IF UNABLE TO OBTAIN BLACK FOAM --PERFORM SAME WOUND CARE TO LEFT ISCHIUM AS RIGHT ISCHIUM UNTIL IT ARRIVES       Left groin Wound    Cleanse area with mild soap and water. (Recommend unscented dove). Pat dry  Apply triad paste to area 2 times a day and as needed     Standing Status:   Future     Standing Expiration Date:   7/8/2024

## 2024-06-24 NOTE — PROGRESS NOTES
Wound Procedure Treatment MASD Left;Posterior;Proximal Groin    Performed by: Park Landa RN  Authorized by: CLEMENTINA Vincent    Associated wounds:   Wound 06/24/24 MASD Groin Left;Posterior;Proximal  Wound cleansed with:  Soap and water  Applied to periwound:  Other    Triad paste applied

## 2024-06-24 NOTE — PROGRESS NOTES
"Patient ID: Fanny Schulz is a 54 y.o. female Date of Birth 1970     Chief Complaint  Chief Complaint   Patient presents with    Follow Up Wound Care Visit     Nonhealing right and left ischial wounds. VAC on left ischial wound on arrival.       Allergies  Latex    Assessment:     Diagnoses and all orders for this visit:    Pressure ulcer of ischium, left, stage III (HCC)  -     Wound cleansing and dressings; Future  -     lidocaine (LMX) 4 % cream  -     Wound Procedure Treatment Pressure Injury Left;Posterior;Proximal Ischium  -     Negative Pressure Wound Therapy    Pressure ulcer of right ischium, stage IV (HCC)  -     Wound cleansing and dressings; Future  -     lidocaine (LMX) 4 % cream  -     Wound Procedure Treatment Pressure Injury Right Ischium    Dermatitis associated with moisture  -     Wound cleansing and dressings; Future  -     Wound Procedure Treatment MASD Left;Posterior;Proximal Groin    Multiple sclerosis (HCC)              Debridement   Wound 01/10/24 Pressure Injury Ischium Left;Posterior;Proximal    Universal Protocol:  Consent: Written consent obtained.  Consent given by: patient  Time out: Immediately prior to procedure a \"time out\" was called to verify the correct patient, procedure, equipment, support staff and site/side marked as required.  Timeout called at: 6/24/2024 9:47 AM.  Patient identity confirmed: verbally with patient    Debridement Details  Performed by: NP  Debridement type: surgical  Level of debridement: subcutaneous tissue  Pain control: lidocaine 4%      Post-debridement measurements  Length (cm): 2  Width (cm): 2  Depth (cm): 2.6  Percent debrided: 100%  Surface Area (cm^2): 4  Area Debrided (cm^2): 4  Volume (cm^3): 10.4    Tissue and other material debrided: subcutaneous tissue  Devitalized tissue debrided: biofilm, fibrin and slough  Instrument(s) utilized: curette  Bleeding: small  Hemostasis obtained with: pressure  Procedural pain (0-10): 0  Post-procedural " pain: 0   Response to treatment: procedure was tolerated well        Plan:  1.  F/U visit.  Wounds debrided.  Wounds measuring larger but have clean granular wound bases.    2.  Will continue wound VAC to left ischial wound.  Black foam is to be applied to the deepest undermined area at 10-11 o'clock.  Please cut white foam and rectangle and tuck white foam at an angle into undermined area.  Black foam is to then be applied into wound bed and wound VAC is to be bridged to her left hip or thigh change 3 times a week on 150 mmHg continuous suction.    3.  If [facility]NA is unable to obtain black foam by her next wound VAC change on 6/26, they are to put the wound VAC on hold and to gently packed the wound with Aquacel Ag rope covered with a silicone bordered foam dressing change 3 times a week until black foam can be obtained.    4.  Will continue to apply Aquacel Ag rope to right ischial wound covered with silicone bordered foam dressing change 3 times a week    5.  Patient has new partial-thickness wound of her left posterior groin fold due to MASD.  Wound not show any signs symptoms of infection.  Will have Triad paste applied to wound twice daily and as needed    6.  Continue frequent offloading of pressure from wound    7.  Patient will follow-up in 2 weeks     Wound 01/10/24 Pressure Injury Ischium Left;Posterior;Proximal (Active)   Wound Image Images linked 06/24/24 0923   Wound Description Pink;Granulation tissue;Slough;Yellow 06/24/24 0907   Pressure Injury Stage 3 06/24/24 0907   Ruchi-wound Assessment Pink 06/24/24 0907   Wound Length (cm) 2 cm 06/24/24 0907   Wound Width (cm) 2 cm 06/24/24 0907   Wound Depth (cm) 2.5 cm 06/24/24 0907   Wound Surface Area (cm^2) 4 cm^2 06/24/24 0907   Wound Volume (cm^3) 10 cm^3 06/24/24 0907   Calculated Wound Volume (cm^3) 10 cm^3 06/24/24 0907   Undermining 1 5 06/24/24 0907   Undermining 1 is depth extending from 11-4 oclcock, deepest at 11 oclock 06/24/24 0907   Drainage Amount  Large 06/24/24 0907   Drainage Description Serosanguineous 06/24/24 0907   Non-staged Wound Description Full thickness 06/24/24 0907   Dressing Wound V.A.C. 06/24/24 0907   Packing- # removed 2 (1 piece white foam, 1 piece black foam) 06/24/24 0907   Dressing Status Intact 06/24/24 0907       Wound 01/29/24 Pressure Injury Ischium Right (Active)   Wound Image Images linked 06/24/24 0904   Wound Description Pink 06/24/24 0910   Pressure Injury Stage 4 06/24/24 0910   Ruchi-wound Assessment Maceration 06/24/24 0910   Wound Length (cm) 0.6 cm 06/24/24 0910   Wound Width (cm) 1.2 cm 06/24/24 0910   Wound Depth (cm) 0.6 cm 06/24/24 0910   Wound Surface Area (cm^2) 0.72 cm^2 06/24/24 0910   Wound Volume (cm^3) 0.432 cm^3 06/24/24 0910   Calculated Wound Volume (cm^3) 0.43 cm^3 06/24/24 0910   Change in Wound Size % 14 06/24/24 0910   Undermining 1 0.5 06/24/24 0910   Undermining 1 is depth extending from 11-1 oclock 06/24/24 0910   Drainage Amount Moderate 06/24/24 0910   Drainage Description Serosanguineous 06/24/24 0910   Non-staged Wound Description Full thickness 06/24/24 0910   Dressing Status Intact 06/24/24 0910       Wound 06/24/24 MASD Groin Left;Posterior;Proximal (Active)   Wound Image Images linked 06/24/24 0904   Wound Description Pink 06/24/24 0905   Pressure Injury Stage 2 06/24/24 0905   Ruchi-wound Assessment Intact 06/24/24 0905   Wound Length (cm) 0.5 cm 06/24/24 0905   Wound Width (cm) 0.7 cm 06/24/24 0905   Wound Depth (cm) 0.1 cm 06/24/24 0905   Wound Surface Area (cm^2) 0.35 cm^2 06/24/24 0905   Wound Volume (cm^3) 0.035 cm^3 06/24/24 0905   Calculated Wound Volume (cm^3) 0.04 cm^3 06/24/24 0905   Drainage Amount Scant 06/24/24 0905   Drainage Description Serosanguineous 06/24/24 0905   Dressing Status Other (Comment) (luis carlos upon visit) 06/24/24 0905       Wound 01/10/24 Pressure Injury Ischium Left;Posterior;Proximal (Active)   Date First Assessed/Time First Assessed: 01/10/24 0956   Primary Wound  Type: Pressure Injury  Location: Ischium  Wound Location Orientation: Left;Posterior;Proximal       Wound 01/29/24 Pressure Injury Ischium Right (Active)   Date First Assessed: 01/29/24   Present on Original Admission: Yes  Primary Wound Type: Pressure Injury  Location: Ischium  Wound Location Orientation: Right       Wound 06/24/24 MASD Groin Left;Posterior;Proximal (Active)   Date First Assessed: 06/24/24   Present on Original Admission: Yes  Primary Wound Type: MASD  Location: Groin  Wound Location Orientation: Left;Posterior;Proximal       [REMOVED] Wound 07/29/21 Pressure Injury Ischium Right (Removed)   Resolved Date: 08/02/22  Date First Assessed/Time First Assessed: 07/29/21 1536   Pre-Existing Wound: Yes  Primary Wound Type: Pressure Injury  Location: Ischium  Wound Location Orientation: Right       [REMOVED] Wound 12/22/22 Other (comment) Buttocks Left (Removed)   Resolved Date: 01/29/24  Date First Assessed/Time First Assessed: 12/22/22 1104   Primary Wound Type: Other (comment)  Location: Buttocks  Wound Location Orientation: Left       [REMOVED] Wound 12/22/22 Pressure Injury Buttocks Right (Removed)   Resolved Date: 01/29/24  Date First Assessed/Time First Assessed: 12/22/22 1106   Primary Wound Type: Pressure Injury  Location: Buttocks  Wound Location Orientation: Right       [REMOVED] Wound 12/22/22 Pressure Injury Heel Right (Removed)   Resolved Date: 01/29/24  Date First Assessed/Time First Assessed: 12/22/22 1110   Primary Wound Type: Pressure Injury  Location: Heel  Wound Location Orientation: Right       [REMOVED] Wound 12/22/22 Pressure Injury Thigh Anterior;Proximal;Right (Removed)   Resolved Date: 01/29/24  Date First Assessed/Time First Assessed: 12/22/22 1112   Primary Wound Type: Pressure Injury  Location: Thigh  Wound Location Orientation: Anterior;Proximal;Right  Wound Description (Comments): BLEEDING EXCORIATED       [REMOVED] Wound 12/22/22 Pressure Injury Thigh Anterior;Left;Proximal  (Removed)   Resolved Date: 01/29/24  Date First Assessed/Time First Assessed: 12/22/22 1112   Primary Wound Type: Pressure Injury  Location: Thigh  Wound Location Orientation: Anterior;Left;Proximal  Wound Description (Comments): BLEEDING EXCORIATED       [REMOVED] Wound 12/22/22 Vagina N/A (Removed)   Resolved Date: 01/29/24  Date First Assessed/Time First Assessed: 12/22/22 1313   Location: Vagina  Wound Location Orientation: N/A       [REMOVED] Wound 01/09/24 Right (Removed)   Resolved Date: 01/10/24  Date First Assessed/Time First Assessed: 01/09/24 1832   Wound Location Orientation: Right  Wound Outcome: Healed       [REMOVED] Wound 01/09/24 Flank Left (Removed)   Resolved Date: 01/10/24  Date First Assessed/Time First Assessed: 01/09/24 1835   Location: Flank  Wound Location Orientation: Left  Wound Outcome: Healed       [REMOVED] Wound 01/09/24 Arm Left;Posterior (Removed)   Resolved Date: 01/29/24  Date First Assessed/Time First Assessed: 01/09/24 1836   Location: Arm  Wound Location Orientation: Left;Posterior       Subjective:      .    F/u visit for stage III pressure injury of her left ischium and stage IV pressure injury of her right ischium.  Patient's  reports Fanny developed a new wound of her left posterior groin fold.  He has been cleansing the wound with soap and water daily and keeping the wound open to air.  Patient's  also reports that [facility] has informed them that the black foam used for her wound VAC is currently on backorder.  Patient arrived to wound center today with white foam only applied into wound and black foam applied on top of wound.  Patient reports the pain she was previously experiencing due to the wound VAC has greatly improved.  She denies any fevers or chills.        The following portions of the patient's history were reviewed and updated as appropriate: She  has a past medical history of Anesthesia, Bladder stones, Chronic pain disorder, Contracture, right  shoulder, Dependent on wheelchair, Edema, Elevated alkaline phosphatase level, Fernandez catheter in place, Gallbladder disease, History of femur fracture, History of UTI, MS (multiple sclerosis) ([facility]), Muscle spasticity, Muscle weakness, Muscular dystrophy ([facility]), Neck pain, Neurogenic bladder, Paralysis ([facility]), Port-A-Cath in place, Pressure injury of skin, Sacral pressure sore, Swallowing difficulty, Tinnitus, and Urinary incontinence.  She   Patient Active Problem List    Diagnosis Date Noted    Chronic lower extremity edema 01/08/2024    Tinnitus of both ears 11/21/2023    Hypophonia 11/21/2023    Peripheral edema 02/20/2023    Continuous opioid dependence ([facility]) 09/08/2022    Increased endometrial stripe thickness 03/10/2021    Ureteral calculus, left 11/03/2020    Alkaline phosphatase elevation 10/28/2020    Abnormal thyroid function test 10/28/2020    Candidal vaginitis 10/19/2020    Hydronephrosis with urinary obstruction due to ureteral calculus 10/02/2020    Thrombocytopenia ([facility]) 10/01/2020    Serum total bilirubin elevated 10/01/2020    Medication management contract signed 02/21/2020    Screening mammogram, encounter for 05/29/2019    Allergic rhinitis 03/26/2018    Functional quadriplegia secondary to MS ([facility]) 01/25/2018    Suprapubic catheter ([facility]) 11/17/2017    Nephrolithiasis 04/22/2016    Bladder calculus 09/02/2015    Muscle spasticity 04/21/2015    Vitamin B12 deficiency 01/16/2015    Constipation 01/14/2015    Hyperglycemia 11/25/2014    Familial hypercholesterolemia 11/25/2014    Recurrent major depressive disorder, in full remission ([facility]) 01/22/2014    Lymphedema 01/22/2014    Spinal stenosis of cervical region 01/22/2014    Urinary incontinence 01/22/2014    Vitamin D deficiency 11/18/2013    Dysphagia 11/04/2013    Dizziness 11/04/2013    Muscle weakness 11/04/2013    Neurogenic bladder 11/04/2013    Sleep disorder 11/04/2013    Tremor 11/04/2013    Vision loss 11/04/2013    Multiple sclerosis  ([facility]) 10/21/2013     She  has a past surgical history that includes Cholecystectomy; Kidney stone surgery; Portacath placement (Left); Esophagogastroduodenoscopy; Bladder stone removal (N/A, 12/4/2017); Bladder surgery; Suprapubic cystostomy (02/25/2014); Cystoscopy (06/15/2020); [place] retrograde pyelogram (10/2/2020); pr cysto bladder w/ureteral catheterization (Left, 10/2/2020); pr cysto/uretero w/lithotripsy &indwell stent insrt (Left, 11/3/2020); [place] retrograde pyelogram (11/3/2020); and pr litholapaxy smpl/sm <2.5 cm (N/A, 12/22/2022).  Her family history includes Alzheimer's disease in her family; COPD in her father; Diabetes in her family and mother; Heart disease in her family; Hyperlipidemia in her mother and sister; Hypertension in her family, father, and mother.  She  reports that she has never smoked. She has never used smokeless tobacco. She reports that she does not currently use alcohol. She reports that she does not use drugs.  Current Outpatient Medications   Medication Sig Dispense Refill    baclofen 20 mg tablet TAKE 1 TABLET BY MOUTH 5 TIMES AS NEEDED (Patient taking differently: TAKE 1 TABLET BY MOUTH 5 TIMES AS NEEDED for muscle spasms) 450 tablet 3    clotrimazole (LOTRIMIN) 1 % cream Apply topically 2 (two) times a day as needed (yeast rash) (Patient not taking: Reported on 11/21/2023) 30 g 0    DULoxetine (CYMBALTA) 20 mg capsule TAKE 1 CAPSULE BY MOUTH EVERY DAY 30 capsule 11    furosemide (LASIX) 20 mg tablet Take 1 tablet (20 mg total) by mouth daily 30 tablet 5    gabapentin (NEURONTIN) 100 mg capsule Take 1 tablet at bedtime tonight, 1 tablet twice a day tomorrow, and then 1 tablet 3 times daily thereafter (Patient taking differently: Take 100 mg by mouth 3 (three) times a day. Indications: Neuropathic Pain) 90 capsule 5    oxybutynin (DITROPAN-XL) 10 MG 24 hr tablet TAKE 1 TABLET BY MOUTH EVERY DAY 90 tablet 1    rosuvastatin (CRESTOR) 5 mg tablet TAKE 1 TABLET (5 MG TOTAL) BY MOUTH DAILY.  90 tablet 1     No current facility-administered medications for this visit.     She is allergic to latex..    Review of Systems   Constitutional: Negative.    HENT:  Negative for ear pain and hearing loss.    Eyes:  Negative for pain.   Respiratory:  Negative for chest tightness and shortness of breath.    Cardiovascular:  Negative for chest pain, palpitations and leg swelling.   Gastrointestinal:  Negative for diarrhea, nausea and vomiting.   Genitourinary:  Negative for dysuria.   Musculoskeletal:  Positive for gait problem.   Skin:  Positive for wound.   Neurological:  Positive for numbness. Negative for tremors and weakness.   Psychiatric/Behavioral:  Negative for behavioral problems, confusion and suicidal ideas.          Objective:       Wound 01/10/24 Pressure Injury Ischium Left;Posterior;Proximal (Active)   Wound Image Images linked 06/24/24 0923   Wound Description Pink;Granulation tissue;Slough;Yellow 06/24/24 0907   Pressure Injury Stage 3 06/24/24 0907   Ruchi-wound Assessment Pink 06/24/24 0907   Wound Length (cm) 2 cm 06/24/24 0907   Wound Width (cm) 2 cm 06/24/24 0907   Wound Depth (cm) 2.5 cm 06/24/24 0907   Wound Surface Area (cm^2) 4 cm^2 06/24/24 0907   Wound Volume (cm^3) 10 cm^3 06/24/24 0907   Calculated Wound Volume (cm^3) 10 cm^3 06/24/24 0907   Undermining 1 5 06/24/24 0907   Undermining 1 is depth extending from 11-4 oclcock, deepest at 11 oclock 06/24/24 0907   Drainage Amount Large 06/24/24 0907   Drainage Description Serosanguineous 06/24/24 0907   Non-staged Wound Description Full thickness 06/24/24 0907   Dressing Wound V.A.C. 06/24/24 0907   Packing- # removed 2 (1 piece white foam, 1 piece black foam) 06/24/24 0907   Dressing Status Intact 06/24/24 0907       Wound 01/29/24 Pressure Injury Ischium Right (Active)   Wound Image Images linked 06/24/24 0904   Wound Description Pink 06/24/24 0910   Pressure Injury Stage 4 06/24/24 0910   Ruchi-wound Assessment Maceration 06/24/24 0910    Wound Length (cm) 0.6 cm 06/24/24 0910   Wound Width (cm) 1.2 cm 06/24/24 0910   Wound Depth (cm) 0.6 cm 06/24/24 0910   Wound Surface Area (cm^2) 0.72 cm^2 06/24/24 0910   Wound Volume (cm^3) 0.432 cm^3 06/24/24 0910   Calculated Wound Volume (cm^3) 0.43 cm^3 06/24/24 0910   Change in Wound Size % 14 06/24/24 0910   Undermining 1 0.5 06/24/24 0910   Undermining 1 is depth extending from 11-1 oclock 06/24/24 0910   Drainage Amount Moderate 06/24/24 0910   Drainage Description Serosanguineous 06/24/24 0910   Non-staged Wound Description Full thickness 06/24/24 0910   Dressing Status Intact 06/24/24 0910       Wound 06/24/24 MASD Groin Left;Posterior;Proximal (Active)   Wound Image Images linked 06/24/24 0904   Wound Description Pink 06/24/24 0905   Pressure Injury Stage 2 06/24/24 0905   Ruchi-wound Assessment Intact 06/24/24 0905   Wound Length (cm) 0.5 cm 06/24/24 0905   Wound Width (cm) 0.7 cm 06/24/24 0905   Wound Depth (cm) 0.1 cm 06/24/24 0905   Wound Surface Area (cm^2) 0.35 cm^2 06/24/24 0905   Wound Volume (cm^3) 0.035 cm^3 06/24/24 0905   Calculated Wound Volume (cm^3) 0.04 cm^3 06/24/24 0905   Drainage Amount Scant 06/24/24 0905   Drainage Description Serosanguineous 06/24/24 0905   Dressing Status Other (Comment) (luis carlos upon visit) 06/24/24 0905       BP 92/58   Pulse (!) 112   Temp (!) 96.8 °F (36 °C)   Resp 16                       Wound Instructions:  Orders Placed This Encounter   Procedures    Wound cleansing and dressings     Right ischium WOUND:                                 Wash your hands with soap and water.  Remove old dressing, discard into plastic bag and place in trash.  Cleanse the wound with dakins as needed if odor is present, wound cleanser other days,  prior to applying a clean dressing. Do not use tissue or cotton balls. Do not scrub the wound. Pat dry using gauze.  Shower no      Apply Silver alginate rope/Aquacell AG rope to wound.  Cover with silicone border dressing  Change  dressing every other day     [facility]NA to change every other day,  to change other days if needed          ·      LEFT ischium  WOUND                           Wash your hands with soap and water.  Remove old dressing, discard into plastic bag and place in trash.  Cleanse the wound with dakins as needed if odor is present, wound cleanser other days,  prior to applying a clean dressing. Do not use tissue or cotton balls. Do not scrub the wound. Pat dry using gauze.  Shower no      Negative Pressure Wound Therapy Instructions        Tuck a rectangle shaped piece of white foam into wound into 10-11 oclock undermining area  Apply black granufoam to wound bed  Set negative pressure on continuous at 150  Bridge to the side.  VAC dressing to be changed three times per week as ordered.      IF UNABLE TO OBTAIN BLACK FOAM --PERFORM SAME WOUND CARE TO LEFT ISCHIUM AS RIGHT ISCHIUM UNTIL IT ARRIVES       Left groin Wound    Cleanse area with mild soap and water. (Recommend unscented dove). Pat dry  Apply triad paste to area 2 times a day and as needed     Standing Status:   Future     Standing Expiration Date:   7/8/2024    Wound Procedure Treatment MASD Left;Posterior;Proximal Groin     This order was created via procedure documentation    Wound Procedure Treatment Pressure Injury Left;Posterior;Proximal Ischium     This order was created via procedure documentation    Negative Pressure Wound Therapy     This order was created via procedure documentation    Wound Procedure Treatment Pressure Injury Right Ischium     This order was created via procedure documentation        Diagnosis ICD-10-CM Associated Orders   1. Pressure ulcer of ischium, left, stage III (HCC)  L89.323 Wound cleansing and dressings     lidocaine (LMX) 4 % cream     Wound Procedure Treatment Pressure Injury Left;Posterior;Proximal Ischium     Negative Pressure Wound Therapy      2. Pressure ulcer of right ischium, stage IV (HCC)  L89.314 Wound cleansing  and dressings     lidocaine (LMX) 4 % cream     Wound Procedure Treatment Pressure Injury Right Ischium      3. Dermatitis associated with moisture  L30.8 Wound cleansing and dressings     Wound Procedure Treatment MASD Left;Posterior;Proximal Groin

## 2024-06-24 NOTE — PROGRESS NOTES
Wound Procedure Treatment Pressure Injury Left;Posterior;Proximal Ischium    Performed by: Park Landa RN  Authorized by: CLEMENTINA Vincent    Associated wounds:   Wound 01/10/24 Pressure Injury Ischium Left;Posterior;Proximal  Wound cleansed with:  Soap and water  Verbal consent obtained?: Yes    Risks and benefits: Risks, benefits and alternatives were discussed    Consent given by:  Patient  Patient states understanding of procedure being performed: Yes    Patient identity confirmed:  Verbally with patient  Negative Pressure Wound Therapy:     Performed by::  Clinician    Type::  ISAURA    Coverage Size (sq cm)::  4    Pressure Type::  Constant    Pressure Setting::  150 mmHG    Sponge Type::  Combination    Total Number Of Sponges Removed Prior To Application::  2 (1 piece of white and 1 piece of black foam)    Total Number Of Sponges Used For This Application::  2 (1 piece of white and 1 piece of black foam)

## 2024-06-26 ENCOUNTER — HOME CARE VISIT (OUTPATIENT)
Dept: HOME HEALTH SERVICES | Facility: HOME HEALTHCARE | Age: 54
End: 2024-06-26
Payer: MEDICARE

## 2024-06-26 VITALS
DIASTOLIC BLOOD PRESSURE: 70 MMHG | SYSTOLIC BLOOD PRESSURE: 122 MMHG | RESPIRATION RATE: 18 BRPM | HEART RATE: 68 BPM | TEMPERATURE: 97.9 F

## 2024-06-26 PROCEDURE — G0299 HHS/HOSPICE OF RN EA 15 MIN: HCPCS

## 2024-06-28 ENCOUNTER — HOME CARE VISIT (OUTPATIENT)
Dept: HOME HEALTH SERVICES | Facility: HOME HEALTHCARE | Age: 54
End: 2024-06-28
Payer: MEDICARE

## 2024-06-28 VITALS
SYSTOLIC BLOOD PRESSURE: 102 MMHG | OXYGEN SATURATION: 95 % | HEART RATE: 92 BPM | TEMPERATURE: 98.2 F | DIASTOLIC BLOOD PRESSURE: 64 MMHG

## 2024-06-28 PROCEDURE — G0299 HHS/HOSPICE OF RN EA 15 MIN: HCPCS

## 2024-07-01 ENCOUNTER — HOME CARE VISIT (OUTPATIENT)
Dept: HOME HEALTH SERVICES | Facility: HOME HEALTHCARE | Age: 54
End: 2024-07-01
Payer: MEDICARE

## 2024-07-01 VITALS
HEART RATE: 104 BPM | SYSTOLIC BLOOD PRESSURE: 102 MMHG | RESPIRATION RATE: 20 BRPM | TEMPERATURE: 97.4 F | DIASTOLIC BLOOD PRESSURE: 50 MMHG | OXYGEN SATURATION: 99 %

## 2024-07-01 PROCEDURE — G0299 HHS/HOSPICE OF RN EA 15 MIN: HCPCS

## 2024-07-03 ENCOUNTER — HOME CARE VISIT (OUTPATIENT)
Dept: HOME HEALTH SERVICES | Facility: HOME HEALTHCARE | Age: 54
End: 2024-07-03
Payer: MEDICARE

## 2024-07-03 VITALS
TEMPERATURE: 98.6 F | HEART RATE: 100 BPM | RESPIRATION RATE: 24 BRPM | OXYGEN SATURATION: 100 % | SYSTOLIC BLOOD PRESSURE: 100 MMHG | DIASTOLIC BLOOD PRESSURE: 62 MMHG

## 2024-07-03 PROCEDURE — G0299 HHS/HOSPICE OF RN EA 15 MIN: HCPCS

## 2024-07-05 ENCOUNTER — HOME CARE VISIT (OUTPATIENT)
Dept: HOME HEALTH SERVICES | Facility: HOME HEALTHCARE | Age: 54
End: 2024-07-05
Payer: MEDICARE

## 2024-07-05 VITALS
DIASTOLIC BLOOD PRESSURE: 60 MMHG | OXYGEN SATURATION: 97 % | SYSTOLIC BLOOD PRESSURE: 98 MMHG | TEMPERATURE: 97.8 F | HEART RATE: 92 BPM

## 2024-07-05 PROCEDURE — G0299 HHS/HOSPICE OF RN EA 15 MIN: HCPCS

## 2024-07-08 ENCOUNTER — HOME CARE VISIT (OUTPATIENT)
Dept: HOME HEALTH SERVICES | Facility: HOME HEALTHCARE | Age: 54
End: 2024-07-08
Payer: MEDICARE

## 2024-07-08 ENCOUNTER — OFFICE VISIT (OUTPATIENT)
Dept: WOUND CARE | Facility: HOSPITAL | Age: 54
End: 2024-07-08
Payer: COMMERCIAL

## 2024-07-08 VITALS
DIASTOLIC BLOOD PRESSURE: 60 MMHG | RESPIRATION RATE: 16 BRPM | TEMPERATURE: 97.6 F | SYSTOLIC BLOOD PRESSURE: 100 MMHG | HEART RATE: 82 BPM

## 2024-07-08 DIAGNOSIS — L89.314 PRESSURE ULCER OF RIGHT ISCHIUM, STAGE IV (HCC): ICD-10-CM

## 2024-07-08 DIAGNOSIS — L89.323 PRESSURE ULCER OF ISCHIUM, LEFT, STAGE III (HCC): Primary | ICD-10-CM

## 2024-07-08 PROCEDURE — 97605 NEG PRS WND THER DME<=50SQCM: CPT

## 2024-07-08 PROCEDURE — 97597 DBRDMT OPN WND 1ST 20 CM/<: CPT | Performed by: NURSE PRACTITIONER

## 2024-07-08 PROCEDURE — 11042 DBRDMT SUBQ TIS 1ST 20SQCM/<: CPT | Performed by: NURSE PRACTITIONER

## 2024-07-08 RX ORDER — LIDOCAINE HYDROCHLORIDE 40 MG/ML
5 SOLUTION TOPICAL ONCE
Status: COMPLETED | OUTPATIENT
Start: 2024-07-08 | End: 2024-07-08

## 2024-07-08 RX ADMIN — LIDOCAINE HYDROCHLORIDE 5 ML: 40 SOLUTION TOPICAL at 11:45

## 2024-07-08 NOTE — PROGRESS NOTES
"Debridement   Wound 01/29/24 Pressure Injury Ischium Right    Universal Protocol:  Consent: Written consent obtained.  Consent given by: patient  Time out: Immediately prior to procedure a \"time out\" was called to verify the correct patient, procedure, equipment, support staff and site/side marked as required.  Timeout called at: 7/8/2024 2:18 PM.  Patient identity confirmed: verbally with patient    Debridement Details  Performed by: NP  Debridement type: selective  Pain control: lidocaine 4%      Post-debridement measurements  Length (cm): 1  Width (cm): 1.5  Depth (cm): 0.5  Percent debrided: 100%  Surface Area (cm^2): 1.5  Area Debrided (cm^2): 1.5  Volume (cm^3): 0.75    Devitalized tissue debrided: biofilm, fibrin and slough  Instrument(s) utilized: curette  Bleeding: small  Hemostasis obtained with: pressure  Procedural pain (0-10): 0  Post-procedural pain: 0   Response to treatment: procedure was tolerated well        "

## 2024-07-08 NOTE — PROGRESS NOTES
Wound Procedure Treatment Pressure Injury Right Ischium    Performed by: Park Landa RN  Authorized by: CLEMENTINA Vincent    Associated wounds:   Wound 01/29/24 Pressure Injury Ischium Right  Wound cleansed with:  NSS  Applied primary dressing:  Silicone bordered foam and Silver    Aquacel ag applied to wound followed by silicone bordered foam

## 2024-07-08 NOTE — PROGRESS NOTES
"Patient ID: Fanny Schulz is a 54 y.o. female Date of Birth 1970     Chief Complaint  Chief Complaint   Patient presents with    Follow Up Wound Care Visit     Pressure ulcer ischium. VAC on upon arrival today.       Allergies  Latex    Assessment:     Diagnoses and all orders for this visit:    Pressure ulcer of ischium, left, stage III (HCC)  -     Wound cleansing and dressings; Future  -     lidocaine (XYLOCAINE) 4 % topical solution 5 mL  -     Negative Pressure Wound Therapy    Pressure ulcer of right ischium, stage IV (HCC)  -     Wound cleansing and dressings; Future  -     lidocaine (XYLOCAINE) 4 % topical solution 5 mL  -     Wound Procedure Treatment Pressure Injury Right Ischium              Debridement   Wound 01/10/24 Pressure Injury Ischium Left;Posterior;Proximal    Universal Protocol:  Consent: Written consent obtained.  Consent given by: patient  Time out: Immediately prior to procedure a \"time out\" was called to verify the correct patient, procedure, equipment, support staff and site/side marked as required.  Timeout called at: 7/8/2024 2:17 PM.  Patient identity confirmed: verbally with patient    Debridement Details  Performed by: NP  Debridement type: surgical  Level of debridement: subcutaneous tissue  Pain control: lidocaine 4%      Post-debridement measurements  Length (cm): 2.8  Width (cm): 2.1  Depth (cm): 1.7  Percent debrided: 100%  Surface Area (cm^2): 5.88  Area Debrided (cm^2): 5.88  Volume (cm^3): 10    Tissue and other material debrided: subcutaneous tissue  Devitalized tissue debrided: biofilm, fibrin and slough  Instrument(s) utilized: curette  Bleeding: medium  Hemostasis obtained with: pressure  Procedural pain (0-10): 0  Post-procedural pain: 0   Response to treatment: procedure was tolerated well        Plan:  1.  F/U visit.  Wounds debrided.  Left ischial wound measuring larger but has healthy granulation tissue present in wound bed.  Right ischial wound measuring " slightly smaller.  2.  Will continue wound VAC to left ischial wound.  White foam is to be applied to area of undermining and remaining wound is to be covered with black foam to fill entire deficit.  Wound VAC is to then be bridged to left hip or thigh change 3 times a week on 150 mmHg of continuous pressure.  3.  Will continue current plan of care to right ischial wound  4.  Did discuss with patient and  if her wounds continue to not show any improvement surgical consult with plastic surgery could be considered.  Patient  verbalized agreement and understanding  5.  Patient will follow-up in 2 weeks     Wound 01/10/24 Pressure Injury Ischium Left;Posterior;Proximal (Active)   Wound Image Images linked 07/08/24 1157   Wound Description Pink;Granulation tissue;Slough;Yellow;LORIN 07/08/24 1134   Pressure Injury Stage 3 07/08/24 1134   Ruchi-wound Assessment [place] 07/08/24 1134   Wound Length (cm) 2.8 cm 07/08/24 1134   Wound Width (cm) 2.1 cm 07/08/24 1134   Wound Depth (cm) 1.6 cm 07/08/24 1134   Wound Surface Area (cm^2) 5.88 cm^2 07/08/24 1134   Wound Volume (cm^3) 9.408 cm^3 07/08/24 1134   Calculated Wound Volume (cm^3) 9.41 cm^3 07/08/24 1134   Undermining 1 4.8 07/08/24 1134   Undermining 1 is depth extending from 11-4 o'clock, deepest at 11 o'clock 07/08/24 1134   Drainage Amount Large 07/08/24 1134   Drainage Description Serosanguineous;Foul smelling 07/08/24 1134   Non-staged Wound Description Full thickness 07/08/24 1134   Dressing Wound V.A.C. 07/08/24 1134   Packing- # removed 2 07/08/24 1134   Dressing Status Intact 07/08/24 1134       Wound 01/29/24 Pressure Injury Ischium Right (Active)   Wound Image Images linked 07/08/24 1127   Wound Description [place] 07/08/24 1127   Ruchi-wound Assessment Maceration 07/08/24 1127   Wound Length (cm) 1 cm 07/08/24 1127   Wound Width (cm) 1.5 cm 07/08/24 1127   Wound Depth (cm) 0.5 cm 07/08/24 1127   Wound Surface Area (cm^2) 1.5 cm^2 07/08/24 1127   Wound  Volume (cm^3) 0.75 cm^3 07/08/24 1127   Calculated Wound Volume (cm^3) 0.75 cm^3 07/08/24 1127   Change in Wound Size % -50 07/08/24 1127   Undermining 1 0.4 07/08/24 1127   Undermining 1 is depth extending from 11-1 07/08/24 1127   Drainage Amount Moderate 07/08/24 1127   Drainage Description Serosanguineous 07/08/24 1127   Non-staged Wound Description Full thickness 07/08/24 1127   Dressing Status Intact 07/08/24 1127       Wound 01/10/24 Pressure Injury Ischium Left;Posterior;Proximal (Active)   Date First Assessed/Time First Assessed: 01/10/24 0956   Primary Wound Type: Pressure Injury  Location: Ischium  Wound Location Orientation: Left;Posterior;Proximal       Wound 01/29/24 Pressure Injury Ischium Right (Active)   Date First Assessed: 01/29/24   Present on Original Admission: Yes  Primary Wound Type: Pressure Injury  Location: Ischium  Wound Location Orientation: Right       [REMOVED] Wound 07/29/21 Pressure Injury Ischium Right (Removed)   Resolved Date: 08/02/22  Date First Assessed/Time First Assessed: 07/29/21 1536   Pre-Existing Wound: Yes  Primary Wound Type: Pressure Injury  Location: Ischium  Wound Location Orientation: Right       [REMOVED] Wound 12/22/22 Other (comment) Buttocks Left (Removed)   Resolved Date: 01/29/24  Date First Assessed/Time First Assessed: 12/22/22 1104   Primary Wound Type: Other (comment)  Location: Buttocks  Wound Location Orientation: Left       [REMOVED] Wound 12/22/22 Pressure Injury Buttocks Right (Removed)   Resolved Date: 01/29/24  Date First Assessed/Time First Assessed: 12/22/22 1106   Primary Wound Type: Pressure Injury  Location: Buttocks  Wound Location Orientation: Right       [REMOVED] Wound 12/22/22 Pressure Injury Heel Right (Removed)   Resolved Date: 01/29/24  Date First Assessed/Time First Assessed: 12/22/22 1110   Primary Wound Type: Pressure Injury  Location: Heel  Wound Location Orientation: Right       [REMOVED] Wound 12/22/22 Pressure Injury Thigh  Anterior;Proximal;Right (Removed)   Resolved Date: 01/29/24  Date First Assessed/Time First Assessed: 12/22/22 1112   Primary Wound Type: Pressure Injury  Location: Thigh  Wound Location Orientation: Anterior;Proximal;Right  Wound Description (Comments): BLEEDING EXCORIATED       [REMOVED] Wound 12/22/22 Pressure Injury Thigh Anterior;Left;Proximal (Removed)   Resolved Date: 01/29/24  Date First Assessed/Time First Assessed: 12/22/22 1112   Primary Wound Type: Pressure Injury  Location: Thigh  Wound Location Orientation: Anterior;Left;Proximal  Wound Description (Comments): BLEEDING EXCORIATED       [REMOVED] Wound 12/22/22 Vagina N/A (Removed)   Resolved Date: 01/29/24  Date First Assessed/Time First Assessed: 12/22/22 1313   Location: Vagina  Wound Location Orientation: N/A       [REMOVED] Wound 01/09/24 Right (Removed)   Resolved Date: 01/10/24  Date First Assessed/Time First Assessed: 01/09/24 1832   Wound Location Orientation: Right  Wound Outcome: Healed       [REMOVED] Wound 01/09/24 Flank Left (Removed)   Resolved Date: 01/10/24  Date First Assessed/Time First Assessed: 01/09/24 1835   Location: Flank  Wound Location Orientation: Left  Wound Outcome: Healed       [REMOVED] Wound 01/09/24 Arm Left;Posterior (Removed)   Resolved Date: 01/29/24  Date First Assessed/Time First Assessed: 01/09/24 1836   Location: Arm  Wound Location Orientation: Left;Posterior       [REMOVED] Wound 06/24/24 MASD Groin Left;Posterior;Proximal (Removed)   Resolved Date: 06/28/24  Date First Assessed: 06/24/24   Present on Original Admission: Yes  Primary Wound Type: MASD  Location: Groin  Wound Location Orientation: Left;Posterior;Proximal       Subjective:      .    F/u visit for pressure injuries of bilateral ischial areas.  RN staff reports that white foam was only applied to wound covered with black foam to bridge wound VAC.  There was no black foam applied into wound bed as previously recommended.  Patient denies any issues  with wound VAC.  She denies any pain, fevers, or chills.        The following portions of the patient's history were reviewed and updated as appropriate: She  has a past medical history of Anesthesia, Bladder stones, Chronic pain disorder, Contracture, right shoulder, Dependent on wheelchair, Edema, Elevated alkaline phosphatase level, Fernandez catheter in place, Gallbladder disease, History of femur fracture, History of UTI, MS (multiple sclerosis) ([facility]), Muscle spasticity, Muscle weakness, Muscular dystrophy ([facility]), Neck pain, Neurogenic bladder, Paralysis ([facility]), Port-A-Cath in place, Pressure injury of skin, Sacral pressure sore, Swallowing difficulty, Tinnitus, and Urinary incontinence.  She   Patient Active Problem List    Diagnosis Date Noted    Chronic lower extremity edema 01/08/2024    Tinnitus of both ears 11/21/2023    Hypophonia 11/21/2023    Peripheral edema 02/20/2023    Continuous opioid dependence ([facility]) 09/08/2022    Increased endometrial stripe thickness 03/10/2021    Ureteral calculus, left 11/03/2020    Alkaline phosphatase elevation 10/28/2020    Abnormal thyroid function test 10/28/2020    Candidal vaginitis 10/19/2020    Hydronephrosis with urinary obstruction due to ureteral calculus 10/02/2020    Thrombocytopenia ([facility]) 10/01/2020    Serum total bilirubin elevated 10/01/2020    Medication management contract signed 02/21/2020    Screening mammogram, encounter for 05/29/2019    Allergic rhinitis 03/26/2018    Functional quadriplegia secondary to MS ([facility]) 01/25/2018    Suprapubic catheter ([facility]) 11/17/2017    Nephrolithiasis 04/22/2016    Bladder calculus 09/02/2015    Muscle spasticity 04/21/2015    Vitamin B12 deficiency 01/16/2015    Constipation 01/14/2015    Hyperglycemia 11/25/2014    Familial hypercholesterolemia 11/25/2014    Recurrent major depressive disorder, in full remission ([facility]) 01/22/2014    Lymphedema 01/22/2014    Spinal stenosis of cervical region 01/22/2014    Urinary incontinence  01/22/2014    Vitamin D deficiency 11/18/2013    Dysphagia 11/04/2013    Dizziness 11/04/2013    Muscle weakness 11/04/2013    Neurogenic bladder 11/04/2013    Sleep disorder 11/04/2013    Tremor 11/04/2013    Vision loss 11/04/2013    Multiple sclerosis (HCC) 10/21/2013     She  has a past surgical history that includes Cholecystectomy; Kidney stone surgery; Portacath placement (Left); Esophagogastroduodenoscopy; Bladder stone removal (N/A, 12/4/2017); Bladder surgery; Suprapubic cystostomy (02/25/2014); Cystoscopy (06/15/2020); [place] retrograde pyelogram (10/2/2020); pr cysto bladder w/ureteral catheterization (Left, 10/2/2020); pr cysto/uretero w/lithotripsy &indwell stent insrt (Left, 11/3/2020); [place] retrograde pyelogram (11/3/2020); and pr litholapaxy smpl/sm <2.5 cm (N/A, 12/22/2022).  Her family history includes Alzheimer's disease in her family; COPD in her father; Diabetes in her family and mother; Heart disease in her family; Hyperlipidemia in her mother and sister; Hypertension in her family, father, and mother.  She  reports that she has never smoked. She has never used smokeless tobacco. She reports that she does not currently use alcohol. She reports that she does not use drugs.  Current Outpatient Medications   Medication Sig Dispense Refill    baclofen 20 mg tablet TAKE 1 TABLET BY MOUTH 5 TIMES AS NEEDED (Patient taking differently: TAKE 1 TABLET BY MOUTH 5 TIMES AS NEEDED for muscle spasms) 450 tablet 3    clotrimazole (LOTRIMIN) 1 % cream Apply topically 2 (two) times a day as needed (yeast rash) (Patient not taking: Reported on 11/21/2023) 30 g 0    DULoxetine (CYMBALTA) 20 mg capsule TAKE 1 CAPSULE BY MOUTH EVERY DAY 30 capsule 11    furosemide (LASIX) 20 mg tablet Take 1 tablet (20 mg total) by mouth daily 30 tablet 5    gabapentin (NEURONTIN) 100 mg capsule Take 1 tablet at bedtime tonight, 1 tablet twice a day tomorrow, and then 1 tablet 3 times daily thereafter (Patient taking differently: Take  100 mg by mouth 3 (three) times a day. Indications: Neuropathic Pain) 90 capsule 5    oxybutynin (DITROPAN-XL) 10 MG 24 hr tablet TAKE 1 TABLET BY MOUTH EVERY DAY 90 tablet 1    rosuvastatin (CRESTOR) 5 mg tablet TAKE 1 TABLET (5 MG TOTAL) BY MOUTH DAILY. 90 tablet 1     No current facility-administered medications for this visit.     She is allergic to latex..    Review of Systems   Constitutional: Negative.    HENT:  Negative for ear pain and hearing loss.    Eyes:  Negative for pain.   Respiratory:  Negative for chest tightness and shortness of breath.    Cardiovascular:  Positive for leg swelling. Negative for chest pain and palpitations.   Gastrointestinal:  Negative for diarrhea, nausea and vomiting.   Genitourinary:  Negative for dysuria.   Musculoskeletal:  Positive for gait problem.   Skin:  Positive for wound.   Neurological:  Positive for numbness. Negative for tremors and weakness.   Psychiatric/Behavioral:  Negative for behavioral problems, confusion and suicidal ideas.          Objective:       Wound 01/10/24 Pressure Injury Ischium Left;Posterior;Proximal (Active)   Wound Image Images linked 07/08/24 1157   Wound Description Pink;Granulation tissue;Slough;Yellow;LORIN 07/08/24 1134   Pressure Injury Stage 3 07/08/24 1134   Ruchi-wound Assessment Lindy 07/08/24 1134   Wound Length (cm) 2.8 cm 07/08/24 1134   Wound Width (cm) 2.1 cm 07/08/24 1134   Wound Depth (cm) 1.6 cm 07/08/24 1134   Wound Surface Area (cm^2) 5.88 cm^2 07/08/24 1134   Wound Volume (cm^3) 9.408 cm^3 07/08/24 1134   Calculated Wound Volume (cm^3) 9.41 cm^3 07/08/24 1134   Undermining 1 4.8 07/08/24 1134   Undermining 1 is depth extending from 11-4 o'clock, deepest at 11 o'clock 07/08/24 1134   Drainage Amount Large 07/08/24 1134   Drainage Description Serosanguineous;Foul smelling 07/08/24 1134   Non-staged Wound Description Full thickness 07/08/24 1134   Dressing Wound V.A.C. 07/08/24 1134   Packing- # removed 2 07/08/24 1134   Dressing  Status Intact 07/08/24 1134       Wound 01/29/24 Pressure Injury Ischium Right (Active)   Wound Image Images linked 07/08/24 1127   Wound Description [place] 07/08/24 1127   Rcuhi-wound Assessment Maceration 07/08/24 1127   Wound Length (cm) 1 cm 07/08/24 1127   Wound Width (cm) 1.5 cm 07/08/24 1127   Wound Depth (cm) 0.5 cm 07/08/24 1127   Wound Surface Area (cm^2) 1.5 cm^2 07/08/24 1127   Wound Volume (cm^3) 0.75 cm^3 07/08/24 1127   Calculated Wound Volume (cm^3) 0.75 cm^3 07/08/24 1127   Change in Wound Size % -50 07/08/24 1127   Undermining 1 0.4 07/08/24 1127   Undermining 1 is depth extending from 11-1 07/08/24 1127   Drainage Amount Moderate 07/08/24 1127   Drainage Description Serosanguineous 07/08/24 1127   Non-staged Wound Description Full thickness 07/08/24 1127   Dressing Status Intact 07/08/24 1127       /60   Pulse 82   Temp 97.6 °F (36.4 °C)   Resp 16                   Wound Instructions:  Orders Placed This Encounter   Procedures    Wound cleansing and dressings     Right ischium wound:                                 Wash your hands with soap and water.  Remove old dressing, discard into plastic bag and place in trash.  Cleanse the wound with dakins as needed if odor is present, wound cleanser other days,  prior to applying a clean dressing. Do not use tissue or cotton balls. Do not scrub the wound. Pat dry using gauze.  Shower no      Apply Silver alginate rope/Aquacell AG rope to wound.  Cover with silicone border dressing  Change dressing every other day     [facility] to change every other day,  to change other days if needed                                                 ·  LEFT ischium wound:                          Wash your hands with soap and water.  Remove old dressing, discard into plastic bag and place in trash.  Cleanse the wound with dakins as needed if odor is present, wound cleanser other days,  prior to applying a clean dressing. Do not use tissue or cotton balls. Do not  scrub the wound. Pat dry using gauze.  Shower no      Negative Pressure Wound Therapy Instructions        Tuck a rectangle shaped piece of white foam into wound into 10-11 o'clock undermining area only  Apply black granufoam to wound bed to fill the wound  Set negative pressure on continuous at 150  Bridge to the side.  VAC dressing to be changed three times per week as ordered.        IF UNABLE TO OBTAIN BLACK FOAM --PERFORM SAME WOUND CARE TO LEFT ISCHIUM AS RIGHT ISCHIUM UNTIL IT ARRIVES     Standing Status:   Future     Standing Expiration Date:   7/22/2024    Wound Procedure Treatment Pressure Injury Right Ischium     This order was created via procedure documentation    Negative Pressure Wound Therapy     This order was created via procedure documentation        Diagnosis ICD-10-CM Associated Orders   1. Pressure ulcer of ischium, left, stage III (HCC)  L89.323 Wound cleansing and dressings     lidocaine (XYLOCAINE) 4 % topical solution 5 mL     Negative Pressure Wound Therapy      2. Pressure ulcer of right ischium, stage IV (HCC)  L89.314 Wound cleansing and dressings     lidocaine (XYLOCAINE) 4 % topical solution 5 mL     Wound Procedure Treatment Pressure Injury Right Ischium

## 2024-07-08 NOTE — PROGRESS NOTES
Negative Pressure Wound Therapy    Performed by: Park Landa RN  Authorized by: CLEMENTINA Vincent  Universal Protocol:  Consent: Verbal consent obtained.  Risks and benefits: risks, benefits and alternatives were discussed  Patient understanding: patient states understanding of the procedure being performed  Patient identity confirmed: verbally with patient    Performed by::  Clinician  Type::  KCI  Coverage Size (sq cm)::  5.9  Pressure Type::  Constant  Pressure Setting::  150 mmHG  Sponge Type::  Combination  Sponge Size:  Medium  Npw total number of sponges removed: 1 white foam out of wound.  Npw total number of sponges used: 1 white, 1 black, 1 bridge.

## 2024-07-08 NOTE — PATIENT INSTRUCTIONS
Orders Placed This Encounter   Procedures    Wound cleansing and dressings     Right ischium wound:                                 Wash your hands with soap and water.  Remove old dressing, discard into plastic bag and place in trash.  Cleanse the wound with dakins as needed if odor is present, wound cleanser other days,  prior to applying a clean dressing. Do not use tissue or cotton balls. Do not scrub the wound. Pat dry using gauze.  Shower no      Apply Silver alginate rope/Aquacell AG rope to wound.  Cover with silicone border dressing  Change dressing every other day     VNA to change every other day,  to change other days if needed                                                 ·  LEFT ischium wound:                          Wash your hands with soap and water.  Remove old dressing, discard into plastic bag and place in trash.  Cleanse the wound with dakins as needed if odor is present, wound cleanser other days,  prior to applying a clean dressing. Do not use tissue or cotton balls. Do not scrub the wound. Pat dry using gauze.  Shower no      Negative Pressure Wound Therapy Instructions        Tuck a rectangle shaped piece of white foam into wound into 10-11 o'clock undermining area only  Apply black granufoam to wound bed to fill the wound  Set negative pressure on continuous at 150  Bridge to the side.  VAC dressing to be changed three times per week as ordered.        IF UNABLE TO OBTAIN BLACK FOAM --PERFORM SAME WOUND CARE TO LEFT ISCHIUM AS RIGHT ISCHIUM UNTIL IT ARRIVES     Standing Status:   Future     Standing Expiration Date:   7/22/2024

## 2024-07-10 ENCOUNTER — HOME CARE VISIT (OUTPATIENT)
Dept: HOME HEALTH SERVICES | Facility: HOME HEALTHCARE | Age: 54
End: 2024-07-10
Payer: MEDICARE

## 2024-07-10 VITALS
TEMPERATURE: 97.5 F | OXYGEN SATURATION: 98 % | SYSTOLIC BLOOD PRESSURE: 124 MMHG | RESPIRATION RATE: 18 BRPM | DIASTOLIC BLOOD PRESSURE: 80 MMHG

## 2024-07-10 PROCEDURE — G0299 HHS/HOSPICE OF RN EA 15 MIN: HCPCS

## 2024-07-12 ENCOUNTER — HOME CARE VISIT (OUTPATIENT)
Dept: HOME HEALTH SERVICES | Facility: HOME HEALTHCARE | Age: 54
End: 2024-07-12
Payer: MEDICARE

## 2024-07-12 VITALS
HEART RATE: 85 BPM | TEMPERATURE: 98.1 F | OXYGEN SATURATION: 100 % | SYSTOLIC BLOOD PRESSURE: 116 MMHG | DIASTOLIC BLOOD PRESSURE: 70 MMHG

## 2024-07-12 PROCEDURE — G0299 HHS/HOSPICE OF RN EA 15 MIN: HCPCS

## 2024-07-15 ENCOUNTER — HOME CARE VISIT (OUTPATIENT)
Dept: HOME HEALTH SERVICES | Facility: HOME HEALTHCARE | Age: 54
End: 2024-07-15
Payer: MEDICARE

## 2024-07-15 VITALS
TEMPERATURE: 97.8 F | DIASTOLIC BLOOD PRESSURE: 58 MMHG | SYSTOLIC BLOOD PRESSURE: 100 MMHG | OXYGEN SATURATION: 100 % | HEART RATE: 92 BPM

## 2024-07-15 DIAGNOSIS — R53.2 FUNCTIONAL QUADRIPLEGIA SECONDARY TO MS (HCC): Chronic | ICD-10-CM

## 2024-07-15 DIAGNOSIS — E55.9 VITAMIN D DEFICIENCY: ICD-10-CM

## 2024-07-15 DIAGNOSIS — R93.89 INCREASED ENDOMETRIAL STRIPE THICKNESS: ICD-10-CM

## 2024-07-15 DIAGNOSIS — K63.9 MURAL THICKENING OF SIGMOID COLON: ICD-10-CM

## 2024-07-15 DIAGNOSIS — R73.9 HYPERGLYCEMIA: ICD-10-CM

## 2024-07-15 DIAGNOSIS — Z01.818 PREOPERATIVE CLEARANCE: ICD-10-CM

## 2024-07-15 DIAGNOSIS — G35 FUNCTIONAL QUADRIPLEGIA SECONDARY TO MS (HCC): Chronic | ICD-10-CM

## 2024-07-15 DIAGNOSIS — T83.510S URINARY TRACT INFECTION ASSOCIATED WITH CYSTOSTOMY CATHETER, SEQUELA: ICD-10-CM

## 2024-07-15 DIAGNOSIS — G82.20 PARAPLEGIA (HCC): ICD-10-CM

## 2024-07-15 DIAGNOSIS — N39.0 URINARY TRACT INFECTION ASSOCIATED WITH CYSTOSTOMY CATHETER, SEQUELA: ICD-10-CM

## 2024-07-15 DIAGNOSIS — G35 MULTIPLE SCLEROSIS (HCC): Chronic | ICD-10-CM

## 2024-07-15 DIAGNOSIS — R94.6 ABNORMAL THYROID FUNCTION TEST: ICD-10-CM

## 2024-07-15 DIAGNOSIS — R74.01 TRANSAMINITIS: ICD-10-CM

## 2024-07-15 DIAGNOSIS — E78.01 FAMILIAL HYPERCHOLESTEROLEMIA: ICD-10-CM

## 2024-07-15 DIAGNOSIS — N21.0 BLADDER CALCULUS: ICD-10-CM

## 2024-07-15 DIAGNOSIS — D50.9 MICROCYTIC ANEMIA: ICD-10-CM

## 2024-07-15 DIAGNOSIS — Z93.59 SUPRAPUBIC CATHETER (HCC): Chronic | ICD-10-CM

## 2024-07-15 DIAGNOSIS — E53.8 VITAMIN B12 DEFICIENCY: ICD-10-CM

## 2024-07-15 DIAGNOSIS — D69.6 THROMBOCYTOPENIA (HCC): ICD-10-CM

## 2024-07-15 PROCEDURE — G0299 HHS/HOSPICE OF RN EA 15 MIN: HCPCS

## 2024-07-15 RX ORDER — ROSUVASTATIN CALCIUM 5 MG/1
5 TABLET, COATED ORAL DAILY
Qty: 30 TABLET | Refills: 5 | Status: SHIPPED | OUTPATIENT
Start: 2024-07-15

## 2024-07-17 ENCOUNTER — HOME CARE VISIT (OUTPATIENT)
Dept: HOME HEALTH SERVICES | Facility: HOME HEALTHCARE | Age: 54
End: 2024-07-17
Payer: MEDICARE

## 2024-07-17 VITALS
SYSTOLIC BLOOD PRESSURE: 118 MMHG | DIASTOLIC BLOOD PRESSURE: 82 MMHG | RESPIRATION RATE: 16 BRPM | OXYGEN SATURATION: 100 % | HEART RATE: 98 BPM | TEMPERATURE: 96.9 F

## 2024-07-17 PROCEDURE — G0299 HHS/HOSPICE OF RN EA 15 MIN: HCPCS

## 2024-07-17 NOTE — CASE COMMUNICATION
Prior abrasion/blister dry on distal end of vac drape by wound improving. Two new superficial abrasion/blisters top of vac drape bilateral of trac pad on hip. Cleansed and applied chelsey wounds in no-sting skin prep and kept drape off areas.

## 2024-07-19 ENCOUNTER — HOME CARE VISIT (OUTPATIENT)
Dept: HOME HEALTH SERVICES | Facility: HOME HEALTHCARE | Age: 54
End: 2024-07-19
Payer: MEDICARE

## 2024-07-19 VITALS
DIASTOLIC BLOOD PRESSURE: 60 MMHG | OXYGEN SATURATION: 100 % | SYSTOLIC BLOOD PRESSURE: 106 MMHG | TEMPERATURE: 98.1 F | HEART RATE: 83 BPM

## 2024-07-19 PROCEDURE — G0299 HHS/HOSPICE OF RN EA 15 MIN: HCPCS

## 2024-07-22 ENCOUNTER — OFFICE VISIT (OUTPATIENT)
Dept: WOUND CARE | Facility: HOSPITAL | Age: 54
End: 2024-07-22
Payer: COMMERCIAL

## 2024-07-22 ENCOUNTER — HOME CARE VISIT (OUTPATIENT)
Dept: HOME HEALTH SERVICES | Facility: HOME HEALTHCARE | Age: 54
End: 2024-07-22
Payer: MEDICARE

## 2024-07-22 VITALS
SYSTOLIC BLOOD PRESSURE: 128 MMHG | RESPIRATION RATE: 20 BRPM | TEMPERATURE: 97.4 F | DIASTOLIC BLOOD PRESSURE: 70 MMHG | HEART RATE: 80 BPM

## 2024-07-22 DIAGNOSIS — L89.323 PRESSURE ULCER OF ISCHIUM, LEFT, STAGE III (HCC): Primary | ICD-10-CM

## 2024-07-22 DIAGNOSIS — L89.314 PRESSURE ULCER OF RIGHT ISCHIUM, STAGE IV (HCC): ICD-10-CM

## 2024-07-22 PROCEDURE — 99214 OFFICE O/P EST MOD 30 MIN: CPT | Performed by: FAMILY MEDICINE

## 2024-07-22 PROCEDURE — 99212 OFFICE O/P EST SF 10 MIN: CPT | Performed by: FAMILY MEDICINE

## 2024-07-22 RX ORDER — LIDOCAINE HYDROCHLORIDE 40 MG/ML
5 SOLUTION TOPICAL ONCE
Status: COMPLETED | OUTPATIENT
Start: 2024-07-22 | End: 2024-07-22

## 2024-07-22 RX ADMIN — LIDOCAINE HYDROCHLORIDE 5 ML: 40 SOLUTION TOPICAL at 09:23

## 2024-07-22 NOTE — CASE COMMUNICATION
Ship to [facility] Home   Ordering MD (home health only) Dr Antunez    Wound 1 Right Ischium Full XX   Frequency QD  Wound 2 Left Ischium Full XX     Frequency QD    All items are ordered by the each unless otherwise noted.  Orders should be for a 2 week period (unless noted by insurance)    Dry Dressings   (Nonsterile gauze not covered by private insurance)  (Sterile gauze may be requested for insurance rather than nonsterile gauze)  AB D 5x9 [id number]    1 box    Tape  Tape. Mefix 2inch [id number]    1    Packing   Mesalt ribbon ¾ x 39 [id number]       4

## 2024-07-22 NOTE — PROGRESS NOTES
Wound Procedure Treatment    Performed by: Thelma Manrique RN  Authorized by: Sara Antunez DO    Associated wounds:   Wound 01/10/24 Pressure Injury Ischium Left;Posterior;Proximal  Wound 01/29/24 Pressure Injury Ischium Right  Wound cleansed with:  Dakin's 0.25%  Applied primary dressing:  Mesalt  Applied secondary dressing:  ABD  Comments:  Med fix tape

## 2024-07-24 ENCOUNTER — HOME CARE VISIT (OUTPATIENT)
Dept: HOME HEALTH SERVICES | Facility: HOME HEALTHCARE | Age: 54
End: 2024-07-24
Payer: MEDICARE

## 2024-07-24 ENCOUNTER — TELEPHONE (OUTPATIENT)
Dept: UROLOGY | Facility: CLINIC | Age: 54
End: 2024-07-24

## 2024-07-24 VITALS
DIASTOLIC BLOOD PRESSURE: 60 MMHG | TEMPERATURE: 99 F | RESPIRATION RATE: 16 BRPM | SYSTOLIC BLOOD PRESSURE: 90 MMHG | OXYGEN SATURATION: 97 % | HEART RATE: 112 BPM

## 2024-07-24 DIAGNOSIS — R39.9 UTI SYMPTOMS: Primary | ICD-10-CM

## 2024-07-24 PROCEDURE — G0300 HHS/HOSPICE OF LPN EA 15 MIN: HCPCS

## 2024-07-24 RX ORDER — LEVOFLOXACIN 500 MG/1
500 TABLET, FILM COATED ORAL EVERY 24 HOURS
Qty: 5 TABLET | Refills: 0 | Status: SHIPPED | OUTPATIENT
Start: 2024-07-24 | End: 2024-07-29

## 2024-07-24 NOTE — TELEPHONE ENCOUNTER
[facility] reached out as they believe she has a urinary tract infection.  Prior cultures show multiple organisms including Pseudomonas and Enterococcus susceptible to Levaquin.  Levaquin 500 mg daily for 5-day course was prescribed.  TONYA LP aware.

## 2024-07-25 DIAGNOSIS — R60.0 LOWER EXTREMITY EDEMA: ICD-10-CM

## 2024-07-25 RX ORDER — FUROSEMIDE 20 MG/1
20 TABLET ORAL DAILY
Qty: 30 TABLET | Refills: 0 | Status: SHIPPED | OUTPATIENT
Start: 2024-07-25 | End: 2024-08-24

## 2024-07-25 NOTE — TELEPHONE ENCOUNTER
Cc'd chart        Reason for call:   [x] Refill   [] Prior Auth  [] Other:     Office:   [x] PCP/Provider -   [] Specialty/Provider -     Medication: Lasix    Dose/Frequency: 20 mg     Quantity: #100    Pharmacy: [address]    Does the patient have enough for 3 days?   [] Yes   [] No - Send as HP to POD

## 2024-07-25 NOTE — TELEPHONE ENCOUNTER
Called and left VM that medication has been refilled and that pt for next refill must schedule an appt as pt is due for AWV.     If pt calls back please schedule for AWV.     Thank you.   Chetna.

## 2024-07-26 ENCOUNTER — HOME CARE VISIT (OUTPATIENT)
Dept: HOME HEALTH SERVICES | Facility: HOME HEALTHCARE | Age: 54
End: 2024-07-26
Payer: MEDICARE

## 2024-07-26 VITALS
DIASTOLIC BLOOD PRESSURE: 70 MMHG | SYSTOLIC BLOOD PRESSURE: 112 MMHG | HEART RATE: 91 BPM | TEMPERATURE: 98.4 F | OXYGEN SATURATION: 100 %

## 2024-07-26 PROCEDURE — G0299 HHS/HOSPICE OF RN EA 15 MIN: HCPCS

## 2024-07-29 ENCOUNTER — HOME CARE VISIT (OUTPATIENT)
Dept: HOME HEALTH SERVICES | Facility: HOME HEALTHCARE | Age: 54
End: 2024-07-29
Payer: MEDICARE

## 2024-07-29 ENCOUNTER — TELEPHONE (OUTPATIENT)
Dept: FAMILY MEDICINE CLINIC | Facility: CLINIC | Age: 54
End: 2024-07-29

## 2024-07-29 VITALS
SYSTOLIC BLOOD PRESSURE: 98 MMHG | HEART RATE: 81 BPM | OXYGEN SATURATION: 99 % | DIASTOLIC BLOOD PRESSURE: 60 MMHG | TEMPERATURE: 97.9 F

## 2024-07-29 PROCEDURE — 400014 VN F/U

## 2024-07-29 PROCEDURE — G0179 MD RECERTIFICATION HHA PT: HCPCS | Performed by: FAMILY MEDICINE

## 2024-07-29 PROCEDURE — G0299 HHS/HOSPICE OF RN EA 15 MIN: HCPCS

## 2024-07-29 NOTE — TELEPHONE ENCOUNTER
Please have patient complete blood work that was ordered and make a follow-up appointment next few weeks

## 2024-07-31 ENCOUNTER — HOME CARE VISIT (OUTPATIENT)
Dept: HOME HEALTH SERVICES | Facility: HOME HEALTHCARE | Age: 54
End: 2024-07-31
Payer: MEDICARE

## 2024-08-02 ENCOUNTER — HOME CARE VISIT (OUTPATIENT)
Dept: HOME HEALTH SERVICES | Facility: HOME HEALTHCARE | Age: 54
End: 2024-08-02
Payer: MEDICARE

## 2024-08-05 ENCOUNTER — OFFICE VISIT (OUTPATIENT)
Dept: WOUND CARE | Facility: HOSPITAL | Age: 54
End: 2024-08-05
Payer: MEDICARE

## 2024-08-05 ENCOUNTER — HOME CARE VISIT (OUTPATIENT)
Dept: HOME HEALTH SERVICES | Facility: HOME HEALTHCARE | Age: 54
End: 2024-08-05
Payer: MEDICARE

## 2024-08-05 VITALS
TEMPERATURE: 98.4 F | DIASTOLIC BLOOD PRESSURE: 82 MMHG | SYSTOLIC BLOOD PRESSURE: 122 MMHG | HEART RATE: 84 BPM | RESPIRATION RATE: 16 BRPM

## 2024-08-05 DIAGNOSIS — L89.323 PRESSURE ULCER OF ISCHIUM, LEFT, STAGE III (HCC): Primary | ICD-10-CM

## 2024-08-05 DIAGNOSIS — G35 MULTIPLE SCLEROSIS (HCC): ICD-10-CM

## 2024-08-05 DIAGNOSIS — L89.314 PRESSURE ULCER OF RIGHT ISCHIUM, STAGE IV (HCC): ICD-10-CM

## 2024-08-05 PROCEDURE — 97597 DBRDMT OPN WND 1ST 20 CM/<: CPT | Performed by: NURSE PRACTITIONER

## 2024-08-05 RX ORDER — LIDOCAINE HYDROCHLORIDE 40 MG/ML
5 SOLUTION TOPICAL ONCE
Status: COMPLETED | OUTPATIENT
Start: 2024-08-05 | End: 2024-08-05

## 2024-08-05 RX ADMIN — LIDOCAINE HYDROCHLORIDE 5 ML: 40 SOLUTION TOPICAL at 09:09

## 2024-08-05 NOTE — PROGRESS NOTES
"Patient ID: Fanny Schulz is a 54 y.o. female Date of Birth 1970     Chief Complaint  Chief Complaint   Patient presents with    Follow Up Wound Care Visit     Bilateral ischial wounds.       Allergies  Latex    Assessment:     Diagnoses and all orders for this visit:    Pressure ulcer of ischium, left, stage III (HCC)  -     lidocaine (XYLOCAINE) 4 % topical solution 5 mL  -     Wound cleansing and dressings; Future  -     Wound Procedure Treatment    Pressure ulcer of right ischium, stage IV (HCC)  -     lidocaine (XYLOCAINE) 4 % topical solution 5 mL  -     Wound cleansing and dressings; Future  -     Wound Procedure Treatment    Multiple sclerosis (HCC)    Other orders  -     Debridement  -     Debridement              Debridement   Wound 01/10/24 Pressure Injury Ischium Left;Posterior;Proximal    Universal Protocol:  Consent: Written consent obtained.  Consent given by: patient  Time out: Immediately prior to procedure a \"time out\" was called to verify the correct patient, procedure, equipment, support staff and site/side marked as required.  Timeout called at: 8/5/2024 9:54 AM.  Patient identity confirmed: verbally with patient    Debridement Details  Performed by: NP  Debridement type: selective  Pain control: lidocaine 4%      Post-debridement measurements  Length (cm): 2.5  Width (cm): 1.5  Depth (cm): 1.4  Percent debrided: 100%  Surface Area (cm^2): 3.75  Area Debrided (cm^2): 3.75  Volume (cm^3): 5.25    Devitalized tissue debrided: biofilm, fibrin and slough  Instrument(s) utilized: curette  Bleeding: small  Hemostasis obtained with: pressure  Procedural pain (0-10): 0  Post-procedural pain: 0   Response to treatment: procedure was tolerated well    Debridement   Wound 01/29/24 Pressure Injury Ischium Right    Universal Protocol:  Consent: Written consent obtained.  Consent given by: patient  Time out: Immediately prior to procedure a \"time out\" was called to verify the correct patient, " procedure, equipment, support staff and site/side marked as required.  Timeout called at: 8/5/2024 9:54 AM.  Patient identity confirmed: verbally with patient    Debridement Details  Performed by: NP  Debridement type: selective  Pain control: lidocaine 4%      Post-debridement measurements  Length (cm): 0.4  Width (cm): 0.8  Depth (cm): 0.6  Percent debrided: 100%  Surface Area (cm^2): 0.32  Area Debrided (cm^2): 0.32  Volume (cm^3): 0.19    Devitalized tissue debrided: biofilm, fibrin and slough  Instrument(s) utilized: curette  Bleeding: small  Hemostasis obtained with: pressure  Procedural pain (0-10): 0  Post-procedural pain: 0   Response to treatment: procedure was tolerated well        Plan:  1.  F/U visit.  Wounds debrided.  Wounds improving and measuring smaller.  Continue current plan of care.  2.  Will discontinue wound VAC  3.  Continue frequent offloading of pressure from wounds  4.  Patient will follow-up in 2 weeks     Wound 01/10/24 Pressure Injury Ischium Left;Posterior;Proximal (Active)   Wound Image Images linked 08/05/24 0858   Wound Description Granulation tissue 08/05/24 0858   Ruchi-wound Assessment Intact 08/05/24 0858   Wound Length (cm) 2.5 cm 08/05/24 0858   Wound Width (cm) 1.5 cm 08/05/24 0858   Wound Depth (cm) 1.4 cm 08/05/24 0858   Wound Surface Area (cm^2) 3.75 cm^2 08/05/24 0858   Wound Volume (cm^3) 5.25 cm^3 08/05/24 0858   Calculated Wound Volume (cm^3) 5.25 cm^3 08/05/24 0858   Number of underminings 1 08/05/24 0858   Undermining 1 3.5 08/05/24 0858   Undermining 1 is depth extending from 12-5 08/05/24 0858   Drainage Amount Large 08/05/24 0858   Drainage Description Serosanguineous 08/05/24 0858   Non-staged Wound Description Full thickness 08/05/24 0858   Dressing Status Intact 08/05/24 0858       Wound 01/29/24 Pressure Injury Ischium Right (Active)   Wound Image Images linked 08/05/24 0900   Wound Description Granulation tissue 08/05/24 0900   Pressure Injury Stage 4 08/05/24  0900   Ruchi-wound Assessment Intact;Maceration 08/05/24 0900   Wound Length (cm) 0.4 cm 08/05/24 0900   Wound Width (cm) 0.8 cm 08/05/24 0900   Wound Depth (cm) 0.6 cm 08/05/24 0900   Wound Surface Area (cm^2) 0.32 cm^2 08/05/24 0900   Wound Volume (cm^3) 0.192 cm^3 08/05/24 0900   Calculated Wound Volume (cm^3) 0.19 cm^3 08/05/24 0900   Change in Wound Size % 62 08/05/24 0900   Undermining 1 0.2 08/05/24 0900   Undermining 1 is depth extending from 11-1 08/05/24 0900   Drainage Amount Moderate 08/05/24 0900   Drainage Description Serosanguineous 08/05/24 0900   Non-staged Wound Description Full thickness 08/05/24 0900   Dressing Status Intact 08/05/24 0900       Wound 01/10/24 Pressure Injury Ischium Left;Posterior;Proximal (Active)   Date First Assessed/Time First Assessed: 01/10/24 0956   Primary Wound Type: Pressure Injury  Location: Ischium  Wound Location Orientation: Left;Posterior;Proximal       Wound 01/29/24 Pressure Injury Ischium Right (Active)   Date First Assessed: 01/29/24   Present on Original Admission: Yes  Primary Wound Type: Pressure Injury  Location: Ischium  Wound Location Orientation: Right       [REMOVED] Wound 07/29/21 Pressure Injury Ischium Right (Removed)   Resolved Date: 08/02/22  Date First Assessed/Time First Assessed: 07/29/21 1536   Pre-Existing Wound: Yes  Primary Wound Type: Pressure Injury  Location: Ischium  Wound Location Orientation: Right       [REMOVED] Wound 12/22/22 Other (comment) Buttocks Left (Removed)   Resolved Date: 01/29/24  Date First Assessed/Time First Assessed: 12/22/22 1104   Primary Wound Type: Other (comment)  Location: Buttocks  Wound Location Orientation: Left       [REMOVED] Wound 12/22/22 Pressure Injury Buttocks Right (Removed)   Resolved Date: 01/29/24  Date First Assessed/Time First Assessed: 12/22/22 1106   Primary Wound Type: Pressure Injury  Location: Buttocks  Wound Location Orientation: Right       [REMOVED] Wound 12/22/22 Pressure Injury Heel  Right (Removed)   Resolved Date: 01/29/24  Date First Assessed/Time First Assessed: 12/22/22 1110   Primary Wound Type: Pressure Injury  Location: Heel  Wound Location Orientation: Right       [REMOVED] Wound 12/22/22 Pressure Injury Thigh Anterior;Proximal;Right (Removed)   Resolved Date: 01/29/24  Date First Assessed/Time First Assessed: 12/22/22 1112   Primary Wound Type: Pressure Injury  Location: Thigh  Wound Location Orientation: Anterior;Proximal;Right  Wound Description (Comments): BLEEDING EXCORIATED       [REMOVED] Wound 12/22/22 Pressure Injury Thigh Anterior;Left;Proximal (Removed)   Resolved Date: 01/29/24  Date First Assessed/Time First Assessed: 12/22/22 1112   Primary Wound Type: Pressure Injury  Location: Thigh  Wound Location Orientation: Anterior;Left;Proximal  Wound Description (Comments): BLEEDING EXCORIATED       [REMOVED] Wound 12/22/22 Vagina N/A (Removed)   Resolved Date: 01/29/24  Date First Assessed/Time First Assessed: 12/22/22 1313   Location: Vagina  Wound Location Orientation: N/A       [REMOVED] Wound 01/09/24 Right (Removed)   Resolved Date: 01/10/24  Date First Assessed/Time First Assessed: 01/09/24 1832   Wound Location Orientation: Right  Wound Outcome: Healed       [REMOVED] Wound 01/09/24 Flank Left (Removed)   Resolved Date: 01/10/24  Date First Assessed/Time First Assessed: 01/09/24 1835   Location: Flank  Wound Location Orientation: Left  Wound Outcome: Healed       [REMOVED] Wound 01/09/24 Arm Left;Posterior (Removed)   Resolved Date: 01/29/24  Date First Assessed/Time First Assessed: 01/09/24 1836   Location: Arm  Wound Location Orientation: Left;Posterior       [REMOVED] Wound 06/24/24 MASD Groin Left;Posterior;Proximal (Removed)   Resolved Date: 06/28/24  Date First Assessed: 06/24/24   Present on Original Admission: Yes  Primary Wound Type: MASD  Location: Groin  Wound Location Orientation: Left;Posterior;Proximal       Subjective:      .    F/u visit for pressure ulcers  of left and right ischial regions.  No new complaints.  She denies any pain, fevers, or chills.        The following portions of the patient's history were reviewed and updated as appropriate: She  has a past medical history of Anesthesia, Bladder stones, Chronic pain disorder, Contracture, right shoulder, Dependent on wheelchair, Edema, Elevated alkaline phosphatase level, Fernandez catheter in place, Gallbladder disease, History of femur fracture, History of UTI, MS (multiple sclerosis) ([facility]), Muscle spasticity, Muscle weakness, Muscular dystrophy ([facility]), Neck pain, Neurogenic bladder, Paralysis ([facility]), Port-A-Cath in place, Pressure injury of skin, Sacral pressure sore, Swallowing difficulty, Tinnitus, and Urinary incontinence.  She   Patient Active Problem List    Diagnosis Date Noted    Chronic lower extremity edema 01/08/2024    Tinnitus of both ears 11/21/2023    Hypophonia 11/21/2023    Peripheral edema 02/20/2023    Continuous opioid dependence ([facility]) 09/08/2022    Increased endometrial stripe thickness 03/10/2021    Ureteral calculus, left 11/03/2020    Alkaline phosphatase elevation 10/28/2020    Abnormal thyroid function test 10/28/2020    Candidal vaginitis 10/19/2020    Hydronephrosis with urinary obstruction due to ureteral calculus 10/02/2020    Thrombocytopenia ([facility]) 10/01/2020    Serum total bilirubin elevated 10/01/2020    Medication management contract signed 02/21/2020    Screening mammogram, encounter for 05/29/2019    Allergic rhinitis 03/26/2018    Functional quadriplegia secondary to MS ([facility]) 01/25/2018    Suprapubic catheter ([facility]) 11/17/2017    Nephrolithiasis 04/22/2016    Bladder calculus 09/02/2015    Muscle spasticity 04/21/2015    Vitamin B12 deficiency 01/16/2015    Constipation 01/14/2015    Hyperglycemia 11/25/2014    Familial hypercholesterolemia 11/25/2014    Recurrent major depressive disorder, in full remission ([facility]) 01/22/2014    Lymphedema 01/22/2014    Spinal stenosis of cervical  region 01/22/2014    Urinary incontinence 01/22/2014    Vitamin D deficiency 11/18/2013    Dysphagia 11/04/2013    Dizziness 11/04/2013    Muscle weakness 11/04/2013    Neurogenic bladder 11/04/2013    Sleep disorder 11/04/2013    Tremor 11/04/2013    Vision loss 11/04/2013    Multiple sclerosis (HCC) 10/21/2013     She  has a past surgical history that includes Cholecystectomy; Kidney stone surgery; Portacath placement (Left); Esophagogastroduodenoscopy; Bladder stone removal (N/A, 12/4/2017); Bladder surgery; Suprapubic cystostomy (02/25/2014); Cystoscopy (06/15/2020); [place] retrograde pyelogram (10/2/2020); pr cysto bladder w/ureteral catheterization (Left, 10/2/2020); pr cysto/uretero w/lithotripsy &indwell stent insrt (Left, 11/3/2020); [place] retrograde pyelogram (11/3/2020); and pr litholapaxy smpl/sm <2.5 cm (N/A, 12/22/2022).  Her family history includes Alzheimer's disease in her family; COPD in her father; Diabetes in her family and mother; Heart disease in her family; Hyperlipidemia in her mother and sister; Hypertension in her family, father, and mother.  She  reports that she has never smoked. She has never used smokeless tobacco. She reports that she does not currently use alcohol. She reports that she does not use drugs.  Current Outpatient Medications   Medication Sig Dispense Refill    baclofen 20 mg tablet TAKE 1 TABLET BY MOUTH 5 TIMES AS NEEDED (Patient taking differently: TAKE 1 TABLET BY MOUTH 5 TIMES AS NEEDED for muscle spasms) 450 tablet 3    clotrimazole (LOTRIMIN) 1 % cream Apply topically 2 (two) times a day as needed (yeast rash) (Patient not taking: Reported on 11/21/2023) 30 g 0    DULoxetine (CYMBALTA) 20 mg capsule TAKE 1 CAPSULE BY MOUTH EVERY DAY 30 capsule 11    furosemide (LASIX) 20 mg tablet Take 1 tablet (20 mg total) by mouth daily 30 tablet 0    OXcarbazepine (Trileptal) 150 mg tablet Take 150 mg by mouth 2 (two) times a day. Indications: Trigeminal Nerve Pain      oxybutynin  (DITROPAN-XL) 10 MG 24 hr tablet TAKE 1 TABLET BY MOUTH EVERY DAY 90 tablet 1    rosuvastatin (CRESTOR) 5 mg tablet TAKE 1 TABLET (5 MG TOTAL) BY MOUTH DAILY. 30 tablet 5     No current facility-administered medications for this visit.     She is allergic to latex..    Review of Systems   Constitutional: Negative.    HENT:  Negative for ear pain and hearing loss.    Eyes:  Negative for pain.   Respiratory:  Negative for chest tightness and shortness of breath.    Cardiovascular:  Negative for chest pain, palpitations and leg swelling.   Gastrointestinal:  Negative for diarrhea, nausea and vomiting.   Genitourinary:  Negative for dysuria.   Musculoskeletal:  Positive for gait problem.   Skin:  Positive for wound.   Neurological:  Positive for numbness. Negative for tremors and weakness.   Psychiatric/Behavioral:  Negative for behavioral problems, confusion and suicidal ideas.          Objective:       Wound 01/10/24 Pressure Injury Ischium Left;Posterior;Proximal (Active)   Wound Image Images linked 08/05/24 0858   Wound Description Granulation tissue 08/05/24 0858   Ruchi-wound Assessment Intact 08/05/24 0858   Wound Length (cm) 2.5 cm 08/05/24 0858   Wound Width (cm) 1.5 cm 08/05/24 0858   Wound Depth (cm) 1.4 cm 08/05/24 0858   Wound Surface Area (cm^2) 3.75 cm^2 08/05/24 0858   Wound Volume (cm^3) 5.25 cm^3 08/05/24 0858   Calculated Wound Volume (cm^3) 5.25 cm^3 08/05/24 0858   Number of underminings 1 08/05/24 0858   Undermining 1 3.5 08/05/24 0858   Undermining 1 is depth extending from 12-5 08/05/24 0858   Drainage Amount Large 08/05/24 0858   Drainage Description Serosanguineous 08/05/24 0858   Non-staged Wound Description Full thickness 08/05/24 0858   Dressing Status Intact 08/05/24 0858       Wound 01/29/24 Pressure Injury Ischium Right (Active)   Wound Image Images linked 08/05/24 0900   Wound Description Granulation tissue 08/05/24 0900   Pressure Injury Stage 4 08/05/24 0900   Ruchi-wound Assessment  Intact;Maceration 08/05/24 0900   Wound Length (cm) 0.4 cm 08/05/24 0900   Wound Width (cm) 0.8 cm 08/05/24 0900   Wound Depth (cm) 0.6 cm 08/05/24 0900   Wound Surface Area (cm^2) 0.32 cm^2 08/05/24 0900   Wound Volume (cm^3) 0.192 cm^3 08/05/24 0900   Calculated Wound Volume (cm^3) 0.19 cm^3 08/05/24 0900   Change in Wound Size % 62 08/05/24 0900   Undermining 1 0.2 08/05/24 0900   Undermining 1 is depth extending from 11-1 08/05/24 0900   Drainage Amount Moderate 08/05/24 0900   Drainage Description Serosanguineous 08/05/24 0900   Non-staged Wound Description Full thickness 08/05/24 0900   Dressing Status Intact 08/05/24 0900       /82   Pulse 84   Temp 98.4 °F (36.9 °C)   Resp 16               Wound Instructions:  Orders Placed This Encounter   Procedures    Wound cleansing and dressings     Orders Placed This Encounter  Procedures  · Wound cleansing and dressings Pressure Injury Left;Posterior;Proximal Ischium      Left  and right ischial wound     Wash your hands with soap and water.  Remove old dressing, discard into plastic bag and place in trash.  Cleanse the wound with dakins three times per week by VNA,  to use saline other days prior to applying a clean dressing. Do not use tissue or cotton balls. Do not scrub the wound. Pat dry using gauze.  Shower yes   Apply skin prep to skin surrounding wound  Apply lightly pack with mesalt to the open  wound.  Cover with abd and medfix tape or silicone border     Change dressing daily  Discontinue wound vac today     Standing Status:   Future     Standing Expiration Date:   8/12/2024    Wound Procedure Treatment     This order was created via procedure documentation    Debridement     This order was created via procedure documentation    Debridement     This order was created via procedure documentation        Diagnosis ICD-10-CM Associated Orders   1. Pressure ulcer of ischium, left, stage III ([facility])  L89.323 lidocaine (XYLOCAINE) 4 % topical  solution 5 mL     Wound cleansing and dressings     Wound Procedure Treatment      2. Pressure ulcer of right ischium, stage IV (HCC)  L89.314 lidocaine (XYLOCAINE) 4 % topical solution 5 mL     Wound cleansing and dressings     Wound Procedure Treatment      3. Multiple sclerosis (HCC)  G35

## 2024-08-05 NOTE — PROGRESS NOTES
Wound Procedure Treatment    Performed by: Thelma Manrique RN  Authorized by: CLEMENTINA Vincent    Associated wounds:   Wound 01/10/24 Pressure Injury Ischium Left;Posterior;Proximal  Wound 01/29/24 Pressure Injury Ischium Right  Wound cleansed with:  Dakin's 0.25%  Comments:  Mesalt and silicone border

## 2024-08-05 NOTE — PATIENT INSTRUCTIONS
Orders Placed This Encounter   Procedures    Wound cleansing and dressings     Orders Placed This Encounter  Procedures  · Wound cleansing and dressings Pressure Injury Left;Posterior;Proximal Ischium      Left  and right ischial wound     Wash your hands with soap and water.  Remove old dressing, discard into plastic bag and place in trash.  Cleanse the wound with dakins three times per week by VNA,  to use saline other days prior to applying a clean dressing. Do not use tissue or cotton balls. Do not scrub the wound. Pat dry using gauze.  Shower yes   Apply skin prep to skin surrounding wound  Apply lightly pack with mesalt to the open  wound.  Cover with abd and medfix tape or silicone border     Change dressing daily  Discontinue wound vac today     Standing Status:   Future     Standing Expiration Date:   8/12/2024    Wound Procedure Treatment     This order was created via procedure documentation      
sec  Exercise 2: modalities, manual therapy/massage -8 min to cervical paraspinals, upper trap/levator scap. positional release bilat levator scap and upper trap. Exercise 5: cervical isomets: 5 X 5 sec ea bilat. Exercise 6: door stretch: 3 X bilat  Exercise 7: cervical stretches: upper trap stretch with hand behind back  X 3 bilat. levator stretch X 3 bilat         Activity Tolerance:  Activity Tolerance: Patient Tolerated treatment well    Assessment: Body structures, Functions, Activity limitations: Decreased functional mobility , Decreased ROM, Decreased strength  Assessment: min to mod tightness bilat uper trap and levator scap. after manual therapy/positional release, she felt much better and pain reduced to 2/10. min Vc's for ex today  Prognosis: Good  REQUIRES PT FOLLOW UP: Yes    Patient Education:  Patient Education: cervical and lumbar roll, cervical retraction, shoulder roll, scap squeeze. posturing. upper trap/levator stretch, door stretch, cervical isomets                      Plan:  Times per week: 2X/week  Plan weeks: 4 weeks  Specific instructions for Next Treatment: modalities, manual therapy/massage PRN.  ROM, stretching, strength, core strength, posturing  Current Treatment Recommendations: Strengthening, ROM, Functional Mobility Training, IADL Training, Manual Therapy - Joint Manipulation, Manual Therapy - Soft Tissue Mobilization, Home Exercise Program, Modalities, Equipment Evaluation, Education, & procurement, Patient/Caregiver Education & Training  Plan Comment: progress as tolerated    Goals:  Patient goals : decrease pain    Short term goals  Time Frame for Short term goals: see LTG due to short ELOS    Long term goals  Time Frame for Long term goals : 4 weeks  Long term goal 1: decrease average pain <2/10 to tolerate reading 30 min, shopping 1 hour, standing >30 min for meal prep with no increase in symptoms  Long term goal 2: improve posturing to neutral with <3VC/visit for symptom

## 2024-08-07 ENCOUNTER — HOME CARE VISIT (OUTPATIENT)
Dept: HOME HEALTH SERVICES | Facility: HOME HEALTHCARE | Age: 54
End: 2024-08-07
Payer: MEDICARE

## 2024-08-09 ENCOUNTER — HOME CARE VISIT (OUTPATIENT)
Dept: HOME HEALTH SERVICES | Facility: HOME HEALTHCARE | Age: 54
End: 2024-08-09
Payer: MEDICARE

## 2024-08-09 VITALS
DIASTOLIC BLOOD PRESSURE: 78 MMHG | SYSTOLIC BLOOD PRESSURE: 100 MMHG | OXYGEN SATURATION: 98 % | HEART RATE: 105 BPM | TEMPERATURE: 97.7 F

## 2024-08-09 PROCEDURE — G0299 HHS/HOSPICE OF RN EA 15 MIN: HCPCS

## 2024-08-16 ENCOUNTER — OFFICE VISIT (OUTPATIENT)
Dept: UROLOGY | Facility: AMBULATORY SURGERY CENTER | Age: 54
End: 2024-08-16
Payer: MEDICARE

## 2024-08-16 ENCOUNTER — HOME CARE VISIT (OUTPATIENT)
Dept: HOME HEALTH SERVICES | Facility: HOME HEALTHCARE | Age: 54
End: 2024-08-16
Payer: MEDICARE

## 2024-08-16 VITALS
HEART RATE: 112 BPM | BODY MASS INDEX: 24.11 KG/M2 | HEIGHT: 66 IN | DIASTOLIC BLOOD PRESSURE: 74 MMHG | SYSTOLIC BLOOD PRESSURE: 122 MMHG | WEIGHT: 150 LBS | OXYGEN SATURATION: 96 %

## 2024-08-16 DIAGNOSIS — N20.0 NEPHROLITHIASIS: ICD-10-CM

## 2024-08-16 DIAGNOSIS — N31.9 NEUROGENIC BLADDER: ICD-10-CM

## 2024-08-16 DIAGNOSIS — N39.0 URINARY TRACT INFECTION ASSOCIATED WITH CYSTOSTOMY CATHETER, SUBSEQUENT ENCOUNTER: ICD-10-CM

## 2024-08-16 DIAGNOSIS — N21.0 BLADDER STONES: Primary | ICD-10-CM

## 2024-08-16 DIAGNOSIS — N21.0 BLADDER CALCULUS: ICD-10-CM

## 2024-08-16 DIAGNOSIS — T83.510D URINARY TRACT INFECTION ASSOCIATED WITH CYSTOSTOMY CATHETER, SUBSEQUENT ENCOUNTER: ICD-10-CM

## 2024-08-16 PROBLEM — T83.511A URINARY TRACT INFECTION ASSOCIATED WITH CATHETERIZATION OF URINARY TRACT  (HCC): Status: ACTIVE | Noted: 2024-08-16

## 2024-08-16 PROCEDURE — 99213 OFFICE O/P EST LOW 20 MIN: CPT

## 2024-08-16 NOTE — PROGRESS NOTES
Assessment and plan:     Bladder calculus  Bladder stone removal in 2022 by Dr. Osborne, no repeat imaging since that time. Will plan for kidney and bladder US now and call the patient with the results.     Nephrolithiasis  Underwent ureteroscopy and left stone removal in 2020. Will plan for kidney and bladder US to assess for any nephrolithiasis. Will call her with the results.     Neurogenic bladder  Has chronic SPT for management for neurogenic bladder and incontinence. Has been having visiting nurses exchnage catheter every 4 weeks. Has noticed increased sediment and blockage approaching the fourth week. I recommend flushing the catheter more frequently during these times and using acetic acid (white vinegar) diluted in NSS during flushes.      Urinary tract infection associated with catheterization of urinary tract  (HCC)  Has had two recent UTIs in March and July that were adequately treated with antibiotics  She is also being treated for pressure ulcers that may have been contributing to her UTIs  Recommend initiating supplement with cranberry, d-mannose and probiotic for UTI prevention  Instructed to call our office with any concern for UTI and we would be happy to order UC and treat with antibiotics  Continue with adequate fluid hydration and water intake  Kidney and bladder US ordered for now, will call with results  Follow up in 1 year       History of Present Illness     Fanny Schulz is a 54 y.o. Ashlie is a 52-year-old female with advanced multiple sclerosis.  She is in a scooter chair.  Her bladder is managed with an indwelling suprapubic tube.  She last had upper tract imaging with a renal bladder ultrasound performed in the fall of 2021 which is normal.  She has been with a chronic SPT to manage her neurogenic bladder. She has had two UTIs since March which have been adequately treated with abx. She has developed pressure ulcers and she is being treated for those as well through wound care. She  "does not take any supplements for UTI prevention but is interested in trying this. She states that she also has problems with her SPT becoming blocked approaching the fourth week when it has to be changed. Her  states that he flushes it as needed and not daily. She does get pretty painful bladder spasms when he flushes it so they only tend to flush it when it is indicated. She is here today in the office with her . She states that she is doing well from a urinary standpoint and overall doing well.     Laboratory     Lab Results   Component Value Date    BUN 7 03/15/2024    CREATININE 0.29 (L) 03/15/2024       No components found for: \"GFR\"    Lab Results   Component Value Date    GLUCOSE 94 10/16/2015    CALCIUM 9.3 03/15/2024     10/16/2015    K 3.8 03/15/2024    CO2 27 03/15/2024    CL 93 (L) 03/15/2024       Lab Results   Component Value Date    WBC 12.96 (H) 03/15/2024    HGB 11.4 (L) 03/15/2024    HCT 35.4 03/15/2024    MCV 85 03/15/2024     03/15/2024       No results found for: \"PSA\"    No results found for this or any previous visit (from the past 1 hour(s)).    Review of Systems     Review of Systems   Constitutional:  Negative for chills and fever.   Respiratory: Negative.  Negative for cough and shortness of breath.    Cardiovascular:  Negative for chest pain and leg swelling.   Genitourinary:  Negative for dyspareunia, dysuria, flank pain, frequency, hematuria, menstrual problem, pelvic pain, urgency, vaginal bleeding, vaginal discharge and vaginal pain.   Skin:  Negative for rash.   Neurological: Negative.    Hematological:  Negative for adenopathy. Does not bruise/bleed easily.                 Allergies     Allergies   Allergen Reactions    Latex Blisters     Added based on information entered during case entry, please review and add reactions, type, and severity as needed    blisters       Physical Exam     Physical Exam  Vitals reviewed.   Constitutional:       Appearance: " "Normal appearance.   HENT:      Head: Normocephalic and atraumatic.   Eyes:      Pupils: Pupils are equal, round, and reactive to light.   Cardiovascular:      Rate and Rhythm: Normal rate.   Pulmonary:      Effort: Pulmonary effort is normal.   Genitourinary:     Comments: SPT in place, draining clear yellow urine  Musculoskeletal:      Cervical back: Normal range of motion.   Skin:     General: Skin is warm and dry.   Neurological:      General: No focal deficit present.      Mental Status: She is alert and oriented to person, place, and time. Mental status is at baseline.      Gait: Gait abnormal (uses motorized wheelchair).   Psychiatric:         Mood and Affect: Mood normal.         Behavior: Behavior normal.         Thought Content: Thought content normal.         Judgment: Judgment normal.         Vital Signs     Vitals:    08/16/24 1232   BP: 122/74   BP Location: Left arm   Patient Position: Standing   Cuff Size: Standard   Pulse: (!) 112   SpO2: 96%   Weight: 68 kg (150 lb)   Height: 5' 6\" (1.676 m)       Current Medications       Current Outpatient Medications:     baclofen 20 mg tablet, TAKE 1 TABLET BY MOUTH 5 TIMES AS NEEDED (Patient taking differently: No sig reported), Disp: 450 tablet, Rfl: 3    clotrimazole (LOTRIMIN) 1 % cream, Apply topically 2 (two) times a day as needed (yeast rash), Disp: 30 g, Rfl: 0    DULoxetine (CYMBALTA) 20 mg capsule, TAKE 1 CAPSULE BY MOUTH EVERY DAY, Disp: 30 capsule, Rfl: 11    furosemide (LASIX) 20 mg tablet, Take 1 tablet (20 mg total) by mouth daily, Disp: 30 tablet, Rfl: 0    OXcarbazepine (Trileptal) 150 mg tablet, Take 150 mg by mouth 2 (two) times a day. Indications: Trigeminal Nerve Pain, Disp: , Rfl:     oxybutynin (DITROPAN-XL) 10 MG 24 hr tablet, TAKE 1 TABLET BY MOUTH EVERY DAY, Disp: 90 tablet, Rfl: 1    rosuvastatin (CRESTOR) 5 mg tablet, TAKE 1 TABLET (5 MG TOTAL) BY MOUTH DAILY., Disp: 30 tablet, Rfl: 5    Active Problems     Patient Active Problem " "List   Diagnosis    Suprapubic catheter (HCC)    Bladder calculus    Constipation    Recurrent major depressive disorder, in full remission (HCC)    Dysphagia    Dizziness    Hyperglycemia    Familial hypercholesterolemia    Lymphedema    Multiple sclerosis (HCC)    Muscle spasticity    Muscle weakness    Nephrolithiasis    Neurogenic bladder    Sleep disorder    Spinal stenosis of cervical region    Tremor    Urinary incontinence    Vision loss    Vitamin B12 deficiency    Vitamin D deficiency    Functional quadriplegia secondary to MS (HCC)    Allergic rhinitis    Screening mammogram, encounter for    Medication management contract signed    Thrombocytopenia (HCC)    Serum total bilirubin elevated    Hydronephrosis with urinary obstruction due to ureteral calculus    Candidal vaginitis    Alkaline phosphatase elevation    Abnormal thyroid function test    Ureteral calculus, left    Increased endometrial stripe thickness    Continuous opioid dependence (HCC)    Peripheral edema    Tinnitus of both ears    Hypophonia    Chronic lower extremity edema    Urinary tract infection associated with catheterization of urinary tract  (HCC)       Past Medical History     Past Medical History:   Diagnosis Date    Anesthesia     \"prefers if able to be put to sleep on stretcher before placed on table if possible due to severe spasticity    Bladder stones     Chronic pain disorder     neck    Contracture, right shoulder     right arm and hand    Dependent on wheelchair     motorized    Edema     dependant lower extremities    Elevated alkaline phosphatase level     Mildly elevated total, normal isoenzymes 4/15/15, normal 1/17; last assessed: [id number]    Fernandez catheter in place     #24 to large bag(silicone catheter)    Gallbladder disease     History of femur fracture     right leg    History of UTI     MS (multiple sclerosis) (HCC)     Muscle spasticity     especially with touch    Muscle weakness     Muscular dystrophy (HCC)  " "   Neck pain     Neurogenic bladder     Paralysis (HCC)     bilateral lower extremities    Port-A-Cath in place     left chest,\" hasn't used in approx 1 yr or so\"    Pressure injury of skin     right ischium    Sacral pressure sore     \"shearing, tegaderm in place,\"    Swallowing difficulty     Tinnitus     Urinary incontinence        Surgical History     Past Surgical History:   Procedure Laterality Date    BLADDER STONE REMOVAL N/A 12/4/2017    Procedure: CYSTO, LITHOLOPAXY HOLMIUM LASER;  Surgeon: Damir Osborne MD;  Location: [facility];  Service: Urology    BLADDER SURGERY      CHOLECYSTECTOMY      CYSTOSCOPY  06/15/2020    ESOPHAGOGASTRODUODENOSCOPY      FL RETROGRADE PYELOGRAM  10/2/2020    FL RETROGRADE PYELOGRAM  11/3/2020    KIDNEY STONE SURGERY      PORTACATH PLACEMENT Left     RI CYSTO BLADDER W/URETERAL CATHETERIZATION Left 10/2/2020    Procedure: CYSTOSCOPY LEFT RETROGRADE ; LEFT URETEROSCOPY; PYELOGRAM WITH STENT INSERTION AND SUPERPUBIC EXCHANGE;  Surgeon: Philip Mcdonald MD;  Location: [facility];  Service: Urology    RI CYSTO/URETERO W/LITHOTRIPSY &INDWELL STENT INSRT Left 11/3/2020    Procedure: CYSTOSCOPY URETEROSCOPY WITH RETROGRADE PYELOGRAM AND EXCHANGE STENT URETERAL, SP TUBE EXCHANGE, BASKET STONE EXTRACTION, BLADDER STONE EXTRACTION;  Surgeon: Damir Osborne MD;  Location: [facility];  Service: Urology    RI LITHOLAPAXY SMPL/SM <2.5 CM N/A 12/22/2022    Procedure: Cystolitholapaxy w/  laser lithotripsy of bladder stones; SUPRAPUBIC CATHETER CHANGE;  Surgeon: Damir Osborne MD;  Location: [facility];  Service: Urology    SUPRAPUBIC CYSTOSTOMY  02/25/2014    last assessed: [id number]       Family History     Family History   Problem Relation Age of Onset    Diabetes Mother     Hyperlipidemia Mother     Hypertension Mother     COPD Father     Hypertension Father     Hyperlipidemia Sister     Alzheimer's disease Family     Diabetes Family     Hypertension Family     Heart disease " Family        Social History     Social History     Social History     Tobacco Use   Smoking Status Never   Smokeless Tobacco Never       Past Surgical History:   Procedure Laterality Date    BLADDER STONE REMOVAL N/A 12/4/2017    Procedure: CYSTO, LITHOLOPAXY HOLMIUM LASER;  Surgeon: Damir Osborne MD;  Location: [facility];  Service: Urology    BLADDER SURGERY      CHOLECYSTECTOMY      CYSTOSCOPY  06/15/2020    ESOPHAGOGASTRODUODENOSCOPY      FL RETROGRADE PYELOGRAM  10/2/2020    FL RETROGRADE PYELOGRAM  11/3/2020    KIDNEY STONE SURGERY      PORTACATH PLACEMENT Left     IN CYSTO BLADDER W/URETERAL CATHETERIZATION Left 10/2/2020    Procedure: CYSTOSCOPY LEFT RETROGRADE ; LEFT URETEROSCOPY; PYELOGRAM WITH STENT INSERTION AND SUPERPUBIC EXCHANGE;  Surgeon: Philip Mcdonald MD;  [facility];  Service: Urology    IN CYSTO/URETERO W/LITHOTRIPSY &INDWELL STENT INSRT Left 11/3/2020    Procedure: CYSTOSCOPY URETEROSCOPY WITH RETROGRADE PYELOGRAM AND EXCHANGE STENT URETERAL, SP TUBE EXCHANGE, BASKET STONE EXTRACTION, BLADDER STONE EXTRACTION;  Surgeon: Damir Osborne MD;  [facility];  Service: Urology    IN LITHOLAPAXY SMPL/SM <2.5 CM N/A 12/22/2022    Procedure: Cystolitholapaxy w/  laser lithotripsy of bladder stones; SUPRAPUBIC CATHETER CHANGE;  Surgeon: Damir Osborne MD;  Location: [facility];  Service: Urology    SUPRAPUBIC CYSTOSTOMY  02/25/2014    last assessed: [id number]         The following portions of the patient's history were reviewed and updated as appropriate: allergies, current medications, past family history, past medical history, past social history, past surgical history and problem list    Please note :  Voice dictation software has been used to create this document.  There may be inadvertent transcription errors.    CLEMENTINA Natarajan

## 2024-08-16 NOTE — ASSESSMENT & PLAN NOTE
Underwent ureteroscopy and left stone removal in 2020. Will plan for kidney and bladder US to assess for any nephrolithiasis. Will call her with the results.

## 2024-08-16 NOTE — ASSESSMENT & PLAN NOTE
Has had two recent UTIs in March and July that were adequately treated with antibiotics  She is also being treated for pressure ulcers that may have been contributing to her UTIs  Recommend initiating supplement with cranberry, d-mannose and probiotic for UTI prevention  Instructed to call our office with any concern for UTI and we would be happy to order UC and treat with antibiotics  Continue with adequate fluid hydration and water intake  Kidney and bladder US ordered for now, will call with results  Follow up in 1 year

## 2024-08-16 NOTE — ASSESSMENT & PLAN NOTE
Has chronic SPT for management for neurogenic bladder and incontinence. Has been having visiting nurses exchnage catheter every 4 weeks. Has noticed increased sediment and blockage approaching the fourth week. I recommend flushing the catheter more frequently during these times and using acetic acid (white vinegar) diluted in NSS during flushes.

## 2024-08-16 NOTE — ASSESSMENT & PLAN NOTE
Bladder stone removal in 2022 by Dr. Osborne, no repeat imaging since that time. Will plan for kidney and bladder US now and call the patient with the results.

## 2024-08-19 ENCOUNTER — HOME CARE VISIT (OUTPATIENT)
Dept: HOME HEALTH SERVICES | Facility: HOME HEALTHCARE | Age: 54
End: 2024-08-19
Payer: MEDICARE

## 2024-08-19 ENCOUNTER — OFFICE VISIT (OUTPATIENT)
Dept: WOUND CARE | Facility: HOSPITAL | Age: 54
End: 2024-08-19
Payer: MEDICARE

## 2024-08-19 VITALS
SYSTOLIC BLOOD PRESSURE: 104 MMHG | TEMPERATURE: 97.3 F | RESPIRATION RATE: 16 BRPM | DIASTOLIC BLOOD PRESSURE: 78 MMHG | HEART RATE: 84 BPM

## 2024-08-19 DIAGNOSIS — L89.323 PRESSURE ULCER OF ISCHIUM, LEFT, STAGE III (HCC): Primary | ICD-10-CM

## 2024-08-19 DIAGNOSIS — L89.314 PRESSURE ULCER OF RIGHT ISCHIUM, STAGE IV (HCC): ICD-10-CM

## 2024-08-19 DIAGNOSIS — G35 MULTIPLE SCLEROSIS (HCC): ICD-10-CM

## 2024-08-19 PROCEDURE — 97597 DBRDMT OPN WND 1ST 20 CM/<: CPT | Performed by: NURSE PRACTITIONER

## 2024-08-19 NOTE — PATIENT INSTRUCTIONS
Orders Placed This Encounter   Procedures    Wound cleansing and dressings     Left  and right ischial wound     Wash your hands with soap and water.  Remove old dressing, discard into plastic bag and place in trash. Cleanse the wound with dakins three times per week by [facility],  to use saline other days prior to applying a clean dressing. Do not use tissue or cotton balls. Do not scrub the wound. Pat dry using gauze.  Shower yes     Apply skin prep to skin surrounding wound  Apply lightly pack with mesalt to the open wound.    Cover with abd and medfix tape or silicone border  Change dressing daily     Standing Status:   Future     Standing Expiration Date:   9/9/2024

## 2024-08-19 NOTE — PROGRESS NOTES
Wound Procedure Treatment    Performed by: Park Landa RN  Authorized by: CLEMENTINA Vincent    Associated wounds:   Wound 01/10/24 Pressure Injury Ischium Left;Posterior;Proximal  Wound 01/29/24 Pressure Injury Ischium Right  Wound cleansed with:  NSS  Applied to periwound:  Skin prep  Applied primary dressing:  Mesalt and Silicone bordered foam    Mesalt ribbon packed gently into wounds

## 2024-08-19 NOTE — PROGRESS NOTES
"Patient ID: Fanny Schulz is a 54 y.o. female Date of Birth 1970     Chief Complaint  Chief Complaint   Patient presents with    Follow Up Wound Care Visit     Left and right ischium wounds        Allergies  Latex    Assessment:     Diagnoses and all orders for this visit:    Pressure ulcer of ischium, left, stage III (HCC)  -     Wound cleansing and dressings; Future  -     Debridement Pressure Injury Left;Posterior;Proximal Ischium  -     Wound Procedure Treatment    Pressure ulcer of right ischium, stage IV (HCC)  -     Wound cleansing and dressings; Future  -     Debridement Pressure Injury Right Ischium  -     Wound Procedure Treatment    Multiple sclerosis (HCC)  -     Wound Procedure Treatment              Debridement   Wound 01/10/24 Pressure Injury Ischium Left;Posterior;Proximal    Universal Protocol:  procedure performed by consultantConsent: Written consent obtained.  Consent given by: patient  Time out: Immediately prior to procedure a \"time out\" was called to verify the correct patient, procedure, equipment, support staff and site/side marked as required.  Timeout called at: 8/19/2024 9:29 AM.  Patient identity confirmed: verbally with patient    Debridement Details  Performed by: NP  Debridement type: selective  Pain control: lidocaine 4%      Post-debridement measurements  Length (cm): 2  Width (cm): 0.7  Depth (cm): 0.9  Percent debrided: 100%  Surface Area (cm^2): 1.4  Area Debrided (cm^2): 1.4  Volume (cm^3): 1.26    Devitalized tissue debrided: biofilm, fibrin and slough  Instrument(s) utilized: curette  Bleeding: small  Hemostasis obtained with: pressure  Procedural pain (0-10): 0  Post-procedural pain: 0   Response to treatment: procedure was tolerated well    Debridement   Wound 01/29/24 Pressure Injury Ischium Right    Universal Protocol:  procedure performed by consultantConsent: Written consent obtained.  Consent given by: patient  Time out: Immediately prior to procedure a \"time " "out\" was called to verify the correct patient, procedure, equipment, support staff and site/side marked as required.  Timeout called at: 8/19/2024 9:29 AM.  Patient identity confirmed: verbally with patient    Debridement Details  Performed by: NP  Debridement type: selective  Pain control: lidocaine 4%      Post-debridement measurements  Length (cm): 0.3  Width (cm): 0.7  Depth (cm): 0.6  Percent debrided: 100%  Surface Area (cm^2): 0.21  Area Debrided (cm^2): 0.21  Volume (cm^3): 0.13    Devitalized tissue debrided: biofilm, fibrin and slough  Instrument(s) utilized: curette  Bleeding: small  Hemostasis obtained with: pressure  Procedural pain (0-10): 0  Post-procedural pain: 0   Response to treatment: procedure was tolerated well        Plan:  1.  F/U visit.  Wounds debrided.  Wounds improving and measuring smaller.  Continue current plan of care.  2.  Patient will follow-up in 3 weeks     Wound 01/10/24 Pressure Injury Ischium Left;Posterior;Proximal (Active)   Wound Image Images linked 08/19/24 0903   Wound Description Granulation tissue 08/19/24 0912   Pressure Injury Stage 3 08/19/24 0912   Ruchi-wound Assessment Intact;Pink 08/19/24 0912   Wound Length (cm) 2 cm 08/19/24 0912   Wound Width (cm) 0.7 cm 08/19/24 0912   Wound Depth (cm) 0.9 cm 08/19/24 0912   Wound Surface Area (cm^2) 1.4 cm^2 08/19/24 0912   Wound Volume (cm^3) 1.26 cm^3 08/19/24 0912   Calculated Wound Volume (cm^3) 1.26 cm^3 08/19/24 0912   Number of underminings 1 08/19/24 0912   Undermining 1 2 08/19/24 0912   Undermining 1 is depth extending from 12-5 08/19/24 0912   Drainage Amount Large 08/19/24 0912   Drainage Description Serosanguineous 08/19/24 0912   Non-staged Wound Description Full thickness 08/19/24 0912   Packing- # removed 1 08/19/24 0912   Dressing Status Intact 08/19/24 0912       Wound 01/29/24 Pressure Injury Ischium Right (Active)   Wound Image Images linked 08/19/24 0903   Wound Description Granulation tissue;Pink 08/19/24 " 0912   Pressure Injury Stage 4 08/19/24 0912   Ruchi-wound Assessment Intact;Maceration;Callus 08/19/24 0912   Wound Length (cm) 0.3 cm 08/19/24 0912   Wound Width (cm) 0.7 cm 08/19/24 0912   Wound Depth (cm) 0.6 cm 08/19/24 0912   Wound Surface Area (cm^2) 0.21 cm^2 08/19/24 0912   Wound Volume (cm^3) 0.126 cm^3 08/19/24 0912   Calculated Wound Volume (cm^3) 0.13 cm^3 08/19/24 0912   Change in Wound Size % 74 08/19/24 0912   Drainage Amount Moderate 08/19/24 0912   Drainage Description Serosanguineous 08/19/24 0912   Non-staged Wound Description Full thickness 08/19/24 0912   Dressing Status Intact 08/19/24 0912       Wound 01/10/24 Pressure Injury Ischium Left;Posterior;Proximal (Active)   Date First Assessed/Time First Assessed: 01/10/24 0956   Primary Wound Type: Pressure Injury  Location: Ischium  Wound Location Orientation: Left;Posterior;Proximal       Wound 01/29/24 Pressure Injury Ischium Right (Active)   Date First Assessed: 01/29/24   Present on Original Admission: Yes  Primary Wound Type: Pressure Injury  Location: Ischium  Wound Location Orientation: Right       [REMOVED] Wound 07/29/21 Pressure Injury Ischium Right (Removed)   Resolved Date: 08/02/22  Date First Assessed/Time First Assessed: 07/29/21 1536   Pre-Existing Wound: Yes  Primary Wound Type: Pressure Injury  Location: Ischium  Wound Location Orientation: Right       [REMOVED] Wound 12/22/22 Other (comment) Buttocks Left (Removed)   Resolved Date: 01/29/24  Date First Assessed/Time First Assessed: 12/22/22 1104   Primary Wound Type: Other (comment)  Location: Buttocks  Wound Location Orientation: Left       [REMOVED] Wound 12/22/22 Pressure Injury Buttocks Right (Removed)   Resolved Date: 01/29/24  Date First Assessed/Time First Assessed: 12/22/22 1106   Primary Wound Type: Pressure Injury  Location: Buttocks  Wound Location Orientation: Right       [REMOVED] Wound 12/22/22 Pressure Injury Heel Right (Removed)   Resolved Date: 01/29/24  Date  First Assessed/Time First Assessed: 12/22/22 1110   Primary Wound Type: Pressure Injury  Location: Heel  Wound Location Orientation: Right       [REMOVED] Wound 12/22/22 Pressure Injury Thigh Anterior;Proximal;Right (Removed)   Resolved Date: 01/29/24  Date First Assessed/Time First Assessed: 12/22/22 1112   Primary Wound Type: Pressure Injury  Location: Thigh  Wound Location Orientation: Anterior;Proximal;Right  Wound Description (Comments): BLEEDING EXCORIATED       [REMOVED] Wound 12/22/22 Pressure Injury Thigh Anterior;Left;Proximal (Removed)   Resolved Date: 01/29/24  Date First Assessed/Time First Assessed: 12/22/22 1112   Primary Wound Type: Pressure Injury  Location: Thigh  Wound Location Orientation: Anterior;Left;Proximal  Wound Description (Comments): BLEEDING EXCORIATED       [REMOVED] Wound 12/22/22 Vagina N/A (Removed)   Resolved Date: 01/29/24  Date First Assessed/Time First Assessed: 12/22/22 1313   Location: Vagina  Wound Location Orientation: N/A       [REMOVED] Wound 01/09/24 Right (Removed)   Resolved Date: 01/10/24  Date First Assessed/Time First Assessed: 01/09/24 1832   Wound Location Orientation: Right  Wound Outcome: Healed       [REMOVED] Wound 01/09/24 Flank Left (Removed)   Resolved Date: 01/10/24  Date First Assessed/Time First Assessed: 01/09/24 1835   Location: Flank  Wound Location Orientation: Left  Wound Outcome: Healed       [REMOVED] Wound 01/09/24 Arm Left;Posterior (Removed)   Resolved Date: 01/29/24  Date First Assessed/Time First Assessed: 01/09/24 1836   Location: Arm  Wound Location Orientation: Left;Posterior       [REMOVED] Wound 06/24/24 MASD Groin Left;Posterior;Proximal (Removed)   Resolved Date: 06/28/24  Date First Assessed: 06/24/24   Present on Original Admission: Yes  Primary Wound Type: MASD  Location: Groin  Wound Location Orientation: Left;Posterior;Proximal       Subjective:      .    F/u visit for pressure ulcers of her left and right ischial regions.  No new  complaints.  She denies any pain, fevers, or chills.        The following portions of the patient's history were reviewed and updated as appropriate: She  has a past medical history of Anesthesia, Bladder stones, Chronic pain disorder, Contracture, right shoulder, Dependent on wheelchair, Edema, Elevated alkaline phosphatase level, Fernandez catheter in place, Gallbladder disease, History of femur fracture, History of UTI, MS (multiple sclerosis) ([facility]), Muscle spasticity, Muscle weakness, Muscular dystrophy ([facility]), Neck pain, Neurogenic bladder, Paralysis ([facility]), Port-A-Cath in place, Pressure injury of skin, Sacral pressure sore, Swallowing difficulty, Tinnitus, and Urinary incontinence.  She   Patient Active Problem List    Diagnosis Date Noted    Urinary tract infection associated with catheterization of urinary tract  ([facility]) 08/16/2024    Chronic lower extremity edema 01/08/2024    Tinnitus of both ears 11/21/2023    Hypophonia 11/21/2023    Peripheral edema 02/20/2023    Continuous opioid dependence ([facility]) 09/08/2022    Increased endometrial stripe thickness 03/10/2021    Ureteral calculus, left 11/03/2020    Alkaline phosphatase elevation 10/28/2020    Abnormal thyroid function test 10/28/2020    Candidal vaginitis 10/19/2020    Hydronephrosis with urinary obstruction due to ureteral calculus 10/02/2020    Thrombocytopenia ([facility]) 10/01/2020    Serum total bilirubin elevated 10/01/2020    Medication management contract signed 02/21/2020    Screening mammogram, encounter for 05/29/2019    Allergic rhinitis 03/26/2018    Functional quadriplegia secondary to MS ([facility]) 01/25/2018    Suprapubic catheter ([facility]) 11/17/2017    Nephrolithiasis 04/22/2016    Bladder calculus 09/02/2015    Muscle spasticity 04/21/2015    Vitamin B12 deficiency 01/16/2015    Constipation 01/14/2015    Hyperglycemia 11/25/2014    Familial hypercholesterolemia 11/25/2014    Recurrent major depressive disorder, in full remission ([facility]) 01/22/2014     Lymphedema 01/22/2014    Spinal stenosis of cervical region 01/22/2014    Urinary incontinence 01/22/2014    Vitamin D deficiency 11/18/2013    Dysphagia 11/04/2013    Dizziness 11/04/2013    Muscle weakness 11/04/2013    Neurogenic bladder 11/04/2013    Sleep disorder 11/04/2013    Tremor 11/04/2013    Vision loss 11/04/2013    Multiple sclerosis (HCC) 10/21/2013     She  has a past surgical history that includes Cholecystectomy; Kidney stone surgery; Portacath placement (Left); Esophagogastroduodenoscopy; Bladder stone removal (N/A, 12/4/2017); Bladder surgery; Suprapubic cystostomy (02/25/2014); Cystoscopy (06/15/2020); [place] retrograde pyelogram (10/2/2020); pr cysto bladder w/ureteral catheterization (Left, 10/2/2020); pr cysto/uretero w/lithotripsy &indwell stent insrt (Left, 11/3/2020); [place] retrograde pyelogram (11/3/2020); and pr litholapaxy smpl/sm <2.5 cm (N/A, 12/22/2022).  Her family history includes Alzheimer's disease in her family; COPD in her father; Diabetes in her family and mother; Heart disease in her family; Hyperlipidemia in her mother and sister; Hypertension in her family, father, and mother.  She  reports that she has never smoked. She has never used smokeless tobacco. She reports that she does not currently use alcohol. She reports current drug use.  Current Outpatient Medications   Medication Sig Dispense Refill    baclofen 20 mg tablet TAKE 1 TABLET BY MOUTH 5 TIMES AS NEEDED (Patient taking differently: No sig reported) 450 tablet 3    clotrimazole (LOTRIMIN) 1 % cream Apply topically 2 (two) times a day as needed (yeast rash) 30 g 0    DULoxetine (CYMBALTA) 20 mg capsule TAKE 1 CAPSULE BY MOUTH EVERY DAY 30 capsule 11    furosemide (LASIX) 20 mg tablet Take 1 tablet (20 mg total) by mouth daily 30 tablet 0    OXcarbazepine (Trileptal) 150 mg tablet Take 150 mg by mouth 2 (two) times a day. Indications: Trigeminal Nerve Pain      oxybutynin (DITROPAN-XL) 10 MG 24 hr tablet TAKE 1 TABLET BY  MOUTH EVERY DAY 90 tablet 1    rosuvastatin (CRESTOR) 5 mg tablet TAKE 1 TABLET (5 MG TOTAL) BY MOUTH DAILY. 30 tablet 5     No current facility-administered medications for this visit.     She is allergic to latex..    Review of Systems   Constitutional: Negative.    HENT:  Negative for ear pain and hearing loss.    Eyes:  Negative for pain.   Respiratory:  Negative for chest tightness and shortness of breath.    Cardiovascular:  Negative for chest pain, palpitations and leg swelling.   Gastrointestinal:  Negative for diarrhea, nausea and vomiting.   Genitourinary:  Negative for dysuria.   Musculoskeletal:  Positive for gait problem.   Skin:  Positive for wound.   Neurological:  Positive for numbness. Negative for tremors and weakness.   Psychiatric/Behavioral:  Negative for behavioral problems, confusion and suicidal ideas.          Objective:       Wound 01/10/24 Pressure Injury Ischium Left;Posterior;Proximal (Active)   Wound Image Images linked 08/19/24 0903   Wound Description Granulation tissue 08/19/24 0912   Pressure Injury Stage 3 08/19/24 0912   Ruchi-wound Assessment Intact;Pink 08/19/24 0912   Wound Length (cm) 2 cm 08/19/24 0912   Wound Width (cm) 0.7 cm 08/19/24 0912   Wound Depth (cm) 0.9 cm 08/19/24 0912   Wound Surface Area (cm^2) 1.4 cm^2 08/19/24 0912   Wound Volume (cm^3) 1.26 cm^3 08/19/24 0912   Calculated Wound Volume (cm^3) 1.26 cm^3 08/19/24 0912   Number of underminings 1 08/19/24 0912   Undermining 1 2 08/19/24 0912   Undermining 1 is depth extending from 12-5 08/19/24 0912   Drainage Amount Large 08/19/24 0912   Drainage Description Serosanguineous 08/19/24 0912   Non-staged Wound Description Full thickness 08/19/24 0912   Packing- # removed 1 08/19/24 0912   Dressing Status Intact 08/19/24 0912       Wound 01/29/24 Pressure Injury Ischium Right (Active)   Wound Image Images linked 08/19/24 0903   Wound Description Granulation tissue;Pink 08/19/24 0912   Pressure Injury Stage 4 08/19/24  0912   Ruchi-wound Assessment Intact;Maceration;Callus 08/19/24 0912   Wound Length (cm) 0.3 cm 08/19/24 0912   Wound Width (cm) 0.7 cm 08/19/24 0912   Wound Depth (cm) 0.6 cm 08/19/24 0912   Wound Surface Area (cm^2) 0.21 cm^2 08/19/24 0912   Wound Volume (cm^3) 0.126 cm^3 08/19/24 0912   Calculated Wound Volume (cm^3) 0.13 cm^3 08/19/24 0912   Change in Wound Size % 74 08/19/24 0912   Drainage Amount Moderate 08/19/24 0912   Drainage Description Serosanguineous 08/19/24 0912   Non-staged Wound Description Full thickness 08/19/24 0912   Dressing Status Intact 08/19/24 0912       /78   Pulse 84   Temp (!) 97.3 °F (36.3 °C)   Resp 16               Wound Instructions:  Orders Placed This Encounter   Procedures    Wound cleansing and dressings     Left  and right ischial wound     Wash your hands with soap and water.  Remove old dressing, discard into plastic bag and place in trash. Cleanse the wound with dakins three times per week by [facility],  to use saline other days prior to applying a clean dressing. Do not use tissue or cotton balls. Do not scrub the wound. Pat dry using gauze.  Shower yes     Apply skin prep to skin surrounding wound  Apply lightly pack with mesalt to the open wound.    Cover with abd and medfix tape or silicone border  Change dressing daily     Standing Status:   Future     Standing Expiration Date:   9/9/2024    Debridement Pressure Injury Left;Posterior;Proximal Ischium     This order was created via procedure documentation    Debridement Pressure Injury Right Ischium     This order was created via procedure documentation    Wound Procedure Treatment     This order was created via procedure documentation        Diagnosis ICD-10-CM Associated Orders   1. Pressure ulcer of ischium, left, stage III (HCC)  L89.323 Wound cleansing and dressings     Debridement Pressure Injury Left;Posterior;Proximal Ischium     Wound Procedure Treatment      2. Pressure ulcer of right ischium, stage IV  (HCC)  L89.314 Wound cleansing and dressings     Debridement Pressure Injury Right Ischium     Wound Procedure Treatment      3. Multiple sclerosis (HCC)  G35 Wound Procedure Treatment

## 2024-08-23 ENCOUNTER — HOME CARE VISIT (OUTPATIENT)
Dept: HOME HEALTH SERVICES | Facility: HOME HEALTHCARE | Age: 54
End: 2024-08-23
Payer: MEDICARE

## 2024-08-23 VITALS
DIASTOLIC BLOOD PRESSURE: 70 MMHG | OXYGEN SATURATION: 97 % | TEMPERATURE: 98.1 F | SYSTOLIC BLOOD PRESSURE: 104 MMHG | HEART RATE: 80 BPM

## 2024-08-23 PROCEDURE — G0299 HHS/HOSPICE OF RN EA 15 MIN: HCPCS

## 2024-08-30 ENCOUNTER — HOME CARE VISIT (OUTPATIENT)
Dept: HOME HEALTH SERVICES | Facility: HOME HEALTHCARE | Age: 54
End: 2024-08-30
Payer: MEDICARE

## 2024-08-30 VITALS
DIASTOLIC BLOOD PRESSURE: 74 MMHG | TEMPERATURE: 98 F | HEART RATE: 95 BPM | OXYGEN SATURATION: 100 % | SYSTOLIC BLOOD PRESSURE: 106 MMHG

## 2024-08-30 PROCEDURE — G0299 HHS/HOSPICE OF RN EA 15 MIN: HCPCS

## 2024-09-04 DIAGNOSIS — R60.0 LOWER EXTREMITY EDEMA: ICD-10-CM

## 2024-09-05 RX ORDER — FUROSEMIDE 20 MG
20 TABLET ORAL DAILY
Qty: 30 TABLET | Refills: 0 | Status: SHIPPED | OUTPATIENT
Start: 2024-09-05

## 2024-09-06 ENCOUNTER — HOME CARE VISIT (OUTPATIENT)
Dept: HOME HEALTH SERVICES | Facility: HOME HEALTHCARE | Age: 54
End: 2024-09-06
Payer: MEDICARE

## 2024-09-06 VITALS
DIASTOLIC BLOOD PRESSURE: 70 MMHG | RESPIRATION RATE: 16 BRPM | OXYGEN SATURATION: 98 % | TEMPERATURE: 98 F | HEART RATE: 97 BPM | SYSTOLIC BLOOD PRESSURE: 120 MMHG

## 2024-09-06 PROCEDURE — G0299 HHS/HOSPICE OF RN EA 15 MIN: HCPCS

## 2024-09-09 ENCOUNTER — HOME CARE VISIT (OUTPATIENT)
Dept: HOME HEALTH SERVICES | Facility: HOME HEALTHCARE | Age: 54
End: 2024-09-09
Payer: MEDICARE

## 2024-09-11 ENCOUNTER — TELEPHONE (OUTPATIENT)
Dept: NEUROLOGY | Facility: CLINIC | Age: 54
End: 2024-09-11

## 2024-09-11 NOTE — TELEPHONE ENCOUNTER
Patient is scheduled with Dr MCINTYRE on 10/22. This visit needs to be rescheduled to a new date as provider is unavailable on current scheduled date.     MyChart sent.     Please have this visit rescheduled to a new date.

## 2024-09-13 ENCOUNTER — HOME CARE VISIT (OUTPATIENT)
Dept: HOME HEALTH SERVICES | Facility: HOME HEALTHCARE | Age: 54
End: 2024-09-13
Payer: MEDICARE

## 2024-09-13 VITALS
SYSTOLIC BLOOD PRESSURE: 110 MMHG | DIASTOLIC BLOOD PRESSURE: 68 MMHG | OXYGEN SATURATION: 98 % | HEART RATE: 80 BPM | TEMPERATURE: 98.2 F

## 2024-09-13 PROCEDURE — G0299 HHS/HOSPICE OF RN EA 15 MIN: HCPCS

## 2024-09-15 PROBLEM — T83.511A URINARY TRACT INFECTION ASSOCIATED WITH CATHETERIZATION OF URINARY TRACT  (HCC): Status: RESOLVED | Noted: 2024-08-16 | Resolved: 2024-09-15

## 2024-09-15 PROBLEM — N39.0 URINARY TRACT INFECTION ASSOCIATED WITH CATHETERIZATION OF URINARY TRACT  (HCC): Status: RESOLVED | Noted: 2024-08-16 | Resolved: 2024-09-15

## 2024-09-16 ENCOUNTER — HOME CARE VISIT (OUTPATIENT)
Dept: HOME HEALTH SERVICES | Facility: HOME HEALTHCARE | Age: 54
End: 2024-09-16
Payer: MEDICARE

## 2024-09-16 ENCOUNTER — OFFICE VISIT (OUTPATIENT)
Dept: WOUND CARE | Facility: HOSPITAL | Age: 54
End: 2024-09-16
Payer: COMMERCIAL

## 2024-09-16 VITALS
RESPIRATION RATE: 16 BRPM | TEMPERATURE: 97.7 F | HEART RATE: 100 BPM | SYSTOLIC BLOOD PRESSURE: 108 MMHG | DIASTOLIC BLOOD PRESSURE: 62 MMHG

## 2024-09-16 DIAGNOSIS — G35 MULTIPLE SCLEROSIS (HCC): ICD-10-CM

## 2024-09-16 DIAGNOSIS — L89.314 PRESSURE ULCER OF RIGHT ISCHIUM, STAGE IV (HCC): ICD-10-CM

## 2024-09-16 DIAGNOSIS — L89.323 PRESSURE ULCER OF ISCHIUM, LEFT, STAGE III (HCC): Primary | ICD-10-CM

## 2024-09-16 PROCEDURE — 11042 DBRDMT SUBQ TIS 1ST 20SQCM/<: CPT

## 2024-09-16 RX ORDER — LIDOCAINE HYDROCHLORIDE 40 MG/ML
5 SOLUTION TOPICAL ONCE
Status: COMPLETED | OUTPATIENT
Start: 2024-09-16 | End: 2024-09-16

## 2024-09-16 RX ADMIN — LIDOCAINE HYDROCHLORIDE 5 ML: 40 SOLUTION TOPICAL at 09:32

## 2024-09-16 NOTE — PROGRESS NOTES
Patient ID: Fanny Schulz is a 54 y.o. female Date of Birth 1970       Chief Complaint   Patient presents with    Follow Up Wound Care Visit     Bilateral ischium wounds        Allergies:  Latex    Diagnosis:      Diagnosis ICD-10-CM Associated Orders   1. Pressure ulcer of ischium, left, stage III ([facility])  L89.323 lidocaine (XYLOCAINE) 4 % topical solution 5 mL     Wound cleansing and dressings     Wound Procedure Treatment     Debridement      2. Pressure ulcer of right ischium, stage IV ([facility])  L89.314 lidocaine (XYLOCAINE) 4 % topical solution 5 mL     Wound cleansing and dressings     Wound Procedure Treatment     Debridement      3. Multiple sclerosis ([facility])  G35               Assessment & Plan:  Left ischium stage III pressure injury is measuring slightly larger in width however wound appears to be filling in with less depth.  Wound is slightly hypergranular in appearance, suspect related to drainage amount.  Right ischium is measuring smaller with noted callus edges.  Will recommend to change wound management to Aquacel Ag ribbon and foam dressings to the bilateral ischium's 3 times a week.  See wounds orders below  Wounds are not showing any s/s of clinical infection.   Debrided as below.   Pressure relief, offload pressure to wound as much as possible  Counseled patient on the importance of adequate protein intake (3-4 servings per day) to promote wound healing.  Follow-up in 3 week(s). Call sooner with questions or concerns or any signs of infection such as redness, swelling, increased/purulent drainage, fever or chills, and increased pain.  Patient and  verbalized understanding and agrees with plan of care.           Subjective:   9/16/24: Patient presents to wound care center for follow-up visit of bilateral ischial wounds.  Patient has been using Mesalt to the wounds with bordered foam dressings to cover.  Patient is accompanied by her  who provides to wound care.  No wound care  "related complaints offered.  Denies increased pain or drainage to the wounds.  No fever or chills.        The following portions of the patient's history were reviewed and updated as appropriate:   Patient Active Problem List   Diagnosis    Suprapubic catheter (HCC)    Bladder calculus    Constipation    Recurrent major depressive disorder, in full remission (HCC)    Dysphagia    Dizziness    Hyperglycemia    Familial hypercholesterolemia    Lymphedema    Multiple sclerosis (HCC)    Muscle spasticity    Muscle weakness    Nephrolithiasis    Neurogenic bladder    Sleep disorder    Spinal stenosis of cervical region    Tremor    Urinary incontinence    Vision loss    Vitamin B12 deficiency    Vitamin D deficiency    Functional quadriplegia secondary to MS (HCC)    Allergic rhinitis    Screening mammogram, encounter for    Medication management contract signed    Thrombocytopenia (HCC)    Serum total bilirubin elevated    Hydronephrosis with urinary obstruction due to ureteral calculus    Candidal vaginitis    Alkaline phosphatase elevation    Abnormal thyroid function test    Ureteral calculus, left    Increased endometrial stripe thickness    Continuous opioid dependence (HCC)    Peripheral edema    Tinnitus of both ears    Hypophonia    Chronic lower extremity edema     Past Medical History:   Diagnosis Date    Anesthesia     \"prefers if able to be put to sleep on stretcher before placed on table if possible due to severe spasticity    Bladder stones     Chronic pain disorder     neck    Contracture, right shoulder     right arm and hand    Dependent on wheelchair     motorized    Edema     dependant lower extremities    Elevated alkaline phosphatase level     Mildly elevated total, normal isoenzymes 4/15/15, normal 1/17; last assessed: [id number]    Fernandez catheter in place     #24 to large bag(silicone catheter)    Gallbladder disease     History of femur fracture     right leg    History of UTI     MS (multiple " "sclerosis) (HCC)     Muscle spasticity     especially with touch    Muscle weakness     Muscular dystrophy (HCC)     Neck pain     Neurogenic bladder     Paralysis (HCC)     bilateral lower extremities    Port-A-Cath in place     left chest,\" hasn't used in approx 1 yr or so\"    Pressure injury of skin     right ischium    Sacral pressure sore     \"shearing, tegaderm in place,\"    Swallowing difficulty     Tinnitus     Urinary incontinence      Past Surgical History:   Procedure Laterality Date    BLADDER STONE REMOVAL N/A 12/4/2017    Procedure: CYSTO, LITHOLOPAXY HOLMIUM LASER;  Surgeon: Damir Osborne MD;  Location: [facility];  Service: Urology    BLADDER SURGERY      CHOLECYSTECTOMY      CYSTOSCOPY  06/15/2020    ESOPHAGOGASTRODUODENOSCOPY      FL RETROGRADE PYELOGRAM  10/2/2020    FL RETROGRADE PYELOGRAM  11/3/2020    KIDNEY STONE SURGERY      PORTACATH PLACEMENT Left     IN CYSTO BLADDER W/URETERAL CATHETERIZATION Left 10/2/2020    Procedure: CYSTOSCOPY LEFT RETROGRADE ; LEFT URETEROSCOPY; PYELOGRAM WITH STENT INSERTION AND SUPERPUBIC EXCHANGE;  Surgeon: Philip Mcdonald MD;  Location: [facility];  Service: Urology    IN CYSTO/URETERO W/LITHOTRIPSY &INDWELL STENT INSRT Left 11/3/2020    Procedure: CYSTOSCOPY URETEROSCOPY WITH RETROGRADE PYELOGRAM AND EXCHANGE STENT URETERAL, SP TUBE EXCHANGE, BASKET STONE EXTRACTION, BLADDER STONE EXTRACTION;  Surgeon: Damir Osborne MD;  Location: [facility];  Service: Urology    IN LITHOLAPAXY SMPL/SM <2.5 CM N/A 12/22/2022    Procedure: Cystolitholapaxy w/  laser lithotripsy of bladder stones; SUPRAPUBIC CATHETER CHANGE;  Surgeon: Damir Osborne MD;  Location: [facility];  Service: Urology    SUPRAPUBIC CYSTOSTOMY  02/25/2014    last assessed: [id number]     Family History   Problem Relation Age of Onset    Diabetes Mother     Hyperlipidemia Mother     Hypertension Mother     COPD Father     Hypertension Father     Hyperlipidemia Sister     Alzheimer's " disease Family     Diabetes Family     Hypertension Family     Heart disease Family       Social History     Socioeconomic History    Marital status: /Civil Union     Spouse name: None    Number of children: None    Years of education: None    Highest education level: None   Occupational History    None   Tobacco Use    Smoking status: Never    Smokeless tobacco: Never   Vaping Use    Vaping status: Never Used   Substance and Sexual Activity    Alcohol use: Not Currently    Drug use: Yes    Sexual activity: Yes   Other Topics Concern    None   Social History Narrative    Caffeine use    Currently on disability    Daily coffee consumption (2 cups/day)    Educational level- completed 2nd year college    Lives with adult children    Not currently employed    Wheelchair dependent      Social Determinants of Health     Financial Resource Strain: Low Risk  (2/20/2023)    Overall Financial Resource Strain (CARDIA)     Difficulty of Paying Living Expenses: Not hard at all   Food Insecurity: Not on file   Transportation Needs: No Transportation Needs (2/20/2023)    PRAPARE - Transportation     Lack of Transportation (Medical): No     Lack of Transportation (Non-Medical): No   Physical Activity: Not on file   Stress: Not on file   Social Connections: Not on file   Intimate Partner Violence: Not on file   Housing Stability: Not on file        Current Outpatient Medications:     baclofen 20 mg tablet, TAKE 1 TABLET BY MOUTH 5 TIMES AS NEEDED (Patient taking differently: No sig reported), Disp: 450 tablet, Rfl: 3    clotrimazole (LOTRIMIN) 1 % cream, Apply topically 2 (two) times a day as needed (yeast rash), Disp: 30 g, Rfl: 0    DULoxetine (CYMBALTA) 20 mg capsule, TAKE 1 CAPSULE BY MOUTH EVERY DAY, Disp: 30 capsule, Rfl: 11    furosemide (LASIX) 20 mg tablet, TAKE 1 TABLET BY MOUTH EVERY DAY, Disp: 30 tablet, Rfl: 0    OXcarbazepine (Trileptal) 150 mg tablet, Take 150 mg by mouth 2 (two) times a day. Indications:  Trigeminal Nerve Pain, Disp: , Rfl:     oxybutynin (DITROPAN-XL) 10 MG 24 hr tablet, TAKE 1 TABLET BY MOUTH EVERY DAY, Disp: 90 tablet, Rfl: 1    rosuvastatin (CRESTOR) 5 mg tablet, TAKE 1 TABLET (5 MG TOTAL) BY MOUTH DAILY., Disp: 30 tablet, Rfl: 5  No current facility-administered medications for this visit.    Review of Systems   Constitutional:  Negative for chills and fever.   HENT:  Negative for congestion and sneezing.    Respiratory:  Negative for cough and shortness of breath.    Musculoskeletal:  Positive for gait problem.   Skin:  Positive for wound.   Psychiatric/Behavioral:  Negative for agitation.        Objective:  /62   Pulse 100   Temp 97.7 °F (36.5 °C)   Resp 16         Physical Exam  Constitutional:       General: She is awake. She is not in acute distress.     Appearance: She is not ill-appearing, toxic-appearing or diaphoretic.   HENT:      Head: Normocephalic and atraumatic.      Right Ear: External ear normal.      Left Ear: External ear normal.   Eyes:      Conjunctiva/sclera: Conjunctivae normal.   Pulmonary:      Effort: Pulmonary effort is normal. No respiratory distress.   Skin:     General: Skin is warm and dry.      Findings: Wound present.      Comments: 1. Left ischial pressure injury visible wound bed with granular tissue, small amount of hypergranular tissue present. Large drainage. Undermining is stable. Ruchi-wound is intact with scar tissue. No purulent drainage, exposed or palpable bone.  2. R ischial pressure injury visible wound bed with pink/red granular tissue. Edges with callous and maceration. Ruchi-wound is intact with scar tissue. No purulent drainage, exposed or palpable bone.    Refer to wound assessment for additional details. No warmth or redness.   Neurological:      Mental Status: She is alert.      Gait: Gait abnormal.   Psychiatric:         Mood and Affect: Mood normal.         Behavior: Behavior normal. Behavior is cooperative.         Wound 01/10/24  Pressure Injury Ischium Left;Posterior;Proximal (Active)   Wound Image   09/16/24 0941   Wound Description Granulation tissue;Hypergranulation 09/16/24 0928   Pressure Injury Stage 3 08/19/24 0912   Ruchi-wound Assessment Intact;Pink 09/16/24 0928   Wound Length (cm) 2 cm 09/16/24 0928   Wound Width (cm) 2 cm 09/16/24 0928   Wound Depth (cm) 0.5 cm 09/16/24 0928   Wound Surface Area (cm^2) 4 cm^2 09/16/24 0928   Wound Volume (cm^3) 2 cm^3 09/16/24 0928   Calculated Wound Volume (cm^3) 2 cm^3 09/16/24 0928   Number of underminings 1 08/19/24 0912   Undermining 1 2 09/16/24 0928   Undermining 1 is depth extending from 12-5, deepest at 4 oclock 09/16/24 0928   Drainage Amount Large 09/16/24 0928   Drainage Description Bloody;Tan 09/16/24 0928   Non-staged Wound Description Full thickness 09/16/24 0928   Treatments Cleansed 05/13/24 0852   Dressing Wound V.A.C. 07/08/24 1134   Packing- # removed 1 09/16/24 0928   Dressing Changed Changed 05/13/24 0852   Patient Tolerance Tolerated well 05/13/24 0852   Dressing Status Intact 09/16/24 0928       Wound 01/29/24 Pressure Injury Ischium Right (Active)   Wound Image   09/16/24 0940   Wound Description Granulation tissue;Pink 09/16/24 0927   Pressure Injury Stage 4 08/19/24 0912   Ruchi-wound Assessment Intact;Maceration;Callus 09/16/24 0927   Wound Length (cm) 0.2 cm 09/16/24 0927   Wound Width (cm) 0.5 cm 09/16/24 0927   Wound Depth (cm) 0.4 cm 09/16/24 0927   Wound Surface Area (cm^2) 0.1 cm^2 09/16/24 0927   Wound Volume (cm^3) 0.04 cm^3 09/16/24 0927   Calculated Wound Volume (cm^3) 0.04 cm^3 09/16/24 0927   Change in Wound Size % 92 09/16/24 0927   Number of underminings 1 06/03/24 0900   Undermining 1 0.2 08/05/24 0900   Undermining 1 is depth extending from 11-1 08/05/24 0900   Drainage Amount Small 09/16/24 0927   Drainage Description Tan;Serosanguineous 09/16/24 0927   Non-staged Wound Description Full thickness 09/16/24 0927   Treatments Cleansed 05/13/24 0854  "  Dressing Changed Changed 05/13/24 0854   Patient Tolerance Tolerated well 05/13/24 0854   Dressing Status Intact 09/16/24 0927               Debridement   Wound 01/10/24 Pressure Injury Ischium Left;Posterior;Proximal    Universal Protocol:  Consent: Verbal consent obtained.  Risks and benefits: risks, benefits and alternatives were discussed  Consent given by: patient  Time out: Immediately prior to procedure a \"time out\" was called to verify the correct patient, procedure, equipment, support staff and site/side marked as required.  Timeout called at: 9/16/2024 9:45 AM.  Patient understanding: patient states understanding of the procedure being performed  Patient identity confirmed: verbally with patient    Debridement Details  Performed by: NP  Debridement type: surgical  Level of debridement: subcutaneous tissue  Pain control: lidocaine 4%      Post-debridement measurements  Length (cm): 2  Width (cm): 2  Depth (cm): 0.6  Percent debrided: 100%  Surface Area (cm^2): 4  Area Debrided (cm^2): 4  Volume (cm^3): 2.4    Tissue and other material debrided: hypergranulation and subcutaneous tissue  Devitalized tissue debrided: biofilm and exudate  Instrument(s) utilized: curette  Bleeding: medium  Hemostasis obtained with: pressure  Procedural pain (0-10): 0  Post-procedural pain: 0   Response to treatment: procedure was tolerated well    Debridement   Wound 01/29/24 Pressure Injury Ischium Right    Universal Protocol:  Consent: Verbal consent obtained.  Risks and benefits: risks, benefits and alternatives were discussed  Consent given by: patient  Time out: Immediately prior to procedure a \"time out\" was called to verify the correct patient, procedure, equipment, support staff and site/side marked as required.  Timeout called at: 9/16/2024 9:45 AM.  Patient understanding: patient states understanding of the procedure being performed  Patient identity confirmed: verbally with patient    Debridement Details  Performed " "by: NP  Debridement type: surgical  Level of debridement: subcutaneous tissue  Pain control: lidocaine 4%      Post-debridement measurements  Length (cm): 0.2  Width (cm): 0.5  Depth (cm): 0.5  Percent debrided: 100%  Surface Area (cm^2): 0.1  Area Debrided (cm^2): 0.1  Volume (cm^3): 0.05    Tissue and other material debrided: subcutaneous tissue  Devitalized tissue debrided: biofilm, callus and exudate  Instrument(s) utilized: curette  Bleeding: medium  Hemostasis obtained with: pressure  Procedural pain (0-10): 0  Post-procedural pain: 0   Response to treatment: procedure was tolerated well                     Lab Results   Component Value Date    HGBA1C 5.2 01/09/2024       Wound Instructions:  Orders Placed This Encounter   Procedures    Wound cleansing and dressings     Left  and right ischial wound     Wash your hands with soap and water.  Remove old dressing, discard into plastic bag and place in trash. Cleanse the wound with dakins. Pat dry. Do not use tissue or cotton balls. Do not scrub the wound. Pat dry using gauze.  Shower yes      Apply skin prep to skin surrounding wound  Apply lightly pack with aquacel ag rope (cut to fit into the open wound)    Cover with abd and medfix tape or silicone border  Change dressing 3 times a week and as needed for excessive drainage     Standing Status:   Future     Standing Expiration Date:   10/7/2024    Wound Procedure Treatment     This order was created via procedure documentation    Debridement     This order was created via procedure documentation    Debridement     This order was created via procedure documentation           CLEMENTINA Reid, LIBRA, DONALD    Portions of the record may have been created with voice recognition software. Occasional wrong word or \"sound alike\" substitutions may have occurred due to the inherent limitations of voice recognition software. Read the chart carefully and recognize, using context, where substitutions have " occurred.          Total time spent today:    Total time (face-to-face and non-face-to-face) spent on today's visit was 20 minutes. This includes preparation for the visits (i.e. reviewing test results from date recent hospitalizations, ER/Urgent Care/primary care visits and recent consultant office visits), performance of a medically appropriate history and examination, and orders for medications or testing.

## 2024-09-16 NOTE — PROGRESS NOTES
Wound Procedure Treatment    Performed by: Park Landa RN  Authorized by: CLEMENTINA Alejandro    Associated wounds:   Wound 01/10/24 Pressure Injury Ischium Left;Posterior;Proximal  Wound 01/29/24 Pressure Injury Ischium Right  Wound cleansed with:  NSS  Applied primary dressing:  Silicone bordered foam and Other    Aquacel ag gently tucked into wounds

## 2024-09-20 ENCOUNTER — HOME CARE VISIT (OUTPATIENT)
Dept: HOME HEALTH SERVICES | Facility: HOME HEALTHCARE | Age: 54
End: 2024-09-20
Payer: MEDICARE

## 2024-09-20 VITALS
HEART RATE: 92 BPM | TEMPERATURE: 97.6 F | OXYGEN SATURATION: 97 % | DIASTOLIC BLOOD PRESSURE: 68 MMHG | SYSTOLIC BLOOD PRESSURE: 120 MMHG

## 2024-09-20 PROCEDURE — G0299 HHS/HOSPICE OF RN EA 15 MIN: HCPCS

## 2024-09-23 ENCOUNTER — OFFICE VISIT (OUTPATIENT)
Dept: FAMILY MEDICINE CLINIC | Facility: CLINIC | Age: 54
End: 2024-09-23
Payer: MEDICARE

## 2024-09-23 VITALS — OXYGEN SATURATION: 99 % | DIASTOLIC BLOOD PRESSURE: 70 MMHG | HEART RATE: 98 BPM | SYSTOLIC BLOOD PRESSURE: 108 MMHG

## 2024-09-23 DIAGNOSIS — N39.0 URINARY TRACT INFECTION ASSOCIATED WITH CYSTOSTOMY CATHETER, SEQUELA: ICD-10-CM

## 2024-09-23 DIAGNOSIS — N31.9 NEUROGENIC BLADDER: ICD-10-CM

## 2024-09-23 DIAGNOSIS — R60.0 PERIPHERAL EDEMA: ICD-10-CM

## 2024-09-23 DIAGNOSIS — F11.20 CONTINUOUS OPIOID DEPENDENCE (HCC): ICD-10-CM

## 2024-09-23 DIAGNOSIS — T83.510S URINARY TRACT INFECTION ASSOCIATED WITH CYSTOSTOMY CATHETER, SEQUELA: ICD-10-CM

## 2024-09-23 DIAGNOSIS — Z00.00 HEALTHCARE MAINTENANCE: ICD-10-CM

## 2024-09-23 DIAGNOSIS — E55.9 VITAMIN D DEFICIENCY: ICD-10-CM

## 2024-09-23 DIAGNOSIS — G35 MULTIPLE SCLEROSIS (HCC): Chronic | ICD-10-CM

## 2024-09-23 DIAGNOSIS — R53.2 FUNCTIONAL QUADRIPLEGIA SECONDARY TO MS (HCC): Primary | Chronic | ICD-10-CM

## 2024-09-23 DIAGNOSIS — N20.0 NEPHROLITHIASIS: ICD-10-CM

## 2024-09-23 DIAGNOSIS — M48.02 SPINAL STENOSIS OF CERVICAL REGION: ICD-10-CM

## 2024-09-23 DIAGNOSIS — K63.9 MURAL THICKENING OF SIGMOID COLON: ICD-10-CM

## 2024-09-23 DIAGNOSIS — R73.9 HYPERGLYCEMIA: ICD-10-CM

## 2024-09-23 DIAGNOSIS — E53.8 VITAMIN B12 DEFICIENCY: ICD-10-CM

## 2024-09-23 DIAGNOSIS — N39.0 RECURRENT UTI: ICD-10-CM

## 2024-09-23 DIAGNOSIS — Z12.39 SCREENING FOR BREAST CANCER USING NON-MAMMOGRAM MODALITY: ICD-10-CM

## 2024-09-23 DIAGNOSIS — D69.6 THROMBOCYTOPENIA (HCC): ICD-10-CM

## 2024-09-23 DIAGNOSIS — G35 FUNCTIONAL QUADRIPLEGIA SECONDARY TO MS (HCC): Primary | Chronic | ICD-10-CM

## 2024-09-23 DIAGNOSIS — R94.6 ABNORMAL THYROID FUNCTION TEST: ICD-10-CM

## 2024-09-23 DIAGNOSIS — R39.9 UTI SYMPTOMS: ICD-10-CM

## 2024-09-23 DIAGNOSIS — Z12.11 SCREENING FOR COLON CANCER: ICD-10-CM

## 2024-09-23 DIAGNOSIS — H61.22 EXCESSIVE CERUMEN IN LEFT EAR CANAL: ICD-10-CM

## 2024-09-23 DIAGNOSIS — H61.22 EXCESSIVE CERUMEN IN EAR CANAL, LEFT: ICD-10-CM

## 2024-09-23 DIAGNOSIS — R60.0 LOWER EXTREMITY EDEMA: ICD-10-CM

## 2024-09-23 DIAGNOSIS — G82.20 PARAPLEGIA (HCC): ICD-10-CM

## 2024-09-23 DIAGNOSIS — I89.0 LYMPHEDEMA: ICD-10-CM

## 2024-09-23 DIAGNOSIS — E78.01 FAMILIAL HYPERCHOLESTEROLEMIA: ICD-10-CM

## 2024-09-23 DIAGNOSIS — R53.83 OTHER FATIGUE: ICD-10-CM

## 2024-09-23 DIAGNOSIS — F33.42 RECURRENT MAJOR DEPRESSIVE DISORDER, IN FULL REMISSION (HCC): ICD-10-CM

## 2024-09-23 DIAGNOSIS — Z93.59 SUPRAPUBIC CATHETER (HCC): Chronic | ICD-10-CM

## 2024-09-23 PROCEDURE — 99215 OFFICE O/P EST HI 40 MIN: CPT | Performed by: FAMILY MEDICINE

## 2024-09-23 PROCEDURE — G0439 PPPS, SUBSEQ VISIT: HCPCS | Performed by: FAMILY MEDICINE

## 2024-09-23 PROCEDURE — 87086 URINE CULTURE/COLONY COUNT: CPT | Performed by: FAMILY MEDICINE

## 2024-09-23 RX ORDER — ROSUVASTATIN CALCIUM 5 MG/1
5 TABLET, COATED ORAL DAILY
Qty: 90 TABLET | Refills: 3 | Status: SHIPPED | OUTPATIENT
Start: 2024-09-23

## 2024-09-23 RX ORDER — DULOXETIN HYDROCHLORIDE 20 MG/1
20 CAPSULE, DELAYED RELEASE ORAL DAILY
Qty: 90 CAPSULE | Refills: 3 | Status: SHIPPED | OUTPATIENT
Start: 2024-09-23

## 2024-09-23 RX ORDER — OXYBUTYNIN CHLORIDE 10 MG/1
10 TABLET, EXTENDED RELEASE ORAL DAILY
Qty: 90 TABLET | Refills: 3 | Status: SHIPPED | OUTPATIENT
Start: 2024-09-23

## 2024-09-23 RX ORDER — FUROSEMIDE 20 MG
20 TABLET ORAL DAILY
Qty: 90 TABLET | Refills: 3 | Status: SHIPPED | OUTPATIENT
Start: 2024-09-23

## 2024-09-23 RX ORDER — GABAPENTIN 100 MG/1
CAPSULE ORAL
Qty: 90 CAPSULE | Refills: 5 | Status: SHIPPED | OUTPATIENT
Start: 2024-09-23

## 2024-09-23 NOTE — ASSESSMENT & PLAN NOTE
Stable follows with neurology  Orders:    CBC; Future    Comprehensive metabolic panel    Lipid Panel with Direct LDL reflex    TSH, 3rd generation with Free T4 reflex    Vitamin B12; Future    Vitamin D 25 hydroxy; Future    Folate; Future    rosuvastatin (CRESTOR) 5 mg tablet; Take 1 tablet (5 mg total) by mouth daily

## 2024-09-23 NOTE — ASSESSMENT & PLAN NOTE
Chronic, stable  Orders:    CBC; Future    Comprehensive metabolic panel    Lipid Panel with Direct LDL reflex    TSH, 3rd generation with Free T4 reflex    Vitamin B12; Future    Vitamin D 25 hydroxy; Future    Folate; Future

## 2024-09-23 NOTE — ASSESSMENT & PLAN NOTE
Blood work is ordered  Orders:    CBC; Future    Comprehensive metabolic panel    Lipid Panel with Direct LDL reflex    TSH, 3rd generation with Free T4 reflex    Vitamin B12; Future    Vitamin D 25 hydroxy; Future    Folate; Future    rosuvastatin (CRESTOR) 5 mg tablet; Take 1 tablet (5 mg total) by mouth daily

## 2024-09-23 NOTE — ASSESSMENT & PLAN NOTE
Patient is no longer on opioids  Orders:    CBC; Future    Comprehensive metabolic panel    Lipid Panel with Direct LDL reflex    TSH, 3rd generation with Free T4 reflex    Vitamin B12; Future    Vitamin D 25 hydroxy; Future    Folate; Future

## 2024-09-23 NOTE — ASSESSMENT & PLAN NOTE
CBC is ordered  Orders:    CBC; Future    Comprehensive metabolic panel    Lipid Panel with Direct LDL reflex    TSH, 3rd generation with Free T4 reflex    Vitamin B12; Future    Vitamin D 25 hydroxy; Future    Folate; Future    rosuvastatin (CRESTOR) 5 mg tablet; Take 1 tablet (5 mg total) by mouth daily

## 2024-09-23 NOTE — ASSESSMENT & PLAN NOTE
Stable follows with neurology  Orders:    oxybutynin (DITROPAN-XL) 10 MG 24 hr tablet; Take 1 tablet (10 mg total) by mouth daily    DULoxetine (CYMBALTA) 20 mg capsule; Take 1 capsule (20 mg total) by mouth daily    CBC; Future    Comprehensive metabolic panel    Lipid Panel with Direct LDL reflex    TSH, 3rd generation with Free T4 reflex    Vitamin B12; Future    Vitamin D 25 hydroxy; Future    Folate; Future    rosuvastatin (CRESTOR) 5 mg tablet; Take 1 tablet (5 mg total) by mouth daily

## 2024-09-23 NOTE — ASSESSMENT & PLAN NOTE
Crestor reordered blood work is ordered  Orders:    CBC; Future    Comprehensive metabolic panel    Lipid Panel with Direct LDL reflex    TSH, 3rd generation with Free T4 reflex    Vitamin B12; Future    Vitamin D 25 hydroxy; Future    Folate; Future    rosuvastatin (CRESTOR) 5 mg tablet; Take 1 tablet (5 mg total) by mouth daily

## 2024-09-23 NOTE — ASSESSMENT & PLAN NOTE
, Oxybutynin reordered  Orders:    oxybutynin (DITROPAN-XL) 10 MG 24 hr tablet; Take 1 tablet (10 mg total) by mouth daily    CBC; Future    Comprehensive metabolic panel    Lipid Panel with Direct LDL reflex    TSH, 3rd generation with Free T4 reflex    Vitamin B12; Future    Vitamin D 25 hydroxy; Future    Folate; Future

## 2024-09-23 NOTE — ASSESSMENT & PLAN NOTE
Follows with urology  Orders:    CBC; Future    Comprehensive metabolic panel    Lipid Panel with Direct LDL reflex    TSH, 3rd generation with Free T4 reflex    Vitamin B12; Future    Vitamin D 25 hydroxy; Future    Folate; Future    rosuvastatin (CRESTOR) 5 mg tablet; Take 1 tablet (5 mg total) by mouth daily

## 2024-09-23 NOTE — ASSESSMENT & PLAN NOTE
In remission, Cymbalta reordered    Orders:    DULoxetine (CYMBALTA) 20 mg capsule; Take 1 capsule (20 mg total) by mouth daily    CBC; Future    Comprehensive metabolic panel    Lipid Panel with Direct LDL reflex    TSH, 3rd generation with Free T4 reflex    Vitamin B12; Future    Vitamin D 25 hydroxy; Future    Folate; Future

## 2024-09-23 NOTE — ASSESSMENT & PLAN NOTE
Blood work ordered  Orders:    CBC; Future    Comprehensive metabolic panel    Lipid Panel with Direct LDL reflex    TSH, 3rd generation with Free T4 reflex    Vitamin B12; Future    Vitamin D 25 hydroxy; Future    Folate; Future    rosuvastatin (CRESTOR) 5 mg tablet; Take 1 tablet (5 mg total) by mouth daily

## 2024-09-23 NOTE — ASSESSMENT & PLAN NOTE
Stable, furosemide was reordered  Orders:    furosemide (LASIX) 20 mg tablet; Take 1 tablet (20 mg total) by mouth daily    CBC; Future    Comprehensive metabolic panel    Lipid Panel with Direct LDL reflex    TSH, 3rd generation with Free T4 reflex    Vitamin B12; Future    Vitamin D 25 hydroxy; Future    Folate; Future

## 2024-09-23 NOTE — ASSESSMENT & PLAN NOTE
Follows with urology  Orders:    CBC; Future    Comprehensive metabolic panel    Lipid Panel with Direct LDL reflex    TSH, 3rd generation with Free T4 reflex    Vitamin B12; Future    Vitamin D 25 hydroxy; Future    Folate; Future

## 2024-09-23 NOTE — PATIENT INSTRUCTIONS
Gabapentin 100 at bedtime tonight, 100 mg twice a day tomorrow, then 100 mg 3 times daily thereafter  Complete Cologuard for colon cancer screening please notify my office if it does not arrive at your house in the next 2 weeks  Complete breast ultrasound for breast cancer screening  Follow-up with all specialist further instructions  Use Debrox eardrops, over-the-counter, 4 drops in left ear twice a day for a week  Please have [facility] draw blood work this week

## 2024-09-23 NOTE — ASSESSMENT & PLAN NOTE
Will start gabapentin  Orders:    CBC; Future    Comprehensive metabolic panel    Lipid Panel with Direct LDL reflex    TSH, 3rd generation with Free T4 reflex    Vitamin B12; Future    Vitamin D 25 hydroxy; Future    Folate; Future    gabapentin (NEURONTIN) 100 mg capsule; Take 1 tablet at bedtime tonight, 1 tablet twice a day tomorrow, and then 1 tablet 3 times daily thereafter     abdomen soft, non-tender, and non-distended. Bowel sounds present.

## 2024-09-23 NOTE — PROGRESS NOTES
Ambulatory Visit  Name: Fanny Schulz      : 1970      MRN: [id number]  Encounter Provider: Nacho Monroy DO  Encounter Date: 2024   Encounter department: [facility]IMARY [facility]  Patient has not completed blood work patient was sent for blood work to be drawn by VNA which is coming to the home  Breast ultrasound is ordered for breast cancer screening patient unable to do mammography  Cologuard is ordered for colon cancer screening  Urine sent to lab from office  Gabapentin started for chronic neck pain as neurology has discontinued Percocet  Patient to return in 6 months sooner if needed  Assessment & Plan  Functional quadriplegia secondary to MS (HCC)  Stable follows with neurology  Orders:    CBC; Future    Comprehensive metabolic panel    Lipid Panel with Direct LDL reflex    TSH, 3rd generation with Free T4 reflex    Vitamin B12; Future    Vitamin D 25 hydroxy; Future    Folate; Future    rosuvastatin (CRESTOR) 5 mg tablet; Take 1 tablet (5 mg total) by mouth daily    Multiple sclerosis (HCC)  Stable follows with neurology  Orders:    oxybutynin (DITROPAN-XL) 10 MG 24 hr tablet; Take 1 tablet (10 mg total) by mouth daily    DULoxetine (CYMBALTA) 20 mg capsule; Take 1 capsule (20 mg total) by mouth daily    CBC; Future    Comprehensive metabolic panel    Lipid Panel with Direct LDL reflex    TSH, 3rd generation with Free T4 reflex    Vitamin B12; Future    Vitamin D 25 hydroxy; Future    Folate; Future    rosuvastatin (CRESTOR) 5 mg tablet; Take 1 tablet (5 mg total) by mouth daily    Nephrolithiasis  Follows with urology  Orders:    CBC; Future    Comprehensive metabolic panel    Lipid Panel with Direct LDL reflex    TSH, 3rd generation with Free T4 reflex    Vitamin B12; Future    Vitamin D 25 hydroxy; Future    Folate; Future    Thrombocytopenia (HCC)  CBC is ordered  Orders:    CBC; Future    Comprehensive metabolic panel    Lipid Panel with Direct LDL reflex    TSH, 3rd  generation with Free T4 reflex    Vitamin B12; Future    Vitamin D 25 hydroxy; Future    Folate; Future    rosuvastatin (CRESTOR) 5 mg tablet; Take 1 tablet (5 mg total) by mouth daily    Suprapubic catheter (HCC)  Follows with urology  Orders:    CBC; Future    Comprehensive metabolic panel    Lipid Panel with Direct LDL reflex    TSH, 3rd generation with Free T4 reflex    Vitamin B12; Future    Vitamin D 25 hydroxy; Future    Folate; Future    rosuvastatin (CRESTOR) 5 mg tablet; Take 1 tablet (5 mg total) by mouth daily    Recurrent major depressive disorder, in full remission ([facility])  In remission, Cymbalta reordered    Orders:    DULoxetine (CYMBALTA) 20 mg capsule; Take 1 capsule (20 mg total) by mouth daily    CBC; Future    Comprehensive metabolic panel    Lipid Panel with Direct LDL reflex    TSH, 3rd generation with Free T4 reflex    Vitamin B12; Future    Vitamin D 25 hydroxy; Future    Folate; Future    Continuous opioid dependence (HCC)  Patient is no longer on opioids  Orders:    CBC; Future    Comprehensive metabolic panel    Lipid Panel with Direct LDL reflex    TSH, 3rd generation with Free T4 reflex    Vitamin B12; Future    Vitamin D 25 hydroxy; Future    Folate; Future    Chronic lower extremity edema  Stable, furosemide was reordered  Orders:    furosemide (LASIX) 20 mg tablet; Take 1 tablet (20 mg total) by mouth daily    CBC; Future    Comprehensive metabolic panel    Lipid Panel with Direct LDL reflex    TSH, 3rd generation with Free T4 reflex    Vitamin B12; Future    Vitamin D 25 hydroxy; Future    Folate; Future    Spinal stenosis of cervical region  Will start gabapentin  Orders:    CBC; Future    Comprehensive metabolic panel    Lipid Panel with Direct LDL reflex    TSH, 3rd generation with Free T4 reflex    Vitamin B12; Future    Vitamin D 25 hydroxy; Future    Folate; Future    gabapentin (NEURONTIN) 100 mg capsule; Take 1 tablet at bedtime tonight, 1 tablet twice a day tomorrow, and then  1 tablet 3 times daily thereafter    Abnormal thyroid function test  Blood work is ordered  Orders:    CBC; Future    Comprehensive metabolic panel    Lipid Panel with Direct LDL reflex    TSH, 3rd generation with Free T4 reflex    Vitamin B12; Future    Vitamin D 25 hydroxy; Future    Folate; Future    rosuvastatin (CRESTOR) 5 mg tablet; Take 1 tablet (5 mg total) by mouth daily    Familial hypercholesterolemia  Crestor reordered blood work is ordered  Orders:    CBC; Future    Comprehensive metabolic panel    Lipid Panel with Direct LDL reflex    TSH, 3rd generation with Free T4 reflex    Vitamin B12; Future    Vitamin D 25 hydroxy; Future    Folate; Future    rosuvastatin (CRESTOR) 5 mg tablet; Take 1 tablet (5 mg total) by mouth daily    Hyperglycemia  Blood work is ordered  Orders:    CBC; Future    Comprehensive metabolic panel    Lipid Panel with Direct LDL reflex    TSH, 3rd generation with Free T4 reflex    Vitamin B12; Future    Vitamin D 25 hydroxy; Future    Folate; Future    rosuvastatin (CRESTOR) 5 mg tablet; Take 1 tablet (5 mg total) by mouth daily    Lymphedema  Chronic, stable  Orders:    CBC; Future    Comprehensive metabolic panel    Lipid Panel with Direct LDL reflex    TSH, 3rd generation with Free T4 reflex    Vitamin B12; Future    Vitamin D 25 hydroxy; Future    Folate; Future    Peripheral edema  Stable, furosemide as ordered  Orders:    CBC; Future    Comprehensive metabolic panel    Lipid Panel with Direct LDL reflex    TSH, 3rd generation with Free T4 reflex    Vitamin B12; Future    Vitamin D 25 hydroxy; Future    Folate; Future    Vitamin B12 deficiency  Blood work ordered  Orders:    CBC; Future    Comprehensive metabolic panel    Lipid Panel with Direct LDL reflex    TSH, 3rd generation with Free T4 reflex    Vitamin B12; Future    Vitamin D 25 hydroxy; Future    Folate; Future    rosuvastatin (CRESTOR) 5 mg tablet; Take 1 tablet (5 mg total) by mouth daily    Vitamin D  deficiency  Blood work ordered  Orders:    CBC; Future    Comprehensive metabolic panel    Lipid Panel with Direct LDL reflex    TSH, 3rd generation with Free T4 reflex    Vitamin B12; Future    Vitamin D 25 hydroxy; Future    Folate; Future    rosuvastatin (CRESTOR) 5 mg tablet; Take 1 tablet (5 mg total) by mouth daily    Healthcare maintenance  Care AWV completed, patient declines COVID-vaccine       Neurogenic bladder  , Oxybutynin reordered  Orders:    oxybutynin (DITROPAN-XL) 10 MG 24 hr tablet; Take 1 tablet (10 mg total) by mouth daily    CBC; Future    Comprehensive metabolic panel    Lipid Panel with Direct LDL reflex    TSH, 3rd generation with Free T4 reflex    Vitamin B12; Future    Vitamin D 25 hydroxy; Future    Folate; Future    Recurrent UTI  Culture sent to lab  Orders:    CBC; Future    Comprehensive metabolic panel    Lipid Panel with Direct LDL reflex    TSH, 3rd generation with Free T4 reflex    Vitamin B12; Future    Vitamin D 25 hydroxy; Future    Folate; Future    Other fatigue  Blood work ordered  Orders:    CBC; Future    Comprehensive metabolic panel    Lipid Panel with Direct LDL reflex    TSH, 3rd generation with Free T4 reflex    Vitamin B12; Future    Vitamin D 25 hydroxy; Future    Folate; Future    UTI symptoms  Culture sent to lab  Orders:    CBC; Future    Comprehensive metabolic panel    Lipid Panel with Direct LDL reflex    TSH, 3rd generation with Free T4 reflex    Vitamin B12; Future    Vitamin D 25 hydroxy; Future    Folate; Future    Urine culture; Future    Urine culture    Mural thickening of sigmoid colon    Orders:    CBC; Future    Comprehensive metabolic panel    Lipid Panel with Direct LDL reflex    TSH, 3rd generation with Free T4 reflex    Vitamin B12; Future    Vitamin D 25 hydroxy; Future    Folate; Future    rosuvastatin (CRESTOR) 5 mg tablet; Take 1 tablet (5 mg total) by mouth daily    Paraplegia (HCC)    Orders:    CBC; Future    Comprehensive metabolic  panel    Lipid Panel with Direct LDL reflex    TSH, 3rd generation with Free T4 reflex    Vitamin B12; Future    Vitamin D 25 hydroxy; Future    Folate; Future    rosuvastatin (CRESTOR) 5 mg tablet; Take 1 tablet (5 mg total) by mouth daily    Urinary tract infection associated with cystostomy catheter, sequela    Orders:    CBC; Future    Comprehensive metabolic panel    Lipid Panel with Direct LDL reflex    TSH, 3rd generation with Free T4 reflex    Vitamin B12; Future    Vitamin D 25 hydroxy; Future    Folate; Future    rosuvastatin (CRESTOR) 5 mg tablet; Take 1 tablet (5 mg total) by mouth daily    Screening for breast cancer using non-mammogram modality    Orders:    CBC; Future    Comprehensive metabolic panel    Lipid Panel with Direct LDL reflex    TSH, 3rd generation with Free T4 reflex    Vitamin B12; Future    Vitamin D 25 hydroxy; Future    Folate; Future    US breast screening bilateral complete (ABUS); Future    Screening for colon cancer    Orders:    CBC; Future    Comprehensive metabolic panel    Lipid Panel with Direct LDL reflex    TSH, 3rd generation with Free T4 reflex    Vitamin B12; Future    Vitamin D 25 hydroxy; Future    Folate; Future    Cologuard    Excessive cerumen in ear canal, left    Orders:    CBC; Future    Comprehensive metabolic panel    Lipid Panel with Direct LDL reflex    TSH, 3rd generation with Free T4 reflex    Vitamin B12; Future    Vitamin D 25 hydroxy; Future    Folate; Future    Excessive cerumen in left ear canal  Use Debrox eardrops 4 drops in left ear 4 times daily for 1 week          Preventive health issues were discussed with patient, and age appropriate screening tests were ordered as noted in patient's After Visit Summary. Personalized health advice and appropriate referrals for health education or preventive services given if needed, as noted in patient's After Visit Summary.    History of Present Illness     Patient with fatigue patient states sometimes she  has a UTI with this, urine culture sent to lab from office.  Patient has not completed blood work as ordered I will have VNA completed this week.  Patient with chronic neck pain neurology took her off Percocet we will start gabapentin.    Neck Pain   Pertinent negatives include no fever.      Patient Care Team:  Nacho Monroy DO as PCP - General (Family Medicine)  MD Nacho Bryant DO    Review of Systems   Constitutional:  Positive for fatigue. Negative for chills and fever.   HENT: Negative.     Eyes: Negative.    Respiratory: Negative.     Cardiovascular: Negative.    Gastrointestinal: Negative.    Endocrine: Negative.    Genitourinary:         HPI   Musculoskeletal:  Positive for neck pain.   Skin: Negative.    Allergic/Immunologic: Negative.    Neurological:         HPI   Hematological: Negative.    Psychiatric/Behavioral: Negative.       Medical History Reviewed by provider this encounter:  Tobacco  Allergies  Meds  Problems  Med Hx  Surg Hx  Fam Hx       Annual Wellness Visit Questionnaire   Fanny is here for her Subsequent Wellness visit.     Health Risk Assessment:   Patient rates overall health as fair. Patient is very satisfied with their life. Eyesight was rated as same. Hearing was rated as same. Patient feels that their emotional and mental health rating is same. Patients states they are never, rarely angry. Patient states they are never, rarely unusually tired/fatigued. Pain experienced in the last 7 days has been a lot. Patient's pain rating has been 7/10. Patient states that she has experienced no weight loss or gain in last 6 months. Neck pain    Fall Risk Screening:   In the past year, patient has experienced: no history of falling in past year      Urinary Incontinence Screening:   Patient has not leaked urine accidently in the last six months.     Home Safety:  Patient has trouble with stairs inside or outside of their home. Patient has working smoke  alarms and has working carbon monoxide detector.     Nutrition:   Current diet is Regular.     Medications:   Patient is not currently taking any over-the-counter supplements. Patient is able to manage medications.  takes care of pts medications    Activities of Daily Living (ADLs)/Instrumental Activities of Daily Living (IADLs):   Walk and transfer into and out of bed and chair?: No  Dress and groom yourself?: No    Bathe or shower yourself?: No    Feed yourself? No  Do your laundry/housekeeping?: No  Manage your money, pay your bills and track your expenses?: No  Make your own meals?: No    Do your own shopping?: No    Previous Hospitalizations:   Any hospitalizations or ED visits within the last 12 months?: Yes    How many hospitalizations have you had in the last year?: 1-2    Hospitalization Comments: See chart    Advance Care Planning:   Living will: Yes    Durable POA for healthcare: No    Advanced directive: Yes    Advanced directive counseling given: Yes    Five wishes given: No    Patient declined ACP directive: No    End of Life Decisions reviewed with patient: Yes    Provider agrees with end of life decisions: Yes      Cognitive Screening:   Provider or family/friend/caregiver concerned regarding cognition?: No    PREVENTIVE SCREENINGS      Cardiovascular Screening:    General: Screening Not Indicated and History Lipid Disorder      Diabetes Screening:     General: Screening Current      Colorectal Cancer Screening:     General: Screening Current      Breast Cancer Screening:     General: Patient Declines      Cervical Cancer Screening:    General: Patient Declines      Osteoporosis Screening:    General: Screening Not Indicated      Abdominal Aortic Aneurysm (AAA) Screening:        General: Screening Not Indicated      Lung Cancer Screening:     General: Screening Not Indicated      Hepatitis C Screening:    General: Screening Current    Screening, Brief Intervention, and Referral to Treatment  (SBIRT)    Screening  Typical number of drinks in a day: 0  Typical number of drinks in a week: 0  Interpretation: Low risk drinking behavior.    Social Determinants of Health     Financial Resource Strain: Low Risk  (2/20/2023)    Overall Financial Resource Strain (CARDIA)     Difficulty of Paying Living Expenses: Not hard at all   Food Insecurity: No Food Insecurity (9/23/2024)    Hunger Vital Sign     Worried About Running Out of Food in the Last Year: Never true     Ran Out of Food in the Last Year: Never true   Transportation Needs: No Transportation Needs (9/23/2024)    PRAPARE - Transportation     Lack of Transportation (Medical): No     Lack of Transportation (Non-Medical): No   Housing Stability: Low Risk  (9/23/2024)    Housing Stability Vital Sign     Unable to Pay for Housing in the Last Year: No     Number of Times Moved in the Last Year: 0     Homeless in the Last Year: No   Utilities: Not At Risk (9/23/2024)    [facility] Utilities     Threatened with loss of utilities: No     No results found.    Objective     /70 (BP Location: Left arm, Patient Position: Sitting, Cuff Size: Standard)   Pulse 98   SpO2 99%     Physical Exam  Vitals and nursing note reviewed.   Constitutional:       Appearance: Normal appearance.   HENT:      Head: Normocephalic and atraumatic.      Ears:      Comments: TMs clear left EAC with excessive cerumen     Nose: Nose normal.      Mouth/Throat:      Mouth: Mucous membranes are moist.      Pharynx: Oropharynx is clear. No oropharyngeal exudate or posterior oropharyngeal erythema.   Eyes:      General: No scleral icterus.  Neck:      Vascular: No carotid bruit.   Cardiovascular:      Rate and Rhythm: Normal rate and regular rhythm.      Heart sounds: Normal heart sounds.   Pulmonary:      Effort: Pulmonary effort is normal.      Breath sounds: Normal breath sounds.   Abdominal:      General: Bowel sounds are normal.      Palpations: Abdomen is soft.      Tenderness: There is no  abdominal tenderness.      Comments: Positive suprapubic catheter   Musculoskeletal:      Cervical back: Neck supple.      Right lower leg: No edema.      Left lower leg: No edema.   Lymphadenopathy:      Cervical: No cervical adenopathy.   Skin:     General: Skin is warm and dry.   Neurological:      Mental Status: She is alert. Mental status is at baseline.      Cranial Nerves: No cranial nerve deficit.      Gait: Gait abnormal.      Comments: Wheelchair-bound   Psychiatric:         Mood and Affect: Mood normal.

## 2024-09-23 NOTE — ASSESSMENT & PLAN NOTE
Stable, furosemide as ordered  Orders:    CBC; Future    Comprehensive metabolic panel    Lipid Panel with Direct LDL reflex    TSH, 3rd generation with Free T4 reflex    Vitamin B12; Future    Vitamin D 25 hydroxy; Future    Folate; Future

## 2024-09-24 ENCOUNTER — HOME CARE VISIT (OUTPATIENT)
Dept: HOME HEALTH SERVICES | Facility: HOME HEALTHCARE | Age: 54
End: 2024-09-24
Payer: MEDICARE

## 2024-09-25 ENCOUNTER — TELEPHONE (OUTPATIENT)
Dept: FAMILY MEDICINE CLINIC | Facility: CLINIC | Age: 54
End: 2024-09-25

## 2024-09-25 LAB — BACTERIA UR CULT: NORMAL

## 2024-09-25 NOTE — TELEPHONE ENCOUNTER
----- Message from Nacho Monroy DO sent at 9/25/2024  8:54 AM EDT -----  Patient's urine culture shows mixed contaminants no UTI

## 2024-09-26 NOTE — TELEPHONE ENCOUNTER
Patient's spouse Mikey contacted the office back (patient present as well) this afternoon looking for results of urine. Relayed result notes. Verbalized understanding, nothing else needed at this time.

## 2024-09-27 ENCOUNTER — APPOINTMENT (OUTPATIENT)
Dept: LAB | Facility: HOSPITAL | Age: 54
End: 2024-09-27
Payer: MEDICARE

## 2024-09-27 ENCOUNTER — HOME CARE VISIT (OUTPATIENT)
Dept: HOME HEALTH SERVICES | Facility: HOME HEALTHCARE | Age: 54
End: 2024-09-27
Payer: MEDICARE

## 2024-09-27 VITALS
SYSTOLIC BLOOD PRESSURE: 128 MMHG | TEMPERATURE: 98.2 F | OXYGEN SATURATION: 96 % | HEART RATE: 92 BPM | DIASTOLIC BLOOD PRESSURE: 82 MMHG

## 2024-09-27 DIAGNOSIS — R53.83 OTHER FATIGUE: ICD-10-CM

## 2024-09-27 DIAGNOSIS — N39.0 URINARY TRACT INFECTION ASSOCIATED WITH CYSTOSTOMY CATHETER, SEQUELA: ICD-10-CM

## 2024-09-27 DIAGNOSIS — R60.0 PERIPHERAL EDEMA: ICD-10-CM

## 2024-09-27 DIAGNOSIS — R39.9 UTI SYMPTOMS: ICD-10-CM

## 2024-09-27 DIAGNOSIS — E55.9 VITAMIN D DEFICIENCY: ICD-10-CM

## 2024-09-27 DIAGNOSIS — E03.9 MYXEDEMA HEART DISEASE: ICD-10-CM

## 2024-09-27 DIAGNOSIS — N39.0 RECURRENT UTI: ICD-10-CM

## 2024-09-27 DIAGNOSIS — I51.9 MYXEDEMA HEART DISEASE: ICD-10-CM

## 2024-09-27 DIAGNOSIS — E53.8 VITAMIN B12 DEFICIENCY: ICD-10-CM

## 2024-09-27 DIAGNOSIS — G35 FUNCTIONAL QUADRIPLEGIA SECONDARY TO MS (HCC): Chronic | ICD-10-CM

## 2024-09-27 DIAGNOSIS — K63.9 MURAL THICKENING OF SIGMOID COLON: ICD-10-CM

## 2024-09-27 DIAGNOSIS — T83.510S URINARY TRACT INFECTION ASSOCIATED WITH CYSTOSTOMY CATHETER, SEQUELA: ICD-10-CM

## 2024-09-27 DIAGNOSIS — D69.6 THROMBOCYTOPENIA (HCC): ICD-10-CM

## 2024-09-27 DIAGNOSIS — N31.9 NEUROGENIC BLADDER: ICD-10-CM

## 2024-09-27 DIAGNOSIS — R73.9 HYPERGLYCEMIA: ICD-10-CM

## 2024-09-27 DIAGNOSIS — I89.0 LYMPHEDEMA: ICD-10-CM

## 2024-09-27 DIAGNOSIS — G35 MULTIPLE SCLEROSIS (HCC): Chronic | ICD-10-CM

## 2024-09-27 DIAGNOSIS — N20.0 NEPHROLITHIASIS: ICD-10-CM

## 2024-09-27 DIAGNOSIS — F33.42 RECURRENT MAJOR DEPRESSIVE DISORDER, IN FULL REMISSION (HCC): ICD-10-CM

## 2024-09-27 DIAGNOSIS — Z93.59 SUPRAPUBIC CATHETER (HCC): Chronic | ICD-10-CM

## 2024-09-27 DIAGNOSIS — R60.0 LOWER EXTREMITY EDEMA: ICD-10-CM

## 2024-09-27 DIAGNOSIS — R53.2 FUNCTIONAL QUADRIPLEGIA SECONDARY TO MS (HCC): Chronic | ICD-10-CM

## 2024-09-27 DIAGNOSIS — F11.20 CONTINUOUS OPIOID DEPENDENCE (HCC): ICD-10-CM

## 2024-09-27 DIAGNOSIS — Z12.39 SCREENING FOR BREAST CANCER USING NON-MAMMOGRAM MODALITY: ICD-10-CM

## 2024-09-27 DIAGNOSIS — H61.22 EXCESSIVE CERUMEN IN EAR CANAL, LEFT: ICD-10-CM

## 2024-09-27 DIAGNOSIS — Z12.11 SCREENING FOR COLON CANCER: ICD-10-CM

## 2024-09-27 DIAGNOSIS — G82.20 PARAPLEGIA (HCC): ICD-10-CM

## 2024-09-27 DIAGNOSIS — E78.01 FAMILIAL HYPERCHOLESTEROLEMIA: ICD-10-CM

## 2024-09-27 DIAGNOSIS — M48.02 SPINAL STENOSIS OF CERVICAL REGION: ICD-10-CM

## 2024-09-27 DIAGNOSIS — R94.6 ABNORMAL THYROID FUNCTION TEST: ICD-10-CM

## 2024-09-27 LAB
25(OH)D3 SERPL-MCNC: 18.5 NG/ML (ref 30–100)
ALBUMIN SERPL BCG-MCNC: 3.8 G/DL (ref 3.5–5)
ALP SERPL-CCNC: 83 U/L (ref 34–104)
ALT SERPL W P-5'-P-CCNC: 6 U/L (ref 7–52)
ANION GAP SERPL CALCULATED.3IONS-SCNC: 9 MMOL/L (ref 4–13)
AST SERPL W P-5'-P-CCNC: 23 U/L (ref 13–39)
BILIRUB SERPL-MCNC: 0.32 MG/DL (ref 0.2–1)
BUN SERPL-MCNC: 14 MG/DL (ref 5–25)
CALCIUM SERPL-MCNC: 8.8 MG/DL (ref 8.4–10.2)
CHLORIDE SERPL-SCNC: 99 MMOL/L (ref 96–108)
CHOLEST SERPL-MCNC: 155 MG/DL
CO2 SERPL-SCNC: 30 MMOL/L (ref 21–32)
CREAT SERPL-MCNC: 0.3 MG/DL (ref 0.6–1.3)
ERYTHROCYTE [DISTWIDTH] IN BLOOD BY AUTOMATED COUNT: 16 % (ref 11.6–15.1)
FOLATE SERPL-MCNC: 8.3 NG/ML
GFR SERPL CREATININE-BSD FRML MDRD: 130 ML/MIN/1.73SQ M
GLUCOSE SERPL-MCNC: 122 MG/DL (ref 65–140)
HCT VFR BLD AUTO: 38.4 % (ref 34.8–46.1)
HDLC SERPL-MCNC: 60 MG/DL
HGB BLD-MCNC: 11.6 G/DL (ref 11.5–15.4)
LDLC SERPL CALC-MCNC: 78 MG/DL (ref 0–100)
MCH RBC QN AUTO: 25.5 PG (ref 26.8–34.3)
MCHC RBC AUTO-ENTMCNC: 30.2 G/DL (ref 31.4–37.4)
MCV RBC AUTO: 84 FL (ref 82–98)
PLATELET # BLD AUTO: 346 THOUSANDS/UL (ref 149–390)
PMV BLD AUTO: 9.7 FL (ref 8.9–12.7)
POTASSIUM SERPL-SCNC: 4.9 MMOL/L (ref 3.5–5.3)
PROT SERPL-MCNC: 6.5 G/DL (ref 6.4–8.4)
RBC # BLD AUTO: 4.55 MILLION/UL (ref 3.81–5.12)
SODIUM SERPL-SCNC: 138 MMOL/L (ref 135–147)
T3 SERPL-MCNC: 1.3 NG/ML
T4 FREE SERPL-MCNC: 1 NG/DL (ref 0.61–1.12)
TRIGL SERPL-MCNC: 85 MG/DL
TSH SERPL DL<=0.05 MIU/L-ACNC: 3.89 UIU/ML (ref 0.45–4.5)
VIT B12 SERPL-MCNC: 188 PG/ML (ref 180–914)
WBC # BLD AUTO: 8.34 THOUSAND/UL (ref 4.31–10.16)

## 2024-09-27 PROCEDURE — 84480 ASSAY TRIIODOTHYRONINE (T3): CPT

## 2024-09-27 PROCEDURE — G0299 HHS/HOSPICE OF RN EA 15 MIN: HCPCS

## 2024-09-27 PROCEDURE — 36415 COLL VENOUS BLD VENIPUNCTURE: CPT

## 2024-09-27 PROCEDURE — 82746 ASSAY OF FOLIC ACID SERUM: CPT

## 2024-09-27 PROCEDURE — 84439 ASSAY OF FREE THYROXINE: CPT

## 2024-09-27 PROCEDURE — 400014 VN F/U

## 2024-09-27 PROCEDURE — 82607 VITAMIN B-12: CPT

## 2024-09-27 PROCEDURE — 82306 VITAMIN D 25 HYDROXY: CPT

## 2024-09-27 PROCEDURE — 80053 COMPREHEN METABOLIC PANEL: CPT | Performed by: FAMILY MEDICINE

## 2024-09-27 PROCEDURE — 85027 COMPLETE CBC AUTOMATED: CPT

## 2024-09-30 ENCOUNTER — TELEPHONE (OUTPATIENT)
Dept: FAMILY MEDICINE CLINIC | Facility: CLINIC | Age: 54
End: 2024-09-30

## 2024-09-30 DIAGNOSIS — R71.8 MICROCYTOSIS: ICD-10-CM

## 2024-09-30 DIAGNOSIS — E55.9 VITAMIN D DEFICIENCY: Primary | ICD-10-CM

## 2024-09-30 DIAGNOSIS — E53.8 VITAMIN B12 DEFICIENCY: ICD-10-CM

## 2024-09-30 DIAGNOSIS — E83.10 IRON DISORDER: ICD-10-CM

## 2024-09-30 RX ORDER — MAGNESIUM 200 MG
TABLET ORAL
Qty: 30 TABLET | Refills: 5 | Status: SHIPPED | OUTPATIENT
Start: 2024-09-30

## 2024-09-30 RX ORDER — ERGOCALCIFEROL 1.25 MG/1
50000 CAPSULE, LIQUID FILLED ORAL WEEKLY
Qty: 4 CAPSULE | Refills: 5 | Status: SHIPPED | OUTPATIENT
Start: 2024-09-30

## 2024-10-03 LAB — COLOGUARD RESULT REPORTABLE: POSITIVE

## 2024-10-04 ENCOUNTER — TELEPHONE (OUTPATIENT)
Dept: FAMILY MEDICINE CLINIC | Facility: CLINIC | Age: 54
End: 2024-10-04

## 2024-10-04 ENCOUNTER — HOME CARE VISIT (OUTPATIENT)
Dept: HOME HEALTH SERVICES | Facility: HOME HEALTHCARE | Age: 54
End: 2024-10-04
Payer: MEDICARE

## 2024-10-04 DIAGNOSIS — R19.5 POSITIVE COLORECTAL CANCER SCREENING USING COLOGUARD TEST: Primary | ICD-10-CM

## 2024-10-04 NOTE — TELEPHONE ENCOUNTER
----- Message from Nacho Monroy DO sent at 10/4/2024 10:30 AM EDT -----  Patient tested positive for screening for colon cancer with Cologuard.  I am referring patient to gastroenterology you will need a further workup for colon cancer screening because of this

## 2024-10-07 ENCOUNTER — OFFICE VISIT (OUTPATIENT)
Dept: WOUND CARE | Facility: HOSPITAL | Age: 54
End: 2024-10-07
Payer: MEDICARE

## 2024-10-07 VITALS
HEART RATE: 99 BPM | DIASTOLIC BLOOD PRESSURE: 60 MMHG | TEMPERATURE: 97.5 F | SYSTOLIC BLOOD PRESSURE: 90 MMHG | RESPIRATION RATE: 14 BRPM

## 2024-10-07 DIAGNOSIS — G35 FUNCTIONAL QUADRIPLEGIA SECONDARY TO MS (HCC): Chronic | ICD-10-CM

## 2024-10-07 DIAGNOSIS — L89.314 PRESSURE ULCER OF RIGHT ISCHIUM, STAGE IV (HCC): ICD-10-CM

## 2024-10-07 DIAGNOSIS — L89.323 PRESSURE ULCER OF ISCHIUM, LEFT, STAGE III (HCC): Primary | ICD-10-CM

## 2024-10-07 DIAGNOSIS — R53.2 FUNCTIONAL QUADRIPLEGIA SECONDARY TO MS (HCC): Chronic | ICD-10-CM

## 2024-10-07 DIAGNOSIS — G35 MULTIPLE SCLEROSIS (HCC): ICD-10-CM

## 2024-10-07 PROCEDURE — 11042 DBRDMT SUBQ TIS 1ST 20SQCM/<: CPT

## 2024-10-07 NOTE — PROGRESS NOTES
Patient ID: Fanny Schulz is a 54 y.o. female Date of Birth 1970       Chief Complaint   Patient presents with    Follow Up Wound Care Visit     Bilateral ischial wounds       Allergies:  Latex    Diagnosis:      Diagnosis ICD-10-CM Associated Orders   1. Pressure ulcer of ischium, left, stage III ([facility])  L89.323 Wound cleansing and dressings     Wound Procedure Treatment     Wound cleansing and dressings      2. Pressure ulcer of right ischium, stage IV ([facility])  L89.314 Wound cleansing and dressings     Wound Procedure Treatment     Wound cleansing and dressings      3. Multiple sclerosis ([facility])  G35       4. Functional quadriplegia secondary to MS ([facility])  G35     R53.2               Assessment & Plan:  B/l ischial wounds with stable appearance. Noted hypergranulation tissue to the left ischium, which silver nitrate was applied today post debridement. Educated caregiver and patient regarding gray/blackish discoloration is to be expected post silver nitrate application.  Callus removed from periwound of right ischial wound.  No exposed or palpable bone.  Recommend to continue with current wound management of Aquacel Ag ribbon and foam dressings to the bilateral ischium's.  See wounds orders below  Wounds are not showing any s/s of clinical infection.   Debrided as below.   Pressure relief, offload pressure to wound as much as possible- discussed limiting time OOB in wheelchair to decreased pressure being applied directly to wounds to facilitate wound healing.  Counseled patient on the importance of adequate protein intake (3-4 servings per day) to promote wound healing.  Follow-up in 2 week(s). Call sooner with questions or concerns or any signs of infection such as redness, swelling, increased/purulent drainage, fever or chills, and increased pain.  Patient and  verbalized understanding and agrees with plan of care.         Subjective:   9/16/24: Patient presents to wound care center for follow-up visit  of bilateral ischial wounds.  Patient has been using Mesalt to the wounds with bordered foam dressings to cover.  Patient is accompanied by her  who provides to wound care.  No wound care related complaints offered.  Denies increased pain or drainage to the wounds.  No fever or chills.    10/7/24: Patient presents to wound care center for follow-up visit bilateral ischial wounds.  Patient has been using Aquacel Ag ribbon to the wounds and bordered foam dressings.  Patient is accompanied by her  who provides the wound care.  No wound care related complaints offered today.  No reported increased pain or drainage related to the wounds.  No fever or chills.  Patient's  reports that patient spends majority of her day out of bed in wheelchair, patient does have offloading seating cushion to her wheelchair.  Patient states that it is more comfortable for her to be in her wheelchair then in bed.        The following portions of the patient's history were reviewed and updated as appropriate:   Patient Active Problem List   Diagnosis    Suprapubic catheter (HCC)    Bladder calculus    Constipation    Recurrent major depressive disorder, in full remission (HCC)    Dysphagia    Dizziness    Hyperglycemia    Familial hypercholesterolemia    Lymphedema    Multiple sclerosis (HCC)    Muscle spasticity    Muscle weakness    Nephrolithiasis    Neurogenic bladder    Sleep disorder    Spinal stenosis of cervical region    Tremor    Urinary incontinence    Vision loss    Vitamin B12 deficiency    Vitamin D deficiency    Functional quadriplegia secondary to MS (HCC)    Allergic rhinitis    Screening mammogram, encounter for    Medication management contract signed    Thrombocytopenia (HCC)    Serum total bilirubin elevated    Hydronephrosis with urinary obstruction due to ureteral calculus    Candidal vaginitis    Alkaline phosphatase elevation    Abnormal thyroid function test    Ureteral calculus, left     "Increased endometrial stripe thickness    Peripheral edema    Tinnitus of both ears    Hypophonia    Chronic lower extremity edema    Positive colorectal cancer screening using Cologuard test     Past Medical History:   Diagnosis Date    Anesthesia     \"prefers if able to be put to sleep on stretcher before placed on table if possible due to severe spasticity    Bladder stones     Chronic pain disorder     neck    Contracture, right shoulder     right arm and hand    Dependent on wheelchair     motorized    Edema     dependant lower extremities    Elevated alkaline phosphatase level     Mildly elevated total, normal isoenzymes 4/15/15, normal 1/17; last assessed: [id number]    Fernandez catheter in place     #24 to large bag(silicone catheter)    Gallbladder disease     History of femur fracture     right leg    History of UTI     MS (multiple sclerosis) ([facility])     Muscle spasticity     especially with touch    Muscle weakness     Muscular dystrophy (HCC)     Neck pain     Neurogenic bladder     Paralysis (HCC)     bilateral lower extremities    Port-A-Cath in place     left chest,\" hasn't used in approx 1 yr or so\"    Pressure injury of skin     right ischium    Sacral pressure sore     \"shearing, tegaderm in place,\"    Swallowing difficulty     Tinnitus     Urinary incontinence      Past Surgical History:   Procedure Laterality Date    BLADDER STONE REMOVAL N/A 12/4/2017    Procedure: CYSTO, LITHOLOPAXY HOLMIUM LASER;  Surgeon: Damir Osborne MD;  Location: [facility];  Service: Urology    BLADDER SURGERY      CHOLECYSTECTOMY      CYSTOSCOPY  06/15/2020    ESOPHAGOGASTRODUODENOSCOPY      FL RETROGRADE PYELOGRAM  10/2/2020    FL RETROGRADE PYELOGRAM  11/3/2020    KIDNEY STONE SURGERY      PORTACATH PLACEMENT Left     RI CYSTO BLADDER W/URETERAL CATHETERIZATION Left 10/2/2020    Procedure: CYSTOSCOPY LEFT RETROGRADE ; LEFT URETEROSCOPY; PYELOGRAM WITH STENT INSERTION AND SUPERPUBIC EXCHANGE;  Surgeon: Philip MONTENEGRO" MD Harry;  Location: [facility];  Service: Urology    ID CYSTO/URETERO W/LITHOTRIPSY &INDWELL STENT INSRT Left 11/3/2020    Procedure: CYSTOSCOPY URETEROSCOPY WITH RETROGRADE PYELOGRAM AND EXCHANGE STENT URETERAL, SP TUBE EXCHANGE, BASKET STONE EXTRACTION, BLADDER STONE EXTRACTION;  Surgeon: Damir Osborne MD;  Location: [facility];  Service: Urology    ID LITHOLAPAXY SMPL/SM <2.5 CM N/A 12/22/2022    Procedure: Cystolitholapaxy w/  laser lithotripsy of bladder stones; SUPRAPUBIC CATHETER CHANGE;  Surgeon: Damir Osborne MD;  Location: [facility];  Service: Urology    SUPRAPUBIC CYSTOSTOMY  02/25/2014    last assessed: [id number]     Family History   Problem Relation Age of Onset    Diabetes Mother     Hyperlipidemia Mother     Hypertension Mother     COPD Father     Hypertension Father     Hyperlipidemia Sister     Alzheimer's disease Family     Diabetes Family     Hypertension Family     Heart disease Family       Social History     Socioeconomic History    Marital status: /Civil Union     Spouse name: Not on file    Number of children: Not on file    Years of education: Not on file    Highest education level: Not on file   Occupational History    Not on file   Tobacco Use    Smoking status: Never    Smokeless tobacco: Never   Vaping Use    Vaping status: Never Used   Substance and Sexual Activity    Alcohol use: Not Currently    Drug use: Yes    Sexual activity: Yes   Other Topics Concern    Not on file   Social History Narrative    Caffeine use    Currently on disability    Daily coffee consumption (2 cups/day)    Educational level- completed 2nd year college    Lives with adult children    Not currently employed    Wheelchair dependent      Social Determinants of Health     Financial Resource Strain: Low Risk  (2/20/2023)    Overall Financial Resource Strain (CARDIA)     Difficulty of Paying Living Expenses: Not hard at all   Food Insecurity: No Food Insecurity (9/23/2024)    Hunger Vital  Sign     Worried About Running Out of Food in the Last Year: Never true     Ran Out of Food in the Last Year: Never true   Transportation Needs: No Transportation Needs (9/23/2024)    PRAPARE - Transportation     Lack of Transportation (Medical): No     Lack of Transportation (Non-Medical): No   Physical Activity: Not on file   Stress: Not on file   Social Connections: Not on file   Intimate Partner Violence: Not on file   Housing Stability: Low Risk  (9/23/2024)    Housing Stability Vital Sign     Unable to Pay for Housing in the Last Year: No     Number of Times Moved in the Last Year: 0     Homeless in the Last Year: No        Current Outpatient Medications:     acetaminophen (TYLENOL) 500 mg tablet, Take 500 mg by mouth every 6 (six) hours as needed for mild pain. Indications: Pain, Disp: , Rfl:     Alpha-D-Galactosidase (Beano) TABS, Take 1 tablet by mouth if needed (GI bloating). Indications: GI bloating, Disp: , Rfl:     baclofen 20 mg tablet, TAKE 1 TABLET BY MOUTH 5 TIMES AS NEEDED (Patient taking differently: No sig reported), Disp: 450 tablet, Rfl: 3    clotrimazole (LOTRIMIN) 1 % cream, Apply topically 2 (two) times a day as needed (yeast rash) (Patient not taking: Reported on 9/23/2024), Disp: 30 g, Rfl: 0    Cyanocobalamin (B-12) 1000 MCG SUBL, Dissolve 1 tablet under tongue daily, Disp: 30 tablet, Rfl: 5    DULoxetine (CYMBALTA) 20 mg capsule, Take 1 capsule (20 mg total) by mouth daily, Disp: 90 capsule, Rfl: 3    ergocalciferol (VITAMIN D2) 50,000 units, Take 1 capsule (50,000 Units total) by mouth once a week, Disp: 4 capsule, Rfl: 5    furosemide (LASIX) 20 mg tablet, Take 1 tablet (20 mg total) by mouth daily, Disp: 90 tablet, Rfl: 3    gabapentin (NEURONTIN) 100 mg capsule, Take 1 tablet at bedtime tonight, 1 tablet twice a day tomorrow, and then 1 tablet 3 times daily thereafter, Disp: 90 capsule, Rfl: 5    ibuprofen (MOTRIN) 200 mg tablet, Take 200 mg by mouth every 6 (six) hours as needed for  moderate pain. Indications: Pain, Disp: , Rfl:     OXcarbazepine (Trileptal) 150 mg tablet, Take 150 mg by mouth 2 (two) times a day. Indications: Trigeminal Nerve Pain, Disp: , Rfl:     oxybutynin (DITROPAN-XL) 10 MG 24 hr tablet, Take 1 tablet (10 mg total) by mouth daily, Disp: 90 tablet, Rfl: 3    rosuvastatin (CRESTOR) 5 mg tablet, Take 1 tablet (5 mg total) by mouth daily, Disp: 90 tablet, Rfl: 3    senna (SENOKOT) 8.6 mg, Take 8.6 mg by mouth if needed for constipation. Indications: Constipation, Disp: , Rfl:     Review of Systems   Constitutional:  Negative for chills and fever.   HENT:  Negative for congestion and sneezing.    Respiratory:  Negative for cough and shortness of breath.    Musculoskeletal:  Positive for gait problem.        Functional quadriplegia    Skin:  Positive for wound.        B/l ischium   Psychiatric/Behavioral:  Negative for agitation.        Objective:  BP 90/60   Pulse 99   Temp 97.5 °F (36.4 °C)   Resp 14   Pain Score: 0-No pain     Physical Exam  Constitutional:       General: She is awake. She is not in acute distress.     Appearance: She is not ill-appearing, toxic-appearing or diaphoretic.   HENT:      Head: Normocephalic and atraumatic.      Right Ear: External ear normal.      Left Ear: External ear normal.   Eyes:      Conjunctiva/sclera: Conjunctivae normal.   Pulmonary:      Effort: Pulmonary effort is normal. No respiratory distress.   Skin:     General: Skin is warm and dry.      Findings: Wound present.      Comments: 1. Left ischial pressure injury visible wound bed with granular tissue, hypergranular tissue present. Large drainage. Undermining is stable. Ruchi-wound is intact with scar tissue. No purulent drainage, exposed or palpable bone.  2. R ischial pressure injury visible wound bed with pink/red granular tissue. Edges with callous and maceration. Ruchi-wound is intact with scar tissue. No purulent drainage, exposed or palpable bone.    Refer to wound  assessment for additional details. No warmth or redness.   Neurological:      Mental Status: She is alert.      Gait: Gait abnormal.   Psychiatric:         Mood and Affect: Mood normal.         Behavior: Behavior normal. Behavior is cooperative.         Wound 01/10/24 Pressure Injury Ischium Left;Posterior;Proximal (Active)   Wound Image   10/07/24 0927   Wound Description Granulation tissue;Hypergranulation 10/07/24 0905   Pressure Injury Stage 3 10/07/24 0905   Ruchi-wound Assessment Intact;Pink 10/07/24 0905   Wound Length (cm) 2.5 cm 10/07/24 0905   Wound Width (cm) 2 cm 10/07/24 0905   Wound Depth (cm) 1 cm 10/07/24 0905   Wound Surface Area (cm^2) 5 cm^2 10/07/24 0905   Wound Volume (cm^3) 5 cm^3 10/07/24 0905   Calculated Wound Volume (cm^3) 5 cm^3 10/07/24 0905   Number of underminings 1 08/19/24 0912   Undermining 1 1.9 10/07/24 0905   Undermining 1 is depth extending from 12-5 o'clock, deepest at 3 o'clock 10/07/24 0905   Drainage Amount Large 10/07/24 0905   Drainage Description Serosanguineous;Tan 10/07/24 0905   Non-staged Wound Description Full thickness 10/07/24 0905   Treatments Cleansed 05/13/24 0852   Dressing Wound V.A.C. 07/08/24 1134   Packing- # removed 1 09/16/24 0928   Dressing Changed Changed 05/13/24 0852   Patient Tolerance Tolerated well 05/13/24 0852   Dressing Status Intact 10/07/24 0905       Wound 01/29/24 Pressure Injury Ischium Right (Active)   Wound Image   10/07/24 0926   Wound Description Pink;Granulation tissue 10/07/24 0905   Pressure Injury Stage 4 08/19/24 0912   Ruchi-wound Assessment Callus;Maceration 10/07/24 0905   Wound Length (cm) 0.2 cm 10/07/24 0905   Wound Width (cm) 0.5 cm 10/07/24 0905   Wound Depth (cm) 0.6 cm 10/07/24 0905   Wound Surface Area (cm^2) 0.1 cm^2 10/07/24 0905   Wound Volume (cm^3) 0.06 cm^3 10/07/24 0905   Calculated Wound Volume (cm^3) 0.06 cm^3 10/07/24 0905   Change in Wound Size % 88 10/07/24 0905   Number of underminings 1 06/03/24 0900  "  Undermining 1 0.1 10/07/24 0905   Undermining 1 is depth extending from 12-12 o'clock 10/07/24 0905   Drainage Amount Moderate 10/07/24 0905   Drainage Description Sanguineous 10/07/24 0905   Non-staged Wound Description Full thickness 10/07/24 0905   Treatments Cleansed 05/13/24 0854   Dressing Changed Changed 05/13/24 0854   Patient Tolerance Tolerated well 05/13/24 0854   Dressing Status Intact 10/07/24 0905           Debridement   Wound 01/29/24 Pressure Injury Ischium Right    Universal Protocol:  Consent: Verbal consent obtained.  Risks and benefits: risks, benefits and alternatives were discussed  Consent given by: patient  Time out: Immediately prior to procedure a \"time out\" was called to verify the correct patient, procedure, equipment, support staff and site/side marked as required.  Timeout called at: 10/7/2024 9:15 AM.  Patient understanding: patient states understanding of the procedure being performed  Patient identity confirmed: verbally with patient    Debridement Details  Performed by: NP  Debridement type: surgical  Level of debridement: subcutaneous tissue  Pain control: lidocaine 4%      Post-debridement measurements  Length (cm): 0.2  Width (cm): 0.5  Depth (cm): 0.7  Percent debrided: 100%  Surface Area (cm^2): 0.1  Area Debrided (cm^2): 0.1  Volume (cm^3): 0.07    Tissue and other material debrided: subcutaneous tissue  Devitalized tissue debrided: biofilm, callus and exudate  Instrument(s) utilized: curette  Bleeding: small  Hemostasis obtained with: pressure  Procedural pain (0-10): 0  Post-procedural pain: 0   Response to treatment: procedure was tolerated well    Debridement   Wound 01/10/24 Pressure Injury Ischium Left;Posterior;Proximal    Universal Protocol:  Consent: Verbal consent obtained.  Risks and benefits: risks, benefits and alternatives were discussed  Consent given by: patient  Time out: Immediately prior to procedure a \"time out\" was called to verify the correct patient, " procedure, equipment, support staff and site/side marked as required.  Timeout called at: 10/7/2024 9:15 AM.  Patient understanding: patient states understanding of the procedure being performed  Patient identity confirmed: verbally with patient    Debridement Details  Performed by: NP  Debridement type: surgical  Level of debridement: subcutaneous tissue  Pain control: lidocaine 4%      Post-debridement measurements  Length (cm): 2.5  Width (cm): 2  Depth (cm): 1  Percent debrided: 100%  Surface Area (cm^2): 5  Area Debrided (cm^2): 5  Volume (cm^3): 5    Tissue and other material debrided: hypergranulation and subcutaneous tissue  Devitalized tissue debrided: biofilm and exudate  Instrument(s) utilized: curette  Bleeding: medium  Hemostasis obtained with: pressure and silver nitrate  Procedural pain (0-10): 0  Post-procedural pain: 0   Response to treatment: procedure was tolerated well  Debridement Comments: After surgical debridement, silver nitrate was applied to control bleeding and treat hypergranulation tissue. 1 stick of silver nitrate was used. Patient tolerated well. Neutralized with NSS.                           Lab Results   Component Value Date    HGBA1C 5.2 01/09/2024       Wound Instructions:  Orders Placed This Encounter   Procedures    Wound cleansing and dressings     Off-loading Instructions:    Keep weight and pressure off wounds as much as possible.    Continue to use ROHO style cushion when sitting.    Try to lay in bed throughout the day to help offload pressure to wounds.     Standing Status:   Future     Standing Expiration Date:   10/28/2024    Wound Procedure Treatment     This order was created via procedure documentation    Wound cleansing and dressings     Left and right ischial wounds:     Wash your hands with soap and water.  Remove old dressing, discard into plastic bag and place in trash. Cleanse the wound with dakins. Pat dry. Do not use tissue or cotton balls. Do not scrub the  "wound. Pat dry using gauze.  Shower yes      Apply skin prep to skin surrounding wound  Lightly pack with aquacel ag rope (cut to fit into the open wound)    Cover with abd and medfix tape or silicone border  VNA and  to continue with wound care dressing changes 3 times a week and as needed for excessive drainage    WMC applied silver nitrate to left ischial wound, wound may appear gray or black.     Standing Status:   Future     Standing Expiration Date:   10/28/2024           CLEMENTINA Reid, LIBRA, DONALD    Portions of the record may have been created with voice recognition software. Occasional wrong word or \"sound alike\" substitutions may have occurred due to the inherent limitations of voice recognition software. Read the chart carefully and recognize, using context, where substitutions have occurred.          Total time spent today:    Total time (face-to-face and non-face-to-face) spent on today's visit was 17 minutes. This includes preparation for the visits (i.e. reviewing test results from date recent hospitalizations, ER/Urgent Care/primary care visits and recent consultant office visits), performance of a medically appropriate history and examination, and orders for medications or testing.     "

## 2024-10-07 NOTE — PROGRESS NOTES
Wound Procedure Treatment    Performed by: Ingris Miranda RN  Authorized by: CLEMENTINA Alejandro    Associated wounds:   Wound 01/10/24 Pressure Injury Ischium Left;Posterior;Proximal  Wound 01/29/24 Pressure Injury Ischium Right  Wound cleansed with:  NSS  Applied primary dressing:  Other and Silicone bordered foam  Comments:  Aquacparker monson

## 2024-10-07 NOTE — PATIENT INSTRUCTIONS
Orders Placed This Encounter   Procedures    Wound cleansing and dressings     Left and right ischial wounds:     Wash your hands with soap and water.  Remove old dressing, discard into plastic bag and place in trash. Cleanse the wound with dakins. Pat dry. Do not use tissue or cotton balls. Do not scrub the wound. Pat dry using gauze.  Shower yes      Apply skin prep to skin surrounding wound  Lightly pack with aquacel ag rope (cut to fit into the open wound)    Cover with abd and medfix tape or silicone border  VNA and  to continue with wound care dressing changes 3 times a week and as needed for excessive drainage     Standing Status:   Future     Standing Expiration Date:   10/28/2024    Wound cleansing and dressings     Off-loading Instructions:    Keep weight and pressure off wounds as much as possible.    Continue to use ROHO style cushion when sitting.    Try to lay in bed throughout the day to help offload pressure to wounds.     Standing Status:   Future     Standing Expiration Date:   10/28/2024

## 2024-10-08 ENCOUNTER — HOME CARE VISIT (OUTPATIENT)
Dept: HOME HEALTH SERVICES | Facility: HOME HEALTHCARE | Age: 54
End: 2024-10-08
Payer: MEDICARE

## 2024-10-10 ENCOUNTER — HOME CARE VISIT (OUTPATIENT)
Dept: HOME HEALTH SERVICES | Facility: HOME HEALTHCARE | Age: 54
End: 2024-10-10
Payer: MEDICARE

## 2024-10-14 ENCOUNTER — HOME CARE VISIT (OUTPATIENT)
Dept: HOME HEALTH SERVICES | Facility: HOME HEALTHCARE | Age: 54
End: 2024-10-14
Payer: MEDICARE

## 2024-10-14 VITALS
HEART RATE: 90 BPM | DIASTOLIC BLOOD PRESSURE: 70 MMHG | OXYGEN SATURATION: 98 % | TEMPERATURE: 98.1 F | SYSTOLIC BLOOD PRESSURE: 104 MMHG

## 2024-10-14 PROCEDURE — G0299 HHS/HOSPICE OF RN EA 15 MIN: HCPCS

## 2024-10-21 ENCOUNTER — TELEPHONE (OUTPATIENT)
Dept: HOME HEALTH SERVICES | Facility: HOME HEALTHCARE | Age: 54
End: 2024-10-21

## 2024-10-25 ENCOUNTER — HOME CARE VISIT (OUTPATIENT)
Dept: HOME HEALTH SERVICES | Facility: HOME HEALTHCARE | Age: 54
End: 2024-10-25
Payer: MEDICARE

## 2024-10-25 VITALS
SYSTOLIC BLOOD PRESSURE: 100 MMHG | HEART RATE: 82 BPM | OXYGEN SATURATION: 96 % | DIASTOLIC BLOOD PRESSURE: 60 MMHG | TEMPERATURE: 98 F

## 2024-10-25 PROCEDURE — G0299 HHS/HOSPICE OF RN EA 15 MIN: HCPCS

## 2024-11-01 ENCOUNTER — HOME CARE VISIT (OUTPATIENT)
Dept: HOME HEALTH SERVICES | Facility: HOME HEALTHCARE | Age: 54
End: 2024-11-01
Payer: MEDICARE

## 2024-11-01 VITALS
TEMPERATURE: 98 F | DIASTOLIC BLOOD PRESSURE: 70 MMHG | HEART RATE: 84 BPM | SYSTOLIC BLOOD PRESSURE: 126 MMHG | OXYGEN SATURATION: 98 %

## 2024-11-01 PROCEDURE — G0299 HHS/HOSPICE OF RN EA 15 MIN: HCPCS

## 2024-11-05 NOTE — ASSESSMENT & PLAN NOTE
Continue present therapy Please notify Felicity Roberson , that her white blood cell count is improving from her hospitalization however her potassium is still low patient strongly encouraged to continue taking the Effer-K, patient also being followed by cardiology/CHF clinic and was provided with diuretics however creatinine function has worsened from the hospitalization from 1.3-1.79.  Kidney function will require close monitoring we will repeat test in 1 week.  Please continue with all supportive care avoiding salt staying well-hydrated ,fall precautions.  Please encourage patient to continue with plan as discussed in the office, please  continue to increase physical activity as able and decrease sweets, carbs, high cholesterol containing foods in the diet.  ~ Thank you   Normal and symmetric appearance without webbing, redundant skin, masses, pits or sternocleidomastoid muscle lesions; clavicles of normal shape, contour and nontender on palpation.

## 2024-11-08 ENCOUNTER — HOME CARE VISIT (OUTPATIENT)
Dept: HOME HEALTH SERVICES | Facility: HOME HEALTHCARE | Age: 54
End: 2024-11-08
Payer: MEDICARE

## 2024-11-08 VITALS
SYSTOLIC BLOOD PRESSURE: 120 MMHG | OXYGEN SATURATION: 99 % | DIASTOLIC BLOOD PRESSURE: 70 MMHG | HEART RATE: 84 BPM | TEMPERATURE: 97.9 F

## 2024-11-08 PROCEDURE — G0299 HHS/HOSPICE OF RN EA 15 MIN: HCPCS

## 2024-11-12 ENCOUNTER — OFFICE VISIT (OUTPATIENT)
Dept: NEUROLOGY | Facility: CLINIC | Age: 54
End: 2024-11-12
Payer: MEDICARE

## 2024-11-12 ENCOUNTER — TELEPHONE (OUTPATIENT)
Dept: NEUROLOGY | Facility: CLINIC | Age: 54
End: 2024-11-12

## 2024-11-12 VITALS
HEART RATE: 86 BPM | TEMPERATURE: 97.6 F | DIASTOLIC BLOOD PRESSURE: 68 MMHG | RESPIRATION RATE: 14 BRPM | SYSTOLIC BLOOD PRESSURE: 108 MMHG | HEIGHT: 66 IN | BODY MASS INDEX: 24.11 KG/M2 | WEIGHT: 150 LBS | OXYGEN SATURATION: 98 %

## 2024-11-12 DIAGNOSIS — R53.2 FUNCTIONAL QUADRIPLEGIA SECONDARY TO MS (HCC): ICD-10-CM

## 2024-11-12 DIAGNOSIS — Z93.59 SUPRAPUBIC CATHETER (HCC): ICD-10-CM

## 2024-11-12 DIAGNOSIS — R49.8 HYPOPHONIA: ICD-10-CM

## 2024-11-12 DIAGNOSIS — E53.8 LOW SERUM VITAMIN B12: ICD-10-CM

## 2024-11-12 DIAGNOSIS — R13.10 DYSPHAGIA, UNSPECIFIED TYPE: ICD-10-CM

## 2024-11-12 DIAGNOSIS — R06.02 SOB (SHORTNESS OF BREATH): ICD-10-CM

## 2024-11-12 DIAGNOSIS — G35 MULTIPLE SCLEROSIS (HCC): Primary | ICD-10-CM

## 2024-11-12 DIAGNOSIS — E55.9 VITAMIN D DEFICIENCY: ICD-10-CM

## 2024-11-12 DIAGNOSIS — G35 FUNCTIONAL QUADRIPLEGIA SECONDARY TO MS (HCC): ICD-10-CM

## 2024-11-12 DIAGNOSIS — G35 FUNCTIONAL QUADRIPLEGIA SECONDARY TO MS (HCC): Primary | ICD-10-CM

## 2024-11-12 DIAGNOSIS — G35 MULTIPLE SCLEROSIS (HCC): ICD-10-CM

## 2024-11-12 DIAGNOSIS — R53.2 FUNCTIONAL QUADRIPLEGIA SECONDARY TO MS (HCC): Primary | ICD-10-CM

## 2024-11-12 DIAGNOSIS — H93.13 TINNITUS OF BOTH EARS: ICD-10-CM

## 2024-11-12 DIAGNOSIS — J96.01 ACUTE RESPIRATORY FAILURE WITH HYPOXIA (HCC): ICD-10-CM

## 2024-11-12 PROCEDURE — 99215 OFFICE O/P EST HI 40 MIN: CPT | Performed by: PSYCHIATRY & NEUROLOGY

## 2024-11-12 PROCEDURE — 96372 THER/PROPH/DIAG INJ SC/IM: CPT | Performed by: PSYCHIATRY & NEUROLOGY

## 2024-11-12 RX ORDER — CYANOCOBALAMIN 1000 UG/ML
1000 INJECTION, SOLUTION INTRAMUSCULAR; SUBCUTANEOUS
Status: SHIPPED | OUTPATIENT
Start: 2024-11-12

## 2024-11-12 RX ORDER — CYANOCOBALAMIN 1000 UG/ML
INJECTION, SOLUTION INTRAMUSCULAR; SUBCUTANEOUS
Qty: 8 ML | Refills: 0 | Status: SHIPPED | OUTPATIENT
Start: 2024-11-12

## 2024-11-12 RX ADMIN — CYANOCOBALAMIN 1000 MCG: 1000 INJECTION, SOLUTION INTRAMUSCULAR; SUBCUTANEOUS at 16:01

## 2024-11-12 NOTE — PROGRESS NOTES
Ambulatory Visit  Name: Fanny Schulz      : 1970      MRN: [id number]  Encounter Provider: Kaykay Santiago MD  Encounter Date: 2024   Encounter department: [facility] [place]    Assessment & Plan  Multiple sclerosis (HCC)  Mrs. Schulz has presented to [facility] multiple sclerosis center for follow-up on multiple grosses related issues.  Since last office visit in 2023, patient had COVID infection in 2024 and was hospitalized for 10 days during that time requiring frequent suctioning.  She experience progressive worsening of dysphagia at that time and required oxygen including high flow nasal cannula.  She completed a 10-day course course of Decadron and has recovered well from that infection since.  She is quadriplegic with progressively worsening hypophonia.  She continues to use her motorized wheelchair which has been serving well for the past 5 years.  Occasionally will have spasticity, which she uses baclofen 20 mg in the morning and 40 mg in the evening.  Has neurogenic bladder with suprapubic catheter, with regular flushing.  No evidence of of UTI since last office visit.  Occasionally will have back pain, uses gabapentin as needed.    Last MRI brain completed in , stable without new lesions.     Interested in looking into [employer] assistive technology program.  Will reach out to our social work team to have facilitate this.  We have also placed a referral to Occupational Therapy to see if they can also help facilitate this.  A [employer] message was sent to the patient with the following link: [email or web address]    Follow-up in about 10 months, or sooner if needed.Orders:    Ambulatory Referral to Occupational Therapy; Future    Ambulatory Referral to Pulmonology; Future    cyanocobalamin injection 1,000 mcg    cyanocobalamin 1,000 mcg/mL; Take B12 1000 mcg IM once a week for 3  "weeks, then once a month for 5 months    SYRINGE-NEEDLE, DISP, 3 ML 25G X 5/8\" 3 ML MISC; Use once a week Use syringes for B12 injections once a week for 3 weeks, then once a month for 5 months.    Functional quadriplegia secondary to MS (HCC)  Continues to work with occupational therapy.  Orders:    Ambulatory Referral to Occupational Therapy; Future    Ambulatory Referral to Pulmonology; Future    Suprapubic catheter (HCC)  Continue management with family.       Hypophonia  Ambulatory referral provided to pulmonary team given evidence of worsening hypophonia in the setting of MS, concern for possible also leading of respiratory muscles.  Would appreciate pulmonology's evaluation to consider options such as pulmonary function tests and/or pulmonary rehab.  Orders:    Ambulatory Referral to Pulmonology; Future    Tinnitus of both ears  Tinnitus treatment resources:  https://www.cbtfortinnitus.com will take you to Dr. Giovanni Archer's web site. Discussion is 90 minutes long.   On boogie.org there is a 27-minute discussion about the same topics - CBT and sound therapy.  Sound therapy at [facility] who was recommended by [facility] emergency dept.  https://[email or web address].Hail Varsity is the stephan with lot of it is free to try.    [employer] - Online Tinnitus Treatment - Start Today  Decrease caffeine intake.   Avoid tobacco, high doses of aspirin or ibuprofen; these make tinnitus wors.   Avoid loud sounds. These can make tinnitus worse.   Use a radio set at night between stations at night.   Use a sound generator at night (which you can buy on Amazon) or tinnitus phone applications. Settings that can be very good for tinnitus include shower sounds, rainfall, and ocean sounds, although you should try the other settings to see what works best for you. You can also download sound files from the internet and play them on your computer or smartphone. Effective sounds include white noise, pink noise, and grey noise. " You can listen to these on youtube prior to purchasing them for your phone or computer.   Use a fan in the bedroom at night (during summer).   Tinnitus specific therapies include cognitive behavior therapy, tinnitus retraining therapy and neuromonics device. They are not covered by insurance. They help you ignore the sounds of the tinnitus, which then decreases the tinnitus sound further. They require many months of attendance (12 to 18 months) to be effective.   Cognitive behavior therapy (Sophia Cole [phone]) or Tamera Cummings ([phone])   Trial of transcendental meditation, acupuncture or yoga.   [facility] [phone]   [facility] [phone]   [facility] [place] [phone]   [facility] [phone]   Medications for treatment of anxiety and depression can significantly improve tinnitus for people with anxiety or depression. If you have these conditions and are under the care of a psychiatrist, you should discuss improved management of them with your psychiatrist.   Tinnitus worsens when you think about it, so try not to focus your thoughts on it or arrange your life around this sound.   If you have hearing loss, use of hearing aids can significantly improve your tinnitus as well as improve your hearing.   Acupuncture can be helpful in the management of your tinnitus.   Transcendental Meditation can be helpful in the management of your tinnitus.   Therapeutic massage can be helpful in the management of your tinnitus.           Dysphagia, unspecified type  Stable.       Acute respiratory failure with hypoxia (HCC)         SOB (shortness of breath)    Orders:    Ambulatory Referral to Pulmonology; Future    Low serum vitamin B12  Lab Results   Component Value Date    [id number] 188 09/27/2024    [id number] 339 12/13/2022    [id number] 322 09/07/2022    [id number] 300 03/11/2021    [id number] 817 10/21/2020     Very low  "vitamin B12 levels.  Can also impact energy level.  Recommend level to be 400+.  Given IM 1000 mcg injection x 1 today.  Continue IM injections at home -once a week for an additional 3 weeks, and then once a month for additional 5 months.  Also continue p.o. 1000 mcg sublingually daily.    Orders:    cyanocobalamin 1,000 mcg/mL; Take B12 1000 mcg IM once a week for 3 weeks, then once a month for 5 months    SYRINGE-NEEDLE, DISP, 3 ML 25G X 5/8\" 3 ML MISC; Use once a week Use syringes for B12 injections once a week for 3 weeks, then once a month for 5 months.    Vitamin D deficiency  Lab Results   Component Value Date    [id number] 18.5 (L) 09/27/2024    KZSL48LKHWLQ 37.2 12/13/2022    [id number] 34.3 09/07/2022    [id number] 36.8 03/11/2021    [id number] 22.8 (L) 10/21/2020     Continue vitamin D2 50,000 unit weekly supplementation per PCP.           History of Present Illness   HPI  Fanny is a very pleasant 55YO F who returns to [facility] neurology multiple sclerosis clinic for follow-up.  Dx'ed with multiple sclerosis in 1998 by Dr. Samuel at [facility]. Within 3 years, pt started using cane then rolling walker with question for primary progressive multiple sclerosis. Followed with physiatry for spasticity. Noted to be quadriplegic with progressive worsening hypophonia, particularly noting RUE stiffness with flaccid paralysis. Not on DMARDs.  LOV on 11/21/23 with Dr. Santiago, at which time plan was for evaluation with [facility] system and to continue Baclofen 20mg qAM and consider 40mg PM. Treatment resources given for tinnitus.    Home meds includes Tylenol 500mg q6h PRN, vit B12 1000mcg daily, Cymbalta 20mg daily, Vit D2 88989 units weekly, Lasix 20mg daily, Trileptal 150mg BID, Oxybutynin 10mg daily, Rosuvastatin 5mg daily, Senna 8.6mg daily PRN.    Today, she reports she has been having softer speech. Here with .  Still occasionally seen by home OT. No longer being actively seen by " speech.  Continues to use suprapubic catheter, no known infections.  Occasionally has neck pain, will take Gabapentin as needed.  Would like to look into [employer] for assistive communicative options.    New infections? COVID in Jan 2024  New hospitalizations? Jan 2024, it was severe.  New urinary/bowel incontinence? no  New falls? no  New symptoms (e.g., weakness, numbness)? no      Prior pertinent work-up:  Vit D 9/27/24 18.5 low.  Feels much more energized.    Also noting low serum B12 levels, has been taking oral supplements.  Lab Results   Component Value Date    [id number] 188 09/27/2024    [id number] 339 12/13/2022    [id number] 322 09/07/2022    [id number] 300 03/11/2021    [id number] 817 10/21/2020     MRI brain ms w/wo contrast, 10/3/20: Stable Mri of the brain w foci of FLAIR abnormality compatible w the provided hx of MS. No acute abn. No abn enhancement.  MRI c-spine wo contrast, 11/24/18: Severe progressive spinal atrophy at the cervicothoracic junction and moderate cord atrophy throughout the cervical spinal cord also progressed. No definite intrinsic cord signal abnormality. Mild spondylotic changes are minimally progressed at C4-5 and C6-C7. No cord compression.  MRI brain MS wo contrast, 11/24/18: Stable WM lesions correlating to h/o MS. Overall plaque burden (periventricular, intracalyceal, L-sided brainstem) is mild w/o evidence of new signal abn. No acute infarction, intracranial hemorrhage, or mass effect.      Review of Systems   Constitutional: Negative.  Negative for chills and fever.   HENT:  Positive for tinnitus (both ears, always). Negative for ear pain and sore throat.    Eyes:  Positive for visual disturbance (always, discoloration). Negative for pain.   Respiratory: Negative.  Negative for cough and shortness of breath.    Cardiovascular: Negative.  Negative for chest pain and palpitations.   Gastrointestinal: Negative.  Negative for abdominal pain and vomiting.   Endocrine:  "Positive for cold intolerance and heat intolerance.   Genitourinary: Negative.  Negative for dysuria and hematuria.   Musculoskeletal:  Positive for back pain (on and off) and neck pain (on and off). Negative for arthralgias.   Skin: Negative.  Negative for color change and rash.   Allergic/Immunologic: Negative.    Neurological:  Positive for tremors (same) and weakness. Negative for seizures and syncope.   Psychiatric/Behavioral: Negative.     All other systems reviewed and are negative.    Pt is with her   Pt states voice is weaker. Pt had a fairly good year.     I have personally reviewed the MA's review of systems and made changes as necessary.    Medical History Reviewed by provider this encounter:       Past Medical History   Past Medical History:   Diagnosis Date    Anesthesia     \"prefers if able to be put to sleep on stretcher before placed on table if possible due to severe spasticity    Bladder stones     Chronic pain disorder     neck    Contracture, right shoulder     right arm and hand    Dependent on wheelchair     motorized    Edema     dependant lower extremities    Elevated alkaline phosphatase level     Mildly elevated total, normal isoenzymes 4/15/15, normal 1/17; last assessed: 24Nov2015    Fernandez catheter in place     #24 to large bag(silicone catheter)    Gallbladder disease     History of femur fracture     right leg    History of UTI     MS (multiple sclerosis) (HCC)     Muscle spasticity     especially with touch    Muscle weakness     Muscular dystrophy (HCC)     Neck pain     Neurogenic bladder     Paralysis (HCC)     bilateral lower extremities    Port-A-Cath in place     left chest,\" hasn't used in approx 1 yr or so\"    Pressure injury of skin     right ischium    Sacral pressure sore     \"shearing, tegaderm in place,\"    Swallowing difficulty     Tinnitus     Urinary incontinence      Past Surgical History:   Procedure Laterality Date    BLADDER STONE REMOVAL N/A 12/4/2017    " Procedure: CYSTO, LITHOLOPAXY HOLMIUM LASER;  Surgeon: Damir Osborne MD;  Location: [facility];  Service: Urology    BLADDER SURGERY      CHOLECYSTECTOMY      CYSTOSCOPY  06/15/2020    ESOPHAGOGASTRODUODENOSCOPY      FL RETROGRADE PYELOGRAM  10/2/2020    FL RETROGRADE PYELOGRAM  11/3/2020    KIDNEY STONE SURGERY      PORTACATH PLACEMENT Left     OH CYSTO BLADDER W/URETERAL CATHETERIZATION Left 10/2/2020    Procedure: CYSTOSCOPY LEFT RETROGRADE ; LEFT URETEROSCOPY; PYELOGRAM WITH STENT INSERTION AND SUPERPUBIC EXCHANGE;  Surgeon: Philip Mcdonald MD;  Location: [facility];  Service: Urology    OH CYSTO/URETERO W/LITHOTRIPSY &INDWELL STENT INSRT Left 11/3/2020    Procedure: CYSTOSCOPY URETEROSCOPY WITH RETROGRADE PYELOGRAM AND EXCHANGE STENT URETERAL, SP TUBE EXCHANGE, BASKET STONE EXTRACTION, BLADDER STONE EXTRACTION;  Surgeon: Damir Osborne MD;  [facility]: [facility];  Service: Urology    OH LITHOLAPAXY SMPL/SM <2.5 CM N/A 12/22/2022    Procedure: Cystolitholapaxy w/  laser lithotripsy of bladder stones; SUPRAPUBIC CATHETER CHANGE;  Surgeon: Damir Osobrne MD;  Location: [facility];  Service: Urology    SUPRAPUBIC CYSTOSTOMY  02/25/2014    last assessed: [id number]     Family History   Problem Relation Age of Onset    Diabetes Mother     Hyperlipidemia Mother     Hypertension Mother     COPD Father     Hypertension Father     Hyperlipidemia Sister     Alzheimer's disease Family     Diabetes Family     Hypertension Family     Heart disease Family      Current Outpatient Medications on File Prior to Visit   Medication Sig Dispense Refill    acetaminophen (TYLENOL) 500 mg tablet Take 500 mg by mouth every 6 (six) hours as needed for mild pain. Indications: Pain      Alpha-D-Galactosidase (Beano) TABS Take 1 tablet by mouth if needed (GI bloating). Indications: GI bloating      baclofen 20 mg tablet TAKE 1 TABLET BY MOUTH 5 TIMES AS NEEDED (Patient taking differently: No sig reported) 450 tablet 3     Cyanocobalamin (B-12) 1000 MCG SUBL Dissolve 1 tablet under tongue daily 30 tablet 5    DULoxetine (CYMBALTA) 20 mg capsule Take 1 capsule (20 mg total) by mouth daily 90 capsule 3    ergocalciferol (VITAMIN D2) 50,000 units Take 1 capsule (50,000 Units total) by mouth once a week 4 capsule 5    furosemide (LASIX) 20 mg tablet Take 1 tablet (20 mg total) by mouth daily 90 tablet 3    gabapentin (NEURONTIN) 100 mg capsule Take 1 tablet at bedtime tonight, 1 tablet twice a day tomorrow, and then 1 tablet 3 times daily thereafter 90 capsule 5    ibuprofen (MOTRIN) 200 mg tablet Take 200 mg by mouth every 6 (six) hours as needed for moderate pain. Indications: Pain      OXcarbazepine (Trileptal) 150 mg tablet Take 150 mg by mouth 2 (two) times a day. Indications: Trigeminal Nerve Pain      oxybutynin (DITROPAN-XL) 10 MG 24 hr tablet Take 1 tablet (10 mg total) by mouth daily 90 tablet 3    rosuvastatin (CRESTOR) 5 mg tablet Take 1 tablet (5 mg total) by mouth daily 90 tablet 3    senna (SENOKOT) 8.6 mg Take 8.6 mg by mouth if needed for constipation. Indications: Constipation      clotrimazole (LOTRIMIN) 1 % cream Apply topically 2 (two) times a day as needed (yeast rash) (Patient not taking: Reported on 9/23/2024) 30 g 0     No current facility-administered medications on file prior to visit.     Allergies   Allergen Reactions    Latex Blisters     Added based on information entered during case entry, please review and add reactions, type, and severity as needed    blisters      Current Outpatient Medications on File Prior to Visit   Medication Sig Dispense Refill    acetaminophen (TYLENOL) 500 mg tablet Take 500 mg by mouth every 6 (six) hours as needed for mild pain. Indications: Pain      Alpha-D-Galactosidase (Beano) TABS Take 1 tablet by mouth if needed (GI bloating). Indications: GI bloating      baclofen 20 mg tablet TAKE 1 TABLET BY MOUTH 5 TIMES AS NEEDED (Patient taking differently: No sig reported) 450  "tablet 3    Cyanocobalamin (B-12) 1000 MCG SUBL Dissolve 1 tablet under tongue daily 30 tablet 5    DULoxetine (CYMBALTA) 20 mg capsule Take 1 capsule (20 mg total) by mouth daily 90 capsule 3    ergocalciferol (VITAMIN D2) 50,000 units Take 1 capsule (50,000 Units total) by mouth once a week 4 capsule 5    furosemide (LASIX) 20 mg tablet Take 1 tablet (20 mg total) by mouth daily 90 tablet 3    gabapentin (NEURONTIN) 100 mg capsule Take 1 tablet at bedtime tonight, 1 tablet twice a day tomorrow, and then 1 tablet 3 times daily thereafter 90 capsule 5    ibuprofen (MOTRIN) 200 mg tablet Take 200 mg by mouth every 6 (six) hours as needed for moderate pain. Indications: Pain      OXcarbazepine (Trileptal) 150 mg tablet Take 150 mg by mouth 2 (two) times a day. Indications: Trigeminal Nerve Pain      oxybutynin (DITROPAN-XL) 10 MG 24 hr tablet Take 1 tablet (10 mg total) by mouth daily 90 tablet 3    rosuvastatin (CRESTOR) 5 mg tablet Take 1 tablet (5 mg total) by mouth daily 90 tablet 3    senna (SENOKOT) 8.6 mg Take 8.6 mg by mouth if needed for constipation. Indications: Constipation      clotrimazole (LOTRIMIN) 1 % cream Apply topically 2 (two) times a day as needed (yeast rash) (Patient not taking: Reported on 9/23/2024) 30 g 0     No current facility-administered medications on file prior to visit.      Social History     Tobacco Use    Smoking status: Never    Smokeless tobacco: Never   Vaping Use    Vaping status: Never Used   Substance and Sexual Activity    Alcohol use: Not Currently    Drug use: Yes    Sexual activity: Yes     Objective     /68 (BP Location: Left arm, Patient Position: Sitting, Cuff Size: Adult)   Pulse 86   Temp 97.6 °F (36.4 °C) (Temporal)   Resp 14   Ht 5' 6\" (1.676 m)   Wt 68 kg (150 lb)   SpO2 98%   BMI 24.21 kg/m²     Physical Exam:  CONSTITUTIONAL: NAD, pleasant. NECK: supple, no lymphadenopathy, no thyromegaly, no JVD. CARDIOVASCULAR: RRR, normal S1S2, no murmurs, no " rubs. RESP: clear to auscultation bilaterally, no wheezes/rhonchi/rales. ABDOMEN: soft, non tender, non distended. SKIN: no rash or skin lesions. EXTREMITIES: no edema, pulses 2+bilaterally. PSYCH: appropriate mood and affect  NEUROLOGIC COMPREHENSIVE EXAM: Patient is oriented to person, place and time, NAD; appropriate affect. CN II, III, IV, V, VI, VII,VIII,IX,X,XI-XII intact with EOMI (L beating nystagmus with L end gaze), PERRLA, VF grossly intact, symmetric face noted. Motor: 0/5 UE/LE bilateral symmetric. Normal muscle tone. Decreased muscle bulk. Sensory: intact to light touch bilaterally. Gait deferred - wheelchair dependent.    Shweta Sofia DO  [facility] Neurology Resident PGY3

## 2024-11-12 NOTE — ASSESSMENT & PLAN NOTE
Ambulatory referral provided to pulmonary team given evidence of worsening hypophonia in the setting of MS, concern for possible also leading of respiratory muscles.  Would appreciate pulmonology's evaluation to consider options such as pulmonary function tests and/or pulmonary rehab.  Orders:    Ambulatory Referral to Pulmonology; Future

## 2024-11-12 NOTE — ASSESSMENT & PLAN NOTE
Tinnitus treatment resources:  https://www.cbtfortinnitus.com will take you to Dr. Giovanni Archer's web site. Discussion is 90 minutes long.   On boogie.org there is a 27-minute discussion about the same topics - CBT and sound therapy.  Sound therapy at [facility] who was recommended by [facility] emergency dept.  https://[employer]iki is the stephan with lot of it is free to try.    [employer] - Online Tinnitus Treatment - Start Today  Decrease caffeine intake.   Avoid tobacco, high doses of aspirin or ibuprofen; these make tinnitus wors.   Avoid loud sounds. These can make tinnitus worse.   Use a radio set at night between stations at night.   Use a sound generator at night (which you can buy on [employer]) or tinnitus phone applications. Settings that can be very good for tinnitus include shower sounds, rainfall, and ocean sounds, although you should try the other settings to see what works best for you. You can also download sound files from the internet and play them on your computer or smartphone. Effective sounds include white noise, pink noise, and grey noise. You can listen to these on [employer] prior to purchasing them for your phone or computer.   Use a fan in the bedroom at night (during summer).   Tinnitus specific therapies include cognitive behavior therapy, tinnitus retraining therapy and neuromonics device. They are not covered by insurance. They help you ignore the sounds of the tinnitus, which then decreases the tinnitus sound further. They require many months of attendance (12 to 18 months) to be effective.   Cognitive behavior therapy (Sophia Cole [phone]) or Tamera Cummings ([phone])   Trial of transcendental meditation, acupuncture or yoga.   [facility] [phone]   [facility] [phone]   [facility] [phone]   [facility] [phone]   Medications for treatment of anxiety  and depression can significantly improve tinnitus for people with anxiety or depression. If you have these conditions and are under the care of a psychiatrist, you should discuss improved management of them with your psychiatrist.   Tinnitus worsens when you think about it, so try not to focus your thoughts on it or arrange your life around this sound.   If you have hearing loss, use of hearing aids can significantly improve your tinnitus as well as improve your hearing.   Acupuncture can be helpful in the management of your tinnitus.   Transcendental Meditation can be helpful in the management of your tinnitus.   Therapeutic massage can be helpful in the management of your tinnitus.

## 2024-11-12 NOTE — ASSESSMENT & PLAN NOTE
Continues to work with occupational therapy.  Orders:    Ambulatory Referral to Occupational Therapy; Future    Ambulatory Referral to Pulmonology; Future

## 2024-11-12 NOTE — ASSESSMENT & PLAN NOTE
"Mrs. Schulz has presented to [facility] multiple sclerosis center for follow-up on multiple grosses related issues.  Since last office visit in November 2023, patient had COVID infection in January 2024 and was hospitalized for 10 days during that time requiring frequent suctioning.  She experience progressive worsening of dysphagia at that time and required oxygen including high flow nasal cannula.  She completed a 10-day course course of Decadron and has recovered well from that infection since.  She is quadriplegic with progressively worsening hypophonia.  She continues to use her motorized wheelchair which has been serving well for the past 5 years.  Occasionally will have spasticity, which she uses baclofen 20 mg in the morning and 40 mg in the evening.  Has neurogenic bladder with suprapubic catheter, with regular flushing.  No evidence of of UTI since last office visit.  Occasionally will have back pain, uses gabapentin as needed.    Last MRI brain completed in 2020, stable without new lesions.     Interested in looking into [employer] assistive technology program.  Will reach out to our social work team to have facilitate this.  We have also placed a referral to Occupational Therapy to see if they can also help facilitate this.  A [employer] message was sent to the patient with the following link: [email or web address]    Follow-up in about 10 months, or sooner if needed.Orders:    Ambulatory Referral to Occupational Therapy; Future    Ambulatory Referral to Pulmonology; Future    cyanocobalamin injection 1,000 mcg    cyanocobalamin 1,000 mcg/mL; Take B12 1000 mcg IM once a week for 3 weeks, then once a month for 5 months    SYRINGE-NEEDLE, DISP, 3 ML 25G X 5/8\" 3 ML MISC; Use once a week Use syringes for B12 injections once a week for 3 weeks, then once a month for 5 months.    "

## 2024-11-12 NOTE — PROGRESS NOTES
Gave B12 inj IM into Right Deltoid, observed pt who tolerated it well.    Showed  how to give injection.

## 2024-11-12 NOTE — LETTER
November 18, 2024              Please see attached referral for patient.  Call patient to schedule.  Let me know if there is anything else you need.  Thank you,     Cornelia Deluna  Outpatient   [facility]  [phone]  [email or web address]

## 2024-11-12 NOTE — PATIENT INSTRUCTIONS
Tinnitus treatment resources:  https://www.cbtfortinnitus.com will take you to Dr. Giovanni Archer's web site. Discussion is 90 minutes long.   On boogie.org there is a 27-minute discussion about the same topics - CBT and sound therapy.  Sound therapy at [facility] who was recommended by [facility] emergency dept.  https://[employer].The [employer]own is the stephan with lot of it is free to try.    [email or web address][employer] - Online Tinnitus Treatment - Start Today  Decrease caffeine intake.   Avoid tobacco, high doses of aspirin or ibuprofen; these make tinnitus wors.   Avoid loud sounds. These can make tinnitus worse.   Use a radio set at night between stations at night.   Use a sound generator at night (which you can buy on [employer]) or tinnitus phone applications. Settings that can be very good for tinnitus include shower sounds, rainfall, and ocean sounds, although you should try the other settings to see what works best for you. You can also download sound files from the internet and play them on your computer or smartphone. Effective sounds include white noise, pink noise, and grey noise. You can listen to these on [employer] prior to purchasing them for your phone or computer.   Use a fan in the bedroom at night (during summer).   Tinnitus specific therapies include cognitive behavior therapy, tinnitus retraining therapy and neuromonics device. They are not covered by insurance. They help you ignore the sounds of the tinnitus, which then decreases the tinnitus sound further. They require many months of attendance (12 to 18 months) to be effective.   Cognitive behavior therapy (Sophia Cole [phone]) or Tamera Cummings ([phone])   Trial of transcendental meditation, acupuncture or yoga.   [facility] [phone]   [facility] [phone]   [facility] [phone]   [facility] [phone]   Medications for treatment of anxiety  and depression can significantly improve tinnitus for people with anxiety or depression. If you have these conditions and are under the care of a psychiatrist, you should discuss improved management of them with your psychiatrist.   Tinnitus worsens when you think about it, so try not to focus your thoughts on it or arrange your life around this sound.   If you have hearing loss, use of hearing aids can significantly improve your tinnitus as well as improve your hearing.   Acupuncture can be helpful in the management of your tinnitus.   Transcendental Meditation can be helpful in the management of your tinnitus.   Therapeutic massage can be helpful in the management of your tinnitus.

## 2024-11-12 NOTE — ASSESSMENT & PLAN NOTE
Lab Results   Component Value Date    [id number] 18.5 (L) 09/27/2024    [id number] 37.2 12/13/2022    [id number] 34.3 09/07/2022    [id number] 36.8 03/11/2021    [id number] 22.8 (L) 10/21/2020     Continue vitamin D2 50,000 unit weekly supplementation per PCP.

## 2024-11-12 NOTE — TELEPHONE ENCOUNTER
MSW- please help the patient establishing with a [facility].  I provided referral to OT, but I ma not familiar with the program

## 2024-11-13 NOTE — TELEPHONE ENCOUNTER
SW called [facility] at [phone] and was transferred to the outpatient neuro rehab program.  Left message and requested c/b to discuss how to make a referral for patient for  [facility].

## 2024-11-15 ENCOUNTER — HOME CARE VISIT (OUTPATIENT)
Dept: HOME HEALTH SERVICES | Facility: HOME HEALTHCARE | Age: 54
End: 2024-11-15
Payer: MEDICARE

## 2024-11-15 VITALS
DIASTOLIC BLOOD PRESSURE: 82 MMHG | HEART RATE: 83 BPM | RESPIRATION RATE: 18 BRPM | TEMPERATURE: 97.1 F | OXYGEN SATURATION: 99 % | SYSTOLIC BLOOD PRESSURE: 138 MMHG

## 2024-11-15 PROCEDURE — G0300 HHS/HOSPICE OF LPN EA 15 MIN: HCPCS

## 2024-11-15 NOTE — TELEPHONE ENCOUNTER
SW called [facility] at [phone] and was transferred to the speech department with outpatient neuro rehab.  Left message requesting c/b to discuss how to make referral.      Received call back from [facility] # [phone].  Requested referral be faxed to them at fax# [phone] and include face sheet, insurance information, office note and order for speech therapy.     Dr. Lerner,   When you have time, could you please place an outpatient ST referral for patient and in the comments include (evaluate for [facility] Alternative Communication).  Thank you in advance for your consideration.

## 2024-11-18 ENCOUNTER — HOME CARE VISIT (OUTPATIENT)
Dept: HOME HEALTH SERVICES | Facility: HOME HEALTHCARE | Age: 54
End: 2024-11-18
Payer: MEDICARE

## 2024-11-18 ENCOUNTER — NURSE TRIAGE (OUTPATIENT)
Age: 54
End: 2024-11-18

## 2024-11-18 DIAGNOSIS — R39.9 UTI SYMPTOMS: Primary | ICD-10-CM

## 2024-11-18 NOTE — TELEPHONE ENCOUNTER
"Patient of Charis Swann, last seen 8/16/2024, called in stating she is experiencing a foul smelling odor and cloudy urine. She states her symptoms started on Friday. She denies fever, chills, or pain at this time. I ordered a UA and culture. I reviewed ED precautions, encouraged water intake, and avoiding bladder irritants. I verified [id number] - [place], [place] - [address] [address]. Please advise.    Reason for Disposition   All other urine symptoms    Answer Assessment - Initial Assessment Questions  1. SYMPTOM: \"What's the main symptom you're concerned about?\" (e.g., frequency, incontinence)      Foul smelling odor, cloudy urine    2. ONSET: \"When did the symptoms start?\"      Friday    3. PAIN: \"Is there any pain?\" If Yes, ask: \"How bad is it?\" (Scale: 1-10; mild, moderate, severe)      Denies    4. CAUSE: \"What do you think is causing the symptoms?\"      UTI    5. OTHER SYMPTOMS: \"Do you have any other symptoms?\" (e.g., blood in urine, fever, flank pain, pain with urination)      Denies    Protocols used: Urinary Symptoms-Adult-[place]    "

## 2024-11-18 NOTE — TELEPHONE ENCOUNTER
STEFANY faxed referral to [facility] for ST for patient at fax# [phone].  Confirmation of successful fax received.  SW remains available.

## 2024-11-21 NOTE — TELEPHONE ENCOUNTER
SW called patient at [phone] and spoke with Nacho, spouse.  [facility] called and l/m yesterday so Nacho will call them back today to schedule apt for patient.  No further questions or needs at this time.  SW will be available for future social needs as requested. Patient has contact information should further needs arise.  Will close task at this time.

## 2024-11-22 ENCOUNTER — APPOINTMENT (OUTPATIENT)
Dept: LAB | Facility: HOSPITAL | Age: 54
End: 2024-11-22
Payer: MEDICARE

## 2024-11-22 ENCOUNTER — HOME CARE VISIT (OUTPATIENT)
Dept: HOME HEALTH SERVICES | Facility: HOME HEALTHCARE | Age: 54
End: 2024-11-22
Payer: MEDICARE

## 2024-11-22 ENCOUNTER — TELEPHONE (OUTPATIENT)
Age: 54
End: 2024-11-22

## 2024-11-22 VITALS
TEMPERATURE: 98.1 F | SYSTOLIC BLOOD PRESSURE: 106 MMHG | DIASTOLIC BLOOD PRESSURE: 66 MMHG | RESPIRATION RATE: 16 BRPM | OXYGEN SATURATION: 98 % | HEART RATE: 95 BPM

## 2024-11-22 DIAGNOSIS — N39.0 RECURRENT UTI: Primary | ICD-10-CM

## 2024-11-22 DIAGNOSIS — R39.9 UTI SYMPTOMS: Primary | ICD-10-CM

## 2024-11-22 DIAGNOSIS — R39.9 UTI SYMPTOMS: ICD-10-CM

## 2024-11-22 LAB
AMORPH URATE CRY URNS QL MICRO: ABNORMAL
BACTERIA UR QL AUTO: ABNORMAL /HPF
BILIRUB UR QL STRIP: NEGATIVE
CLARITY UR: ABNORMAL
COLOR UR: ABNORMAL
GLUCOSE UR STRIP-MCNC: NEGATIVE MG/DL
HGB UR QL STRIP.AUTO: NEGATIVE
KETONES UR STRIP-MCNC: NEGATIVE MG/DL
LEUKOCYTE ESTERASE UR QL STRIP: ABNORMAL
NITRITE UR QL STRIP: NEGATIVE
NON-SQ EPI CELLS URNS QL MICRO: ABNORMAL /HPF
PH UR STRIP.AUTO: 8.5 [PH]
PROT UR STRIP-MCNC: ABNORMAL MG/DL
RBC #/AREA URNS AUTO: ABNORMAL /HPF
SP GR UR STRIP.AUTO: 1.02 (ref 1–1.03)
UROBILINOGEN UR STRIP-ACNC: <2 MG/DL
WBC #/AREA URNS AUTO: ABNORMAL /HPF

## 2024-11-22 PROCEDURE — 87186 SC STD MICRODIL/AGAR DIL: CPT

## 2024-11-22 PROCEDURE — 87086 URINE CULTURE/COLONY COUNT: CPT

## 2024-11-22 PROCEDURE — G0299 HHS/HOSPICE OF RN EA 15 MIN: HCPCS

## 2024-11-22 PROCEDURE — 87077 CULTURE AEROBIC IDENTIFY: CPT

## 2024-11-22 PROCEDURE — 81001 URINALYSIS AUTO W/SCOPE: CPT

## 2024-11-22 PROCEDURE — 87147 CULTURE TYPE IMMUNOLOGIC: CPT

## 2024-11-22 RX ORDER — CEPHALEXIN 500 MG/1
CAPSULE ORAL
Qty: 21 CAPSULE | Refills: 0 | Status: SHIPPED | OUTPATIENT
Start: 2024-11-22 | End: 2024-11-29

## 2024-11-22 NOTE — TELEPHONE ENCOUNTER
Patients sister called to confirm an antibiotic for patient had been called into the pharmacy. Patients sister advised.

## 2024-11-24 ENCOUNTER — RESULTS FOLLOW-UP (OUTPATIENT)
Dept: FAMILY MEDICINE CLINIC | Facility: CLINIC | Age: 54
End: 2024-11-24

## 2024-11-26 LAB
BACTERIA UR CULT: ABNORMAL
BACTERIA UR CULT: ABNORMAL

## 2024-11-26 NOTE — TELEPHONE ENCOUNTER
----- Message from Nacho Monroy DO sent at 11/24/2024  9:34 AM EST -----  Ua with wbc,  cx pending

## 2024-11-29 ENCOUNTER — HOME CARE VISIT (OUTPATIENT)
Dept: HOME HEALTH SERVICES | Facility: HOME HEALTHCARE | Age: 54
End: 2024-11-29
Payer: MEDICARE

## 2024-11-29 VITALS
DIASTOLIC BLOOD PRESSURE: 60 MMHG | HEART RATE: 77 BPM | SYSTOLIC BLOOD PRESSURE: 102 MMHG | TEMPERATURE: 97.7 F | OXYGEN SATURATION: 100 %

## 2024-11-29 PROCEDURE — G0299 HHS/HOSPICE OF RN EA 15 MIN: HCPCS

## 2024-11-29 NOTE — TELEPHONE ENCOUNTER
Patient  returned the call from us on 11/27- Advised per notes in chart Urine culture shows Keflex will treat this UTI. No further questions call ended well.

## 2024-12-05 ENCOUNTER — OFFICE VISIT (OUTPATIENT)
Dept: WOUND CARE | Facility: HOSPITAL | Age: 54
End: 2024-12-05
Payer: COMMERCIAL

## 2024-12-05 ENCOUNTER — TELEPHONE (OUTPATIENT)
Dept: NEUROLOGY | Facility: CLINIC | Age: 54
End: 2024-12-05

## 2024-12-05 VITALS
SYSTOLIC BLOOD PRESSURE: 110 MMHG | HEART RATE: 82 BPM | TEMPERATURE: 97.3 F | RESPIRATION RATE: 16 BRPM | DIASTOLIC BLOOD PRESSURE: 70 MMHG

## 2024-12-05 DIAGNOSIS — L89.314 PRESSURE ULCER OF RIGHT ISCHIUM, STAGE IV (HCC): ICD-10-CM

## 2024-12-05 DIAGNOSIS — R53.2 FUNCTIONAL QUADRIPLEGIA SECONDARY TO MS (HCC): ICD-10-CM

## 2024-12-05 DIAGNOSIS — G35 FUNCTIONAL QUADRIPLEGIA SECONDARY TO MS (HCC): ICD-10-CM

## 2024-12-05 DIAGNOSIS — L89.324 PRESSURE INJURY OF LEFT ISCHIUM, STAGE 4 (HCC): Primary | ICD-10-CM

## 2024-12-05 DIAGNOSIS — G35 MULTIPLE SCLEROSIS (HCC): ICD-10-CM

## 2024-12-05 PROCEDURE — 11042 DBRDMT SUBQ TIS 1ST 20SQCM/<: CPT | Performed by: FAMILY MEDICINE

## 2024-12-05 RX ORDER — LIDOCAINE HYDROCHLORIDE 40 MG/ML
5 SOLUTION TOPICAL ONCE
Status: COMPLETED | OUTPATIENT
Start: 2024-12-05 | End: 2024-12-05

## 2024-12-05 RX ADMIN — LIDOCAINE HYDROCHLORIDE 5 ML: 40 SOLUTION TOPICAL at 14:50

## 2024-12-05 NOTE — PROGRESS NOTES
Patient ID: Fanny Schulz is a 54 y.o. female Date of Birth 1970       Chief Complaint   Patient presents with    Follow Up Wound Care Visit     Ischial wounds       Allergies:  Latex    Diagnosis:      Diagnosis ICD-10-CM Associated Orders   1. Pressure injury of left ischium, stage 4 ([facility])  L89.324 lidocaine (XYLOCAINE) 4 % topical solution 5 mL     Wound cleansing and dressings     Wound Procedure Treatment     Debridement      2. Pressure ulcer of right ischium, stage IV ([facility])  L89.314 lidocaine (XYLOCAINE) 4 % topical solution 5 mL     Wound cleansing and dressings     Wound Procedure Treatment     Debridement      3. Multiple sclerosis (HCC)  G35 Wound cleansing and dressings     Wound Procedure Treatment      4. Functional quadriplegia secondary to MS (HCC)  G35 Wound cleansing and dressings    R53.2 Wound Procedure Treatment              Assessment & Plan:  Stage 4 pressure injury of the left ischium and stage IV pressure injury of the right ischium.  Patient has not been spending any time in her air mattress.  She feels more comfortable sitting in her chair.  She does have a Roho cushion.  Ever she is also using towels over the Roho which defeats some of the purpose and effectiveness of the cushion.  We discussed this at length and I would asked that she spends more time in in the air mattress bed on 1 side or the other.  Surgical debridement of both pressure injuries.  Silver nitrate chemical cauterization of hypergranulation tissue postdebridement on the left.  Mesalt ribbon to the right.  Plain Mesalt to the left today and then go back to [facility].  Follow-up in approximately 3 weeks.         Subjective:   9/16/24: Patient presents to wound care center for follow-up visit of bilateral ischial wounds.  Patient has been using Mesalt to the wounds with bordered foam dressings to cover.  Patient is accompanied by her  who provides to wound care.  No wound care related complaints  offered.  Denies increased pain or drainage to the wounds.  No fever or chills.    10/7/24: Patient presents to wound care center for follow-up visit bilateral ischial wounds.  Patient has been using Aquacel Ag ribbon to the wounds and bordered foam dressings.  Patient is accompanied by her  who provides the wound care.  No wound care related complaints offered today.  No reported increased pain or drainage related to the wounds.  No fever or chills.  Patient's  reports that patient spends majority of her day out of bed in wheelchair, patient does have offloading seating cushion to her wheelchair.  Patient states that it is more comfortable for her to be in her wheelchair then in bed.    12/5/2024: Follow-up of bilateral stage IV ischial pressure injuries.  Ran out of Aquacel Ag rope and now is back to using Mesalt.  Patient is not using her air mattress bed and spends her time sitting in her chair.  She even sleeps there.  No other new complaints.  As noted above, she feels more comfortable in her wheelchair than in her bed.        The following portions of the patient's history were reviewed and updated as appropriate:   Patient Active Problem List   Diagnosis    Suprapubic catheter (HCC)    Bladder calculus    Constipation    Recurrent major depressive disorder, in full remission (HCC)    Dysphagia    Dizziness    Hyperglycemia    Familial hypercholesterolemia    Lymphedema    Multiple sclerosis (HCC)    Muscle spasticity    Muscle weakness    Nephrolithiasis    Neurogenic bladder    Sleep disorder    Spinal stenosis of cervical region    Tremor    Urinary incontinence    Vision loss    Vitamin B12 deficiency    Vitamin D deficiency    Functional quadriplegia secondary to MS (HCC)    Allergic rhinitis    Screening mammogram, encounter for    Medication management contract signed    Thrombocytopenia (HCC)    Serum total bilirubin elevated    Hydronephrosis with urinary obstruction due to ureteral  "calculus    Candidal vaginitis    Alkaline phosphatase elevation    Abnormal thyroid function test    Ureteral calculus, left    Increased endometrial stripe thickness    Peripheral edema    Tinnitus of both ears    Hypophonia    Chronic lower extremity edema    Positive colorectal cancer screening using Cologuard test     Past Medical History:   Diagnosis Date    Anesthesia     \"prefers if able to be put to sleep on stretcher before placed on table if possible due to severe spasticity    Bladder stones     Chronic pain disorder     neck    Contracture, right shoulder     right arm and hand    Dependent on wheelchair     motorized    Edema     dependant lower extremities    Elevated alkaline phosphatase level     Mildly elevated total, normal isoenzymes 4/15/15, normal 1/17; last assessed: [id number]    Fernandez catheter in place     #24 to large bag(silicone catheter)    Gallbladder disease     History of femur fracture     right leg    History of UTI     MS (multiple sclerosis) ([facility])     Muscle spasticity     especially with touch    Muscle weakness     Muscular dystrophy (HCC)     Neck pain     Neurogenic bladder     Paralysis ([facility])     bilateral lower extremities    Port-A-Cath in place     left chest,\" hasn't used in approx 1 yr or so\"    Pressure injury of skin     right ischium    Sacral pressure sore     \"shearing, tegaderm in place,\"    Swallowing difficulty     Tinnitus     Urinary incontinence      Past Surgical History:   Procedure Laterality Date    BLADDER STONE REMOVAL N/A 12/4/2017    Procedure: CYSTO, LITHOLOPAXY HOLMIUM LASER;  Surgeon: Damir Osborne MD;  Location: [facility];  Service: Urology    BLADDER SURGERY      CHOLECYSTECTOMY      CYSTOSCOPY  06/15/2020    ESOPHAGOGASTRODUODENOSCOPY      FL RETROGRADE PYELOGRAM  10/2/2020    FL RETROGRADE PYELOGRAM  11/3/2020    KIDNEY STONE SURGERY      PORTACATH PLACEMENT Left     CO CYSTO BLADDER W/URETERAL CATHETERIZATION Left 10/2/2020    Procedure: " CYSTOSCOPY LEFT RETROGRADE ; LEFT URETEROSCOPY; PYELOGRAM WITH STENT INSERTION AND SUPERPUBIC EXCHANGE;  Surgeon: Pihlip Mcdonald MD;  Location: [facility];  Service: Urology    WI CYSTO/URETERO W/LITHOTRIPSY &INDWELL STENT INSRT Left 11/3/2020    Procedure: CYSTOSCOPY URETEROSCOPY WITH RETROGRADE PYELOGRAM AND EXCHANGE STENT URETERAL, SP TUBE EXCHANGE, BASKET STONE EXTRACTION, BLADDER STONE EXTRACTION;  Surgeon: Damir Osborne MD;  Location: [facility];  Service: Urology    WI LITHOLAPAXY SMPL/SM <2.5 CM N/A 12/22/2022    Procedure: Cystolitholapaxy w/  laser lithotripsy of bladder stones; SUPRAPUBIC CATHETER CHANGE;  Surgeon: Damir Osborne MD;  Location: [facility];  Service: Urology    SUPRAPUBIC CYSTOSTOMY  02/25/2014    last assessed: [id number]     Family History   Problem Relation Age of Onset    Diabetes Mother     Hyperlipidemia Mother     Hypertension Mother     COPD Father     Hypertension Father     Hyperlipidemia Sister     Alzheimer's disease Family     Diabetes Family     Hypertension Family     Heart disease Family       Social History     Socioeconomic History    Marital status: /Civil Union     Spouse name: Not on file    Number of children: Not on file    Years of education: Not on file    Highest education level: Not on file   Occupational History    Not on file   Tobacco Use    Smoking status: Never    Smokeless tobacco: Never   Vaping Use    Vaping status: Never Used   Substance and Sexual Activity    Alcohol use: Not Currently    Drug use: Yes    Sexual activity: Yes   Other Topics Concern    Not on file   Social History Narrative    Caffeine use    Currently on disability    Daily coffee consumption (2 cups/day)    Educational level- completed 2nd year college    Lives with adult children    Not currently employed    Wheelchair dependent      Social Drivers of Health     Financial Resource Strain: Low Risk  (2/20/2023)    Overall Financial Resource Strain (CARDIA)      Difficulty of Paying Living Expenses: Not hard at all   Food Insecurity: No Food Insecurity (9/23/2024)    Nursing - Inadequate Food Risk Classification     Worried About Running Out of Food in the Last Year: Never true     Ran Out of Food in the Last Year: Never true     Ran Out of Food in the Last Year: Not on file   Transportation Needs: No Transportation Needs (9/23/2024)    PRAPARE - Transportation     Lack of Transportation (Medical): No     Lack of Transportation (Non-Medical): No   Physical Activity: Not on file   Stress: Not on file   Social Connections: Not on file   Intimate Partner Violence: Not on file   Housing Stability: Low Risk  (9/23/2024)    Housing Stability Vital Sign     Unable to Pay for Housing in the Last Year: No     Number of Times Moved in the Last Year: 0     Homeless in the Last Year: No        Current Outpatient Medications:     acetaminophen (TYLENOL) 500 mg tablet, Take 500 mg by mouth every 6 (six) hours as needed for mild pain. Indications: Pain, Disp: , Rfl:     Alpha-D-Galactosidase (Beano) TABS, Take 1 tablet by mouth if needed (GI bloating). As needed before each meal that may cause bloating.   Indications: GI bloating, Disp: , Rfl:     baclofen 20 mg tablet, TAKE 1 TABLET BY MOUTH 5 TIMES AS NEEDED (Patient taking differently: No sig reported), Disp: 450 tablet, Rfl: 3    Cyanocobalamin (B-12) 1000 MCG SUBL, Dissolve 1 tablet under tongue daily, Disp: 30 tablet, Rfl: 5    cyanocobalamin 1,000 mcg/mL, Take B12 1000 mcg IM once a week for 3 weeks, then once a month for 5 months, Disp: 8 mL, Rfl: 0    DULoxetine (CYMBALTA) 20 mg capsule, Take 1 capsule (20 mg total) by mouth daily, Disp: 90 capsule, Rfl: 3    ergocalciferol (VITAMIN D2) 50,000 units, Take 1 capsule (50,000 Units total) by mouth once a week, Disp: 4 capsule, Rfl: 5    furosemide (LASIX) 20 mg tablet, Take 1 tablet (20 mg total) by mouth daily, Disp: 90 tablet, Rfl: 3    gabapentin (NEURONTIN) 100 mg capsule, Take  "1 tablet at bedtime tonight, 1 tablet twice a day tomorrow, and then 1 tablet 3 times daily thereafter, Disp: 90 capsule, Rfl: 5    ibuprofen (MOTRIN) 200 mg tablet, Take 200 mg by mouth every 6 (six) hours as needed for moderate pain. Indications: Pain, Disp: , Rfl:     levofloxacin (LEVAQUIN) 500 mg tablet, Take 1 tablet (500 mg total) by mouth every 24 hours for 7 days, Disp: 7 tablet, Rfl: 0    OXcarbazepine (Trileptal) 150 mg tablet, Take 150 mg by mouth 2 (two) times a day. Indications: Trigeminal Nerve Pain, Disp: , Rfl:     oxybutynin (DITROPAN-XL) 10 MG 24 hr tablet, Take 1 tablet (10 mg total) by mouth daily, Disp: 90 tablet, Rfl: 3    rosuvastatin (CRESTOR) 5 mg tablet, Take 1 tablet (5 mg total) by mouth daily, Disp: 90 tablet, Rfl: 3    senna (SENOKOT) 8.6 mg, Take 8.6 mg by mouth if needed for constipation. Indications: Constipation, Disp: , Rfl:     SYRINGE-NEEDLE, DISP, 3 ML 25G X 5/8\" 3 ML MISC, Use once a week Use syringes for B12 injections once a week for 3 weeks, then once a month for 5 months., Disp: 8 each, Rfl: 0    Current Facility-Administered Medications:     cyanocobalamin injection 1,000 mcg, 1,000 mcg, Intramuscular, Q30 Days, , 1,000 mcg at 11/12/24 1601    Review of Systems   Constitutional:  Negative for chills and fever.   HENT:  Negative for congestion.    Respiratory:  Negative for cough.    Musculoskeletal:  Positive for gait problem (Functional quadriplegia).        Functional quadriplegia    Skin:  Positive for wound (Both the serum).        B/l ischium   Neurological:  Positive for weakness and numbness.   Psychiatric/Behavioral:  Negative for dysphoric mood. The patient is not nervous/anxious.        Objective:  /70   Pulse 82   Temp (!) 97.3 °F (36.3 °C)   Resp 16   Pain Score: 0-No pain     Physical Exam  Constitutional:       General: She is awake.   HENT:      Head: Normocephalic and atraumatic.   Cardiovascular:      Rate and Rhythm: Normal rate.   Pulmonary:    "   Effort: Pulmonary effort is normal. No respiratory distress.   Skin:     General: Skin is warm and dry.      Findings: Wound present. No erythema.             Comments: 1. Left ischial pressure injury visible wound bed with granular tissue and hypergranular tissue present. Large drainage. Undermining is stable. Ruchi-wound is intact with scar tissue. No purulent drainage, exposed or palpable bone.  2. R ischial pressure injury visible wound bed with pink/red granular tissue. Edges with callous and maceration. Ruchi-wound is intact with scar tissue. No purulent drainage, exposed or palpable bone.    Refer to wound assessment for additional details. No warmth or redness.   Neurological:      Mental Status: She is alert and oriented to person, place, and time.      Gait: Gait abnormal.      Comments: Quadriplegia secondary to MS   Psychiatric:         Mood and Affect: Mood normal.         Behavior: Behavior normal. Behavior is cooperative.               Wound 01/10/24 Pressure Injury Ischium Left;Posterior;Proximal (Active)   Wound Image Images linked 12/05/24 1512   Wound Description Granulation tissue;Hypergranulation 12/05/24 1442   Ruchi-wound Assessment Intact;Pink 12/05/24 1442   Wound Length (cm) 2.5 cm 12/05/24 1442   Wound Width (cm) 1.8 cm 12/05/24 1442   Wound Depth (cm) 0.7 cm 12/05/24 1442   Wound Surface Area (cm^2) 4.5 cm^2 12/05/24 1442   Wound Volume (cm^3) 3.15 cm^3 12/05/24 1442   Calculated Wound Volume (cm^3) 3.15 cm^3 12/05/24 1442   Undermining 1 2.5 12/05/24 1442   Undermining 1 is depth extending from 12-5 o'clock, deepest at 2 o'clock 12/05/24 1442   Drainage Amount Moderate 12/05/24 1442   Drainage Description Serosanguineous;Tan 12/05/24 1442   Non-staged Wound Description Full thickness 12/05/24 1442   Dressing Status Intact (mepilex border foam and mesalt) 12/05/24 1442       Wound 01/29/24 Pressure Injury Ischium Right (Active)   Wound Image Images linked 12/05/24 1511   Wound Description  "Pink;Granulation tissue 12/05/24 1441   Ruchi-wound Assessment Callus;Maceration 12/05/24 1441   Wound Length (cm) 0.3 cm 12/05/24 1441   Wound Width (cm) 0.6 cm 12/05/24 1441   Wound Depth (cm) 0.7 cm 12/05/24 1441   Wound Surface Area (cm^2) 0.18 cm^2 12/05/24 1441   Wound Volume (cm^3) 0.126 cm^3 12/05/24 1441   Calculated Wound Volume (cm^3) 0.13 cm^3 12/05/24 1441   Change in Wound Size % 74 12/05/24 1441   Drainage Amount Moderate 12/05/24 1441   Drainage Description Serosanguineous;[place];Foul smelling 12/05/24 1441   Non-staged Wound Description Full thickness 12/05/24 1441   Dressing Status Intact;Other (Comment) (border foam dressing and mesalt) 12/05/24 1441        Debridement   Wound 01/10/24 Pressure Injury Ischium Left;Posterior;Proximal    Universal Protocol:  procedure performed by consultantConsent: Verbal consent obtained. Written consent obtained.  Consent given by: patient  Time out: Immediately prior to procedure a \"time out\" was called to verify the correct patient, procedure, equipment, support staff and site/side marked as required.  Patient understanding: patient states understanding of the procedure being performed  Patient identity confirmed: verbally with patient    Debridement Details  Performed by: physician  Debridement type: surgical  Level of debridement: subcutaneous tissue  Pain control: lidocaine 4%      Post-debridement measurements  Length (cm): 2.5  Width (cm): 1.8  Depth (cm): 0.8  Percent debrided: 100%  Surface Area (cm^2): 4.5  Area Debrided (cm^2): 4.5  Volume (cm^3): 3.6    Tissue and other material debrided: dermis, hypergranulation and subcutaneous tissue  Devitalized tissue debrided: fibrin  Instrument(s) utilized: curette  Bleeding: large  Hemostasis obtained with: pressure and silver nitrate  Procedural pain (0-10): 0  Post-procedural pain: 0   Response to treatment: procedure was tolerated well    Debridement   Wound 01/29/24 Pressure Injury Ischium Right    Universal " "Protocol:  procedure performed by consultantConsent: Verbal consent obtained. Written consent obtained.  Consent given by: patient  Time out: Immediately prior to procedure a \"time out\" was called to verify the correct patient, procedure, equipment, support staff and site/side marked as required.  Patient understanding: patient states understanding of the procedure being performed  Patient identity confirmed: verbally with patient    Debridement Details  Performed by: physician  Debridement type: surgical  Level of debridement: subcutaneous tissue  Pain control: lidocaine 4%      Post-debridement measurements  Length (cm): 0.3  Width (cm): 0.6  Depth (cm): 0.8  Percent debrided: 100%  Surface Area (cm^2): 0.18  Area Debrided (cm^2): 0.18  Volume (cm^3): 0.14    Tissue and other material debrided: dermis and subcutaneous tissue  Devitalized tissue debrided: fibrin, necrotic debris and slough  Instrument(s) utilized: curette  Bleeding: medium  Hemostasis obtained with: pressure  Procedural pain (0-10): 0  Post-procedural pain: 0   Response to treatment: procedure was tolerated well                     Lab Results   Component Value Date    HGBA1C 5.2 01/09/2024       Wound Instructions:  Orders Placed This Encounter   Procedures    Wound cleansing and dressings     Left and right ischial wounds:     Wash your hands with soap and water.  Remove old dressing, discard into plastic bag and place in trash. Cleanse the wound with dakins. Pat dry. Do not use tissue or cotton balls. Do not scrub the wound. Pat dry using gauze.  Shower yes      Apply skin prep to skin surrounding wound    Left ischium:  Lightly pack with aquacel ag rope (cut to fit into the open wound), tape tail end to intact skin     Right ischium:  Lightly pack with mesalt ribbon and tape tail end to intact skin       Cover wounds with abd and medfix tape or silicone border  VNA and  to continue with wound care dressing changes 3 times a week and as " "needed for excessive drainage       WMC applied silver nitrate to left ischial wound, wound may appear gray or black.    Off-loading Instructions:     Keep weight and pressure off wounds as much as possible.     Continue to use ROHO style cushion when sitting.     Try to lay in bed throughout the day to help offload pressure to wounds.     Standing Status:   Future     Expiration Date:   12/26/2024    Wound Procedure Treatment     This order was created via procedure documentation    Debridement     This order was created via procedure documentation    Debridement     This order was created via procedure documentation             Homero Ramirez MD, CHT, CWS    Portions of the record may have been created with voice recognition software. Occasional wrong word or \"sound alike\" substitutions may have occurred due to the inherent limitations of voice recognition software. Read the chart carefully and recognize, using context, where substitutions have occurred.    "

## 2024-12-05 NOTE — PATIENT INSTRUCTIONS
Orders Placed This Encounter   Procedures    Wound cleansing and dressings     Left and right ischial wounds:     Wash your hands with soap and water.  Remove old dressing, discard into plastic bag and place in trash. Cleanse the wound with dakins. Pat dry. Do not use tissue or cotton balls. Do not scrub the wound. Pat dry using gauze.  Shower yes      Apply skin prep to skin surrounding wound    Left ischium:  Lightly pack with aquacel ag rope (cut to fit into the open wound), tape tail end to intact skin     Right ischium:  Lightly pack with mesalt ribbon and tape tail end to intact skin       Cover wounds with abd and medfix tape or silicone border  VNA and  to continue with wound care dressing changes 3 times a week and as needed for excessive drainage       WMC applied silver nitrate to left ischial wound, wound may appear gray or black.    Off-loading Instructions:     Keep weight and pressure off wounds as much as possible.     Continue to use ROHO style cushion when sitting.     Try to lay in bed throughout the day to help offload pressure to wounds.     Standing Status:   Future     Expiration Date:   12/26/2024

## 2024-12-05 NOTE — PROGRESS NOTES
Wound Procedure Treatment    Performed by: Park Landa RN  Authorized by: Homero Ramirez MD    Associated wounds:   Wound 01/10/24 Pressure Injury Ischium Left;Posterior;Proximal  Wound 01/29/24 Pressure Injury Ischium Right  Wound cleansed with:  NSS  Applied to periwound:  Skin prep  Applied primary dressing:  Silicone bordered foam and Mesalt    Mesalt ribbon packed into wounds

## 2024-12-05 NOTE — TELEPHONE ENCOUNTER
Scanned unsigned Wheelchair repair form into pt media.    Will hand to Dr MCINTYRE when maría's back in office 12/9/24

## 2024-12-06 ENCOUNTER — HOME CARE VISIT (OUTPATIENT)
Dept: HOME HEALTH SERVICES | Facility: HOME HEALTHCARE | Age: 54
End: 2024-12-06
Payer: MEDICARE

## 2024-12-06 VITALS
HEART RATE: 66 BPM | DIASTOLIC BLOOD PRESSURE: 62 MMHG | SYSTOLIC BLOOD PRESSURE: 108 MMHG | TEMPERATURE: 98 F | OXYGEN SATURATION: 99 %

## 2024-12-06 PROCEDURE — G0299 HHS/HOSPICE OF RN EA 15 MIN: HCPCS

## 2024-12-10 ENCOUNTER — TELEPHONE (OUTPATIENT)
Dept: NEUROLOGY | Facility: CLINIC | Age: 54
End: 2024-12-10

## 2024-12-10 NOTE — TELEPHONE ENCOUNTER
Faxed wheel chair repair from to [employer] at [phone].    Scanned into pt media.

## 2024-12-13 ENCOUNTER — HOME CARE VISIT (OUTPATIENT)
Dept: HOME HEALTH SERVICES | Facility: HOME HEALTHCARE | Age: 54
End: 2024-12-13
Payer: MEDICARE

## 2024-12-13 VITALS
OXYGEN SATURATION: 99 % | SYSTOLIC BLOOD PRESSURE: 102 MMHG | HEART RATE: 88 BPM | TEMPERATURE: 98.5 F | DIASTOLIC BLOOD PRESSURE: 66 MMHG

## 2024-12-13 PROCEDURE — G0299 HHS/HOSPICE OF RN EA 15 MIN: HCPCS

## 2024-12-13 RX ADMIN — CYANOCOBALAMIN 1000 MCG: 1000 INJECTION, SOLUTION INTRAMUSCULAR; SUBCUTANEOUS at 09:34

## 2024-12-17 ENCOUNTER — TELEPHONE (OUTPATIENT)
Dept: NEUROLOGY | Facility: CLINIC | Age: 54
End: 2024-12-17

## 2024-12-19 ENCOUNTER — APPOINTMENT (EMERGENCY)
Dept: RADIOLOGY | Facility: HOSPITAL | Age: 54
DRG: 865 | End: 2024-12-19
Payer: MEDICARE

## 2024-12-19 ENCOUNTER — HOSPITAL ENCOUNTER (INPATIENT)
Facility: HOSPITAL | Age: 54
LOS: 1 days | Discharge: HOME | DRG: 865 | End: 2024-12-21
Attending: EMERGENCY MEDICINE | Admitting: FAMILY MEDICINE
Payer: MEDICARE

## 2024-12-19 DIAGNOSIS — R05.1 ACUTE COUGH: Primary | ICD-10-CM

## 2024-12-19 DIAGNOSIS — R53.2 FUNCTIONAL QUADRIPLEGIA (HCC): ICD-10-CM

## 2024-12-19 DIAGNOSIS — G35 MULTIPLE SCLEROSIS (HCC): Chronic | ICD-10-CM

## 2024-12-19 LAB
ANION GAP SERPL CALCULATED.3IONS-SCNC: 13 MMOL/L (ref 4–13)
BASOPHILS # BLD AUTO: 0.03 THOUSANDS/ÂΜL (ref 0–0.1)
BASOPHILS NFR BLD AUTO: 0 % (ref 0–1)
BUN SERPL-MCNC: 7 MG/DL (ref 5–25)
CALCIUM SERPL-MCNC: 8.9 MG/DL (ref 8.4–10.2)
CHLORIDE SERPL-SCNC: 100 MMOL/L (ref 96–108)
CO2 SERPL-SCNC: 22 MMOL/L (ref 21–32)
CREAT SERPL-MCNC: 0.32 MG/DL (ref 0.6–1.3)
EOSINOPHIL # BLD AUTO: 0.05 THOUSAND/ÂΜL (ref 0–0.61)
EOSINOPHIL NFR BLD AUTO: 1 % (ref 0–6)
ERYTHROCYTE [DISTWIDTH] IN BLOOD BY AUTOMATED COUNT: 14.9 % (ref 11.6–15.1)
FLUAV RNA RESP QL NAA+PROBE: NEGATIVE
FLUBV RNA RESP QL NAA+PROBE: NEGATIVE
GFR SERPL CREATININE-BSD FRML MDRD: 127 ML/MIN/1.73SQ M
GLUCOSE SERPL-MCNC: 96 MG/DL (ref 65–140)
HCT VFR BLD AUTO: 41.8 % (ref 34.8–46.1)
HGB BLD-MCNC: 12.8 G/DL (ref 11.5–15.4)
IMM GRANULOCYTES # BLD AUTO: 0.03 THOUSAND/UL (ref 0–0.2)
IMM GRANULOCYTES NFR BLD AUTO: 0 % (ref 0–2)
LYMPHOCYTES # BLD AUTO: 0.71 THOUSANDS/ÂΜL (ref 0.6–4.47)
LYMPHOCYTES NFR BLD AUTO: 7 % (ref 14–44)
MCH RBC QN AUTO: 26.1 PG (ref 26.8–34.3)
MCHC RBC AUTO-ENTMCNC: 30.6 G/DL (ref 31.4–37.4)
MCV RBC AUTO: 85 FL (ref 82–98)
MONOCYTES # BLD AUTO: 0.7 THOUSAND/ÂΜL (ref 0.17–1.22)
MONOCYTES NFR BLD AUTO: 7 % (ref 4–12)
NEUTROPHILS # BLD AUTO: 9.14 THOUSANDS/ÂΜL (ref 1.85–7.62)
NEUTS SEG NFR BLD AUTO: 85 % (ref 43–75)
NRBC BLD AUTO-RTO: 0 /100 WBCS
PLATELET # BLD AUTO: 238 THOUSANDS/UL (ref 149–390)
PMV BLD AUTO: 9.4 FL (ref 8.9–12.7)
POTASSIUM SERPL-SCNC: 4 MMOL/L (ref 3.5–5.3)
RBC # BLD AUTO: 4.91 MILLION/UL (ref 3.81–5.12)
RSV RNA RESP QL NAA+PROBE: POSITIVE
SARS-COV-2 RNA RESP QL NAA+PROBE: NEGATIVE
SODIUM SERPL-SCNC: 135 MMOL/L (ref 135–147)
WBC # BLD AUTO: 10.66 THOUSAND/UL (ref 4.31–10.16)

## 2024-12-19 PROCEDURE — 99222 1ST HOSP IP/OBS MODERATE 55: CPT | Performed by: INTERNAL MEDICINE

## 2024-12-19 PROCEDURE — 0241U HB NFCT DS VIR RESP RNA 4 TRGT: CPT | Performed by: INTERNAL MEDICINE

## 2024-12-19 PROCEDURE — 36415 COLL VENOUS BLD VENIPUNCTURE: CPT

## 2024-12-19 PROCEDURE — 71046 X-RAY EXAM CHEST 2 VIEWS: CPT

## 2024-12-19 PROCEDURE — 80048 BASIC METABOLIC PNL TOTAL CA: CPT

## 2024-12-19 PROCEDURE — 99285 EMERGENCY DEPT VISIT HI MDM: CPT | Performed by: EMERGENCY MEDICINE

## 2024-12-19 PROCEDURE — 85025 COMPLETE CBC W/AUTO DIFF WBC: CPT

## 2024-12-19 PROCEDURE — 99284 EMERGENCY DEPT VISIT MOD MDM: CPT

## 2024-12-19 RX ORDER — ONDANSETRON 2 MG/ML
4 INJECTION INTRAMUSCULAR; INTRAVENOUS EVERY 6 HOURS PRN
Status: DISCONTINUED | OUTPATIENT
Start: 2024-12-19 | End: 2024-12-21 | Stop reason: HOSPADM

## 2024-12-19 RX ORDER — SENNOSIDES 8.6 MG
8.6 TABLET ORAL
Status: DISCONTINUED | OUTPATIENT
Start: 2024-12-19 | End: 2024-12-21 | Stop reason: HOSPADM

## 2024-12-19 RX ORDER — IBUPROFEN 400 MG/1
200 TABLET, FILM COATED ORAL EVERY 6 HOURS PRN
Status: DISCONTINUED | OUTPATIENT
Start: 2024-12-19 | End: 2024-12-21 | Stop reason: HOSPADM

## 2024-12-19 RX ORDER — PRAVASTATIN SODIUM 40 MG
40 TABLET ORAL
Status: DISCONTINUED | OUTPATIENT
Start: 2024-12-20 | End: 2024-12-21 | Stop reason: HOSPADM

## 2024-12-19 RX ORDER — ENOXAPARIN SODIUM 100 MG/ML
40 INJECTION SUBCUTANEOUS DAILY
Status: DISCONTINUED | OUTPATIENT
Start: 2024-12-20 | End: 2024-12-21 | Stop reason: HOSPADM

## 2024-12-19 RX ORDER — DULOXETIN HYDROCHLORIDE 20 MG/1
20 CAPSULE, DELAYED RELEASE ORAL DAILY
Status: DISCONTINUED | OUTPATIENT
Start: 2024-12-20 | End: 2024-12-21 | Stop reason: HOSPADM

## 2024-12-19 RX ORDER — ACETAMINOPHEN 325 MG/1
650 TABLET ORAL EVERY 6 HOURS PRN
Status: DISCONTINUED | OUTPATIENT
Start: 2024-12-19 | End: 2024-12-21 | Stop reason: HOSPADM

## 2024-12-19 RX ORDER — BACLOFEN 20 MG/1
20 TABLET ORAL
Status: DISCONTINUED | OUTPATIENT
Start: 2024-12-19 | End: 2024-12-19

## 2024-12-19 RX ORDER — GABAPENTIN 100 MG/1
100 CAPSULE ORAL 3 TIMES DAILY
Status: DISCONTINUED | OUTPATIENT
Start: 2024-12-19 | End: 2024-12-21 | Stop reason: HOSPADM

## 2024-12-19 RX ORDER — FUROSEMIDE 20 MG/1
20 TABLET ORAL DAILY
Status: DISCONTINUED | OUTPATIENT
Start: 2024-12-20 | End: 2024-12-21 | Stop reason: HOSPADM

## 2024-12-19 RX ORDER — OXCARBAZEPINE 150 MG/1
150 TABLET, FILM COATED ORAL 2 TIMES DAILY
Status: DISCONTINUED | OUTPATIENT
Start: 2024-12-19 | End: 2024-12-21 | Stop reason: HOSPADM

## 2024-12-19 RX ORDER — BACLOFEN 20 MG/1
20 TABLET ORAL 2 TIMES DAILY
Status: DISCONTINUED | OUTPATIENT
Start: 2024-12-19 | End: 2024-12-21 | Stop reason: HOSPADM

## 2024-12-19 RX ORDER — OXYBUTYNIN CHLORIDE 5 MG/1
10 TABLET, EXTENDED RELEASE ORAL DAILY
Status: DISCONTINUED | OUTPATIENT
Start: 2024-12-20 | End: 2024-12-21 | Stop reason: HOSPADM

## 2024-12-19 RX ADMIN — GABAPENTIN 100 MG: 100 CAPSULE ORAL at 22:42

## 2024-12-19 RX ADMIN — BACLOFEN 20 MG: 20 TABLET ORAL at 22:42

## 2024-12-19 RX ADMIN — IBUPROFEN 200 MG: 400 TABLET, FILM COATED ORAL at 22:42

## 2024-12-19 NOTE — ED ATTENDING ATTESTATION
12/19/2024  I, Gelacio Berrios MD, saw and evaluated the patient. I have discussed the patient with the resident/non-physician practitioner and agree with the resident's/non-physician practitioner's findings, Plan of Care, and MDM as documented in the resident's/non-physician practitioner's note, except where noted. All available labs and Radiology studies were reviewed.  I was present for key portions of any procedure(s) performed by the resident/non-physician practitioner and I was immediately available to provide assistance.       At this point I agree with the current assessment done in the Emergency Department.  I have conducted an independent evaluation of this patient a history and physical is as follows:    54-year-old woman with history of MS presenting with cough and inability to clear secretions.  Not currently having chest pain or shortness of breath.  She is awake and alert in no acute distress.  Heart regular rate and rhythm, no murmurs rubs or gallops.  Lungs clear to auscultation bilaterally.  Evaluated by respiratory therapy and plan at this time is for admission with engagement with case management tomorrow as patient will probably need substantial increased home resources.    ED Course         Critical Care Time  Procedures

## 2024-12-19 NOTE — DISCHARGE INSTRUCTIONS
Please use Tylenol and Motrin for any pain.  You may also use over-the-counter cold and flu medications for your cough.    Please return if he develop any new or concerning symptoms including worsening cough, fevers, or shortness of breath.

## 2024-12-20 ENCOUNTER — HOME CARE VISIT (OUTPATIENT)
Dept: HOME HEALTH SERVICES | Facility: HOME HEALTHCARE | Age: 54
End: 2024-12-20
Payer: MEDICARE

## 2024-12-20 PROBLEM — B33.8 RSV INFECTION: Status: ACTIVE | Noted: 2024-12-20

## 2024-12-20 PROCEDURE — 94760 N-INVAS EAR/PLS OXIMETRY 1: CPT

## 2024-12-20 PROCEDURE — 99232 SBSQ HOSP IP/OBS MODERATE 35: CPT | Performed by: FAMILY MEDICINE

## 2024-12-20 RX ORDER — LOPERAMIDE HYDROCHLORIDE 2 MG/1
2 CAPSULE ORAL 3 TIMES DAILY PRN
Status: DISCONTINUED | OUTPATIENT
Start: 2024-12-20 | End: 2024-12-21 | Stop reason: HOSPADM

## 2024-12-20 RX ORDER — SODIUM CHLORIDE, SODIUM GLUCONATE, SODIUM ACETATE, POTASSIUM CHLORIDE, MAGNESIUM CHLORIDE, SODIUM PHOSPHATE, DIBASIC, AND POTASSIUM PHOSPHATE .53; .5; .37; .037; .03; .012; .00082 G/100ML; G/100ML; G/100ML; G/100ML; G/100ML; G/100ML; G/100ML
100 INJECTION, SOLUTION INTRAVENOUS CONTINUOUS
Status: DISCONTINUED | OUTPATIENT
Start: 2024-12-20 | End: 2024-12-21 | Stop reason: HOSPADM

## 2024-12-20 RX ADMIN — GABAPENTIN 100 MG: 100 CAPSULE ORAL at 13:45

## 2024-12-20 RX ADMIN — BACLOFEN 20 MG: 20 TABLET ORAL at 19:22

## 2024-12-20 RX ADMIN — FUROSEMIDE 20 MG: 20 TABLET ORAL at 13:48

## 2024-12-20 RX ADMIN — DULOXETINE HYDROCHLORIDE 20 MG: 20 CAPSULE, DELAYED RELEASE ORAL at 13:47

## 2024-12-20 RX ADMIN — LOPERAMIDE HYDROCHLORIDE 2 MG: 2 CAPSULE ORAL at 22:25

## 2024-12-20 RX ADMIN — ACETAMINOPHEN 650 MG: 325 TABLET, FILM COATED ORAL at 13:45

## 2024-12-20 RX ADMIN — ENOXAPARIN SODIUM 40 MG: 40 INJECTION SUBCUTANEOUS at 13:41

## 2024-12-20 RX ADMIN — OXYBUTYNIN 10 MG: 10 TABLET, FILM COATED, EXTENDED RELEASE ORAL at 13:44

## 2024-12-20 RX ADMIN — SODIUM CHLORIDE, SODIUM GLUCONATE, SODIUM ACETATE, POTASSIUM CHLORIDE, MAGNESIUM CHLORIDE, SODIUM PHOSPHATE, DIBASIC, AND POTASSIUM PHOSPHATE 100 ML/HR: .53; .5; .37; .037; .03; .012; .00082 INJECTION, SOLUTION INTRAVENOUS at 14:12

## 2024-12-20 RX ADMIN — GABAPENTIN 100 MG: 100 CAPSULE ORAL at 22:22

## 2024-12-20 RX ADMIN — BACLOFEN 20 MG: 20 TABLET ORAL at 13:47

## 2024-12-20 NOTE — ASSESSMENT & PLAN NOTE
Patient with multiple sclerosis, currently following with [facility] neurology  She is essentially wheelchair/bedbound with hypophonia additionally with neurogenic bladder s/p SPT.  Not on disease directed therapy  Continue supportive medications

## 2024-12-20 NOTE — RESPIRATORY THERAPY NOTE
resp   12/20/24 1044   Respiratory Assessment   Assessment Type Assess only   General Appearance Alert;Awake   Respiratory Pattern Normal   Chest Assessment Chest expansion symmetrical   Bilateral Breath Sounds Clear;Diminished   Resp Comments Request by MD to assess pt for suctioning. Pt in no acute resp distress. BS clear, CXR reveals clear lungs. Pt possitive for RSV. Pt unable to clear mucus in throat. Was able to suction scant oral secretions. At this time no resp interventions indicated. Spoke with RN pt requires assistance with oral suctioning.   Oxygen Therapy/Pulse Ox   O2 Device None (Room air)   SpO2 96 %   SpO2 Activity At Rest   $ Pulse Oximetry Spot Check Charge Completed

## 2024-12-20 NOTE — ED PROVIDER NOTES
Time reflects when diagnosis was documented in both MDM as applicable and the Disposition within this note       Time User Action Codes Description Comment    12/19/2024  6:18 PM Dale Church Add [R05.1] Acute cough           ED Disposition       ED Disposition   Admit    Condition   Stable    Date/Time   Thu Dec 19, 2024  9:22 PM    Comment   Patient's case was discussed with Dr. Hough and patient's status was agreed to be observation status under the care of Dr. Hough               Assessment & Plan       Medical Decision Making  54-year-old female with history of multiple sclerosis presenting today for evaluation of 1 day of cough and 2 days of sore throat.  Denies any fevers, chills, chest pain, shortness of breath.  Patient admitted 1 year ago for COVID-pneumonia requiring 10 days hospitalization.  Patient reporting excessive secretions that she is unable to cough up secondary to her multiple sclerosis.    Differential: Viral URI, pneumonia    Plan: Chest x-ray, deep suctioning    Patient's x-ray on my interpretation shows no acute cardiopulmonary abnormalities.  Patient requesting suctioning equipment at home.  I discussed her case with patient's  and respiratory therapist at bedside.  Given her excessive secretions and difficulty with expectoration, I fear that if patient were to go home without proper medical equipment that she would have aspiration episodes with which would put her at high risk for developing worsening pneumonia.  Given her chronic medical conditions, I believe it is safer for her to be admitted for case management review tomorrow.  Will plan to obtain basic labs.  Patient's case was discussed with the hospitalist who agrees to admit the patient for respiratory protocol and case management evaluation.    Amount and/or Complexity of Data Reviewed  Labs: ordered.  Radiology: ordered and independent interpretation performed.    Risk  Decision regarding hospitalization.        ED  "Course as of 12/19/24 2313   Thu Dec 19, 2024   1622 54F hx MS, wheelchair bound, sore throat yesterday and cough today, covid in January and hospitalized, no fever, HA, fatigue, took Mucinex, at home covid test negative       Medications   acetaminophen (TYLENOL) tablet 650 mg (has no administration in time range)   cyanocobalamin (VITAMIN B-12) tablet 1,000 mcg (has no administration in time range)   DULoxetine (CYMBALTA) delayed release capsule 20 mg (has no administration in time range)   furosemide (LASIX) tablet 20 mg (has no administration in time range)   gabapentin (NEURONTIN) capsule 100 mg (100 mg Oral Given 12/19/24 2242)   ibuprofen (MOTRIN) tablet 200 mg (200 mg Oral Given 12/19/24 2242)   OXcarbazepine (TRILEPTAL) tablet 150 mg (150 mg Oral Not Given 12/19/24 2250)   oxybutynin (DITROPAN-XL) 24 hr tablet 10 mg (has no administration in time range)   pravastatin (PRAVACHOL) tablet 40 mg (has no administration in time range)   senna (SENOKOT) tablet 8.6 mg (has no administration in time range)   ondansetron (ZOFRAN) injection 4 mg (has no administration in time range)   enoxaparin (LOVENOX) subcutaneous injection 40 mg (has no administration in time range)   baclofen tablet 20 mg (20 mg Oral Given 12/19/24 2242)       ED Risk Strat Scores                                              History of Present Illness       Chief Complaint   Patient presents with    Cough     Pt coming into ED with a cough.  Pt has MS and is wheelchair bound.  Pt started with cough this morning and wants to get it looked at.  Pt previously had Covid  in January and was in the hospital for 10 days.  Pt concerned with the holidays coming up and just wants to get looked at       Past Medical History:   Diagnosis Date    Anesthesia     \"prefers if able to be put to sleep on stretcher before placed on table if possible due to severe spasticity    Bladder stones     Chronic pain disorder     neck    Contracture, right shoulder     " "right arm and hand    Dependent on wheelchair     motorized    Edema     dependant lower extremities    Elevated alkaline phosphatase level     Mildly elevated total, normal isoenzymes 4/15/15, normal 1/17; last assessed: 24Nov2015    Fernandez catheter in place     #24 to large bag(silicone catheter)    Gallbladder disease     History of femur fracture     right leg    History of UTI     MS (multiple sclerosis) ([facility])     Muscle spasticity     especially with touch    Muscle weakness     Muscular dystrophy ([facility])     Neck pain     Neurogenic bladder     Paralysis ([facility])     bilateral lower extremities    Port-A-Cath in place     left chest,\" hasn't used in approx 1 yr or so\"    Pressure injury of skin     right ischium    Sacral pressure sore     \"shearing, tegaderm in place,\"    Swallowing difficulty     Tinnitus     Urinary incontinence       Past Surgical History:   Procedure Laterality Date    BLADDER STONE REMOVAL N/A 12/4/2017    Procedure: CYSTO, LITHOLOPAXY HOLMIUM LASER;  Surgeon: Damir Osborne MD;  Location: [facility];  Service: Urology    BLADDER SURGERY      CHOLECYSTECTOMY      CYSTOSCOPY  06/15/2020    ESOPHAGOGASTRODUODENOSCOPY      FL RETROGRADE PYELOGRAM  10/2/2020    FL RETROGRADE PYELOGRAM  11/3/2020    KIDNEY STONE SURGERY      PORTACATH PLACEMENT Left     AL CYSTO BLADDER W/URETERAL CATHETERIZATION Left 10/2/2020    Procedure: CYSTOSCOPY LEFT RETROGRADE ; LEFT URETEROSCOPY; PYELOGRAM WITH STENT INSERTION AND SUPERPUBIC EXCHANGE;  Surgeon: Philip Mcdonald MD;  Location: [facility];  Service: Urology    AL CYSTO/URETERO W/LITHOTRIPSY &INDWELL STENT INSRT Left 11/3/2020    Procedure: CYSTOSCOPY URETEROSCOPY WITH RETROGRADE PYELOGRAM AND EXCHANGE STENT URETERAL, SP TUBE EXCHANGE, BASKET STONE EXTRACTION, BLADDER STONE EXTRACTION;  Surgeon: Damir Osborne MD;  Location: [facility];  Service: Urology    AL LITHOLAPAXY SMPL/SM <2.5 CM N/A 12/22/2022    Procedure: Cystolitholapaxy w/  laser " lithotripsy of bladder stones; SUPRAPUBIC CATHETER CHANGE;  Surgeon: Damir Osborne MD;  Location: [facility];  Service: Urology    SUPRAPUBIC CYSTOSTOMY  02/25/2014    last assessed: [id number]      Family History   Problem Relation Age of Onset    Diabetes Mother     Hyperlipidemia Mother     Hypertension Mother     COPD Father     Hypertension Father     Hyperlipidemia Sister     Alzheimer's disease Family     Diabetes Family     Hypertension Family     Heart disease Family       Social History     Tobacco Use    Smoking status: Never    Smokeless tobacco: Never   Vaping Use    Vaping status: Never Used   Substance Use Topics    Alcohol use: Not Currently    Drug use: Yes      E-Cigarette/Vaping    E-Cigarette Use Never User       E-Cigarette/Vaping Substances    Nicotine No     THC No     CBD No     Flavoring No     Other No     Unknown No       I have reviewed and agree with the history as documented.     Patient is a 54-year-old female with history of multiple sclerosis presenting today for evaluation of cough.  Patient reports 2 days of sore throat and 1 day of cough.  She reports that she has a lot of phlegm production but secondary to her MS is unable to expectorate.  She feels like she has a lot of secretions stuck in her throat.  She states that she is concerned because a year ago she was hospitalized for 10 days for COVID-pneumonia and does not want this to happen again.  Patient's  was recently sick with similar symptoms.  Patient denies any fevers, chills, shortness of breath, chest pain, palpitations, abdominal pain, nausea, vomiting, leg swelling.  Patient is wheelchair-bound.        Review of Systems   Constitutional:  Negative for chills and fever.   HENT:  Positive for sore throat. Negative for congestion and rhinorrhea.    Eyes:  Negative for pain and visual disturbance.   Respiratory:  Positive for cough. Negative for shortness of breath.    Cardiovascular:  Negative for chest pain  and palpitations.   Gastrointestinal:  Negative for abdominal pain, constipation, diarrhea, nausea and vomiting.   Genitourinary:  Negative for dysuria and hematuria.   Musculoskeletal:  Negative for back pain and neck pain.   Skin:  Negative for color change and rash.   Neurological:  Negative for weakness and numbness.   All other systems reviewed and are negative.          Objective       ED Triage Vitals   Temperature Pulse Blood Pressure Respirations SpO2 Patient Position - Orthostatic VS   12/19/24 1238 12/19/24 1238 12/19/24 1238 12/19/24 1238 12/19/24 1238 12/19/24 1856   97.8 °F (36.6 °C) 103 128/66 20 98 % Lying      Temp Source Heart Rate Source BP Location FiO2 (%) Pain Score    12/19/24 1238 12/19/24 1856 12/19/24 1856 -- --    Oral Monitor Right arm        Vitals      Date and Time Temp Pulse SpO2 Resp BP Pain Score FACES Pain Rating User   12/19/24 1856 -- 97 98 % 20 131/72 -- -- MW   12/19/24 1238 97.8 °F (36.6 °C) 103 98 % 20 128/66 -- -- MW            Physical Exam  Vitals and nursing note reviewed.   Constitutional:       General: She is not in acute distress.     Appearance: Normal appearance. She is well-developed and normal weight. She is not ill-appearing, toxic-appearing or diaphoretic.      Comments: Patient is sitting in her electric wheelchair in no acute distress   HENT:      Head: Normocephalic and atraumatic.      Right Ear: External ear normal.      Left Ear: External ear normal.      Nose: Nose normal. No congestion or rhinorrhea.      Mouth/Throat:      Mouth: Mucous membranes are moist.      Pharynx: Oropharynx is clear. No oropharyngeal exudate or posterior oropharyngeal erythema.   Eyes:      General: No scleral icterus.        Right eye: No discharge.         Left eye: No discharge.      Extraocular Movements: Extraocular movements intact.      Conjunctiva/sclera: Conjunctivae normal.      Pupils: Pupils are equal, round, and reactive to light.   Cardiovascular:      Rate and  Rhythm: Normal rate and regular rhythm.      Pulses: Normal pulses.      Heart sounds: Normal heart sounds. No murmur heard.     No friction rub. No gallop.   Pulmonary:      Effort: No respiratory distress.      Breath sounds: Normal breath sounds. No stridor. No wheezing, rhonchi or rales.      Comments: Decreased respiratory effort.  Referred upper airway sounds consistent with upper airway secretions.  Abdominal:      General: Abdomen is flat. Bowel sounds are normal. There is no distension.      Palpations: Abdomen is soft.      Tenderness: There is no abdominal tenderness. There is no guarding.      Comments: Suprapubic catheter in place without surrounding signs of infection   Musculoskeletal:         General: Normal range of motion.      Cervical back: Neck supple.   Skin:     General: Skin is warm and dry.      Capillary Refill: Capillary refill takes less than 2 seconds.      Coloration: Skin is not jaundiced or pale.   Neurological:      General: No focal deficit present.      Mental Status: She is alert and oriented to person, place, and time.   Psychiatric:         Mood and Affect: Mood normal.         Behavior: Behavior normal.         Results Reviewed       Procedure Component Value Units Date/Time    FLU/RSV/COVID - if FLU/RSV clinically relevant [[id number]] Collected: 12/19/24 2247    Lab Status: In process Specimen: Nares from Nasopharyngeal Swab Updated: 12/19/24 2251    Basic metabolic panel [[id number]]  (Abnormal) Collected: 12/19/24 1923    Lab Status: Final result Specimen: Blood from Arm, Left Updated: 12/19/24 1959     Sodium 135 mmol/L      Potassium 4.0 mmol/L      Chloride 100 mmol/L      CO2 22 mmol/L      ANION GAP 13 mmol/L      BUN 7 mg/dL      Creatinine 0.32 mg/dL      Glucose 96 mg/dL      Calcium 8.9 mg/dL      eGFR 127 ml/min/1.73sq m     Narrative:      National Kid[employer] Disease Foundation guidelines for Chronic Kidney Disease (CKD):     Stage 1 with normal or high GFR (GFR > 90  mL/min/1.73 square meters)    Stage 2 Mild CKD (GFR = 60-89 mL/min/1.73 square meters)    Stage 3A Moderate CKD (GFR = 45-59 mL/min/1.73 square meters)    Stage 3B Moderate CKD (GFR = 30-44 mL/min/1.73 square meters)    Stage 4 Severe CKD (GFR = 15-29 mL/min/1.73 square meters)    Stage 5 End Stage CKD (GFR <15 mL/min/1.73 square meters)  Note: GFR calculation is accurate only with a steady state creatinine    CBC and differential [[id number]]  (Abnormal) Collected: 12/19/24 1923    Lab Status: Final result Specimen: Blood from Arm, Left Updated: 12/19/24 1936     WBC 10.66 Thousand/uL      RBC 4.91 Million/uL      Hemoglobin 12.8 g/dL      Hematocrit 41.8 %      MCV 85 fL      MCH 26.1 pg      MCHC 30.6 g/dL      RDW 14.9 %      MPV 9.4 fL      Platelets 238 Thousands/uL      nRBC 0 /100 WBCs      Segmented % 85 %      Immature Grans % 0 %      Lymphocytes % 7 %      Monocytes % 7 %      Eosinophils Relative 1 %      Basophils Relative 0 %      Absolute Neutrophils 9.14 Thousands/µL      Absolute Immature Grans 0.03 Thousand/uL      Absolute Lymphocytes 0.71 Thousands/µL      Absolute Monocytes 0.70 Thousand/µL      Eosinophils Absolute 0.05 Thousand/µL      Basophils Absolute 0.03 Thousands/µL             XR chest 2 views   ED Interpretation by Dale Church MD (12/19 5625)   No acute cardiopulmonary maladies          Procedures    ED Medication and Procedure Management   Prior to Admission Medications   Prescriptions Last Dose Informant Patient Reported? Taking?   Alpha-D-Galactosidase (Beano) TABS   Yes No   Sig: Take 1 tablet by mouth if needed (GI bloating). As needed before each meal that may cause bloating.   Indications: GI bloating   Cyanocobalamin (B-12) 1000 MCG SUBL   No No   Sig: Dissolve 1 tablet under tongue daily   DULoxetine (CYMBALTA) 20 mg capsule   No No   Sig: Take 1 capsule (20 mg total) by mouth daily   OXcarbazepine (Trileptal) 150 mg tablet  Self, Spouse/Significant Other Yes No   Sig:  "Take 150 mg by mouth 2 (two) times a day. Indications: Trigeminal Nerve Pain   SYRINGE-NEEDLE, DISP, 3 ML 25G X 5/8\" 3 ML MISC   No No   Sig: Use once a week Use syringes for B12 injections once a week for 3 weeks, then once a month for 5 months.   acetaminophen (TYLENOL) 500 mg tablet   Yes No   Sig: Take 500 mg by mouth every 6 (six) hours as needed for mild pain. Indications: Pain   baclofen 20 mg tablet  Self, Spouse/Significant Other No No   Sig: TAKE 1 TABLET BY MOUTH 5 TIMES AS NEEDED   Patient taking differently: No sig reported   cyanocobalamin 1,000 mcg/mL   No No   Sig: Take B12 1000 mcg IM once a week for 3 weeks, then once a month for 5 months   ergocalciferol (VITAMIN D2) 50,000 units   No No   Sig: Take 1 capsule (50,000 Units total) by mouth once a week   furosemide (LASIX) 20 mg tablet   No No   Sig: Take 1 tablet (20 mg total) by mouth daily   gabapentin (NEURONTIN) 100 mg capsule   No No   Sig: Take 1 tablet at bedtime tonight, 1 tablet twice a day tomorrow, and then 1 tablet 3 times daily thereafter   ibuprofen (MOTRIN) 200 mg tablet   Yes No   Sig: Take 200 mg by mouth every 6 (six) hours as needed for moderate pain. Indications: Pain   oxybutynin (DITROPAN-XL) 10 MG 24 hr tablet   No No   Sig: Take 1 tablet (10 mg total) by mouth daily   rosuvastatin (CRESTOR) 5 mg tablet   No No   Sig: Take 1 tablet (5 mg total) by mouth daily   senna (SENOKOT) 8.6 mg   Yes No   Sig: Take 8.6 mg by mouth if needed for constipation. Indications: Constipation      Facility-Administered Medications Last Administration Doses Remaining   cyanocobalamin injection 1,000 mcg 12/13/2024  9:34 AM         Patient's Medications   Discharge Prescriptions    No medications on file     No discharge procedures on file.  ED SEPSIS DOCUMENTATION   Time reflects when diagnosis was documented in both MDM as applicable and the Disposition within this note       Time User Action Codes Description Comment    12/19/2024  6:18 PM " Dale Church Add [R05.1] Acute cough                  Dale Church MD  12/19/24 6062

## 2024-12-20 NOTE — ASSESSMENT & PLAN NOTE
Patient with multiple sclerosis, currently following with [facility] neurology  She is essentially wheelchair/bedbound with hypophonia additionally with neurogenic bladder s/p SPT.  Not on disease directed therapy  Continue supportive medications

## 2024-12-20 NOTE — PROGRESS NOTES
Progress Note - Hospitalist   Name: Fanny Schulz 54 y.o. female I MRN: [id number]  Unit/Bed#: ED 25 I Date of Admission: 12/19/2024   Date of Service: 12/20/2024 I Hospital Day: 0    Assessment & Plan  RSV infection  With several day history of worsening cough and difficulty bringing up phlegm, patient with history of multiple sclerosis with marked weakness and essentially wheelchair/bedbound  Labs during ED evaluation were without marked abnormality, CXR awaiting final radiologist read but wet read appears without acute cardiopulmonary disease visualized.  Admission initially sought for assistance with attaining home resources given her weakness and lack of equipment for chest/respiratory therapy.  COVID-19/influenza/RSV PCR however were obtained after I evaluated patient and noted positive for RSV infection.  Thus far she is maintaining appropriate oxygenation on room air  Provide supportive cares at this time for the RSV infection  CM consult for assistance with resources as above  Multiple sclerosis (HCC)  Patient with multiple sclerosis, currently following with [facility] neurology  She is essentially wheelchair/bedbound with hypophonia additionally with neurogenic bladder s/p SPT.  Not on disease directed therapy  Continue supportive medications  Neurogenic bladder  S/p SPT  Continue routine SPT care    VTE Pharmacologic Prophylaxis: VTE Score: 3 Moderate Risk (Score 3-4) - Pharmacological DVT Prophylaxis Ordered: enoxaparin (Lovenox).    Mobility:      JH-HLM Goal NOT achieved. Continue with multidisciplinary rounding and encourage appropriate mobility to improve upon JH-HLM goals.    Patient Centered Rounds: I performed bedside rounds with nursing staff today.   Discussions with Specialists or Other Care Team Provider: will discuss with cm     Education and Discussions with Family / Patient: Updated  () at bedside.    Current Length of Stay: 0 day(s)  Current Patient Status:  Observation   Certification Statement: The patient will continue to require additional inpatient hospital stay due to await cm to set up with suction  Discharge Plan: Anticipate discharge later today or tomorrow to home.    Code Status: Level 1 - Full Code    Subjective   Seen and examined still not able to cough stuff up    Objective :  Temp:  [97.8 °F (36.6 °C)] 97.8 °F (36.6 °C)  HR:  [] 73  BP: (116-131)/(56-72) 116/56  Resp:  [20] 20  SpO2:  [98 %] 98 %  O2 Device: None (Room air)    There is no height or weight on file to calculate BMI.     Input and Output Summary (last 24 hours):     Intake/Output Summary (Last 24 hours) at 12/20/2024 1035  Last data filed at 12/20/2024 0602  Gross per 24 hour   Intake --   Output 1200 ml   Net -1200 ml       Physical Exam  Vitals and nursing note reviewed.   Constitutional:       General: She is not in acute distress.     Appearance: She is well-developed.   HENT:      Head: Normocephalic and atraumatic.   Eyes:      Conjunctiva/sclera: Conjunctivae normal.   Cardiovascular:      Rate and Rhythm: Normal rate and regular rhythm.      Heart sounds: No murmur heard.  Pulmonary:      Effort: Pulmonary effort is normal. No respiratory distress.      Breath sounds: Normal breath sounds. No wheezing or rales.   Abdominal:      General: There is no distension.      Palpations: Abdomen is soft.      Tenderness: There is no abdominal tenderness.   Musculoskeletal:         General: No swelling.      Cervical back: Neck supple.   Skin:     General: Skin is warm and dry.      Capillary Refill: Capillary refill takes less than 2 seconds.   Neurological:      Mental Status: She is alert. Mental status is at baseline.   Psychiatric:         Mood and Affect: Mood normal.           Lines/Drains:              Lab Results: I have reviewed the following results:   Results from last 7 days   Lab Units 12/19/24  1923   WBC Thousand/uL 10.66*   HEMOGLOBIN g/dL 12.8   HEMATOCRIT % 41.8    PLATELETS Thousands/uL 238   SEGS PCT % 85*   LYMPHO PCT % 7*   MONO PCT % 7   EOS PCT % 1     Results from last 7 days   Lab Units 12/19/24  1923   SODIUM mmol/L 135   POTASSIUM mmol/L 4.0   CHLORIDE mmol/L 100   CO2 mmol/L 22   BUN mg/dL 7   CREATININE mg/dL 0.32*   ANION GAP mmol/L 13   CALCIUM mg/dL 8.9   GLUCOSE RANDOM mg/dL 96                       Recent Cultures (last 7 days):         Imaging Results Review: I reviewed radiology reports from this admission including: chest xray.  Other Study Results Review: No additional pertinent studies reviewed.    Last 24 Hours Medication List:     Current Facility-Administered Medications:     acetaminophen (TYLENOL) tablet 650 mg, Q6H PRN    baclofen tablet 20 mg, BID    cyanocobalamin (VITAMIN B-12) tablet 1,000 mcg, Daily    cyanocobalamin injection 1,000 mcg, Q30 Days    DULoxetine (CYMBALTA) delayed release capsule 20 mg, Daily    enoxaparin (LOVENOX) subcutaneous injection 40 mg, Daily    furosemide (LASIX) tablet 20 mg, Daily    gabapentin (NEURONTIN) capsule 100 mg, TID    ibuprofen (MOTRIN) tablet 200 mg, Q6H PRN    ondansetron (ZOFRAN) injection 4 mg, Q6H PRN    OXcarbazepine (TRILEPTAL) tablet 150 mg, BID    oxybutynin (DITROPAN-XL) 24 hr tablet 10 mg, Daily    pravastatin (PRAVACHOL) tablet 40 mg, Daily With Dinner    senna (SENOKOT) tablet 8.6 mg, HS PRN    Administrative Statements   Today, Patient Was Seen By: Amarilis Balderas MD  I have spent a total time of >35 minutes in caring for this patient on the day of the visit/encounter including Patient and family education, Documenting in the medical record, Reviewing / ordering tests, medicine, procedures  , and Obtaining or reviewing history  .    **Please Note: This note may have been constructed using a voice recognition system.**

## 2024-12-20 NOTE — ASSESSMENT & PLAN NOTE
With several day history of worsening cough and difficulty bringing up phlegm, patient with history of multiple sclerosis with marked weakness and essentially wheelchair/bedbound  Labs during ED evaluation were without marked abnormality, CXR awaiting final radiologist read but wet read appears without acute cardiopulmonary disease visualized.  Admission initially sought for assistance with attaining home resources given her weakness and lack of equipment for chest/respiratory therapy.  COVID-19/influenza/RSV PCR however were obtained after I evaluated patient and noted positive for RSV infection.  Thus far she is maintaining appropriate oxygenation on room air  Provide supportive cares at this time for the RSV infection  CM consult for assistance with resources as above

## 2024-12-21 VITALS
OXYGEN SATURATION: 98 % | SYSTOLIC BLOOD PRESSURE: 113 MMHG | RESPIRATION RATE: 18 BRPM | HEIGHT: 66 IN | HEART RATE: 96 BPM | BODY MASS INDEX: 24.91 KG/M2 | DIASTOLIC BLOOD PRESSURE: 69 MMHG | WEIGHT: 155 LBS | TEMPERATURE: 98.2 F

## 2024-12-21 PROBLEM — R53.2 FUNCTIONAL QUADRIPLEGIA (HCC): Status: ACTIVE | Noted: 2024-12-21

## 2024-12-21 LAB

## 2024-12-21 PROCEDURE — 99239 HOSP IP/OBS DSCHRG MGMT >30: CPT | Performed by: NURSE PRACTITIONER

## 2024-12-21 RX ORDER — BACLOFEN 20 MG/1
20 TABLET ORAL 2 TIMES DAILY
Start: 2024-12-21 | End: 2024-12-27

## 2024-12-21 RX ADMIN — ENOXAPARIN SODIUM 40 MG: 40 INJECTION SUBCUTANEOUS at 10:13

## 2024-12-21 RX ADMIN — GABAPENTIN 100 MG: 100 CAPSULE ORAL at 10:12

## 2024-12-21 RX ADMIN — OXYBUTYNIN 10 MG: 10 TABLET, FILM COATED, EXTENDED RELEASE ORAL at 10:12

## 2024-12-21 RX ADMIN — DULOXETINE HYDROCHLORIDE 20 MG: 20 CAPSULE, DELAYED RELEASE ORAL at 10:12

## 2024-12-21 RX ADMIN — SODIUM CHLORIDE, SODIUM GLUCONATE, SODIUM ACETATE, POTASSIUM CHLORIDE, MAGNESIUM CHLORIDE, SODIUM PHOSPHATE, DIBASIC, AND POTASSIUM PHOSPHATE 100 ML/HR: .53; .5; .37; .037; .03; .012; .00082 INJECTION, SOLUTION INTRAVENOUS at 14:41

## 2024-12-21 RX ADMIN — SODIUM CHLORIDE, SODIUM GLUCONATE, SODIUM ACETATE, POTASSIUM CHLORIDE, MAGNESIUM CHLORIDE, SODIUM PHOSPHATE, DIBASIC, AND POTASSIUM PHOSPHATE 100 ML/HR: .53; .5; .37; .037; .03; .012; .00082 INJECTION, SOLUTION INTRAVENOUS at 02:50

## 2024-12-21 NOTE — CASE MANAGEMENT
Case Management Assessment & Discharge Planning Note    Patient name Fanny Schulz  [facility] /-01 MRN [id number]  : 1970 Date 2024       Current Admission Date: 2024  Current Admission Diagnosis:RSV infection   Patient Active Problem List    Diagnosis Date Noted Date Diagnosed    RSV infection 2024     Positive colorectal cancer screening using Cologuard test 10/04/2024     Chronic lower extremity edema 2024     Tinnitus of both ears 2023     Hypophonia 2023     Peripheral edema 2023     Increased endometrial stripe thickness 03/10/2021     Ureteral calculus, left 2020     Alkaline phosphatase elevation 10/28/2020     Abnormal thyroid function test 10/28/2020     Candidal vaginitis 10/19/2020     Hydronephrosis with urinary obstruction due to ureteral calculus 10/02/2020     Thrombocytopenia (HCC) 10/01/2020     Serum total bilirubin elevated 10/01/2020     Medication management contract signed 2020     Screening mammogram, encounter for 2019     Allergic rhinitis 2018     Functional quadriplegia secondary to MS (HCC) 2018     Suprapubic catheter (HCC) 2017     Nephrolithiasis 2016     Bladder calculus 2015     Muscle spasticity 2015     Vitamin B12 deficiency 2015     Constipation 2015     Hyperglycemia 2014     Familial hypercholesterolemia 2014     Recurrent major depressive disorder, in full remission (HCC) 2014     Lymphedema 2014     Spinal stenosis of cervical region 2014     Urinary incontinence 2014     Vitamin D deficiency 2013     Dysphagia 2013     Dizziness 2013     Muscle weakness 2013     Neurogenic bladder 2013     Sleep disorder 2013     Tremor 2013     Vision loss 2013     Multiple sclerosis (HCC) 10/21/2013       LOS (days): 1  Geometric Mean LOS (GMLOS) (days): 2.7  Days to  GMLOS:1.8     OBJECTIVE:    Risk of Unplanned Readmission Score: 6.43         Current admission status: Inpatient       Preferred Pharmacy:   [facility]/pharmacy #[id number] - [place] - [address] [facility] [address]  [address] [address]  Phone: [phone] Fax: [phone]    Primary Care Provider: Nacho Monroy DO    Primary Insurance: MEDICARE  Secondary Insurance: BLUE CROSS    ASSESSMENT:  Active Health Care Proxies    There are no active Health Care Proxies on file.                                                          DISCHARGE DETAILS:    Discharge planning discussed with:: Jamal [facility] [employer], spoke to Egrin and via [place]. DME confirmed order completed pending scheduled delivery. Jamal spoke to pt's spouse Mikey updated on same.

## 2024-12-21 NOTE — DISCHARGE SUMMARY
Discharge Summary - Hospitalist   Name: Fanny Schulz 54 y.o. female I MRN: [id number]  Unit/Bed#: -01 I Date of Admission: 12/19/2024   Date of Service: 12/21/2024 I Hospital Day: 1     Assessment & Plan  RSV infection  With several day history of worsening cough and difficulty bringing up phlegm, patient with history of multiple sclerosis with marked weakness and essentially wheelchair/bedbound  Labs during ED evaluation were without marked abnormality, CXR awaiting final radiologist read but wet read appears without acute cardiopulmonary disease visualized.  Admission initially sought for assistance with attaining home resources given her weakness and lack of equipment for chest/respiratory therapy.  COVID-19/influenza/RSV PCR however were obtained after I evaluated patient and noted positive for RSV infection.  Thus far she is maintaining appropriate oxygenation on room air can fluctuate likely dt mucus plugging   Cm have set up mucus suction machine to be delivered later today to pts home   Provide supportive cares at this time for the RSV infection  CM consult for assistance with resources as above  Multiple sclerosis (HCC)  Patient with multiple sclerosis, currently following with [facility] neurology  She is essentially wheelchair/bedbound with hypophonia additionally with neurogenic bladder s/p SPT.  Not on disease directed therapy  Continue supportive medications  Neurogenic bladder  S/p SPT  Continue routine SPT care  Functional quadriplegia (HCC)       Medical Problems       Resolved Problems  Date Reviewed: 12/20/2024   None       Discharging Physician / Practitioner: CLEMENTINA Graham  PCP: Nacho Monroy DO  Admission Date:   Admission Orders (From admission, onward)       Ordered        12/20/24 1537  INPATIENT ADMISSION  Once            12/19/24 2123  Place in Observation  Once                          Discharge Date: 12/21/24    Consultations During Hospital  Stay:  none    Procedures Performed:   12/19 positive RSV pcr  12/19 chest xray: No focal consolidation, pleural effusion, or pneumothorax     Significant Findings / Test Results:   See above    Incidental Findings:   None       Test Results Pending at Discharge (will require follow up):   none     Outpatient Tests Requested:  Call to schedule follow up with pcp in the next week     Complications:  none    Reason for Admission: Worsening cough     Hospital Course:   Fanny Schulz is a 54 y.o. female patient who originally presented to the hospital on 12/19/2024 due to worsening cough. Pt has a past medical hx of MS not on goal directed therapy now wheelchair bound also complicated by hypophonia and neurogenic bladder sp SPT now presenting with worsening cough and inability to bring up phlegm. This had developed over the last few days. She denied any fever , chills , sob.  does report being sick not tested just recently along with other family members also sick. Upon admission pt tested and noted to be positive for RSV. Family requested help with machine to assist with sputum suction for home use. Chest xray with no acute findings. Pt was admitted for observation and supportive care. [facility] set up equipment to be delivered later today .  has communicated with company and will plan for discharge to be at home to receive equipment.   Pt to follow up with pcp in the next week .           Please see above list of diagnoses and related plan for additional information.     Condition at Discharge: fair    Discharge Day Visit / Exam:   Subjective:  pt reports breathing improving , sats 92 percent during exam poor effort occ cough. Pt on room air.  very attentive at bedside. Pt feel ready to go home   Vitals: Blood Pressure: 113/69 (12/21/24 1435)  Pulse: 96 (12/21/24 1435)  Temperature: 98.2 °F (36.8 °C) (12/21/24 1435)  Temp Source: Oral (12/19/24 1238)  Respirations: 18 (12/20/24 2108)  Height:  "5' 6\" (167.6 cm) (12/20/24 2108)  Weight - Scale: 70.3 kg (155 lb) (12/20/24 2108)  SpO2: 98 % (12/21/24 1435)  Physical Exam  Constitutional:       General: She is not in acute distress.     Appearance: She is not ill-appearing, toxic-appearing or diaphoretic.   HENT:      Head: Normocephalic and atraumatic.   Eyes:      General:         Right eye: No discharge.         Left eye: No discharge.   Cardiovascular:      Rate and Rhythm: Normal rate.      Heart sounds: No murmur heard.     No friction rub. No gallop.   Pulmonary:      Effort: No respiratory distress.      Breath sounds: No stridor. Rales present. No wheezing or rhonchi.      Comments: Very diminished breath sounds dt poor inspiration   Chest:      Chest wall: No tenderness.   Abdominal:      General: There is no distension.      Palpations: There is no mass.      Tenderness: There is no abdominal tenderness. There is no guarding or rebound.      Hernia: No hernia is present.   Musculoskeletal:         General: No swelling, tenderness, deformity or signs of injury.      Right lower leg: No edema.      Left lower leg: No edema.   Skin:     Coloration: Skin is not jaundiced or pale.      Findings: No bruising, erythema, lesion or rash.   Neurological:      Mental Status: She is alert and oriented to person, place, and time.   Psychiatric:         Behavior: Behavior normal.          Discussion with Family: Updated  () at bedside.    Discharge instructions/Information to patient and family:   See after visit summary for information provided to patient and family.      Provisions for Follow-Up Care:  See after visit summary for information related to follow-up care and any pertinent home health orders.      Mobility at time of Discharge:   Basic Mobility Inpatient Raw Score: 6  JH-HLM Goal: 2: Bed activities/Dependent transfer  JH-HLM Achieved: 2: Bed activities/Dependent transfer  HLM Goal achieved. Continue to encourage appropriate " mobility.     Disposition:   Home    Planned Readmission: no plan for readmission     Discharge Medications:  See after visit summary for reconciled discharge medications provided to patient and/or family.      Administrative Statements   Discharge Statement:  I have spent a total time of 56 minutes in caring for this patient on the day of the visit/encounter. >30 minutes of time was spent on: Risks and benefits of tx options, Patient and family education, Documenting in the medical record, Reviewing / ordering tests, medicine, procedures  , and Communicating with other healthcare professionals .    **Please Note: This note may have been constructed using a voice recognition system**

## 2024-12-21 NOTE — CASE MANAGEMENT
Case Management Assessment & Discharge Planning Note    Patient name Fanny Schulz  [facility] /-01 MRN [id number]  : 1970 Date 2024       Current Admission Date: 2024  Current Admission Diagnosis:RSV infection   Patient Active Problem List    Diagnosis Date Noted Date Diagnosed    RSV infection 2024     Positive colorectal cancer screening using Cologuard test 10/04/2024     Chronic lower extremity edema 2024     Tinnitus of both ears 2023     Hypophonia 2023     Peripheral edema 2023     Increased endometrial stripe thickness 03/10/2021     Ureteral calculus, left 2020     Alkaline phosphatase elevation 10/28/2020     Abnormal thyroid function test 10/28/2020     Candidal vaginitis 10/19/2020     Hydronephrosis with urinary obstruction due to ureteral calculus 10/02/2020     Thrombocytopenia (HCC) 10/01/2020     Serum total bilirubin elevated 10/01/2020     Medication management contract signed 2020     Screening mammogram, encounter for 2019     Allergic rhinitis 2018     Functional quadriplegia secondary to MS (HCC) 2018     Suprapubic catheter (HCC) 2017     Nephrolithiasis 2016     Bladder calculus 2015     Muscle spasticity 2015     Vitamin B12 deficiency 2015     Constipation 2015     Hyperglycemia 2014     Familial hypercholesterolemia 2014     Recurrent major depressive disorder, in full remission (HCC) 2014     Lymphedema 2014     Spinal stenosis of cervical region 2014     Urinary incontinence 2014     Vitamin D deficiency 2013     Dysphagia 2013     Dizziness 2013     Muscle weakness 2013     Neurogenic bladder 2013     Sleep disorder 2013     Tremor 2013     Vision loss 2013     Multiple sclerosis (HCC) 10/21/2013       LOS (days): 1  Geometric Mean LOS (GMLOS) (days): 2.7  Days to  GMLOS:1.9     OBJECTIVE:    Risk of Unplanned Readmission Score: 6.41         Current admission status: Inpatient       Preferred Pharmacy:   [facility]/pharmacy #[id number] - [place] - [address] [facility] [address]  [address] [address]  Phone: [phone] Fax: [phone]    Primary Care Provider: Nacho Monroy DO    Primary Insurance: MEDICARE  Secondary Insurance: BLUE CROSS    ASSESSMENT:  Active Health Care Proxies    There are no active Health Care Proxies on file.                                                          DISCHARGE DETAILS:    Discharge planning discussed with:: Chart reviewed. Pt remain stable for discharged pending DME: oral suction. Pt to discharged home when medically cleared. Per diann, DME thru [facility], approved pending delivery. Cm message enquiring on delivery scheduled and TC oncall [phone], who reported not able to see status of the DME however requested Cm call back num for further investigation. Awaiting call back.

## 2024-12-21 NOTE — ASSESSMENT & PLAN NOTE
With several day history of worsening cough and difficulty bringing up phlegm, patient with history of multiple sclerosis with marked weakness and essentially wheelchair/bedbound  Labs during ED evaluation were without marked abnormality, CXR awaiting final radiologist read but wet read appears without acute cardiopulmonary disease visualized.  Admission initially sought for assistance with attaining home resources given her weakness and lack of equipment for chest/respiratory therapy.  COVID-19/influenza/RSV PCR however were obtained after I evaluated patient and noted positive for RSV infection.  Thus far she is maintaining appropriate oxygenation on room air can fluctuate likely dt mucus plugging   Cm have set up mucus suction machine to be delivered later today to pts home   Provide supportive cares at this time for the RSV infection  CM consult for assistance with resources as above

## 2024-12-21 NOTE — CASE MANAGEMENT
Case Management Discharge Planning Note    Patient name Fanny Schulz  [facility] /-01 MRN [id number]  : 1970 Date 2024       Current Admission Date: 2024  Current Admission Diagnosis:RSV infection   Patient Active Problem List    Diagnosis Date Noted Date Diagnosed    RSV infection 2024     Positive colorectal cancer screening using Cologuard test 10/04/2024     Chronic lower extremity edema 2024     Tinnitus of both ears 2023     Hypophonia 2023     Peripheral edema 2023     Increased endometrial stripe thickness 03/10/2021     Ureteral calculus, left 2020     Alkaline phosphatase elevation 10/28/2020     Abnormal thyroid function test 10/28/2020     Candidal vaginitis 10/19/2020     Hydronephrosis with urinary obstruction due to ureteral calculus 10/02/2020     Thrombocytopenia (HCC) 10/01/2020     Serum total bilirubin elevated 10/01/2020     Medication management contract signed 2020     Screening mammogram, encounter for 2019     Allergic rhinitis 2018     Functional quadriplegia secondary to MS (HCC) 2018     Suprapubic catheter (HCC) 2017     Nephrolithiasis 2016     Bladder calculus 2015     Muscle spasticity 2015     Vitamin B12 deficiency 2015     Constipation 2015     Hyperglycemia 2014     Familial hypercholesterolemia 2014     Recurrent major depressive disorder, in full remission (HCC) 2014     Lymphedema 2014     Spinal stenosis of cervical region 2014     Urinary incontinence 2014     Vitamin D deficiency 2013     Dysphagia 2013     Dizziness 2013     Muscle weakness 2013     Neurogenic bladder 2013     Sleep disorder 2013     Tremor 2013     Vision loss 2013     Multiple sclerosis (HCC) 10/21/2013       LOS (days): 0  Geometric Mean LOS (GMLOS) (days): 2.7  Days to GMLOS:2.6      OBJECTIVE:  Risk of Unplanned Readmission Score: 6.26         Current admission status: Inpatient   Preferred Pharmacy:   [facility] #[id number] - [place], [place] - [address] [address]  [address] [address]  Phone: [phone] Fax: [phone]    Primary Care Provider: Nacho Monroy DO    Primary Insurance: MEDICARE  Secondary Insurance: BLUE CROSS    DISCHARGE DETAILS:       DME Referral Provided  Referral made for DME?: Yes  DME referral completed for the following items:: Other (suction)  DME Supplier Name:: [facility]    Other Referral/Resources/Interventions Provided:  Interventions: DME  Referral Comments: CM placed an order in [place] for oral suction.  The order was signed by the provider however the order needs to be reviewed by [employer] and delivery needs to be arranged if approved by insurance.     Additional Comments: CM was notified by RN that pt was waiting in the ED all day for CM to order an oral suction unit for home for pt and pt has now been moved to the floor.  This CM was not aware pt was only waiting for DME for d/c to home until 1739.  CM placed an order in [place] for the Oral Suction Kit and the order was signed by the provider.  The order now needs to be processed by [employer] for insurance approval and delivery needs to be arranged with family.  CM spoke to Padmini from [employer] to confirm this order will not be able to be delivered tonight and she would make a note that the goal is for as early as possible tomorrow (taking into accout coordination with family for delivery). Pt, family, RN and provider made aware.

## 2024-12-21 NOTE — ASSESSMENT & PLAN NOTE
Patient with multiple sclerosis, currently following with [facility] neurology  She is essentially wheelchair/bedbound with hypophonia additionally with neurogenic bladder s/p SPT.  Not on disease directed therapy  Continue supportive medications

## 2024-12-21 NOTE — PROGRESS NOTES
Patient and patients  request that patient stay in her wheelchair they prefer this over the patient being in the bed, patients  is staying at patients bedside.  Will continue to monitor.  Patient has no complaints.

## 2024-12-21 NOTE — ASSESSMENT & PLAN NOTE
Functional quadriplegia history of total dependent care due to advanced MS   Wheelchair-bound/bedbound, hypophonia, neurogenic bladder, right shoulder contracture and history of pressure injuries treated with activities of daily living, routine SPT care and case management consult  Discussion had with case management in regards to suction to be set up at home for patient's needs.  [employer] [facility] will be reaching out to patient/spouse to schedule delivery

## 2024-12-21 NOTE — ASSESSMENT & PLAN NOTE
Patient with multiple sclerosis, currently following with [facility] neurology  She is essentially wheelchair/bedbound with hypophonia additionally with neurogenic bladder s/p SPT.  Not on disease directed therapy  Continue supportive medications

## 2024-12-22 ENCOUNTER — TELEPHONE (OUTPATIENT)
Dept: FAMILY MEDICINE CLINIC | Facility: CLINIC | Age: 54
End: 2024-12-22

## 2024-12-22 NOTE — TELEPHONE ENCOUNTER
----- Message from CLEMENTINA Graham sent at 12/21/2024 10:54 PM EST -----  Thank you for allowing us to participate in the care of your patient, Fanny Schulz, who was hospitalized from 12/19/2024 through 12/21/2024 with the admitting diagnosis of RSV.  Pt admitted sp  with potential viral illness and family> pt tested noting RSV struggling with mucus. Requesting suction pump be set up. This was ordered by cm and planned for dc to home . Pt dcd 12/21 to home with suction pump to be delivered later today to their home. She did express feeling a little better . Sp low dose iv fluids.     Medication Changes:  none    Outpatient testing recommended:  Follow up with pcp in one week     If you have any additional questions or would like to discuss further, please feel free to contact me.    Daisha Eduardo DNP  [facility] Internal Medicine, Hospitalist  [phone]

## 2024-12-23 ENCOUNTER — HOME CARE VISIT (OUTPATIENT)
Dept: HOME HEALTH SERVICES | Facility: HOME HEALTHCARE | Age: 54
End: 2024-12-23
Payer: MEDICARE

## 2024-12-23 ENCOUNTER — TELEPHONE (OUTPATIENT)
Age: 54
End: 2024-12-23

## 2024-12-23 VITALS
SYSTOLIC BLOOD PRESSURE: 112 MMHG | TEMPERATURE: 97.7 F | DIASTOLIC BLOOD PRESSURE: 66 MMHG | RESPIRATION RATE: 16 BRPM | OXYGEN SATURATION: 100 % | HEART RATE: 87 BPM

## 2024-12-23 PROCEDURE — G0299 HHS/HOSPICE OF RN EA 15 MIN: HCPCS

## 2024-12-23 NOTE — TELEPHONE ENCOUNTER
Tamera from [facility] home care was with patient currently in the home. Wanted to make pcp aware of patient's most recent hospital admission due to RSV, 12/19-12/21. Patient was discharged and is back home, resumed home care at this time.

## 2024-12-27 DIAGNOSIS — R53.2 FUNCTIONAL QUADRIPLEGIA (HCC): ICD-10-CM

## 2024-12-27 RX ORDER — BACLOFEN 20 MG/1
TABLET ORAL
Qty: 150 TABLET | Refills: 11 | Status: SHIPPED | OUTPATIENT
Start: 2024-12-27

## 2025-01-03 ENCOUNTER — HOME CARE VISIT (OUTPATIENT)
Dept: HOME HEALTH SERVICES | Facility: HOME HEALTHCARE | Age: 55
End: 2025-01-03
Payer: MEDICARE

## 2025-01-03 VITALS
TEMPERATURE: 97.7 F | DIASTOLIC BLOOD PRESSURE: 66 MMHG | SYSTOLIC BLOOD PRESSURE: 112 MMHG | OXYGEN SATURATION: 97 % | HEART RATE: 81 BPM

## 2025-01-03 PROCEDURE — G0299 HHS/HOSPICE OF RN EA 15 MIN: HCPCS

## 2025-01-10 ENCOUNTER — HOME CARE VISIT (OUTPATIENT)
Dept: HOME HEALTH SERVICES | Facility: HOME HEALTHCARE | Age: 55
End: 2025-01-10
Payer: MEDICARE

## 2025-01-10 ENCOUNTER — TELEPHONE (OUTPATIENT)
Age: 55
End: 2025-01-10

## 2025-01-10 ENCOUNTER — TELEPHONE (OUTPATIENT)
Dept: UROLOGY | Facility: AMBULATORY SURGERY CENTER | Age: 55
End: 2025-01-10

## 2025-01-10 ENCOUNTER — TELEPHONE (OUTPATIENT)
Dept: UROLOGY | Facility: CLINIC | Age: 55
End: 2025-01-10

## 2025-01-10 VITALS
TEMPERATURE: 98.5 F | SYSTOLIC BLOOD PRESSURE: 102 MMHG | HEART RATE: 77 BPM | OXYGEN SATURATION: 100 % | DIASTOLIC BLOOD PRESSURE: 60 MMHG

## 2025-01-10 PROCEDURE — G0299 HHS/HOSPICE OF RN EA 15 MIN: HCPCS

## 2025-01-10 NOTE — TELEPHONE ENCOUNTER
If there is acute concern, then the patient should go to the emergency room.  If not, should follow-up with Dr. Ramirez.

## 2025-01-10 NOTE — TELEPHONE ENCOUNTER
"Message via secure chat \"Hi Dr. Osborne: Ashlie Schulz reports that she was having tea colored urine 1/3 and 1/4. On the evening of 1/4 her  flushed her catheter with vinegar wayer. On 1/5 her  found what appears to be a large bladder stone in her brief. I put a photo in [employer]. Her urine became clear on Sunday, but now it's looking chinedu and there's a lot of sediment. She doesn't seem ill but I'm wondering if you want her to have a cystoscopy or a bladder scan sooner rather than later? They had been ordered annually, but she said the order was changed at the end of the summer to ultrasounds instead.\" Per Dr. Osborne, we are to bring patient in for AP visit.   "

## 2025-01-10 NOTE — TELEPHONE ENCOUNTER
Received call from KAYLEIGH Philip with [facility] who states that she saw patient today for wound care and noted that she now has a hole in her left ischium with brown purulent drainage that has a foul odor to it.  Nurse has concerns for possible osteo. Per RN, wounds are being managed by Dr. Ramirez who she attempted to contact but has not received a response.  Wanted to make PCP aware because she is concerned for patient.  VSS, no swelling or increased redness noted.  Pain is 2/10 when pressure is applied.      Tamera can be reached at: [phone]

## 2025-01-10 NOTE — TELEPHONE ENCOUNTER
Called patient to advise her to have her U/S for her kidney and bladder done. Received no answer and unable to leave VM due to inbox being full.

## 2025-01-10 NOTE — TELEPHONE ENCOUNTER
Called patient's  who did not  and VM was full.  Called home number and spoke with patient. Informed on appt Monday with Kushal.

## 2025-01-13 ENCOUNTER — NURSE TRIAGE (OUTPATIENT)
Age: 55
End: 2025-01-13

## 2025-01-13 DIAGNOSIS — R39.9 UTI SYMPTOMS: Primary | ICD-10-CM

## 2025-01-13 NOTE — TELEPHONE ENCOUNTER
Called and spoke with  and she just past a stone - there is a u/s in system in and they will call and scheduled. - She has an SPT catheter -  is picking up sample cups for urine - she was supposed to have appointment scheduled today but van that transports er is in shop

## 2025-01-13 NOTE — TELEPHONE ENCOUNTER
Agree with urine studies.  Will monitor urine culture results closely and treat accordingly.  Foul-smelling urine is not always indicative of a UTI and can come from foods and beverages that you consume.  Recommend increasing water and fluid intake to see if this helps with the foul-smelling urine.

## 2025-01-13 NOTE — TELEPHONE ENCOUNTER
"Patient of Charis Swann, last seen 8/16/2024, called in stating she is experiencing a foul smelling odor from urine. She states her symptoms started a few weeks ago. She denies fever, chills, or pain at this time. I ordered a UA and culture on 11/18/2024 that patient did not have completed. I reviewed ED precautions, encouraged water intake, and avoiding bladder irritants.a    Reason for Disposition   All other urine symptoms    Answer Assessment - Initial Assessment Questions  1. SYMPTOM: \"What's the main symptom you're concerned about?\" (e.g., frequency, incontinence)      Foul smelling urine odor    2. ONSET: \"When did the symptoms start?\"      A few weeks    3. PAIN: \"Is there any pain?\" If Yes, ask: \"How bad is it?\" (Scale: 1-10; mild, moderate, severe)      Denies    4. CAUSE: \"What do you think is causing the symptoms?\"      Unsure    5. OTHER SYMPTOMS: \"Do you have any other symptoms?\" (e.g., blood in urine, fever, flank pain, pain with urination)      Denies    Protocols used: Urinary Symptoms-Adult-[place]    "

## 2025-01-16 ENCOUNTER — TELEPHONE (OUTPATIENT)
Dept: WOUND CARE | Facility: HOSPITAL | Age: 55
End: 2025-01-16

## 2025-01-16 ENCOUNTER — APPOINTMENT (OUTPATIENT)
Dept: LAB | Facility: CLINIC | Age: 55
End: 2025-01-16
Payer: MEDICARE

## 2025-01-16 ENCOUNTER — OFFICE VISIT (OUTPATIENT)
Dept: WOUND CARE | Facility: HOSPITAL | Age: 55
End: 2025-01-16
Payer: MEDICARE

## 2025-01-16 VITALS
HEART RATE: 104 BPM | SYSTOLIC BLOOD PRESSURE: 110 MMHG | RESPIRATION RATE: 16 BRPM | DIASTOLIC BLOOD PRESSURE: 78 MMHG | TEMPERATURE: 98.4 F

## 2025-01-16 DIAGNOSIS — G35 FUNCTIONAL QUADRIPLEGIA SECONDARY TO MS (HCC): ICD-10-CM

## 2025-01-16 DIAGNOSIS — L89.314 PRESSURE INJURY OF RIGHT ISCHIUM, STAGE 4 (HCC): ICD-10-CM

## 2025-01-16 DIAGNOSIS — L89.324 PRESSURE INJURY OF LEFT ISCHIUM, STAGE 4 (HCC): Primary | ICD-10-CM

## 2025-01-16 DIAGNOSIS — R39.9 UTI SYMPTOMS: ICD-10-CM

## 2025-01-16 DIAGNOSIS — R53.2 FUNCTIONAL QUADRIPLEGIA SECONDARY TO MS (HCC): ICD-10-CM

## 2025-01-16 LAB
AMORPH URATE CRY URNS QL MICRO: ABNORMAL
BACTERIA UR QL AUTO: ABNORMAL /HPF
BILIRUB UR QL STRIP: NEGATIVE
CLARITY UR: ABNORMAL
COLOR UR: ABNORMAL
GLUCOSE UR STRIP-MCNC: NEGATIVE MG/DL
HGB UR QL STRIP.AUTO: ABNORMAL
KETONES UR STRIP-MCNC: NEGATIVE MG/DL
LEUKOCYTE ESTERASE UR QL STRIP: ABNORMAL
NITRITE UR QL STRIP: POSITIVE
NON-SQ EPI CELLS URNS QL MICRO: ABNORMAL /HPF
PH UR STRIP.AUTO: 8 [PH]
PROT UR STRIP-MCNC: ABNORMAL MG/DL
RBC #/AREA URNS AUTO: ABNORMAL /HPF
SP GR UR STRIP.AUTO: 1.02 (ref 1–1.03)
TRI-PHOS CRY URNS QL MICRO: ABNORMAL /HPF
UROBILINOGEN UR STRIP-ACNC: <2 MG/DL
WBC #/AREA URNS AUTO: ABNORMAL /HPF

## 2025-01-16 PROCEDURE — 87147 CULTURE TYPE IMMUNOLOGIC: CPT | Performed by: FAMILY MEDICINE

## 2025-01-16 PROCEDURE — 99213 OFFICE O/P EST LOW 20 MIN: CPT | Performed by: FAMILY MEDICINE

## 2025-01-16 PROCEDURE — 87186 SC STD MICRODIL/AGAR DIL: CPT | Performed by: FAMILY MEDICINE

## 2025-01-16 PROCEDURE — 99214 OFFICE O/P EST MOD 30 MIN: CPT | Performed by: FAMILY MEDICINE

## 2025-01-16 PROCEDURE — 87205 SMEAR GRAM STAIN: CPT | Performed by: FAMILY MEDICINE

## 2025-01-16 PROCEDURE — 87070 CULTURE OTHR SPECIMN AEROBIC: CPT | Performed by: FAMILY MEDICINE

## 2025-01-16 RX ORDER — LIDOCAINE HYDROCHLORIDE 40 MG/ML
5 SOLUTION TOPICAL ONCE
Status: COMPLETED | OUTPATIENT
Start: 2025-01-16 | End: 2025-01-16

## 2025-01-16 RX ADMIN — LIDOCAINE HYDROCHLORIDE 5 ML: 40 SOLUTION TOPICAL at 15:11

## 2025-01-16 NOTE — PATIENT INSTRUCTIONS
Orders Placed This Encounter   Procedures    Wound cleansing and dressings     Left and right ischial wounds:     Wash your hands with soap and water.  Remove old dressing, discard into plastic bag and place in trash. Cleanse the wound with dakins. Pat dry. Do not use tissue or cotton balls. Do not scrub the wound. Pat dry using gauze.  Shower yes      Apply skin prep to skin surrounding wound     Left ischium:  Apply Acticoat 3 to wound then alginate and border foam (leave Acticoat mesh in place for 3 days. change alginate daily to every other day)     Right ischium:  Lightly pack with mesalt ribbon and tape tail end to intact skin and cover wounds with abd and medfix tape or silicone border  VNA and  to continue with wound care dressing changes 3 times a week and as needed for excessive drainage        Wound culture obtained today from left wound     Off-loading Instructions:     Keep weight and pressure off wounds as much as possible.     Continue to use ROHO style cushion when sitting.     Try to lay in bed throughout the day to help offload pressure to wounds.     Standing Status:   Future     Expiration Date:   1/23/2025

## 2025-01-16 NOTE — TELEPHONE ENCOUNTER
AS PER DR. RODRIGUEZ, CLD PT'S  TO ASK IF WLD LIKE TO BRING PT IN TODAY, WE HAVE AN OPENING. LFT VM TO CALL US BACK

## 2025-01-16 NOTE — PROGRESS NOTES
Wound Procedure Treatment Pressure Injury Left;Posterior;Proximal Ischium    Performed by: Ingris Miranda RN  Authorized by: Homero Ramirez MD    Associated wounds:   Wound 01/10/24 Pressure Injury Ischium Left;Posterior;Proximal  Wound cleansed with:  NSS  Applied primary dressing:  Acticoat, Calcium alginate and Silicone bordered foam

## 2025-01-16 NOTE — PROGRESS NOTES
Wound Procedure Treatment Pressure Injury Right Ischium    Performed by: Ingris Miranda RN  Authorized by: Homero Ramirez MD    Associated wounds:   Wound 01/29/24 Pressure Injury Ischium Right  Wound cleansed with:  NSS  Applied primary dressing:  Mesalt  Applied secondary dressing:  Foam

## 2025-01-16 NOTE — PROGRESS NOTES
Patient ID: Fanny Schulz is a 54 y.o. female Date of Birth 1970       Chief Complaint   Patient presents with    Follow Up Wound Care Visit     Sacral wound. Patient's VNA was concerned about wound. Patient came a week earlier.       Allergies:  Latex    Diagnosis:      Diagnosis ICD-10-CM Associated Orders   1. Pressure injury of left ischium, stage 4 ([facility])  L89.324 lidocaine (XYLOCAINE) 4 % topical solution 5 mL     Wound cleansing and dressings     Wound Procedure Treatment Pressure Injury Left;Posterior;Proximal Ischium      2. Pressure injury of right ischium, stage 4 ([facility])  L89.314 Wound cleansing and dressings     Wound Procedure Treatment Pressure Injury Right Ischium      3. Functional quadriplegia secondary to MS ([facility])  G35     R53.2               Assessment & Plan:  Stage IV pressure injury of the left ischium.  Although there is brown seropurulent drainage, no other signs of infection is noted at this time.  Some increased hypergranulation tissue again.  Nonselectively debrided with gauze.  Culture obtained after saline irrigation.  Antibiotic not started at this time.  Acticoat 3 followed by alginate and foam.  Alginate and foam can be changed daily but the Acticoat should be changed every other day or every third day.  Stage IV pressure injury of the right ischium.  Depth is unchanged but the opening is decreased.  Nonselectively debrided.  Continue with bordered foam.  Continue to offload is much as possible.         Subjective:   1/16/2025: Follow-up bilateral stage IV pressure injuries of the ischium.  Received VNA message concerned about increased brown seropurulent drainage on the left.  Patient did not have any other systemic symptoms.  Appointment made for today.  Patient denies any fever or chills.    9/16/24: Patient presents to wound care center for follow-up visit of bilateral ischial wounds.  Patient has been using Mesalt to the wounds with bordered foam dressings to cover.   Patient is accompanied by her  who provides to wound care.  No wound care related complaints offered.  Denies increased pain or drainage to the wounds.  No fever or chills.    10/7/24: Patient presents to wound care center for follow-up visit bilateral ischial wounds.  Patient has been using Aquacel Ag ribbon to the wounds and bordered foam dressings.  Patient is accompanied by her  who provides the wound care.  No wound care related complaints offered today.  No reported increased pain or drainage related to the wounds.  No fever or chills.  Patient's  reports that patient spends majority of her day out of bed in wheelchair, patient does have offloading seating cushion to her wheelchair.  Patient states that it is more comfortable for her to be in her wheelchair then in bed.    12/5/2024: Follow-up of bilateral stage IV ischial pressure injuries.  Ran out of Aquacel Ag rope and now is back to using Mesalt.  Patient is not using her air mattress bed and spends her time sitting in her chair.  She even sleeps there.  No other new complaints.  As noted above, she feels more comfortable in her wheelchair than in her bed.        The following portions of the patient's history were reviewed and updated as appropriate:   Patient Active Problem List   Diagnosis    Suprapubic catheter (HCC)    Bladder calculus    Constipation    Recurrent major depressive disorder, in full remission (HCC)    Dysphagia    Dizziness    Hyperglycemia    Familial hypercholesterolemia    Lymphedema    Multiple sclerosis (HCC)    Muscle spasticity    Muscle weakness    Nephrolithiasis    Neurogenic bladder    Sleep disorder    Spinal stenosis of cervical region    Tremor    Urinary incontinence    Vision loss    Vitamin B12 deficiency    Vitamin D deficiency    Functional quadriplegia secondary to MS (HCC)    Allergic rhinitis    Screening mammogram, encounter for    Medication management contract signed    Pressure injury of  "right ischium, stage 4 (HCC)    Thrombocytopenia (HCC)    Serum total bilirubin elevated    Hydronephrosis with urinary obstruction due to ureteral calculus    Candidal vaginitis    Alkaline phosphatase elevation    Abnormal thyroid function test    Ureteral calculus, left    Increased endometrial stripe thickness    Peripheral edema    Tinnitus of both ears    Hypophonia    Chronic lower extremity edema    Positive colorectal cancer screening using Cologuard test    RSV infection    Functional quadriplegia (HCC)    Pressure injury of left ischium, stage 4 (HCC)     Past Medical History:   Diagnosis Date    Anesthesia     \"prefers if able to be put to sleep on stretcher before placed on table if possible due to severe spasticity    Bladder stones     Chronic pain disorder     neck    Contracture, right shoulder     right arm and hand    Dependent on wheelchair     motorized    Edema     dependant lower extremities    Elevated alkaline phosphatase level     Mildly elevated total, normal isoenzymes 4/15/15, normal 1/17; last assessed: [id number]    Fernandez catheter in place     #24 to large bag(silicone catheter)    Gallbladder disease     History of femur fracture     right leg    History of UTI     MS (multiple sclerosis) (HCC)     Muscle spasticity     especially with touch    Muscle weakness     Muscular dystrophy (HCC)     Neck pain     Neurogenic bladder     Paralysis (HCC)     bilateral lower extremities    Port-A-Cath in place     left chest,\" hasn't used in approx 1 yr or so\"    Pressure injury of skin     right ischium    Sacral pressure sore     \"shearing, tegaderm in place,\"    Swallowing difficulty     Tinnitus     Urinary incontinence      Past Surgical History:   Procedure Laterality Date    BLADDER STONE REMOVAL N/A 12/4/2017    Procedure: CYSTO, LITHOLOPAXY HOLMIUM LASER;  Surgeon: Damir Osborne MD;  Location: [facility];  Service: Urology    BLADDER SURGERY      CHOLECYSTECTOMY      CYSTOSCOPY  " 06/15/2020    ESOPHAGOGASTRODUODENOSCOPY      FL RETROGRADE PYELOGRAM  10/2/2020    FL RETROGRADE PYELOGRAM  11/3/2020    KIDNEY STONE SURGERY      PORTACATH PLACEMENT Left     CA CYSTO BLADDER W/URETERAL CATHETERIZATION Left 10/2/2020    Procedure: CYSTOSCOPY LEFT RETROGRADE ; LEFT URETEROSCOPY; PYELOGRAM WITH STENT INSERTION AND SUPERPUBIC EXCHANGE;  Surgeon: Philip Mcdonald MD;  Location: [facility];  Service: Urology    CA CYSTO/URETERO W/LITHOTRIPSY &INDWELL STENT INSRT Left 11/3/2020    Procedure: CYSTOSCOPY URETEROSCOPY WITH RETROGRADE PYELOGRAM AND EXCHANGE STENT URETERAL, SP TUBE EXCHANGE, BASKET STONE EXTRACTION, BLADDER STONE EXTRACTION;  Surgeon: Damir Osborne MD;  Location: [facility];  Service: Urology    CA LITHOLAPAXY SMPL/SM <2.5 CM N/A 12/22/2022    Procedure: Cystolitholapaxy w/  laser lithotripsy of bladder stones; SUPRAPUBIC CATHETER CHANGE;  Surgeon: Damir Osborne MD;  Location: [facility];  Service: Urology    SUPRAPUBIC CYSTOSTOMY  02/25/2014    last assessed: [id number]     Family History   Problem Relation Age of Onset    Diabetes Mother     Hyperlipidemia Mother     Hypertension Mother     COPD Father     Hypertension Father     Hyperlipidemia Sister     Alzheimer's disease Family     Diabetes Family     Hypertension Family     Heart disease Family       Social History     Socioeconomic History    Marital status: /Civil Union     Spouse name: Not on file    Number of children: Not on file    Years of education: Not on file    Highest education level: Not on file   Occupational History    Not on file   Tobacco Use    Smoking status: Never    Smokeless tobacco: Never   Vaping Use    Vaping status: Never Used   Substance and Sexual Activity    Alcohol use: Not Currently    Drug use: Yes    Sexual activity: Yes   Other Topics Concern    Not on file   Social History Narrative    Caffeine use    Currently on disability    Daily coffee consumption (2 cups/day)     Educational level- completed 2nd year college    Lives with adult children    Not currently employed    Wheelchair dependent      Social Drivers of Health     Financial Resource Strain: Low Risk  (2023)    Overall Financial Resource Strain (CARDIA)     Difficulty of Paying Living Expenses: Not hard at all   Food Insecurity: No Food Insecurity (2024)    Nursing - Inadequate Food Risk Classification     Worried About Running Out of Food in the Last Year: Never true     Ran Out of Food in the Last Year: Never true     Ran Out of Food in the Last Year: Never true   Transportation Needs: No Transportation Needs (2024)    OASIS : Transportation     Lack of Transportation (Medical): No     Lack of Transportation (Non-Medical): No     Patient Unable or Declines to Respond: No   Physical Activity: Not on file   Stress: Not on file   Social Connections: Not on file   Intimate Partner Violence: Unknown (2024)    Nursing IPS     Feels Physically and Emotionally Safe: Not on file     Physically Hurt by Someone: Not on file     Humiliated or Emotionally Abused by Someone: Not on file     Physically Hurt by Someone: No     Hurt or Threatened by Someone: No   Housing Stability: Unknown (2024)    Nursing: Inadequate Housing Risk Classification     Has Housing: Not on file     Worried About Losing Housing: Not on file     Unable to Get Utilities: Not on file     Unable to Pay for Housing in the Last Year: No     Has Housin        Current Outpatient Medications:     acetaminophen (TYLENOL) 500 mg tablet, Take 500 mg by mouth every 6 (six) hours as needed for mild pain. Indications: Pain, Disp: , Rfl:     Alpha-D-Galactosidase (Beano) TABS, Take 1 tablet by mouth if needed (GI bloating). As needed before each meal that may cause bloating.   Indications: GI bloating, Disp: , Rfl:     baclofen 20 mg tablet, TAKE 1 TABLET BY MOUTH 5 TIMES AS NEEDED, Disp: 150 tablet, Rfl: 11    Cyanocobalamin (B-12)  "1000 MCG SUBL, Dissolve 1 tablet under tongue daily, Disp: 30 tablet, Rfl: 5    DULoxetine (CYMBALTA) 20 mg capsule, Take 1 capsule (20 mg total) by mouth daily, Disp: 90 capsule, Rfl: 3    furosemide (LASIX) 20 mg tablet, Take 1 tablet (20 mg total) by mouth daily, Disp: 90 tablet, Rfl: 3    gabapentin (NEURONTIN) 100 mg capsule, Take 1 tablet at bedtime tonight, 1 tablet twice a day tomorrow, and then 1 tablet 3 times daily thereafter (Patient taking differently: Take 300 mg by mouth. Take 1 tablet at bedtime tonight, 1 tablet twice a day tomorrow, and then 1 tablet 3 times daily thereafter  Indications: Neck pain), Disp: 90 capsule, Rfl: 5    ibuprofen (MOTRIN) 200 mg tablet, Take 200 mg by mouth every 6 (six) hours as needed for moderate pain. Indications: Pain, Disp: , Rfl:     oxybutynin (DITROPAN-XL) 10 MG 24 hr tablet, Take 1 tablet (10 mg total) by mouth daily, Disp: 90 tablet, Rfl: 3    rosuvastatin (CRESTOR) 5 mg tablet, Take 1 tablet (5 mg total) by mouth daily, Disp: 90 tablet, Rfl: 3    senna (SENOKOT) 8.6 mg, Take 8.6 mg by mouth if needed for constipation. Daily as needed for constipation  Indications: Constipation, Disp: , Rfl:     SYRINGE-NEEDLE, DISP, 3 ML 25G X 5/8\" 3 ML MISC, Use once a week Use syringes for B12 injections once a week for 3 weeks, then once a month for 5 months., Disp: 8 each, Rfl: 0    Current Facility-Administered Medications:     cyanocobalamin injection 1,000 mcg, 1,000 mcg, Intramuscular, Q30 Days, , 1,000 mcg at 12/13/24 0934    Review of Systems   Constitutional:  Negative for chills and fever.   HENT:  Negative for congestion.    Respiratory:  Negative for cough.    Musculoskeletal:  Positive for gait problem (Functional quadriplegia).        Functional quadriplegia    Skin:  Positive for wound (Bilateral ischia).        B/l ischium   Neurological:  Positive for weakness (Functional quadriplegia) and numbness.   Psychiatric/Behavioral:  Negative for dysphoric mood. The " patient is not nervous/anxious.        Objective:  /78   Pulse 104   Temp 98.4 °F (36.9 °C)   Resp 16   Pain Score: 0-No pain     Physical Exam  Constitutional:       General: She is awake.   HENT:      Head: Normocephalic and atraumatic.   Cardiovascular:      Rate and Rhythm: Normal rate.   Pulmonary:      Effort: Pulmonary effort is normal. No respiratory distress.   Skin:     General: Skin is warm and dry.      Findings: Wound present. No erythema.             Comments: 1. Left ischial pressure injury visible wound bed with granular tissue and hypergranular tissue present. Large brown seropurulent drainage.  Slight malodor.  Undermining from 1-5. chelsey-wound is intact with scar tissue.  No probe to bone or exposed bone.  2. R ischial pressure injury with very small opening with surrounding maceration.  Still has some depth to it.  No evidence of infection.  Refer to wound assessment for additional details. No warmth or redness.   Neurological:      Mental Status: She is alert and oriented to person, place, and time.      Gait: Gait abnormal.      Comments: Quadriplegia secondary to MS   Psychiatric:         Mood and Affect: Mood normal.         Behavior: Behavior normal. Behavior is cooperative.               Wound 01/10/24 Pressure Injury Ischium Left;Posterior;Proximal (Active)   Wound Image Images linked 01/16/25 1458   Wound Description Granulation tissue;Hypergranulation;Yellow;Slough 01/16/25 1458   Pressure Injury Stage 3 01/16/25 1458   Chelsey-wound Assessment Intact 01/16/25 1458   Wound Length (cm) 2.9 cm 01/16/25 1458   Wound Width (cm) 2.5 cm 01/16/25 1458   Wound Depth (cm) 0.6 cm 01/16/25 1458   Wound Surface Area (cm^2) 7.25 cm^2 01/16/25 1458   Wound Volume (cm^3) 4.35 cm^3 01/16/25 1458   Calculated Wound Volume (cm^3) 4.35 cm^3 01/16/25 1458   Undermining 1 1 01/16/25 1458   Undermining 1 is depth extending from 12-5 o'clock, deepest around 1 o'clock 01/16/25 1458   Drainage Amount  Moderate 01/16/25 1458   Drainage Description Serosanguineous;Tan 01/16/25 1458   Non-staged Wound Description Full thickness 01/16/25 1458   Dressing Status Intact 01/16/25 1458       Wound 01/29/24 Pressure Injury Ischium Right (Active)   Wound Image Images linked 01/16/25 1459   Wound Description Pink 01/16/25 1459   Pressure Injury Stage 4 01/16/25 1459   Ruchi-wound Assessment Callus;Maceration 01/16/25 1459   Wound Length (cm) 0.1 cm 01/16/25 1459   Wound Width (cm) 0.6 cm 01/16/25 1459   Wound Depth (cm) 0.4 cm 01/16/25 1459   Wound Surface Area (cm^2) 0.06 cm^2 01/16/25 1459   Wound Volume (cm^3) 0.024 cm^3 01/16/25 1459   Calculated Wound Volume (cm^3) 0.02 cm^3 01/16/25 1459   Change in Wound Size % 96 01/16/25 1459   Drainage Amount Moderate 01/16/25 1459   Drainage Description Serosanguineous;Tan 01/16/25 1459   Non-staged Wound Description Full thickness 01/16/25 1459   Dressing Status Intact 01/16/25 1459        Procedures nonselective debridement to both.          Lab Results   Component Value Date    HGBA1C 5.2 01/09/2024       Wound Instructions:  Orders Placed This Encounter   Procedures    Wound cleansing and dressings     Left and right ischial wounds:     Wash your hands with soap and water.  Remove old dressing, discard into plastic bag and place in trash. Cleanse the wound with dakins. Pat dry. Do not use tissue or cotton balls. Do not scrub the wound. Pat dry using gauze.  Shower yes      Apply skin prep to skin surrounding wound     Left ischium:  Apply Acticoat 3 to wound then alginate and border foam (leave Acticoat mesh in place for 3 days. change alginate daily to every other day)     Right ischium:  Lightly pack with mesalt ribbon and tape tail end to intact skin and cover wounds with abd and medfix tape or silicone border  VNA and  to continue with wound care dressing changes 3 times a week and as needed for excessive drainage        Wound culture obtained today from left wound    "  Off-loading Instructions:     Keep weight and pressure off wounds as much as possible.     Continue to use ROHO style cushion when sitting.     Try to lay in bed throughout the day to help offload pressure to wounds.     Standing Status:   Future     Expiration Date:   1/23/2025    Wound Procedure Treatment Pressure Injury Right Ischium     This order was created via procedure documentation    Wound Procedure Treatment Pressure Injury Left;Posterior;Proximal Ischium     This order was created via procedure documentation             Homero Ramirez MD, CHT, CWS    Portions of the record may have been created with voice recognition software. Occasional wrong word or \"sound alike\" substitutions may have occurred due to the inherent limitations of voice recognition software. Read the chart carefully and recognize, using context, where substitutions have occurred.      "

## 2025-01-17 ENCOUNTER — RESULTS FOLLOW-UP (OUTPATIENT)
Dept: UROLOGY | Facility: AMBULATORY SURGERY CENTER | Age: 55
End: 2025-01-17

## 2025-01-17 ENCOUNTER — HOME CARE VISIT (OUTPATIENT)
Dept: HOME HEALTH SERVICES | Facility: HOME HEALTHCARE | Age: 55
End: 2025-01-17
Payer: MEDICARE

## 2025-01-17 VITALS
TEMPERATURE: 98.3 F | SYSTOLIC BLOOD PRESSURE: 98 MMHG | DIASTOLIC BLOOD PRESSURE: 58 MMHG | OXYGEN SATURATION: 100 % | HEART RATE: 70 BPM

## 2025-01-17 DIAGNOSIS — R39.9 UTI SYMPTOMS: Primary | ICD-10-CM

## 2025-01-17 PROCEDURE — G0299 HHS/HOSPICE OF RN EA 15 MIN: HCPCS

## 2025-01-17 RX ORDER — NITROFURANTOIN 25; 75 MG/1; MG/1
100 CAPSULE ORAL 2 TIMES DAILY
Qty: 14 CAPSULE | Refills: 0 | Status: SHIPPED | OUTPATIENT
Start: 2025-01-17 | End: 2025-01-24

## 2025-01-17 NOTE — TELEPHONE ENCOUNTER
LVM per communication consent that her urinalysis is suspicious for a UTI and that CLEMENTINA Vizcaino sent a course of antibiotics to the pharmacy on file. She will monitor for final results and adjust antibiotic if necessary. Phone number for Urology left on message.           ----- Message from CLEMENTINA Natarajan sent at 1/17/2025  9:09 AM EST -----  Urinalysis suspicious for UTI.  Will send course of antibiotics to pharmacy on file.  Will monitor for final results and adjust antibiotic if necessary.

## 2025-01-18 LAB
BACTERIA WND AEROBE CULT: ABNORMAL
GRAM STN SPEC: ABNORMAL
GRAM STN SPEC: ABNORMAL

## 2025-01-19 LAB — BACTERIA UR CULT: ABNORMAL

## 2025-01-20 ENCOUNTER — TELEPHONE (OUTPATIENT)
Age: 55
End: 2025-01-20

## 2025-01-20 ENCOUNTER — HOME CARE VISIT (OUTPATIENT)
Dept: HOME HEALTH SERVICES | Facility: HOME HEALTHCARE | Age: 55
End: 2025-01-20
Payer: MEDICARE

## 2025-01-20 ENCOUNTER — TELEPHONE (OUTPATIENT)
Dept: WOUND CARE | Facility: HOSPITAL | Age: 55
End: 2025-01-20

## 2025-01-20 ENCOUNTER — RESULTS FOLLOW-UP (OUTPATIENT)
Dept: WOUND CARE | Facility: HOSPITAL | Age: 55
End: 2025-01-20

## 2025-01-20 VITALS
HEART RATE: 84 BPM | TEMPERATURE: 98.1 F | DIASTOLIC BLOOD PRESSURE: 56 MMHG | OXYGEN SATURATION: 100 % | SYSTOLIC BLOOD PRESSURE: 102 MMHG

## 2025-01-20 RX ORDER — SULFAMETHOXAZOLE AND TRIMETHOPRIM 800; 160 MG/1; MG/1
1 TABLET ORAL EVERY 12 HOURS SCHEDULED
Qty: 14 TABLET | Refills: 0 | Status: SHIPPED | OUTPATIENT
Start: 2025-01-20 | End: 2025-01-27

## 2025-01-20 NOTE — TELEPHONE ENCOUNTER
pt's  cld to ask if Dr. Ramirez can take a look at pt's culture results. wants to know if Pt needs meds for possible infection. I sent a Cube Route mess to  to see what he wants to do

## 2025-01-20 NOTE — TELEPHONE ENCOUNTER
CLD PT'S  TO LET HIM KNOW DR. RODRIGUEZ SAID IF ITS BACTRIM DS 2X A DAY THAT SHLD COVER BOTH THE WOUND AND THE UTI TAKING IT FOR 7-10 DAYS. HE SAID OK.

## 2025-01-20 NOTE — TELEPHONE ENCOUNTER
Tamera a nurse with [facility] visiting nurse called in regards to patient receiving home care visits. Tamera stated that patient has been receiving catheter changes and wound care. Tamera will be faxing over a 485 form for provider to fill out for patient to continue home care.

## 2025-01-20 NOTE — TELEPHONE ENCOUNTER
Called patient and spoke to patients  and relayed to him the antibiotics prescribed prior to the weekend are  unfortunately  not susceptible to the bacteria that was reported on her most recent urine culture.We are going to adjust the antibiotics accordingly.  She should stop taking the Macrobid and start taking Bactrim.Confirmed with patients  pharmacy medication was sent to and  stated he will inform his wife.      ----- Message from CLEMENTINA Natarajan sent at 1/20/2025  9:31 AM EST -----  I called in antibiotics prior to the weekend and unfortunately they are not susceptible to the bacteria that was reported on her most recent urine culture.  I am going to adjust the antibiotics accordingly.  She should stop taking the Macrobid and start taking Bactrim.

## 2025-01-20 NOTE — TELEPHONE ENCOUNTER
Patient's spouse called in asking why patient's antibiotic was changed. Informed him that it was based off of urine culture results. Verbalized understanding. No further assistance needed.

## 2025-01-21 ENCOUNTER — VBI (OUTPATIENT)
Dept: ADMINISTRATIVE | Facility: OTHER | Age: 55
End: 2025-01-21

## 2025-01-21 NOTE — TELEPHONE ENCOUNTER
01/21/25 3:25 PM     Chart reviewed for Mammogram was/were not submitted to the patient's insurance.     Margot Ames   [facility]

## 2025-01-24 ENCOUNTER — HOME CARE VISIT (OUTPATIENT)
Dept: HOME HEALTH SERVICES | Facility: HOME HEALTHCARE | Age: 55
End: 2025-01-24
Payer: MEDICARE

## 2025-01-24 VITALS
OXYGEN SATURATION: 99 % | HEART RATE: 90 BPM | DIASTOLIC BLOOD PRESSURE: 60 MMHG | RESPIRATION RATE: 16 BRPM | TEMPERATURE: 97.4 F | SYSTOLIC BLOOD PRESSURE: 118 MMHG

## 2025-01-24 PROCEDURE — G0300 HHS/HOSPICE OF LPN EA 15 MIN: HCPCS

## 2025-01-30 ENCOUNTER — OFFICE VISIT (OUTPATIENT)
Dept: WOUND CARE | Facility: HOSPITAL | Age: 55
End: 2025-01-30
Payer: MEDICARE

## 2025-01-30 VITALS
DIASTOLIC BLOOD PRESSURE: 78 MMHG | HEART RATE: 100 BPM | RESPIRATION RATE: 16 BRPM | TEMPERATURE: 98.5 F | SYSTOLIC BLOOD PRESSURE: 110 MMHG

## 2025-01-30 DIAGNOSIS — G35 FUNCTIONAL QUADRIPLEGIA SECONDARY TO MS (HCC): ICD-10-CM

## 2025-01-30 DIAGNOSIS — L89.323 PRESSURE ULCER OF ISCHIUM, LEFT, STAGE III (HCC): ICD-10-CM

## 2025-01-30 DIAGNOSIS — L89.324 PRESSURE INJURY OF LEFT ISCHIUM, STAGE 4 (HCC): Primary | ICD-10-CM

## 2025-01-30 DIAGNOSIS — R53.2 FUNCTIONAL QUADRIPLEGIA SECONDARY TO MS (HCC): ICD-10-CM

## 2025-01-30 DIAGNOSIS — L92.9 HYPERGRANULATION: ICD-10-CM

## 2025-01-30 PROCEDURE — 99214 OFFICE O/P EST MOD 30 MIN: CPT | Performed by: FAMILY MEDICINE

## 2025-01-30 PROCEDURE — 17250 CHEM CAUT OF GRANLTJ TISSUE: CPT | Performed by: FAMILY MEDICINE

## 2025-01-30 RX ORDER — LIDOCAINE HYDROCHLORIDE 40 MG/ML
5 SOLUTION TOPICAL ONCE
Status: COMPLETED | OUTPATIENT
Start: 2025-01-30 | End: 2025-01-30

## 2025-01-30 RX ADMIN — LIDOCAINE HYDROCHLORIDE 5 ML: 40 SOLUTION TOPICAL at 15:47

## 2025-01-30 NOTE — PROGRESS NOTES
Wound Procedure Treatment Pressure Injury Right Ischium    Performed by: Thelma Manrique RN  Authorized by: Homero Ramirez MD    Associated wounds:   Wound 01/29/24 Pressure Injury Ischium Right  Wound cleansed with:  Dakin's 0.25%  Applied primary dressing:  Silicone bordered foam, Calcium alginate and Silver  Comments:  Marty

## 2025-01-30 NOTE — PATIENT INSTRUCTIONS
Orders Placed This Encounter   Procedures    Wound cleansing and dressings     Left and right ischial wounds:     Wash your hands with soap and water.  Remove old dressing, discard into plastic bag and place in trash. Cleanse the wound with dakins. Pat dry. Do not use tissue or cotton balls. Do not scrub the wound. Pat dry using gauze.  Shower yes      Apply skin prep to skin surrounding wound     Left ischium:  Apply Acticoat 3 to wound then alginate and border foam (leave Acticoat mesh in place for 3 days. change alginate daily to every other day)     Right ischium:  Lightly pack with mesalt ribbon and tape tail end to intact skin and cover wounds with abd and medfix tape or silicone border  VNA and  to continue with wound care dressing changes 3 times a week and as needed for excessive drainage        Wound culture obtained today from left wound     Off-loading Instructions:     Keep weight and pressure off wounds as much as possible.     Continue to use ROHO style cushion when sitting.     Try to lay in bed throughout the day to help offload pressure to wounds.        Standing Status:   Future      Expiration Date:   1/23/2025     Standing Status:   Future     Expiration Date:   2/6/2025

## 2025-01-30 NOTE — PROGRESS NOTES
Wound Procedure Treatment Pressure Injury Left;Posterior;Proximal Ischium    Performed by: Thelma Manrique RN  Authorized by: Homero Ramirez MD    Associated wounds:   Wound 01/10/24 Pressure Injury Ischium Left;Posterior;Proximal  Wound cleansed with:  Dakin's 0.25%  Applied primary dressing:  Silicone bordered foam, Silver and Calcium alginate

## 2025-01-30 NOTE — PROGRESS NOTES
Patient ID: Fanny Schulz is a 54 y.o. female Date of Birth 1970       Chief Complaint   Patient presents with    Follow Up Wound Care Visit     Bilateral ischial wounds        Allergies:  Latex    Diagnosis:      Diagnosis ICD-10-CM Associated Orders   1. Pressure injury of left ischium, stage 4 ([facility])  L89.324 Wound cleansing and dressings     lidocaine (XYLOCAINE) 4 % topical solution 5 mL      2. Functional quadriplegia secondary to MS (HCC)  G35 Wound cleansing and dressings    R53.2 lidocaine (XYLOCAINE) 4 % topical solution 5 mL      3. Pressure ulcer of ischium, left, stage III (HCC)  L89.323 Wound cleansing and dressings     lidocaine (XYLOCAINE) 4 % topical solution 5 mL              Assessment & Plan:  Stage IV pressure injury of the left ischium.  Culture from last week was positive for 1+ MRSA.  She was already on Bactrim for the UTI.  Overall improved since last visit.  Because of hypergranulation tissue, silver nitrate chemical cauterization was done today.  At next dressing change, go back to Acticoat 3 and bordered foam.  Right ischial pressure injury is essentially unchanged.  Slight macerated.  Silver nitrate chemical cauterization.  Continue with packing as previous.         Subjective:   1/30/2025: Follow-up bilateral stage IV pressure injuries of the ischium.  Culture that was done at last visit was positive for 1+ MRSA.  However, she was just started on Bactrim for UTI which covered her MRSA.   believes that the ulcer has improved.    1/16/2025: Follow-up bilateral stage IV pressure injuries of the ischium.  Received VNA message concerned about increased brown seropurulent drainage on the left.  Patient did not have any other systemic symptoms.  Appointment made for today.  Patient denies any fever or chills.    9/16/24: Patient presents to wound care center for follow-up visit of bilateral ischial wounds.  Patient has been using Mesalt to the wounds with bordered foam dressings  to cover.  Patient is accompanied by her  who provides to wound care.  No wound care related complaints offered.  Denies increased pain or drainage to the wounds.  No fever or chills.    10/7/24: Patient presents to wound care center for follow-up visit bilateral ischial wounds.  Patient has been using Aquacel Ag ribbon to the wounds and bordered foam dressings.  Patient is accompanied by her  who provides the wound care.  No wound care related complaints offered today.  No reported increased pain or drainage related to the wounds.  No fever or chills.  Patient's  reports that patient spends majority of her day out of bed in wheelchair, patient does have offloading seating cushion to her wheelchair.  Patient states that it is more comfortable for her to be in her wheelchair then in bed.    12/5/2024: Follow-up of bilateral stage IV ischial pressure injuries.  Ran out of Aquacel Ag rope and now is back to using Mesalt.  Patient is not using her air mattress bed and spends her time sitting in her chair.  She even sleeps there.  No other new complaints.  As noted above, she feels more comfortable in her wheelchair than in her bed.        The following portions of the patient's history were reviewed and updated as appropriate:   Patient Active Problem List   Diagnosis    Suprapubic catheter (HCC)    Bladder calculus    Constipation    Recurrent major depressive disorder, in full remission (HCC)    Dysphagia    Dizziness    Hyperglycemia    Familial hypercholesterolemia    Lymphedema    Multiple sclerosis (HCC)    Muscle spasticity    Muscle weakness    Nephrolithiasis    Neurogenic bladder    Sleep disorder    Spinal stenosis of cervical region    Tremor    Urinary incontinence    Vision loss    Vitamin B12 deficiency    Vitamin D deficiency    Functional quadriplegia secondary to MS (HCC)    Allergic rhinitis    Screening mammogram, encounter for    Medication management contract signed    Pressure  "injury of right ischium, stage 4 (HCC)    Thrombocytopenia (HCC)    Serum total bilirubin elevated    Hydronephrosis with urinary obstruction due to ureteral calculus    Candidal vaginitis    Alkaline phosphatase elevation    Abnormal thyroid function test    Ureteral calculus, left    Increased endometrial stripe thickness    Peripheral edema    Tinnitus of both ears    Hypophonia    Chronic lower extremity edema    Positive colorectal cancer screening using Cologuard test    RSV infection    Functional quadriplegia (HCC)    Pressure injury of left ischium, stage 4 (HCC)     Past Medical History:   Diagnosis Date    Anesthesia     \"prefers if able to be put to sleep on stretcher before placed on table if possible due to severe spasticity    Bladder stones     Chronic pain disorder     neck    Contracture, right shoulder     right arm and hand    Dependent on wheelchair     motorized    Edema     dependant lower extremities    Elevated alkaline phosphatase level     Mildly elevated total, normal isoenzymes 4/15/15, normal 1/17; last assessed: [id number]    Fernandez catheter in place     #24 to large bag(silicone catheter)    Gallbladder disease     History of femur fracture     right leg    History of UTI     MS (multiple sclerosis) (HCC)     Muscle spasticity     especially with touch    Muscle weakness     Muscular dystrophy (HCC)     Neck pain     Neurogenic bladder     Paralysis (HCC)     bilateral lower extremities    Port-A-Cath in place     left chest,\" hasn't used in approx 1 yr or so\"    Pressure injury of skin     right ischium    Sacral pressure sore     \"shearing, tegaderm in place,\"    Swallowing difficulty     Tinnitus     Urinary incontinence      Past Surgical History:   Procedure Laterality Date    BLADDER STONE REMOVAL N/A 12/4/2017    Procedure: CYSTO, LITHOLOPAXY HOLMIUM LASER;  Surgeon: Damir Osborne MD;  Location: [facility];  Service: Urology    BLADDER SURGERY      CHOLECYSTECTOMY      " CYSTOSCOPY  06/15/2020    ESOPHAGOGASTRODUODENOSCOPY      FL RETROGRADE PYELOGRAM  10/2/2020    FL RETROGRADE PYELOGRAM  11/3/2020    KIDNEY STONE SURGERY      PORTACATH PLACEMENT Left     CO CYSTO BLADDER W/URETERAL CATHETERIZATION Left 10/2/2020    Procedure: CYSTOSCOPY LEFT RETROGRADE ; LEFT URETEROSCOPY; PYELOGRAM WITH STENT INSERTION AND SUPERPUBIC EXCHANGE;  Surgeon: Philip Mcdonald MD;  Location: [facility];  Service: Urology    CO CYSTO/URETERO W/LITHOTRIPSY &INDWELL STENT INSRT Left 11/3/2020    Procedure: CYSTOSCOPY URETEROSCOPY WITH RETROGRADE PYELOGRAM AND EXCHANGE STENT URETERAL, SP TUBE EXCHANGE, BASKET STONE EXTRACTION, BLADDER STONE EXTRACTION;  Surgeon: Damir Osborne MD;  Location: [facility];  Service: Urology    CO LITHOLAPAXY SMPL/SM <2.5 CM N/A 12/22/2022    Procedure: Cystolitholapaxy w/  laser lithotripsy of bladder stones; SUPRAPUBIC CATHETER CHANGE;  Surgeon: Damir Osborne MD;  Location: [facility];  Service: Urology    SUPRAPUBIC CYSTOSTOMY  02/25/2014    last assessed: [id number]     Family History   Problem Relation Age of Onset    Diabetes Mother     Hyperlipidemia Mother     Hypertension Mother     COPD Father     Hypertension Father     Hyperlipidemia Sister     Alzheimer's disease Family     Diabetes Family     Hypertension Family     Heart disease Family       Social History     Socioeconomic History    Marital status: /Civil Union     Spouse name: Not on file    Number of children: Not on file    Years of education: Not on file    Highest education level: Not on file   Occupational History    Not on file   Tobacco Use    Smoking status: Never    Smokeless tobacco: Never   Vaping Use    Vaping status: Never Used   Substance and Sexual Activity    Alcohol use: Not Currently    Drug use: Yes    Sexual activity: Yes   Other Topics Concern    Not on file   Social History Narrative    Caffeine use    Currently on disability    Daily coffee consumption (2 cups/day)     Educational level- completed 2nd year college    Lives with adult children    Not currently employed    Wheelchair dependent      Social Drivers of Health     Financial Resource Strain: Low Risk  (2023)    Overall Financial Resource Strain (CARDIA)     Difficulty of Paying Living Expenses: Not hard at all   Food Insecurity: No Food Insecurity (2024)    Nursing - Inadequate Food Risk Classification     Worried About Running Out of Food in the Last Year: Never true     Ran Out of Food in the Last Year: Never true     Ran Out of Food in the Last Year: Never true   Transportation Needs: No Transportation Needs (2024)    OASIS : Transportation     Lack of Transportation (Medical): No     Lack of Transportation (Non-Medical): No     Patient Unable or Declines to Respond: No   Physical Activity: Not on file   Stress: Not on file   Social Connections: Not on file   Intimate Partner Violence: Unknown (2024)    Nursing IPS     Feels Physically and Emotionally Safe: Not on file     Physically Hurt by Someone: Not on file     Humiliated or Emotionally Abused by Someone: Not on file     Physically Hurt by Someone: No     Hurt or Threatened by Someone: No   Housing Stability: Unknown (2024)    Nursing: Inadequate Housing Risk Classification     Has Housing: Not on file     Worried About Losing Housing: Not on file     Unable to Get Utilities: Not on file     Unable to Pay for Housing in the Last Year: No     Has Housin        Current Outpatient Medications:     acetaminophen (TYLENOL) 500 mg tablet, Take 500 mg by mouth every 6 (six) hours as needed for mild pain. Indications: Pain, Disp: , Rfl:     Alpha-D-Galactosidase (Beano) TABS, Take 1 tablet by mouth if needed (GI bloating). As needed before each meal that may cause bloating.   Indications: GI bloating, Disp: , Rfl:     baclofen 20 mg tablet, TAKE 1 TABLET BY MOUTH 5 TIMES AS NEEDED, Disp: 150 tablet, Rfl: 11    Cyanocobalamin  "(B-12) 1000 MCG SUBL, Dissolve 1 tablet under tongue daily, Disp: 30 tablet, Rfl: 5    DULoxetine (CYMBALTA) 20 mg capsule, Take 1 capsule (20 mg total) by mouth daily, Disp: 90 capsule, Rfl: 3    furosemide (LASIX) 20 mg tablet, Take 1 tablet (20 mg total) by mouth daily, Disp: 90 tablet, Rfl: 3    gabapentin (NEURONTIN) 100 mg capsule, Take 1 tablet at bedtime tonight, 1 tablet twice a day tomorrow, and then 1 tablet 3 times daily thereafter (Patient taking differently: Take 300 mg by mouth. Take 1 tablet orally at bedtime tonight, 1 tablet twice a day tomorrow, and then 1 tablet 3 times daily thereafter  Indications: Neck pain), Disp: 90 capsule, Rfl: 5    ibuprofen (MOTRIN) 200 mg tablet, Take 200 mg by mouth every 6 (six) hours as needed for moderate pain. Indications: Pain, Disp: , Rfl:     oxybutynin (DITROPAN-XL) 10 MG 24 hr tablet, Take 1 tablet (10 mg total) by mouth daily, Disp: 90 tablet, Rfl: 3    rosuvastatin (CRESTOR) 5 mg tablet, Take 1 tablet (5 mg total) by mouth daily, Disp: 90 tablet, Rfl: 3    senna (SENOKOT) 8.6 mg, Take 8.6 mg by mouth if needed for constipation. Daily as needed for constipation  Indications: Constipation, Disp: , Rfl:     SYRINGE-NEEDLE, DISP, 3 ML 25G X 5/8\" 3 ML MISC, Use once a week Use syringes for B12 injections once a week for 3 weeks, then once a month for 5 months., Disp: 8 each, Rfl: 0    Current Facility-Administered Medications:     cyanocobalamin injection 1,000 mcg, 1,000 mcg, Intramuscular, Q30 Days, , 1,000 mcg at 12/13/24 0934    Review of Systems   Constitutional:  Negative for chills and fever.   HENT:  Negative for congestion.    Respiratory:  Negative for cough.    Musculoskeletal:  Positive for gait problem (Functional quadriplegia).        Functional quadriplegia    Skin:  Positive for wound (Bilateral ischia).        B/l ischium   Neurological:  Positive for weakness (Functional quadriplegia) and numbness.   Psychiatric/Behavioral:  Negative for " dysphoric mood. The patient is not nervous/anxious.        Objective:  /78   Pulse 100   Temp 98.5 °F (36.9 °C)   Resp 16         Physical Exam  Constitutional:       General: She is awake.   HENT:      Head: Normocephalic and atraumatic.   Cardiovascular:      Rate and Rhythm: Normal rate.   Pulmonary:      Effort: Pulmonary effort is normal. No respiratory distress.   Skin:     General: Skin is warm and dry.      Findings: Wound present. No erythema.             Comments: 1. Left ischial pressure injury visible wound bed with hypergranular tissue present.  No malodor.  Overall smaller.  Undermining from 1-5. chelsey-wound is intact with scar tissue.  No probe to bone or exposed bone.  2. R ischial pressure injury with very small opening with surrounding maceration again.  Still has some depth to it.  No evidence of infection.  Refer to wound assessment for additional details. No warmth or redness.   Neurological:      Mental Status: She is alert and oriented to person, place, and time.      Gait: Gait abnormal.      Comments: Quadriplegia secondary to MS   Psychiatric:         Mood and Affect: Mood normal.         Behavior: Behavior normal. Behavior is cooperative.               Wound 01/10/24 Pressure Injury Ischium Left;Posterior;Proximal (Active)   Wound Image Images linked 01/30/25 1540   Wound Description Hypergranulation 01/30/25 1558   Pressure Injury Stage 3 01/30/25 1542   Chelsey-wound Assessment Intact 01/30/25 1542   Wound Length (cm) 2 cm 01/30/25 1542   Wound Width (cm) 2 cm 01/30/25 1542   Wound Depth (cm) 1 cm 01/30/25 1542   Wound Surface Area (cm^2) 4 cm^2 01/30/25 1542   Wound Volume (cm^3) 4 cm^3 01/30/25 1542   Calculated Wound Volume (cm^3) 4 cm^3 01/30/25 1542   Undermining 1 1 01/30/25 1542   Undermining 1 is depth extending from 9-4 oclock, deepest at 01/30/25 1542   Drainage Amount Moderate 01/30/25 1542   Drainage Description Serosanguineous;Foul smelling 01/30/25 1542   Non-staged  "Wound Description Full thickness 01/30/25 1542   Dressing Status Intact 01/30/25 1542       Wound 01/29/24 Pressure Injury Ischium Right (Active)   Wound Image Images linked 01/30/25 1540   Wound Description Granulation tissue 01/30/25 1543   Pressure Injury Stage 4 01/30/25 1543   Ruchi-wound Assessment Callus;Maceration 01/30/25 1543   Wound Length (cm) 0.2 cm 01/30/25 1543   Wound Width (cm) 0.6 cm 01/30/25 1543   Wound Depth (cm) 0.6 cm 01/30/25 1543   Wound Surface Area (cm^2) 0.12 cm^2 01/30/25 1543   Wound Volume (cm^3) 0.072 cm^3 01/30/25 1543   Calculated Wound Volume (cm^3) 0.07 cm^3 01/30/25 1543   Change in Wound Size % 86 01/30/25 1543   Drainage Amount Moderate 01/30/25 1543   Drainage Description Serosanguineous 01/30/25 1543   Non-staged Wound Description Full thickness 01/30/25 1543   Dressing Status Intact 01/30/25 1543            Chemical caut of a wound Pressure Injury Left;Posterior;Proximal Ischium     Date/Time  1/30/2025 3:00 PM     Performed by  Homero Ramirez MD   Authorized by  Homero Ramirez MD        Associated wounds:   Wound 01/10/24 Pressure Injury Ischium Left;Posterior;Proximal   Universal Protocol   procedure performed by consultantConsent: Verbal consent obtained. Written consent obtained.  Risks and benefits: risks, benefits and alternatives were discussed  Consent given by: patient  Time out: Immediately prior to procedure a \"time out\" was called to verify the correct patient, procedure, equipment, support staff and site/side marked as required.  Patient understanding: patient states understanding of the procedure being performed  Patient identity confirmed: verbally with patient      Local anesthesia used: yes     Anesthesia   Local anesthesia used: yes  Local Anesthetic: topical anesthetic     Sedation   Patient sedated: no        Specimen: no    Culture: no   Procedure Details   Procedure Notes: Silver nitrate was used to cauterize the hypergranulation " "tissue.  Normal saline was used to neutralize the reaction.  3 silver nitrate sticks were used for this procedure.  Patient tolerance: Patient tolerated the procedure well with no immediate complications              Results from last 6 Months   Lab Units 01/16/25  1535   WOUND CULTURE  1+ Growth of Methicillin Resistant Staphylococcus aureus*       Lab Results   Component Value Date    HGBA1C 5.2 01/09/2024       Wound Instructions:  Orders Placed This Encounter   Procedures    Wound cleansing and dressings     Left and right ischial wounds:     Wash your hands with soap and water.  Remove old dressing, discard into plastic bag and place in trash. Cleanse the wound with dakins. Pat dry. Do not use tissue or cotton balls. Do not scrub the wound. Pat dry using gauze.  Shower yes      Apply skin prep to skin surrounding wound     Left ischium:  Apply Acticoat 3 to wound then alginate and border foam (leave Acticoat mesh in place for 3 days. change alginate daily to every other day)     Right ischium:  Lightly pack with mesalt ribbon and tape tail end to intact skin and cover wounds with abd and medfix tape or silicone border  VNA and  to continue with wound care dressing changes 3 times a week and as needed for excessive drainage        Wound culture obtained today from left wound     Off-loading Instructions:     Keep weight and pressure off wounds as much as possible.     Continue to use ROHO style cushion when sitting.     Try to lay in bed throughout the day to help offload pressure to wounds.        Standing Status:   Future      Expiration Date:   1/23/2025     Standing Status:   Future     Expiration Date:   2/6/2025             Homero Ramirez MD, CHT, CWS    Portions of the record may have been created with voice recognition software. Occasional wrong word or \"sound alike\" substitutions may have occurred due to the inherent limitations of voice recognition software. Read the chart carefully and " recognize, using context, where substitutions have occurred.

## 2025-01-31 ENCOUNTER — HOME CARE VISIT (OUTPATIENT)
Dept: HOME HEALTH SERVICES | Facility: HOME HEALTHCARE | Age: 55
End: 2025-01-31
Payer: MEDICARE

## 2025-02-03 ENCOUNTER — HOME CARE VISIT (OUTPATIENT)
Dept: HOME HEALTH SERVICES | Facility: HOME HEALTHCARE | Age: 55
End: 2025-02-03
Payer: MEDICARE

## 2025-02-03 VITALS
HEART RATE: 86 BPM | OXYGEN SATURATION: 100 % | DIASTOLIC BLOOD PRESSURE: 68 MMHG | SYSTOLIC BLOOD PRESSURE: 100 MMHG | TEMPERATURE: 98 F

## 2025-02-03 PROCEDURE — G0299 HHS/HOSPICE OF RN EA 15 MIN: HCPCS

## 2025-02-04 ENCOUNTER — VBI (OUTPATIENT)
Dept: ADMINISTRATIVE | Facility: OTHER | Age: 55
End: 2025-02-04

## 2025-02-04 NOTE — TELEPHONE ENCOUNTER
02/04/25 1:42 PM     Chart reviewed for PREV-12: Preventive Care and Screening: Screening for Depression and Follow-Up Plan  was/were submitted to the patient's insurance.     Mejia Weiss MA   [facility]

## 2025-02-04 NOTE — TELEPHONE ENCOUNTER
02/04/25 2:53 PM     Chart reviewed for PREV-13: Statin Therapy for the Prevention and Treatment ofCardiovascular  Disease was/were submitted to the patient's insurance.     Priscilla Chau MA   [facility]

## 2025-02-05 ENCOUNTER — VBI (OUTPATIENT)
Dept: ADMINISTRATIVE | Facility: OTHER | Age: 55
End: 2025-02-05

## 2025-02-10 ENCOUNTER — HOME CARE VISIT (OUTPATIENT)
Dept: HOME HEALTH SERVICES | Facility: HOME HEALTHCARE | Age: 55
End: 2025-02-10
Payer: MEDICARE

## 2025-02-10 VITALS
HEART RATE: 88 BPM | SYSTOLIC BLOOD PRESSURE: 120 MMHG | RESPIRATION RATE: 18 BRPM | TEMPERATURE: 98.4 F | OXYGEN SATURATION: 98 % | DIASTOLIC BLOOD PRESSURE: 80 MMHG

## 2025-02-10 PROCEDURE — G0300 HHS/HOSPICE OF LPN EA 15 MIN: HCPCS

## 2025-02-11 ENCOUNTER — VBI (OUTPATIENT)
Dept: ADMINISTRATIVE | Facility: OTHER | Age: 55
End: 2025-02-11

## 2025-02-11 NOTE — TELEPHONE ENCOUNTER
02/11/25 2:59 PM     Chart reviewed for Immunization(s) Influenza  ; nothing is submitted to the patient's insurance at this time.     Renetta Rahman MA   [facility]

## 2025-02-12 ENCOUNTER — VBI (OUTPATIENT)
Dept: ADMINISTRATIVE | Facility: OTHER | Age: 55
End: 2025-02-12

## 2025-02-12 NOTE — TELEPHONE ENCOUNTER
02/12/25 3:31 PM     Chart reviewed for Mammogram was/were not submitted to the patient's insurance.     Yana Hernandez MA   [facility]

## 2025-02-17 ENCOUNTER — HOME CARE VISIT (OUTPATIENT)
Dept: HOME HEALTH SERVICES | Facility: HOME HEALTHCARE | Age: 55
End: 2025-02-17
Payer: MEDICARE

## 2025-02-17 VITALS
RESPIRATION RATE: 18 BRPM | TEMPERATURE: 98.2 F | SYSTOLIC BLOOD PRESSURE: 122 MMHG | OXYGEN SATURATION: 98 % | HEART RATE: 80 BPM | DIASTOLIC BLOOD PRESSURE: 62 MMHG

## 2025-02-17 PROCEDURE — G0300 HHS/HOSPICE OF LPN EA 15 MIN: HCPCS

## 2025-02-20 ENCOUNTER — HOME CARE VISIT (OUTPATIENT)
Dept: HOME HEALTH SERVICES | Facility: HOME HEALTHCARE | Age: 55
End: 2025-02-20
Payer: MEDICARE

## 2025-02-20 ENCOUNTER — APPOINTMENT (OUTPATIENT)
Dept: LAB | Facility: CLINIC | Age: 55
End: 2025-02-20
Payer: MEDICARE

## 2025-02-20 ENCOUNTER — OFFICE VISIT (OUTPATIENT)
Dept: WOUND CARE | Facility: HOSPITAL | Age: 55
End: 2025-02-20
Payer: MEDICARE

## 2025-02-20 VITALS — SYSTOLIC BLOOD PRESSURE: 102 MMHG | DIASTOLIC BLOOD PRESSURE: 70 MMHG | TEMPERATURE: 97.8 F | HEART RATE: 68 BPM

## 2025-02-20 DIAGNOSIS — L89.323 PRESSURE INJURY OF LEFT ISCHIUM, STAGE 3 (HCC): Primary | ICD-10-CM

## 2025-02-20 DIAGNOSIS — R53.2 FUNCTIONAL QUADRIPLEGIA SECONDARY TO MS (HCC): ICD-10-CM

## 2025-02-20 DIAGNOSIS — G35 FUNCTIONAL QUADRIPLEGIA SECONDARY TO MS (HCC): ICD-10-CM

## 2025-02-20 DIAGNOSIS — L92.9 HYPERGRANULATION: ICD-10-CM

## 2025-02-20 DIAGNOSIS — L89.314 PRESSURE INJURY OF RIGHT ISCHIUM, STAGE 4 (HCC): ICD-10-CM

## 2025-02-20 LAB
AMORPH URATE CRY URNS QL MICRO: ABNORMAL
BACTERIA UR QL AUTO: ABNORMAL /HPF
BILIRUB UR QL STRIP: NEGATIVE
CLARITY UR: ABNORMAL
COLOR UR: ABNORMAL
GLUCOSE UR STRIP-MCNC: NEGATIVE MG/DL
HGB UR QL STRIP.AUTO: ABNORMAL
KETONES UR STRIP-MCNC: NEGATIVE MG/DL
LEUKOCYTE ESTERASE UR QL STRIP: ABNORMAL
MUCOUS THREADS UR QL AUTO: ABNORMAL
NITRITE UR QL STRIP: POSITIVE
NON-SQ EPI CELLS URNS QL MICRO: ABNORMAL /HPF
PH UR STRIP.AUTO: 8.5 [PH]
PROT UR STRIP-MCNC: ABNORMAL MG/DL
RBC #/AREA URNS AUTO: ABNORMAL /HPF
SP GR UR STRIP.AUTO: 1.01 (ref 1–1.03)
UROBILINOGEN UR STRIP-ACNC: <2 MG/DL
WBC #/AREA URNS AUTO: ABNORMAL /HPF

## 2025-02-20 PROCEDURE — 11042 DBRDMT SUBQ TIS 1ST 20SQCM/<: CPT | Performed by: FAMILY MEDICINE

## 2025-02-20 PROCEDURE — 87186 SC STD MICRODIL/AGAR DIL: CPT | Performed by: FAMILY MEDICINE

## 2025-02-20 PROCEDURE — 81001 URINALYSIS AUTO W/SCOPE: CPT

## 2025-02-20 PROCEDURE — 17250 CHEM CAUT OF GRANLTJ TISSUE: CPT | Performed by: FAMILY MEDICINE

## 2025-02-20 PROCEDURE — 99214 OFFICE O/P EST MOD 30 MIN: CPT | Performed by: FAMILY MEDICINE

## 2025-02-20 PROCEDURE — 87070 CULTURE OTHR SPECIMN AEROBIC: CPT | Performed by: FAMILY MEDICINE

## 2025-02-20 PROCEDURE — 87205 SMEAR GRAM STAIN: CPT | Performed by: FAMILY MEDICINE

## 2025-02-20 PROCEDURE — 87147 CULTURE TYPE IMMUNOLOGIC: CPT | Performed by: FAMILY MEDICINE

## 2025-02-20 RX ORDER — LIDOCAINE HYDROCHLORIDE 40 MG/ML
5 SOLUTION TOPICAL ONCE
Status: COMPLETED | OUTPATIENT
Start: 2025-02-20 | End: 2025-02-20

## 2025-02-20 RX ADMIN — LIDOCAINE HYDROCHLORIDE 5 ML: 40 SOLUTION TOPICAL at 15:04

## 2025-02-20 NOTE — PATIENT INSTRUCTIONS
Orders Placed This Encounter   Procedures    Wound cleansing and dressings     · Wound cleansing and dressings      Left and right ischial wounds:     Wash your hands with soap and water.  Remove old dressing, discard into plastic bag and place in trash. Cleanse the wounds with dakins. Pat dry. Do not use tissue or cotton balls. Do not scrub the wound. Pat dry using gauze.  Shower yes      Apply skin prep to skin surrounding wound     Left ischium:  Apply hydrofera classic to wound , cut to size of wound wet with saline,  ring out, change every other day, cover with abd and medfix tape or silicone border    Right ischium:apply silicone border, change three times per week    VNA and  to continue with wound care dressing changes 3 times a week and as needed for excessive drainage        Wound culture obtained today from left wound     Off-loading Instructions:     Keep weight and pressure off wounds as much as possible.     Continue to use ROHO style cushion when sitting.     Try to lay in bed throughout the day to help offload pressure to wounds.     Standing Status:   Future     Expiration Date:   2/27/2025

## 2025-02-20 NOTE — PROGRESS NOTES
Wound Procedure Treatment    Performed by: Thelma Manrique RN  Authorized by: Homero Ramirez MD  Associated wounds:   Wound 01/10/24 Pressure Injury Ischium Left;Posterior;Proximal  Wound 01/29/24 Pressure Injury Ischium Right    Applied primary dressing:  Silicone bordered foam and Calcium alginate

## 2025-02-20 NOTE — PROGRESS NOTES
Patient ID: Fanny Schulz is a 54 y.o. female Date of Birth 1970       Chief Complaint   Patient presents with    Follow Up Wound Care Visit     Pressure ulcers left ischium       Allergies:  Latex    Diagnosis:      Diagnosis ICD-10-CM Associated Orders   1. Pressure injury of left ischium, stage 3 ([facility])  L89.323 lidocaine (XYLOCAINE) 4 % topical solution 5 mL     Wound cleansing and dressings     Wound Procedure Treatment     Wound culture and Gram stain     Wound culture and Gram stain     Debridement      2. Pressure injury of right ischium, stage 4 ([facility])  L89.314 Wound cleansing and dressings     Wound Procedure Treatment      3. Functional quadriplegia secondary to MS (HCC)  G35 Wound cleansing and dressings    R53.2 Wound Procedure Treatment      4. Hypergranulation  L92.9 Chemical Caut Of A Wound              Assessment & Plan:  Chronic stage III pressure injury of the left ischium.  Still with hypergranulation tissue but less compared to last visit.  Still with undermining.  No exposed bone.   states that she seemed to improve when she was on antibiotics.  At last culture, she had 1+ of MRSA.  She was treated with her UTI with Bactrim DS.  Surgical debridement.  This was followed by silver nitrate chemical cauterization.  Prior to cauterization, another culture has been obtained to see if there is a recurrence of MRSA.  Hydrofera Blue classic cut to fit base.  Bordered foam to cover.  Continue to offload is much as possible.  Recommend to have Roho cushion evaluated for possible new one.  Stage IV pressure injury of the right ischium.  Very small.  Chemical cauterization.  Bordered foam.         Subjective:   2/20/2025: Follow-up stage III pressure injuries of the left ischium and stage IV of the right ischium.   does not believe that there is been any improvement.  At last visit she had hypergranulation tissue cauterized.    1/30/2025: Follow-up bilateral  pressure injuries of the  ischii.  Culture that was done at last visit was positive for 1+ MRSA.  However, she was just started on Bactrim for UTI which covered her MRSA.   believes that the ulcer has improved.    1/16/2025: Follow-up bilateral stage IV pressure injuries of the ischium.  Received [facility]NA message concerned about increased brown seropurulent drainage on the left.  Patient did not have any other systemic symptoms.  Appointment made for today.  Patient denies any fever or chills.    9/16/24: Patient presents to wound care center for follow-up visit of bilateral ischial wounds.  Patient has been using Mesalt to the wounds with bordered foam dressings to cover.  Patient is accompanied by her  who provides to wound care.  No wound care related complaints offered.  Denies increased pain or drainage to the wounds.  No fever or chills.    10/7/24: Patient presents to wound care center for follow-up visit bilateral ischial wounds.  Patient has been using Aquacel Ag ribbon to the wounds and bordered foam dressings.  Patient is accompanied by her  who provides the wound care.  No wound care related complaints offered today.  No reported increased pain or drainage related to the wounds.  No fever or chills.  Patient's  reports that patient spends majority of her day out of bed in wheelchair, patient does have offloading seating cushion to her wheelchair.  Patient states that it is more comfortable for her to be in her wheelchair then in bed.    12/5/2024: Follow-up of bilateral stage IV ischial pressure injuries.  Ran out of Aquacel Ag rope and now is back to using Mesalt.  Patient is not using her air mattress bed and spends her time sitting in her chair.  She even sleeps there.  No other new complaints.  As noted above, she feels more comfortable in her wheelchair than in her bed.        The following portions of the patient's history were reviewed and updated as appropriate:   Patient Active Problem List  "  Diagnosis    Suprapubic catheter (HCC)    Bladder calculus    Constipation    Recurrent major depressive disorder, in full remission (HCC)    Dysphagia    Dizziness    Hyperglycemia    Familial hypercholesterolemia    Lymphedema    Multiple sclerosis (HCC)    Muscle spasticity    Muscle weakness    Nephrolithiasis    Neurogenic bladder    Sleep disorder    Spinal stenosis of cervical region    Tremor    Urinary incontinence    Vision loss    Vitamin B12 deficiency    Vitamin D deficiency    Functional quadriplegia secondary to MS (HCC)    Allergic rhinitis    Screening mammogram, encounter for    Medication management contract signed    Pressure injury of right ischium, stage 4 (HCC)    Thrombocytopenia (HCC)    Serum total bilirubin elevated    Hydronephrosis with urinary obstruction due to ureteral calculus    Candidal vaginitis    Alkaline phosphatase elevation    Abnormal thyroid function test    Ureteral calculus, left    Increased endometrial stripe thickness    Peripheral edema    Tinnitus of both ears    Hypophonia    Chronic lower extremity edema    Positive colorectal cancer screening using Cologuard test    RSV infection    Functional quadriplegia (HCC)    Pressure injury of left ischium, stage 4 (HCC)     Past Medical History:   Diagnosis Date    Anesthesia     \"prefers if able to be put to sleep on stretcher before placed on table if possible due to severe spasticity    Bladder stones     Chronic pain disorder     neck    Contracture, right shoulder     right arm and hand    Dependent on wheelchair     motorized    Edema     dependant lower extremities    Elevated alkaline phosphatase level     Mildly elevated total, normal isoenzymes 4/15/15, normal 1/17; last assessed: [id number]    Fernandez catheter in place     #24 to large bag(silicone catheter)    Gallbladder disease     History of femur fracture     right leg    History of UTI     MS (multiple sclerosis) (HCC)     Muscle spasticity     especially " "with touch    Muscle weakness     Muscular dystrophy (HCC)     Neck pain     Neurogenic bladder     Paralysis (HCC)     bilateral lower extremities    Port-A-Cath in place     left chest,\" hasn't used in approx 1 yr or so\"    Pressure injury of skin     right ischium    Sacral pressure sore     \"shearing, tegaderm in place,\"    Swallowing difficulty     Tinnitus     Urinary incontinence      Past Surgical History:   Procedure Laterality Date    BLADDER STONE REMOVAL N/A 12/4/2017    Procedure: CYSTO, LITHOLOPAXY HOLMIUM LASER;  Surgeon: Damir Osborne MD;  Location: [facility];  Service: Urology    BLADDER SURGERY      CHOLECYSTECTOMY      CYSTOSCOPY  06/15/2020    ESOPHAGOGASTRODUODENOSCOPY      FL RETROGRADE PYELOGRAM  10/2/2020    FL RETROGRADE PYELOGRAM  11/3/2020    KIDNEY STONE SURGERY      PORTACATH PLACEMENT Left     AL CYSTO BLADDER W/URETERAL CATHETERIZATION Left 10/2/2020    Procedure: CYSTOSCOPY LEFT RETROGRADE ; LEFT URETEROSCOPY; PYELOGRAM WITH STENT INSERTION AND SUPERPUBIC EXCHANGE;  Surgeon: Philip Mcdonald MD;  Location: [facility];  Service: Urology    AL CYSTO/URETERO W/LITHOTRIPSY &INDWELL STENT INSRT Left 11/3/2020    Procedure: CYSTOSCOPY URETEROSCOPY WITH RETROGRADE PYELOGRAM AND EXCHANGE STENT URETERAL, SP TUBE EXCHANGE, BASKET STONE EXTRACTION, BLADDER STONE EXTRACTION;  Surgeon: Damir Osborne MD;  Location: [facility];  Service: Urology    AL LITHOLAPAXY SMPL/SM <2.5 CM N/A 12/22/2022    Procedure: Cystolitholapaxy w/  laser lithotripsy of bladder stones; SUPRAPUBIC CATHETER CHANGE;  Surgeon: Damir Osborne MD;  Location: [facility];  Service: Urology    SUPRAPUBIC CYSTOSTOMY  02/25/2014    last assessed: [id number]     Family History   Problem Relation Age of Onset    Diabetes Mother     Hyperlipidemia Mother     Hypertension Mother     COPD Father     Hypertension Father     Hyperlipidemia Sister     Alzheimer's disease Family     Diabetes Family     Hypertension Family  "    Heart disease Family       Social History     Socioeconomic History    Marital status: /Civil Union     Spouse name: Not on file    Number of children: Not on file    Years of education: Not on file    Highest education level: Not on file   Occupational History    Not on file   Tobacco Use    Smoking status: Never    Smokeless tobacco: Never   Vaping Use    Vaping status: Never Used   Substance and Sexual Activity    Alcohol use: Not Currently    Drug use: Yes    Sexual activity: Yes   Other Topics Concern    Not on file   Social History Narrative    Caffeine use    Currently on disability    Daily coffee consumption (2 cups/day)    Educational level- completed 2nd year college    Lives with adult children    Not currently employed    Wheelchair dependent      Social Drivers of Health     Financial Resource Strain: Low Risk  (2/20/2023)    Overall Financial Resource Strain (CARDIA)     Difficulty of Paying Living Expenses: Not hard at all   Food Insecurity: No Food Insecurity (12/20/2024)    Nursing - Inadequate Food Risk Classification     Worried About Running Out of Food in the Last Year: Never true     Ran Out of Food in the Last Year: Never true     Ran Out of Food in the Last Year: Never true   Transportation Needs: No Transportation Needs (12/23/2024)    OASIS : Transportation     Lack of Transportation (Medical): No     Lack of Transportation (Non-Medical): No     Patient Unable or Declines to Respond: No   Physical Activity: Not on file   Stress: Not on file   Social Connections: Not on file   Intimate Partner Violence: Unknown (12/20/2024)    Nursing IPS     Feels Physically and Emotionally Safe: Not on file     Physically Hurt by Someone: Not on file     Humiliated or Emotionally Abused by Someone: Not on file     Physically Hurt by Someone: No     Hurt or Threatened by Someone: No   Housing Stability: Unknown (12/20/2024)    Nursing: Inadequate Housing Risk Classification     Has Housing:  "Not on file     Worried About Losing Housing: Not on file     Unable to Get Utilities: Not on file     Unable to Pay for Housing in the Last Year: No     Has Housin        Current Outpatient Medications:     acetaminophen (TYLENOL) 500 mg tablet, Take 500 mg by mouth every 6 (six) hours as needed for mild pain. Indications: Pain, Disp: , Rfl:     Alpha-D-Galactosidase (Beano) TABS, Take 1 tablet by mouth if needed (GI bloating). As needed before each meal that may cause bloating.   Indications: GI bloating, Disp: , Rfl:     baclofen 20 mg tablet, TAKE 1 TABLET BY MOUTH 5 TIMES AS NEEDED, Disp: 150 tablet, Rfl: 11    Cyanocobalamin (B-12) 1000 MCG SUBL, Dissolve 1 tablet under tongue daily, Disp: 30 tablet, Rfl: 5    DULoxetine (CYMBALTA) 20 mg capsule, Take 1 capsule (20 mg total) by mouth daily, Disp: 90 capsule, Rfl: 3    furosemide (LASIX) 20 mg tablet, Take 1 tablet (20 mg total) by mouth daily, Disp: 90 tablet, Rfl: 3    gabapentin (NEURONTIN) 100 mg capsule, Take 1 tablet at bedtime tonight, 1 tablet twice a day tomorrow, and then 1 tablet 3 times daily thereafter (Patient taking differently: Take 300 mg by mouth. Take 1 tablet orally at bedtime tonight, 1 tablet twice a day tomorrow, and then 1 tablet 3 times daily thereafter  Indications: Neck pain), Disp: 90 capsule, Rfl: 5    ibuprofen (MOTRIN) 200 mg tablet, Take 200 mg by mouth every 6 (six) hours as needed for moderate pain. Indications: Pain, Disp: , Rfl:     oxybutynin (DITROPAN-XL) 10 MG 24 hr tablet, Take 1 tablet (10 mg total) by mouth daily, Disp: 90 tablet, Rfl: 3    rosuvastatin (CRESTOR) 5 mg tablet, Take 1 tablet (5 mg total) by mouth daily, Disp: 90 tablet, Rfl: 3    senna (SENOKOT) 8.6 mg, Take 8.6 mg by mouth if needed for constipation. Daily as needed for constipation  Indications: Constipation, Disp: , Rfl:     SYRINGE-NEEDLE, DISP, 3 ML 25G X 5/8\" 3 ML MISC, Use once a week Use syringes for B12 injections once a week for 3 weeks, " then once a month for 5 months., Disp: 8 each, Rfl: 0    Current Facility-Administered Medications:     cyanocobalamin injection 1,000 mcg, 1,000 mcg, Intramuscular, Q30 Days, , 1,000 mcg at 12/13/24 0934    Review of Systems   Constitutional:  Negative for chills and fever.   HENT:  Negative for congestion.    Respiratory:  Negative for cough.    Musculoskeletal:  Positive for gait problem (Functional quadriplegia).        Functional quadriplegia    Skin:  Positive for wound (Bilateral ischia).        B/l ischium   Neurological:  Positive for weakness (Functional quadriplegia) and numbness.   Psychiatric/Behavioral:  Negative for dysphoric mood. The patient is not nervous/anxious.        Objective:  /70   Pulse 68   Temp 97.8 °F (36.6 °C)   Pain Score: 0-No pain     Physical Exam  Constitutional:       General: She is awake.   HENT:      Head: Normocephalic and atraumatic.   Cardiovascular:      Rate and Rhythm: Normal rate.   Pulmonary:      Effort: Pulmonary effort is normal. No respiratory distress.   Skin:     General: Skin is warm and dry.      Findings: Wound present. No erythema.             Comments: 1. Left ischial pressure injury visible wound bed with hypergranular tissue present but slightly less then at last visit.  No malodor.  Size unchanged.  Undermining from 10-2. chelsey-wound is intact with scar tissue.  No probe to bone or exposed bone.  Fibrin and minimal slough.  2. R ischial pressure injury with very small opening with surrounding maceration again.  Still has some depth to it.  No evidence of infection.  Refer to wound assessment for additional details.   Neurological:      Mental Status: She is alert and oriented to person, place, and time.      Gait: Gait abnormal.      Comments: Quadriplegia secondary to MS   Psychiatric:         Mood and Affect: Mood normal.         Behavior: Behavior normal. Behavior is cooperative.               Wound 01/10/24 Pressure Injury Ischium  Left;Posterior;Proximal (Active)   Wound Image Images linked 02/20/25 1511   Wound Description Hypergranulation;Granulation tissue;Slough 02/20/25 1511   Pressure Injury Stage 3 02/20/25 1511   Ruchi-wound Assessment Intact;Maceration 02/20/25 1511   Wound Length (cm) 2.7 cm 02/20/25 1511   Wound Width (cm) 2.3 cm 02/20/25 1511   Wound Depth (cm) 1 cm 02/20/25 1511   Wound Surface Area (cm^2) 6.21 cm^2 02/20/25 1511   Wound Volume (cm^3) 6.21 cm^3 02/20/25 1511   Calculated Wound Volume (cm^3) 6.21 cm^3 02/20/25 1511   Number of underminings 1 02/20/25 1511   Undermining 1 1.2 02/20/25 1511   Undermining 1 is depth extending from 10-12 02/20/25 1511   Drainage Amount Moderate 02/20/25 1511   Drainage Description Serosanguineous;Foul smelling 02/20/25 1511   Non-staged Wound Description Full thickness 02/20/25 1511   Wound packed? No 02/20/25 1511   Dressing Status Intact 02/20/25 1511       Wound 01/29/24 Pressure Injury Ischium Right (Active)   Wound Image Images linked 02/20/25 1515   Wound Description Granulation tissue;Slough 02/20/25 1515   Pressure Injury Stage 4 02/20/25 1515   Ruchi-wound Assessment Callus;Maceration 02/20/25 1515   Wound Length (cm) 0.3 cm 02/20/25 1515   Wound Width (cm) 0.4 cm 02/20/25 1515   Wound Depth (cm) 0.4 cm 02/20/25 1515   Wound Surface Area (cm^2) 0.12 cm^2 02/20/25 1515   Wound Volume (cm^3) 0.048 cm^3 02/20/25 1515   Calculated Wound Volume (cm^3) 0.05 cm^3 02/20/25 1515   Change in Wound Size % 90 02/20/25 1515   Drainage Amount Moderate 02/20/25 1515   Drainage Description Serosanguineous 02/20/25 1515   Non-staged Wound Description Full thickness 02/20/25 1515   Wound packed? No 02/20/25 1515   Dressing Status Intact 02/20/25 1515        Debridement   Wound 01/10/24 Pressure Injury Ischium Left;Posterior;Proximal    Universal Protocol:  procedure performed by consultantConsent: Verbal consent obtained. Written consent obtained.  Consent given by: patient  Time out:  "Immediately prior to procedure a \"time out\" was called to verify the correct patient, procedure, equipment, support staff and site/side marked as required.  Patient understanding: patient states understanding of the procedure being performed  Patient identity confirmed: verbally with patient    Debridement Details  Performed by: physician  Debridement type: surgical  Level of debridement: subcutaneous tissue  Pain control: lidocaine 4%      Post-debridement measurements  Length (cm): 2.7  Width (cm): 2.3  Depth (cm): 1.1  Percent debrided: 100%  Surface Area (cm^2): 6.21  Area Debrided (cm^2): 6.21  Volume (cm^3): 6.83    Tissue and other material debrided: dermis and subcutaneous tissue  Devitalized tissue debrided: fibrin and slough  Instrument(s) utilized: curette  Bleeding: large  Hemostasis obtained with: pressure and silver nitrate  Procedural pain (0-10): 0  Post-procedural pain: 0   Response to treatment: procedure was tolerated well  Debridement Comments: Culture obtained postdebridement.      Chemical caut of a wound Pressure Injury Right Ischium     Date/Time  2/20/2025 2:30 PM     Performed by  Homero Ramirez MD   Authorized by  Homero Ramirez MD      Associated wounds:   Wound 01/29/24 Pressure Injury Ischium Right     Universal Protocol   procedure performed by consultantConsent: Verbal consent obtained. Written consent obtained.  Risks and benefits: risks, benefits and alternatives were discussed  Consent given by: patient  Time out: Immediately prior to procedure a \"time out\" was called to verify the correct patient, procedure, equipment, support staff and site/side marked as required.  Patient understanding: patient states understanding of the procedure being performed  Patient identity confirmed: verbally with patient      Local anesthesia used: yes     Anesthesia   Local anesthesia used: yes  Local Anesthetic: topical anesthetic     Sedation   Patient sedated: no        Specimen: " no    Culture: no   Procedure Details   Procedure Notes: Silver nitrate was used to cauterize the hypergranulation tissue.  Normal saline was used to neutralize the reaction.  1 silver nitrate stick was used for this procedure.  Patient tolerance: Patient tolerated the procedure well with no immediate complications              Results from last 6 Months   Lab Units 01/16/25  1535   WOUND CULTURE  1+ Growth of Methicillin Resistant Staphylococcus aureus*       Lab Results   Component Value Date    HGBA1C 5.2 01/09/2024       Wound Instructions:  Orders Placed This Encounter   Procedures    Wound cleansing and dressings     · Wound cleansing and dressings      Left and right ischial wounds:     Wash your hands with soap and water.  Remove old dressing, discard into plastic bag and place in trash. Cleanse the wounds with dakins. Pat dry. Do not use tissue or cotton balls. Do not scrub the wound. Pat dry using gauze.  Shower yes      Apply skin prep to skin surrounding wound     Left ischium:  Apply hydrofera classic to wound , cut to size of wound wet with saline,  ring out, change every other day, cover with abd and medfix tape or silicone border    Right ischium:apply silicone border, change three times per week    VNA and  to continue with wound care dressing changes 3 times a week and as needed for excessive drainage        Wound culture obtained today from left wound     Off-loading Instructions:     Keep weight and pressure off wounds as much as possible.     Continue to use ROHO style cushion when sitting.     Try to lay in bed throughout the day to help offload pressure to wounds.     Standing Status:   Future     Expiration Date:   2/27/2025    Wound Procedure Treatment     This order was created via procedure documentation    Debridement     This order was created via procedure documentation    Chemical Caut Of A Wound     This order was created via procedure documentation    Wound culture and Gram  "stain     Standing Status:   Future     Number of Occurrences:   1     Expected Date:   2/20/2025     Expiration Date:   2/20/2026     Release to patient through [facility]:   Immediate             Homero Ramirez MD, CHT, CWS    Portions of the record may have been created with voice recognition software. Occasional wrong word or \"sound alike\" substitutions may have occurred due to the inherent limitations of voice recognition software. Read the chart carefully and recognize, using context, where substitutions have occurred.    "

## 2025-02-21 ENCOUNTER — RESULTS FOLLOW-UP (OUTPATIENT)
Dept: UROLOGY | Facility: AMBULATORY SURGERY CENTER | Age: 55
End: 2025-02-21

## 2025-02-21 DIAGNOSIS — R39.9 UTI SYMPTOMS: Primary | ICD-10-CM

## 2025-02-21 RX ORDER — SULFAMETHOXAZOLE AND TRIMETHOPRIM 800; 160 MG/1; MG/1
1 TABLET ORAL EVERY 12 HOURS SCHEDULED
Qty: 14 TABLET | Refills: 0 | Status: SHIPPED | OUTPATIENT
Start: 2025-02-21 | End: 2025-02-28

## 2025-02-21 NOTE — TELEPHONE ENCOUNTER
----- Message from CLEMENTINA Natarajan sent at 2/21/2025  8:10 AM EST -----  Urinalysis suspicious for urinary tract infection, urine culture still in process.  Will send course of antibiotics due to it being the weekend.  Will monitor final results of urine culture and adjust antibiotics if necessary.

## 2025-02-22 LAB
BACTERIA WND AEROBE CULT: ABNORMAL
BACTERIA WND AEROBE CULT: ABNORMAL
GRAM STN SPEC: ABNORMAL

## 2025-02-23 LAB
BACTERIA UR CULT: ABNORMAL
BACTERIA UR CULT: ABNORMAL

## 2025-02-24 ENCOUNTER — HOME CARE VISIT (OUTPATIENT)
Dept: HOME HEALTH SERVICES | Facility: HOME HEALTHCARE | Age: 55
End: 2025-02-24
Payer: MEDICARE

## 2025-02-24 VITALS
TEMPERATURE: 97.7 F | HEART RATE: 96 BPM | DIASTOLIC BLOOD PRESSURE: 68 MMHG | OXYGEN SATURATION: 100 % | SYSTOLIC BLOOD PRESSURE: 108 MMHG

## 2025-02-24 PROCEDURE — G0299 HHS/HOSPICE OF RN EA 15 MIN: HCPCS

## 2025-02-27 ENCOUNTER — HOSPITAL ENCOUNTER (OUTPATIENT)
Dept: RADIOLOGY | Facility: HOSPITAL | Age: 55
Discharge: HOME | End: 2025-02-27
Payer: MEDICARE

## 2025-02-27 ENCOUNTER — RESULTS FOLLOW-UP (OUTPATIENT)
Dept: WOUND CARE | Facility: HOSPITAL | Age: 55
End: 2025-02-27

## 2025-02-27 ENCOUNTER — VBI (OUTPATIENT)
Dept: ADMINISTRATIVE | Facility: OTHER | Age: 55
End: 2025-02-27

## 2025-02-27 DIAGNOSIS — N21.0 BLADDER STONES: ICD-10-CM

## 2025-02-27 PROCEDURE — 76775 US EXAM ABDO BACK WALL LIM: CPT

## 2025-02-27 NOTE — TELEPHONE ENCOUNTER
02/27/25 1:54 PM     Chart reviewed for CRC: Cologuard was/were submitted to the patient's insurance.     Karli Oconnor MA   [facility]

## 2025-03-04 ENCOUNTER — RESULTS FOLLOW-UP (OUTPATIENT)
Dept: UROLOGY | Facility: AMBULATORY SURGERY CENTER | Age: 55
End: 2025-03-04

## 2025-03-04 NOTE — TELEPHONE ENCOUNTER
Called and left DVM going over results from CLEMENTINA.Left call back number if patient give our office a call back please relay results.        ----- Message from CLEMENTINA Natarajan sent at 3/4/2025 11:30 AM EST -----  Can you please call Fanny and let her know that I reviewed her most recent ultrasound.  Please let her know that she has 2 small bladder stones that are seen on the ultrasound imaging.  There are no kidney stones seen no abnormalities within the kidney stones. I know in the past she did have bladder stone removal with Dr. Osborne, it is up to her on whether or not she would want an additional procedure at this time. We can always re-discuss at her follow up appointment in August.

## 2025-03-05 NOTE — TELEPHONE ENCOUNTER
2ND attempt Left DVM going over results and left call back number . If patient calls back please relay.      ----- Message from CLEMENTINA Natarajan sent at 3/4/2025 11:30 AM EST -----  Can you please call Fanny and let her know that I reviewed her most recent ultrasound.  Please let her know that she has 2 small bladder stones that are seen on the ultrasound imaging.  There are no kidney stones seen no abnormalities within the kidney stones. I know in the past she did have bladder stone removal with Dr. Osborne, it is up to her on whether or not she would want an additional procedure at this time. We can always re-discuss at her follow up appointment in August.

## 2025-03-07 ENCOUNTER — HOME CARE VISIT (OUTPATIENT)
Dept: HOME HEALTH SERVICES | Facility: HOME HEALTHCARE | Age: 55
End: 2025-03-07
Payer: MEDICARE

## 2025-03-10 ENCOUNTER — HOME CARE VISIT (OUTPATIENT)
Dept: HOME HEALTH SERVICES | Facility: HOME HEALTHCARE | Age: 55
End: 2025-03-10
Payer: MEDICARE

## 2025-03-10 VITALS
DIASTOLIC BLOOD PRESSURE: 66 MMHG | HEART RATE: 88 BPM | OXYGEN SATURATION: 100 % | TEMPERATURE: 98 F | SYSTOLIC BLOOD PRESSURE: 96 MMHG

## 2025-03-10 PROCEDURE — G0299 HHS/HOSPICE OF RN EA 15 MIN: HCPCS

## 2025-03-13 ENCOUNTER — HOME CARE VISIT (OUTPATIENT)
Dept: HOME HEALTH SERVICES | Facility: HOME HEALTHCARE | Age: 55
End: 2025-03-13
Payer: MEDICARE

## 2025-03-20 ENCOUNTER — HOME CARE VISIT (OUTPATIENT)
Dept: HOME HEALTH SERVICES | Facility: HOME HEALTHCARE | Age: 55
End: 2025-03-20
Payer: MEDICARE

## 2025-03-21 ENCOUNTER — HOME CARE VISIT (OUTPATIENT)
Dept: HOME HEALTH SERVICES | Facility: HOME HEALTHCARE | Age: 55
End: 2025-03-21
Payer: MEDICARE

## 2025-03-21 ENCOUNTER — TELEPHONE (OUTPATIENT)
Age: 55
End: 2025-03-21

## 2025-03-21 VITALS
HEART RATE: 86 BPM | TEMPERATURE: 97.7 F | SYSTOLIC BLOOD PRESSURE: 108 MMHG | OXYGEN SATURATION: 97 % | DIASTOLIC BLOOD PRESSURE: 64 MMHG

## 2025-03-21 PROCEDURE — G0299 HHS/HOSPICE OF RN EA 15 MIN: HCPCS

## 2025-03-21 RX ADMIN — CYANOCOBALAMIN 1000 MCG: 1000 INJECTION, SOLUTION INTRAMUSCULAR; SUBCUTANEOUS at 10:42

## 2025-03-21 NOTE — TELEPHONE ENCOUNTER
Tamera/JESICA HANCOCK calling to let the office know that she is re-certifying patient for [facility] for wound care and supra pubic cath changes.

## 2025-03-24 ENCOUNTER — HOME CARE VISIT (OUTPATIENT)
Dept: HOME HEALTH SERVICES | Facility: HOME HEALTHCARE | Age: 55
End: 2025-03-24
Payer: MEDICARE

## 2025-03-24 VITALS
SYSTOLIC BLOOD PRESSURE: 94 MMHG | DIASTOLIC BLOOD PRESSURE: 60 MMHG | OXYGEN SATURATION: 100 % | HEART RATE: 96 BPM | TEMPERATURE: 98.3 F

## 2025-03-24 PROCEDURE — G0299 HHS/HOSPICE OF RN EA 15 MIN: HCPCS

## 2025-03-24 PROCEDURE — 400014 VN F/U

## 2025-03-27 ENCOUNTER — HOME CARE VISIT (OUTPATIENT)
Dept: HOME HEALTH SERVICES | Facility: HOME HEALTHCARE | Age: 55
End: 2025-03-27
Payer: MEDICARE

## 2025-03-27 ENCOUNTER — OFFICE VISIT (OUTPATIENT)
Dept: WOUND CARE | Facility: HOSPITAL | Age: 55
End: 2025-03-27
Payer: MEDICARE

## 2025-03-27 VITALS — DIASTOLIC BLOOD PRESSURE: 70 MMHG | SYSTOLIC BLOOD PRESSURE: 100 MMHG | RESPIRATION RATE: 14 BRPM | HEART RATE: 84 BPM

## 2025-03-27 DIAGNOSIS — R53.2 FUNCTIONAL QUADRIPLEGIA SECONDARY TO MS (HCC): ICD-10-CM

## 2025-03-27 DIAGNOSIS — L92.9 HYPERGRANULATION: ICD-10-CM

## 2025-03-27 DIAGNOSIS — G35 MULTIPLE SCLEROSIS (HCC): ICD-10-CM

## 2025-03-27 DIAGNOSIS — G35 FUNCTIONAL QUADRIPLEGIA SECONDARY TO MS (HCC): ICD-10-CM

## 2025-03-27 DIAGNOSIS — L89.314 PRESSURE INJURY OF RIGHT ISCHIUM, STAGE 4 (HCC): ICD-10-CM

## 2025-03-27 DIAGNOSIS — L89.323 PRESSURE INJURY OF LEFT ISCHIUM, STAGE 3 (HCC): Primary | ICD-10-CM

## 2025-03-27 PROCEDURE — 11042 DBRDMT SUBQ TIS 1ST 20SQCM/<: CPT | Performed by: FAMILY MEDICINE

## 2025-03-27 PROCEDURE — 99213 OFFICE O/P EST LOW 20 MIN: CPT | Performed by: FAMILY MEDICINE

## 2025-03-27 RX ORDER — LIDOCAINE HYDROCHLORIDE 40 MG/ML
5 SOLUTION TOPICAL ONCE
Status: COMPLETED | OUTPATIENT
Start: 2025-03-27 | End: 2025-03-27

## 2025-03-27 RX ADMIN — LIDOCAINE HYDROCHLORIDE 5 ML: 40 SOLUTION TOPICAL at 11:38

## 2025-03-27 NOTE — PROGRESS NOTES
Wound Procedure Treatment Pressure Injury Right Ischium    Performed by: Ingris Miranda RN  Authorized by: Homero Ramirez MD  Associated wounds:   Wound 01/29/24 Pressure Injury Ischium Right    Wound cleansed with:  NSS   Applied primary dressing:  Silicone bordered foam

## 2025-03-27 NOTE — PROGRESS NOTES
Wound Procedure Treatment Pressure Injury Left;Posterior;Proximal Ischium    Performed by: Ingris Miranda RN  Authorized by: Homero Ramirez MD  Associated wounds:   Wound 01/10/24 Pressure Injury Ischium Left;Posterior;Proximal    Wound cleansed with:  NSS   Applied primary dressing:  Silicone bordered foam   Comments:  Silver alginate rope applied today post debridement

## 2025-03-27 NOTE — PATIENT INSTRUCTIONS
Orders Placed This Encounter   Procedures    Wound cleansing and dressings     Left and right ischial wounds:     Wash your hands with soap and water.  Remove old dressing, discard into plastic bag and place in trash. Cleanse the wounds with dakins. Pat dry. Do not use tissue or cotton balls. Do not scrub the wound. Pat dry using gauze.  Shower yes      Apply skin prep to skin surrounding wound     Left ischium:  Paint wound with betadine swab daily and cover with abd and medfix tape or silicone border     Right ischium:apply silicone border, change three times per week     VNA and  to continue with wound care dressing changes 3 times a week and as needed for excessive drainage        Off-loading Instructions:    Remove Meño sling from chair during the day.     Keep weight and pressure off wounds as much as possible.     Continue to use ROHO style cushion when sitting.     Try to lay in bed throughout the day to help offload pressure to wounds.     Standing Status:   Future     Expiration Date:   4/10/2025

## 2025-03-27 NOTE — PROGRESS NOTES
Patient ID: Fanny Schulz is a 54 y.o. female Date of Birth 1970       Chief Complaint   Patient presents with    Follow Up Wound Care Visit     Wounds to left and right ischium       Allergies:  Latex    Diagnosis:      Diagnosis ICD-10-CM Associated Orders   1. Pressure injury of left ischium, stage 3 ([facility])  L89.323 lidocaine (XYLOCAINE) 4 % topical solution 5 mL     Wound cleansing and dressings     Debridement      2. Pressure injury of right ischium, stage 4 ([facility])  L89.314 lidocaine (XYLOCAINE) 4 % topical solution 5 mL     Wound cleansing and dressings     Wound Procedure Treatment Pressure Injury Left;Posterior;Proximal Ischium     Wound Procedure Treatment Pressure Injury Right Ischium      3. Functional quadriplegia secondary to MS ([facility])  G35 Wound cleansing and dressings    R53.2       4. Hypergranulation  L92.9 Wound cleansing and dressings      5. Multiple sclerosis ([facility])  G35 Wound cleansing and dressings              Assessment & Plan:  Stage IV pressure injury of the right ischium.  Now with significant amount of hypergranulation tissue that is not attached to the sides of the ulcer.  Unfortunately, bone is palpable but not exposed in the middle of the hypergranulation tissue.  The patient does stay in her chair with 2 Roho cushions and tends to sleep in the chair as well.  I discussed with the patient and her , that because of being in the chair so much the sides of the ulcer are pulling away from the center and it would be helpful if she could spend more time in bed.  They understand.  I also expressed my concern about the possible future bone exposure and possible osteomyelitis.  Surgical debridement of some of the hypergranulation tissue being careful not to expose any bone.  Also debridement of the circular trough to try to get more adherence with the edges.  Silver nitrate was used to stop the bleeding as well as treat hypergranulation tissue.  Betadine daily to the area.  Foam.   Offloading as discussed above.  Follow-up in approximately 2 weeks.  Stage III pressure injury of the left ischium.  About the same.  Too small to pack.  Silver nitrate was used.         Subjective:   3/27/2025: Follow-up stage III pressure injury of the left ischium and stage IV pressure injury of the right ischium.   states that they felt the Hydrofera Blue classic was causing some discomfort and therefore discontinued this.  They went back to silver alginate.  She continues to spend almost all of her time in the chair and tends to sleep in the chair frequently.    2/20/2025: Follow-up stage III pressure injuries of the left ischium and stage IV of the right ischium.   does not believe that there is been any improvement.  At last visit she had hypergranulation tissue cauterized.    1/30/2025: Follow-up bilateral  pressure injuries of the ischii.  Culture that was done at last visit was positive for 1+ MRSA.  However, she was just started on Bactrim for UTI which covered her MRSA.   believes that the ulcer has improved.    1/16/2025: Follow-up bilateral stage IV pressure injuries of the ischium.  Received [facility] message concerned about increased brown seropurulent drainage on the left.  Patient did not have any other systemic symptoms.  Appointment made for today.  Patient denies any fever or chills.    9/16/24: Patient presents to wound care center for follow-up visit of bilateral ischial wounds.  Patient has been using Mesalt to the wounds with bordered foam dressings to cover.  Patient is accompanied by her  who provides to wound care.  No wound care related complaints offered.  Denies increased pain or drainage to the wounds.  No fever or chills.    10/7/24: Patient presents to wound care center for follow-up visit bilateral ischial wounds.  Patient has been using Aquacel Ag ribbon to the wounds and bordered foam dressings.  Patient is accompanied by her  who provides the wound  care.  No wound care related complaints offered today.  No reported increased pain or drainage related to the wounds.  No fever or chills.  Patient's  reports that patient spends majority of her day out of bed in wheelchair, patient does have offloading seating cushion to her wheelchair.  Patient states that it is more comfortable for her to be in her wheelchair then in bed.    12/5/2024: Follow-up of bilateral stage IV ischial pressure injuries.  Ran out of [employer] and now is back to using [employer].  Patient is not using her air mattress bed and spends her time sitting in her chair.  She even sleeps there.  No other new complaints.  As noted above, she feels more comfortable in her wheelchair than in her bed.        The following portions of the patient's history were reviewed and updated as appropriate:   Patient Active Problem List   Diagnosis    Suprapubic catheter (HCC)    Bladder calculus    Constipation    Recurrent major depressive disorder, in full remission (HCC)    Dysphagia    Dizziness    Hyperglycemia    Familial hypercholesterolemia    Lymphedema    Multiple sclerosis (HCC)    Muscle spasticity    Muscle weakness    Nephrolithiasis    Neurogenic bladder    Sleep disorder    Spinal stenosis of cervical region    Tremor    Urinary incontinence    Vision loss    Vitamin B12 deficiency    Vitamin D deficiency    Functional quadriplegia secondary to MS (HCC)    Allergic rhinitis    Screening mammogram, encounter for    Medication management contract signed    Pressure injury of right ischium, stage 4 (HCC)    Thrombocytopenia (HCC)    Serum total bilirubin elevated    Hydronephrosis with urinary obstruction due to ureteral calculus    Candidal vaginitis    Alkaline phosphatase elevation    Abnormal thyroid function test    Ureteral calculus, left    Increased endometrial stripe thickness    Peripheral edema    Tinnitus of both ears    Hypophonia    Chronic lower extremity edema    Positive  "colorectal cancer screening using Cologuard test    RSV infection    Functional quadriplegia (HCC)    Pressure injury of left ischium, stage 4 (HCC)     Past Medical History:   Diagnosis Date    Anesthesia     \"prefers if able to be put to sleep on stretcher before placed on table if possible due to severe spasticity    Bladder stones     Chronic pain disorder     neck    Contracture, right shoulder     right arm and hand    Dependent on wheelchair     motorized    Edema     dependant lower extremities    Elevated alkaline phosphatase level     Mildly elevated total, normal isoenzymes 4/15/15, normal 1/17; last assessed: [id number]    Fernandez catheter in place     #24 to large bag(silicone catheter)    Gallbladder disease     History of femur fracture     right leg    History of UTI     MS (multiple sclerosis) ([facility])     Muscle spasticity     especially with touch    Muscle weakness     Muscular dystrophy (HCC)     Neck pain     Neurogenic bladder     Paralysis (HCC)     bilateral lower extremities    Port-A-Cath in place     left chest,\" hasn't used in approx 1 yr or so\"    Pressure injury of skin     right ischium    Sacral pressure sore     \"shearing, tegaderm in place,\"    Swallowing difficulty     Tinnitus     Urinary incontinence      Past Surgical History:   Procedure Laterality Date    BLADDER STONE REMOVAL N/A 12/4/2017    Procedure: CYSTO, LITHOLOPAXY HOLMIUM LASER;  Surgeon: Damir Osborne MD;  Location: [facility];  Service: Urology    BLADDER SURGERY      CHOLECYSTECTOMY      CYSTOSCOPY  06/15/2020    ESOPHAGOGASTRODUODENOSCOPY      FL RETROGRADE PYELOGRAM  10/2/2020    FL RETROGRADE PYELOGRAM  11/3/2020    KIDNEY STONE SURGERY      PORTACATH PLACEMENT Left     MD CYSTO BLADDER W/URETERAL CATHETERIZATION Left 10/2/2020    Procedure: CYSTOSCOPY LEFT RETROGRADE ; LEFT URETEROSCOPY; PYELOGRAM WITH STENT INSERTION AND SUPERPUBIC EXCHANGE;  Surgeon: Philip Mcdonald MD;  Location: [facility];  Service: " Urology    AR CYSTO/URETERO W/LITHOTRIPSY &INDWELL STENT INSRT Left 11/3/2020    Procedure: CYSTOSCOPY URETEROSCOPY WITH RETROGRADE PYELOGRAM AND EXCHANGE STENT URETERAL, SP TUBE EXCHANGE, BASKET STONE EXTRACTION, BLADDER STONE EXTRACTION;  Surgeon: Damir Osborne MD;  Location: [facility];  Service: Urology    AR LITHOLAPAXY SMPL/SM <2.5 CM N/A 12/22/2022    Procedure: Cystolitholapaxy w/  laser lithotripsy of bladder stones; SUPRAPUBIC CATHETER CHANGE;  Surgeon: Damir Osborne MD;  Location: [facility];  Service: Urology    SUPRAPUBIC CYSTOSTOMY  02/25/2014    last assessed: [id number]     Family History   Problem Relation Age of Onset    Diabetes Mother     Hyperlipidemia Mother     Hypertension Mother     COPD Father     Hypertension Father     Hyperlipidemia Sister     Alzheimer's disease Family     Diabetes Family     Hypertension Family     Heart disease Family       Social History     Socioeconomic History    Marital status: /Civil Union     Spouse name: Not on file    Number of children: Not on file    Years of education: Not on file    Highest education level: Not on file   Occupational History    Not on file   Tobacco Use    Smoking status: Never    Smokeless tobacco: Never   Vaping Use    Vaping status: Never Used   Substance and Sexual Activity    Alcohol use: Not Currently    Drug use: Yes    Sexual activity: Yes   Other Topics Concern    Not on file   Social History Narrative    Caffeine use    Currently on disability    Daily coffee consumption (2 cups/day)    Educational level- completed 2nd year college    Lives with adult children    Not currently employed    Wheelchair dependent      Social Drivers of Health     Financial Resource Strain: Low Risk  (2/20/2023)    Overall Financial Resource Strain (CARDIA)     Difficulty of Paying Living Expenses: Not hard at all   Food Insecurity: No Food Insecurity (12/20/2024)    Nursing - Inadequate Food Risk Classification     Worried About  Running Out of Food in the Last Year: Never true     Ran Out of Food in the Last Year: Never true     Ran Out of Food in the Last Year: Never true   Transportation Needs: No Transportation Needs (2024)    OASIS : Transportation     Lack of Transportation (Medical): No     Lack of Transportation (Non-Medical): No     Patient Unable or Declines to Respond: No   Physical Activity: Not on file   Stress: Not on file   Social Connections: Not on file   Intimate Partner Violence: Unknown (2024)    Nursing IPS     Feels Physically and Emotionally Safe: Not on file     Physically Hurt by Someone: Not on file     Humiliated or Emotionally Abused by Someone: Not on file     Physically Hurt by Someone: No     Hurt or Threatened by Someone: No   Housing Stability: Unknown (2024)    Nursing: Inadequate Housing Risk Classification     Has Housing: Not on file     Worried About Losing Housing: Not on file     Unable to Get Utilities: Not on file     Unable to Pay for Housing in the Last Year: No     Has Housin        Current Outpatient Medications:     acetaminophen (TYLENOL) 500 mg tablet, Take 500 mg by mouth every 6 (six) hours as needed for mild pain. Indications: Pain, Disp: , Rfl:     Alpha-D-Galactosidase (Beano) TABS, Take 1 tablet by mouth if needed (GI bloating). As needed before each meal that may cause bloating.   Indications: GI bloating, Disp: , Rfl:     baclofen 20 mg tablet, TAKE 1 TABLET BY MOUTH 5 TIMES AS NEEDED, Disp: 150 tablet, Rfl: 11    Cyanocobalamin (B-12) 1000 MCG SUBL, Dissolve 1 tablet under tongue daily, Disp: 30 tablet, Rfl: 5    DULoxetine (CYMBALTA) 20 mg capsule, Take 1 capsule (20 mg total) by mouth daily, Disp: 90 capsule, Rfl: 3    furosemide (LASIX) 20 mg tablet, Take 1 tablet (20 mg total) by mouth daily, Disp: 90 tablet, Rfl: 3    gabapentin (NEURONTIN) 100 mg capsule, Take 1 tablet at bedtime tonight, 1 tablet twice a day tomorrow, and then 1 tablet 3 times daily  "thereafter (Patient taking differently: Take 300 mg by mouth. Take 1 tablet orally at bedtime tonight, 1 tablet twice a day tomorrow, and then 1 tablet 3 times daily thereafter  Indications: Neck pain), Disp: 90 capsule, Rfl: 5    ibuprofen (MOTRIN) 200 mg tablet, Take 200 mg by mouth every 6 (six) hours as needed for moderate pain. Indications: Pain, Disp: , Rfl:     oxybutynin (DITROPAN-XL) 10 MG 24 hr tablet, Take 1 tablet (10 mg total) by mouth daily, Disp: 90 tablet, Rfl: 3    rosuvastatin (CRESTOR) 5 mg tablet, Take 1 tablet (5 mg total) by mouth daily, Disp: 90 tablet, Rfl: 3    senna (SENOKOT) 8.6 mg, Take 8.6 mg by mouth if needed for constipation. Daily as needed for constipation  Indications: Constipation, Disp: , Rfl:     SYRINGE-NEEDLE, DISP, 3 ML 25G X 5/8\" 3 ML MISC, Use once a week Use syringes for B12 injections once a week for 3 weeks, then once a month for 5 months., Disp: 8 each, Rfl: 0    Current Facility-Administered Medications:     cyanocobalamin injection 1,000 mcg, 1,000 mcg, Intramuscular, Q30 Days, , 1,000 mcg at 03/21/25 1042    Review of Systems   Constitutional:  Negative for chills and fever.   HENT:  Negative for congestion.    Respiratory:  Negative for cough.    Musculoskeletal:  Positive for gait problem (Functional quadriplegia).        Functional quadriplegia    Skin:  Positive for wound (Bilateral ischia).        B/l ischium   Neurological:  Positive for weakness (Functional quadriplegia) and numbness.   Psychiatric/Behavioral:  Negative for dysphoric mood. The patient is not nervous/anxious.        Objective:  /70   Pulse 84   Resp 14         Physical Exam  Constitutional:       General: She is awake.   HENT:      Head: Normocephalic and atraumatic.   Cardiovascular:      Rate and Rhythm: Normal rate.   Pulmonary:      Effort: Pulmonary effort is normal. No respiratory distress.   Skin:     General: Skin is warm and dry.      Findings: Wound present. No erythema.       "       Comments: 1. left ischial pressure injury with extensive hypergranulation tissue centrally without any attachment to the walls.  Centrally, bone is palpable but not exposed.  Friable hypergranulation tissue.  No malodor or signs of infection.  2. R ischial pressure injury with very small opening with surrounding maceration again.  Slightly less depth.  No evidence of infection.  Refer to wound assessment for additional details.   Neurological:      Mental Status: She is alert and oriented to person, place, and time.      Gait: Gait abnormal.      Comments: Quadriplegia secondary to MS   Psychiatric:         Mood and Affect: Mood normal.         Behavior: Behavior normal. Behavior is cooperative.               Wound 01/10/24 Pressure Injury Ischium Left;Posterior;Proximal (Active)   Wound Image Images linked 03/27/25 1159   Wound Description Hypergranulation;Granulation tissue;Slough 03/27/25 1131   Non-staged Wound Description Full thickness 03/27/25 1131   Wound Length (cm) 2.4 cm 03/27/25 1131   Wound Width (cm) 2.4 cm 03/27/25 1131   Wound Depth (cm) 0.7 cm 03/27/25 1131   Wound Surface Area (cm^2) 5.76 cm^2 03/27/25 1131   Wound Volume (cm^3) 4.032 cm^3 03/27/25 1131   Calculated Wound Volume (cm^3) 4.03 cm^3 03/27/25 1131   Undermining 1 1.5 03/27/25 1131   Undermining 1 is depth extending from 10-12 o'clock 03/27/25 1131   Drainage Amount Moderate 03/27/25 1131   Drainage Description Serosanguineous;Tan 03/27/25 1131   Ruchi-wound Assessment Intact;Maceration 03/27/25 1131   Dressing Status Intact 03/27/25 1131       Wound 01/29/24 Pressure Injury Ischium Right (Active)   Wound Image Images linked 03/27/25 1131   Wound Description Granulation tissue 03/27/25 1131   Non-staged Wound Description Full thickness 03/27/25 1131   Wound Length (cm) 0.1 cm 03/27/25 1131   Wound Width (cm) 0.5 cm 03/27/25 1131   Wound Depth (cm) 0.5 cm 03/27/25 1131   Wound Surface Area (cm^2) 0.05 cm^2 03/27/25 1131   Wound  "Volume (cm^3) 0.025 cm^3 03/27/25 1131   Calculated Wound Volume (cm^3) 0.03 cm^3 03/27/25 1131   Change in Wound Size % 94 03/27/25 1131   Drainage Amount Moderate 03/27/25 1131   Drainage Description Serosanguineous 03/27/25 1131   Ruchi-wound Assessment Callus;Maceration 03/27/25 1131   Dressing Status Intact 03/27/25 1131        Debridement   Wound 01/10/24 Pressure Injury Ischium Left;Posterior;Proximal     Date/Time: 3/27/2025 11:00 AM  Universal Protocol:  procedure performed by consultantConsent: Verbal consent obtained. Written consent obtained.  Consent given by: patient  Time out: Immediately prior to procedure a \"time out\" was called to verify the correct patient, procedure, equipment, support staff and site/side marked as required.  Patient understanding: patient states understanding of the procedure being performed  Patient identity confirmed: verbally with patient    Debridement Details  Performed by: physician  Debridement type: surgical  Level of debridement: subcutaneous tissue  Pain control: lidocaine 4%    Post-debridement measurements  Length (cm): 2.4  Width (cm): 2.4  Depth (cm): 0.8  Percent debrided: 100%  Surface Area (cm^2): 5.76  Area Debrided (cm^2): 5.76  Volume (cm^3): 4.61    Tissue and other material debrided: dermis, hypergranulation and subcutaneous tissue  Devitalized tissue debrided: fibrin  Instrument(s) utilized: curette  Bleeding: large  Hemostasis obtained with: pressure and silver nitrate  Procedural pain (0-10): 0  Post-procedural pain: 0   Response to treatment: procedure was tolerated well         Results from last 6 Months   Lab Units 02/20/25  1615   WOUND CULTURE  1+ Growth of Methicillin Resistant Staphylococcus aureus*  1+ Growth of       Lab Results   Component Value Date    HGBA1C 5.2 01/09/2024       Wound Instructions:  Orders Placed This Encounter   Procedures    Wound cleansing and dressings     Left and right ischial wounds:     Wash your hands with soap and " "water.  Remove old dressing, discard into plastic bag and place in trash. Cleanse the wounds with dakins. Pat dry. Do not use tissue or cotton balls. Do not scrub the wound. Pat dry using gauze.  Shower yes      Apply skin prep to skin surrounding wound     Left ischium:  Paint wound with betadine swab daily and cover with abd and medfix tape or silicone border     Right ischium:apply silicone border, change three times per week     VNA and  to continue with wound care dressing changes 3 times a week and as needed for excessive drainage        Off-loading Instructions:    Remove Meño sling from chair during the day.     Keep weight and pressure off wounds as much as possible.     Continue to use ROHO style cushion when sitting.     Try to lay in bed throughout the day to help offload pressure to wounds.     Standing Status:   Future     Expiration Date:   4/10/2025    Wound Procedure Treatment Pressure Injury Left;Posterior;Proximal Ischium     This order was created via procedure documentation    Wound Procedure Treatment Pressure Injury Right Ischium     This order was created via procedure documentation    Debridement     This order was created via procedure documentation             Homero Ramirez MD, CHT, CWS    Portions of the record may have been created with voice recognition software. Occasional wrong word or \"sound alike\" substitutions may have occurred due to the inherent limitations of voice recognition software. Read the chart carefully and recognize, using context, where substitutions have occurred.      "

## 2025-03-31 ENCOUNTER — HOME CARE VISIT (OUTPATIENT)
Dept: HOME HEALTH SERVICES | Facility: HOME HEALTHCARE | Age: 55
End: 2025-03-31
Payer: MEDICARE

## 2025-03-31 VITALS
OXYGEN SATURATION: 98 % | RESPIRATION RATE: 18 BRPM | DIASTOLIC BLOOD PRESSURE: 78 MMHG | SYSTOLIC BLOOD PRESSURE: 122 MMHG | TEMPERATURE: 97.7 F | HEART RATE: 78 BPM

## 2025-03-31 PROCEDURE — G0299 HHS/HOSPICE OF RN EA 15 MIN: HCPCS

## 2025-04-04 ENCOUNTER — TELEPHONE (OUTPATIENT)
Dept: NEUROLOGY | Facility: CLINIC | Age: 55
End: 2025-04-04

## 2025-04-04 DIAGNOSIS — G35 MULTIPLE SCLEROSIS (HCC): Primary | ICD-10-CM

## 2025-04-04 NOTE — TELEPHONE ENCOUNTER
Patient's spouse called the RX Refill Line. Message is being forwarded to the office.     Spouse is requesting a rx of prednisone for his wife. She is a little weak and prednisone always helps her. Please send to local pharmacy and call him as well.    [facility]/pharmacy #[id number] - [place] - [phone] [address]     Please contact patient at [phone]

## 2025-04-07 ENCOUNTER — HOME CARE VISIT (OUTPATIENT)
Dept: HOME HEALTH SERVICES | Facility: HOME HEALTHCARE | Age: 55
End: 2025-04-07
Payer: MEDICARE

## 2025-04-07 VITALS
TEMPERATURE: 97.9 F | OXYGEN SATURATION: 92 % | HEART RATE: 88 BPM | SYSTOLIC BLOOD PRESSURE: 96 MMHG | DIASTOLIC BLOOD PRESSURE: 62 MMHG

## 2025-04-07 PROCEDURE — G0299 HHS/HOSPICE OF RN EA 15 MIN: HCPCS

## 2025-04-07 NOTE — TELEPHONE ENCOUNTER
LOV - 11/12/24    Spoke w/ Mikey (on consent form). He reports patient is experiencing increased weakness and hypophonia   Started 1-2 weeks ago  No new tingling/numbness  No changes in bowel or bladder  No vision changes    Patient did have a UTI end of February. Rx'd Levaquin 500mg BID for 5 days    DMT - none  MRI brain MS w/wo - 10/2020     is requesting rx for oral dexamethasone. States we did rx this in the past. Unable to find any rx that was not IP/injection.     [phone] - Mikey; okay to leave detailed msg.     Dr. MCINTYRE - please advise. Thank you.

## 2025-04-08 RX ORDER — DEXAMETHASONE 2 MG/1
2 TABLET ORAL 2 TIMES DAILY WITH MEALS
Qty: 10 TABLET | Refills: 0 | Status: SHIPPED | OUTPATIENT
Start: 2025-04-08

## 2025-04-08 NOTE — TELEPHONE ENCOUNTER
Patient likely experiencing pseudorelapse due to recent UTI    Decadron 2 mg bid for total 5 days sent to [facility]

## 2025-04-08 NOTE — TELEPHONE ENCOUNTER
Called and LMOM requesting return call to the office to discuss. [employer] message sent with information

## 2025-04-10 ENCOUNTER — OFFICE VISIT (OUTPATIENT)
Dept: WOUND CARE | Facility: HOSPITAL | Age: 55
End: 2025-04-10
Payer: MEDICARE

## 2025-04-10 VITALS — SYSTOLIC BLOOD PRESSURE: 110 MMHG | TEMPERATURE: 97.5 F | DIASTOLIC BLOOD PRESSURE: 68 MMHG | HEART RATE: 76 BPM

## 2025-04-10 DIAGNOSIS — G35 FUNCTIONAL QUADRIPLEGIA SECONDARY TO MS (HCC): ICD-10-CM

## 2025-04-10 DIAGNOSIS — L89.314 PRESSURE INJURY OF RIGHT ISCHIUM, STAGE 4 (HCC): ICD-10-CM

## 2025-04-10 DIAGNOSIS — R53.2 FUNCTIONAL QUADRIPLEGIA SECONDARY TO MS (HCC): ICD-10-CM

## 2025-04-10 DIAGNOSIS — L89.324 PRESSURE INJURY OF LEFT ISCHIUM, STAGE 4 (HCC): Primary | ICD-10-CM

## 2025-04-10 PROCEDURE — 11042 DBRDMT SUBQ TIS 1ST 20SQCM/<: CPT | Performed by: FAMILY MEDICINE

## 2025-04-10 NOTE — PROGRESS NOTES
Patient ID: Fanny Schulz is a 55 y.o. female Date of Birth 1970       Chief Complaint   Patient presents with    Follow Up Wound Care Visit     Ischium wounds.       Allergies:  Latex    Diagnosis:      Diagnosis ICD-10-CM Associated Orders   1. Pressure injury of left ischium, stage 4 ([facility])  L89.324 Wound cleansing and dressings     Wound Procedure Treatment Pressure Injury Left;Posterior;Proximal Ischium     Wound Procedure Treatment Pressure Injury Right Ischium      2. Pressure injury of right ischium, stage 4 ([facility])  L89.314 Wound cleansing and dressings     Wound Procedure Treatment Pressure Injury Left;Posterior;Proximal Ischium     Wound Procedure Treatment Pressure Injury Right Ischium      3. Functional quadriplegia secondary to MS ([facility])  G35     R53.2               Assessment & Plan:  Stage IV pressure injury of the right ischium and stage IV pressure injury of the left ischium.  The left has significant undermining.  The left is slightly smaller and much less hypergranulation tissue.  The right is about the same.  Surgical debridement of both.  This was followed by silver nitrate.  Continue with Betadine solution to the left with bordered foam.  Follow-up about 3 weeks.  Discussed with patient and her  that I would recommend appointment with [facility] to possibly do pressure mapping and evaluate her Roho cushions.         Subjective:   4/10/2025: Follow-up stage IV pressure injuries to the left and right ischium.  Patient notes no further pain.  At last visit significant hypergranulation tissue was debrided and then subsequently cauterized.  Betadine has been used along with bordered foam.  No other complaints.    3/27/2025: Follow-up stage III pressure injury of the left ischium and stage IV pressure injury of the right ischium.   states that they felt the Hydrofera Blue classic was causing some discomfort and therefore discontinued this.  They went back to  silver alginate.  She continues to spend almost all of her time in the chair and tends to sleep in the chair frequently.    2/20/2025: Follow-up stage III pressure injuries of the left ischium and stage IV of the right ischium.   does not believe that there is been any improvement.  At last visit she had hypergranulation tissue cauterized.    1/30/2025: Follow-up bilateral  pressure injuries of the ischii.  Culture that was done at last visit was positive for 1+ MRSA.  However, she was just started on Bactrim for UTI which covered her MRSA.   believes that the ulcer has improved.    1/16/2025: Follow-up bilateral stage IV pressure injuries of the ischium.  Received [facility] message concerned about increased brown seropurulent drainage on the left.  Patient did not have any other systemic symptoms.  Appointment made for today.  Patient denies any fever or chills.    9/16/24: Patient presents to wound care center for follow-up visit of bilateral ischial wounds.  Patient has been using Mesalt to the wounds with bordered foam dressings to cover.  Patient is accompanied by her  who provides to wound care.  No wound care related complaints offered.  Denies increased pain or drainage to the wounds.  No fever or chills.    10/7/24: Patient presents to wound care center for follow-up visit bilateral ischial wounds.  Patient has been using Aquacel Ag ribbon to the wounds and bordered foam dressings.  Patient is accompanied by her  who provides the wound care.  No wound care related complaints offered today.  No reported increased pain or drainage related to the wounds.  No fever or chills.  Patient's  reports that patient spends majority of her day out of bed in wheelchair, patient does have offloading seating cushion to her wheelchair.  Patient states that it is more comfortable for her to be in her wheelchair then in bed.    12/5/2024: Follow-up of bilateral stage IV ischial pressure injuries.   "Ran out of [employer] rope and now is back to using Mesalt.  Patient is not using her air mattress bed and spends her time sitting in her chair.  She even sleeps there.  No other new complaints.  As noted above, she feels more comfortable in her wheelchair than in her bed.        The following portions of the patient's history were reviewed and updated as appropriate:   Patient Active Problem List   Diagnosis    Suprapubic catheter (HCC)    Bladder calculus    Constipation    Recurrent major depressive disorder, in full remission (HCC)    Dysphagia    Dizziness    Hyperglycemia    Familial hypercholesterolemia    Lymphedema    Multiple sclerosis (HCC)    Muscle spasticity    Muscle weakness    Nephrolithiasis    Neurogenic bladder    Sleep disorder    Spinal stenosis of cervical region    Tremor    Urinary incontinence    Vision loss    Vitamin B12 deficiency    Vitamin D deficiency    Functional quadriplegia secondary to MS (HCC)    Allergic rhinitis    Screening mammogram, encounter for    Medication management contract signed    Pressure injury of right ischium, stage 4 (HCC)    Thrombocytopenia (HCC)    Serum total bilirubin elevated    Hydronephrosis with urinary obstruction due to ureteral calculus    Candidal vaginitis    Alkaline phosphatase elevation    Abnormal thyroid function test    Ureteral calculus, left    Increased endometrial stripe thickness    Peripheral edema    Tinnitus of both ears    Hypophonia    Chronic lower extremity edema    Positive colorectal cancer screening using Cologuard test    RSV infection    Functional quadriplegia (HCC)    Pressure injury of left ischium, stage 4 (HCC)     Past Medical History:   Diagnosis Date    Anesthesia     \"prefers if able to be put to sleep on stretcher before placed on table if possible due to severe spasticity    Bladder stones     Chronic pain disorder     neck    Contracture, right shoulder     right arm and hand    Dependent on wheelchair     " "motorized    Edema     dependant lower extremities    Elevated alkaline phosphatase level     Mildly elevated total, normal isoenzymes 4/15/15, normal 1/17; last assessed: [id number]    Fernandez catheter in place     #24 to large bag(silicone catheter)    Gallbladder disease     History of femur fracture     right leg    History of UTI     MS (multiple sclerosis) ([facility])     Muscle spasticity     especially with touch    Muscle weakness     Muscular dystrophy ([facility])     Neck pain     Neurogenic bladder     Paralysis ([facility])     bilateral lower extremities    Port-A-Cath in place     left chest,\" hasn't used in approx 1 yr or so\"    Pressure injury of skin     right ischium    Sacral pressure sore     \"shearing, tegaderm in place,\"    Swallowing difficulty     Tinnitus     Urinary incontinence      Past Surgical History:   Procedure Laterality Date    BLADDER STONE REMOVAL N/A 12/4/2017    Procedure: CYSTO, LITHOLOPAXY HOLMIUM LASER;  Surgeon: Damir Osborne MD;  Location: [facility];  Service: Urology    BLADDER SURGERY      CHOLECYSTECTOMY      CYSTOSCOPY  06/15/2020    ESOPHAGOGASTRODUODENOSCOPY      FL RETROGRADE PYELOGRAM  10/2/2020    FL RETROGRADE PYELOGRAM  11/3/2020    KIDNEY STONE SURGERY      PORTACATH PLACEMENT Left     NE CYSTO BLADDER W/URETERAL CATHETERIZATION Left 10/2/2020    Procedure: CYSTOSCOPY LEFT RETROGRADE ; LEFT URETEROSCOPY; PYELOGRAM WITH STENT INSERTION AND SUPERPUBIC EXCHANGE;  Surgeon: Philip Mcdonald MD;  Location: [facility];  Service: Urology    NE CYSTO/URETERO W/LITHOTRIPSY &INDWELL STENT INSRT Left 11/3/2020    Procedure: CYSTOSCOPY URETEROSCOPY WITH RETROGRADE PYELOGRAM AND EXCHANGE STENT URETERAL, SP TUBE EXCHANGE, BASKET STONE EXTRACTION, BLADDER STONE EXTRACTION;  Surgeon: Damir Osborne MD;  Location: [facility];  Service: Urology    NE LITHOLAPAXY SMPL/SM <2.5 CM N/A 12/22/2022    Procedure: Cystolitholapaxy w/  laser lithotripsy of bladder stones; SUPRAPUBIC CATHETER CHANGE;  " Surgeon: Damir Osborne MD;  Location: [facility] OR;  Service: Urology    SUPRAPUBIC CYSTOSTOMY  02/25/2014    last assessed: [id number]     Family History   Problem Relation Age of Onset    Diabetes Mother     Hyperlipidemia Mother     Hypertension Mother     COPD Father     Hypertension Father     Hyperlipidemia Sister     Alzheimer's disease Family     Diabetes Family     Hypertension Family     Heart disease Family       Social History     Socioeconomic History    Marital status: /Civil Union     Spouse name: Not on file    Number of children: Not on file    Years of education: Not on file    Highest education level: Not on file   Occupational History    Not on file   Tobacco Use    Smoking status: Never    Smokeless tobacco: Never   Vaping Use    Vaping status: Never Used   Substance and Sexual Activity    Alcohol use: Not Currently    Drug use: Yes    Sexual activity: Yes   Other Topics Concern    Not on file   Social History Narrative    Caffeine use    Currently on disability    Daily coffee consumption (2 cups/day)    Educational level- completed 2nd year college    Lives with adult children    Not currently employed    Wheelchair dependent      Social Drivers of Health     Financial Resource Strain: Low Risk  (2/20/2023)    Overall Financial Resource Strain (CARDIA)     Difficulty of Paying Living Expenses: Not hard at all   Food Insecurity: No Food Insecurity (12/20/2024)    Nursing - Inadequate Food Risk Classification     Worried About Running Out of Food in the Last Year: Never true     Ran Out of Food in the Last Year: Never true     Ran Out of Food in the Last Year: Never true   Transportation Needs: No Transportation Needs (12/23/2024)    OASIS : Transportation     Lack of Transportation (Medical): No     Lack of Transportation (Non-Medical): No     Patient Unable or Declines to Respond: No   Physical Activity: Not on file   Stress: Not on file   Social Connections: Not on file    Intimate Partner Violence: Unknown (2024)    Nursing IPS     Feels Physically and Emotionally Safe: Not on file     Physically Hurt by Someone: Not on file     Humiliated or Emotionally Abused by Someone: Not on file     Physically Hurt by Someone: No     Hurt or Threatened by Someone: No   Housing Stability: Unknown (2024)    Nursing: Inadequate Housing Risk Classification     Has Housing: Not on file     Worried About Losing Housing: Not on file     Unable to Get Utilities: Not on file     Unable to Pay for Housing in the Last Year: No     Has Housin        Current Outpatient Medications:     acetaminophen (TYLENOL) 500 mg tablet, Take 500 mg by mouth every 6 (six) hours as needed for mild pain. Indications: Pain, Disp: , Rfl:     Alpha-D-Galactosidase (Beano) TABS, Take 1 tablet by mouth if needed (GI bloating). As needed before each meal that may cause bloating.   Indications: GI bloating, Disp: , Rfl:     baclofen 20 mg tablet, TAKE 1 TABLET BY MOUTH 5 TIMES AS NEEDED, Disp: 150 tablet, Rfl: 11    Cyanocobalamin (B-12) 1000 MCG SUBL, Dissolve 1 tablet under tongue daily, Disp: 30 tablet, Rfl: 5    dexamethasone (DECADRON) 2 mg tablet, Take 1 tablet (2 mg total) by mouth 2 (two) times a day with meals, Disp: 10 tablet, Rfl: 0    DULoxetine (CYMBALTA) 20 mg capsule, Take 1 capsule (20 mg total) by mouth daily, Disp: 90 capsule, Rfl: 3    furosemide (LASIX) 20 mg tablet, Take 1 tablet (20 mg total) by mouth daily, Disp: 90 tablet, Rfl: 3    gabapentin (NEURONTIN) 100 mg capsule, Take 1 tablet at bedtime tonight, 1 tablet twice a day tomorrow, and then 1 tablet 3 times daily thereafter (Patient taking differently: Take 300 mg by mouth. Take 1 tablet orally at bedtime tonight, 1 tablet twice a day tomorrow, and then 1 tablet 3 times daily thereafter  Indications: Neck pain), Disp: 90 capsule, Rfl: 5    ibuprofen (MOTRIN) 200 mg tablet, Take 200 mg by mouth every 6 (six) hours as needed for moderate  "pain. Indications: Pain, Disp: , Rfl:     oxybutynin (DITROPAN-XL) 10 MG 24 hr tablet, Take 1 tablet (10 mg total) by mouth daily, Disp: 90 tablet, Rfl: 3    rosuvastatin (CRESTOR) 5 mg tablet, Take 1 tablet (5 mg total) by mouth daily, Disp: 90 tablet, Rfl: 3    senna (SENOKOT) 8.6 mg, Take 8.6 mg by mouth if needed for constipation. Daily as needed for constipation  Indications: Constipation, Disp: , Rfl:     SYRINGE-NEEDLE, DISP, 3 ML 25G X 5/8\" 3 ML MISC, Use once a week Use syringes for B12 injections once a week for 3 weeks, then once a month for 5 months., Disp: 8 each, Rfl: 0    Current Facility-Administered Medications:     cyanocobalamin injection 1,000 mcg, 1,000 mcg, Intramuscular, Q30 Days, , 1,000 mcg at 03/21/25 1042    Review of Systems   Constitutional:  Negative for chills and fever.   HENT:  Negative for congestion.    Respiratory:  Negative for cough.    Musculoskeletal:  Positive for gait problem (Functional quadriplegia).        Functional quadriplegia    Skin:  Positive for wound (Bilateral ischia).        B/l ischium   Neurological:  Positive for weakness (Functional quadriplegia) and numbness.   Psychiatric/Behavioral:  Negative for dysphoric mood. The patient is not nervous/anxious.        Objective:  /68   Pulse 76   Temp 97.5 °F (36.4 °C)   Pain Score: 0-No pain     Physical Exam  Constitutional:       General: She is awake.   HENT:      Head: Normocephalic and atraumatic.   Cardiovascular:      Rate and Rhythm: Normal rate.   Pulmonary:      Effort: Pulmonary effort is normal. No respiratory distress.   Skin:     General: Skin is warm and dry.      Findings: Wound present. No erythema.             Comments: 1. left ischial pressure injury with much less hypergranulation tissue centrally without any attachment to the walls.  Centrally, bone is palpable but not exposed.   No malodor or signs of infection.  2. R ischial pressure injury with very small opening with surrounding " maceration again.  Slightly less depth.  No evidence of infection.  Refer to wound assessment for additional details.   Neurological:      Mental Status: She is alert and oriented to person, place, and time.      Gait: Gait abnormal.      Comments: Quadriplegia secondary to MS   Psychiatric:         Mood and Affect: Mood normal.         Behavior: Behavior normal. Behavior is cooperative.               Wound 01/10/24 Pressure Injury Ischium Left;Posterior;Proximal (Active)   Wound Image Images linked 04/10/25 0831   Wound Description Hypergranulation;Granulation tissue;Slough 04/10/25 0810   Non-staged Wound Description Full thickness 04/10/25 0810   Wound Length (cm) 1.9 cm 04/10/25 0810   Wound Width (cm) 2 cm 04/10/25 0810   Wound Depth (cm) 0.9 cm 04/10/25 0810   Wound Surface Area (cm^2) 3.8 cm^2 04/10/25 0810   Wound Volume (cm^3) 3.42 cm^3 04/10/25 0810   Calculated Wound Volume (cm^3) 3.42 cm^3 04/10/25 0810   Undermining 1 1 04/10/25 0810   Undermining 1 is depth extending from 10-12 04/10/25 0810   Drainage Amount Large 04/10/25 0810   Drainage Description Serosanguineous;Tan 04/10/25 0810   Ruchi-wound Assessment Intact 04/10/25 0810   Dressing Status Intact 04/10/25 0810       Wound 01/29/24 Pressure Injury Ischium Right (Active)   Wound Image Images linked 04/10/25 0830   Wound Description Granulation tissue 04/10/25 0813   Non-staged Wound Description Full thickness 04/10/25 0813   Wound Length (cm) 0.1 cm 04/10/25 0813   Wound Width (cm) 0.3 cm 04/10/25 0813   Wound Depth (cm) 0.5 cm 04/10/25 0813   Wound Surface Area (cm^2) 0.03 cm^2 04/10/25 0813   Wound Volume (cm^3) 0.015 cm^3 04/10/25 0813   Calculated Wound Volume (cm^3) 0.02 cm^3 04/10/25 0813   Change in Wound Size % 96 04/10/25 0813   Drainage Amount Moderate 04/10/25 0813   Drainage Description Serosanguineous 04/10/25 0813   Ruchi-wound Assessment Intact 04/10/25 0813   Dressing Status Intact 04/10/25 0813        Debridement   Wound 01/10/24  "Pressure Injury Ischium Left;Posterior;Proximal     Date/Time: 4/10/2025 8:00 AM  Universal Protocol:  procedure performed by consultantConsent: Verbal consent obtained. Written consent obtained.  Consent given by: patient  Time out: Immediately prior to procedure a \"time out\" was called to verify the correct patient, procedure, equipment, support staff and site/side marked as required.  Patient understanding: patient states understanding of the procedure being performed  Patient identity confirmed: verbally with patient    Debridement Details  Performed by: physician  Debridement type: surgical  Level of debridement: subcutaneous tissue  Pain control: lidocaine 4%    Post-debridement measurements  Length (cm): 1.9  Width (cm): 2  Depth (cm): 1  Percent debrided: 100%  Surface Area (cm^2): 3.8  Area Debrided (cm^2): 3.8  Volume (cm^3): 3.8    Tissue and other material debrided: dermis, hypergranulation and subcutaneous tissue  Devitalized tissue debrided: fibrin  Instrument(s) utilized: curette  Bleeding: large  Hemostasis obtained with: pressure and silver nitrate  Procedural pain (0-10): 0  Post-procedural pain: 0   Response to treatment: procedure was tolerated well    Debridement   Wound 01/29/24 Pressure Injury Ischium Right     Date/Time: 4/10/2025 8:00 AM  Universal Protocol:  procedure performed by consultantConsent: Verbal consent obtained. Written consent obtained.  Consent given by: patient  Time out: Immediately prior to procedure a \"time out\" was called to verify the correct patient, procedure, equipment, support staff and site/side marked as required.  Patient understanding: patient states understanding of the procedure being performed  Patient identity confirmed: verbally with patient    Debridement Details  Performed by: physician  Debridement type: surgical  Level of debridement: subcutaneous tissue  Pain control: lidocaine 4%    Post-debridement measurements  Length (cm): 0.1  Width (cm): " 0.3  Depth (cm): 0.6  Percent debrided: 100%  Surface Area (cm^2): 0.03  Area Debrided (cm^2): 0.03  Volume (cm^3): 0.02    Tissue and other material debrided: dermis and subcutaneous tissue  Devitalized tissue debrided: fibrin  Instrument(s) utilized: curette  Bleeding: medium  Hemostasis obtained with: pressure and silver nitrate  Procedural pain (0-10): 0  Post-procedural pain: 0   Response to treatment: procedure was tolerated well               Results from last 6 Months   Lab Units 02/20/25  1615   WOUND CULTURE  1+ Growth of Methicillin Resistant Staphylococcus aureus*  1+ Growth of       Lab Results   Component Value Date    HGBA1C 5.2 01/09/2024       Wound Instructions:  Orders Placed This Encounter   Procedures    Wound cleansing and dressings     Wound cleansing and dressings  :Left and right ischial wounds:     Wash your hands with soap and water.  Remove old dressing, discard into plastic bag and place in trash. Cleanse the wounds with dakins. Pat dry. Do not use tissue or cotton balls. Do not scrub the wound. Pat dry using gauze.  Shower yes      Apply skin prep to skin surrounding wound     Left ischium:  Paint wound with betadine swab daily and cover with abd and medfix tape or silicone border     Right ischium:apply silicone border, change three times per week     VNA and  to continue with wound care dressing changes 3 times a week and as needed for excessive drainage    Provider used Silver Nitrate today after debridement. The wound will appear grayish from this.        Off-loading Instructions:     Remove Meño sling from chair during the day.     Keep weight and pressure off wounds as much as possible.     Continue to use ROHO style cushion when sitting.     Try to lay in bed throughout the day to help offload pressure to wounds.     Standing Status:   Future     Expiration Date:   4/17/2025    Wound Procedure Treatment Pressure Injury Left;Posterior;Proximal Ischium     This order was  "created via procedure documentation    Wound Procedure Treatment Pressure Injury Right Ischium     This order was created via procedure documentation             Homero Ramirez MD, CHT, CWS    Portions of the record may have been created with voice recognition software. Occasional wrong word or \"sound alike\" substitutions may have occurred due to the inherent limitations of voice recognition software. Read the chart carefully and recognize, using context, where substitutions have occurred.      "

## 2025-04-10 NOTE — PATIENT INSTRUCTIONS
Orders Placed This Encounter   Procedures    Wound cleansing and dressings     Wound cleansing and dressings  :Left and right ischial wounds:     Wash your hands with soap and water.  Remove old dressing, discard into plastic bag and place in trash. Cleanse the wounds with dakins. Pat dry. Do not use tissue or cotton balls. Do not scrub the wound. Pat dry using gauze.  Shower yes      Apply skin prep to skin surrounding wound     Left ischium:  Paint wound with betadine swab daily and cover with abd and medfix tape or silicone border     Right ischium:apply silicone border, change three times per week     VNA and  to continue with wound care dressing changes 3 times a week and as needed for excessive drainage    Provider used Silver Nitrate today after debridement. The wound will appear grayish from this.        Off-loading Instructions:     Remove Meño sling from chair during the day.     Keep weight and pressure off wounds as much as possible.     Continue to use ROHO style cushion when sitting.     Try to lay in bed throughout the day to help offload pressure to wounds.     Standing Status:   Future     Expiration Date:   4/17/2025

## 2025-04-10 NOTE — PROGRESS NOTES
Wound Procedure Treatment Pressure Injury Left;Posterior;Proximal Ischium    Performed by: Ayla Corrigan RN  Authorized by: Homero Ramirez MD  Associated wounds:   Wound 01/10/24 Pressure Injury Ischium Left;Posterior;Proximal    Wound cleansed with:  NSS   Applied topical:  Betadine   Applied primary dressing:  Silicone bordered foam   Wound Procedure Treatment Pressure Injury Right Ischium    Performed by: Ayla Corrigan RN  Authorized by: Homero Ramirez MD  Associated wounds:   Wound 01/29/24 Pressure Injury Ischium Right    Wound cleansed with:  NSS   Applied to periwound:  Skin prep   Applied primary dressing:  Silicone bordered foam

## 2025-04-11 ENCOUNTER — HOME CARE VISIT (OUTPATIENT)
Dept: HOME HEALTH SERVICES | Facility: HOME HEALTHCARE | Age: 55
End: 2025-04-11
Payer: MEDICARE

## 2025-04-14 ENCOUNTER — HOME CARE VISIT (OUTPATIENT)
Dept: HOME HEALTH SERVICES | Facility: HOME HEALTHCARE | Age: 55
End: 2025-04-14
Payer: MEDICARE

## 2025-04-14 ENCOUNTER — TELEPHONE (OUTPATIENT)
Dept: LAB | Facility: HOSPITAL | Age: 55
End: 2025-04-14

## 2025-04-14 VITALS
HEART RATE: 88 BPM | SYSTOLIC BLOOD PRESSURE: 96 MMHG | DIASTOLIC BLOOD PRESSURE: 60 MMHG | OXYGEN SATURATION: 98 % | TEMPERATURE: 98.2 F

## 2025-04-14 PROCEDURE — G0299 HHS/HOSPICE OF RN EA 15 MIN: HCPCS

## 2025-04-21 ENCOUNTER — HOME CARE VISIT (OUTPATIENT)
Dept: HOME HEALTH SERVICES | Facility: HOME HEALTHCARE | Age: 55
End: 2025-04-21
Payer: MEDICARE

## 2025-04-21 ENCOUNTER — RESULTS FOLLOW-UP (OUTPATIENT)
Dept: FAMILY MEDICINE CLINIC | Facility: CLINIC | Age: 55
End: 2025-04-21

## 2025-04-21 ENCOUNTER — APPOINTMENT (OUTPATIENT)
Dept: LAB | Facility: HOSPITAL | Age: 55
End: 2025-04-21
Payer: MEDICARE

## 2025-04-21 VITALS
DIASTOLIC BLOOD PRESSURE: 60 MMHG | TEMPERATURE: 98.2 F | SYSTOLIC BLOOD PRESSURE: 108 MMHG | HEART RATE: 73 BPM | OXYGEN SATURATION: 100 %

## 2025-04-21 DIAGNOSIS — E83.10 DISORDER OF IRON METABOLISM: Primary | ICD-10-CM

## 2025-04-21 PROCEDURE — G0299 HHS/HOSPICE OF RN EA 15 MIN: HCPCS

## 2025-04-21 PROCEDURE — 83020 HEMOGLOBIN ELECTROPHORESIS: CPT

## 2025-04-22 NOTE — TELEPHONE ENCOUNTER
2nd attempt to reach patient. LMOM for patient to call back to schedule lab results follow up visit.

## 2025-04-22 NOTE — TELEPHONE ENCOUNTER
----- Message from Nacho Monroy DO sent at 4/22/2025  7:12 AM EDT -----  Please have patient set up follow-up appointment in the next 1 to 2 weeks

## 2025-04-23 LAB
HGB A MFR BLD: 2 % (ref 1.8–3.2)
HGB A MFR BLD: 98 % (ref 96.4–98.8)
HGB F MFR BLD: 0 % (ref 0–2)
HGB FRACT BLD-IMP: NORMAL
HGB S MFR BLD: 0 %

## 2025-04-24 ENCOUNTER — RESULTS FOLLOW-UP (OUTPATIENT)
Dept: FAMILY MEDICINE CLINIC | Facility: CLINIC | Age: 55
End: 2025-04-24

## 2025-04-28 ENCOUNTER — HOME CARE VISIT (OUTPATIENT)
Dept: HOME HEALTH SERVICES | Facility: HOME HEALTHCARE | Age: 55
End: 2025-04-28
Payer: MEDICARE

## 2025-04-28 PROCEDURE — G0299 HHS/HOSPICE OF RN EA 15 MIN: HCPCS

## 2025-04-29 VITALS
OXYGEN SATURATION: 100 % | DIASTOLIC BLOOD PRESSURE: 68 MMHG | TEMPERATURE: 97.6 F | RESPIRATION RATE: 18 BRPM | HEART RATE: 86 BPM | SYSTOLIC BLOOD PRESSURE: 128 MMHG

## 2025-05-01 ENCOUNTER — HOME CARE VISIT (OUTPATIENT)
Dept: HOME HEALTH SERVICES | Facility: HOME HEALTHCARE | Age: 55
End: 2025-05-01
Payer: MEDICARE

## 2025-05-01 ENCOUNTER — OFFICE VISIT (OUTPATIENT)
Dept: WOUND CARE | Facility: HOSPITAL | Age: 55
End: 2025-05-01
Payer: MEDICARE

## 2025-05-01 VITALS — SYSTOLIC BLOOD PRESSURE: 100 MMHG | DIASTOLIC BLOOD PRESSURE: 60 MMHG | HEART RATE: 90 BPM | RESPIRATION RATE: 16 BRPM

## 2025-05-01 DIAGNOSIS — G35 FUNCTIONAL QUADRIPLEGIA SECONDARY TO MS (HCC): ICD-10-CM

## 2025-05-01 DIAGNOSIS — R53.2 FUNCTIONAL QUADRIPLEGIA SECONDARY TO MS (HCC): ICD-10-CM

## 2025-05-01 DIAGNOSIS — G35 MULTIPLE SCLEROSIS (HCC): ICD-10-CM

## 2025-05-01 DIAGNOSIS — L89.324 PRESSURE INJURY OF LEFT ISCHIUM, STAGE 4 (HCC): ICD-10-CM

## 2025-05-01 DIAGNOSIS — L89.314 PRESSURE INJURY OF RIGHT ISCHIUM, STAGE 4 (HCC): Primary | ICD-10-CM

## 2025-05-01 PROCEDURE — 11042 DBRDMT SUBQ TIS 1ST 20SQCM/<: CPT | Performed by: FAMILY MEDICINE

## 2025-05-01 RX ORDER — LIDOCAINE 40 MG/G
CREAM TOPICAL ONCE
Status: COMPLETED | OUTPATIENT
Start: 2025-05-01 | End: 2025-05-01

## 2025-05-01 RX ADMIN — LIDOCAINE: 40 CREAM TOPICAL at 14:10

## 2025-05-01 NOTE — PROGRESS NOTES
Wound Procedure Treatment Pressure Injury Left;Posterior;Proximal Ischium    Performed by: Ingris Miranda RN  Authorized by: Homero Ramirez MD  Associated wounds:   Wound 01/10/24 Pressure Injury Ischium Left;Posterior;Proximal    Wound cleansed with:  NSS   Applied topical:  Betadine   Applied primary dressing:  Silicone bordered foam

## 2025-05-01 NOTE — PROGRESS NOTES
Patient ID: Fanny Schulz is a 55 y.o. female Date of Birth 1970       Chief Complaint   Patient presents with    Follow Up Wound Care Visit     Pressure ulcer to bilateral ischium       Allergies:  Latex    Diagnosis:      Diagnosis ICD-10-CM Associated Orders   1. Pressure injury of right ischium, stage 4 ([facility])  L89.314 lidocaine (LMX) 4 % cream     Wound cleansing and dressings     Wound Procedure Treatment Pressure Injury Right Ischium      2. Pressure injury of left ischium, stage 4 ([facility])  L89.324       3. Functional quadriplegia secondary to MS ([facility])  G35 Wound cleansing and dressings    R53.2 Wound Procedure Treatment Pressure Injury Left;Posterior;Proximal Ischium      4. Multiple sclerosis ([facility])  G35 Wound cleansing and dressings     Wound Procedure Treatment Pressure Injury Right Ischium              Assessment & Plan:  Stage IV pressure injury of the right ischium.  Much less hypergranulation tissue.  Still with surrounding rim of unattached skin.  No exposed bone.  Surgical debridement of the surrounding rim.  Continue with Betadine.  Cover with bordered foam.  Stage IV pressure injury of the left ischium.  No significant change.  No probe to bone.  Surgical debridement.  Packed with Endoform AM today only.  Cover with bordered foam.         Subjective:   5/1/2025: Follow-up stage IV pressure injuries of the left and right ischium.  Has been using Betadine and bordered foam to the left ischium with hypergranulation tissue.  No new complaints or problems.  Still spending time in chair.    4/10/2025: Follow-up stage IV pressure injuries to the left and right ischium.  Patient notes no further pain.  At last visit significant hypergranulation tissue was debrided and then subsequently cauterized.  Betadine has been used along with bordered foam.  No other complaints.    3/27/2025: Follow-up stage III pressure injury of the left ischium and stage IV pressure injury of the right ischium.    states that they felt the Hydrofera Blue classic was causing some discomfort and therefore discontinued this.  They went back to silver alginate.  She continues to spend almost all of her time in the chair and tends to sleep in the chair frequently.    2/20/2025: Follow-up stage III pressure injuries of the left ischium and stage IV of the right ischium.   does not believe that there is been any improvement.  At last visit she had hypergranulation tissue cauterized.    1/30/2025: Follow-up bilateral  pressure injuries of the ischii.  Culture that was done at last visit was positive for 1+ MRSA.  However, she was just started on Bactrim for UTI which covered her MRSA.   believes that the ulcer has improved.    1/16/2025: Follow-up bilateral stage IV pressure injuries of the ischium.  Received [facility] message concerned about increased brown seropurulent drainage on the left.  Patient did not have any other systemic symptoms.  Appointment made for today.  Patient denies any fever or chills.    9/16/24: Patient presents to wound care center for follow-up visit of bilateral ischial wounds.  Patient has been using Mesalt to the wounds with bordered foam dressings to cover.  Patient is accompanied by her  who provides to wound care.  No wound care related complaints offered.  Denies increased pain or drainage to the wounds.  No fever or chills.    10/7/24: Patient presents to wound care center for follow-up visit bilateral ischial wounds.  Patient has been using Aquacel Ag ribbon to the wounds and bordered foam dressings.  Patient is accompanied by her  who provides the wound care.  No wound care related complaints offered today.  No reported increased pain or drainage related to the wounds.  No fever or chills.  Patient's  reports that patient spends majority of her day out of bed in wheelchair, patient does have offloading seating cushion to her wheelchair.  Patient states that it is more  "comfortable for her to be in her wheelchair then in bed.    12/5/2024: Follow-up of bilateral stage IV ischial pressure injuries.  Ran out of [employer] rope and now is back to using Mesalt.  Patient is not using her air mattress bed and spends her time sitting in her chair.  She even sleeps there.  No other new complaints.  As noted above, she feels more comfortable in her wheelchair than in her bed.        The following portions of the patient's history were reviewed and updated as appropriate:   Patient Active Problem List   Diagnosis    Suprapubic catheter (HCC)    Bladder calculus    Constipation    Recurrent major depressive disorder, in full remission (HCC)    Dysphagia    Dizziness    Hyperglycemia    Familial hypercholesterolemia    Lymphedema    Multiple sclerosis (HCC)    Muscle spasticity    Muscle weakness    Nephrolithiasis    Neurogenic bladder    Sleep disorder    Spinal stenosis of cervical region    Tremor    Urinary incontinence    Vision loss    Vitamin B12 deficiency    Vitamin D deficiency    Functional quadriplegia secondary to MS (HCC)    Allergic rhinitis    Screening mammogram, encounter for    Medication management contract signed    Pressure injury of right ischium, stage 4 (HCC)    Thrombocytopenia (HCC)    Serum total bilirubin elevated    Hydronephrosis with urinary obstruction due to ureteral calculus    Candidal vaginitis    Alkaline phosphatase elevation    Abnormal thyroid function test    Ureteral calculus, left    Increased endometrial stripe thickness    Peripheral edema    Tinnitus of both ears    Hypophonia    Chronic lower extremity edema    Positive colorectal cancer screening using Cologuard test    RSV infection    Functional quadriplegia (HCC)    Pressure injury of left ischium, stage 4 (HCC)     Past Medical History:   Diagnosis Date    Anesthesia     \"prefers if able to be put to sleep on stretcher before placed on table if possible due to severe spasticity    Bladder " "stones     Chronic pain disorder     neck    Contracture, right shoulder     right arm and hand    Dependent on wheelchair     motorized    Edema     dependant lower extremities    Elevated alkaline phosphatase level     Mildly elevated total, normal isoenzymes 4/15/15, normal 1/17; last assessed: [id number]    Fernandez catheter in place     #24 to large bag(silicone catheter)    Gallbladder disease     History of femur fracture     right leg    History of UTI     MS (multiple sclerosis) ([facility])     Muscle spasticity     especially with touch    Muscle weakness     Muscular dystrophy (HCC)     Neck pain     Neurogenic bladder     Paralysis ([facility])     bilateral lower extremities    Port-A-Cath in place     left chest,\" hasn't used in approx 1 yr or so\"    Pressure injury of skin     right ischium    Sacral pressure sore     \"shearing, tegaderm in place,\"    Swallowing difficulty     Tinnitus     Urinary incontinence      Past Surgical History:   Procedure Laterality Date    BLADDER STONE REMOVAL N/A 12/4/2017    Procedure: CYSTO, LITHOLOPAXY HOLMIUM LASER;  Surgeon: Damir Osborne MD;  Location: [facility];  Service: Urology    BLADDER SURGERY      CHOLECYSTECTOMY      CYSTOSCOPY  06/15/2020    ESOPHAGOGASTRODUODENOSCOPY      FL RETROGRADE PYELOGRAM  10/2/2020    FL RETROGRADE PYELOGRAM  11/3/2020    KIDNEY STONE SURGERY      PORTACATH PLACEMENT Left     NC CYSTO BLADDER W/URETERAL CATHETERIZATION Left 10/2/2020    Procedure: CYSTOSCOPY LEFT RETROGRADE ; LEFT URETEROSCOPY; PYELOGRAM WITH STENT INSERTION AND SUPERPUBIC EXCHANGE;  Surgeon: Philip Mcdonald MD;  Location: [facility];  Service: Urology    NC CYSTO/URETERO W/LITHOTRIPSY &INDWELL STENT INSRT Left 11/3/2020    Procedure: CYSTOSCOPY URETEROSCOPY WITH RETROGRADE PYELOGRAM AND EXCHANGE STENT URETERAL, SP TUBE EXCHANGE, BASKET STONE EXTRACTION, BLADDER STONE EXTRACTION;  Surgeon: Damir Osborne MD;  Location: [facility];  Service: Urology    NC LITHOLAPAXY " SMPL/SM <2.5 CM N/A 12/22/2022    Procedure: Cystolitholapaxy w/  laser lithotripsy of bladder stones; SUPRAPUBIC CATHETER CHANGE;  Surgeon: Damir Osborne MD;  Location: [facility];  Service: Urology    SUPRAPUBIC CYSTOSTOMY  02/25/2014    last assessed: [id number]     Family History   Problem Relation Age of Onset    Diabetes Mother     Hyperlipidemia Mother     Hypertension Mother     COPD Father     Hypertension Father     Hyperlipidemia Sister     Alzheimer's disease Family     Diabetes Family     Hypertension Family     Heart disease Family       Social History     Socioeconomic History    Marital status: /Civil Union     Spouse name: Not on file    Number of children: Not on file    Years of education: Not on file    Highest education level: Not on file   Occupational History    Not on file   Tobacco Use    Smoking status: Never    Smokeless tobacco: Never   Vaping Use    Vaping status: Never Used   Substance and Sexual Activity    Alcohol use: Not Currently    Drug use: Yes    Sexual activity: Yes   Other Topics Concern    Not on file   Social History Narrative    Caffeine use    Currently on disability    Daily coffee consumption (2 cups/day)    Educational level- completed 2nd year college    Lives with adult children    Not currently employed    Wheelchair dependent      Social Drivers of Health     Financial Resource Strain: Low Risk  (2/20/2023)    Overall Financial Resource Strain (CARDIA)     Difficulty of Paying Living Expenses: Not hard at all   Food Insecurity: No Food Insecurity (12/20/2024)    Nursing - Inadequate Food Risk Classification     Worried About Running Out of Food in the Last Year: Never true     Ran Out of Food in the Last Year: Never true     Ran Out of Food in the Last Year: Never true   Transportation Needs: No Transportation Needs (12/23/2024)    OASIS : Transportation     Lack of Transportation (Medical): No     Lack of Transportation (Non-Medical): No      Patient Unable or Declines to Respond: No   Physical Activity: Not on file   Stress: Not on file   Social Connections: Not on file   Intimate Partner Violence: Unknown (2024)    Nursing IPS     Feels Physically and Emotionally Safe: Not on file     Physically Hurt by Someone: Not on file     Humiliated or Emotionally Abused by Someone: Not on file     Physically Hurt by Someone: No     Hurt or Threatened by Someone: No   Housing Stability: Unknown (2024)    Nursing: Inadequate Housing Risk Classification     Has Housing: Not on file     Worried About Losing Housing: Not on file     Unable to Get Utilities: Not on file     Unable to Pay for Housing in the Last Year: No     Has Housin        Current Outpatient Medications:     acetaminophen (TYLENOL) 500 mg tablet, Take 500 mg by mouth every 6 (six) hours as needed for mild pain. Indications: Pain, Disp: , Rfl:     Alpha-D-Galactosidase (Beano) TABS, Take 1 tablet by mouth if needed (GI bloating). As needed before each meal that may cause bloating.   Indications: GI bloating, Disp: , Rfl:     baclofen 20 mg tablet, TAKE 1 TABLET BY MOUTH 5 TIMES AS NEEDED, Disp: 150 tablet, Rfl: 11    Cyanocobalamin (B-12) 1000 MCG SUBL, Dissolve 1 tablet under tongue daily, Disp: 30 tablet, Rfl: 5    dexamethasone (DECADRON) 2 mg tablet, Take 1 tablet (2 mg total) by mouth 2 (two) times a day with meals, Disp: 10 tablet, Rfl: 0    DULoxetine (CYMBALTA) 20 mg capsule, Take 1 capsule (20 mg total) by mouth daily, Disp: 90 capsule, Rfl: 3    furosemide (LASIX) 20 mg tablet, Take 1 tablet (20 mg total) by mouth daily, Disp: 90 tablet, Rfl: 3    gabapentin (NEURONTIN) 100 mg capsule, Take 1 tablet at bedtime tonight, 1 tablet twice a day tomorrow, and then 1 tablet 3 times daily thereafter (Patient taking differently: Take 300 mg by mouth. Take 1 tablet orally at bedtime tonight, 1 tablet twice a day tomorrow, and then 1 tablet 3 times daily thereafter  Indications: Neck  "pain), Disp: 90 capsule, Rfl: 5    ibuprofen (MOTRIN) 200 mg tablet, Take 200 mg by mouth every 6 (six) hours as needed for moderate pain. Indications: Pain, Disp: , Rfl:     oxybutynin (DITROPAN-XL) 10 MG 24 hr tablet, Take 1 tablet (10 mg total) by mouth daily, Disp: 90 tablet, Rfl: 3    rosuvastatin (CRESTOR) 5 mg tablet, Take 1 tablet (5 mg total) by mouth daily, Disp: 90 tablet, Rfl: 3    senna (SENOKOT) 8.6 mg, Take 8.6 mg by mouth if needed for constipation. Daily as needed for constipation  Indications: Constipation, Disp: , Rfl:     SYRINGE-NEEDLE, DISP, 3 ML 25G X 5/8\" 3 ML MISC, Use once a week Use syringes for B12 injections once a week for 3 weeks, then once a month for 5 months., Disp: 8 each, Rfl: 0    Current Facility-Administered Medications:     cyanocobalamin injection 1,000 mcg, 1,000 mcg, Intramuscular, Q30 Days, , 1,000 mcg at 03/21/25 1042    Review of Systems   Constitutional:  Negative for chills and fever.   HENT:  Negative for congestion.    Respiratory:  Negative for cough.    Musculoskeletal:  Positive for gait problem (Functional quadriplegia).        Functional quadriplegia    Skin:  Positive for wound (Bilateral ischia).        B/l ischium   Neurological:  Positive for weakness (Functional quadriplegia) and numbness.   Psychiatric/Behavioral:  Negative for dysphoric mood. The patient is not nervous/anxious.        Objective:  /60   Pulse 90   Resp 16   Pain Score: 0-No pain     Physical Exam  Constitutional:       General: She is awake.   HENT:      Head: Normocephalic and atraumatic.   Cardiovascular:      Rate and Rhythm: Normal rate.   Pulmonary:      Effort: Pulmonary effort is normal. No respiratory distress.   Skin:     General: Skin is warm and dry.      Findings: Wound present. No erythema.             Comments: 1. left ischial pressure injury with less hypergranulation tissue centrally without any attachment to the walls.  Centrally, bone is palpable but not exposed.  "  No malodor or signs of infection.  2. R ischial pressure injury with very small opening with surrounding maceration again.  Depth slightly less.  No evidence of infection.  Refer to wound assessment for additional details.   Neurological:      Mental Status: She is alert and oriented to person, place, and time.      Gait: Gait abnormal.      Comments: Quadriplegia secondary to MS   Psychiatric:         Mood and Affect: Mood normal.         Behavior: Behavior normal. Behavior is cooperative.               Wound 01/10/24 Pressure Injury Ischium Left;Posterior;Proximal (Active)   Wound Image Images linked 05/01/25 1453   Wound Description Hypergranulation;Granulation tissue 05/01/25 1405   Pressure Injury Stage 3 05/01/25 1405   Non-staged Wound Description Full thickness 05/01/25 1405   Wound Length (cm) 2.5 cm 05/01/25 1405   Wound Width (cm) 2.4 cm 05/01/25 1405   Wound Depth (cm) 0.8 cm 05/01/25 1405   Wound Surface Area (cm^2) 6 cm^2 05/01/25 1405   Wound Volume (cm^3) 4.8 cm^3 05/01/25 1405   Calculated Wound Volume (cm^3) 4.8 cm^3 05/01/25 1405   Drainage Amount Moderate 05/01/25 1405   Drainage Description Serosanguineous;Tan 05/01/25 1405   Ruchi-wound Assessment Intact 05/01/25 1405   Dressing Status Intact 05/01/25 1405       Wound 01/29/24 Pressure Injury Ischium Right (Active)   Wound Image Images linked 05/01/25 1453   Wound Description Granulation tissue 05/01/25 1405   Non-staged Wound Description Full thickness 05/01/25 1405   Wound Length (cm) 0.3 cm 05/01/25 1405   Wound Width (cm) 0.4 cm 05/01/25 1405   Wound Depth (cm) 0.4 cm 05/01/25 1405   Wound Surface Area (cm^2) 0.12 cm^2 05/01/25 1405   Wound Volume (cm^3) 0.048 cm^3 05/01/25 1405   Calculated Wound Volume (cm^3) 0.05 cm^3 05/01/25 1405   Change in Wound Size % 90 05/01/25 1405   Drainage Amount Moderate 05/01/25 1405   Drainage Description Serosanguineous;Tan 05/01/25 1405   Ruchi-wound Assessment Maceration;White 05/01/25 1406   Dressing  "Status Intact 05/01/25 1405        Debridement   Wound 01/10/24 Pressure Injury Ischium Left;Posterior;Proximal     Date/Time: 5/1/2025 1:45 PM  Universal Protocol:  procedure performed by consultantConsent: Verbal consent obtained. Written consent obtained.  Consent given by: patient  Time out: Immediately prior to procedure a \"time out\" was called to verify the correct patient, procedure, equipment, support staff and site/side marked as required.  Patient understanding: patient states understanding of the procedure being performed  Patient identity confirmed: verbally with patient    Debridement Details  Performed by: physician  Debridement type: surgical  Level of debridement: subcutaneous tissue  Pain control: lidocaine 4%    Post-debridement measurements  Length (cm): 2.5  Width (cm): 2.4  Depth (cm): 0.9  Percent debrided: 100%  Surface Area (cm^2): 6  Area Debrided (cm^2): 6  Volume (cm^3): 5.4    Tissue and other material debrided: dermis and subcutaneous tissue  Devitalized tissue debrided: fibrin  Instrument(s) utilized: curette  Bleeding: large  Hemostasis obtained with: pressure  Procedural pain (0-10): 0  Post-procedural pain: 0   Response to treatment: procedure was tolerated well  Debridement Comments: All of the undermined/not adhered walls of the ulcer were debrided.  Debridement   Wound 01/29/24 Pressure Injury Ischium Right     Date/Time: 5/1/2025 1:45 PM  Universal Protocol:  procedure performed by consultantConsent: Verbal consent obtained. Written consent obtained.  Consent given by: patient  Time out: Immediately prior to procedure a \"time out\" was called to verify the correct patient, procedure, equipment, support staff and site/side marked as required.  Patient understanding: patient states understanding of the procedure being performed  Patient identity confirmed: verbally with patient    Debridement Details  Performed by: physician  Debridement type: surgical  Level of debridement: " subcutaneous tissue  Pain control: lidocaine 4%    Post-debridement measurements  Length (cm): 0.3  Width (cm): 0.4  Depth (cm): 0.5  Percent debrided: 100%  Surface Area (cm^2): 0.12  Area Debrided (cm^2): 0.12  Volume (cm^3): 0.06    Tissue and other material debrided: dermis and subcutaneous tissue  Devitalized tissue debrided: necrotic debris and slough  Instrument(s) utilized: curette  Bleeding: medium  Hemostasis obtained with: pressure  Procedural pain (0-10): 0  Post-procedural pain: 0   Response to treatment: procedure was tolerated well               Results from last 6 Months   Lab Units 02/20/25  1615   WOUND CULTURE  1+ Growth of Methicillin Resistant Staphylococcus aureus*  1+ Growth of       Lab Results   Component Value Date    HGBA1C 5.2 01/09/2024       Wound Instructions:  Orders Placed This Encounter   Procedures    Wound cleansing and dressings     Left and right ischial wounds:     Wash your hands with soap and water.  Remove old dressing, discard into plastic bag and place in trash. Cleanse the wounds with soap and water. Pat dry. Do not use tissue or cotton balls. Do not scrub the wound. Pat dry using gauze.  Shower yes      Apply skin prep to skin surrounding wound     Left ischium:  Paint wound with betadine swab daily and cover with abd and medfix tape or silicone border     Right ischium:  apply silicone border, change three times per week. [facility] applied Endoform today.     VNA and  to continue with wound care dressing changes 3 times a week and as needed for excessive drainage        Off-loading Instructions:     Remove Meño sling from chair during the day.     Keep weight and pressure off wounds as much as possible.     Continue to use ROHO style cushion when sitting.     Try to lay in bed throughout the day to help offload pressure to wounds.     Standing Status:   Future     Expiration Date:   5/22/2025    Wound Procedure Treatment Pressure Injury Left;Posterior;Proximal  "Ischium     This order was created via procedure documentation    Wound Procedure Treatment Pressure Injury Right Ischium     This order was created via procedure documentation             Homero Ramirez MD, CHT, CWS    Portions of the record may have been created with voice recognition software. Occasional wrong word or \"sound alike\" substitutions may have occurred due to the inherent limitations of voice recognition software. Read the chart carefully and recognize, using context, where substitutions have occurred.      "

## 2025-05-01 NOTE — PROGRESS NOTES
Wound Procedure Treatment Pressure Injury Right Ischium    Performed by: Ingris Miranda RN  Authorized by: Homero Ramirez MD  Associated wounds:   Wound 01/29/24 Pressure Injury Ischium Right    Wound cleansed with:  NSS   Applied primary dressing:  Silicone bordered foam and Collagen dressing

## 2025-05-01 NOTE — PATIENT INSTRUCTIONS
Orders Placed This Encounter   Procedures    Wound cleansing and dressings     Left and right ischial wounds:     Wash your hands with soap and water.  Remove old dressing, discard into plastic bag and place in trash. Cleanse the wounds with soap and water. Pat dry. Do not use tissue or cotton balls. Do not scrub the wound. Pat dry using gauze.  Shower yes      Apply skin prep to skin surrounding wound     Left ischium:  Paint wound with betadine swab daily and cover with abd and medfix tape or silicone border     Right ischium:  apply silicone border, change three times per week. [facility] applied Endoform today.     VNA and  to continue with wound care dressing changes 3 times a week and as needed for excessive drainage        Off-loading Instructions:     Remove Meño sling from chair during the day.     Keep weight and pressure off wounds as much as possible.     Continue to use ROHO style cushion when sitting.     Try to lay in bed throughout the day to help offload pressure to wounds.     Standing Status:   Future     Expiration Date:   5/22/2025

## 2025-05-02 ENCOUNTER — VBI (OUTPATIENT)
Dept: ADMINISTRATIVE | Facility: OTHER | Age: 55
End: 2025-05-02

## 2025-05-02 NOTE — TELEPHONE ENCOUNTER
05/02/25 10:24 AM     Chart reviewed for CRC: Cologuard was/were submitted to the patient's insurance.     Renae Martinez MA   [facility]    05/02/25 10:25 AM     Chart reviewed for Mammogram and Pap Smear (HPV) aka Cervical Cancer Screening was/were not submitted to the patient's insurance.     Renae Martinez MA   [facility]

## 2025-05-05 ENCOUNTER — HOME CARE VISIT (OUTPATIENT)
Dept: HOME HEALTH SERVICES | Facility: HOME HEALTHCARE | Age: 55
End: 2025-05-05
Payer: MEDICARE

## 2025-05-05 VITALS
OXYGEN SATURATION: 97 % | SYSTOLIC BLOOD PRESSURE: 100 MMHG | DIASTOLIC BLOOD PRESSURE: 80 MMHG | HEART RATE: 80 BPM | TEMPERATURE: 98 F

## 2025-05-05 PROCEDURE — G0299 HHS/HOSPICE OF RN EA 15 MIN: HCPCS

## 2025-05-12 ENCOUNTER — HOME CARE VISIT (OUTPATIENT)
Dept: HOME HEALTH SERVICES | Facility: HOME HEALTHCARE | Age: 55
End: 2025-05-12
Payer: MEDICARE

## 2025-05-12 ENCOUNTER — TELEPHONE (OUTPATIENT)
Age: 55
End: 2025-05-12

## 2025-05-12 VITALS
HEART RATE: 82 BPM | SYSTOLIC BLOOD PRESSURE: 112 MMHG | DIASTOLIC BLOOD PRESSURE: 70 MMHG | TEMPERATURE: 97.4 F | OXYGEN SATURATION: 99 %

## 2025-05-12 PROCEDURE — G0299 HHS/HOSPICE OF RN EA 15 MIN: HCPCS

## 2025-05-12 NOTE — TELEPHONE ENCOUNTER
Tamera [facility] Nurse calling to see if Dr Santiago sent in Dexamethasone to her [facility].    I confirmed Dr Santiago sent Dexamethasone 2 mg tab to her [facility] . I informed what I believed happen is after 10 days of not being picked up, they put the med back on the shelf. Tamera is aware I will call [facility] to request to fill the Dexamethasone and to update pt once ready for     I spoke to Amalia at [facility]. She confirmed they have the Dexamethasone 2 mg script and agreed since it was not picked up in 10 days they did put it back on the shelf. She informed she will fill it now and send a msg notification once it is ready to  to pt.

## 2025-05-19 ENCOUNTER — TELEPHONE (OUTPATIENT)
Age: 55
End: 2025-05-19

## 2025-05-19 ENCOUNTER — HOME CARE VISIT (OUTPATIENT)
Dept: HOME HEALTH SERVICES | Facility: HOME HEALTHCARE | Age: 55
End: 2025-05-19

## 2025-05-19 VITALS
SYSTOLIC BLOOD PRESSURE: 122 MMHG | DIASTOLIC BLOOD PRESSURE: 78 MMHG | TEMPERATURE: 98.3 F | OXYGEN SATURATION: 98 % | HEART RATE: 83 BPM

## 2025-05-19 PROCEDURE — G0299 HHS/HOSPICE OF RN EA 15 MIN: HCPCS

## 2025-05-19 NOTE — TELEPHONE ENCOUNTER
Patient was contacted and a message was left on their machine that they needed to schedule an appointment before forms could be completed.

## 2025-05-19 NOTE — TELEPHONE ENCOUNTER
Tamera L Tornetta, RN from [facility] called and wanted to inform the Dr. 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 The 485 Form will be sent to the office.

## 2025-05-19 NOTE — TELEPHONE ENCOUNTER
Tamera L Tornetta, RN from [facility] called and wanted to inquire if Dr. Nacho Monroy will recommend the patient to take a Sublingual B12 Vitamin. The patient used to take the vitamin and it helped her but it was recently stopped.

## 2025-05-30 ENCOUNTER — HOME CARE VISIT (OUTPATIENT)
Dept: HOME HEALTH SERVICES | Facility: HOME HEALTHCARE | Age: 55
End: 2025-05-30
Payer: MEDICARE

## 2025-05-30 VITALS
SYSTOLIC BLOOD PRESSURE: 128 MMHG | DIASTOLIC BLOOD PRESSURE: 58 MMHG | TEMPERATURE: 98 F | OXYGEN SATURATION: 100 % | RESPIRATION RATE: 18 BRPM | HEART RATE: 96 BPM

## 2025-05-30 PROCEDURE — G0299 HHS/HOSPICE OF RN EA 15 MIN: HCPCS

## 2025-06-02 ENCOUNTER — HOME CARE VISIT (OUTPATIENT)
Dept: HOME HEALTH SERVICES | Facility: HOME HEALTHCARE | Age: 55
End: 2025-06-02
Payer: MEDICARE

## 2025-06-02 PROCEDURE — G0299 HHS/HOSPICE OF RN EA 15 MIN: HCPCS

## 2025-06-05 ENCOUNTER — OFFICE VISIT (OUTPATIENT)
Dept: WOUND CARE | Facility: HOSPITAL | Age: 55
End: 2025-06-05
Payer: MEDICARE

## 2025-06-05 ENCOUNTER — HOME CARE VISIT (OUTPATIENT)
Dept: HOME HEALTH SERVICES | Facility: HOME HEALTHCARE | Age: 55
End: 2025-06-05
Payer: MEDICARE

## 2025-06-05 ENCOUNTER — TELEPHONE (OUTPATIENT)
Dept: NEUROLOGY | Facility: CLINIC | Age: 55
End: 2025-06-05

## 2025-06-05 VITALS — SYSTOLIC BLOOD PRESSURE: 104 MMHG | HEART RATE: 98 BPM | DIASTOLIC BLOOD PRESSURE: 62 MMHG | RESPIRATION RATE: 16 BRPM

## 2025-06-05 DIAGNOSIS — R53.2 FUNCTIONAL QUADRIPLEGIA SECONDARY TO MS (HCC): ICD-10-CM

## 2025-06-05 DIAGNOSIS — L89.314 PRESSURE INJURY OF RIGHT ISCHIUM, STAGE 4 (HCC): Primary | ICD-10-CM

## 2025-06-05 DIAGNOSIS — G35 FUNCTIONAL QUADRIPLEGIA SECONDARY TO MS (HCC): ICD-10-CM

## 2025-06-05 DIAGNOSIS — L89.324 PRESSURE INJURY OF LEFT ISCHIUM, STAGE 4 (HCC): ICD-10-CM

## 2025-06-05 DIAGNOSIS — G35 MULTIPLE SCLEROSIS (HCC): ICD-10-CM

## 2025-06-05 DIAGNOSIS — L92.9 HYPERGRANULATION: ICD-10-CM

## 2025-06-05 PROCEDURE — 17250 CHEM CAUT OF GRANLTJ TISSUE: CPT | Performed by: FAMILY MEDICINE

## 2025-06-05 RX ORDER — LIDOCAINE HYDROCHLORIDE 40 MG/ML
5 SOLUTION TOPICAL ONCE
Status: COMPLETED | OUTPATIENT
Start: 2025-06-05 | End: 2025-06-05

## 2025-06-05 RX ADMIN — LIDOCAINE HYDROCHLORIDE 5 ML: 40 SOLUTION TOPICAL at 14:01

## 2025-06-05 NOTE — PATIENT INSTRUCTIONS
Orders Placed This Encounter   Procedures    Wound cleansing and dressings     Left and right ischial wounds:     Wash your hands with soap and water.  Remove old dressing, discard into plastic bag and place in trash. Cleanse the wounds with soap and water. Pat dry. Do not use tissue or cotton balls. Do not scrub the wound. Pat dry using gauze.  Shower yes      Apply skin prep to skin surrounding wound     Left ischium:  Paint wound with betadine swab daily and cover with abd and medfix tape or silicone border     Right ischium:  apply silicone border, change three times per week.     [facility] and  to continue with wound care dressing changes 3 times a week and as needed for excessive drainage    C applied silver nitrate to wounds today.         Off-loading Instructions:     Remove Meño sling from chair during the day.     Keep weight and pressure off wounds as much as possible.     Continue to use ROHO style cushion when sitting. Have pressure mapping done at [facility]     Try to lay in bed throughout the day to help offload pressure to wounds     Standing Status:   Future     Expiration Date:   6/26/2025

## 2025-06-05 NOTE — PROGRESS NOTES
Wound Procedure Treatment    Performed by: Ingris Miranda RN  Authorized by: Homero Ramirez MD  Associated wounds:   Wound 01/10/24 Pressure Injury Ischium Left;Posterior;Proximal  Wound 01/29/24 Pressure Injury Ischium Right    Wound cleansed with:  NSS   Applied primary dressing:  Silicone bordered foam

## 2025-06-05 NOTE — PROGRESS NOTES
Patient ID: Fanny Schulz is a 55 y.o. female Date of Birth 1970       Chief Complaint   Patient presents with    Follow Up Wound Care Visit     Right and left ischial wounds       Allergies:  Latex    Diagnosis:      Diagnosis ICD-10-CM Associated Orders   1. Pressure injury of right ischium, stage 4 ([facility])  L89.314 lidocaine (XYLOCAINE) 4 % topical solution 5 mL     Wound cleansing and dressings     Wound Procedure Treatment      2. Pressure injury of left ischium, stage 4 ([facility])  L89.324 lidocaine (XYLOCAINE) 4 % topical solution 5 mL     Wound cleansing and dressings     Wound Procedure Treatment      3. Functional quadriplegia secondary to MS ([facility])  G35 Wound cleansing and dressings    R53.2       4. Multiple sclerosis ([facility])  G35 Wound cleansing and dressings      5. Hypergranulation  L92.9 Chemical Caut Of A Wound              Assessment & Plan:  Stage IV pressure injuries of the right and left ischium.  Continues to spend almost all of her time in her chair.  I asked that they again call Kyle Lawson to schedule appointment for evaluation of her Roho and wheelchair as well as for pressure mapping because of her 2 pressure injuries.  Hypergranulation tissue again is significant on the left.  Silver nitrate chemical cauterization of the right and left pressure injuries.  Hypergranulation tissue.  In a few days go back to Betadine and foam dressings.  Try to get out of her chair more and in her bed.         Subjective:   6/5/2025: Follow-up of stage IV pressure injuries of both this year.  She has had problems with hypergranulation tissue treated with silver nitrate and Betadine.  Unfortunately she continues to spend most of her time including sleeping in her chair.  She does have a Roho cushion.  I had previously asked them to contact Kyle Lawson to get an appointment for pressure mapping and evaluation of her cushion.    5/1/2025: Follow-up stage IV pressure injuries of the left and right ischium.   Has been using Betadine and bordered foam to the left ischium with hypergranulation tissue.  No new complaints or problems.  Still spending time in chair.    4/10/2025: Follow-up stage IV pressure injuries to the left and right ischium.  Patient notes no further pain.  At last visit significant hypergranulation tissue was debrided and then subsequently cauterized.  Betadine has been used along with bordered foam.  No other complaints.    3/27/2025: Follow-up stage III pressure injury of the left ischium and stage IV pressure injury of the right ischium.   states that they felt the Hydrofera Blue classic was causing some discomfort and therefore discontinued this.  They went back to silver alginate.  She continues to spend almost all of her time in the chair and tends to sleep in the chair frequently.    2/20/2025: Follow-up stage III pressure injuries of the left ischium and stage IV of the right ischium.   does not believe that there is been any improvement.  At last visit she had hypergranulation tissue cauterized.    1/30/2025: Follow-up bilateral  pressure injuries of the ischii.  Culture that was done at last visit was positive for 1+ MRSA.  However, she was just started on Bactrim for UTI which covered her MRSA.   believes that the ulcer has improved.    1/16/2025: Follow-up bilateral stage IV pressure injuries of the ischium.  Received [facility] message concerned about increased brown seropurulent drainage on the left.  Patient did not have any other systemic symptoms.  Appointment made for today.  Patient denies any fever or chills.    9/16/24: Patient presents to wound care center for follow-up visit of bilateral ischial wounds.  Patient has been using Mesalt to the wounds with bordered foam dressings to cover.  Patient is accompanied by her  who provides to wound care.  No wound care related complaints offered.  Denies increased pain or drainage to the wounds.  No fever or  chills.    10/7/24: Patient presents to wound care center for follow-up visit bilateral ischial wounds.  Patient has been using Aquacel Ag ribbon to the wounds and bordered foam dressings.  Patient is accompanied by her  who provides the wound care.  No wound care related complaints offered today.  No reported increased pain or drainage related to the wounds.  No fever or chills.  Patient's  reports that patient spends majority of her day out of bed in wheelchair, patient does have offloading seating cushion to her wheelchair.  Patient states that it is more comfortable for her to be in her wheelchair then in bed.    12/5/2024: Follow-up of bilateral stage IV ischial pressure injuries.  Ran out of Aquacel Ag rope and now is back to using Mesalt.  Patient is not using her air mattress bed and spends her time sitting in her chair.  She even sleeps there.  No other new complaints.  As noted above, she feels more comfortable in her wheelchair than in her bed.        The following portions of the patient's history were reviewed and updated as appropriate:   Problem List[1]  Past Medical History[2]  Past Surgical History[3]  Family History[4]   Social History[5]   Current Medications[6]    Review of Systems   Constitutional:  Negative for chills and fever.   HENT:  Negative for congestion.    Respiratory:  Negative for cough.    Musculoskeletal:  Positive for gait problem (Functional quadriplegia).        Functional quadriplegia    Skin:  Positive for wound (Bilateral ischia).        B/l ischium   Neurological:  Positive for weakness (Functional quadriplegia) and numbness.   Psychiatric/Behavioral:  Negative for dysphoric mood. The patient is not nervous/anxious.        Objective:  /62   Pulse 98   Resp 16   Pain Score: 0-No pain     Physical Exam  Constitutional:       General: She is awake.   HENT:      Head: Normocephalic and atraumatic.     Cardiovascular:      Rate and Rhythm: Normal rate.    Pulmonary:      Effort: Pulmonary effort is normal. No respiratory distress.     Skin:     General: Skin is warm and dry.      Findings: Wound present. No erythema.           Comments: 1. left ischial pressure injury with increased hypergranulation tissue centrally without any attachment to the walls again.  Centrally, bone is palpable but not exposed.   No malodor or signs of infection.  2. R ischial pressure injury with very small opening with surrounding maceration again.  Depth unchanged.  No evidence of infection.  Refer to wound assessment for additional details.     Neurological:      Mental Status: She is alert and oriented to person, place, and time.      Gait: Gait abnormal.      Comments: Quadriplegia secondary to MS   Psychiatric:         Mood and Affect: Mood normal.         Behavior: Behavior normal. Behavior is cooperative.               Wound 01/10/24 Pressure Injury Ischium Left;Posterior;Proximal (Active)   Wound Image Images linked 06/05/25 1354   Wound Description Hypergranulation;Granulation tissue 06/05/25 1354   Pressure Injury Stage 3 06/05/25 1354   Non-staged Wound Description Full thickness 06/05/25 1354   Wound Length (cm) 2.3 cm 06/05/25 1354   Wound Width (cm) 2.2 cm 06/05/25 1354   Wound Depth (cm) 0.6 cm 06/05/25 1354   Wound Surface Area (cm^2) 3.97 cm^2 06/05/25 1354   Wound Volume (cm^3) 1.59 cm^3 06/05/25 1354   Calculated Wound Volume (cm^3) 3.04 cm^3 06/05/25 1354   Drainage Amount Moderate 06/05/25 1354   Drainage Description Serosanguineous;Tan 06/05/25 1354   Ruchi-wound Assessment Intact 06/05/25 1354   Dressing Status Intact 06/05/25 1354       Wound 01/29/24 Pressure Injury Ischium Right (Active)   Wound Image Images linked 06/05/25 1354   Wound Description Granulation tissue 06/05/25 1354   Pressure Injury Stage 4 06/05/25 1354   Non-staged Wound Description Full thickness 06/05/25 1354   Wound Length (cm) 0.3 cm 06/05/25 1354   Wound Width (cm) 0.8 cm 06/05/25 1354  "  Wound Depth (cm) 0.5 cm 06/05/25 1354   Wound Surface Area (cm^2) 0.19 cm^2 06/05/25 1354   Wound Volume (cm^3) 0.063 cm^3 06/05/25 1354   Calculated Wound Volume (cm^3) 0.12 cm^3 06/05/25 1354   Change in Wound Size % 76 06/05/25 1354   Drainage Amount Moderate 06/05/25 1354   Drainage Description Tan 06/05/25 1354   Ruchi-wound Assessment Maceration;White 06/05/25 1354   Dressing Status Intact 06/05/25 1354            Chemical caut of a wound     Date/Time  6/5/2025 1:45 PM     Performed by  Homero Ramirez MD   Authorized by  Homero Ramirez MD      Associated wounds:   Wound 01/10/24 Pressure Injury Ischium Left;Posterior;Proximal  Wound 01/29/24 Pressure Injury Ischium Right     Universal Protocol   procedure performed by consultantConsent: Verbal consent obtained. Written consent obtained  Risks and benefits: risks, benefits and alternatives were discussed  Consent given by: patient  Time out: Immediately prior to procedure a \"time out\" was called to verify the correct patient, procedure, equipment, support staff and site/side marked as required.  Patient understanding: patient states understanding of the procedure being performed  Patient identity confirmed: verbally with patient      Local anesthesia used: yes     Anesthesia   Local anesthesia used: yes  Local Anesthetic: topical anesthetic     Sedation   Patient sedated: no        Specimen: no    Culture: no   Procedure Details   Procedure Notes: Silver nitrate was used to cauterize the hypergranulation tissue.  Normal saline was used to neutralize the reaction.  3 silver nitrate sticks were used for this procedure.  Patient tolerance: Patient tolerated the procedure well with no immediate complications              Results from last 6 Months   Lab Units 02/20/25  1615   WOUND CULTURE  1+ Growth of Methicillin Resistant Staphylococcus aureus*  1+ Growth of       Lab Results   Component Value Date    HGBA1C 5.2 01/09/2024       Wound " "Instructions:  Orders Placed This Encounter   Procedures    Wound cleansing and dressings     Left and right ischial wounds:     Wash your hands with soap and water.  Remove old dressing, discard into plastic bag and place in trash. Cleanse the wounds with soap and water. Pat dry. Do not use tissue or cotton balls. Do not scrub the wound. Pat dry using gauze.  Shower yes      Apply skin prep to skin surrounding wound     Left ischium:  Paint wound with betadine swab daily and cover with abd and medfix tape or silicone border     Right ischium:  apply silicone border, change three times per week.     [facility] and  to continue with wound care dressing changes 3 times a week and as needed for excessive drainage    [facility] applied silver nitrate to wounds today.         Off-loading Instructions:     Remove Meño sling from chair during the day.     Keep weight and pressure off wounds as much as possible.     Continue to use ROHO style cushion when sitting. Have pressure mapping done at [facility]     Try to lay in bed throughout the day to help offload pressure to wounds     Standing Status:   Future     Expiration Date:   6/26/2025    Wound Procedure Treatment     This order was created via procedure documentation    Chemical Caut Of A Wound     This order was created via procedure documentation             Homero Ramirez MD, CHT, CWS    Portions of the record may have been created with voice recognition software. Occasional wrong word or \"sound alike\" substitutions may have occurred due to the inherent limitations of voice recognition software. Read the chart carefully and recognize, using context, where substitutions have occurred.         [1]   Patient Active Problem List  Diagnosis    Suprapubic catheter (HCC)    Bladder calculus    Constipation    Recurrent major depressive disorder, in full remission (HCC)    Dysphagia    Dizziness    Hyperglycemia    Familial hypercholesterolemia    Lymphedema    " "Multiple sclerosis (HCC)    Muscle spasticity    Muscle weakness    Nephrolithiasis    Neurogenic bladder    Sleep disorder    Spinal stenosis of cervical region    Tremor    Urinary incontinence    Vision loss    Vitamin B12 deficiency    Vitamin D deficiency    Functional quadriplegia secondary to MS (HCC)    Allergic rhinitis    Screening mammogram, encounter for    Medication management contract signed    Pressure injury of right ischium, stage 4 (HCC)    Thrombocytopenia (HCC)    Serum total bilirubin elevated    Hydronephrosis with urinary obstruction due to ureteral calculus    Candidal vaginitis    Alkaline phosphatase elevation    Abnormal thyroid function test    Ureteral calculus, left    Increased endometrial stripe thickness    Peripheral edema    Tinnitus of both ears    Hypophonia    Chronic lower extremity edema    Positive colorectal cancer screening using Cologuard test    RSV infection    Functional quadriplegia (HCC)    Pressure injury of left ischium, stage 4 (HCC)   [2]   Past Medical History:  Diagnosis Date    Anesthesia     \"prefers if able to be put to sleep on stretcher before placed on table if possible due to severe spasticity    Bladder stones     Chronic pain disorder     neck    Contracture, right shoulder     right arm and hand    Dependent on wheelchair     motorized    Edema     dependant lower extremities    Elevated alkaline phosphatase level     Mildly elevated total, normal isoenzymes 4/15/15, normal 1/17; last assessed: [id number]    Fernandez catheter in place     #24 to large bag(silicone catheter)    Gallbladder disease     History of femur fracture     right leg    History of UTI     MS (multiple sclerosis) (HCC)     Muscle spasticity     especially with touch    Muscle weakness     Muscular dystrophy (HCC)     Neck pain     Neurogenic bladder     Paralysis (HCC)     bilateral lower extremities    Port-A-Cath in place     left chest,\" hasn't used in approx 1 yr or so\"    " "Pressure injury of skin     right ischium    Sacral pressure sore     \"shearing, tegaderm in place,\"    Swallowing difficulty     Tinnitus     Urinary incontinence    [3]   Past Surgical History:  Procedure Laterality Date    BLADDER STONE REMOVAL N/A 12/4/2017    Procedure: CYSTO, LITHOLOPAXY HOLMIUM LASER;  Surgeon: Damir Osborne MD;  Location: [facility];  Service: Urology    BLADDER SURGERY      CHOLECYSTECTOMY      CYSTOSCOPY  06/15/2020    ESOPHAGOGASTRODUODENOSCOPY      FL RETROGRADE PYELOGRAM  10/2/2020    FL RETROGRADE PYELOGRAM  11/3/2020    KIDNEY STONE SURGERY      PORTACATH PLACEMENT Left     MD CYSTO BLADDER W/URETERAL CATHETERIZATION Left 10/2/2020    Procedure: CYSTOSCOPY LEFT RETROGRADE ; LEFT URETEROSCOPY; PYELOGRAM WITH STENT INSERTION AND SUPERPUBIC EXCHANGE;  Surgeon: Philip Mcdonald MD;  Location: [facility];  Service: Urology    MD CYSTO/URETERO W/LITHOTRIPSY &INDWELL STENT INSRT Left 11/3/2020    Procedure: CYSTOSCOPY URETEROSCOPY WITH RETROGRADE PYELOGRAM AND EXCHANGE STENT URETERAL, SP TUBE EXCHANGE, BASKET STONE EXTRACTION, BLADDER STONE EXTRACTION;  Surgeon: Damir Osborne MD;  Location: [facility];  Service: Urology    MD LITHOLAPAXY SMPL/SM <2.5 CM N/A 12/22/2022    Procedure: Cystolitholapaxy w/  laser lithotripsy of bladder stones; SUPRAPUBIC CATHETER CHANGE;  Surgeon: Damir Osborne MD;  Location: [facility];  Service: Urology    SUPRAPUBIC CYSTOSTOMY  02/25/2014    last assessed: [id number]   [4]   Family History  Problem Relation Name Age of Onset    Diabetes Mother      Hyperlipidemia Mother      Hypertension Mother      COPD Father      Hypertension Father      Hyperlipidemia Sister      Alzheimer's disease Family      Diabetes Family      Hypertension Family      Heart disease Family     [5]   Social History  Socioeconomic History    Marital status: /Civil Union   Tobacco Use    Smoking status: Never    Smokeless tobacco: Never   Vaping Use    Vaping " status: Never Used   Substance and Sexual Activity    Alcohol use: Not Currently    Drug use: Yes    Sexual activity: Yes   Social History Narrative    Caffeine use    Currently on disability    Daily coffee consumption (2 cups/day)    Educational level- completed 2nd year college    Lives with adult children    Not currently employed    Wheelchair dependent      Social Drivers of Health     Financial Resource Strain: Low Risk  (2023)    Overall Financial Resource Strain (CARDIA)     Difficulty of Paying Living Expenses: Not hard at all   Food Insecurity: No Food Insecurity (2024)    Nursing - Inadequate Food Risk Classification     Worried About Running Out of Food in the Last Year: Never true     Ran Out of Food in the Last Year: Never true     Ran Out of Food in the Last Year: Never true   Transportation Needs: No Transportation Needs (2024)    OASIS : Transportation     Lack of Transportation (Medical): No     Lack of Transportation (Non-Medical): No     Patient Unable or Declines to Respond: No   Intimate Partner Violence: Unknown (2024)    Nursing IPS     Physically Hurt by Someone: No     Hurt or Threatened by Someone: No   Housing Stability: Unknown (2024)    Nursing: Inadequate Housing Risk Classification     Unable to Pay for Housing in the Last Year: No     Has Housin   [6]   Current Outpatient Medications:     acetaminophen (TYLENOL) 500 mg tablet, Take 500 mg by mouth every 6 (six) hours as needed for mild pain. Indications: Pain, Disp: , Rfl:     Alpha-D-Galactosidase (Beano) TABS, Take 1 tablet by mouth if needed (GI bloating). As needed before each meal that may cause bloating.   Indications: GI bloating, Disp: , Rfl:     baclofen 20 mg tablet, TAKE 1 TABLET BY MOUTH 5 TIMES AS NEEDED, Disp: 150 tablet, Rfl: 11    Cyanocobalamin (B-12) 1000 MCG SUBL, Dissolve 1 tablet under tongue daily, Disp: 30 tablet, Rfl: 5    dexamethasone (DECADRON) 2 mg tablet, Take 1  "tablet (2 mg total) by mouth 2 (two) times a day with meals, Disp: 10 tablet, Rfl: 0    DULoxetine (CYMBALTA) 20 mg capsule, Take 1 capsule (20 mg total) by mouth daily, Disp: 90 capsule, Rfl: 3    furosemide (LASIX) 20 mg tablet, Take 1 tablet (20 mg total) by mouth daily, Disp: 90 tablet, Rfl: 3    gabapentin (NEURONTIN) 100 mg capsule, Take 1 tablet at bedtime tonight, 1 tablet twice a day tomorrow, and then 1 tablet 3 times daily thereafter (Patient taking differently: Take 300 mg by mouth. Take 1 tablet orally at bedtime tonight, 1 tablet twice a day tomorrow, and then 1 tablet 3 times daily thereafter  Indications: Neck pain), Disp: 90 capsule, Rfl: 5    ibuprofen (MOTRIN) 200 mg tablet, Take 200 mg by mouth every 6 (six) hours as needed for moderate pain. Indications: Pain, Disp: , Rfl:     oxybutynin (DITROPAN-XL) 10 MG 24 hr tablet, Take 1 tablet (10 mg total) by mouth daily, Disp: 90 tablet, Rfl: 3    rosuvastatin (CRESTOR) 5 mg tablet, Take 1 tablet (5 mg total) by mouth daily, Disp: 90 tablet, Rfl: 3    senna (SENOKOT) 8.6 mg, Take 8.6 mg by mouth if needed for constipation. Daily as needed for constipation  Indications: Constipation, Disp: , Rfl:     SYRINGE-NEEDLE, DISP, 3 ML 25G X 5/8\" 3 ML MISC, Use once a week Use syringes for B12 injections once a week for 3 weeks, then once a month for 5 months., Disp: 8 each, Rfl: 0    Current Facility-Administered Medications:     cyanocobalamin injection 1,000 mcg, 1,000 mcg, Intramuscular, Q30 Days, , 1,000 mcg at 03/21/25 1042    "

## 2025-06-09 ENCOUNTER — HOME CARE VISIT (OUTPATIENT)
Dept: HOME HEALTH SERVICES | Facility: HOME HEALTHCARE | Age: 55
End: 2025-06-09
Payer: MEDICARE

## 2025-06-09 ENCOUNTER — OFFICE VISIT (OUTPATIENT)
Dept: FAMILY MEDICINE CLINIC | Facility: CLINIC | Age: 55
End: 2025-06-09
Payer: MEDICARE

## 2025-06-09 VITALS — HEART RATE: 85 BPM | SYSTOLIC BLOOD PRESSURE: 98 MMHG | OXYGEN SATURATION: 92 % | DIASTOLIC BLOOD PRESSURE: 62 MMHG

## 2025-06-09 VITALS
RESPIRATION RATE: 16 BRPM | DIASTOLIC BLOOD PRESSURE: 60 MMHG | SYSTOLIC BLOOD PRESSURE: 110 MMHG | OXYGEN SATURATION: 99 % | TEMPERATURE: 98.2 F | HEART RATE: 72 BPM

## 2025-06-09 DIAGNOSIS — R79.0 DECREASED FERRITIN: ICD-10-CM

## 2025-06-09 DIAGNOSIS — F33.42 RECURRENT MAJOR DEPRESSIVE DISORDER, IN FULL REMISSION (HCC): ICD-10-CM

## 2025-06-09 DIAGNOSIS — Z99.3 DEPENDENCE ON WHEELCHAIR: ICD-10-CM

## 2025-06-09 DIAGNOSIS — E55.9 VITAMIN D DEFICIENCY: ICD-10-CM

## 2025-06-09 DIAGNOSIS — G35 MULTIPLE SCLEROSIS (HCC): Chronic | ICD-10-CM

## 2025-06-09 DIAGNOSIS — R60.0 PERIPHERAL EDEMA: ICD-10-CM

## 2025-06-09 DIAGNOSIS — R53.2 FUNCTIONAL QUADRIPLEGIA SECONDARY TO MS (HCC): Primary | Chronic | ICD-10-CM

## 2025-06-09 DIAGNOSIS — G35 FUNCTIONAL QUADRIPLEGIA SECONDARY TO MS (HCC): Primary | Chronic | ICD-10-CM

## 2025-06-09 DIAGNOSIS — E78.01 FAMILIAL HYPERCHOLESTEROLEMIA: ICD-10-CM

## 2025-06-09 DIAGNOSIS — R53.2 FUNCTIONAL QUADRIPLEGIA (HCC): ICD-10-CM

## 2025-06-09 DIAGNOSIS — M48.02 SPINAL STENOSIS OF CERVICAL REGION: ICD-10-CM

## 2025-06-09 DIAGNOSIS — E53.8 VITAMIN B12 DEFICIENCY: ICD-10-CM

## 2025-06-09 DIAGNOSIS — R60.0 LOWER EXTREMITY EDEMA: ICD-10-CM

## 2025-06-09 DIAGNOSIS — R94.6 ABNORMAL THYROID FUNCTION TEST: ICD-10-CM

## 2025-06-09 DIAGNOSIS — R19.5 POSITIVE COLORECTAL CANCER SCREENING USING COLOGUARD TEST: ICD-10-CM

## 2025-06-09 DIAGNOSIS — Z93.59 SUPRAPUBIC CATHETER (HCC): ICD-10-CM

## 2025-06-09 PROBLEM — L89.314 PRESSURE INJURY OF RIGHT ISCHIUM, STAGE 4 (HCC): Status: RESOLVED | Noted: 2020-09-03 | Resolved: 2025-06-09

## 2025-06-09 PROBLEM — L89.324 PRESSURE INJURY OF LEFT ISCHIUM, STAGE 4 (HCC): Status: RESOLVED | Noted: 2025-01-16 | Resolved: 2025-06-09

## 2025-06-09 PROBLEM — B33.8 RSV INFECTION: Status: RESOLVED | Noted: 2024-12-20 | Resolved: 2025-06-09

## 2025-06-09 PROCEDURE — 99214 OFFICE O/P EST MOD 30 MIN: CPT | Performed by: FAMILY MEDICINE

## 2025-06-09 PROCEDURE — G2211 COMPLEX E/M VISIT ADD ON: HCPCS | Performed by: FAMILY MEDICINE

## 2025-06-09 PROCEDURE — G0300 HHS/HOSPICE OF LPN EA 15 MIN: HCPCS

## 2025-06-09 RX ORDER — GABAPENTIN 100 MG/1
CAPSULE ORAL
Qty: 90 CAPSULE | Refills: 5 | Status: CANCELLED | OUTPATIENT
Start: 2025-06-09

## 2025-06-09 RX ORDER — GABAPENTIN 300 MG/1
300 CAPSULE ORAL 3 TIMES DAILY
Qty: 270 CAPSULE | Refills: 3 | Status: SHIPPED | OUTPATIENT
Start: 2025-06-09 | End: 2026-06-04

## 2025-06-09 NOTE — ASSESSMENT & PLAN NOTE
To follow with GI  Orders:  •  CBC; Future  •  Comprehensive metabolic panel; Future  •  Lipid Panel with Direct LDL reflex; Future  •  TSH, 3rd generation with Free T4 reflex; Future  •  Vitamin B12; Future  •  Vitamin D 25 hydroxy; Future  •  Folate; Future  •  Iron Panel (Includes Ferritin, Iron Sat%, Iron, and TIBC); Future

## 2025-06-09 NOTE — PATIENT INSTRUCTIONS
Increase gabapentin from 100 300 mg 3 times daily  Follow with all specialist further instructions  Follow-up gastroenterology for positive Cologuard screening  Return in 4 months for office visit, blood work, and AWV

## 2025-06-09 NOTE — ASSESSMENT & PLAN NOTE
Negative leg edema at the present time we will monitor, continue furosemide 20 mg daily    Orders:  •  CBC; Future  •  Comprehensive metabolic panel; Future  •  Lipid Panel with Direct LDL reflex; Future  •  TSH, 3rd generation with Free T4 reflex; Future  •  Vitamin B12; Future  •  Vitamin D 25 hydroxy; Future  •  Folate; Future  •  Iron Panel (Includes Ferritin, Iron Sat%, Iron, and TIBC); Future

## 2025-06-09 NOTE — ASSESSMENT & PLAN NOTE
Increase gabapentin from 100 300 mg daily  Orders:  •  gabapentin (NEURONTIN) 300 mg capsule; Take 1 capsule (300 mg total) by mouth 3 (three) times a day  •  CBC; Future  •  Comprehensive metabolic panel; Future  •  Lipid Panel with Direct LDL reflex; Future  •  TSH, 3rd generation with Free T4 reflex; Future  •  Vitamin B12; Future  •  Vitamin D 25 hydroxy; Future  •  Folate; Future  •  Iron Panel (Includes Ferritin, Iron Sat%, Iron, and TIBC); Future

## 2025-06-09 NOTE — ASSESSMENT & PLAN NOTE
Medei much improved will monitor  Orders:  •  CBC; Future  •  Comprehensive metabolic panel; Future  •  Lipid Panel with Direct LDL reflex; Future  •  TSH, 3rd generation with Free T4 reflex; Future  •  Vitamin B12; Future  •  Vitamin D 25 hydroxy; Future  •  Folate; Future  •  Iron Panel (Includes Ferritin, Iron Sat%, Iron, and TIBC); Future

## 2025-06-09 NOTE — ASSESSMENT & PLAN NOTE
Stable, follows with neurology  Orders:  •  CBC; Future  •  Comprehensive metabolic panel; Future  •  Lipid Panel with Direct LDL reflex; Future  •  TSH, 3rd generation with Free T4 reflex; Future  •  Vitamin B12; Future  •  Vitamin D 25 hydroxy; Future  •  Folate; Future  •  Iron Panel (Includes Ferritin, Iron Sat%, Iron, and TIBC); Future

## 2025-06-09 NOTE — ASSESSMENT & PLAN NOTE
Blood work ordered  Orders:  •  CBC; Future  •  Comprehensive metabolic panel; Future  •  Lipid Panel with Direct LDL reflex; Future  •  TSH, 3rd generation with Free T4 reflex; Future  •  Vitamin B12; Future  •  Vitamin D 25 hydroxy; Future  •  Folate; Future  •  Iron Panel (Includes Ferritin, Iron Sat%, Iron, and TIBC); Future

## 2025-06-09 NOTE — ASSESSMENT & PLAN NOTE
B12 is normal will discontinue injection and monitor at present  Orders:  •  CBC; Future  •  Comprehensive metabolic panel; Future  •  Lipid Panel with Direct LDL reflex; Future  •  TSH, 3rd generation with Free T4 reflex; Future  •  Vitamin B12; Future  •  Vitamin D 25 hydroxy; Future  •  Folate; Future  •  Iron Panel (Includes Ferritin, Iron Sat%, Iron, and TIBC); Future

## 2025-06-09 NOTE — ASSESSMENT & PLAN NOTE
Follows with urology  Orders:  •  CBC; Future  •  Comprehensive metabolic panel; Future  •  Lipid Panel with Direct LDL reflex; Future  •  TSH, 3rd generation with Free T4 reflex; Future  •  Vitamin B12; Future  •  Vitamin D 25 hydroxy; Future  •  Folate; Future  •  Iron Panel (Includes Ferritin, Iron Sat%, Iron, and TIBC); Future

## 2025-06-09 NOTE — ASSESSMENT & PLAN NOTE
Stable continue furosemide 20 mg daily  Orders:  •  CBC; Future  •  Comprehensive metabolic panel; Future  •  Lipid Panel with Direct LDL reflex; Future  •  TSH, 3rd generation with Free T4 reflex; Future  •  Vitamin B12; Future  •  Vitamin D 25 hydroxy; Future  •  Folate; Future  •  Iron Panel (Includes Ferritin, Iron Sat%, Iron, and TIBC); Future

## 2025-06-09 NOTE — ASSESSMENT & PLAN NOTE
Secondary to MS follows with neurology wheelchair-bound  Orders:  •  CBC; Future  •  Comprehensive metabolic panel; Future  •  Lipid Panel with Direct LDL reflex; Future  •  TSH, 3rd generation with Free T4 reflex; Future  •  Vitamin B12; Future  •  Vitamin D 25 hydroxy; Future  •  Folate; Future  •  Iron Panel (Includes Ferritin, Iron Sat%, Iron, and TIBC); Future

## 2025-06-09 NOTE — ASSESSMENT & PLAN NOTE
As above  Orders:  •  CBC; Future  •  Comprehensive metabolic panel; Future  •  Lipid Panel with Direct LDL reflex; Future  •  TSH, 3rd generation with Free T4 reflex; Future  •  Vitamin B12; Future  •  Vitamin D 25 hydroxy; Future  •  Folate; Future  •  Iron Panel (Includes Ferritin, Iron Sat%, Iron, and TIBC); Future

## 2025-06-09 NOTE — ASSESSMENT & PLAN NOTE
In remission on duloxetine 20 mg daily  Orders:  •  CBC; Future  •  Comprehensive metabolic panel; Future  •  Lipid Panel with Direct LDL reflex; Future  •  TSH, 3rd generation with Free T4 reflex; Future  •  Vitamin B12; Future  •  Vitamin D 25 hydroxy; Future  •  Folate; Future  •  Iron Panel (Includes Ferritin, Iron Sat%, Iron, and TIBC); Future

## 2025-06-09 NOTE — PROGRESS NOTES
Name: Fanny Schulz      : 1970      MRN: [id number]  Encounter Provider: Nacho Monroy DO  Encounter Date: 2025   Encounter department: [facility] line return in 4 months for office visit, blood work, and AWV  :  Assessment & Plan  Spinal stenosis of cervical region  Increase gabapentin from 100 300 mg daily  Orders:  •  gabapentin (NEURONTIN) 300 mg capsule; Take 1 capsule (300 mg total) by mouth 3 (three) times a day  •  CBC; Future  •  Comprehensive metabolic panel; Future  •  Lipid Panel with Direct LDL reflex; Future  •  TSH, 3rd generation with Free T4 reflex; Future  •  Vitamin B12; Future  •  Vitamin D 25 hydroxy; Future  •  Folate; Future  •  Iron Panel (Includes Ferritin, Iron Sat%, Iron, and TIBC); Future    Suprapubic catheter (HCC)  Follows with urology  Orders:  •  CBC; Future  •  Comprehensive metabolic panel; Future  •  Lipid Panel with Direct LDL reflex; Future  •  TSH, 3rd generation with Free T4 reflex; Future  •  Vitamin B12; Future  •  Vitamin D 25 hydroxy; Future  •  Folate; Future  •  Iron Panel (Includes Ferritin, Iron Sat%, Iron, and TIBC); Future    Functional quadriplegia secondary to MS (HCC)  Stable, follows with neurology  Orders:  •  CBC; Future  •  Comprehensive metabolic panel; Future  •  Lipid Panel with Direct LDL reflex; Future  •  TSH, 3rd generation with Free T4 reflex; Future  •  Vitamin B12; Future  •  Vitamin D 25 hydroxy; Future  •  Folate; Future  •  Iron Panel (Includes Ferritin, Iron Sat%, Iron, and TIBC); Future    Multiple sclerosis (HCC)  As above  Orders:  •  CBC; Future  •  Comprehensive metabolic panel; Future  •  Lipid Panel with Direct LDL reflex; Future  •  TSH, 3rd generation with Free T4 reflex; Future  •  Vitamin B12; Future  •  Vitamin D 25 hydroxy; Future  •  Folate; Future  •  Iron Panel (Includes Ferritin, Iron Sat%, Iron, and TIBC); Future    Recurrent major depressive disorder, in full remission (HCC)  In  remission on duloxetine 20 mg daily  Orders:  •  CBC; Future  •  Comprehensive metabolic panel; Future  •  Lipid Panel with Direct LDL reflex; Future  •  TSH, 3rd generation with Free T4 reflex; Future  •  Vitamin B12; Future  •  Vitamin D 25 hydroxy; Future  •  Folate; Future  •  Iron Panel (Includes Ferritin, Iron Sat%, Iron, and TIBC); Future    Positive colorectal cancer screening using Cologuard test  To follow with GI  Orders:  •  CBC; Future  •  Comprehensive metabolic panel; Future  •  Lipid Panel with Direct LDL reflex; Future  •  TSH, 3rd generation with Free T4 reflex; Future  •  Vitamin B12; Future  •  Vitamin D 25 hydroxy; Future  •  Folate; Future  •  Iron Panel (Includes Ferritin, Iron Sat%, Iron, and TIBC); Future    Chronic lower extremity edema  Negative leg edema at the present time we will monitor, continue furosemide 20 mg daily    Orders:  •  CBC; Future  •  Comprehensive metabolic panel; Future  •  Lipid Panel with Direct LDL reflex; Future  •  TSH, 3rd generation with Free T4 reflex; Future  •  Vitamin B12; Future  •  Vitamin D 25 hydroxy; Future  •  Folate; Future  •  Iron Panel (Includes Ferritin, Iron Sat%, Iron, and TIBC); Future    Abnormal thyroid function test  Blood work ordered  Orders:  •  CBC; Future  •  Comprehensive metabolic panel; Future  •  Lipid Panel with Direct LDL reflex; Future  •  TSH, 3rd generation with Free T4 reflex; Future  •  Vitamin B12; Future  •  Vitamin D 25 hydroxy; Future  •  Folate; Future  •  Iron Panel (Includes Ferritin, Iron Sat%, Iron, and TIBC); Future    Familial hypercholesterolemia  Stable continue Crestor 5 mg daily blood work is ordered  Orders:  •  CBC; Future  •  Comprehensive metabolic panel; Future  •  Lipid Panel with Direct LDL reflex; Future  •  TSH, 3rd generation with Free T4 reflex; Future  •  Vitamin B12; Future  •  Vitamin D 25 hydroxy; Future  •  Folate; Future  •  Iron Panel (Includes Ferritin, Iron Sat%, Iron, and TIBC);  Future    Functional quadriplegia (HCC)  Secondary to MS follows with neurology wheelchair-bound  Orders:  •  CBC; Future  •  Comprehensive metabolic panel; Future  •  Lipid Panel with Direct LDL reflex; Future  •  TSH, 3rd generation with Free T4 reflex; Future  •  Vitamin B12; Future  •  Vitamin D 25 hydroxy; Future  •  Folate; Future  •  Iron Panel (Includes Ferritin, Iron Sat%, Iron, and TIBC); Future    Peripheral edema  Stable continue furosemide 20 mg daily  Orders:  •  CBC; Future  •  Comprehensive metabolic panel; Future  •  Lipid Panel with Direct LDL reflex; Future  •  TSH, 3rd generation with Free T4 reflex; Future  •  Vitamin B12; Future  •  Vitamin D 25 hydroxy; Future  •  Folate; Future  •  Iron Panel (Includes Ferritin, Iron Sat%, Iron, and TIBC); Future    Vitamin B12 deficiency  B12 is normal will discontinue injection and monitor at present  Orders:  •  CBC; Future  •  Comprehensive metabolic panel; Future  •  Lipid Panel with Direct LDL reflex; Future  •  TSH, 3rd generation with Free T4 reflex; Future  •  Vitamin B12; Future  •  Vitamin D 25 hydroxy; Future  •  Folate; Future  •  Iron Panel (Includes Ferritin, Iron Sat%, Iron, and TIBC); Future    Vitamin D deficiency  Medei much improved will monitor  Orders:  •  CBC; Future  •  Comprehensive metabolic panel; Future  •  Lipid Panel with Direct LDL reflex; Future  •  TSH, 3rd generation with Free T4 reflex; Future  •  Vitamin B12; Future  •  Vitamin D 25 hydroxy; Future  •  Folate; Future  •  Iron Panel (Includes Ferritin, Iron Sat%, Iron, and TIBC); Future    Dependence on wheelchair    Orders:  •  CBC; Future  •  Comprehensive metabolic panel; Future  •  Lipid Panel with Direct LDL reflex; Future  •  TSH, 3rd generation with Free T4 reflex; Future  •  Vitamin B12; Future  •  Vitamin D 25 hydroxy; Future  •  Folate; Future  •  Iron Panel (Includes Ferritin, Iron Sat%, Iron, and TIBC); Future    Decreased ferritin  Repeat iron panel 4  "months  Orders:  •  CBC; Future  •  Comprehensive metabolic panel; Future  •  Lipid Panel with Direct LDL reflex; Future  •  TSH, 3rd generation with Free T4 reflex; Future  •  Vitamin B12; Future  •  Vitamin D 25 hydroxy; Future  •  Folate; Future  •  Iron Panel (Includes Ferritin, Iron Sat%, Iron, and TIBC); Future          Depression Screening and Follow-up Plan: Patient was screened for depression during today's encounter. They screened negative with a PHQ-9 score of 0.        History of Present Illness   Patient says she is feeling \"terrific\".  Patient still with occasional leg pain only on gabapentin 103 times a day will go to 300.      Review of Systems   Constitutional: Negative.    HENT: Negative.     Eyes: Negative.    Respiratory: Negative.     Cardiovascular: Negative.    Gastrointestinal: Negative.    Endocrine: Negative.    Genitourinary: Negative.    Musculoskeletal:         HPI   Skin: Negative.    Allergic/Immunologic: Negative.    Neurological: Negative.    Hematological: Negative.    Psychiatric/Behavioral: Negative.         Objective   BP 98/62 (BP Location: Left arm, Patient Position: Sitting, Cuff Size: Standard)   Pulse 85   SpO2 92%      Physical Exam  Vitals and nursing note reviewed.   Constitutional:       Appearance: Normal appearance.   HENT:      Head: Normocephalic and atraumatic.      Mouth/Throat:      Mouth: Mucous membranes are moist.     Eyes:      General: No scleral icterus.    Neck:      Vascular: No carotid bruit.     Cardiovascular:      Rate and Rhythm: Normal rate and regular rhythm.      Heart sounds: Normal heart sounds.   Pulmonary:      Effort: Pulmonary effort is normal.      Breath sounds: Normal breath sounds.   Abdominal:      Palpations: Abdomen is soft.      Tenderness: There is no abdominal tenderness.      Comments: Positive suprapubic catheter     Musculoskeletal:      Cervical back: Neck supple.      Right lower leg: No edema.      Left lower leg: No edema. "     Skin:     General: Skin is warm and dry.     Neurological:      Mental Status: She is alert and oriented to person, place, and time.      Motor: Weakness present.      Gait: Gait abnormal.      Comments: Quadriplegia, chronic, wheelchair-bound   Psychiatric:         Mood and Affect: Mood normal.

## 2025-06-09 NOTE — ASSESSMENT & PLAN NOTE
Stable continue Crestor 5 mg daily blood work is ordered  Orders:  •  CBC; Future  •  Comprehensive metabolic panel; Future  •  Lipid Panel with Direct LDL reflex; Future  •  TSH, 3rd generation with Free T4 reflex; Future  •  Vitamin B12; Future  •  Vitamin D 25 hydroxy; Future  •  Folate; Future  •  Iron Panel (Includes Ferritin, Iron Sat%, Iron, and TIBC); Future

## 2025-06-20 ENCOUNTER — HOME CARE VISIT (OUTPATIENT)
Dept: HOME HEALTH SERVICES | Facility: HOME HEALTHCARE | Age: 55
End: 2025-06-20
Payer: MEDICARE

## 2025-06-20 VITALS
SYSTOLIC BLOOD PRESSURE: 100 MMHG | DIASTOLIC BLOOD PRESSURE: 50 MMHG | OXYGEN SATURATION: 95 % | HEART RATE: 74 BPM | TEMPERATURE: 98.3 F

## 2025-06-20 PROCEDURE — G0299 HHS/HOSPICE OF RN EA 15 MIN: HCPCS

## 2025-06-23 ENCOUNTER — HOME CARE VISIT (OUTPATIENT)
Dept: HOME HEALTH SERVICES | Facility: HOME HEALTHCARE | Age: 55
End: 2025-06-23
Payer: MEDICARE

## 2025-06-23 VITALS
OXYGEN SATURATION: 100 % | HEART RATE: 63 BPM | SYSTOLIC BLOOD PRESSURE: 102 MMHG | DIASTOLIC BLOOD PRESSURE: 76 MMHG | TEMPERATURE: 98 F

## 2025-06-23 PROCEDURE — G0299 HHS/HOSPICE OF RN EA 15 MIN: HCPCS

## 2025-06-25 NOTE — PROGRESS NOTES
"Patient ID: Fanny Schulz is a 55 y.o. female Date of Birth 1970       Chief Complaint   Patient presents with    Follow Up Wound Care Visit     Left and right ischium.       Allergies:  Latex    Diagnosis:      Diagnosis ICD-10-CM Associated Orders   1. Pressure injury of right ischium, stage 4 ([facility])  L89.314 Wound cleansing and dressings     Wound Procedure Treatment      2. Pressure injury of left ischium, stage 4 (HCC)  L89.324 Wound cleansing and dressings     Wound Procedure Treatment     Chemical caut of a wound Pressure Injury Left;Posterior;Proximal Ischium      3. Functional quadriplegia secondary to MS (HCC)  G35 Wound cleansing and dressings    R53.2 Wound Procedure Treatment      4. Hypergranulation  L92.9 Chemical caut of a wound Pressure Injury Left;Posterior;Proximal Ischium              Assessment & Plan:  Stage IV pressure injuries of bilateral ischium in setting of functional quadriplegia from MS: Left ischium with hypergranular tissue requiring chemical cautery.  Right ischium clean and granular.  Both periwounds with mild maceration. To either wound -no exposed bone.  No acute erythema, increased warmth, lymphangitic streaking.  No purulence or malodor.  Chemical cautery completed of left ischial pressure injury only  As patient has been reports that the Betadine has been working well for them, keeping the wounds clean, and is tolerated well, will continue.  Apply bordered foam over top of Betadine  Offloading: Emphasized extreme importance of recommendation to schedule a [facility] for evaluation of Roho and wheelchair and for pressure remapping.  They expressed understanding and assured me they will do so  ER precautions reviewed, they expressed understanding  Follow-up in 3-4 weeks or sooner if needed    Portions of the record may have been created with voice recognition software. Occasional wrong word or \"sound alike\" substitutions may have occurred due to the inherent limitations " "of voice recognition software. Read the chart carefully and recognize, using context, where substitutions have occurred.    Subjective:   6/26/2025: Fanny presents today along with her  for follow-up.  They report they have not contacted [facility] clinic for pressure mapping/Roho adjustment.  She continues to spend most of her time in her chair against recommendations.  She otherwise reports she is doing well and denies fever, chills.  No other new acute complaints today.    *Below HPI history/summary per chart review of previous visits to the wound center with my colleague Dr. Ramirez*    \"6/5/2025: Follow-up of stage IV pressure injuries of both this year.  She has had problems with hypergranulation tissue treated with silver nitrate and Betadine.  Unfortunately she continues to spend most of her time including sleeping in her chair.  She does have a Roho cushion.  I had previously asked them to contact [facility] to get an appointment for pressure mapping and evaluation of her cushion.    5/1/2025: Follow-up stage IV pressure injuries of the left and right ischium.  Has been using Betadine and bordered foam to the left ischium with hypergranulation tissue.  No new complaints or problems.  Still spending time in chair.    4/10/2025: Follow-up stage IV pressure injuries to the left and right ischium.  Patient notes no further pain.  At last visit significant hypergranulation tissue was debrided and then subsequently cauterized.  Betadine has been used along with bordered foam.  No other complaints.    3/27/2025: Follow-up stage III pressure injury of the left ischium and stage IV pressure injury of the right ischium.   states that they felt the Hydrofera Blue classic was causing some discomfort and therefore discontinued this.  They went back to silver alginate.  She continues to spend almost all of her time in the chair and tends to sleep in the chair frequently.    2/20/2025: " Follow-up stage III pressure injuries of the left ischium and stage IV of the right ischium.   does not believe that there is been any improvement.  At last visit she had hypergranulation tissue cauterized.    1/30/2025: Follow-up bilateral  pressure injuries of the ischii.  Culture that was done at last visit was positive for 1+ MRSA.  However, she was just started on Bactrim for UTI which covered her MRSA.   believes that the ulcer has improved.    1/16/2025: Follow-up bilateral stage IV pressure injuries of the ischium.  Received [facility] message concerned about increased brown seropurulent drainage on the left.  Patient did not have any other systemic symptoms.  Appointment made for today.  Patient denies any fever or chills.    9/16/24: Patient presents to wound care center for follow-up visit of bilateral ischial wounds.  Patient has been using Mesalt to the wounds with bordered foam dressings to cover.  Patient is accompanied by her  who provides to wound care.  No wound care related complaints offered.  Denies increased pain or drainage to the wounds.  No fever or chills.    10/7/24: Patient presents to wound care center for follow-up visit bilateral ischial wounds.  Patient has been using Aquacel Ag ribbon to the wounds and bordered foam dressings.  Patient is accompanied by her  who provides the wound care.  No wound care related complaints offered today.  No reported increased pain or drainage related to the wounds.  No fever or chills.  Patient's  reports that patient spends majority of her day out of bed in wheelchair, patient does have offloading seating cushion to her wheelchair.  Patient states that it is more comfortable for her to be in her wheelchair then in bed.    12/5/2024: Follow-up of bilateral stage IV ischial pressure injuries.  Ran out of Aquacel Ag rope and now is back to using Mesalt.  Patient is not using her air mattress bed and spends her time sitting in  "her chair.  She even sleeps there.  No other new complaints.  As noted above, she feels more comfortable in her wheelchair than in her bed.\"        The following portions of the patient's history were reviewed and updated as appropriate:   Problem List[1]  Past Medical History[2]  Past Surgical History[3]  Family History[4]   Social History[5]   Current Medications[6]    Review of Systems   Constitutional:  Negative for chills and fever.   Musculoskeletal:  Positive for gait problem.        Functional quadriplegia    Skin:  Positive for wound.        B/l ischium   Neurological:  Positive for weakness (Functional quadriplegia) and numbness.   Psychiatric/Behavioral:  Negative for dysphoric mood. The patient is not nervous/anxious.        Objective:  /60   Pulse 72   Temp 97.5 °F (36.4 °C)   Resp 20   Pain Score: 0-No pain     Physical Exam  Constitutional:       General: She is awake.   HENT:      Head: Normocephalic and atraumatic.     Cardiovascular:      Rate and Rhythm: Normal rate.   Pulmonary:      Effort: Pulmonary effort is normal. No respiratory distress.     Skin:     General: Skin is warm and dry.      Findings: Wound present. No erythema.           Comments: 1/2- Left ischium with hypergranular tissue requiring chemical cautery.  Right ischium clean and granular.  Both periwounds with mild maceration. To either wound -no exposed bone.  No acute erythema, increased warmth, lymphangitic streaking.  No purulence or malodor.     Neurological:      Mental Status: She is alert and oriented to person, place, and time.      Gait: Gait abnormal.      Comments: Quadriplegia secondary to MS   Psychiatric:         Mood and Affect: Mood normal.         Behavior: Behavior normal. Behavior is cooperative.         Wound 01/10/24 Pressure Injury Ischium Left;Posterior;Proximal (Active)   Wound Image   06/05/25 1354   Wound Description Hypergranulation;Granulation tissue 06/26/25 1433   Pressure Injury Stage 4 " 06/05/25 1354   Non-staged Wound Description Full thickness 06/26/25 1433   Wound Length (cm) 2 cm 06/26/25 1433   Wound Width (cm) 2.5 cm 06/26/25 1433   Wound Depth (cm) 1 cm 06/26/25 1433   Wound Surface Area (cm^2) 3.93 cm^2 06/26/25 1433   Wound Volume (cm^3) 2.618 cm^3 06/26/25 1433   Calculated Wound Volume (cm^3) 5 cm^3 06/26/25 1433   Number of underminings 1 02/20/25 1511   Undermining 1 1 04/10/25 0810   Undermining 1 is depth extending from 10-12 04/10/25 0810   Drainage Amount Moderate 06/26/25 1433   Drainage Description Serosanguineous 06/26/25 1433   Ruchi-wound Assessment Intact 06/26/25 1433   Treatments Cleansed 05/13/24 0852   Dressing Wound V.A.C. 07/08/24 1134   Wound packed? No 02/20/25 1511   Packing- # removed 1 09/16/24 0928   Dressing Changed Changed 05/13/24 0852   Patient Tolerance Tolerated well 05/13/24 0852   Dressing Status Intact 06/26/25 1433       Wound 01/29/24 Pressure Injury Ischium Right (Active)   Wound Image   06/26/25 1428   Wound Description Granulation tissue 06/26/25 1433   Pressure Injury Stage 4 06/05/25 1354   Non-staged Wound Description Full thickness 06/26/25 1433   Wound Length (cm) 0.1 cm 06/26/25 1433   Wound Width (cm) 0.5 cm 06/26/25 1433   Wound Depth (cm) 0.5 cm 06/26/25 1433   Wound Surface Area (cm^2) 0.04 cm^2 06/26/25 1433   Wound Volume (cm^3) 0.013 cm^3 06/26/25 1433   Calculated Wound Volume (cm^3) 0.03 cm^3 06/26/25 1433   Change in Wound Size % 94 06/26/25 1433   Number of underminings 1 06/03/24 0900   Undermining 1 0.1 10/07/24 0905   Undermining 1 is depth extending from 12-12 o'clock 10/07/24 0905   Drainage Amount Moderate 06/26/25 1433   Drainage Description Serosanguineous 06/26/25 1433   Ruchi-wound Assessment Maceration;White 06/05/25 1354   Treatments Cleansed 05/13/24 0854   Wound packed? No 02/20/25 1515   Dressing Changed Changed 05/13/24 0854   Patient Tolerance Tolerated well 05/13/24 0854   Dressing Status Intact 06/26/25 1433          "Chemical caut of a wound Pressure Injury Left;Posterior;Proximal Ischium     Date/Time  6/26/2025 1:45 PM     Performed by  Adriana Hagen MD   Authorized by  Adriana Hagen MD      Associated wounds:   Wound 01/10/24 Pressure Injury Ischium Left;Posterior;Proximal     Universal Protocol   procedure performed by consultantConsent: Verbal consent obtained  Risks and benefits: risks, benefits and alternatives were discussed  Consent given by: patient  Time out: Immediately prior to procedure a \"time out\" was called to verify the correct patient, procedure, equipment, support staff and site/side marked as required.  Patient understanding: patient states understanding of the procedure being performed  Patient identity confirmed: verbally with patient      Local anesthesia used: no (Pt declined)     Anesthesia   Local anesthesia used: no (Pt declined)     Sedation   Patient sedated: no        Specimen: no    Culture: no   Procedure Details   Procedure Notes: Silver nitrate was used to cauterize the hypergranulation tissue.  Normal saline was used to neutralize the reaction.  1 silver nitrate stick was used for this procedure.  Patient tolerance: Patient tolerated the procedure well with no immediate complications              Results from last 6 Months   Lab Units 02/20/25  1615   WOUND CULTURE  1+ Growth of Methicillin Resistant Staphylococcus aureus*  1+ Growth of       Lab Results   Component Value Date    HGBA1C 5.2 01/09/2024       Wound Instructions:  Orders Placed This Encounter   Procedures    Wound cleansing and dressings     Left and right ischial wounds:     Wash your hands with soap and water.  Remove old dressing, discard into plastic bag and place in trash. Cleanse the wounds with soap and water. Pat dry. Do not use tissue or cotton balls. Do not scrub the wound. Pat dry using gauze.  Shower yes      Apply skin prep to skin surrounding wound     Left ischium:  Paint wound with betadine swab daily and cover " with abd and medfix tape or silicone border     Right ischium:  apply silicone border, change three times per week.     [facility] and  to continue with wound care dressing changes 3 times a week and as needed for excessive drainage     [facility] applied silver nitrate to wounds today.         Off-loading Instructions:     Remove Meño sling from chair during the day.     Keep weight and pressure off wounds as much as possible.     Continue to use ROHO style cushion when sitting. [facility] pressure [facility]     Try to lay in bed throughout the day to help offload pressure to wounds     Standing Status:   Future     Expiration Date:   7/3/2025    Wound Procedure Treatment     This order was created via procedure documentation    Chemical caut of a wound Pressure Injury Left;Posterior;Proximal Ischium     This order was created via procedure documentation     Adriana Hagen MD       [1]   Patient Active Problem List  Diagnosis    Suprapubic catheter (HCC)    Bladder calculus    Constipation    Recurrent major depressive disorder, in full remission (HCC)    Dysphagia    Dizziness    Hyperglycemia    Familial hypercholesterolemia    Lymphedema    Multiple sclerosis (HCC)    Muscle spasticity    Muscle weakness    Nephrolithiasis    Neurogenic bladder    Sleep disorder    Spinal stenosis of cervical region    Tremor    Vision loss    Vitamin B12 deficiency    Vitamin D deficiency    Functional quadriplegia secondary to MS (HCC)    Allergic rhinitis    Screening mammogram, encounter for    Medication management contract signed    Thrombocytopenia (HCC)    Serum total bilirubin elevated    Hydronephrosis with urinary obstruction due to ureteral calculus    Candidal vaginitis    Alkaline phosphatase elevation    Abnormal thyroid function test    Ureteral calculus, left    Increased endometrial stripe thickness    Peripheral edema    Tinnitus of both ears    Hypophonia    Chronic lower extremity edema     "Positive colorectal cancer screening using Cologuard test    Functional quadriplegia (HCC)   [2]   Past Medical History:  Diagnosis Date    Anesthesia     \"prefers if able to be put to sleep on stretcher before placed on table if possible due to severe spasticity    Bladder stones     Chronic pain disorder     neck    Contracture, right shoulder     right arm and hand    Dependent on wheelchair     motorized    Edema     dependant lower extremities    Elevated alkaline phosphatase level     Mildly elevated total, normal isoenzymes 4/15/15, normal 1/17; last assessed: [id number]    Fernandez catheter in place     #24 to large bag(silicone catheter)    Gallbladder disease     History of femur fracture     right leg    History of UTI     MS (multiple sclerosis) ([facility])     Muscle spasticity     especially with touch    Muscle weakness     Muscular dystrophy ([facility])     Neck pain     Neurogenic bladder     Paralysis ([facility])     bilateral lower extremities    Port-A-Cath in place     left chest,\" hasn't used in approx 1 yr or so\"    Pressure injury of skin     right ischium    Sacral pressure sore     \"shearing, tegaderm in place,\"    Swallowing difficulty     Tinnitus     Urinary incontinence    [3]   Past Surgical History:  Procedure Laterality Date    BLADDER STONE REMOVAL N/A 12/4/2017    Procedure: CYSTO, LITHOLOPAXY HOLMIUM LASER;  Surgeon: Damir Osborne MD;  Location: [facility];  Service: Urology    BLADDER SURGERY      CHOLECYSTECTOMY      CYSTOSCOPY  06/15/2020    ESOPHAGOGASTRODUODENOSCOPY      FL RETROGRADE PYELOGRAM  10/2/2020    FL RETROGRADE PYELOGRAM  11/3/2020    KIDNEY STONE SURGERY      PORTACATH PLACEMENT Left     [place] CYSTO/URETERO W/LITHOTRIPSY &INDWELL STENT INSRT Left 11/3/2020    Procedure: CYSTOSCOPY URETEROSCOPY WITH RETROGRADE PYELOGRAM AND EXCHANGE STENT URETERAL, SP TUBE EXCHANGE, BASKET STONE EXTRACTION, BLADDER STONE EXTRACTION;  Surgeon: Damir Osborne MD;  Location: [facility];  Service: Urology "    MD CYSTOURETHROSCOPY W/URETERAL CATHETERIZATION Left 10/2/2020    Procedure: CYSTOSCOPY LEFT RETROGRADE ; LEFT URETEROSCOPY; PYELOGRAM WITH STENT INSERTION AND SUPERPUBIC EXCHANGE;  Surgeon: Philip Mcdonald MD;  Location: [facility];  Service: Urology    MD LITHOLAPAXY SMPL/SM <2.5 CM N/A 12/22/2022    Procedure: Cystolitholapaxy w/  laser lithotripsy of bladder stones; SUPRAPUBIC CATHETER CHANGE;  Surgeon: Damir Osborne MD;  Location: [facility];  Service: Urology    SUPRAPUBIC CYSTOSTOMY  02/25/2014    last assessed: [id number]   [4]   Family History  Problem Relation Name Age of Onset    Diabetes Mother      Hyperlipidemia Mother      Hypertension Mother      COPD Father      Hypertension Father      Hyperlipidemia Sister      Alzheimer's disease Family      Diabetes Family      Hypertension Family      Heart disease Family     [5]   Social History  Socioeconomic History    Marital status: /Civil Union   Tobacco Use    Smoking status: Never    Smokeless tobacco: Never   Vaping Use    Vaping status: Never Used   Substance and Sexual Activity    Alcohol use: Not Currently    Drug use: Yes    Sexual activity: Yes   Social History Narrative    Caffeine use    Currently on disability    Daily coffee consumption (2 cups/day)    Educational level- completed 2nd year college    Lives with adult children    Not currently employed    Wheelchair dependent      Social Drivers of Health     Financial Resource Strain: Low Risk  (2/20/2023)    Overall Financial Resource Strain (CARDIA)     Difficulty of Paying Living Expenses: Not hard at all   Food Insecurity: No Food Insecurity (12/20/2024)    Nursing - Inadequate Food Risk Classification     Worried About Running Out of Food in the Last Year: Never true     Ran Out of Food in the Last Year: Never true     Ran Out of Food in the Last Year: Never true   Transportation Needs: No Transportation Needs (12/23/2024)    OASIS : Transportation     Lack of  "Transportation (Medical): No     Lack of Transportation (Non-Medical): No     Patient Unable or Declines to Respond: No   Intimate Partner Violence: Unknown (12/20/2024)    Nursing IPS     Physically Hurt by Someone: No     Hurt or Threatened by Someone: No   Housing Stability: Unknown (12/20/2024)    Nursing: Inadequate Housing Risk Classification     Unable to Pay for Housing in the Last Year: No     Has Housing: No   [6]   Current Outpatient Medications:     acetaminophen (TYLENOL) 500 mg tablet, Take 500 mg by mouth every 6 (six) hours as needed for mild pain, Disp: , Rfl:     baclofen 20 mg tablet, TAKE 1 TABLET BY MOUTH 5 TIMES AS NEEDED, Disp: 150 tablet, Rfl: 11    Cyanocobalamin (B-12) 1000 MCG SUBL, Dissolve 1 tablet under tongue daily, Disp: 30 tablet, Rfl: 5    DULoxetine (CYMBALTA) 20 mg capsule, Take 1 capsule (20 mg total) by mouth daily, Disp: 90 capsule, Rfl: 3    furosemide (LASIX) 20 mg tablet, Take 1 tablet (20 mg total) by mouth daily, Disp: 90 tablet, Rfl: 3    gabapentin (NEURONTIN) 300 mg capsule, Take 1 capsule (300 mg total) by mouth 3 (three) times a day, Disp: 270 capsule, Rfl: 3    ibuprofen (MOTRIN) 200 mg tablet, Take 200 mg by mouth every 6 (six) hours as needed for moderate pain, Disp: , Rfl:     oxybutynin (DITROPAN-XL) 10 MG 24 hr tablet, Take 1 tablet (10 mg total) by mouth daily, Disp: 90 tablet, Rfl: 3    rosuvastatin (CRESTOR) 5 mg tablet, Take 1 tablet (5 mg total) by mouth daily, Disp: 90 tablet, Rfl: 3    senna (SENOKOT) 8.6 mg, Take 8.6 mg by mouth if needed for constipation Daily as needed for constipation, Disp: , Rfl:     SYRINGE-NEEDLE, DISP, 3 ML 25G X 5/8\" 3 ML MISC, Use once a week Use syringes for B12 injections once a week for 3 weeks, then once a month for 5 months., Disp: 8 each, Rfl: 0    "

## 2025-06-26 ENCOUNTER — OFFICE VISIT (OUTPATIENT)
Dept: WOUND CARE | Facility: HOSPITAL | Age: 55
End: 2025-06-26
Payer: MEDICARE

## 2025-06-26 ENCOUNTER — HOME CARE VISIT (OUTPATIENT)
Dept: HOME HEALTH SERVICES | Facility: HOME HEALTHCARE | Age: 55
End: 2025-06-26
Payer: MEDICARE

## 2025-06-26 VITALS
DIASTOLIC BLOOD PRESSURE: 60 MMHG | SYSTOLIC BLOOD PRESSURE: 110 MMHG | RESPIRATION RATE: 20 BRPM | HEART RATE: 72 BPM | TEMPERATURE: 97.5 F

## 2025-06-26 DIAGNOSIS — R53.2 FUNCTIONAL QUADRIPLEGIA SECONDARY TO MS (HCC): ICD-10-CM

## 2025-06-26 DIAGNOSIS — L89.324 PRESSURE INJURY OF LEFT ISCHIUM, STAGE 4 (HCC): ICD-10-CM

## 2025-06-26 DIAGNOSIS — L92.9 HYPERGRANULATION: ICD-10-CM

## 2025-06-26 DIAGNOSIS — G35 FUNCTIONAL QUADRIPLEGIA SECONDARY TO MS (HCC): ICD-10-CM

## 2025-06-26 DIAGNOSIS — L89.314 PRESSURE INJURY OF RIGHT ISCHIUM, STAGE 4 (HCC): Primary | ICD-10-CM

## 2025-06-26 PROCEDURE — 17250 CHEM CAUT OF GRANLTJ TISSUE: CPT | Performed by: FAMILY MEDICINE

## 2025-06-26 PROCEDURE — 99214 OFFICE O/P EST MOD 30 MIN: CPT | Performed by: FAMILY MEDICINE

## 2025-06-26 NOTE — PATIENT INSTRUCTIONS
Orders Placed This Encounter   Procedures    Wound cleansing and dressings     Left and right ischial wounds:     Wash your hands with soap and water.  Remove old dressing, discard into plastic bag and place in trash. Cleanse the wounds with soap and water. Pat dry. Do not use tissue or cotton balls. Do not scrub the wound. Pat dry using gauze.  Shower yes      Apply skin prep to skin surrounding wound     Left ischium:  Paint wound with betadine swab daily and cover with abd and medfix tape or silicone border     Right ischium:  apply silicone border, change three times per week.     [facility] and  to continue with wound care dressing changes 3 times a week and as needed for excessive drainage     C applied silver nitrate to wounds today.         Off-loading Instructions:     Remove Meño sling from chair during the day.     Keep weight and pressure off wounds as much as possible.     Continue to use ROHO style cushion when sitting. Have pressure mapping done at [facility]     Try to lay in bed throughout the day to help offload pressure to wounds     Standing Status:   Future     Expiration Date:   7/3/2025

## 2025-06-26 NOTE — PROGRESS NOTES
Wound Procedure Treatment    Performed by: Thelma Manrique RN  Authorized by: Adriana Hagen MD  Associated wounds:   Wound 01/10/24 Pressure Injury Ischium Left;Posterior;Proximal  Wound 01/29/24 Pressure Injury Ischium Right    Applied primary dressing:  Calcium alginate, Silver and Silicone bordered foam

## 2025-06-30 ENCOUNTER — HOME CARE VISIT (OUTPATIENT)
Dept: HOME HEALTH SERVICES | Facility: HOME HEALTHCARE | Age: 55
End: 2025-06-30
Payer: MEDICARE

## 2025-07-07 ENCOUNTER — HOME CARE VISIT (OUTPATIENT)
Dept: HOME HEALTH SERVICES | Facility: HOME HEALTHCARE | Age: 55
End: 2025-07-07
Payer: MEDICARE

## 2025-07-07 VITALS
TEMPERATURE: 97.9 F | SYSTOLIC BLOOD PRESSURE: 102 MMHG | OXYGEN SATURATION: 99 % | HEART RATE: 92 BPM | DIASTOLIC BLOOD PRESSURE: 56 MMHG

## 2025-07-07 PROCEDURE — G0299 HHS/HOSPICE OF RN EA 15 MIN: HCPCS

## 2025-07-14 ENCOUNTER — HOME CARE VISIT (OUTPATIENT)
Dept: HOME HEALTH SERVICES | Facility: HOME HEALTHCARE | Age: 55
End: 2025-07-14
Payer: MEDICARE

## 2025-07-18 ENCOUNTER — HOME CARE VISIT (OUTPATIENT)
Dept: HOME HEALTH SERVICES | Facility: HOME HEALTHCARE | Age: 55
End: 2025-07-18
Payer: MEDICARE

## 2025-07-18 VITALS
SYSTOLIC BLOOD PRESSURE: 102 MMHG | HEART RATE: 104 BPM | TEMPERATURE: 97.7 F | OXYGEN SATURATION: 100 % | DIASTOLIC BLOOD PRESSURE: 72 MMHG

## 2025-07-18 PROCEDURE — G0299 HHS/HOSPICE OF RN EA 15 MIN: HCPCS

## 2025-07-18 RX ADMIN — BACLOFEN 20 MG: 20 TABLET ORAL at 09:14

## 2025-07-21 ENCOUNTER — HOME CARE VISIT (OUTPATIENT)
Dept: HOME HEALTH SERVICES | Facility: HOME HEALTHCARE | Age: 55
End: 2025-07-21
Payer: MEDICARE

## 2025-07-22 ENCOUNTER — HOME CARE VISIT (OUTPATIENT)
Dept: HOME HEALTH SERVICES | Facility: HOME HEALTHCARE | Age: 55
End: 2025-07-22
Payer: MEDICARE

## 2025-07-22 VITALS
HEART RATE: 82 BPM | TEMPERATURE: 97.6 F | OXYGEN SATURATION: 95 % | SYSTOLIC BLOOD PRESSURE: 104 MMHG | DIASTOLIC BLOOD PRESSURE: 64 MMHG

## 2025-07-22 PROCEDURE — 400014 VN F/U

## 2025-07-22 PROCEDURE — G0299 HHS/HOSPICE OF RN EA 15 MIN: HCPCS

## 2025-07-24 ENCOUNTER — HOME CARE VISIT (OUTPATIENT)
Dept: HOME HEALTH SERVICES | Facility: HOME HEALTHCARE | Age: 55
End: 2025-07-24
Payer: MEDICARE

## 2025-07-24 VITALS
HEART RATE: 89 BPM | OXYGEN SATURATION: 96 % | TEMPERATURE: 97.3 F | RESPIRATION RATE: 18 BRPM | DIASTOLIC BLOOD PRESSURE: 54 MMHG | SYSTOLIC BLOOD PRESSURE: 115 MMHG

## 2025-07-24 PROCEDURE — G0300 HHS/HOSPICE OF LPN EA 15 MIN: HCPCS

## 2025-07-28 ENCOUNTER — HOME CARE VISIT (OUTPATIENT)
Dept: HOME HEALTH SERVICES | Facility: HOME HEALTHCARE | Age: 55
End: 2025-07-28
Payer: MEDICARE

## 2025-07-28 VITALS
HEART RATE: 84 BPM | DIASTOLIC BLOOD PRESSURE: 70 MMHG | SYSTOLIC BLOOD PRESSURE: 104 MMHG | TEMPERATURE: 98.1 F | OXYGEN SATURATION: 97 %

## 2025-07-28 PROCEDURE — G0299 HHS/HOSPICE OF RN EA 15 MIN: HCPCS

## 2025-07-31 ENCOUNTER — HOME CARE VISIT (OUTPATIENT)
Dept: HOME HEALTH SERVICES | Facility: HOME HEALTHCARE | Age: 55
End: 2025-07-31
Payer: MEDICARE

## 2025-07-31 ENCOUNTER — OFFICE VISIT (OUTPATIENT)
Dept: WOUND CARE | Facility: HOSPITAL | Age: 55
End: 2025-07-31
Payer: MEDICARE

## 2025-07-31 VITALS
TEMPERATURE: 98 F | RESPIRATION RATE: 16 BRPM | HEART RATE: 80 BPM | SYSTOLIC BLOOD PRESSURE: 104 MMHG | DIASTOLIC BLOOD PRESSURE: 70 MMHG

## 2025-07-31 DIAGNOSIS — L89.314 PRESSURE INJURY OF RIGHT ISCHIUM, STAGE 4 (HCC): Primary | ICD-10-CM

## 2025-07-31 DIAGNOSIS — G35 FUNCTIONAL QUADRIPLEGIA SECONDARY TO MS (HCC): ICD-10-CM

## 2025-07-31 DIAGNOSIS — L89.324 PRESSURE INJURY OF LEFT ISCHIUM, STAGE 4 (HCC): ICD-10-CM

## 2025-07-31 DIAGNOSIS — R53.2 FUNCTIONAL QUADRIPLEGIA SECONDARY TO MS (HCC): ICD-10-CM

## 2025-07-31 PROCEDURE — 17250 CHEM CAUT OF GRANLTJ TISSUE: CPT | Performed by: FAMILY MEDICINE

## 2025-07-31 PROCEDURE — 11042 DBRDMT SUBQ TIS 1ST 20SQCM/<: CPT | Performed by: FAMILY MEDICINE

## 2025-08-04 ENCOUNTER — HOME CARE VISIT (OUTPATIENT)
Dept: HOME HEALTH SERVICES | Facility: HOME HEALTHCARE | Age: 55
End: 2025-08-04
Payer: MEDICARE

## 2025-08-04 VITALS
DIASTOLIC BLOOD PRESSURE: 56 MMHG | SYSTOLIC BLOOD PRESSURE: 106 MMHG | TEMPERATURE: 98.3 F | OXYGEN SATURATION: 100 % | HEART RATE: 74 BPM

## 2025-08-04 PROCEDURE — G0299 HHS/HOSPICE OF RN EA 15 MIN: HCPCS

## 2025-08-11 ENCOUNTER — HOME CARE VISIT (OUTPATIENT)
Dept: HOME HEALTH SERVICES | Facility: HOME HEALTHCARE | Age: 55
End: 2025-08-11
Payer: MEDICARE

## 2025-08-15 ENCOUNTER — HOME CARE VISIT (OUTPATIENT)
Dept: HOME HEALTH SERVICES | Facility: HOME HEALTHCARE | Age: 55
End: 2025-08-15
Payer: MEDICARE

## 2025-08-18 ENCOUNTER — HOME CARE VISIT (OUTPATIENT)
Dept: HOME HEALTH SERVICES | Facility: HOME HEALTHCARE | Age: 55
End: 2025-08-18
Payer: MEDICARE

## 2025-08-20 ENCOUNTER — OFFICE VISIT (OUTPATIENT)
Dept: UROLOGY | Facility: AMBULATORY SURGERY CENTER | Age: 55
End: 2025-08-20
Payer: MEDICARE

## 2025-08-20 VITALS
DIASTOLIC BLOOD PRESSURE: 68 MMHG | HEART RATE: 89 BPM | SYSTOLIC BLOOD PRESSURE: 118 MMHG | RESPIRATION RATE: 14 BRPM | OXYGEN SATURATION: 100 %

## 2025-08-20 DIAGNOSIS — N20.0 NEPHROLITHIASIS: ICD-10-CM

## 2025-08-20 DIAGNOSIS — N21.0 BLADDER CALCULUS: ICD-10-CM

## 2025-08-20 DIAGNOSIS — N31.9 NEUROGENIC BLADDER: Primary | ICD-10-CM

## 2025-08-20 LAB
BACTERIA UR QL AUTO: ABNORMAL /HPF
BILIRUB UR QL STRIP: NEGATIVE
CLARITY UR: ABNORMAL
COLOR UR: ABNORMAL
GLUCOSE UR STRIP-MCNC: NEGATIVE MG/DL
HGB UR QL STRIP.AUTO: ABNORMAL
KETONES UR STRIP-MCNC: ABNORMAL MG/DL
LEUKOCYTE ESTERASE UR QL STRIP: ABNORMAL
MUCOUS THREADS UR QL AUTO: ABNORMAL
NITRITE UR QL STRIP: NEGATIVE
NON-SQ EPI CELLS URNS QL MICRO: ABNORMAL /HPF
PH UR STRIP.AUTO: 8 [PH]
PROT UR STRIP-MCNC: ABNORMAL MG/DL
RBC #/AREA URNS AUTO: ABNORMAL /HPF
SP GR UR STRIP.AUTO: 1.01 (ref 1–1.03)
UROBILINOGEN UR STRIP-ACNC: <2 MG/DL
WBC #/AREA URNS AUTO: ABNORMAL /HPF

## 2025-08-20 PROCEDURE — 99214 OFFICE O/P EST MOD 30 MIN: CPT

## 2025-08-20 RX ORDER — LEVOFLOXACIN 500 MG/1
500 TABLET, FILM COATED ORAL EVERY 24 HOURS
Qty: 5 TABLET | Refills: 0 | Status: SHIPPED | OUTPATIENT
Start: 2025-08-20 | End: 2025-08-25

## 2025-08-22 ENCOUNTER — HOME CARE VISIT (OUTPATIENT)
Dept: HOME HEALTH SERVICES | Facility: HOME HEALTHCARE | Age: 55
End: 2025-08-22
Payer: MEDICARE

## 2025-08-22 VITALS
DIASTOLIC BLOOD PRESSURE: 68 MMHG | HEART RATE: 74 BPM | SYSTOLIC BLOOD PRESSURE: 96 MMHG | TEMPERATURE: 98 F | OXYGEN SATURATION: 98 %

## 2025-08-22 LAB — BACTERIA UR CULT: ABNORMAL

## 2025-08-22 PROCEDURE — G0299 HHS/HOSPICE OF RN EA 15 MIN: HCPCS

## (undated) DEVICE — Device

## (undated) DEVICE — GLOVE SRG BIOGEL 7.5

## (undated) DEVICE — CATH URETERAL 5FR X 70 CM FLEX TIP POLYUR BARD

## (undated) DEVICE — TUBING SUCTION 5MM X 12 FT

## (undated) DEVICE — PACK TUR

## (undated) DEVICE — UROCATCH BAG

## (undated) DEVICE — 3000CC GUARDIAN II: Brand: GUARDIAN

## (undated) DEVICE — CATH FOLEY 12FR 5ML 2 WAY SILICONE ELASTIMER

## (undated) DEVICE — 3M™ TEGADERM™ TRANSPARENT FILM DRESSING FRAME STYLE, 1624W, 2-3/8 IN X 2-3/4 IN (6 CM X 7 CM), 100/CT 4CT/CASE: Brand: 3M™ TEGADERM™

## (undated) DEVICE — SYRINGE 10ML LL

## (undated) DEVICE — GLOVE INDICATOR PI UNDERGLOVE SZ 8 BLUE

## (undated) DEVICE — SPECIMEN CONTAINER STERILE PEEL PACK

## (undated) DEVICE — GUIDEWIRE STRGHT TIP 0.035 IN  SOLO PLUS

## (undated) DEVICE — FIBER STD QUANTA 1000 MICRON

## (undated) DEVICE — BASKET STONE RTRVL ZERO TIP 2.4FR

## (undated) DEVICE — GUIDEWIRE ANGLED TIP 0.035 IN SOLO PLUS

## (undated) DEVICE — LASER HOLMUIUM FIBER 1000 MIC

## (undated) DEVICE — 3M™ STERI-STRIP™ COMPOUND BENZOIN TINCTURE 40 BAGS/CARTON 4 CARTONS/CASE C1544: Brand: 3M™ STERI-STRIP™

## (undated) DEVICE — CATH FOLEY 24FR 5ML 24FR 2 WAY UNCOATED SILICONE

## (undated) DEVICE — SINGLE PORT MANIFOLD: Brand: NEPTUNE 2

## (undated) DEVICE — SCD SEQUENTIAL COMPRESSION COMFORT SLEEVE MEDIUM KNEE LENGTH: Brand: KENDALL SCD

## (undated) DEVICE — STERILE CYSTO PACK: Brand: CARDINAL HEALTH

## (undated) DEVICE — BAG URINE DRAINAGE 2000ML ANTI RFLX LF